# Patient Record
Sex: FEMALE | Race: WHITE | Employment: UNEMPLOYED | URBAN - METROPOLITAN AREA
[De-identification: names, ages, dates, MRNs, and addresses within clinical notes are randomized per-mention and may not be internally consistent; named-entity substitution may affect disease eponyms.]

---

## 2017-01-06 ENCOUNTER — OFFICE VISIT (OUTPATIENT)
Dept: FAMILY MEDICINE CLINIC | Age: 62
End: 2017-01-06

## 2017-01-06 ENCOUNTER — DOCUMENTATION ONLY (OUTPATIENT)
Dept: FAMILY MEDICINE CLINIC | Age: 62
End: 2017-01-06

## 2017-01-06 VITALS
HEIGHT: 64 IN | WEIGHT: 130.6 LBS | SYSTOLIC BLOOD PRESSURE: 152 MMHG | HEART RATE: 74 BPM | OXYGEN SATURATION: 92 % | DIASTOLIC BLOOD PRESSURE: 70 MMHG | RESPIRATION RATE: 20 BRPM | BODY MASS INDEX: 22.3 KG/M2 | TEMPERATURE: 98.2 F

## 2017-01-06 DIAGNOSIS — Z95.2 S/P AVR (AORTIC VALVE REPLACEMENT): Primary | ICD-10-CM

## 2017-01-06 DIAGNOSIS — Z79.01 ON WARFARIN THERAPY: ICD-10-CM

## 2017-01-06 DIAGNOSIS — J44.1 COPD EXACERBATION (HCC): ICD-10-CM

## 2017-01-06 LAB
INR BLD: 1.8
PT POC: 22 SEC
VALID INTERNAL CONTROL?: YES

## 2017-01-06 RX ORDER — AZITHROMYCIN 250 MG/1
TABLET, FILM COATED ORAL
Qty: 6 TAB | Refills: 0 | Status: SHIPPED | OUTPATIENT
Start: 2017-01-06 | End: 2017-02-21 | Stop reason: ALTCHOICE

## 2017-01-06 RX ORDER — WARFARIN 2 MG/1
2 TABLET ORAL DAILY
Qty: 30 TAB | Refills: 5 | Status: SHIPPED | OUTPATIENT
Start: 2017-01-06 | End: 2017-06-08 | Stop reason: SDUPTHER

## 2017-01-06 NOTE — PROGRESS NOTES
Pt here for PT INR   Also c/o cough, congestion and sore throat for past two days   Results for orders placed or performed in visit on 01/06/17   AMB POC PT/INR   Result Value Ref Range    VALID INTERNAL CONTROL POC Yes     Prothrombin time (POC) 22.0 sec    INR POC 1.8

## 2017-01-06 NOTE — PROGRESS NOTES
Jesus Jacobsen is a 63 y.o. female   Chief Complaint   Patient presents with    Coagulation disorder    Cold Symptoms   Pt states she is having a lot of coughing and congestion. Very little production. Pt states dark yellow when it does come up. Pt breathing is also getting a little more difficult but nothing severe. PA for symbicort has been completed. Chief Complaint   Patient presents with    Coagulation disorder    Cold Symptoms     she is a 63y.o. year old female who presents for evalution. Reviewed PmHx, RxHx, FmHx, SocHx, AllgHx and updated and dated in the chart. Review of Systems - negative except as listed above in the HPI    Objective:     Vitals:    01/06/17 1555   BP: 152/70   Pulse: 74   Resp: 20   Temp: 98.2 °F (36.8 °C)   TempSrc: Oral   SpO2: 92%   Weight: 130 lb 9.6 oz (59.2 kg)   Height: 5' 4\" (1.626 m)       Current Outpatient Prescriptions   Medication Sig    warfarin (COUMADIN) 2 mg tablet Take 1 Tab by mouth daily.  azithromycin (ZITHROMAX) 250 mg tablet Take 2 tablets today, then take 1 tablet daily    lovastatin (MEVACOR) 10 mg tablet TAKE 1 TABLET EVERY NIGHT    oxyCODONE-acetaminophen (PERCOCET) 5-325 mg per tablet Take 1 Tab by mouth every six (6) hours as needed for Pain. Max Daily Amount: 4 Tabs.  enoxaparin (LOVENOX) 60 mg/0.6 mL injection 60 mg by SubCUTAneous route every twelve (12) hours.  ferrous sulfate (SLOW FE) 142 mg (45 mg iron) ER tablet Take 1 Tab by mouth two (2) times daily (with meals).  famotidine (PEPCID) 40 mg tablet Take 1 Tab by mouth two (2) times a day.  docusate sodium (COLACE) 100 mg capsule Take 100 mg by mouth two (2) times daily as needed for Constipation.  budesonide-formoterol (SYMBICORT) 160-4.5 mcg/actuation HFA inhaler Take 2 Puffs by inhalation two (2) times a day.  predniSONE (DELTASONE) 5 mg tablet TAKE 1 TABLET EVERY DAY    bumetanide (BUMEX) 1 mg tablet Take 1 Tab by mouth daily.     diltiazem (CARDIZEM) 60 mg tablet Take 1 Tab by mouth Before breakfast, lunch, dinner and at bedtime.  alendronate (FOSAMAX) 70 mg tablet Take 1 Tab by mouth every Wednesday.  albuterol (PROVENTIL VENTOLIN) 2.5 mg /3 mL (0.083 %) nebulizer solution 3 mL by Nebulization route every four (4) hours as needed for Wheezing or Shortness of Breath.  albuterol (VENTOLIN HFA) 90 mcg/actuation inhaler Take 2 Puffs by inhalation every four (4) hours as needed for Wheezing or Shortness of Breath. No current facility-administered medications for this visit. Physical Examination: General appearance - alert, well appearing, and in no distress  Eyes - pupils equal and reactive, extraocular eye movements intact  Chest - coarse b/l  Heart - normal rate, regular rhythm, normal S1, S2, no murmurs, rubs, clicks or gallops      Assessment/ Plan:   Chayito Contreras was seen today for coagulation disorder and cold symptoms. Diagnoses and all orders for this visit:    S/P AVR (aortic valve replacement)  -     warfarin (COUMADIN) 2 mg tablet; Take 1 Tab by mouth daily. On warfarin therapy  -     AMB POC PT/INR    COPD exacerbation (HCC)  -     azithromycin (ZITHROMAX) 250 mg tablet; Take 2 tablets today, then take 1 tablet daily     pt to check inr in 1 week and call with result if does not have testing strips will need appt. Follow-up Disposition:  Return in about 1 week (around 1/13/2017), or if symptoms worsen or fail to improve. I have discussed the diagnosis with the patient and the intended plan as seen in the above orders. The patient has received an after-visit summary and questions were answered concerning future plans. Pt conveyed understanding of plan.     Medication Side Effects and Warnings were discussed with patient      Leisa Mccoy DO

## 2017-01-06 NOTE — PATIENT INSTRUCTIONS
Azithromycin (By mouth)   Azithromycin ([id number])  Treats infections. This medicine is a macrolide antibiotic. Brand Name(s):Zithromax, Zithromax Tri-Rodger, Zithromax Z-Rodger, Zmax   There may be other brand names for this medicine. When This Medicine Should Not Be Used: This medicine is not right for everyone. Do not use it if you had an allergic reaction to azithromycin, erythromycin, or any other macrolide or ketolide antibiotic (such as clarithromycin, telithromycin), or if you have a history of liver problems caused by azithromycin. How to Use This Medicine:   Capsule, Liquid, Packet, Powder, Tablet  · Your doctor will tell you how much medicine to use. Do not use more than directed. · Read and follow the patient instructions that come with this medicine. Talk to your doctor or pharmacist if you have any questions. · Multiple dose (Zithromax® oral liquid or tablets):   ¨ You may take this medicine with or without food. ¨ Take all of the medicine in your prescription to clear up your infection, even if you feel better after the first few doses. ¨ Shake the bottle well before you measure the medicine. Measure the oral liquid medicine with a marked measuring spoon, oral syringe, or medicine cup. · Single dose (Zmax® extended-release oral liquid or powder):   ¨ Liquid:   § Take this medicine on an empty stomach at least 1 hour before you eat, or 2 hours after you eat. § Call your doctor right away if you vomit within 1 hour after you take the medicine. § You must take the liquid within 12 hours after the pharmacist gives it to you. § Shake the bottle well before you measure the medicine. Measure your dose with a marked measuring spoon, cup, or syringe. ¨ Powder:   § Open 1 packet and pour all of the medicine into a glass with about 2 ounces (¼ cup) of water. Mix well and drink the medicine right away.  Pour another 2 ounces of water into the same glass, and drink the remaining medicine. · Missed dose: If you are taking multiple doses, take the dose as soon as you remember. If it is almost time for your next dose, wait until then to take a regular dose. Do not use extra medicine to make up for a missed dose. · Store the medicine in a closed container at room temperature, away from heat, moisture, and direct light. · Oral liquid for multiple doses: Store at room temperature or in the refrigerator. Use it within 10 days of filling the prescription. · Oral liquid for 1 dose only: Store at room temperature. Do not store in the refrigerator or allow the medicine to freeze. · Extended-release oral liquid: Do not refrigerate or freeze. Drugs and Foods to Avoid:   Ask your doctor or pharmacist before using any other medicine, including over-the-counter medicines, vitamins, and herbal products. · Some medicines can affect how azithromycin works. Tell your doctor if you are also using cyclosporine, digoxin, nelfinavir, an ergot medicine, medicine for a heart rhythm problem, medicine for seizures, or a blood thinner. · Zithromax® for multiple doses: Do not take an antacid that contains magnesium or aluminum at the same time you take Zithromax®. An antacid will affect how the medicine works. Antacids will not affect Zmax® for single dose. Warnings While Using This Medicine:   · Tell your doctor if you are pregnant or breastfeeding, or if you have kidney disease, liver disease, heart disease, heart rhythm problems, heart failure, low potassium or magnesium in the blood, or myasthenia gravis. Tell your doctor if anyone in your family has heart rhythm problems. · This medicine may cause the following problems:   ¨ Liver problems  ¨ Heart rhythm problems  · This medicine can cause diarrhea. Call your doctor if the diarrhea becomes severe, does not stop, or is bloody. Do not take any medicine to stop diarrhea until you have talked to your doctor.  Diarrhea can occur 2 months or more after you stop taking this medicine. It may occur 2 months or more after you stop using this medicine. · Call your doctor if your symptoms do not improve or if they get worse. · Keep all medicine out of the reach of children. Never share your medicine with anyone. Possible Side Effects While Using This Medicine:   Call your doctor right away if you notice any of these side effects:  · Allergic reaction: Itching or hives, swelling in your face or hands, swelling or tingling in your mouth or throat, chest tightness, trouble breathing  · Blistering, peeling, or red skin rash  · Dark urine, pale stools, nausea, vomiting, loss of appetite, stomach pain, yellow skin or eyes  · Double vision, unusual tiredness or weakness  · Fainting, dizziness, lightheadedness  · Fast, pounding, or uneven heartbeat, chest pain  · Severe diarrhea that may contain blood, stomach cramps, fever  If you notice these less serious side effects, talk with your doctor:   · Mild diarrhea, nausea, vomiting, stomach pain  If you notice other side effects that you think are caused by this medicine, tell your doctor. Call your doctor for medical advice about side effects. You may report side effects to FDA at 7-817-FDA-4105  © 2016 [address] is for End User's use only and may not be sold, redistributed or otherwise used for commercial purposes. The above information is an  only. It is not intended as medical advice for individual conditions or treatments. Talk to your doctor, nurse or pharmacist before following any medical regimen to see if it is safe and effective for you.

## 2017-01-09 RX ORDER — FLUTICASONE FUROATE AND VILANTEROL 100; 25 UG/1; UG/1
1 POWDER RESPIRATORY (INHALATION) DAILY
Qty: 1 INHALER | Refills: 5 | Status: SHIPPED | OUTPATIENT
Start: 2017-01-09 | End: 2017-02-21

## 2017-02-13 ENCOUNTER — TELEPHONE (OUTPATIENT)
Dept: FAMILY MEDICINE CLINIC | Age: 62
End: 2017-02-13

## 2017-02-13 NOTE — TELEPHONE ENCOUNTER
[facility] called. They need an end date for the INR supplies. Please fax to [facility]. Info that was provided before.

## 2017-02-14 NOTE — TELEPHONE ENCOUNTER
Spoke with rep stated they know pt's med hx and aware inr supplies are life long; no further action required at this time. stated will call office back if further documentation is needed.

## 2017-02-21 ENCOUNTER — OFFICE VISIT (OUTPATIENT)
Dept: FAMILY MEDICINE CLINIC | Age: 62
End: 2017-02-21

## 2017-02-21 VITALS
DIASTOLIC BLOOD PRESSURE: 62 MMHG | TEMPERATURE: 98.2 F | OXYGEN SATURATION: 97 % | BODY MASS INDEX: 21.7 KG/M2 | SYSTOLIC BLOOD PRESSURE: 122 MMHG | RESPIRATION RATE: 18 BRPM | HEART RATE: 61 BPM | WEIGHT: 126.4 LBS

## 2017-02-21 DIAGNOSIS — J43.9 PULMONARY EMPHYSEMA, UNSPECIFIED EMPHYSEMA TYPE (HCC): ICD-10-CM

## 2017-02-21 DIAGNOSIS — M54.50 CHRONIC MIDLINE LOW BACK PAIN WITHOUT SCIATICA: Primary | ICD-10-CM

## 2017-02-21 DIAGNOSIS — G89.29 CHRONIC MIDLINE LOW BACK PAIN WITHOUT SCIATICA: Primary | ICD-10-CM

## 2017-02-21 DIAGNOSIS — S32.000S LUMBAR COMPRESSION FRACTURE, SEQUELA: ICD-10-CM

## 2017-02-21 DIAGNOSIS — Z23 ENCOUNTER FOR IMMUNIZATION: ICD-10-CM

## 2017-02-21 RX ORDER — OXYCODONE AND ACETAMINOPHEN 5; 325 MG/1; MG/1
1 TABLET ORAL
Qty: 120 TAB | Refills: 0 | Status: SHIPPED | OUTPATIENT
Start: 2017-02-21 | End: 2017-03-29 | Stop reason: SDUPTHER

## 2017-02-21 NOTE — PATIENT INSTRUCTIONS

## 2017-02-21 NOTE — PROGRESS NOTES
Ksenia Finch is a 63 y.o. female   Chief Complaint   Patient presents with    Follow-up    pt here for refill of her pain medication for her chronic back pain from her compression fracture. Pt states the med is working well for her pain. Pt also would like an inhaler sample states that she is currently out of symbicort and has on order with mail order. she is a 63y.o. year old female who presents for evalution. Reviewed PmHx, RxHx, FmHx, SocHx, AllgHx and updated and dated in the chart. Review of Systems - negative except as listed above in the HPI    Objective:     Vitals:    02/21/17 1551   BP: 122/62   Pulse: 61   Resp: 18   Temp: 98.2 °F (36.8 °C)   TempSrc: Oral   SpO2: 97%   Weight: 126 lb 6.4 oz (57.3 kg)   Height: (P) 5' 4\" (1.626 m)       Current Outpatient Prescriptions   Medication Sig    oxyCODONE-acetaminophen (PERCOCET) 5-325 mg per tablet Take 1 Tab by mouth every six (6) hours as needed for Pain. Max Daily Amount: 4 Tabs.  warfarin (COUMADIN) 2 mg tablet Take 1 Tab by mouth daily.  lovastatin (MEVACOR) 10 mg tablet TAKE 1 TABLET EVERY NIGHT    enoxaparin (LOVENOX) 60 mg/0.6 mL injection 60 mg by SubCUTAneous route every twelve (12) hours.  ferrous sulfate (SLOW FE) 142 mg (45 mg iron) ER tablet Take 1 Tab by mouth two (2) times daily (with meals).  famotidine (PEPCID) 40 mg tablet Take 1 Tab by mouth two (2) times a day.  docusate sodium (COLACE) 100 mg capsule Take 100 mg by mouth two (2) times daily as needed for Constipation.  budesonide-formoterol (SYMBICORT) 160-4.5 mcg/actuation HFA inhaler Take 2 Puffs by inhalation two (2) times a day.  bumetanide (BUMEX) 1 mg tablet Take 1 Tab by mouth daily.  diltiazem (CARDIZEM) 60 mg tablet Take 1 Tab by mouth Before breakfast, lunch, dinner and at bedtime.  alendronate (FOSAMAX) 70 mg tablet Take 1 Tab by mouth every Wednesday.     albuterol (PROVENTIL VENTOLIN) 2.5 mg /3 mL (0.083 %) nebulizer solution 3 mL by Nebulization route every four (4) hours as needed for Wheezing or Shortness of Breath.  albuterol (VENTOLIN HFA) 90 mcg/actuation inhaler Take 2 Puffs by inhalation every four (4) hours as needed for Wheezing or Shortness of Breath. No current facility-administered medications for this visit. Physical Examination: General appearance - alert, well appearing, and in no distress  Eyes - pupils equal and reactive, extraocular eye movements intact  Chest - wheezing noted b/l  Heart - s1s2 regular with audible click from the valve  Back exam - limited range of motion, pain with motion noted during exam      Assessment/ Plan:   Barrett Gutierrez was seen today for follow-up. Diagnoses and all orders for this visit:    Chronic midline low back pain without sciatica  -     oxyCODONE-acetaminophen (PERCOCET) 5-325 mg per tablet; Take 1 Tab by mouth every six (6) hours as needed for Pain. Max Daily Amount: 4 Tabs. Lumbar compression fracture, sequela  -     oxyCODONE-acetaminophen (PERCOCET) 5-325 mg per tablet; Take 1 Tab by mouth every six (6) hours as needed for Pain. Max Daily Amount: 4 Tabs. Encounter for immunization  -     Pneumococcal polysaccharide vaccine, 23-valent, adult or immunosuppressed pt dose  -     CO IMMUNIZ ADMIN,1 SINGLE/COMB VAC/TOXOID    Pulmonary emphysema, unspecified emphysema type ([facility] [address]     stiolto sample given advised to stop once she gets symbicort if better control than symbicort will switch to stiolto  Follow-up Disposition:  Return in about 1 month (around 3/21/2017), or if symptoms worsen or fail to improve. I have discussed the diagnosis with the patient and the intended plan as seen in the above orders. The patient has received an after-visit summary and questions were answered concerning future plans. Pt conveyed understanding of plan.     Medication Side Effects and Warnings were discussed with patient      Kavya Montesinos, DO

## 2017-02-23 ENCOUNTER — DOCUMENTATION ONLY (OUTPATIENT)
Dept: FAMILY MEDICINE CLINIC | Age: 62
End: 2017-02-23

## 2017-03-29 ENCOUNTER — DOCUMENTATION ONLY (OUTPATIENT)
Dept: FAMILY MEDICINE CLINIC | Age: 62
End: 2017-03-29

## 2017-03-29 ENCOUNTER — OFFICE VISIT (OUTPATIENT)
Dept: FAMILY MEDICINE CLINIC | Age: 62
End: 2017-03-29

## 2017-03-29 VITALS
TEMPERATURE: 98.2 F | HEIGHT: 64 IN | OXYGEN SATURATION: 97 % | RESPIRATION RATE: 18 BRPM | BODY MASS INDEX: 21.34 KG/M2 | SYSTOLIC BLOOD PRESSURE: 122 MMHG | WEIGHT: 125 LBS | DIASTOLIC BLOOD PRESSURE: 70 MMHG | HEART RATE: 83 BPM

## 2017-03-29 DIAGNOSIS — Z95.2 S/P AVR (AORTIC VALVE REPLACEMENT): ICD-10-CM

## 2017-03-29 DIAGNOSIS — E87.6 HYPOKALEMIA: ICD-10-CM

## 2017-03-29 DIAGNOSIS — M54.50 CHRONIC MIDLINE LOW BACK PAIN WITHOUT SCIATICA: Primary | ICD-10-CM

## 2017-03-29 DIAGNOSIS — G89.29 CHRONIC MIDLINE LOW BACK PAIN WITHOUT SCIATICA: Primary | ICD-10-CM

## 2017-03-29 DIAGNOSIS — Z51.81 MEDICATION MONITORING ENCOUNTER: ICD-10-CM

## 2017-03-29 DIAGNOSIS — S32.000S LUMBAR COMPRESSION FRACTURE, SEQUELA: ICD-10-CM

## 2017-03-29 DIAGNOSIS — D50.9 IRON DEFICIENCY ANEMIA, UNSPECIFIED IRON DEFICIENCY ANEMIA TYPE: ICD-10-CM

## 2017-03-29 RX ORDER — OXYCODONE AND ACETAMINOPHEN 5; 325 MG/1; MG/1
1 TABLET ORAL
Qty: 120 TAB | Refills: 0 | Status: SHIPPED | OUTPATIENT
Start: 2017-03-29 | End: 2017-05-05 | Stop reason: SDUPTHER

## 2017-03-29 NOTE — PATIENT INSTRUCTIONS
Warfarin (By mouth)   Warfarin (WAR-far-in)  Prevents and treats blood clots. May lower the risk of serious complications after a heart attack. This medicine is a blood thinner. Brand Name(s):Ben Douglas   There may be other brand names for this medicine. When This Medicine Should Not Be Used: This medicine is not right for everyone. Do not use it if you had an allergic reaction to warfarin, if you are pregnant, or if you have health problems that could cause bleeding. How to Use This Medicine:   Tablet  · Take your medicine as directed. Your dose may need to be changed several times to find what works best for you. · This medicine should come with a Medication Guide. Ask your pharmacist for a copy if you do not have one. · Missed dose: Take a dose as soon as you remember. If it is almost time for your next dose, wait until then and take a regular dose. Do not take extra medicine to make up for a missed dose. · Store the medicine in a closed container at room temperature, away from heat, moisture, and direct light. Drugs and Foods to Avoid:   Ask your doctor or pharmacist before using any other medicine, including over-the-counter medicines, vitamins, and herbal products. · Many medicines and foods can affect how warfarin works and may affect the PT/INR test results.  Tell your doctor before you start or stop any medicine, especially the following:   ¨ Ginkgo, echinacea, goldenseal, or Ladi's wort  ¨ Another blood thinner, including apixaban, argatroban, bivalirudin, cilostazol, clopidogrel, dabigatran, desirudin, dipyridamole, heparin, lepirudin, prasugrel, rivaroxaban, ticlopidine  ¨ Medicine to treat depression or anxiety, including citalopram, desvenlafaxine, duloxetine, escitalopram, fluoxetine, fluvoxamine, milnacipran, paroxetine, sertraline, venlafaxine, vilazodone  ¨ Medicine to treat an infection  ¨ NSAID pain or arthritis medicine, including aspirin, celecoxib, diclofenac, diflunisal, fenoprofen, ibuprofen, indomethacin, ketoprofen, ketorolac, mefenamic acid, naproxen, oxaprozin, piroxicam, sulindac. Check labels for over-the-counter medicines to find out if they contain an NSAID. ¨ Steroid medicine, including dexamethasone, hydrocortisone, methylprednisolone, prednisolone, prednisone  · Warfarin works best if you eat about the same amount of vitamin K every day. Foods high in vitamin K include asparagus, broccoli, brussels sprouts, cabbage, green leafy vegetables, plums, rhubarb, and canola oil. Talk to your doctor before you make changes to your normal diet. Warnings While Using This Medicine:   · It is not safe to take this medicine during pregnancy. It could harm an unborn baby. Tell your doctor right away if you become pregnant. Use an effective form of birth control to keep from getting pregnant during treatment and for at least 1 month after your last dose. · Tell your doctor if you are breastfeeding, or if you have kidney disease, liver disease, diabetes, heart disease, heart failure, high blood pressure, an infection, a stomach ulcer, or protein C deficiency. Also tell your doctor if you had recent surgery or an injury, or a history of stroke, anemia, severe bleeding or bruising, or problems caused by heparin use. · This medicine may cause the following problems:   ¨ Bleeding, which may be life-threatening  ¨ Gangrene (skin or tissue damage)  ¨ Purple toes syndrome  · You must have a PT/INR blood test while you use this medicine to check how well your blood is clotting. Your doctor will use the test results to make sure the medicine is working properly. Keep all appointments for the PT/INR blood tests. · You may bleed or bruise more easily with warfarin. To prevent injury or cuts, do not play rough sports, be careful with sharp objects, and brush and floss your teeth gently. Alissa Bunting your nose gently, and do not pick your nose.   · Carry an ID card or wear a medical alert bracelet to let emergency caregivers know that you use warfarin. · Tell any doctor or dentist who treats you that you are using this medicine. You may need to stop using this medicine several days before you have surgery or medical tests. · Keep all medicine out of the reach of children. Never share your medicine with anyone. Possible Side Effects While Using This Medicine:   Call your doctor right away if you notice any of these side effects:  · Allergic reaction: Itching or hives, swelling in your face or hands, swelling or tingling in your mouth or throat, chest tightness, trouble breathing  · Bleeding from your gums or nose, coughing up blood, heavy monthly periods  · Bleeding that does not stop, bruising, or weakness  · Dizziness, fainting, or lightheadedness  · Pain, brown or black skin, or skin that is cool to the touch  · Purple toes or feet, or new pain in your leg, foot, or toes  · Red or dark brown urine, or red or black, tarry stools  · Vomiting blood or material that looks like coffee grounds  If you notice other side effects that you think are caused by this medicine, tell your doctor. Call your doctor for medical advice about side effects. You may report side effects to FDA at 7-510-FDA-8116  © 2016 [address] is for End User's use only and may not be sold, redistributed or otherwise used for commercial purposes. The above information is an  only. It is not intended as medical advice for individual conditions or treatments. Talk to your doctor, nurse or pharmacist before following any medical regimen to see if it is safe and effective for you.

## 2017-03-29 NOTE — MR AVS SNAPSHOT
Visit Information Date & Time Provider Department Dept. Phone Encounter #  
 3/29/2017  7:00 AM [address] [facility], [place] AUDI Crowley [phone] [id number] Follow-up Instructions Return in about 1 month (around 4/29/2017), or if symptoms worsen or fail to improve. Your Appointments 6/12/2017  9:00 AM  
ECHO CARDIOGRAMS 2D with ECHO, STFRANCIS  
[facility] (WENDI PEGUERO) Appt Note: 6 MO FUP ECHO AT 9  [facility] [address] [facility]  
[phone]  
  
   
 [address] [facility]  
  
    
 6/12/2017 10:00 AM  
ESTABLISHED PATIENT with Clayton Hernandez MD  
[facility] (Payal Goddard) Appt Note: 6 MO FUP ECHO AT 9  [facility] [address] [facility]  
[address] [facility] Upcoming Health Maintenance Date Due DTaP/Tdap/Td series (1 - Tdap) 3/13/1978 PAP AKA CERVICAL CYTOLOGY 3/13/1978 ZOSTER VACCINE AGE 60> 3/13/2017 BREAST CANCER SCRN MAMMOGRAM 2/24/2018 COLONOSCOPY 11/28/2019 Allergies as of 3/29/2017  Review Complete On: 3/29/2017 By: [address] [facility], DO Severity Noted Reaction Type Reactions Spiriva Respimat [Tiotropium Bromide] High 01/08/2015   Systemic Anaphylaxis, Other (comments) Adverse effects; Per RN - pt states that Spiriva caused throat swelling and could not breathe well. Celebrex [Celecoxib]  01/08/2015   Intolerance Rash Tomato  01/08/2015   Intolerance Rash Current Immunizations  Reviewed on 2/21/2017 Name Date Influenza Vaccine (Quad) PF 10/21/2016, 11/13/2015 Pneumococcal Conjugate (PCV-13) 2/18/2016 Pneumococcal Polysaccharide (PPSV-23) 2/21/2017 Not reviewed this visit You Were Diagnosed With   
  
 Codes Comments Chronic midline low back pain without sciatica    -  Primary ICD-10-CM: M54.5, G89.29 ICD-9-CM: 724.2, 338.29 Lumbar compression fracture, sequela     ICD-10-CM: S32.000S ICD-9-CM: 905.1 Medication monitoring encounter     ICD-10-CM: Z51.81 
ICD-9-CM: V58.83 Hypokalemia     ICD-10-CM: E87.6 ICD-9-CM: 276.8 Iron deficiency anemia, unspecified iron deficiency anemia type     ICD-10-CM: D50.9 ICD-9-CM: 280.9 S/P AVR (aortic valve replacement)     ICD-10-CM: D79.6 ICD-9-CM: V43.3 Vitals BP Pulse Temp Resp Height(growth percentile) Weight(growth percentile) 122/70 83 98.2 °F (36.8 °C) (Oral) 18 5' 4\" (1.626 m) 125 lb (56.7 kg) SpO2 BMI OB Status Smoking Status 97% 21.46 kg/m2 Hysterectomy Current Every Day Smoker Vitals History BMI and BSA Data Body Mass Index Body Surface Area  
 21.46 kg/m 2 1.6 m 2 Preferred Pharmacy Pharmacy Name Phone [facility] [place], [address] [phone] Your Updated Medication List  
  
   
This list is accurate as of: 3/29/17  7:41 AM.  Always use your most recent med list.  
  
  
  
  
 * albuterol 90 mcg/actuation inhaler Commonly known as:  VENTOLIN HFA Take 2 Puffs by inhalation every four (4) hours as needed for Wheezing or Shortness of Breath. * albuterol 2.5 mg /3 mL (0.083 %) nebulizer solution Commonly known as:  PROVENTIL VENTOLIN  
3 mL by Nebulization route every four (4) hours as needed for Wheezing or Shortness of Breath. alendronate 70 mg tablet Commonly known as:  FOSAMAX Take 1 Tab by mouth every Wednesday. budesonide-formoterol 160-4.5 mcg/actuation HFA inhaler Commonly known as:  SYMBICORT Take 2 Puffs by inhalation two (2) times a day. bumetanide 1 mg tablet Commonly known as:  Assunta Brink Take 1 Tab by mouth daily. dilTIAZem 60 mg tablet Commonly known as:  CARDIZEM  
 Take 1 Tab by mouth Before breakfast, lunch, dinner and at bedtime. docusate sodium 100 mg capsule Commonly known as:  Ltanya Rahat Take 100 mg by mouth two (2) times daily as needed for Constipation. famotidine 40 mg tablet Commonly known as:  PEPCID Take 1 Tab by mouth two (2) times a day. ferrous sulfate 142 mg (45 mg iron) ER tablet Commonly known as:  SLOW FE Take 1 Tab by mouth two (2) times daily (with meals). lovastatin 10 mg tablet Commonly known as:  MEVACOR  
TAKE 1 TABLET EVERY NIGHT  
  
 LOVENOX 60 mg/0.6 mL injection Generic drug:  enoxaparin 60 mg by SubCUTAneous route every twelve (12) hours. oxyCODONE-acetaminophen 5-325 mg per tablet Commonly known as:  PERCOCET Take 1 Tab by mouth every six (6) hours as needed for Pain. Max Daily Amount: 4 Tabs. warfarin 2 mg tablet Commonly known as:  COUMADIN Take 1 Tab by mouth daily. * Notice: This list has 2 medication(s) that are the same as other medications prescribed for you. Read the directions carefully, and ask your doctor or other care provider to review them with you. Prescriptions Printed Refills  
 oxyCODONE-acetaminophen (PERCOCET) 5-325 mg per tablet 0 Sig: Take 1 Tab by mouth every six (6) hours as needed for Pain. Max Daily Amount: 4 Tabs. Class: Print Route: Oral  
  
We Performed the Following CBC WITH AUTOMATED DIFF [15524 CPT(R)] [address] [place] [address] MONITORING [[id number] Custom] METABOLIC PANEL, BASIC [12047 CPT(R)] PROTHROMBIN TIME + INR [07973 CPT(R)] Follow-up Instructions Return in about 1 month (around 4/29/2017), or if symptoms worsen or fail to improve. Patient Instructions Warfarin (By mouth) Warfarin (WAR-far-in) Prevents and treats blood clots. May lower the risk of serious complications after a heart attack. This medicine is a blood thinner. Brand Name(s):Coumadin, John Paul and J Luis There may be other brand names for this medicine. When This Medicine Should Not Be Used: This medicine is not right for everyone. Do not use it if you had an allergic reaction to warfarin, if you are pregnant, or if you have health problems that could cause bleeding. How to Use This Medicine:  
Tablet · Take your medicine as directed. Your dose may need to be changed several times to find what works best for you. · This medicine should come with a Medication Guide. Ask your pharmacist for a copy if you do not have one. · Missed dose: Take a dose as soon as you remember. If it is almost time for your next dose, wait until then and take a regular dose. Do not take extra medicine to make up for a missed dose. · Store the medicine in a closed container at room temperature, away from heat, moisture, and direct light. Drugs and Foods to Avoid: Ask your doctor or pharmacist before using any other medicine, including over-the-counter medicines, vitamins, and herbal products. · Many medicines and foods can affect how warfarin works and may affect the PT/INR test results. Tell your doctor before you start or stop any medicine, especially the following: ¨ Ginkgo, echinacea, goldenseal, or Ladi's wort ¨ Another blood thinner, including apixaban, argatroban, bivalirudin, cilostazol, clopidogrel, dabigatran, desirudin, dipyridamole, heparin, lepirudin, prasugrel, rivaroxaban, ticlopidine ¨ Medicine to treat depression or anxiety, including citalopram, desvenlafaxine, duloxetine, escitalopram, fluoxetine, fluvoxamine, milnacipran, paroxetine, sertraline, venlafaxine, vilazodone ¨ Medicine to treat an infection ¨ NSAID pain or arthritis medicine, including aspirin, celecoxib, diclofenac, diflunisal, fenoprofen, ibuprofen, indomethacin, ketoprofen, ketorolac, mefenamic acid, naproxen, oxaprozin, piroxicam, sulindac. Check labels for over-the-counter medicines to find out if they contain an NSAID. ¨ Steroid medicine, including dexamethasone, hydrocortisone, methylprednisolone, prednisolone, prednisone · Warfarin works best if you eat about the same amount of vitamin K every day. Foods high in vitamin K include asparagus, broccoli, brussels sprouts, cabbage, green leafy vegetables, plums, rhubarb, and canola oil. Talk to your doctor before you make changes to your normal diet. Warnings While Using This Medicine: · It is not safe to take this medicine during pregnancy. It could harm an unborn baby. Tell your doctor right away if you become pregnant. Use an effective form of birth control to keep from getting pregnant during treatment and for at least 1 month after your last dose. · Tell your doctor if you are breastfeeding, or if you have kidney disease, liver disease, diabetes, heart disease, heart failure, high blood pressure, an infection, a stomach ulcer, or protein C deficiency. Also tell your doctor if you had recent surgery or an injury, or a history of stroke, anemia, severe bleeding or bruising, or problems caused by heparin use. · This medicine may cause the following problems: ¨ Bleeding, which may be life-threatening ¨ Gangrene (skin or tissue damage) ¨ Purple toes syndrome · You must have a PT/INR blood test while you use this medicine to check how well your blood is clotting. Your doctor will use the test results to make sure the medicine is working properly. Keep all appointments for the PT/INR blood tests. · You may bleed or bruise more easily with warfarin. To prevent injury or cuts, do not play rough sports, be careful with sharp objects, and brush and floss your teeth gently. Marleta Morales your nose gently, and do not pick your nose. · Carry an ID card or wear a medical alert bracelet to let emergency caregivers know that you use warfarin. · Tell any doctor or dentist who treats you that you are using this medicine.  You may need to stop using this medicine several days before you have surgery or medical tests. · Keep all medicine out of the reach of children. Never share your medicine with anyone. Possible Side Effects While Using This Medicine:  
Call your doctor right away if you notice any of these side effects: · Allergic reaction: Itching or hives, swelling in your face or hands, swelling or tingling in your mouth or throat, chest tightness, trouble breathing · Bleeding from your gums or nose, coughing up blood, heavy monthly periods · Bleeding that does not stop, bruising, or weakness · Dizziness, fainting, or lightheadedness · Pain, brown or black skin, or skin that is cool to the touch · Purple toes or feet, or new pain in your leg, foot, or toes · Red or dark brown urine, or red or black, tarry stools · Vomiting blood or material that looks like coffee grounds If you notice other side effects that you think are caused by this medicine, tell your doctor. Call your doctor for medical advice about side effects. You may report side effects to FDA at 9-137-HUQ-8584 © 2016 [address] is for End User's use only and may not be sold, redistributed or otherwise used for commercial purposes. The above information is an  only. It is not intended as medical advice for individual conditions or treatments. Talk to your doctor, nurse or pharmacist before following any medical regimen to see if it is safe and effective for you. Introducing [facility]! Dear Kathya Carpenter: 
Thank you for requesting a [employer] account. Our records indicate that you already have an active [employer] account. You can access your account anytime at [email or web address] Did you know that you can access your hospital and ER discharge instructions at any time in [employer]? You can also review all of your test results from your hospital stay or ER visit. Additional Information If you have questions, please visit the Frequently Asked Questions section of the [employer] website at [email or web address] Remember, [employer] is NOT to be used for urgent needs. For medical emergencies, dial 911. Now available from your iPhone and Android! Please provide this summary of care documentation to your next provider. Your primary care clinician is listed as Kavya Reynoso. If you have any questions after today's visit, please call [phone].

## 2017-04-02 LAB
BASOPHILS # BLD AUTO: 0 X10E3/UL (ref 0–0.2)
BASOPHILS NFR BLD AUTO: 0 %
BUN SERPL-MCNC: 13 MG/DL (ref 8–27)
BUN/CREAT SERPL: 16 (ref 11–26)
CALCIUM SERPL-MCNC: 9.6 MG/DL (ref 8.7–10.3)
CHLORIDE SERPL-SCNC: 88 MMOL/L (ref 96–106)
CO2 SERPL-SCNC: 33 MMOL/L (ref 18–29)
CREAT SERPL-MCNC: 0.8 MG/DL (ref 0.57–1)
DRUGS UR: NORMAL
EOSINOPHIL # BLD AUTO: 0.1 X10E3/UL (ref 0–0.4)
EOSINOPHIL NFR BLD AUTO: 1 %
ERYTHROCYTE [DISTWIDTH] IN BLOOD BY AUTOMATED COUNT: 17.7 % (ref 12.3–15.4)
GLUCOSE SERPL-MCNC: 86 MG/DL (ref 65–99)
HCT VFR BLD AUTO: 41.9 % (ref 34–46.6)
HGB BLD-MCNC: 13.6 G/DL (ref 11.1–15.9)
IMM GRANULOCYTES # BLD: 0 X10E3/UL (ref 0–0.1)
IMM GRANULOCYTES NFR BLD: 0 %
INR PPP: 1 (ref 0.8–1.2)
LYMPHOCYTES # BLD AUTO: 1.7 X10E3/UL (ref 0.7–3.1)
LYMPHOCYTES NFR BLD AUTO: 16 %
MCH RBC QN AUTO: 29.6 PG (ref 26.6–33)
MCHC RBC AUTO-ENTMCNC: 32.5 G/DL (ref 31.5–35.7)
MCV RBC AUTO: 91 FL (ref 79–97)
MONOCYTES # BLD AUTO: 1.1 X10E3/UL (ref 0.1–0.9)
MONOCYTES NFR BLD AUTO: 10 %
NEUTROPHILS # BLD AUTO: 7.9 X10E3/UL (ref 1.4–7)
NEUTROPHILS NFR BLD AUTO: 73 %
PLATELET # BLD AUTO: 238 X10E3/UL (ref 150–379)
POTASSIUM SERPL-SCNC: 2.8 MMOL/L (ref 3.5–5.2)
PROTHROMBIN TIME: 10.6 SEC (ref 9.1–12)
RBC # BLD AUTO: 4.6 X10E6/UL (ref 3.77–5.28)
SODIUM SERPL-SCNC: 142 MMOL/L (ref 134–144)
WBC # BLD AUTO: 10.7 X10E3/UL (ref 3.4–10.8)

## 2017-04-03 NOTE — PROGRESS NOTES
Your potassium has dropped even further, i would like you to try the [prescription potassium supplements again, I will send these in. Your coumadin level is a 1, have you been taking the coumadin everyday? If you have been I will need to adjust your dose, are you currently taking 2mg everyday? Your urine toxicology is appropriate.

## 2017-04-04 NOTE — PROGRESS NOTES
Pt idx3 notified and verbalized understanding. Per Dr. Olga Cedillo pt to take 4mg today and tomorrow and then 3mg every day, recheck 1 week.

## 2017-04-07 ENCOUNTER — DOCUMENTATION ONLY (OUTPATIENT)
Dept: FAMILY MEDICINE CLINIC | Age: 62
End: 2017-04-07

## 2017-04-14 NOTE — PROGRESS NOTES
[facility] monitoring approved at home PT INR testing. [facility] #[id number]. Original placed in scan folder.

## 2017-04-27 ENCOUNTER — NURSE NAVIGATOR (OUTPATIENT)
Dept: FAMILY MEDICINE CLINIC | Age: 62
End: 2017-04-27

## 2017-04-27 NOTE — PROGRESS NOTES
4/27/17, Chart review. [employer]. Patient continues to keep F/U appts, seen by Dr Dominique Raza, 3/29/17(usually seen monthly) and has upcoming Cardiology appt, 6/12/17. No further ED/Hospitalizations noted at this time. Will remain available for further NN needs. See Previous NN Documentation:   Patient S/P 11/25/16 - 11/29/16 at [facility] related to weakness/GI Bleed. RISK SCORE = 13, HIGH RISK FOR RE-ADMISSION. Per Discharge Summary:    GI bleed : s/p 2 units of PRBCs on 11/26/16, s/p 3 complete polypectomies on 11/28/16. (Hgb/Hct on discharge, 9.1/29.0). - Colonoscopy and EGD on (11/28/16) showing normal mucosa without angioectasias. One (4mm) sessile polyp in the ascending colon , one (10mm ) pedunculated polyp in the sigmoid colon and one sessile polyp in the rectum. A total of 3 polyps were removed. GI recommends repeat capsule study as outpatient while on chronic anticoagulation and repeat colonoscopy in 3 years. Patient will resume regular diet, resume home Warfarin regimen with therapeutic lovenox injections ( additional 4 days) and famotidine 40 mg bid.    History of AVR and MVR in the setting of Diastolic CHF. Followed by Cardiology/Dr Mauro Doty  Hypokalemia : Chronic  (K+ on discharge at 3.4)  Potassium was repleted with a goal of >4 . Patient follow up with PCP for INR and BMP check as well affordable outpatient regimen. COPD : stable not in exacerbation. Tobacco Abuse, Nicotine patch prn, counseled pt about smoking cessation and risk and benefits of not smoking. Patient not interested/not ready to quit. **Also of note, during hospitalization, R. Foot pain:    Right ankle Xray on 11/5/16 showed no acute findings, no fracture. Patiens repeat right ankle Xray on 11/26/16 shows new non displaced intra-articular distal calcaneal fracture. Fracture and pain is secondary to step injury 4 weeks prior to admission, pain stable, now with CAM walker boot.  Ortho Consult recommended treating conservatively with pain meds and boot. Pain managed with Tylenol q6 prn  and Percocet 1 tab q 6 prn. Follow up outpatient with Dr. Katie Oden for repeat Xrays in 2 weeks. See Previous NN Encounter Notes:  S/P 12/4/15 - 12/11/15 at [facility] related to Hypotension.    S/P 11/25/15 - 12/03/15 at [facility] related to Hypovolemic Shock secondary to acute GI Bleed, kept F/U appt with Dr Corinne Sick, 12/4/15, however, sent to ED related to hypotension.    S/P [facility] 11/14/15-11/15/15 COPD exacerbation. Per chart review, kept F/U appt with Dr Corinne Sick, 11/18/15, however required further hospitalization, 11/25/15 related to Hypovolemic Shock secondary to acute GI Bleed.    Patient was new patient to [facility]/Dr French, 11/13/15, keeping regular F/U appts, seen recently, 11/22/16. Patient has past medical history of artificial mitral and aortic valves(S/P Rheumatic Fever) with AFib on coumadin, COPD, and HLD  12/2/16, Patient seen for F/U appt with Dr Corinne Sick, today, 12/2/16. Patient reports, currently in [facility] and could not afford medications, especially Lovenox and Inhalers. INR at 1.2  12/2/16, This writer/NN agrees to reach out to Medical Group Team and Cardiology NN, Beecher Pallas to discuss possible resources. 12/2/16, This writer/NN contacted Beecher Pallas, Cardiology NN and Wolf Seen agrees to speak with Cardiologist, assist with F/U appt and possible medication resources as needed. 12/2/16, This writer/NN contacted At [address], [phone], spoke to Deonna, to explain above concerns. Deonna states a visit can be made tomorrow, 12/3/16, should Lovenox become available. 12/2/16, This writer/NN in communication with Medical Group/Jaqueline, working with [facility] for possible assistance with Lovenox. 12/2/16, This writer/NN contacted patient at listed phone number, HIPAA verified with 2 identifiers. Patient allowed this writer/NN to explain purpose of call,possible community resources to assist with medications.  Patient verbalizes understanding, states will be able to get transportation to  Lovenox if available resources are able to assist with cost of medication. [facility], tomorrow, 12/3/16 to assist with 1st injection and patient states feels comfortable with injections and will be able to self administer. 12/2/16, This writer/NN received confirmation that [facility]acy will be able to assist with medication/cost and will be open until 7pm.  12/2/16, This writer/NN contacted patient to discuss medication at [facility]. Patient verbalizes understanding and states will have transportation to  medications. Instruction provided on importance to continue to monitor for signs/symptoms(shortness of breath, chest pain, bleeding, etc), notify MD of changes or concerns(Provider on call/after hours as needed), and to seek medical attention as needed. Patient verbalizes understanding and agrees to call as needed. PLAN: Should patient return call or be seen for F/U appt, continue post hospitalization. Discuss further symptoms or concerns, INR results medications(Lovenox Compliance and Reconciliation), HH Progress, and F/U appts. Provide instruction, goal work and resource connection as indicated.

## 2017-05-05 ENCOUNTER — TELEPHONE (OUTPATIENT)
Dept: FAMILY MEDICINE CLINIC | Age: 62
End: 2017-05-05

## 2017-05-05 DIAGNOSIS — G89.29 CHRONIC MIDLINE LOW BACK PAIN WITHOUT SCIATICA: ICD-10-CM

## 2017-05-05 DIAGNOSIS — M54.50 CHRONIC MIDLINE LOW BACK PAIN WITHOUT SCIATICA: ICD-10-CM

## 2017-05-05 DIAGNOSIS — S32.000S LUMBAR COMPRESSION FRACTURE, SEQUELA: ICD-10-CM

## 2017-05-05 RX ORDER — OXYCODONE AND ACETAMINOPHEN 5; 325 MG/1; MG/1
1 TABLET ORAL
Qty: 120 TAB | Refills: 0 | Status: SHIPPED | OUTPATIENT
Start: 2017-05-05 | End: 2017-06-08 | Stop reason: SDUPTHER

## 2017-05-05 NOTE — TELEPHONE ENCOUNTER
Pt with INR 5.0 advised to skip today and she skipped yesterday Then restart 2mg x 3 days then 4mg x 2 days and repeat. No active bleeding pt otherwise feels fine.

## 2017-06-07 DIAGNOSIS — I48.20 CHRONIC ATRIAL FIBRILLATION (HCC): ICD-10-CM

## 2017-06-07 DIAGNOSIS — I50.32 DIASTOLIC CHF, CHRONIC (HCC): ICD-10-CM

## 2017-06-07 RX ORDER — DILTIAZEM HYDROCHLORIDE 60 MG/1
TABLET, FILM COATED ORAL
Qty: 360 TAB | Refills: 3 | Status: SHIPPED | OUTPATIENT
Start: 2017-06-07 | End: 2017-08-22 | Stop reason: SDUPTHER

## 2017-06-08 ENCOUNTER — OFFICE VISIT (OUTPATIENT)
Dept: FAMILY MEDICINE CLINIC | Age: 62
End: 2017-06-08

## 2017-06-08 VITALS
SYSTOLIC BLOOD PRESSURE: 122 MMHG | BODY MASS INDEX: 21 KG/M2 | WEIGHT: 123 LBS | DIASTOLIC BLOOD PRESSURE: 80 MMHG | HEART RATE: 80 BPM | RESPIRATION RATE: 18 BRPM | TEMPERATURE: 98.1 F | HEIGHT: 64 IN | OXYGEN SATURATION: 97 %

## 2017-06-08 DIAGNOSIS — Z71.89 ACP (ADVANCE CARE PLANNING): ICD-10-CM

## 2017-06-08 DIAGNOSIS — J44.1 COPD EXACERBATION (HCC): ICD-10-CM

## 2017-06-08 DIAGNOSIS — Z13.39 SCREENING FOR ALCOHOLISM: ICD-10-CM

## 2017-06-08 DIAGNOSIS — B37.0 ORAL THRUSH: ICD-10-CM

## 2017-06-08 DIAGNOSIS — I48.20 CHRONIC ATRIAL FIBRILLATION (HCC): Primary | ICD-10-CM

## 2017-06-08 DIAGNOSIS — M54.50 CHRONIC MIDLINE LOW BACK PAIN WITHOUT SCIATICA: ICD-10-CM

## 2017-06-08 DIAGNOSIS — E87.6 HYPOKALEMIA: ICD-10-CM

## 2017-06-08 DIAGNOSIS — Z95.2 S/P AVR (AORTIC VALVE REPLACEMENT): ICD-10-CM

## 2017-06-08 DIAGNOSIS — S32.000S LUMBAR COMPRESSION FRACTURE, SEQUELA: ICD-10-CM

## 2017-06-08 DIAGNOSIS — G89.29 CHRONIC MIDLINE LOW BACK PAIN WITHOUT SCIATICA: ICD-10-CM

## 2017-06-08 DIAGNOSIS — Z00.00 ROUTINE GENERAL MEDICAL EXAMINATION AT A HEALTH CARE FACILITY: ICD-10-CM

## 2017-06-08 LAB
INR BLD: 4.1
PT POC: 49.4 SECONDS
VALID INTERNAL CONTROL?: YES

## 2017-06-08 RX ORDER — OXYCODONE AND ACETAMINOPHEN 5; 325 MG/1; MG/1
1 TABLET ORAL
Qty: 120 TAB | Refills: 0 | Status: SHIPPED | OUTPATIENT
Start: 2017-06-08 | End: 2017-07-25 | Stop reason: SDUPTHER

## 2017-06-08 RX ORDER — NYSTATIN 100000 [USP'U]/ML
1 SUSPENSION ORAL 4 TIMES DAILY
Qty: 180 ML | Refills: 0 | Status: SHIPPED | OUTPATIENT
Start: 2017-06-08 | End: 2018-01-01

## 2017-06-08 RX ORDER — AZITHROMYCIN 250 MG/1
TABLET, FILM COATED ORAL
Qty: 6 TAB | Refills: 0 | Status: SHIPPED | OUTPATIENT
Start: 2017-06-08 | End: 2017-06-16

## 2017-06-08 RX ORDER — WARFARIN 2 MG/1
2 TABLET ORAL DAILY
Qty: 30 TAB | Refills: 5 | Status: SHIPPED | OUTPATIENT
Start: 2017-06-08 | End: 2017-06-16 | Stop reason: SDUPTHER

## 2017-06-08 RX ORDER — PREDNISONE 10 MG/1
TABLET ORAL
Qty: 21 TAB | Refills: 0 | Status: SHIPPED | OUTPATIENT
Start: 2017-06-08 | End: 2017-08-22

## 2017-06-08 NOTE — PROGRESS NOTES
Mis Eli is a 63 y.o. female   Chief Complaint   Patient presents with    Labs    Coagulation disorder    pt nhere for INR check and refill of pain medication from chronic compression fracture in her lumbar spine. Pain med does work well for her spinal pain, take s 7-8/10 down to a 3/10. Pt states that her INR machine is not working and is trying to get it fixed. INR currently 4+. Pt currently taking 2mg x 4 days then 4mg x 2 days. Will change to 2mg daily. Skip today and tomorrow  Pt also reports that her breathing is getting more difficult with increased coughing. Pt continues to smoke and has again been advised to quit. Pt is using inhalers. Pt also with hypok which is chronic and pt will not take Rx supplement and otc have not helped, did prev discuss a higher K diet. Recheck today  she is a 63y.o. year old female who presents for evalution. Reviewed PmHx, RxHx, FmHx, SocHx, AllgHx and updated and dated in the chart. Review of Systems - negative except as listed above in the HPI    Objective:     Vitals:    06/08/17 0810   BP: 122/80   Pulse: 80   Resp: 18   Temp: 98.1 °F (36.7 °C)   TempSrc: Oral   SpO2: 97%   Weight: 123 lb (55.8 kg)   Height: 5' 4\" (1.626 m)       Current Outpatient Prescriptions   Medication Sig    warfarin (COUMADIN) 2 mg tablet Take 1 Tab by mouth daily.  oxyCODONE-acetaminophen (PERCOCET) 5-325 mg per tablet Take 1 Tab by mouth every six (6) hours as needed for Pain. Max Daily Amount: 4 Tabs.  predniSONE (STERAPRED DS) 10 mg dose pack See administration instruction per 10mg dose pack    azithromycin (ZITHROMAX) 250 mg tablet Take 2 tablets today, then take 1 tablet daily    nystatin (MYCOSTATIN) 100,000 unit/mL suspension Take 5 mL by mouth four (4) times daily.  swish and spit    dilTIAZem (CARDIZEM) 60 mg tablet TAKE 1 TABLET BEFORE BREAKFAST, LUNCH, DINNER AND AT BEDTIME    lovastatin (MEVACOR) 10 mg tablet TAKE 1 TABLET EVERY NIGHT    enoxaparin (LOVENOX) 60 mg/0.6 mL injection 60 mg by SubCUTAneous route every twelve (12) hours.  ferrous sulfate (SLOW FE) 142 mg (45 mg iron) ER tablet Take 1 Tab by mouth two (2) times daily (with meals).  famotidine (PEPCID) 40 mg tablet Take 1 Tab by mouth two (2) times a day.  docusate sodium (COLACE) 100 mg capsule Take 100 mg by mouth two (2) times daily as needed for Constipation.  budesonide-formoterol (SYMBICORT) 160-4.5 mcg/actuation HFA inhaler Take 2 Puffs by inhalation two (2) times a day.  bumetanide (BUMEX) 1 mg tablet Take 1 Tab by mouth daily.  alendronate (FOSAMAX) 70 mg tablet Take 1 Tab by mouth every Wednesday.  albuterol (PROVENTIL VENTOLIN) 2.5 mg /3 mL (0.083 %) nebulizer solution 3 mL by Nebulization route every four (4) hours as needed for Wheezing or Shortness of Breath.  albuterol (VENTOLIN HFA) 90 mcg/actuation inhaler Take 2 Puffs by inhalation every four (4) hours as needed for Wheezing or Shortness of Breath. No current facility-administered medications for this visit. Physical Examination: General appearance - alert, well appearing, and in no distress  Mental status - alert, oriented to person, place, and time  Eyes - pupils equal and reactive, extraocular eye movements intact  Ears - bilateral TM's and external ear canals normal  Mouth - thrush noted  Chest - wheezes and scattered rhonchi  Heart - irregularly irregular with audible click from mechanical valve    Assessment/ Plan:   Andrei Garza was seen today for labs and coagulation disorder. Diagnoses and all orders for this visit:    Chronic atrial fibrillation (HCC)  -     AMB POC PT/INR    S/P AVR (aortic valve replacement)  -     AMB POC PT/INR  -     warfarin (COUMADIN) 2 mg tablet; Take 1 Tab by mouth daily.     Routine general medical examination at a health care facility    Screening for alcoholism    ACP (advance care planning)  Comments:  discussed    Chronic midline low back pain without sciatica  -     oxyCODONE-acetaminophen (PERCOCET) 5-325 mg per tablet; Take 1 Tab by mouth every six (6) hours as needed for Pain. Max Daily Amount: 4 Tabs. Lumbar compression fracture, sequela  -     oxyCODONE-acetaminophen (PERCOCET) 5-325 mg per tablet; Take 1 Tab by mouth every six (6) hours as needed for Pain. Max Daily Amount: 4 Tabs. COPD exacerbation (HCC)  -     predniSONE (STERAPRED DS) 10 mg dose pack; See administration instruction per 10mg dose pack  -     azithromycin (ZITHROMAX) 250 mg tablet; Take 2 tablets today, then take 1 tablet daily    Oral thrush  -     nystatin (MYCOSTATIN) 100,000 unit/mL suspension; Take 5 mL by mouth four (4) times daily. swish and spit    Hypokalemia  -     METABOLIC PANEL, BASIC       Follow-up Disposition:  Return in about 1 week (around 6/15/2017), or if symptoms worsen or fail to improve. I have discussed the diagnosis with the patient and the intended plan as seen in the above orders. The patient has received an after-visit summary and questions were answered concerning future plans. Pt conveyed understanding of plan. Medication Side Effects and Warnings were discussed with patient      Sharon Magana, DO       This is an Initial Medicare Annual Wellness Exam (AWV) (Performed 12 months after IPPE or effective date of Medicare Part B enrollment, Once in a lifetime)    I have reviewed the patient's medical history in detail and updated the computerized patient record.      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    Tissue MVR 1989 following failed balloon valvuloplasty for MS Redo MVR 2004 due to severe MR, AVR due to AR ([facility])     S/P AVR (aortic valve replacement) 1/8/2015    S/P MVR (mitral valve replacement) 1/8/2015      Past Surgical History:   Procedure Laterality Date    COLONOSCOPY N/A 11/28/2016    COLONOSCOPY performed by Bryan Painter MD at [facility] HX COLECTOMY      HX HEART CATHETERIZATION      cabg    HX HYSTERECTOMY      BSO    HX MITRAL VALVE REPLACEMENT      and redo MVR and AVR     Current Outpatient Prescriptions   Medication Sig Dispense Refill    warfarin (COUMADIN) 2 mg tablet Take 1 Tab by mouth daily. 30 Tab 5    oxyCODONE-acetaminophen (PERCOCET) 5-325 mg per tablet Take 1 Tab by mouth every six (6) hours as needed for Pain. Max Daily Amount: 4 Tabs. 120 Tab 0    predniSONE (STERAPRED DS) 10 mg dose pack See administration instruction per 10mg dose pack 21 Tab 0    azithromycin (ZITHROMAX) 250 mg tablet Take 2 tablets today, then take 1 tablet daily 6 Tab 0    nystatin (MYCOSTATIN) 100,000 unit/mL suspension Take 5 mL by mouth four (4) times daily. swish and spit 180 mL 0    dilTIAZem (CARDIZEM) 60 mg tablet TAKE 1 TABLET BEFORE BREAKFAST, LUNCH, DINNER AND AT BEDTIME 360 Tab 3    lovastatin (MEVACOR) 10 mg tablet TAKE 1 TABLET EVERY NIGHT 90 Tab 3    enoxaparin (LOVENOX) 60 mg/0.6 mL injection 60 mg by SubCUTAneous route every twelve (12) hours.  ferrous sulfate (SLOW FE) 142 mg (45 mg iron) ER tablet Take 1 Tab by mouth two (2) times daily (with meals). 30 Tab 2    famotidine (PEPCID) 40 mg tablet Take 1 Tab by mouth two (2) times a day. 30 Tab 0    docusate sodium (COLACE) 100 mg capsule Take 100 mg by mouth two (2) times daily as needed for Constipation.  budesonide-formoterol (SYMBICORT) 160-4.5 mcg/actuation HFA inhaler Take 2 Puffs by inhalation two (2) times a day. 3 Inhaler 3    bumetanide (BUMEX) 1 mg tablet Take 1 Tab by mouth daily. 90 Tab 3    alendronate (FOSAMAX) 70 mg tablet Take 1 Tab by mouth every Wednesday.  12 Tab 3    albuterol (PROVENTIL VENTOLIN) 2.5 mg /3 mL (0.083 %) nebulizer solution 3 mL by Nebulization route every four (4) hours as needed for Wheezing or Shortness of Breath. 24 Each 0    albuterol (VENTOLIN HFA) 90 mcg/actuation inhaler Take 2 Puffs by inhalation every four (4) hours as needed for Wheezing or Shortness of Breath. 1 Inhaler 11     Allergies   Allergen Reactions    Spiriva Respimat [Tiotropium Bromide] Anaphylaxis and Other (comments)     Adverse effects; Per RN - pt states that Spiriva caused throat swelling and could not breathe well. 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 I08.0    CAD (coronary artery disease), native coronary artery I25.10    S/P MVR (mitral valve replacement) Z95.2    COPD (chronic obstructive pulmonary disease) ([facility]) J44.9    Tobacco abuse O64.7    Diastolic CHF, chronic ([facility]) I50.32    Chronic atrial fibrillation ([facility]) I48.2    ACP (advance care planning) Z71.89    COPD exacerbation ([facility] [address] J44.1    GI (gastrointestinal bleed) K92.2         Depression Risk Factor Screening:     PHQ over the last two weeks 6/8/2017   Little interest or pleasure in doing things Not at all   Feeling down, depressed or hopeless Not at all   Total Score PHQ 2 0     Alcohol Risk Factor Screening: On any occasion during the past 3 months, have you had more than 3 drinks containing alcohol? No    Do you average more than 7 drinks per week?   No    Functional Ability and Level of Safety:     Hearing Loss   none    Activities of Daily Living   Self-care. Requires assistance with: no ADLs    Fall Risk     Fall Risk Assessment, last 12 mths 2/18/2016   Able to walk? Yes   Fall in past 12 months? No     Abuse Screen   Patient is not abused    Review of Systems   A comprehensive review of systems was negative except for that written in the HPI. Physical Examination     No exam data present    Evaluation of Cognitive Function:  Mood/affect:  happy  Appearance: age appropriate  Family member/caregiver input: n/a    See above    Patient Care Team:  Alex Parsons DO as PCP - General (Family Practice)  Seth Theodore MD as Physician (Cardiology)    Advice/Referrals/Counseling   Education and counseling provided:  Are appropriate based on today's review and evaluation  End-of-Life planning (with patient's consent)      Assessment/Plan       ICD-10-CM ICD-9-CM    1. Chronic atrial fibrillation (HCC) I48.2 427.31 AMB POC PT/INR   2. S/P AVR (aortic valve replacement) Z95.2 V43.3 AMB POC PT/INR      warfarin (COUMADIN) 2 mg tablet   3. Routine general medical examination at a health care facility Z00.00 V70.0    4. Screening for alcoholism Z13.89 V79.1    5. ACP (advance care planning) Z71.89 V65.49     discussed   6. Chronic midline low back pain without sciatica M54.5 724.2 oxyCODONE-acetaminophen (PERCOCET) 5-325 mg per tablet    G89.29 338.29    7. Lumbar compression fracture, sequela S32.000S 905.1 oxyCODONE-acetaminophen (PERCOCET) 5-325 mg per tablet   8. COPD exacerbation (HCC) J44.1 491.21 predniSONE (STERAPRED DS) 10 mg dose pack      azithromycin (ZITHROMAX) 250 mg tablet   9. Oral thrush B37.0 112.0 nystatin (MYCOSTATIN) 100,000 unit/mL suspension   10. Hypokalemia F14.5 366.2 METABOLIC PANEL, BASIC   . I have discussed the diagnosis with the patient and the intended plan as seen in the above orders. The patient has received an after-visit summary and questions were answered concerning future plans.  Pt conveyed understanding of plan.      Dr Rossana Jordan

## 2017-06-09 ENCOUNTER — TELEPHONE (OUTPATIENT)
Dept: FAMILY MEDICINE CLINIC | Age: 62
End: 2017-06-09

## 2017-06-09 DIAGNOSIS — I50.32 DIASTOLIC CHF, CHRONIC (HCC): Primary | ICD-10-CM

## 2017-06-09 DIAGNOSIS — I25.10 CORONARY ARTERY DISEASE INVOLVING NATIVE CORONARY ARTERY OF NATIVE HEART WITHOUT ANGINA PECTORIS: ICD-10-CM

## 2017-06-09 DIAGNOSIS — I48.20 CHRONIC ATRIAL FIBRILLATION (HCC): ICD-10-CM

## 2017-06-09 DIAGNOSIS — Z95.2 S/P AVR (AORTIC VALVE REPLACEMENT): ICD-10-CM

## 2017-06-09 LAB
BUN SERPL-MCNC: 10 MG/DL (ref 8–27)
BUN/CREAT SERPL: 14 (ref 12–28)
CALCIUM SERPL-MCNC: 9.2 MG/DL (ref 8.7–10.3)
CHLORIDE SERPL-SCNC: 98 MMOL/L (ref 96–106)
CO2 SERPL-SCNC: 28 MMOL/L (ref 18–29)
CREAT SERPL-MCNC: 0.73 MG/DL (ref 0.57–1)
GLUCOSE SERPL-MCNC: 105 MG/DL (ref 65–99)
POTASSIUM SERPL-SCNC: 4.4 MMOL/L (ref 3.5–5.2)
SODIUM SERPL-SCNC: 143 MMOL/L (ref 134–144)

## 2017-06-09 NOTE — TELEPHONE ENCOUNTER
Please enter referral for Dr. Rhea Grant ([facility]ARD[facility])    DX: I48.2 Chronic atrial fibrillation         Z95.2 S/P AVR (aortic valve replacement)

## 2017-06-16 ENCOUNTER — OFFICE VISIT (OUTPATIENT)
Dept: FAMILY MEDICINE CLINIC | Age: 62
End: 2017-06-16

## 2017-06-16 VITALS
WEIGHT: 123 LBS | OXYGEN SATURATION: 97 % | HEART RATE: 80 BPM | DIASTOLIC BLOOD PRESSURE: 80 MMHG | HEIGHT: 64 IN | BODY MASS INDEX: 21 KG/M2 | TEMPERATURE: 97.7 F | SYSTOLIC BLOOD PRESSURE: 122 MMHG | RESPIRATION RATE: 18 BRPM

## 2017-06-16 DIAGNOSIS — Z95.2 S/P AVR (AORTIC VALVE REPLACEMENT): Primary | ICD-10-CM

## 2017-06-16 DIAGNOSIS — Z79.01 ON WARFARIN THERAPY: ICD-10-CM

## 2017-06-16 LAB
INR BLD: 1.4
PT POC: 16.6 SECONDS
VALID INTERNAL CONTROL?: YES

## 2017-06-16 RX ORDER — WARFARIN 2 MG/1
TABLET ORAL
Qty: 30 TAB | Refills: 5
Start: 2017-06-16 | End: 2017-08-22 | Stop reason: SDUPTHER

## 2017-06-16 NOTE — PROGRESS NOTES
Dominga Ruiz is a 63 y.o. female   Chief Complaint   Patient presents with    Coagulation disorder    Pt with low INR now and pt was previously too high last week and was changed to 2mg everyday. Pt machine remains broken and is trying to get it fixed. If she fixes will call Monday with INR otherwise f/u on Friday next week. she is a 63y.o. year old female who presents for evalution. Reviewed PmHx, RxHx, FmHx, SocHx, AllgHx and updated and dated in the chart. Review of Systems - negative except as listed above in the HPI    Objective:     Vitals:    06/16/17 0757   BP: 122/80   Pulse: 80   Resp: 18   Temp: 97.7 °F (36.5 °C)   TempSrc: Oral   SpO2: 97%   Weight: 123 lb (55.8 kg)   Height: 5' 4\" (1.626 m)       Current Outpatient Prescriptions   Medication Sig    warfarin (COUMADIN) 2 mg tablet 2mg everyday except Friday and Sunday take 3mg    oxyCODONE-acetaminophen (PERCOCET) 5-325 mg per tablet Take 1 Tab by mouth every six (6) hours as needed for Pain. Max Daily Amount: 4 Tabs.  predniSONE (STERAPRED DS) 10 mg dose pack See administration instruction per 10mg dose pack    nystatin (MYCOSTATIN) 100,000 unit/mL suspension Take 5 mL by mouth four (4) times daily. swish and spit    dilTIAZem (CARDIZEM) 60 mg tablet TAKE 1 TABLET BEFORE BREAKFAST, LUNCH, DINNER AND AT BEDTIME    lovastatin (MEVACOR) 10 mg tablet TAKE 1 TABLET EVERY NIGHT    ferrous sulfate (SLOW FE) 142 mg (45 mg iron) ER tablet Take 1 Tab by mouth two (2) times daily (with meals).  famotidine (PEPCID) 40 mg tablet Take 1 Tab by mouth two (2) times a day.  docusate sodium (COLACE) 100 mg capsule Take 100 mg by mouth two (2) times daily as needed for Constipation.  budesonide-formoterol (SYMBICORT) 160-4.5 mcg/actuation HFA inhaler Take 2 Puffs by inhalation two (2) times a day.  bumetanide (BUMEX) 1 mg tablet Take 1 Tab by mouth daily.  alendronate (FOSAMAX) 70 mg tablet Take 1 Tab by mouth every Wednesday.  albuterol (PROVENTIL VENTOLIN) 2.5 mg /3 mL (0.083 %) nebulizer solution 3 mL by Nebulization route every four (4) hours as needed for Wheezing or Shortness of Breath.  albuterol (VENTOLIN HFA) 90 mcg/actuation inhaler Take 2 Puffs by inhalation every four (4) hours as needed for Wheezing or Shortness of Breath. No current facility-administered medications for this visit. Physical Examination: General appearance - alert, well appearing, and in no distress  Chest - rhonchi noted scattered baseline for pt  Heart - normal rate, regular rhythm, normal S1, S2, no murmurs, rubs, clicks or gallops      Assessment/ Plan:   Aleks Macario was seen today for coagulation disorder. Diagnoses and all orders for this visit:    S/P AVR (aortic valve replacement)  -     warfarin (COUMADIN) 2 mg tablet; 2mg everyday except Friday and Sunday take 3mg    On warfarin therapy  -     AMB POC PT/INR       Follow-up Disposition:  Return in about 1 week (around 6/23/2017), or if symptoms worsen or fail to improve. I have discussed the diagnosis with the patient and the intended plan as seen in the above orders. The patient has received an after-visit summary and questions were answered concerning future plans. Pt conveyed understanding of plan.     Medication Side Effects and Warnings were discussed with patient      Eugene Hay DO

## 2017-06-16 NOTE — PATIENT INSTRUCTIONS
Anticoagulants: After Your Visit  Your Care Instructions  Your doctor prescribed an anticoagulant medicine. Anticoagulants, often called blood thinners, prevent new blood clots from forming and keep existing clots from getting larger. They do not actually thin the blood, but they make the blood take longer to clot. This lowers the risk of a blood clot moving to the lungs (pulmonary embolism) or moving to the brain and causing a stroke. Blood thinners come in two forms. Heparin is given by shot, either under your skin or through a needle in your vein, and starts working right away. Warfarin (Coumadin) comes in pill form and takes longer to work. Your doctor may have you begin taking both forms at the same time. As soon as the pills start to work, you will stop the shots but continue to take the pills. If you have a blood clot in your leg, you may need to take warfarin for several months. People who have heart conditions such as atrial fibrillation often need to take it for the rest of their lives. The right dose of this medicine is different for each person. You will need regular blood tests to see if your dose is correct. Follow-up care is a key part of your treatment and safety. Be sure to make and go to all appointments, and call your doctor if you are having problems. Itâs also a good idea to know your test results and keep a list of the medicines you take. How do you take blood thinners? · Take your medicines exactly as prescribed. Call your doctor if you think you are having a problem with your medicine. · Call your doctor if you are not sure what to do if you missed a dose of blood thinner. ¨ Your doctor can tell you exactly what to do so you do not take too much or too little blood thinner. Then you will be as safe as possible. · Some general rules for what to do if you miss a dose:  ¨ If you remember it in the same day, take the missed dose. Then go back to your regular schedule.   ¨ If it is the next day, or almost time to take the next dose, do not take the missed dose. Do not double the dose to make up for the missed one. At your next regularly scheduled time, take your normal dose. ¨ If you miss your dose for 2 or more days, call your doctor. · To help you stay on schedule, use a calendar to remind you when to take your blood thinner. When you take the medicine, note it on the calendar. · If you are going to give yourself shots, your doctor will give you instructions for how to safely inject the medicine. Follow the directions carefully. · Do not take any vitamins, over-the-counter medicines, or herbal products without talking to your doctor first.  · Avoid contact sports and other activities that could lead to injury. Make your home safe and take other measures to reduce your risk of falling. Always wear a seat belt while in a car. · Do not suddenly change your intake of vitamin Kârich foods, such as broccoli, cabbage, asparagus, lettuce, and spinach. This will help blood thinners work evenly from day to day. · Limit alcohol to 2 drinks a day for men and 1 drink a day for women. Alcohol may interfere with blood thinners. It also increases your risk of falls, which can cause bruising and bleeding. · Tell your dentist, pharmacist, and other health professionals that you take blood thinners. Wear medical alert jewelry that says you take blood thinners. You can buy this at most drugstores. When should you call for help? Call 911 anytime you think you may need emergency care. For example, call if:  · You cough up blood. · You vomit blood or what looks like coffee grounds. · You pass maroon or very bloody stools. Call your doctor now or seek immediate medical care if:  · You have new bruises or blood spots under your skin. · You have a nosebleed. · Your gums bleed when you brush your teeth. · You have blood in your urine. · Your stools are black and tarlike or have streaks of blood.   · You have vaginal bleeding when you are not having your period, or heavy period bleeding. Watch closely for changes in your health, and be sure to contact your doctor if:  · You have questions about your medicine. Where can you learn more? Go to [employer]  Enter T810 in the search box to learn more about \"Anticoagulants: After Your Visit. \"   Â© 1283-1792 [employer]. Care instructions adapted under license by [facility] (which disclaims liability or warranty for this information). This care instruction is for use with your licensed healthcare professional. If you have questions about a medical condition or this instruction, always ask your healthcare professional. [place] [address] any warranty or liability for your use of this information.   Content Version: 6.2.02017; Last Revised: July 1, 2009

## 2017-06-16 NOTE — PROGRESS NOTES
Pt here for PT INR   Results for orders placed or performed in visit on 06/16/17   AMB POC PT/INR   Result Value Ref Range    VALID INTERNAL CONTROL POC Yes     Prothrombin time (POC) 16.6 seconds    INR POC 1.4

## 2017-06-22 ENCOUNTER — OFFICE VISIT (OUTPATIENT)
Dept: FAMILY MEDICINE CLINIC | Age: 62
End: 2017-06-22

## 2017-06-22 VITALS
HEART RATE: 74 BPM | TEMPERATURE: 98.2 F | SYSTOLIC BLOOD PRESSURE: 114 MMHG | HEIGHT: 64 IN | WEIGHT: 123 LBS | RESPIRATION RATE: 18 BRPM | OXYGEN SATURATION: 96 % | BODY MASS INDEX: 21 KG/M2 | DIASTOLIC BLOOD PRESSURE: 80 MMHG

## 2017-06-22 DIAGNOSIS — Z95.2 S/P AVR (AORTIC VALVE REPLACEMENT): ICD-10-CM

## 2017-06-22 DIAGNOSIS — Z79.01 ON WARFARIN THERAPY: Primary | ICD-10-CM

## 2017-06-22 DIAGNOSIS — E87.6 HYPOKALEMIA: ICD-10-CM

## 2017-06-22 DIAGNOSIS — Z51.81 MEDICATION MONITORING ENCOUNTER: ICD-10-CM

## 2017-06-22 LAB
INR BLD: 3.1
PT POC: 37.5 SECONDS
VALID INTERNAL CONTROL?: YES

## 2017-06-22 RX ORDER — POTASSIUM CHLORIDE 750 MG/1
10 TABLET, EXTENDED RELEASE ORAL DAILY
Qty: 90 TAB | Refills: 3 | Status: SHIPPED | OUTPATIENT
Start: 2017-06-22 | End: 2017-07-12 | Stop reason: SDUPTHER

## 2017-06-22 NOTE — PROGRESS NOTES
Rito Uribe is a 63 y.o. female   Chief Complaint   Patient presents with    Labs    Coagulation disorder    pt here coumadin check. Currently at 3.1 which is within range for mechanical heart valve. K back to normal on Rx K pills, will decrease dose to 10 and continue. she is a 63y.o. year old female who presents for evalution. Reviewed PmHx, RxHx, FmHx, SocHx, AllgHx and updated and dated in the chart. Review of Systems - negative except as listed above in the HPI    Objective:     Vitals:    06/22/17 0714   BP: 114/80   Pulse: 74   Resp: 18   Temp: 98.2 °F (36.8 °C)   TempSrc: Oral   SpO2: 96%   Weight: 123 lb (55.8 kg)   Height: 5' 4\" (1.626 m)       Current Outpatient Prescriptions   Medication Sig    potassium chloride (KLOR-CON) 10 mEq tablet Take 1 Tab by mouth daily.  warfarin (COUMADIN) 2 mg tablet 2mg everyday except Friday and Sunday take 3mg    oxyCODONE-acetaminophen (PERCOCET) 5-325 mg per tablet Take 1 Tab by mouth every six (6) hours as needed for Pain. Max Daily Amount: 4 Tabs.  predniSONE (STERAPRED DS) 10 mg dose pack See administration instruction per 10mg dose pack    nystatin (MYCOSTATIN) 100,000 unit/mL suspension Take 5 mL by mouth four (4) times daily. swish and spit    dilTIAZem (CARDIZEM) 60 mg tablet TAKE 1 TABLET BEFORE BREAKFAST, LUNCH, DINNER AND AT BEDTIME    lovastatin (MEVACOR) 10 mg tablet TAKE 1 TABLET EVERY NIGHT    ferrous sulfate (SLOW FE) 142 mg (45 mg iron) ER tablet Take 1 Tab by mouth two (2) times daily (with meals).  famotidine (PEPCID) 40 mg tablet Take 1 Tab by mouth two (2) times a day.  docusate sodium (COLACE) 100 mg capsule Take 100 mg by mouth two (2) times daily as needed for Constipation.  budesonide-formoterol (SYMBICORT) 160-4.5 mcg/actuation HFA inhaler Take 2 Puffs by inhalation two (2) times a day.  bumetanide (BUMEX) 1 mg tablet Take 1 Tab by mouth daily.     alendronate (FOSAMAX) 70 mg tablet Take 1 Tab by mouth every Wednesday.  albuterol (PROVENTIL VENTOLIN) 2.5 mg /3 mL (0.083 %) nebulizer solution 3 mL by Nebulization route every four (4) hours as needed for Wheezing or Shortness of Breath.  albuterol (VENTOLIN HFA) 90 mcg/actuation inhaler Take 2 Puffs by inhalation every four (4) hours as needed for Wheezing or Shortness of Breath. No current facility-administered medications for this visit. Physical Examination: General appearance - alert, well appearing, and in no distress  Eyes - pupils equal and reactive, extraocular eye movements intact  Chest - clear to auscultation, no wheezes, rales or rhonchi, symmetric air entry  Heart - normal rate with audible click from [facility] AV      Assessment/ Plan:   Chandana Fink was seen today for labs and coagulation disorder. Diagnoses and all orders for this visit:    On warfarin therapy  -     AMB POC PT/INR    S/P AVR (aortic valve replacement)  -     AMB POC PT/INR  Within range c/w current dosing regimen  Medication monitoring encounter  -     AMB POC PT/INR    Hypokalemia  -     potassium chloride (KLOR-CON) 10 mEq tablet; Take 1 Tab by mouth daily. Follow-up Disposition:  Return in about 2 weeks (around 7/6/2017), or if symptoms worsen or fail to improve. I have discussed the diagnosis with the patient and the intended plan as seen in the above orders. The patient has received an after-visit summary and questions were answered concerning future plans. Pt conveyed understanding of plan.     Medication Side Effects and Warnings were discussed with patient      Russell Castro DO

## 2017-06-22 NOTE — PROGRESS NOTES
Pt here for INR check  Would like any labs needed   Results for orders placed or performed in visit on 06/22/17   AMB POC PT/INR   Result Value Ref Range    VALID INTERNAL CONTROL POC Yes     Prothrombin time (POC) 37.5 seconds    INR POC 3.1

## 2017-07-12 ENCOUNTER — OFFICE VISIT (OUTPATIENT)
Dept: CARDIOLOGY CLINIC | Age: 62
End: 2017-07-12

## 2017-07-12 ENCOUNTER — CLINICAL SUPPORT (OUTPATIENT)
Dept: CARDIOLOGY CLINIC | Age: 62
End: 2017-07-12

## 2017-07-12 VITALS
SYSTOLIC BLOOD PRESSURE: 130 MMHG | RESPIRATION RATE: 16 BRPM | WEIGHT: 125 LBS | HEIGHT: 64 IN | BODY MASS INDEX: 21.34 KG/M2 | DIASTOLIC BLOOD PRESSURE: 80 MMHG | OXYGEN SATURATION: 98 %

## 2017-07-12 DIAGNOSIS — Z95.2 S/P MVR (MITRAL VALVE REPLACEMENT): ICD-10-CM

## 2017-07-12 DIAGNOSIS — Z95.2 S/P AVR (AORTIC VALVE REPLACEMENT): Primary | ICD-10-CM

## 2017-07-12 DIAGNOSIS — I50.32 DIASTOLIC CHF, CHRONIC (HCC): ICD-10-CM

## 2017-07-12 DIAGNOSIS — E87.6 HYPOKALEMIA: ICD-10-CM

## 2017-07-12 DIAGNOSIS — J43.9 PULMONARY EMPHYSEMA, UNSPECIFIED EMPHYSEMA TYPE (HCC): ICD-10-CM

## 2017-07-12 DIAGNOSIS — Z95.2 S/P AVR (AORTIC VALVE REPLACEMENT): ICD-10-CM

## 2017-07-12 DIAGNOSIS — I08.0 RHEUMATIC DISEASE OF MITRAL AND AORTIC VALVES: ICD-10-CM

## 2017-07-12 DIAGNOSIS — I25.10 CORONARY ARTERY DISEASE INVOLVING NATIVE CORONARY ARTERY OF NATIVE HEART WITHOUT ANGINA PECTORIS: ICD-10-CM

## 2017-07-12 DIAGNOSIS — I48.20 CHRONIC ATRIAL FIBRILLATION (HCC): Primary | ICD-10-CM

## 2017-07-12 DIAGNOSIS — Z72.0 TOBACCO ABUSE: ICD-10-CM

## 2017-07-12 RX ORDER — BUMETANIDE 1 MG/1
1 TABLET ORAL
Qty: 90 TAB | Refills: 3
Start: 2017-07-12 | End: 2017-12-18 | Stop reason: SDUPTHER

## 2017-07-12 RX ORDER — POTASSIUM CHLORIDE 750 MG/1
10 TABLET, EXTENDED RELEASE ORAL
Qty: 90 TAB | Refills: 3
Start: 2017-07-12 | End: 2018-01-01 | Stop reason: DRUGHIGH

## 2017-07-12 NOTE — PROGRESS NOTES
Blanca Eubanks, [address]  [address]  [place], [address] [facility]    Office [phone]  Fax [phone]  Cell [phone]         Meir Montana is a 63 y.o. female. Last seen 7 months ago. Assessment  Encounter Diagnoses   Name Primary? 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 Lorna Roth has rheumatic valvular disease s/p mechanical AVR/MVR 1429 complicated by chronic AF. She has normal prosthetic valve by exam and echo today. anticoagulation has been therapeutic without evidence of recurrent GI bleeding. Repeat echocardiogram in 1 year. She has chronic HA due to COPD and ongoing smoking. She has been on chronic diuretic therapy for presuemd diastolic dysfunction. Will reduce dose of Bumex and KCL to thrice weekly. AF remains rate controlled on Diltiazem alone. Follow-up Disposition:  Return in about 6 months (around 1/12/2018). Subjective:    Does home INR checks and Coumadin levels have been within range. No recurrent GI bleeding    She has stable pattern of HA with moderate effort, but no associated chest pain. She continues to smoke 1/2 ppd. Patient denies any exertional chest pain, palpitations, syncope, orthopnea, edema or paroxysmal nocturnal dyspnea.     Cardiac risk factors   HTN no  DM  no  Smoking yes    Cardiac testing  ECHO 11/2004 - normal [facility] AVR/MVR, EF 50%, PA pressures nl  ECHO 1/16/15-EF 55 % to 60 %, [facility], AVR/MVR, PA pressures normal  STRESS: Dobutamine cardiolite 2/5/15 - normal perfusion  ECHO 11/14/2015: EF 65%, no WMA, LAE, normal [facility] MVR/AVR   AoV gradient 25 mmHg mean gradient 13 mmHg. 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 90 Tab 3    warfarin (COUMADIN) 2 mg tablet 2mg everyday except Friday and Sunday take 3mg 30 Tab 5    oxyCODONE-acetaminophen (PERCOCET) 5-325 mg per tablet Take 1 Tab by mouth every six (6) hours as needed for Pain. Max Daily Amount: 4 Tabs. 120 Tab 0    predniSONE (STERAPRED DS) 10 mg dose pack See administration instruction per 10mg dose pack 21 Tab 0    nystatin (MYCOSTATIN) 100,000 unit/mL suspension Take 5 mL by mouth four (4) times daily. swish and spit 180 mL 0    dilTIAZem (CARDIZEM) 60 mg tablet TAKE 1 TABLET BEFORE BREAKFAST, LUNCH, DINNER AND AT BEDTIME 360 Tab 3    lovastatin (MEVACOR) 10 mg tablet TAKE 1 TABLET EVERY NIGHT 90 Tab 3    ferrous sulfate (SLOW FE) 142 mg (45 mg iron) ER tablet Take 1 Tab by mouth two (2) times daily (with meals). 30 Tab 2    famotidine (PEPCID) 40 mg tablet Take 1 Tab by mouth two (2) times a day. 30 Tab 0    docusate sodium (COLACE) 100 mg capsule Take 100 mg by mouth two (2) times daily as needed for Constipation.       budesonide-formoterol (SYMBICORT) 160-4.5 mcg/actuation HFA inhaler Take 2 Puffs by inhalation two (2) times a day. 3 Inhaler 3    bumetanide (BUMEX) 1 mg tablet Take 1 Tab by mouth daily. 90 Tab 3    alendronate (FOSAMAX) 70 mg tablet Take 1 Tab by mouth every Wednesday. 12 Tab 3    albuterol (PROVENTIL VENTOLIN) 2.5 mg /3 mL (0.083 %) nebulizer solution 3 mL by Nebulization route every four (4) hours as needed for Wheezing or Shortness of Breath. 24 Each 0    albuterol (VENTOLIN HFA) 90 mcg/actuation inhaler Take 2 Puffs by inhalation every four (4) hours as needed for Wheezing or Shortness of Breath. 1 Inhaler 11       Allergies   Allergen Reactions    Spiriva Respimat [Tiotropium Bromide] Anaphylaxis and Other (comments)     Adverse effects; Per RN - pt states that Spiriva caused throat swelling and could not breathe well.  Celebrex [Celecoxib] Rash    Tomato Rash      . Review of Systems  Constitutional: Negative for fever, chills, malaise/fatigue and diaphoresis. Respiratory: Negative for cough, hemoptysis, sputum production and wheezing. Positive for HA. Cardiovascular: Negative for chest pain, palpitations, orthopnea, claudication, leg swelling and PND. Gastrointestinal: Negative for heartburn, nausea, vomiting, blood in stool and melena. Genitourinary: Negative for dysuria and flank pain. Musculoskeletal: Negative for joint pain and back pain. Skin: Negative for rash. Neurological: Negative for focal weakness, seizures, loss of consciousness, weakness and headaches. Endo/Heme/Allergies: Does not bruise/bleed easily. Psychiatric/Behavioral: Negative for memory loss. The patient does not have insomnia.         Physical Exam    Visit Vitals    /80 (BP 1 Location: Left arm, BP Patient Position: Sitting)    Resp 16    Ht 5' 4\" (1.626 m)    Wt 125 lb (56.7 kg)    SpO2 98%    BMI 21.46 kg/m2      Wt Readings from Last 3 Encounters:   07/12/17 125 lb (56.7 kg)   06/22/17 123 lb (55.8 kg)   06/16/17 123 lb (55.8 kg)      General - well developed well nourished  Neck - JVP normal, thyroid nl  Cardiac - Irregular S1,S2, no murmurs, rubs or gallops. crisp metallic clicks  Vascular - carotids without bruits, radials, femorals and pedal pulses equal bilateral  Lungs - clear to auscultation bilaterals, no rales ,wheezing or rhonchi  Abd - soft nontender, no HSM, no abd bruits  Extremities - no edema  Skin - no rash  Neuro - nonfocal  Psych - normal mood and affect    Lab Results   Component Value Date/Time    WBC 10.7 03/29/2017 07:38 AM    Hemoglobin (POC) 10.5 01/08/2016 02:18 PM    HGB 13.6 03/29/2017 07:38 AM    Hematocrit (POC) 31 01/08/2016 02:18 PM    HCT 41.9 03/29/2017 07:38 AM    PLATELET 722 61/46/5135 07:38 AM    MCV 91 03/29/2017 07:38 AM       Lab Results   Component Value Date/Time    INR 1.0 03/29/2017 07:38 AM    INR 1.1 11/29/2016 04:44 AM    INR 1.1 11/28/2016 03:42 AM    INR POC 3.1 06/22/2017 07:20 AM    INR POC 1.4 06/16/2017 08:04 AM    INR POC 4.1 06/08/2017 01:10 PM    Prothrombin time 10.6 03/29/2017 07:38 AM    Prothrombin time 11.1 11/29/2016 04:44 AM    Prothrombin time 11.3 11/28/2016 03:42 AM         Cardiographics    ECG 1/8/15 - AF 90s  EKG 12/17/15 - AF, non specific T wave inversion V1-V3   LVEF 60% normal mechancial AVR/MVR function.        Written by Isael Gonzalez, as dictated by Ken Whitlock MD.   Ken Whitlock MD

## 2017-07-12 NOTE — MR AVS SNAPSHOT
Visit Information Date & Time Provider Department Dept. Phone Encounter #  
 7/12/2017 10:00 AM Ken Whitlock MD [facility] [phone] [id number] Follow-up Instructions Return in about 6 months (around 1/12/2018). Upcoming Health Maintenance Date Due DTaP/Tdap/Td series (1 - Tdap) 3/13/1978 PAP AKA CERVICAL CYTOLOGY 3/13/1978 ZOSTER VACCINE AGE 60> 3/13/2017 INFLUENZA AGE 9 TO ADULT 8/1/2017 BREAST CANCER SCRN MAMMOGRAM 2/24/2018 COLONOSCOPY 11/28/2019 Allergies as of 7/12/2017  Review Complete On: 7/12/2017 By: Yasmine Hays Severity Noted Reaction Type Reactions Spiriva Respimat [Tiotropium Bromide] High 01/08/2015   Systemic Anaphylaxis, Other (comments) Adverse effects; Per RN - pt states that Spiriva caused throat swelling and could not breathe well. Celebrex [Celecoxib]  01/08/2015   Intolerance Rash Tomato  01/08/2015   Intolerance Rash Current Immunizations  Reviewed on 2/21/2017 Name Date Influenza Vaccine (Quad) PF 10/21/2016, 11/13/2015 Pneumococcal Conjugate (PCV-13) 2/18/2016 Pneumococcal Polysaccharide (PPSV-23) 2/21/2017 Not reviewed this visit You Were Diagnosed With   
  
 Codes Comments Chronic atrial fibrillation (HCC)    -  Primary ICD-10-CM: M52.1 ICD-9-CM: 427.31 Diastolic CHF, chronic (HCC)     ICD-10-CM: I50.32 
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  
 130/80 (BP 1 Location: Left arm, BP Patient Position: Sitting) 16 5' 4\" (1.626 m) 125 lb (56.7 kg) 98% 21.46 kg/m2 OB Status Smoking Status Hysterectomy Current Every Day Smoker BMI and BSA Data Body Mass Index Body Surface Area  
 21.46 kg/m 2 1.6 m 2 Preferred Pharmacy Pharmacy Name Phone [facility] [phone] Your Updated Medication List  
  
   
This list is accurate as of: 7/12/17 10:37 AM.  Always use your most recent med list.  
  
  
  
  
 * albuterol 90 mcg/actuation inhaler Commonly known as:  VENTOLIN HFA Take 2 Puffs by inhalation every four (4) hours as needed for Wheezing or Shortness of Breath. * albuterol 2.5 mg /3 mL (0.083 %) nebulizer solution Commonly known as:  PROVENTIL VENTOLIN  
3 mL by Nebulization route every four (4) hours as needed for Wheezing or Shortness of Breath. alendronate 70 mg tablet Commonly known as:  FOSAMAX Take 1 Tab by mouth every Wednesday. budesonide-formoterol 160-4.5 mcg/actuation HFA inhaler Commonly known as:  SYMBICORT Take 2 Puffs by inhalation two (2) times a day. bumetanide 1 mg tablet Commonly known as:  Nubia Jayla Take 1 Tab by mouth daily. dilTIAZem 60 mg tablet Commonly known as:  CARDIZEM  
TAKE 1 TABLET BEFORE BREAKFAST, LUNCH, DINNER AND AT BEDTIME  
  
 docusate sodium 100 mg capsule Commonly known as:  Malinda Second Take 100 mg by mouth two (2) times daily as needed for Constipation. famotidine 40 mg tablet Commonly known as:  PEPCID Take 1 Tab by mouth two (2) times a day. ferrous sulfate 142 mg (45 mg iron) ER tablet Commonly known as:  SLOW FE Take 1 Tab by mouth two (2) times daily (with meals). lovastatin 10 mg tablet Commonly known as:  MEVACOR  
 TAKE 1 TABLET EVERY NIGHT  
  
 nystatin 100,000 unit/mL suspension Commonly known as:  MYCOSTATIN Take 5 mL by mouth four (4) times daily. swish and spit  
  
 oxyCODONE-acetaminophen 5-325 mg per tablet Commonly known as:  PERCOCET Take 1 Tab by mouth every six (6) hours as needed for Pain. Max Daily Amount: 4 Tabs. potassium chloride 10 mEq tablet Commonly known as:  KLOR-CON Take 1 Tab by mouth daily. predniSONE 10 mg dose pack Commonly known as:  STERAPRED DS See administration instruction per 10mg dose pack  
  
 warfarin 2 mg tablet Commonly known as:  COUMADIN  
2mg everyday except Friday and Sunday take 3mg * Notice: This list has 2 medication(s) that are the same as other medications prescribed for you. Read the directions carefully, and ask your doctor or other care provider to review them with you. Follow-up Instructions Return in about 6 months (around 1/12/2018). Patient Instructions Reduce Bumex to 1mg thrice weekly and also reduce Potassium three times weekly. Introducing [facility] & [facility]! Dear Merlin Michael: 
Thank you for requesting a [employer] account. Our records indicate that you already have an active [employer] account. You can access your account anytime at [email or web address] Did you know that you can access your hospital and ER discharge instructions at any time in [employer]? You can also review all of your test results from your hospital stay or ER visit. Additional Information If you have questions, please visit the Frequently Asked Questions section of the [employer] website at [email or web address] Remember, [employer] is NOT to be used for urgent needs. For medical emergencies, dial 911. Now available from your iPhone and Android! Please provide this summary of care documentation to your next provider. Your primary care clinician is listed as [facility]. If you have any questions after today's visit, please call [phone].

## 2017-07-25 ENCOUNTER — TELEPHONE (OUTPATIENT)
Dept: FAMILY MEDICINE CLINIC | Age: 62
End: 2017-07-25

## 2017-07-25 DIAGNOSIS — G89.29 CHRONIC MIDLINE LOW BACK PAIN WITHOUT SCIATICA: ICD-10-CM

## 2017-07-25 DIAGNOSIS — S32.000S LUMBAR COMPRESSION FRACTURE, SEQUELA: ICD-10-CM

## 2017-07-25 DIAGNOSIS — M54.50 CHRONIC MIDLINE LOW BACK PAIN WITHOUT SCIATICA: ICD-10-CM

## 2017-07-25 RX ORDER — OXYCODONE AND ACETAMINOPHEN 5; 325 MG/1; MG/1
1 TABLET ORAL
Qty: 120 TAB | Refills: 0 | Status: SHIPPED | OUTPATIENT
Start: 2017-07-25 | End: 2017-08-22 | Stop reason: SDUPTHER

## 2017-07-25 NOTE — TELEPHONE ENCOUNTER
Pt states that her INR is 3.4 on 2mg on Monday, Wednesday, and Thursday. And 3 mg the other days. Can be reached at [phone]  Would also like a refill of percocet.

## 2017-07-25 NOTE — TELEPHONE ENCOUNTER
She is in range but I would like her to recheck her INR in 4 weeks.   Percocet printed and may  will need appt for next

## 2017-08-22 ENCOUNTER — OFFICE VISIT (OUTPATIENT)
Dept: FAMILY MEDICINE CLINIC | Age: 62
End: 2017-08-22

## 2017-08-22 VITALS
WEIGHT: 128 LBS | HEART RATE: 67 BPM | BODY MASS INDEX: 21.85 KG/M2 | TEMPERATURE: 98.2 F | RESPIRATION RATE: 18 BRPM | HEIGHT: 64 IN | DIASTOLIC BLOOD PRESSURE: 86 MMHG | SYSTOLIC BLOOD PRESSURE: 124 MMHG | OXYGEN SATURATION: 98 %

## 2017-08-22 DIAGNOSIS — Z51.81 MEDICATION MONITORING ENCOUNTER: ICD-10-CM

## 2017-08-22 DIAGNOSIS — J44.1 COPD EXACERBATION (HCC): ICD-10-CM

## 2017-08-22 DIAGNOSIS — G89.29 CHRONIC MIDLINE LOW BACK PAIN WITHOUT SCIATICA: Primary | ICD-10-CM

## 2017-08-22 DIAGNOSIS — M54.50 CHRONIC MIDLINE LOW BACK PAIN WITHOUT SCIATICA: Primary | ICD-10-CM

## 2017-08-22 DIAGNOSIS — I48.20 CHRONIC ATRIAL FIBRILLATION (HCC): ICD-10-CM

## 2017-08-22 DIAGNOSIS — J43.9 PULMONARY EMPHYSEMA, UNSPECIFIED EMPHYSEMA TYPE (HCC): ICD-10-CM

## 2017-08-22 DIAGNOSIS — S32.000S LUMBAR COMPRESSION FRACTURE, SEQUELA: ICD-10-CM

## 2017-08-22 DIAGNOSIS — Z95.2 S/P AVR (AORTIC VALVE REPLACEMENT): ICD-10-CM

## 2017-08-22 DIAGNOSIS — Z71.6 ENCOUNTER FOR TOBACCO USE CESSATION COUNSELING: ICD-10-CM

## 2017-08-22 DIAGNOSIS — I50.32 DIASTOLIC CHF, CHRONIC (HCC): ICD-10-CM

## 2017-08-22 DIAGNOSIS — I25.10 CORONARY ARTERY DISEASE INVOLVING NATIVE CORONARY ARTERY OF NATIVE HEART WITHOUT ANGINA PECTORIS: ICD-10-CM

## 2017-08-22 LAB
BARBITURATES UR POC: NEGATIVE
BENZODIAZEPINES UR POC: NEGATIVE
COCAINE QL URINE POC: NEGATIVE
LOT EXP DATE POC: NORMAL
LOT NUMBER POC: NORMAL
MARIJUANA (THC) QL URINE POC: NEGATIVE
MDMA/ECSTASY UR POC: NEGATIVE
METHADONE QL URINE POC: NEGATIVE
METHAMPHETAMINE QL URINE POC: NEGATIVE
NTI OTHER MICRO TRANSPORT 977598: NEGATIVE
OPIATES QL URINE POC: NORMAL
OXYCODONE UR POC: NORMAL
VALID INTERNAL CONTROL?: YES

## 2017-08-22 RX ORDER — WARFARIN 2 MG/1
TABLET ORAL
Qty: 120 TAB | Refills: 3 | Status: SHIPPED | OUTPATIENT
Start: 2017-08-22 | End: 2017-09-08 | Stop reason: SDUPTHER

## 2017-08-22 RX ORDER — LOVASTATIN 10 MG/1
TABLET ORAL
Qty: 90 TAB | Refills: 3 | Status: SHIPPED | OUTPATIENT
Start: 2017-08-22 | End: 2018-01-01 | Stop reason: SDUPTHER

## 2017-08-22 RX ORDER — DILTIAZEM HYDROCHLORIDE 60 MG/1
TABLET, FILM COATED ORAL
Qty: 360 TAB | Refills: 3 | Status: SHIPPED | OUTPATIENT
Start: 2017-08-22 | End: 2019-01-01

## 2017-08-22 RX ORDER — OXYCODONE AND ACETAMINOPHEN 5; 325 MG/1; MG/1
1 TABLET ORAL
Qty: 120 TAB | Refills: 0 | Status: SHIPPED | OUTPATIENT
Start: 2017-08-22 | End: 2017-09-26 | Stop reason: SDUPTHER

## 2017-08-22 RX ORDER — VARENICLINE TARTRATE 25 MG
KIT ORAL
Qty: 1 DOSE PACK | Refills: 0 | Status: SHIPPED | OUTPATIENT
Start: 2017-08-22 | End: 2018-01-01

## 2017-08-22 RX ORDER — WARFARIN 2 MG/1
TABLET ORAL
Qty: 30 TAB | Refills: 5 | Status: SHIPPED | OUTPATIENT
Start: 2017-08-22 | End: 2017-08-22 | Stop reason: SDUPTHER

## 2017-08-22 RX ORDER — PREDNISONE 5 MG/1
5 TABLET ORAL DAILY
Qty: 90 TAB | Refills: 3 | Status: SHIPPED | OUTPATIENT
Start: 2017-08-22 | End: 2018-01-01

## 2017-08-22 NOTE — PROGRESS NOTES
Daniel Mcgrath is a 63 y.o. female   Chief Complaint   Patient presents with    Medication Refill    pt states she has been eating more greens, pt would like to change dosing to  accommodate her dietary changes, will change as in orders. Pt also states she is having a hard time quitting smoking but wants to and would like to try chantix. Pt also needs a refill of her pain medication which brings her pain from a 8/10 to a 3/10 in her back from her chronic pain from compression gfracture. This is a chronic problem that is stable. Per review of available records and patients , there are not sign of overuse, misuse, diversion, or concerning side effects. Today we reviewed: the risk of overdose, addiction, and dependency proper storage and disposal of medications the goals of treatment (improve functionality, quality of life, and pain) alternative treatment options including non-narcotic modalities the risks and benefits of continuing with a narcotic based pain regimen  The following changes were made to the patients current treatment plan: nothing, medications refilled.  checked and utox appropriate      Pt would like meds resent to Kaylyn Stewart  she is a 63y.o. year old female who presents for evalution. Reviewed PmHx, RxHx, FmHx, SocHx, AllgHx and updated and dated in the chart. Review of Systems - negative except as listed above in the HPI    Objective:     Vitals:    08/22/17 0915   BP: 124/86   Pulse: 67   Resp: 18   Temp: 98.2 °F (36.8 °C)   TempSrc: Oral   SpO2: 98%   Weight: 128 lb (58.1 kg)   Height: 5' 4\" (1.626 m)       Current Outpatient Prescriptions   Medication Sig    oxyCODONE-acetaminophen (PERCOCET) 5-325 mg per tablet Take 1 Tab by mouth every six (6) hours as needed for Pain. Max Daily Amount: 4 Tabs. DO NOT FILL UNTIL 8/26/17    predniSONE (DELTASONE) 5 mg tablet Take 1 Tab by mouth daily.     lovastatin (MEVACOR) 10 mg tablet TAKE 1 TABLET EVERY NIGHT    dilTIAZem (CARDIZEM) 60 mg tablet TAKE 1 TABLET BEFORE BREAKFAST, LUNCH, DINNER AND AT BEDTIME    warfarin (COUMADIN) 2 mg tablet Monday Wednesday Friday and Sunday take 3mg and take 2mg Tuesday thursday Saturday    varenicline (CHANTIX STARTER ROSSY) 0.5 mg (11)- 1 mg (42) DsPk Take as directed    potassium chloride (KLOR-CON) 10 mEq tablet Take 1 Tab by mouth every Monday, Wednesday, Friday.  bumetanide (BUMEX) 1 mg tablet Take 1 Tab by mouth every Monday, Wednesday, Friday.  ferrous sulfate (SLOW FE) 142 mg (45 mg iron) ER tablet Take 1 Tab by mouth two (2) times daily (with meals).  famotidine (PEPCID) 40 mg tablet Take 1 Tab by mouth two (2) times a day.  docusate sodium (COLACE) 100 mg capsule Take 100 mg by mouth two (2) times daily as needed for Constipation.  budesonide-formoterol (SYMBICORT) 160-4.5 mcg/actuation HFA inhaler Take 2 Puffs by inhalation two (2) times a day.  alendronate (FOSAMAX) 70 mg tablet Take 1 Tab by mouth every Wednesday.  albuterol (PROVENTIL VENTOLIN) 2.5 mg /3 mL (0.083 %) nebulizer solution 3 mL by Nebulization route every four (4) hours as needed for Wheezing or Shortness of Breath.  albuterol (VENTOLIN HFA) 90 mcg/actuation inhaler Take 2 Puffs by inhalation every four (4) hours as needed for Wheezing or Shortness of Breath.  nystatin (MYCOSTATIN) 100,000 unit/mL suspension Take 5 mL by mouth four (4) times daily. swish and spit     No current facility-administered medications for this visit.         Physical Examination: General appearance - alert, well appearing, and in no distress  Eyes - pupils equal and reactive, extraocular eye movements intact  Chest - clear to auscultation, no wheezes, rales or rhonchi, symmetric air entry  Heart - normal rate, regular rhythm, normal S1, S2, no murmurs, rubs, + click form mechanical valve  Back exam - limited range of motion, pain with motion noted during exam      Assessment/ Plan:   Diagnoses and all orders for this visit: 1. Chronic midline low back pain without sciatica  -     oxyCODONE-acetaminophen (PERCOCET) 5-325 mg per tablet; Take 1 Tab by mouth every six (6) hours as needed for Pain. Max Daily Amount: 4 Tabs. DO NOT FILL UNTIL 8/26/17    2. Lumbar compression fracture, sequela  -     oxyCODONE-acetaminophen (PERCOCET) 5-325 mg per tablet; Take 1 Tab by mouth every six (6) hours as needed for Pain. Max Daily Amount: 4 Tabs. DO NOT FILL UNTIL 8/26/17    3. Medication monitoring encounter  -     PRASANNA MADDEN ICUP DX DRUG SCREEN 10    4. S/P AVR (aortic valve replacement)  -     warfarin (COUMADIN) 2 mg tablet; Monday Wednesday Friday and Sunday take 3mg and take 2mg Tuesday thursday Saturday    5. COPD exacerbation (HCC)    6. Pulmonary emphysema, unspecified emphysema type (HCC)  -     predniSONE (DELTASONE) 5 mg tablet; Take 1 Tab by mouth daily. 7. Coronary artery disease involving native coronary artery of native heart without angina pectoris  -     lovastatin (MEVACOR) 10 mg tablet; TAKE 1 TABLET EVERY NIGHT    8. Chronic atrial fibrillation (HCC)  -     dilTIAZem (CARDIZEM) 60 mg tablet; TAKE 1 TABLET BEFORE BREAKFAST, LUNCH, DINNER AND AT BEDTIME    9. Diastolic CHF, chronic (HCC)  -     dilTIAZem (CARDIZEM) 60 mg tablet; TAKE 1 TABLET BEFORE BREAKFAST, LUNCH, DINNER AND AT BEDTIME    10. Encounter for tobacco use cessation counseling  -     varenicline (CHANTIX STARTER ROSSY) 0.5 mg (11)- 1 mg (42) DsPk; Take as directed     recheck INR 9/1/17 and send message  Follow-up Disposition:  Return in about 1 month (around 9/22/2017), or if symptoms worsen or fail to improve. I have discussed the diagnosis with the patient and the intended plan as seen in the above orders. The patient has received an after-visit summary and questions were answered concerning future plans. Pt conveyed understanding of plan.     Medication Side Effects and Warnings were discussed with patient      Bisi Sheldon DO

## 2017-09-08 DIAGNOSIS — Z95.2 S/P AVR (AORTIC VALVE REPLACEMENT): ICD-10-CM

## 2017-09-08 RX ORDER — WARFARIN 2 MG/1
TABLET ORAL
Qty: 120 TAB | Refills: 3
Start: 2017-09-08 | End: 2017-09-26 | Stop reason: SDUPTHER

## 2017-09-09 DIAGNOSIS — J43.9 PULMONARY EMPHYSEMA, UNSPECIFIED EMPHYSEMA TYPE (HCC): ICD-10-CM

## 2017-09-11 RX ORDER — BUDESONIDE AND FORMOTEROL FUMARATE DIHYDRATE 160; 4.5 UG/1; UG/1
AEROSOL RESPIRATORY (INHALATION)
Qty: 3 INHALER | Refills: 3 | Status: SHIPPED | OUTPATIENT
Start: 2017-09-11 | End: 2018-01-01 | Stop reason: SDUPTHER

## 2017-09-14 RX ORDER — BUPROPION HYDROCHLORIDE 150 MG/1
TABLET, EXTENDED RELEASE ORAL
Qty: 60 TAB | Refills: 2 | Status: SHIPPED | OUTPATIENT
Start: 2017-09-14 | End: 2018-01-01

## 2017-09-14 RX ORDER — BUPROPION HYDROCHLORIDE 150 MG/1
TABLET, EXTENDED RELEASE ORAL
Qty: 60 TAB | Refills: 2 | Status: SHIPPED | OUTPATIENT
Start: 2017-09-14 | End: 2017-09-14 | Stop reason: SDUPTHER

## 2017-09-26 ENCOUNTER — OFFICE VISIT (OUTPATIENT)
Dept: FAMILY MEDICINE CLINIC | Age: 62
End: 2017-09-26

## 2017-09-26 VITALS
RESPIRATION RATE: 14 BRPM | HEART RATE: 78 BPM | OXYGEN SATURATION: 98 % | WEIGHT: 128 LBS | HEIGHT: 64 IN | DIASTOLIC BLOOD PRESSURE: 70 MMHG | TEMPERATURE: 98.1 F | SYSTOLIC BLOOD PRESSURE: 144 MMHG | BODY MASS INDEX: 21.85 KG/M2

## 2017-09-26 DIAGNOSIS — Z79.01 ANTICOAGULATED ON COUMADIN: Primary | ICD-10-CM

## 2017-09-26 DIAGNOSIS — N30.01 ACUTE CYSTITIS WITH HEMATURIA: ICD-10-CM

## 2017-09-26 DIAGNOSIS — G89.29 CHRONIC MIDLINE LOW BACK PAIN WITHOUT SCIATICA: ICD-10-CM

## 2017-09-26 DIAGNOSIS — M54.50 CHRONIC MIDLINE LOW BACK PAIN WITHOUT SCIATICA: ICD-10-CM

## 2017-09-26 DIAGNOSIS — R30.0 BURNING WITH URINATION: ICD-10-CM

## 2017-09-26 DIAGNOSIS — Z95.2 S/P AVR (AORTIC VALVE REPLACEMENT): ICD-10-CM

## 2017-09-26 DIAGNOSIS — S32.000S LUMBAR COMPRESSION FRACTURE, SEQUELA: ICD-10-CM

## 2017-09-26 DIAGNOSIS — Z51.81 MEDICATION MONITORING ENCOUNTER: ICD-10-CM

## 2017-09-26 DIAGNOSIS — Z23 ENCOUNTER FOR IMMUNIZATION: ICD-10-CM

## 2017-09-26 PROBLEM — Z90.710 H/O TOTAL HYSTERECTOMY: Status: ACTIVE | Noted: 2017-09-26

## 2017-09-26 LAB
BARBITURATES UR POC: NEGATIVE
BENZODIAZEPINES UR POC: NEGATIVE
BILIRUB UR QL STRIP: NEGATIVE
COCAINE QL URINE POC: NEGATIVE
GLUCOSE UR-MCNC: NEGATIVE MG/DL
INR BLD: 2.1
KETONES P FAST UR STRIP-MCNC: NEGATIVE MG/DL
LOT EXP DATE POC: NORMAL
LOT NUMBER POC: NORMAL
MARIJUANA (THC) QL URINE POC: NEGATIVE
MDMA/ECSTASY UR POC: NEGATIVE
METHADONE QL URINE POC: NEGATIVE
METHAMPHETAMINE QL URINE POC: NEGATIVE
NTI OTHER MICRO TRANSPORT 977598: NEGATIVE
OPIATES QL URINE POC: NEGATIVE
OXYCODONE UR POC: NORMAL
PH UR STRIP: 6.5 [PH] (ref 4.6–8)
PROT UR QL STRIP: NEGATIVE MG/DL
PT POC: NORMAL SECONDS
SP GR UR STRIP: 1.01 (ref 1–1.03)
UA UROBILINOGEN AMB POC: NORMAL (ref 0.2–1)
URINALYSIS CLARITY POC: NORMAL
URINALYSIS COLOR POC: YELLOW
URINE BLOOD POC: NORMAL
URINE LEUKOCYTES POC: NORMAL
URINE NITRITES POC: POSITIVE
VALID INTERNAL CONTROL?: YES
VALID INTERNAL CONTROL?: YES

## 2017-09-26 RX ORDER — OXYCODONE AND ACETAMINOPHEN 5; 325 MG/1; MG/1
1 TABLET ORAL
Qty: 120 TAB | Refills: 0 | Status: SHIPPED | OUTPATIENT
Start: 2017-09-26 | End: 2017-10-28 | Stop reason: SDUPTHER

## 2017-09-26 RX ORDER — WARFARIN 2 MG/1
TABLET ORAL
Qty: 120 TAB | Refills: 3
Start: 2017-09-26 | End: 2017-12-12 | Stop reason: SDUPTHER

## 2017-09-26 RX ORDER — CIPROFLOXACIN 250 MG/1
250 TABLET, FILM COATED ORAL EVERY 12 HOURS
Qty: 14 TAB | Refills: 0 | Status: SHIPPED | OUTPATIENT
Start: 2017-09-26 | End: 2017-10-03

## 2017-09-26 NOTE — PROGRESS NOTES
Chief Complaint   Patient presents with    Coagulation disorder    Injection     Flu        Results for orders placed or performed in visit on 09/26/17   AMB POC PT/INR   Result Value Ref Range    VALID INTERNAL CONTROL POC Yes     Prothrombin time (POC)  seconds    INR POC 2.1

## 2017-09-26 NOTE — PROGRESS NOTES
Vignesh Yeung is a 63 y.o. female   Chief Complaint   Patient presents with    Coagulation disorder    Injection     Flu    pt states for the past week has been having issues with increased urinary frequency and burning. No fever no chills. Pt also for INR check and currently 2.1 for AVR so low and denies any changes I ndiet. Pt also for refill of her pain medication for her chronic low back pain from vertebral compression fracture. Opioid/Pain Management:    1. Has the patient signed a pain contract for chronic narcotic use? yes  2. Has the patient had a UDS or Serum screen as per guidelines as per formerly [facility]?:   yes    3. Does patient meet necessary guidelines for Naloxone treatement per the formerly [facility]?:    no  4. Has the Prescription Monitoring Program been reviewed? yes  5. Does patient have a long term condition that requires long term use of a Narcotic?  yes  6. Has patient been tolerant of therapy and responsible to routine follow up and specialist follow-up? Yes        she is a 63y.o. year old female who presents for evalution. Reviewed PmHx, RxHx, FmHx, SocHx, AllgHx and updated and dated in the chart. Review of Systems - negative except as listed above in the HPI    Objective:     Vitals:    09/26/17 1136   BP: 144/70   Pulse: 78   Resp: 14   Temp: 98.1 °F (36.7 °C)   SpO2: 98%   Weight: 128 lb (58.1 kg)   Height: 5' 4\" (1.626 m)       Current Outpatient Prescriptions   Medication Sig    warfarin (COUMADIN) 2 mg tablet Monday Tuesday Wednesday and Sunday take 3mg and take 2mg Thursday Friday Saturday.  oxyCODONE-acetaminophen (PERCOCET) 5-325 mg per tablet Take 1 Tab by mouth every six (6) hours as needed for Pain. Max Daily Amount: 4 Tabs.  ciprofloxacin HCl (CIPRO) 250 mg tablet Take 1 Tab by mouth every twelve (12) hours for 7 days.     buPROPion SR (WELLBUTRIN SR) 150 mg SR tablet 1 tab PO qday x 3 days then 1 tab PO bid for smoking cessation    SYMBICORT 160-4.5 mcg/actuation HFA inhaler INHALE 2 PUFFS TWICE DAILY    predniSONE (DELTASONE) 5 mg tablet Take 1 Tab by mouth daily.  lovastatin (MEVACOR) 10 mg tablet TAKE 1 TABLET EVERY NIGHT    dilTIAZem (CARDIZEM) 60 mg tablet TAKE 1 TABLET BEFORE BREAKFAST, LUNCH, DINNER AND AT BEDTIME    varenicline (CHANTIX STARTER ROSSY) 0.5 mg (11)- 1 mg (42) DsPk Take as directed    potassium chloride (KLOR-CON) 10 mEq tablet Take 1 Tab by mouth every Monday, Wednesday, Friday.  bumetanide (BUMEX) 1 mg tablet Take 1 Tab by mouth every Monday, Wednesday, Friday.  nystatin (MYCOSTATIN) 100,000 unit/mL suspension Take 5 mL by mouth four (4) times daily. swish and spit    ferrous sulfate (SLOW FE) 142 mg (45 mg iron) ER tablet Take 1 Tab by mouth two (2) times daily (with meals).  famotidine (PEPCID) 40 mg tablet Take 1 Tab by mouth two (2) times a day.  docusate sodium (COLACE) 100 mg capsule Take 100 mg by mouth two (2) times daily as needed for Constipation.  alendronate (FOSAMAX) 70 mg tablet Take 1 Tab by mouth every Wednesday.  albuterol (PROVENTIL VENTOLIN) 2.5 mg /3 mL (0.083 %) nebulizer solution 3 mL by Nebulization route every four (4) hours as needed for Wheezing or Shortness of Breath.  albuterol (VENTOLIN HFA) 90 mcg/actuation inhaler Take 2 Puffs by inhalation every four (4) hours as needed for Wheezing or Shortness of Breath. No current facility-administered medications for this visit.         Physical Examination: General appearance - alert, well appearing, and in no distress  Eyes - pupils equal and reactive, extraocular eye movements intact  Chest - clear to auscultation, no wheezes, rales or rhonchi, symmetric air entry  Heart - normal rate, regular rhythm, normal S1, S2, no murmurs, rubs, clicks or gallops  Back exam - limited range of motion, pain with motion noted during exam, tenderness noted midline lumbar spine      Assessment/ Plan:   Diagnoses and all orders for this visit:    1. Anticoagulated on Coumadin  -     AMB POC PT/INR    2. Burning with urination  -     AMB POC URINALYSIS DIP STICK AUTO W/O MICRO  -     CULTURE, URINE    3. S/P AVR (aortic valve replacement)  -     warfarin (COUMADIN) 2 mg tablet; Monday Tuesday Wednesday and Sunday take 3mg and take 2mg Thursday Friday Saturday. 4. Chronic midline low back pain without sciatica  -     oxyCODONE-acetaminophen (PERCOCET) 5-325 mg per tablet; Take 1 Tab by mouth every six (6) hours as needed for Pain. Max Daily Amount: 4 Tabs. 5. Lumbar compression fracture, sequela  -     oxyCODONE-acetaminophen (PERCOCET) 5-325 mg per tablet; Take 1 Tab by mouth every six (6) hours as needed for Pain. Max Daily Amount: 4 Tabs. 6. Medication monitoring encounter  -     AMB POC ALERE ICUP DX DRUG SCREEN 10    7. Acute cystitis with hematuria  -     ciprofloxacin HCl (CIPRO) 250 mg tablet; Take 1 Tab by mouth every twelve (12) hours for 7 days. -     CULTURE, URINE    8. Encounter for immunization  -     Influenza virus vaccine (QUADRIVALENT PRES FREE SYRINGE) IM ([id number])  -     [place] IMMUNIZ ADMIN,1 SINGLE/COMB VAC/TOXOID       Follow-up Disposition:  Return in about 1 month (around 10/26/2017), or if symptoms worsen or fail to improve. I have discussed the diagnosis with the patient and the intended plan as seen in the above orders. The patient has received an after-visit summary and questions were answered concerning future plans. Pt conveyed understanding of plan.     Medication Side Effects and Warnings were discussed with patient      Choco Jasso DO

## 2017-09-29 LAB — BACTERIA UR CULT: ABNORMAL

## 2017-10-18 ENCOUNTER — DOCUMENTATION ONLY (OUTPATIENT)
Dept: FAMILY MEDICINE CLINIC | Age: 62
End: 2017-10-18

## 2017-10-18 NOTE — PROGRESS NOTES
Spoke with pt- ID verified, informed pt that she need to come into office for a medication count, stated she has no transportation & was keeping her 17 mos grandchild, I informed pt she could be discharged from practice if she could not come into the office within 24 hours for her medication count, that is stated in her pain contract - pt stated she could come tomorrow- advised pt she need to present to office by 7:30 am with all medications- stated she would come in am at 7:30 with all her medications.

## 2017-10-19 ENCOUNTER — OFFICE VISIT (OUTPATIENT)
Dept: FAMILY MEDICINE CLINIC | Age: 62
End: 2017-10-19

## 2017-10-19 DIAGNOSIS — Z51.81 MEDICATION MONITORING ENCOUNTER: Primary | ICD-10-CM

## 2017-10-19 NOTE — PROGRESS NOTES
Pt brought in for pill count which was appropriate had 35 pills and markings were correct as confirmed with pharmacy yesterday for her percocet. Utox in office appropriate but sent out for confirmation.   This was also confirmed by TRISH Vazquez

## 2017-10-26 LAB — DRUGS UR: NORMAL

## 2017-10-28 DIAGNOSIS — G89.29 CHRONIC MIDLINE LOW BACK PAIN WITHOUT SCIATICA: ICD-10-CM

## 2017-10-28 DIAGNOSIS — M54.50 CHRONIC MIDLINE LOW BACK PAIN WITHOUT SCIATICA: ICD-10-CM

## 2017-10-28 DIAGNOSIS — S32.000S LUMBAR COMPRESSION FRACTURE, SEQUELA: ICD-10-CM

## 2017-10-31 RX ORDER — OXYCODONE AND ACETAMINOPHEN 5; 325 MG/1; MG/1
1 TABLET ORAL
Qty: 120 TAB | Refills: 0 | Status: SHIPPED | OUTPATIENT
Start: 2017-10-31 | End: 2017-12-12 | Stop reason: SDUPTHER

## 2017-10-31 NOTE — TELEPHONE ENCOUNTER
From: Tierra Huddleston  To: Charlotte Hernandez DO  Sent: 10/28/2017 12:51 PM EDT  Subject: Medication Renewal Request    Original authorizing provider: DO Emili Porter Acebonita would like a refill of the following medications:  oxyCODONE-acetaminophen (PERCOCET) 5-325 mg per tablet Charlotte Hernandez DO]    Preferred pharmacy: Other - come in to get    Comment:

## 2017-11-16 ENCOUNTER — OFFICE VISIT (OUTPATIENT)
Dept: FAMILY MEDICINE CLINIC | Age: 62
End: 2017-11-16

## 2017-11-16 VITALS
TEMPERATURE: 98.2 F | DIASTOLIC BLOOD PRESSURE: 80 MMHG | HEART RATE: 81 BPM | BODY MASS INDEX: 21.68 KG/M2 | HEIGHT: 64 IN | OXYGEN SATURATION: 98 % | WEIGHT: 127 LBS | SYSTOLIC BLOOD PRESSURE: 134 MMHG | RESPIRATION RATE: 18 BRPM

## 2017-11-16 DIAGNOSIS — I48.20 CHRONIC ATRIAL FIBRILLATION (HCC): Primary | ICD-10-CM

## 2017-11-16 DIAGNOSIS — Z95.2 S/P AVR (AORTIC VALVE REPLACEMENT): ICD-10-CM

## 2017-11-16 LAB
INR BLD: 1.5
PT POC: 17.7 SECONDS
VALID INTERNAL CONTROL?: YES

## 2017-11-16 NOTE — PROGRESS NOTES
Fritz Curtis is a 63 y.o. female   Chief Complaint   Patient presents with    Coagulation disorder    Pt here for INR check and is currently low at 1.5, goal is 2.5-3.5. Pt denies any dietary changes. Pt is otherwise doing well. Pt is not taking 3mg on Sundays and will start now and will also double dose today and tomorrow. Home INR machine is not functioning pt keeps getting an error code    she is a 63y.o. year old female who presents for evalution. Reviewed PmHx, RxHx, FmHx, SocHx, AllgHx and updated and dated in the chart. Review of Systems - negative except as listed above in the HPI    Objective:     Vitals:    11/16/17 0742   BP: 134/80   Pulse: 81   Resp: 18   Temp: 98.2 °F (36.8 °C)   TempSrc: Oral   SpO2: 98%   Weight: 127 lb (57.6 kg)   Height: 5' 4\" (1.626 m)       Current Outpatient Prescriptions   Medication Sig    warfarin (COUMADIN) 2 mg tablet Monday Tuesday Wednesday and Sunday take 3mg and take 2mg Thursday Friday Saturday.  oxyCODONE-acetaminophen (PERCOCET) 5-325 mg per tablet Take 1 Tab by mouth every six (6) hours as needed for Pain. Max Daily Amount: 4 Tabs.  buPROPion SR (WELLBUTRIN SR) 150 mg SR tablet 1 tab PO qday x 3 days then 1 tab PO bid for smoking cessation    SYMBICORT 160-4.5 mcg/actuation HFA inhaler INHALE 2 PUFFS TWICE DAILY    predniSONE (DELTASONE) 5 mg tablet Take 1 Tab by mouth daily.  lovastatin (MEVACOR) 10 mg tablet TAKE 1 TABLET EVERY NIGHT    dilTIAZem (CARDIZEM) 60 mg tablet TAKE 1 TABLET BEFORE BREAKFAST, LUNCH, DINNER AND AT BEDTIME    varenicline (CHANTIX STARTER ROSSY) 0.5 mg (11)- 1 mg (42) DsPk Take as directed    potassium chloride (KLOR-CON) 10 mEq tablet Take 1 Tab by mouth every Monday, Wednesday, Friday.  bumetanide (BUMEX) 1 mg tablet Take 1 Tab by mouth every Monday, Wednesday, Friday.  nystatin (MYCOSTATIN) 100,000 unit/mL suspension Take 5 mL by mouth four (4) times daily.  swish and spit    ferrous sulfate (SLOW FE) 142 mg (45 mg iron) ER tablet Take 1 Tab by mouth two (2) times daily (with meals).  famotidine (PEPCID) 40 mg tablet Take 1 Tab by mouth two (2) times a day.  docusate sodium (COLACE) 100 mg capsule Take 100 mg by mouth two (2) times daily as needed for Constipation.  alendronate (FOSAMAX) 70 mg tablet Take 1 Tab by mouth every Wednesday.  albuterol (PROVENTIL VENTOLIN) 2.5 mg /3 mL (0.083 %) nebulizer solution 3 mL by Nebulization route every four (4) hours as needed for Wheezing or Shortness of Breath.  albuterol (VENTOLIN HFA) 90 mcg/actuation inhaler Take 2 Puffs by inhalation every four (4) hours as needed for Wheezing or Shortness of Breath. No current facility-administered medications for this visit. Physical Examination: General appearance - alert, well appearing, and in no distress  Chest - clear to auscultation, no wheezes, rales or rhonchi, symmetric air entry  Heart Audible click  Assessment/ Plan:   Diagnoses and all orders for this visit:    1. S/P AVR (aortic valve replacement)     double dose today and tomorrow, then coumadin as per med list and recheck in 1.5 weeks  Follow-up Disposition:  Return if symptoms worsen or fail to improve. I have discussed the diagnosis with the patient and the intended plan as seen in the above orders. The patient has received an after-visit summary and questions were answered concerning future plans. Pt conveyed understanding of plan.     Medication Side Effects and Warnings were discussed with patient      Wilbur Nazario DO

## 2017-11-16 NOTE — PATIENT INSTRUCTIONS
Anticoagulants: After Your Visit  Your Care Instructions  Your doctor prescribed an anticoagulant medicine. Anticoagulants, often called blood thinners, prevent new blood clots from forming and keep existing clots from getting larger. They do not actually thin the blood, but they make the blood take longer to clot. This lowers the risk of a blood clot moving to the lungs (pulmonary embolism) or moving to the brain and causing a stroke. Blood thinners come in two forms. Heparin is given by shot, either under your skin or through a needle in your vein, and starts working right away. Warfarin (Coumadin) comes in pill form and takes longer to work. Your doctor may have you begin taking both forms at the same time. As soon as the pills start to work, you will stop the shots but continue to take the pills. If you have a blood clot in your leg, you may need to take warfarin for several months. People who have heart conditions such as atrial fibrillation often need to take it for the rest of their lives. The right dose of this medicine is different for each person. You will need regular blood tests to see if your dose is correct. Follow-up care is a key part of your treatment and safety. Be sure to make and go to all appointments, and call your doctor if you are having problems. Itâs also a good idea to know your test results and keep a list of the medicines you take. How do you take blood thinners? · Take your medicines exactly as prescribed. Call your doctor if you think you are having a problem with your medicine. · Call your doctor if you are not sure what to do if you missed a dose of blood thinner. ¨ Your doctor can tell you exactly what to do so you do not take too much or too little blood thinner. Then you will be as safe as possible. · Some general rules for what to do if you miss a dose:  ¨ If you remember it in the same day, take the missed dose. Then go back to your regular schedule.   ¨ If it is the next day, or almost time to take the next dose, do not take the missed dose. Do not double the dose to make up for the missed one. At your next regularly scheduled time, take your normal dose. ¨ If you miss your dose for 2 or more days, call your doctor. · To help you stay on schedule, use a calendar to remind you when to take your blood thinner. When you take the medicine, note it on the calendar. · If you are going to give yourself shots, your doctor will give you instructions for how to safely inject the medicine. Follow the directions carefully. · Do not take any vitamins, over-the-counter medicines, or herbal products without talking to your doctor first.  · Avoid contact sports and other activities that could lead to injury. Make your home safe and take other measures to reduce your risk of falling. Always wear a seat belt while in a car. · Do not suddenly change your intake of vitamin Kârich foods, such as broccoli, cabbage, asparagus, lettuce, and spinach. This will help blood thinners work evenly from day to day. · Limit alcohol to 2 drinks a day for men and 1 drink a day for women. Alcohol may interfere with blood thinners. It also increases your risk of falls, which can cause bruising and bleeding. · Tell your dentist, pharmacist, and other health professionals that you take blood thinners. Wear medical alert jewelry that says you take blood thinners. You can buy this at most drugstores. When should you call for help? Call 911 anytime you think you may need emergency care. For example, call if:  · You cough up blood. · You vomit blood or what looks like coffee grounds. · You pass maroon or very bloody stools. Call your doctor now or seek immediate medical care if:  · You have new bruises or blood spots under your skin. · You have a nosebleed. · Your gums bleed when you brush your teeth. · You have blood in your urine. · Your stools are black and tarlike or have streaks of blood.   · You have vaginal bleeding when you are not having your period, or heavy period bleeding. Watch closely for changes in your health, and be sure to contact your doctor if:  · You have questions about your medicine. Where can you learn more? Go to [employer]  Enter U572 in the search box to learn more about \"Anticoagulants: After Your Visit. \"   Â© 6000-9203 [employer]. Care instructions adapted under license by [employer] (which disclaims liability or warranty for this information). This care instruction is for use with your licensed healthcare professional. If you have questions about a medical condition or this instruction, always ask your healthcare professional. [place] [address] any warranty or liability for your use of this information.   Content Version: 2.1.87817; Last Revised: July 1, 2009

## 2017-11-26 ENCOUNTER — APPOINTMENT (OUTPATIENT)
Dept: GENERAL RADIOLOGY | Age: 62
DRG: 191 | End: 2017-11-26
Attending: EMERGENCY MEDICINE
Payer: MEDICARE

## 2017-11-26 ENCOUNTER — HOSPITAL ENCOUNTER (INPATIENT)
Age: 62
LOS: 6 days | Discharge: HOME | DRG: 191 | End: 2017-12-02
Attending: EMERGENCY MEDICINE | Admitting: INTERNAL MEDICINE
Payer: MEDICARE

## 2017-11-26 DIAGNOSIS — I48.20 CHRONIC ATRIAL FIBRILLATION (HCC): ICD-10-CM

## 2017-11-26 DIAGNOSIS — Z79.01 ANTICOAGULANT LONG-TERM USE: ICD-10-CM

## 2017-11-26 DIAGNOSIS — J44.1 ACUTE EXACERBATION OF CHRONIC OBSTRUCTIVE PULMONARY DISEASE (COPD) (HCC): Primary | ICD-10-CM

## 2017-11-26 LAB
ALBUMIN SERPL-MCNC: 3.6 G/DL (ref 3.5–5)
ALBUMIN/GLOB SERPL: 0.9 {RATIO} (ref 1.1–2.2)
ALP SERPL-CCNC: 103 U/L (ref 45–117)
ALT SERPL-CCNC: 26 U/L (ref 12–78)
ANION GAP SERPL CALC-SCNC: 6 MMOL/L (ref 5–15)
APPEARANCE UR: CLEAR
ARTERIAL PATENCY WRIST A: ABNORMAL
AST SERPL-CCNC: 27 U/L (ref 15–37)
ATRIAL RATE: 300 BPM
BACTERIA URNS QL MICRO: NEGATIVE /HPF
BASE EXCESS BLDA CALC-SCNC: 0.4 MMOL/L
BASOPHILS # BLD: 0 K/UL (ref 0–0.1)
BASOPHILS NFR BLD: 0 % (ref 0–1)
BDY SITE: ABNORMAL
BILIRUB SERPL-MCNC: 0.4 MG/DL (ref 0.2–1)
BILIRUB UR QL: NEGATIVE
BNP SERPL-MCNC: 600 PG/ML (ref 0–125)
BREATHS.SPONTANEOUS ON VENT: 18
BUN SERPL-MCNC: 11 MG/DL (ref 6–20)
BUN/CREAT SERPL: 11 (ref 12–20)
CALCIUM SERPL-MCNC: 8.8 MG/DL (ref 8.5–10.1)
CALCULATED R AXIS, ECG10: 78 DEGREES
CALCULATED T AXIS, ECG11: 30 DEGREES
CHLORIDE SERPL-SCNC: 102 MMOL/L (ref 97–108)
CO2 SERPL-SCNC: 31 MMOL/L (ref 21–32)
COLOR UR: ABNORMAL
CREAT SERPL-MCNC: 1.01 MG/DL (ref 0.55–1.02)
DIAGNOSIS, 93000: NORMAL
DIFFERENTIAL METHOD BLD: ABNORMAL
EOSINOPHIL # BLD: 0 K/UL (ref 0–0.4)
EOSINOPHIL NFR BLD: 0 % (ref 0–7)
EPITH CASTS URNS QL MICRO: ABNORMAL /LPF
ERYTHROCYTE [DISTWIDTH] IN BLOOD BY AUTOMATED COUNT: 15.9 % (ref 11.5–14.5)
GAS FLOW.O2 O2 DELIVERY SYS: 2 L/MIN
GLOBULIN SER CALC-MCNC: 3.9 G/DL (ref 2–4)
GLUCOSE SERPL-MCNC: 106 MG/DL (ref 65–100)
GLUCOSE UR STRIP.AUTO-MCNC: NEGATIVE MG/DL
HCO3 BLDA-SCNC: 25 MMOL/L (ref 22–26)
HCT VFR BLD AUTO: 40.1 % (ref 35–47)
HGB BLD-MCNC: 13.3 G/DL (ref 11.5–16)
HGB UR QL STRIP: ABNORMAL
HYALINE CASTS URNS QL MICRO: ABNORMAL /LPF (ref 0–5)
INR PPP: 1.8 (ref 0.9–1.1)
KETONES UR QL STRIP.AUTO: NEGATIVE MG/DL
LACTATE SERPL-SCNC: 1.6 MMOL/L (ref 0.4–2)
LACTATE SERPL-SCNC: 2.4 MMOL/L (ref 0.4–2)
LEUKOCYTE ESTERASE UR QL STRIP.AUTO: NEGATIVE
LYMPHOCYTES # BLD: 0.7 K/UL (ref 0.8–3.5)
LYMPHOCYTES NFR BLD: 7 % (ref 12–49)
MCH RBC QN AUTO: 30.9 PG (ref 26–34)
MCHC RBC AUTO-ENTMCNC: 33.2 G/DL (ref 30–36.5)
MCV RBC AUTO: 93.3 FL (ref 80–99)
MONOCYTES # BLD: 1.2 K/UL (ref 0–1)
MONOCYTES NFR BLD: 12 % (ref 5–13)
NEUTS SEG # BLD: 8.1 K/UL (ref 1.8–8)
NEUTS SEG NFR BLD: 81 % (ref 32–75)
NITRITE UR QL STRIP.AUTO: NEGATIVE
PCO2 BLDA: 42 MMHG (ref 35–45)
PH BLDA: 7.4 [PH] (ref 7.35–7.45)
PH UR STRIP: 7.5 [PH] (ref 5–8)
PLATELET # BLD AUTO: 221 K/UL (ref 150–400)
PO2 BLDA: 71 MMHG (ref 80–100)
POTASSIUM SERPL-SCNC: 3.7 MMOL/L (ref 3.5–5.1)
PROT SERPL-MCNC: 7.5 G/DL (ref 6.4–8.2)
PROT UR STRIP-MCNC: NEGATIVE MG/DL
PROTHROMBIN TIME: 18.8 SEC (ref 9–11.1)
Q-T INTERVAL, ECG07: 350 MS
QRS DURATION, ECG06: 74 MS
QTC CALCULATION (BEZET), ECG08: 439 MS
RBC # BLD AUTO: 4.3 M/UL (ref 3.8–5.2)
RBC #/AREA URNS HPF: ABNORMAL /HPF (ref 0–5)
RBC MORPH BLD: ABNORMAL
SAO2 % BLD: 94 % (ref 92–97)
SAO2% DEVICE SAO2% SENSOR NAME: ABNORMAL
SODIUM SERPL-SCNC: 139 MMOL/L (ref 136–145)
SP GR UR REFRACTOMETRY: 1.01 (ref 1–1.03)
SPECIMEN SITE: ABNORMAL
TROPONIN I SERPL-MCNC: <0.04 NG/ML
UA: UC IF INDICATED,UAUC: ABNORMAL
UROBILINOGEN UR QL STRIP.AUTO: 0.2 EU/DL (ref 0.2–1)
VENTRICULAR RATE, ECG03: 95 BPM
WBC # BLD AUTO: 10 K/UL (ref 3.6–11)
WBC URNS QL MICRO: ABNORMAL /HPF (ref 0–4)

## 2017-11-26 PROCEDURE — 84484 ASSAY OF TROPONIN QUANT: CPT | Performed by: EMERGENCY MEDICINE

## 2017-11-26 PROCEDURE — 80053 COMPREHEN METABOLIC PANEL: CPT | Performed by: EMERGENCY MEDICINE

## 2017-11-26 PROCEDURE — 81001 URINALYSIS AUTO W/SCOPE: CPT | Performed by: EMERGENCY MEDICINE

## 2017-11-26 PROCEDURE — 71010 XR CHEST PORT: CPT

## 2017-11-26 PROCEDURE — 74011000250 HC RX REV CODE- 250: Performed by: INTERNAL MEDICINE

## 2017-11-26 PROCEDURE — 94640 AIRWAY INHALATION TREATMENT: CPT

## 2017-11-26 PROCEDURE — 74011250636 HC RX REV CODE- 250/636: Performed by: EMERGENCY MEDICINE

## 2017-11-26 PROCEDURE — 85025 COMPLETE CBC W/AUTO DIFF WBC: CPT | Performed by: EMERGENCY MEDICINE

## 2017-11-26 PROCEDURE — 96374 THER/PROPH/DIAG INJ IV PUSH: CPT

## 2017-11-26 PROCEDURE — 77030029684 HC NEB SM VOL KT MONA -A

## 2017-11-26 PROCEDURE — 36600 WITHDRAWAL OF ARTERIAL BLOOD: CPT | Performed by: EMERGENCY MEDICINE

## 2017-11-26 PROCEDURE — 83880 ASSAY OF NATRIURETIC PEPTIDE: CPT | Performed by: EMERGENCY MEDICINE

## 2017-11-26 PROCEDURE — 77010033678 HC OXYGEN DAILY

## 2017-11-26 PROCEDURE — 74011250637 HC RX REV CODE- 250/637: Performed by: INTERNAL MEDICINE

## 2017-11-26 PROCEDURE — 93005 ELECTROCARDIOGRAM TRACING: CPT

## 2017-11-26 PROCEDURE — 94644 CONT INHLJ TX 1ST HOUR: CPT

## 2017-11-26 PROCEDURE — 65660000000 HC RM CCU STEPDOWN

## 2017-11-26 PROCEDURE — 82803 BLOOD GASES ANY COMBINATION: CPT | Performed by: EMERGENCY MEDICINE

## 2017-11-26 PROCEDURE — 85610 PROTHROMBIN TIME: CPT | Performed by: EMERGENCY MEDICINE

## 2017-11-26 PROCEDURE — 74011250636 HC RX REV CODE- 250/636: Performed by: INTERNAL MEDICINE

## 2017-11-26 PROCEDURE — 99285 EMERGENCY DEPT VISIT HI MDM: CPT

## 2017-11-26 PROCEDURE — 36415 COLL VENOUS BLD VENIPUNCTURE: CPT | Performed by: EMERGENCY MEDICINE

## 2017-11-26 PROCEDURE — 83605 ASSAY OF LACTIC ACID: CPT | Performed by: EMERGENCY MEDICINE

## 2017-11-26 PROCEDURE — 74011000250 HC RX REV CODE- 250: Performed by: EMERGENCY MEDICINE

## 2017-11-26 RX ORDER — IBUPROFEN 200 MG
1 TABLET ORAL DAILY PRN
Status: DISCONTINUED | OUTPATIENT
Start: 2017-11-26 | End: 2017-12-02 | Stop reason: HOSPADM

## 2017-11-26 RX ORDER — ACETAMINOPHEN 500 MG
500 TABLET ORAL
Status: DISCONTINUED | OUTPATIENT
Start: 2017-11-26 | End: 2017-12-02 | Stop reason: HOSPADM

## 2017-11-26 RX ORDER — OXYCODONE AND ACETAMINOPHEN 5; 325 MG/1; MG/1
1 TABLET ORAL
Status: DISCONTINUED | OUTPATIENT
Start: 2017-11-26 | End: 2017-12-02 | Stop reason: HOSPADM

## 2017-11-26 RX ORDER — DILTIAZEM HYDROCHLORIDE 60 MG/1
60 TABLET, FILM COATED ORAL
Status: DISCONTINUED | OUTPATIENT
Start: 2017-11-26 | End: 2017-12-02 | Stop reason: HOSPADM

## 2017-11-26 RX ORDER — FLUTICASONE FUROATE AND VILANTEROL 200; 25 UG/1; UG/1
1 POWDER RESPIRATORY (INHALATION) DAILY
Status: DISCONTINUED | OUTPATIENT
Start: 2017-11-27 | End: 2017-12-02 | Stop reason: HOSPADM

## 2017-11-26 RX ORDER — POTASSIUM CHLORIDE 750 MG/1
10 TABLET, FILM COATED, EXTENDED RELEASE ORAL
Status: DISCONTINUED | OUTPATIENT
Start: 2017-11-27 | End: 2017-12-02 | Stop reason: HOSPADM

## 2017-11-26 RX ORDER — ALBUTEROL SULFATE 0.83 MG/ML
15 SOLUTION RESPIRATORY (INHALATION)
Status: COMPLETED | OUTPATIENT
Start: 2017-11-26 | End: 2017-11-26

## 2017-11-26 RX ORDER — ALBUTEROL SULFATE 0.83 MG/ML
2.5 SOLUTION RESPIRATORY (INHALATION)
Status: DISCONTINUED | OUTPATIENT
Start: 2017-11-26 | End: 2017-12-02 | Stop reason: HOSPADM

## 2017-11-26 RX ORDER — ONDANSETRON 2 MG/ML
2 INJECTION INTRAMUSCULAR; INTRAVENOUS
Status: DISCONTINUED | OUTPATIENT
Start: 2017-11-26 | End: 2017-12-02 | Stop reason: HOSPADM

## 2017-11-26 RX ORDER — SODIUM CHLORIDE 0.9 % (FLUSH) 0.9 %
5-10 SYRINGE (ML) INJECTION EVERY 8 HOURS
Status: DISCONTINUED | OUTPATIENT
Start: 2017-11-26 | End: 2017-12-02 | Stop reason: HOSPADM

## 2017-11-26 RX ORDER — SODIUM CHLORIDE 0.9 % (FLUSH) 0.9 %
5-10 SYRINGE (ML) INJECTION AS NEEDED
Status: DISCONTINUED | OUTPATIENT
Start: 2017-11-26 | End: 2017-12-02 | Stop reason: HOSPADM

## 2017-11-26 RX ORDER — NYSTATIN 100000 [USP'U]/ML
500000 SUSPENSION ORAL 4 TIMES DAILY
Status: DISCONTINUED | OUTPATIENT
Start: 2017-11-26 | End: 2017-12-02 | Stop reason: HOSPADM

## 2017-11-26 RX ORDER — WARFARIN SODIUM 5 MG/1
5 TABLET ORAL ONCE
Status: COMPLETED | OUTPATIENT
Start: 2017-11-26 | End: 2017-11-26

## 2017-11-26 RX ORDER — WARFARIN 1 MG/1
1 TABLET ORAL EVERY EVENING
Status: DISCONTINUED | OUTPATIENT
Start: 2017-11-26 | End: 2017-11-26 | Stop reason: DRUGHIGH

## 2017-11-26 RX ORDER — ATORVASTATIN CALCIUM 20 MG/1
20 TABLET, FILM COATED ORAL
Status: DISCONTINUED | OUTPATIENT
Start: 2017-11-26 | End: 2017-12-02 | Stop reason: HOSPADM

## 2017-11-26 RX ORDER — BUMETANIDE 1 MG/1
1 TABLET ORAL
Status: DISCONTINUED | OUTPATIENT
Start: 2017-11-27 | End: 2017-12-02 | Stop reason: HOSPADM

## 2017-11-26 RX ORDER — DOCUSATE SODIUM 100 MG/1
100 CAPSULE, LIQUID FILLED ORAL
Status: DISCONTINUED | OUTPATIENT
Start: 2017-11-26 | End: 2017-12-02 | Stop reason: HOSPADM

## 2017-11-26 RX ORDER — NALOXONE HYDROCHLORIDE 0.4 MG/ML
0.4 INJECTION, SOLUTION INTRAMUSCULAR; INTRAVENOUS; SUBCUTANEOUS AS NEEDED
Status: DISCONTINUED | OUTPATIENT
Start: 2017-11-26 | End: 2017-12-02 | Stop reason: HOSPADM

## 2017-11-26 RX ORDER — FAMOTIDINE 20 MG/1
40 TABLET, FILM COATED ORAL 2 TIMES DAILY
Status: DISCONTINUED | OUTPATIENT
Start: 2017-11-26 | End: 2017-12-02 | Stop reason: HOSPADM

## 2017-11-26 RX ORDER — BUPROPION HYDROCHLORIDE 150 MG/1
150 TABLET, EXTENDED RELEASE ORAL 2 TIMES DAILY
Status: DISCONTINUED | OUTPATIENT
Start: 2017-11-26 | End: 2017-12-02 | Stop reason: HOSPADM

## 2017-11-26 RX ORDER — HYDROMORPHONE HYDROCHLORIDE 1 MG/ML
0.5 INJECTION, SOLUTION INTRAMUSCULAR; INTRAVENOUS; SUBCUTANEOUS
Status: DISCONTINUED | OUTPATIENT
Start: 2017-11-26 | End: 2017-12-02 | Stop reason: HOSPADM

## 2017-11-26 RX ADMIN — WARFARIN SODIUM 5 MG: 5 TABLET ORAL at 18:01

## 2017-11-26 RX ADMIN — METHYLPREDNISOLONE SODIUM SUCCINATE 60 MG: 125 INJECTION, POWDER, FOR SOLUTION INTRAMUSCULAR; INTRAVENOUS at 16:35

## 2017-11-26 RX ADMIN — OXYCODONE HYDROCHLORIDE AND ACETAMINOPHEN 1 TABLET: 5; 325 TABLET ORAL at 18:09

## 2017-11-26 RX ADMIN — SODIUM CHLORIDE 500 ML: 900 INJECTION, SOLUTION INTRAVENOUS at 09:28

## 2017-11-26 RX ADMIN — METHYLPREDNISOLONE SODIUM SUCCINATE 125 MG: 125 INJECTION, POWDER, FOR SOLUTION INTRAMUSCULAR; INTRAVENOUS at 08:36

## 2017-11-26 RX ADMIN — Medication 10 ML: at 22:53

## 2017-11-26 RX ADMIN — ALBUTEROL SULFATE 15 MG: 2.5 SOLUTION RESPIRATORY (INHALATION) at 08:59

## 2017-11-26 RX ADMIN — ALBUTEROL SULFATE 2.5 MG: 2.5 SOLUTION RESPIRATORY (INHALATION) at 16:35

## 2017-11-26 RX ADMIN — ALBUTEROL SULFATE 2.5 MG: 2.5 SOLUTION RESPIRATORY (INHALATION) at 19:19

## 2017-11-26 RX ADMIN — ATORVASTATIN CALCIUM 20 MG: 20 TABLET, FILM COATED ORAL at 22:51

## 2017-11-26 RX ADMIN — Medication 10 ML: at 18:03

## 2017-11-26 RX ADMIN — ALBUTEROL SULFATE 2.5 MG: 2.5 SOLUTION RESPIRATORY (INHALATION) at 23:59

## 2017-11-26 RX ADMIN — METHYLPREDNISOLONE SODIUM SUCCINATE 60 MG: 125 INJECTION, POWDER, FOR SOLUTION INTRAMUSCULAR; INTRAVENOUS at 22:52

## 2017-11-26 RX ADMIN — DILTIAZEM HYDROCHLORIDE 60 MG: 30 TABLET, FILM COATED ORAL at 16:35

## 2017-11-26 NOTE — H&P
History and Physical          Pt Name  Gabriel Cedeño   Date of Birth 1955   Medical Record Number  [id number]      Age  63 y.o. PCP Toma Gonzales DO   Admit date:  11/26/2017    Room Number  VX70/73  @ [facility]   Date of Service  11/26/2017     Admission Diagnoses:  Acute exacerbation of chronic obstructive pulmonary disease (COPD) [facility])     Certification: We are admitting Gabriel Cedeño 63 y.o. female with a principle diagnosis of Acute exacerbation of chronic obstructive pulmonary disease (COPD) ([facility] [address]  This patient also suffers from other comorbidities listed below. I have a high level of concern for rapid decline in respiratory distress  leading to life threatening complications      Assessment and plan:    Acute exacerbation of chronic obstructive pulmonary disease (COPD) ([facility] [address]  · Admit to remote tele bed   · Oxygen/NC ( pt reported that she could not afford home oxygen when it had been previously ordered. )   · IV Solu-medrol   · Jet nebs round the clock   · RT Peak flow check daily     CAD (coronary artery disease), native coronary artery  Chronic atrial fibrillation (HCC) on chronic anticoagulation   Rate controled and we will continue her home meds as they were PTA     COPD (chronic obstructive pulmonary disease) ([facility] [address]  As above     Diastolic CHF, chronic (HCC) not in acute CHF at this time. EF 55-60% 07/17   We will ask cardiologist to evaluate her - pt is planning to transfer care to her 's cardiologist Dr. Lisa Manzano. History of GI bleed   Happened last November and workup including Capsule endoscopy didn't show any obvious source. 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 Pharmacy to manage coumadin dose.      Tobacco abuse  Nicotine patch PRN        CODE STATUS  Full     Functional Status  Pt is  and lives with her  in [place]  She is able to walk on her own and walk back forth to the mailbox about 100' total.      Surrogate decision maker: Pt's     Prophylaxis  Hep SQ   Discharge Plan: HH PT, OT, RN,     There are currently no Active Isolations Payor: Brian Cavanaugh / Plan: [address] [place] [address] HMO / Product Type: Managed Care Medicare /    Social issues  Date Comment    2:25 PM  No family here. Prognosis  Fair      Subjective Data         History of Present Illness : The pt was in her usual state of health till last evening. She tried increasing her nebs with some relief. This morning she developed increased SOB and wheezing. ER treated her with back to back nebs with no significant relief.    We are admitting her for COPD exacerbation   She did mention that she had some chest pain with yellow sputum with cough      Review of Systems - History obtained from the patient  General ROS: positive for  - fatigue  negative for - chills or fever  Psychological ROS: negative  Ophthalmic ROS: negative for - blurry vision or decreased vision  ENT ROS: negative for - epistaxis, headaches or hearing change  Allergy and Immunology ROS: negative for - nasal congestion or postnasal drip  Respiratory ROS: positive for - cough, shortness of breath, sputum changes, tachypnea and wheezing  negative for - hemoptysis or orthopnea  Cardiovascular ROS: positive for - chest pain and dyspnea on exertion  negative for - loss of consciousness or palpitations  Gastrointestinal ROS: no abdominal pain, change in bowel habits, or black or bloody stools  Genito-Urinary ROS: no dysuria, trouble voiding, or hematuria  Musculoskeletal ROS: negative for - gait disturbance or joint pain  Neurological ROS: no TIA or stroke symptoms  Dermatological ROS: negative     Past Medical History:   Diagnosis Date    A-fib ([facility] [address]     Anticoagulant long-term use     CAD (coronary artery disease), native coronary artery     mild to moderate by cath    CHF (congestive heart failure) (HCC)     Chronic obstructive pulmonary disease (HCC)     Dyslipidemia     Ill-defined condition     migraines    Migraine     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 1/8/2015    Tissue MVR 1989 following failed balloon valvuloplasty for MS Redo MVR 2004 due to severe MR, AVR due to AR ([facility]     S/P AVR (aortic valve replacement) 1/8/2015    S/P MVR (mitral valve replacement) 1/8/2015         Past Surgical History:   Procedure Laterality Date    COLONOSCOPY N/A 11/28/2016    COLONOSCOPY performed by Connie Dubon MD at [address] HX COLECTOMY      HX HEART CATHETERIZATION      cabg    HX HYSTERECTOMY      BSO    HX MITRAL VALVE REPLACEMENT      and redo MVR and AVR         Social History   Substance Use Topics    Smoking status: Current Every Day Smoker     Packs/day: 0.50     Types: Cigarettes     Last attempt to quit: 11/1/2015    Smokeless tobacco: Never Used      Comment: 11/1/2014    Alcohol use No         Family History   Problem Relation Age of Onset    Cancer Brother            Objective data     Comment: Pleasant lady lying in ER bed in no distress   No one else in her room      Patient Vitals for the past 24 hrs:   Temp   11/26/17 0749 98.3 °F (36.8 °C)    Patient Vitals for the past 24 hrs:   Pulse   11/26/17 1300 87   11/26/17 1200 87   11/26/17 1114 96   11/26/17 1112 89   11/26/17 1000 87   11/26/17 0900 80   11/26/17 0830 89   11/26/17 0800 93   11/26/17 0749 90    Patient Vitals for the past 24 hrs:   Resp   11/26/17 1300 25   11/26/17 1200 26   11/26/17 1114 26   11/26/17 1112 (!) 32   11/26/17 1000 26   11/26/17 0900 24   11/26/17 0830 25   11/26/17 0800 28   11/26/17 0749 26    Patient Vitals for the past 24 hrs:   BP   11/26/17 1300 131/61   11/26/17 1200 128/76   11/26/17 1114 145/68   11/26/17 1112 145/68   11/26/17 1000 142/66   11/26/17 0900 134/74   11/26/17 0830 123/64   11/26/17 0800 132/66   11/26/17 0749 150/81 SpO2 Readings from Last 6 Encounters:   11/26/17 97%   11/16/17 98%   09/26/17 98%   08/22/17 98%   07/12/17 98%   06/22/17 96%      O2 Flow Rate (L/min): 2 l/min  O2 Device: Nasal cannula   Body mass index is 21.8 kg/(m^2). - Wt Readings from Last 10 Encounters:   11/26/17 57.6 kg (126 lb 15.8 oz)   11/16/17 57.6 kg (127 lb)   09/26/17 58.1 kg (128 lb)   08/22/17 58.1 kg (128 lb)   07/12/17 56.7 kg (125 lb)   06/22/17 55.8 kg (123 lb)   06/16/17 55.8 kg (123 lb)   06/08/17 55.8 kg (123 lb)   03/29/17 56.7 kg (125 lb)   02/21/17 57.3 kg (126 lb 6.4 oz)          Physical Exam:             General:  Alert, cooperative,   well noursished,   well developed,   appears stated age    Ears/Eyes:  Hearing intact  Sclera anicteric. Pupils equal   Mouth/Throat:  Mucous membranes normal pink and moist  Oral pharynx clear   Mild thrush noted on tongue    Neck:     Lungs:  Trachea midline  Chest excursion symmetrical   Auscultation B/L Symmetrical with   Vesicular breath sounds     No Crepitations noted   Percussion note resonant on mid Clavicular line; no sign of pneumothorax        CVS:  Regular rate and rhythm   Mod systolic  murmur,   No click, rub or gallop  S1 normal   S2 normal   Pedal pulses  b/l symmetrical   Abdomen:    Soft, non-tender  Bowel sounds normal  No distension   Percussion note tympanitic   Extremities:    No cyanosis, jaundice  No edema noted   No sign of DVT/cord like lesion on palpation  No sign of acute trauma   Age appropriate OA changes noted. Skin:    Skin color, texture, turgor normal. no acute rash or lesions    Lymph nodes:     Musculoskeletal Muscle bulk B/L symmetrical   Neuro Cranial nerves are intact,   motor movement b/l symmetrical,   Sensory evaluation b/l symmetrical    Psych:  Alert and oriented, normal mood & affect given the clinical scenario          Medications reviewed     No current facility-administered medications for this encounter.       Current Outpatient Prescriptions Medication Sig    oxyCODONE-acetaminophen (PERCOCET) 5-325 mg per tablet Take 1 Tab by mouth every six (6) hours as needed for Pain. Max Daily Amount: 4 Tabs.  warfarin (COUMADIN) 2 mg tablet Monday Tuesday Wednesday and Sunday take 3mg and take 2mg Thursday Friday Saturday.  buPROPion SR (WELLBUTRIN SR) 150 mg SR tablet 1 tab PO qday x 3 days then 1 tab PO bid for smoking cessation    SYMBICORT 160-4.5 mcg/actuation HFA inhaler INHALE 2 PUFFS TWICE DAILY    predniSONE (DELTASONE) 5 mg tablet Take 1 Tab by mouth daily.  lovastatin (MEVACOR) 10 mg tablet TAKE 1 TABLET EVERY NIGHT    dilTIAZem (CARDIZEM) 60 mg tablet TAKE 1 TABLET BEFORE BREAKFAST, LUNCH, DINNER AND AT BEDTIME    varenicline (CHANTIX STARTER ROSSY) 0.5 mg (11)- 1 mg (42) DsPk Take as directed    potassium chloride (KLOR-CON) 10 mEq tablet Take 1 Tab by mouth every Monday, Wednesday, Friday.  bumetanide (BUMEX) 1 mg tablet Take 1 Tab by mouth every Monday, Wednesday, Friday.  nystatin (MYCOSTATIN) 100,000 unit/mL suspension Take 5 mL by mouth four (4) times daily. swish and spit    ferrous sulfate (SLOW FE) 142 mg (45 mg iron) ER tablet Take 1 Tab by mouth two (2) times daily (with meals).  famotidine (PEPCID) 40 mg tablet Take 1 Tab by mouth two (2) times a day.  docusate sodium (COLACE) 100 mg capsule Take 100 mg by mouth two (2) times daily as needed for Constipation.  alendronate (FOSAMAX) 70 mg tablet Take 1 Tab by mouth every Wednesday.  albuterol (PROVENTIL VENTOLIN) 2.5 mg /3 mL (0.083 %) nebulizer solution 3 mL by Nebulization route every four (4) hours as needed for Wheezing or Shortness of Breath.  albuterol (VENTOLIN HFA) 90 mcg/actuation inhaler Take 2 Puffs by inhalation every four (4) hours as needed for Wheezing or Shortness of Breath. Relevant other informations:      Other medical conditions listed in this following active hospital problem list section; all of these and other pertinent data were taken into consideration when treatment plan is developed and customized to this patient's unique overall circumstances and needs. We have reviewed available old medical records within the constraints of this admission process. 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   S/P AVR (aortic valve replacement)   S/P MVR (mitral valve replacement)   Tobacco abuse       Data Review:   Recent Days:  All Micro Results     None          Recent Labs      11/26/17   0800   WBC  10.0   HGB  13.3   HCT  40.1   PLT  221     Recent Labs      11/26/17   0800   NA  139   K  3.7   CL  102   CO2  31   GLU  106*   BUN  11   CREA  1.01   CA  8.8   ALB  3.6   TBILI  0.4   SGOT  27   ALT  26   INR  1.8*      Lab Results   Component Value Date/Time    TSH 1.08 11/26/2016 05:36 AM         Care Plan discussed with: Patient/Family and Nurse   Other medical conditions are listed in the active hospital problem list section; these and other pertinent data were taken into consideration when the treatment plan was developed and customized to this patient's unique overall circumstances and needs. High complexity decision making was performed for this patient who is at high risk for decompensation with multiple organ involvement. Today total floor/unit time was 65  minutes while caring for this patient and greater than 50% of that time was spent with patient (and/or family) coordinating patients clinical issues.      Jhonathan Arita MD MPH  FACP                               11/26/2017       __________________________________________________________     FOR SOUND PHYSICIAN ADMINISTRATIVE USE ONLY     SHYANN           INPT 223 Merrill Adames MD MPH FACP   2:05 PM  11/26/2017

## 2017-11-26 NOTE — ED NOTES
Bedside and Verbal shift change report given to [facility] (oncoming nurse) by Hilton Jefferson (offgoing nurse). Report included the following information SBAR, ED Summary, Intake/Output, MAR and Recent Results. Pt on monitor x 3. Call bell in reach. Pt updated on plan of care, awaiting inpatient room placement. Pt on oxygen via NC at 2 L; not baseline.

## 2017-11-26 NOTE — PROGRESS NOTES
Pharmacy Daily Dosing of Warfarin    Indication: AF, AVR, MVR    Goal INR: 2-3    PTA Warfarin Dose: 3 mg M/T/W/Sun, 2 mg Th/Sat (2.6 mg average)    Concurrent anticoagulants: None    Concurrent antiplatelet: None    Major Interacting Medications    Drugs that may increase INR: None   Drugs that may decrease INR: None    Conditions that may increase/decrease INR (CHF, C. diff, cirrhosis, thyroid disorder, hypoalbuminemia): No    Labs:  Recent Labs      11/26/17   0800   INR  1.8*   HGB  13.3   PLT  221   SGOT  27   TBILI  0.4   ALB  3.6           Impression/Plan: Warfarin 5 mg tonight then daily dose per INR. Daily INR  CBC w/o diff QOD     Pharmacy will follow daily and adjust the dose as appropriate. Thanks  Jorge Coyne [facility]      [email or web address]. [email or web address]

## 2017-11-26 NOTE — ED NOTES
Patient arrived with complaints of shortness of breath. Oxygen saturation noted to be 89% on room air. Patient speaking in short sentences. Patient placed on two liters of oxygen nasal cannula. Oxygen saturations improved to 99%. Spouse at bedside and call bell with in reach.

## 2017-11-26 NOTE — ED PROVIDER NOTES
EMERGENCY DEPARTMENT HISTORY AND PHYSICAL EXAM      Date: 11/26/2017  Patient Name: Wally Tejeda    History of Presenting Illness     Chief Complaint   Patient presents with    Shortness of Breath     Couple of days - has COPD       History Provided By: Patient    HPI: Wally Tejeda, 63 y.o. female with PMHx significant for COPD, aortic valve replacement, mitral valve replacement, CAD, afib, and CHF, presents ambulatory to the ED with cc of progressively worsening SOB that started 2 days ago. She also c/o a minimally productive cough with yellow sputum and constant, generalized chest pain that started yesterday morning. She describes her chest pain as a pressure. The patient reports that she typically undergoes 2-3 breathing treatments per day, but she has increased her use to 5-6 times since onset of symptoms with no relief. She notes that her most recent breathing treatment was at 0430 this morning. She also states that she has used her Albuterol inhaler with no relief. She reports that she is also prescribed Warfarin and Prednisone 5 mg daily. She notes that she has been admitted to the hospital for COPD exacerbation in the past, but she denies a history of intubations. She has been a smoker since she was 8years old, with a history of smoking 2 PPD for several years. She states that she has recently decreased her tobacco use to 5-6 cigarettes per day. She denies Pulmonology or recent Cardiology follow up. She specifically denies fevers, nausea, vomiting, or other complaints at this time. PCP: Jamey Womack DO   Cardiology: Ihsan Salgado. Marion Casas MD    Current Outpatient Prescriptions   Medication Sig Dispense Refill    oxyCODONE-acetaminophen (PERCOCET) 5-325 mg per tablet Take 1 Tab by mouth every six (6) hours as needed for Pain. Max Daily Amount: 4 Tabs. 120 Tab 0    warfarin (COUMADIN) 2 mg tablet Monday Tuesday Wednesday and Sunday take 3mg and take 2mg Thursday Friday Saturday.  120 Tab 3    buPROPion SR (WELLBUTRIN SR) 150 mg SR tablet 1 tab PO qday x 3 days then 1 tab PO bid for smoking cessation 60 Tab 2    SYMBICORT 160-4.5 mcg/actuation HFA inhaler INHALE 2 PUFFS TWICE DAILY 3 Inhaler 3    predniSONE (DELTASONE) 5 mg tablet Take 1 Tab by mouth daily. 90 Tab 3    lovastatin (MEVACOR) 10 mg tablet TAKE 1 TABLET EVERY NIGHT 90 Tab 3    dilTIAZem (CARDIZEM) 60 mg tablet TAKE 1 TABLET BEFORE BREAKFAST, LUNCH, DINNER AND AT BEDTIME 360 Tab 3    varenicline (CHANTIX STARTER ROSSY) 0.5 mg (11)- 1 mg (42) DsPk Take as directed 1 Dose Pack 0    potassium chloride (KLOR-CON) 10 mEq tablet Take 1 Tab by mouth every Monday, Wednesday, Friday. 90 Tab 3    bumetanide (BUMEX) 1 mg tablet Take 1 Tab by mouth every Monday, Wednesday, Friday. 90 Tab 3    nystatin (MYCOSTATIN) 100,000 unit/mL suspension Take 5 mL by mouth four (4) times daily. swish and spit 180 mL 0    ferrous sulfate (SLOW FE) 142 mg (45 mg iron) ER tablet Take 1 Tab by mouth two (2) times daily (with meals). 30 Tab 2    famotidine (PEPCID) 40 mg tablet Take 1 Tab by mouth two (2) times a day. 30 Tab 0    docusate sodium (COLACE) 100 mg capsule Take 100 mg by mouth two (2) times daily as needed for Constipation.  alendronate (FOSAMAX) 70 mg tablet Take 1 Tab by mouth every Wednesday. 12 Tab 3    albuterol (PROVENTIL VENTOLIN) 2.5 mg /3 mL (0.083 %) nebulizer solution 3 mL by Nebulization route every four (4) hours as needed for Wheezing or Shortness of Breath. 24 Each 0    albuterol (VENTOLIN HFA) 90 mcg/actuation inhaler Take 2 Puffs by inhalation every four (4) hours as needed for Wheezing or Shortness of Breath.  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    Tissue MVR 1989 following failed balloon valvuloplasty for MS Redo MVR 2004 due to severe MR, AVR due to AR ([facility])     S/P AVR (aortic valve replacement) 1/8/2015    S/P MVR (mitral valve replacement) 1/8/2015       Past Surgical History:  Past Surgical History:   Procedure Laterality Date    COLONOSCOPY N/A 11/28/2016    COLONOSCOPY performed by Maris Carlisle MD at [facility] ENDOSCOPY    HX COLECTOMY      HX HEART CATHETERIZATION      cabg    HX HYSTERECTOMY      BSO    HX MITRAL VALVE REPLACEMENT      and redo MVR and AVR       Family History:  Family History   Problem Relation Age of Onset    Cancer Brother        Social History:  Social History   Substance Use Topics    Smoking status: Current Every Day Smoker     Packs/day: 0.50     Types: Cigarettes     Last attempt to quit: 11/1/2015    Smokeless tobacco: Never Used      Comment: 11/1/2014    Alcohol use No       Allergies: Allergies   Allergen Reactions    Spiriva Respimat [Tiotropium Bromide] Anaphylaxis and Other (comments)     Adverse effects; Per RN - pt states that Spiriva caused throat swelling and could not breathe well.  Celebrex [Celecoxib] Rash    Tomato Rash         Review of Systems   Review of Systems   Constitutional: Negative for activity change, chills and fever. HENT: Negative for congestion and sore throat. Eyes: Negative for pain and redness. Respiratory: Positive for cough and shortness of breath. Negative for chest tightness. Cardiovascular: Positive for chest pain. Negative for palpitations. Gastrointestinal: Negative for abdominal pain, diarrhea, nausea and vomiting. Genitourinary: Negative for dysuria, frequency and urgency. Musculoskeletal: Negative for back pain and neck pain. Skin: Negative for rash. Neurological: Negative for syncope, light-headedness and headaches. Psychiatric/Behavioral: Negative for confusion. Physical Exam   Physical Exam   Constitutional: She is oriented to person, place, and time. She appears well-developed and well-nourished. She appears distressed (Moderately). HENT:   Head: Normocephalic. Nose: Nose normal.   Mouth/Throat: Oropharynx is clear and moist. No oropharyngeal exudate. Eyes: Conjunctivae are normal. Pupils are equal, round, and reactive to light. No scleral icterus. Neck: Normal range of motion. Neck supple. No JVD present. No tracheal deviation present. No thyromegaly present. Cardiovascular: Normal rate, regular rhythm and intact distal pulses. Exam reveals no gallop and no friction rub. No murmur heard. Pulmonary/Chest: No stridor. She is in respiratory distress (Moderate). She has wheezes. She has no rales. Moderate BL expiratory wheezes. Moderate increased work of breathing. Poor air movement. Speaking in 3-5 words phrases. Abdominal: Soft. Bowel sounds are normal. She exhibits no distension. There is no tenderness. There is no rebound and no guarding. Musculoskeletal: Normal range of motion. She exhibits no edema. Lymphadenopathy:     She has no cervical adenopathy. Neurological: She is alert and oriented to person, place, and time. No cranial nerve deficit. She exhibits normal muscle tone. Coordination normal.   Skin: Skin is warm and dry. No rash noted. She is not diaphoretic. No erythema. Psychiatric: She has a normal mood and affect. Her behavior is normal.   Nursing note and vitals reviewed.         Diagnostic Study Results     Labs -     Recent Results (from the past 12 hour(s))   EKG, 12 LEAD, INITIAL    Collection Time: 11/26/17  7:55 AM   Result Value Ref Range    Ventricular Rate 95 BPM    Atrial Rate 300 BPM    QRS Duration 74 ms    Q-T Interval 350 ms    QTC Calculation (Bezet) 439 ms    Calculated R Axis 78 degrees    Calculated T Axis 30 degrees    Diagnosis       Atrial fibrillation  ST & T wave abnormality, consider inferior ischemia  ST & T wave abnormality, consider anterolateral ischemia  Abnormal ECG  When compared with ECG of 25-NOV-2016 12:59,  ST no longer depressed in Lateral leads  T wave inversion now evident in Inferior leads  QT has shortened     CBC WITH AUTOMATED DIFF    Collection Time: 11/26/17  8:00 AM   Result Value Ref Range    WBC 10.0 3.6 - 11.0 K/uL    RBC 4.30 3.80 - 5.20 M/uL    HGB 13.3 11.5 - 16.0 g/dL    HCT 40.1 35.0 - 47.0 %    MCV 93.3 80.0 - 99.0 FL    MCH 30.9 26.0 - 34.0 PG    MCHC 33.2 30.0 - 36.5 g/dL    RDW 15.9 (H) 11.5 - 14.5 %    PLATELET 708 421 - 982 K/uL    NEUTROPHILS 81 (H) 32 - 75 %    LYMPHOCYTES 7 (L) 12 - 49 %    MONOCYTES 12 5 - 13 %    EOSINOPHILS 0 0 - 7 %    BASOPHILS 0 0 - 1 %    ABS. NEUTROPHILS 8.1 (H) 1.8 - 8.0 K/UL    ABS. LYMPHOCYTES 0.7 (L) 0.8 - 3.5 K/UL    ABS. MONOCYTES 1.2 (H) 0.0 - 1.0 K/UL    ABS. EOSINOPHILS 0.0 0.0 - 0.4 K/UL    ABS. BASOPHILS 0.0 0.0 - 0.1 K/UL    RBC COMMENTS ANISOCYTOSIS  1+        DF SMEAR SCANNED     METABOLIC PANEL, COMPREHENSIVE    Collection Time: 11/26/17  8:00 AM   Result Value Ref Range    Sodium 139 136 - 145 mmol/L    Potassium 3.7 3.5 - 5.1 mmol/L    Chloride 102 97 - 108 mmol/L    CO2 31 21 - 32 mmol/L    Anion gap 6 5 - 15 mmol/L    Glucose 106 (H) 65 - 100 mg/dL    BUN 11 6 - 20 MG/DL    Creatinine 1.01 0.55 - 1.02 MG/DL    BUN/Creatinine ratio 11 (L) 12 - 20      GFR est AA >60 >60 ml/min/1.73m2    GFR est non-AA 56 (L) >60 ml/min/1.73m2    Calcium 8.8 8.5 - 10.1 MG/DL    Bilirubin, total 0.4 0.2 - 1.0 MG/DL    ALT (SGPT) 26 12 - 78 U/L    AST (SGOT) 27 15 - 37 U/L    Alk.  phosphatase 103 45 - 117 U/L    Protein, total 7.5 6.4 - 8.2 g/dL    Albumin 3.6 3.5 - 5.0 g/dL    Globulin 3.9 2.0 - 4.0 g/dL    A-G Ratio 0.9 (L) 1.1 - 2.2     PROTHROMBIN TIME + INR    Collection Time: 11/26/17  8:00 AM   Result Value Ref Range    INR 1.8 (H) 0.9 - 1.1      Prothrombin time 18.8 (H) 9.0 - 11.1 sec   LACTIC ACID Collection Time: 11/26/17  8:00 AM   Result Value Ref Range    Lactic acid 2.4 (HH) 0.4 - 2.0 MMOL/L   TROPONIN I    Collection Time: 11/26/17  8:00 AM   Result Value Ref Range    Troponin-I, Qt. <0.04 <0.05 ng/mL   NT-PRO BNP    Collection Time: 11/26/17  8:00 AM   Result Value Ref Range    NT pro- (H) 0 - 125 PG/ML   URINALYSIS W/ REFLEX CULTURE    Collection Time: 11/26/17 10:36 AM   Result Value Ref Range    Color YELLOW/STRAW      Appearance CLEAR CLEAR      Specific gravity 1.015 1.003 - 1.030      pH (UA) 7.5 5.0 - 8.0      Protein NEGATIVE  NEG mg/dL    Glucose NEGATIVE  NEG mg/dL    Ketone NEGATIVE  NEG mg/dL    Bilirubin NEGATIVE  NEG      Blood MODERATE (A) NEG      Urobilinogen 0.2 0.2 - 1.0 EU/dL    Nitrites NEGATIVE  NEG      Leukocyte Esterase NEGATIVE  NEG      WBC 0-4 0 - 4 /hpf    RBC 20-50 0 - 5 /hpf    Epithelial cells FEW FEW /lpf    Bacteria NEGATIVE  NEG /hpf    UA:UC IF INDICATED CULTURE NOT INDICATED BY UA RESULT CNI      Hyaline cast 0-2 0 - 5 /lpf       Radiologic Studies -   CXR Results  (Last 48 hours)               11/26/17 0810  XR CHEST PORT Final result    Impression:  IMPRESSION:       No acute process on portable chest. No change given difference in technique. Narrative:  EXAM:  XR CHEST PORT       INDICATION:  Shortness of breath and hypoxia. COPD       COMPARISON: Chest views on 4/7/2016       TECHNIQUE: Upright portable chest AP view       FINDINGS: Cardiac monitoring wires overlie the thorax. Sternotomy wires and   cardiac valve prosthesis are unchanged. Left atrial enlargement is unchanged. Cardiac apex is not enlarged, unchanged. Aortic arch is atherosclerotic but not   enlarged. The pulmonary vasculature is within normal limits. Left pericardial adipose tissues are unchanged. Lungs and pleural spaces are   otherwise clear. No pneumothorax. Osteopenia is unchanged.                    Medical Decision Making   I am the first provider for this patient. I reviewed the vital signs, available nursing notes, past medical history, past surgical history, family history and social history. Vital Signs-Reviewed the patient's vital signs. Patient Vitals for the past 12 hrs:   Temp Pulse Resp BP SpO2   11/26/17 1114 - 96 26 145/68 95 %   11/26/17 0900 - 80 24 134/74 97 %   11/26/17 0859 - - - - 98 %   11/26/17 0830 - 89 25 123/64 97 %   11/26/17 0800 - 93 28 132/66 98 %   11/26/17 0753 - - - - 99 %   11/26/17 0749 98.3 °F (36.8 °C) 90 26 150/81 (!) 89 %       Pulse Oximetry Analysis - 89% on RA    Cardiac Monitor:   Rate: 90 bpm  Rhythm: Atrial Fibrillation     ED EKG interpretation: 07:55  Rhythm: atrial fib; and irregular. Rate (approx.): 95; Axis: normal; MT Interval: m/a; QRS interval: normal; ST/T wave: non-specific changes; This EKG was interpreted by Chandana Mar MD, ED Provider. Records Reviewed: Nursing Notes, Old Medical Records and Previous electrocardiograms    Provider Notes (Medical Decision Making):   DDx: COPD exacerbation, ACS, PNA. ED Course:   Initial assessment performed. The patients presenting problems have been discussed, and they are in agreement with the care plan formulated and outlined with them. I have encouraged them to ask questions as they arise throughout their visit. Progress Note:  11:13 AM  The patient was informed of her available lab and imaging results. She conveys her understanding of these results. Only mild improvement with hr long neb; baseline O2 sats but patient still with moderate increased work of breathing; abg appears compensated; lactate minimally elevated at 2.4 which improved to 1.6 with iv fluids; clear cxr; negative troponin;   Written by Clemente Onofre, ED Scribe, as dictated by Chandana Mar MD.    P    CONSULT NOTE:   11:28 AM  Chandana Mar MD spoke with Salazar Adams MD,   Specialty: Hospitalist  Discussed pt's hx, disposition, and available diagnostic and imaging results.  Reviewed care plans. Consultant will evaluate pt for admission. Written by Az Ngo, MATTHEW Scribe, as dictated by Rogelio Blunt MD.      CRITICAL CARE NOTE :  11:27 AM  IMPENDING DETERIORATION -Airway, Respiratory, CNS and Metabolic  ASSOCIATED RISK FACTORS - Hypoxia, Dysrhythmia, Metabolic changes and CNS Decompensation  MANAGEMENT- Bedside Assessment and Supervision of Care  INTERPRETATION -  Xrays, Blood Gases, ECG and Blood Pressure  INTERVENTIONS - Metobolic interventions  CASE REVIEW - Hospitalist, Medical Sub-Specialist, Nursing and Family  TREATMENT RESPONSE -Improved  PERFORMED BY - Self    NOTES   :  I have spent 45 minutes of critical care time involved in lab review, consultations with specialist, family decision- making, bedside attention and documentation. During this entire length of time I was immediately available to the patient . Critical Care: The reason for providing this level of medical care for this critically ill patient was due to a critical illness that impaired one or more vital organ systems, such that there was a high probability of imminent or life threatening deterioration in the patient's condition. This care involved high complexity decision making to assess, manipulate, and support vital system functions, to treat this degree of vital organ system failure, and to prevent further life threatening deterioration of the patients condition. Admit Note:  11:28 AM  Pt is being admitted by Zulma Singh MD. The results of their tests and reason(s) for their admission have been discussed with pt and/or available family. They convey agreement and understanding for the need to be admitted and for admission diagnosis. Diagnosis     Clinical Impression:   1. Acute exacerbation of chronic obstructive pulmonary disease (COPD) ([facility]    2. Chronic atrial fibrillation (HCC)    3. Anticoagulant long-term use        Attestations:   This note is prepared by Az Ngo, acting as a Scribe for [employer] Karen Ponce MD.    Cuate Whiting MD: The scribe's documentation has been prepared under my direction and personally reviewed by me in its entirety. I confirm that the notes above accurately reflects all work, treatment, procedures, and medical decision making performed by me. This note will not be viewable in [address]

## 2017-11-26 NOTE — ED NOTES
TRANSFER - OUT REPORT:    Verbal report given to FAUSTINA Byers on Jake Doll  being transferred to [facility] for routine progression of care       Report consisted of patients Situation, Background, Assessment and   Recommendations(SBAR). Information from the following report(s) SBAR, Kardex, ED Summary, [facility] and Recent Results was reviewed with the receiving nurse. Lines:   Peripheral IV 11/26/17 Left Antecubital (Active)   Site Assessment Clean, dry, & intact 11/26/2017  7:58 AM   Phlebitis Assessment 0 11/26/2017  7:58 AM   Infiltration Assessment 0 11/26/2017  7:58 AM   Dressing Status Clean, dry, & intact 11/26/2017  7:58 AM   Dressing Type Transparent;Tape 11/26/2017  7:58 AM        Opportunity for questions and clarification was provided.       Patient transported with:   oxygen at 2 L via NC

## 2017-11-26 NOTE — IP AVS SNAPSHOT
[address] [facility] 
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  
  
Phone:  [phone] Where:  [address] [facility], [address]  [address] Discharge Orders None A check tram indicates which time of day the medication should be taken. My Medications TAKE these medications as instructed Instructions Each Dose to Equal  
 Morning Noon Evening Bedtime * albuterol 90 mcg/actuation inhaler Commonly known as:  VENTOLIN HFA Your last dose was: Your next dose is: Take 2 Puffs by inhalation every four (4) hours as needed for Wheezing or Shortness of Breath. 2 Puff * albuterol 2.5 mg /3 mL (0.083 %) nebulizer solution Commonly known as:  PROVENTIL VENTOLIN Your last dose was: Your next dose is:    
   
   
 3 mL by Nebulization route every four (4) hours as needed for Wheezing or Shortness of Breath.   
 2.5 mg  
    
   
   
   
  
 alendronate 70 mg tablet Commonly known as:  FOSAMAX Your last dose was: Your next dose is: Take 1 Tab by mouth every Wednesday. 70 mg  
    
   
   
   
  
 azithromycin 250 mg tablet Commonly known as:  Nasreen Ax Your last dose was: Your next dose is: Take 1 Tab by mouth daily for 2 days. 250 mg  
    
   
   
   
  
 bumetanide 1 mg tablet Commonly known as:  Angel Vincent Your last dose was: Your next dose is: Take 1 Tab by mouth every Monday, Wednesday, Friday. 1 mg  
    
   
   
   
  
 buPROPion  mg SR tablet Commonly known as:  Hermon Light Your last dose was: Your next dose is:    
   
   
 1 tab PO qday x 3 days then 1 tab PO bid for smoking cessation  
     
   
   
   
  
 dilTIAZem 60 mg tablet Commonly known as:  CARDIZEM Your last dose was: Your next dose is: TAKE 1 TABLET BEFORE BREAKFAST, LUNCH, DINNER AND AT BEDTIME  
     
   
   
   
  
 docusate sodium 100 mg capsule Commonly known as:  Roseline Lucio Your last dose was: Your next dose is: Take 100 mg by mouth two (2) times daily as needed for Constipation. 100 mg  
    
   
   
   
  
 famotidine 40 mg tablet Commonly known as:  PEPCID Your last dose was: Your next dose is: Take 1 Tab by mouth two (2) times a day. 40 mg  
    
   
   
   
  
 ferrous sulfate 142 mg (45 mg iron) ER tablet Commonly known as:  SLOW FE  
   
Your last dose was: Your next dose is: Take 1 Tab by mouth two (2) times daily (with meals). 142 mg  
    
   
   
   
  
 lovastatin 10 mg tablet Commonly known as:  MEVACOR Your last dose was: Your next dose is: TAKE 1 TABLET EVERY NIGHT  
     
   
   
   
  
 nystatin 100,000 unit/mL suspension Commonly known as:  MYCOSTATIN Your last dose was: Your next dose is: Take 5 mL by mouth four (4) times daily. swish and spit 1 tsp  
    
   
   
   
  
 oxyCODONE-acetaminophen 5-325 mg per tablet Commonly known as:  PERCOCET Your last dose was: Your next dose is: Take 1 Tab by mouth every six (6) hours as needed for Pain. Max Daily Amount: 4 Tabs. 1 Tab  
    
   
   
   
  
 potassium chloride 10 mEq tablet Commonly known as:  KLOR-CON Your last dose was: Your next dose is: Take 1 Tab by mouth every Monday, Wednesday, Friday. 10 mEq * predniSONE 5 mg tablet Commonly known as:  Kavitha Hernandez Your last dose was: Your next dose is: Take 1 Tab by mouth daily. 5 mg * predniSONE 10 mg tablet Commonly known as:  Kavitha Hernandez Your last dose was: Your next dose is: Take 4 tabs daily for 3 days, then take 3 tabs daily for 3 days, then take 2 tabs daily for 3 days, then take 1 tab daily for 3 days, then stop SYMBICORT 160-4.5 mcg/actuation [facility]faa Generic drug:  budesonide-formoterol Your last dose was: Your next dose is:    
   
   
 INHALE 2 PUFFS TWICE DAILY  
     
   
   
   
  
 umeclidinium 62.5 mcg/actuation inhaler Commonly known as:  INCRUSE ELLIPTA Start taking on:  12/3/2017 Your last dose was: Your next dose is: Take 1 Puff by inhalation daily. Indications: BRONCHOSPASM PREVENTION WITH COPD  
 1 Puff  
    
   
   
   
  
 varenicline 0.5 mg (11)- 1 mg (42) Dspk Commonly known as:  CHANTIX STARTER ROSSY Your last dose was: Your next dose is: Take as directed  
     
   
   
   
  
 warfarin 2 mg tablet Commonly known as:  COUMADIN Your last dose was: Your next dose is:    
   
   
 Monday Tuesday Wednesday and Sunday take 3mg and take 2mg Thursday Friday Saturday. * Notice: This list has 4 medication(s) that are the same as other medications prescribed for you. Read the directions carefully, and ask your doctor or other care provider to review them with you. Where to Get Your Medications Information on where to get these meds will be given to you by the nurse or doctor. ! Ask your nurse or doctor about these medications  
  azithromycin 250 mg tablet  
 predniSONE 10 mg tablet  
 umeclidinium 62.5 mcg/actuation inhaler Discharge Instructions Smoking Cessation Program: This is a free, phone/text/email based, smoking cessation program. The program is individualized to meet each patient's needs. To enroll use the link https://Polleverywhere/ra/survey/7670 or text Ruddy Valiente to 420 4609 from any smart phone. Introducing [facility]! Dear Zeenat Harrington: 
Thank you for requesting a [employer] account. Our records indicate that you already have an active [employer] account. You can access your account anytime at [email or web address] Did you know that you can access your hospital and ER discharge instructions at any time in [employer]? You can also review all of your test results from your hospital stay or ER visit. Additional Information If you have questions, please visit the Frequently Asked Questions section of the [employer] website at [email or web address] Remember, [employer] is NOT to be used for urgent needs. For medical emergencies, dial 911. Now available from your iPhone and Android! Providers Seen During Your Hospitalization Provider Specialty Primary office phone Loni Harris MD Emergency Medicine [phone] David Vazquez MD Hospitalist [phone] Brent Geronimo MD Internal Medicine [phone] Your Primary Care Physician (PCP) Primary Care Physician Office Phone Office Fax Cristobal Hutchins [phone] You are allergic to the following Allergen Reactions Spiriva Respimat (Tiotropium Bromide) Anaphylaxis Other (comments) Adverse effects; Per RN - pt states that Spiriva caused throat swelling and could not breathe well. Celebrex (Celecoxib) Rash Tomato Rash Recent Documentation Height Weight Breastfeeding? BMI OB Status Smoking Status 1.626 m 55.7 kg No 21.08 kg/m2 Hysterectomy Current Every Day Smoker Emergency Contacts Name Discharge Info Relation Home Work Mobile ROCHELLE Johns DISCHARGE CAREGIVER [3] Spouse [3] [phone] [place]  Daughter [21] [phone] Eric Johns  Spouse [3] [phone] Patient Belongings The following personal items are in your possession at time of discharge: 
  Dental Appliances: Lowers, Uppers  Visual Aid: Glasses, At bedside      Home Medications: None   Jewelry: Necklace, Ring  Clothing: Footwear, Pajamas, Shirt, Undergarments    Other Valuables: [employer], Eyeglasses, Money (comment), Valeta Shirlene Discharge Instructions Attachments/References WARFARIN: LONG-TERM USE (ENGLISH) Patient Handouts Taking Warfarin Safely: Care Instructions Your Care Instructions Warfarin is a medicine that you take to prevent blood clots. It is often called a blood thinner. Doctors give warfarin (such as Coumadin) to reduce the risk of blood clots. You may be at risk for blood clots if you have atrial fibrillation or deep vein thrombosis. Some other health problems may also put you at risk. Warfarin slows the amount of time it takes for your blood to clot. It can cause bleeding problems. Even if you've been taking warfarin for a while, it's important to know how to take it safely. Foods and other medicines can affect the way warfarin works. Some can make warfarin work too well. This can cause bleeding problems.  And some can make it work poorly, so that it does not prevent blood clots very well. You will need regular blood tests to check how long it takes for your blood to form a clot. This test is called a PT or prothrombin time test. The result of the test is called an INR level. Depending on the test results, your doctor or anticoagulation clinic may adjust your dose of warfarin. Follow-up care is a key part of your treatment and safety. Be sure to make and go to all appointments, and call your doctor if you are having problems. It's also a good idea to know your test results and keep a list of the medicines you take. How can you care for yourself at home? Take warfarin safely · Take your warfarin at the same time each day. · If you miss a dose of warfarin, don't take an extra dose to make up for it. Your doctor can tell you exactly what to do so you don't take too much or too little. · Wear medical alert jewelry that lets others know that you take warfarin. You can buy this at most drugstores. · Don't take warfarin if you are pregnant or planning to get pregnant. Talk to your doctor about how you can prevent getting pregnant while you are taking it. · Don't change your dose or stop taking warfarin unless your doctor tells you to. Effects of medicines and food on warfarin · Don't start or stop taking any medicines, vitamins, or natural remedies unless you first talk to your doctor. Many medicines can affect how warfarin works. These include aspirin and other pain relievers, over-the-counter medicines, multivitamins, dietary supplements, and herbal products. · Tell all of your doctors and pharmacists that you take warfarin. Some prescription medicines can affect how warfarin works. · Keep the amount of vitamin K in your diet about the same from day to day. Do not suddenly eat a lot more or a lot less food that is rich in vitamin K than you usually do.  Vitamin K affects how warfarin works and how your blood clots. Talk with your doctor before making big changes in your diet. Foods that have a lot of vitamin K include cooked green vegetables, such as: ¨ Kale, spinach, turnip greens, george greens, Swiss chard, and mustard greens. ¨ Wright sprouts, broccoli, and asparagus. · Limit your use of alcohol. Avoid bleeding by preventing falls and injuries · Wear slippers or shoes with nonskid soles. · Remove throw rugs and clutter. · Rearrange furniture and electrical cords to keep them out of walking paths. · Keep stairways, porches, and outside walkways well lit. Use night-lights in hallways and bathrooms. · Be extra careful when you work with sharp tools or knives. When should you call for help? Call 911 anytime you think you may need emergency care. For example, call if: 
? · You have a sudden, severe headache that is different from past headaches. ?Call your doctor now or seek immediate medical care if: 
? · You have any abnormal bleeding, such as: 
¨ Nosebleeds. ¨ Vaginal bleeding that is different (heavier, more frequent, at a different time of the month) than what you are used to. ¨ Bloody or black stools, or rectal bleeding. ¨ Bloody or pink urine. ? Watch closely for changes in your health, and be sure to contact your doctor if you have any problems. Where can you learn more? Go to http://hayden-дмитрий.info/. Enter T285 in the search box to learn more about \"Taking Warfarin Safely: Care Instructions. \" Current as of: September 21, 2016 Content Version: 11.4 © 3518-4683 [employer]. Care instructions adapted under license by [employer] (which disclaims liability or warranty for this information). If you have questions about a medical condition or this instruction, always ask your healthcare professional. [place] [address] any warranty or liability for your use of this information. Please provide this summary of care documentation to your next provider. Signatures-by signing, you are acknowledging that this After Visit Summary has been reviewed with you and you have received a copy. Patient Signature:  ____________________________________________________________ Date:  ____________________________________________________________  
  
[facility] Provider Signature:  ____________________________________________________________ Date:  ____________________________________________________________

## 2017-11-26 NOTE — ED NOTES
Bedside shift change report given to RN Jose A Galaviz (oncoming nurse) by RN May Granados (offgoing nurse). Report included the following information SBAR, ED Summary and MAR.

## 2017-11-26 NOTE — IP AVS SNAPSHOT
[address] [facility]. 
[phone] Patient: Sheldon Menjivar MRN: [id number] My Medications TAKE these medications as instructed Instructions Each Dose to Equal  
 Morning Noon Evening Bedtime * albuterol 90 mcg/actuation inhaler Commonly known as:  VENTOLIN HFA Your last dose was: Your next dose is: Take 2 Puffs by inhalation every four (4) hours as needed for Wheezing or Shortness of Breath. 2 Puff * albuterol 2.5 mg /3 mL (0.083 %) nebulizer solution Commonly known as:  PROVENTIL VENTOLIN Your last dose was: Your next dose is:    
   
   
 3 mL by Nebulization route every four (4) hours as needed for Wheezing or Shortness of Breath. 2.5 mg  
    
   
   
   
  
 alendronate 70 mg tablet Commonly known as:  FOSAMAX Your last dose was: Your next dose is: Take 1 Tab by mouth every Wednesday. 70 mg  
    
   
   
   
  
 azithromycin 250 mg tablet Commonly known as:  Bree Gil Your last dose was: Your next dose is: Take 1 Tab by mouth daily for 2 days. 250 mg  
    
   
   
   
  
 bumetanide 1 mg tablet Commonly known as:  Assunta Brink Your last dose was: Your next dose is: Take 1 Tab by mouth every Monday, Wednesday, Friday. 1 mg  
    
   
   
   
  
 buPROPion  mg SR tablet Commonly known as:  Martha Rivers Your last dose was: Your next dose is:    
   
   
 1 tab PO qday x 3 days then 1 tab PO bid for smoking cessation  
     
   
   
   
  
 dilTIAZem 60 mg tablet Commonly known as:  CARDIZEM Your last dose was: Your next dose is: TAKE 1 TABLET BEFORE BREAKFAST, LUNCH, DINNER AND AT BEDTIME  
     
   
   
   
  
 docusate sodium 100 mg capsule Commonly known as:  Ravindra Alva  
   
 Your last dose was: Your next dose is: Take 100 mg by mouth two (2) times daily as needed for Constipation. 100 mg  
    
   
   
   
  
 famotidine 40 mg tablet Commonly known as:  PEPCID Your last dose was: Your next dose is: Take 1 Tab by mouth two (2) times a day. 40 mg  
    
   
   
   
  
 ferrous sulfate 142 mg (45 mg iron) ER tablet Commonly known as:  SLOW FE  
   
Your last dose was: Your next dose is: Take 1 Tab by mouth two (2) times daily (with meals). 142 mg  
    
   
   
   
  
 lovastatin 10 mg tablet Commonly known as:  MEVACOR Your last dose was: Your next dose is: TAKE 1 TABLET EVERY NIGHT  
     
   
   
   
  
 nystatin 100,000 unit/mL suspension Commonly known as:  MYCOSTATIN Your last dose was: Your next dose is: Take 5 mL by mouth four (4) times daily. swish and spit 1 tsp  
    
   
   
   
  
 oxyCODONE-acetaminophen 5-325 mg per tablet Commonly known as:  PERCOCET Your last dose was: Your next dose is: Take 1 Tab by mouth every six (6) hours as needed for Pain. Max Daily Amount: 4 Tabs. 1 Tab  
    
   
   
   
  
 potassium chloride 10 mEq tablet Commonly known as:  KLOR-CON Your last dose was: Your next dose is: Take 1 Tab by mouth every Monday, Wednesday, Friday. 10 mEq * predniSONE 5 mg tablet Commonly known as:  Berle Pouch Your last dose was: Your next dose is: Take 1 Tab by mouth daily. 5 mg * predniSONE 10 mg tablet Commonly known as:  Berle Pouch Your last dose was: Your next dose is: Take 4 tabs daily for 3 days, then take 3 tabs daily for 3 days, then take 2 tabs daily for 3 days, then take 1 tab daily for 3 days, then stop SYMBICORT 160-4.5 mcg/actuation Hfaa Generic drug:  budesonide-formoterol Your last dose was: Your next dose is:    
   
   
 INHALE 2 PUFFS TWICE DAILY  
     
   
   
   
  
 umeclidinium 62.5 mcg/actuation inhaler Commonly known as:  INCRUSE ELLIPTA Start taking on:  12/3/2017 Your last dose was: Your next dose is: Take 1 Puff by inhalation daily. Indications: BRONCHOSPASM PREVENTION WITH COPD  
 1 Puff  
    
   
   
   
  
 varenicline 0.5 mg (11)- 1 mg (42) Dspk Commonly known as:  CHANTIX STARTER ROSSY Your last dose was: Your next dose is: Take as directed  
     
   
   
   
  
 warfarin 2 mg tablet Commonly known as:  COUMADIN Your last dose was: Your next dose is:    
   
   
 Monday Tuesday Wednesday and Sunday take 3mg and take 2mg Thursday Friday Saturday. * Notice: This list has 4 medication(s) that are the same as other medications prescribed for you. Read the directions carefully, and ask your doctor or other care provider to review them with you. Where to Get Your Medications Information on where to get these meds will be given to you by the nurse or doctor. ! Ask your nurse or doctor about these medications  
  azithromycin 250 mg tablet  
 predniSONE 10 mg tablet  
 umeclidinium 62.5 mcg/actuation inhaler

## 2017-11-27 LAB
ANION GAP SERPL CALC-SCNC: 6 MMOL/L (ref 5–15)
ATRIAL RATE: 84 BPM
BASOPHILS # BLD: 0 K/UL (ref 0–0.1)
BASOPHILS NFR BLD: 0 % (ref 0–1)
BUN SERPL-MCNC: 17 MG/DL (ref 6–20)
BUN/CREAT SERPL: 20 (ref 12–20)
CALCIUM SERPL-MCNC: 8.2 MG/DL (ref 8.5–10.1)
CALCULATED R AXIS, ECG10: 82 DEGREES
CALCULATED T AXIS, ECG11: 45 DEGREES
CHLORIDE SERPL-SCNC: 103 MMOL/L (ref 97–108)
CO2 SERPL-SCNC: 30 MMOL/L (ref 21–32)
CREAT SERPL-MCNC: 0.85 MG/DL (ref 0.55–1.02)
DIAGNOSIS, 93000: NORMAL
EOSINOPHIL # BLD: 0 K/UL (ref 0–0.4)
EOSINOPHIL NFR BLD: 0 % (ref 0–7)
ERYTHROCYTE [DISTWIDTH] IN BLOOD BY AUTOMATED COUNT: 15.8 % (ref 11.5–14.5)
GLUCOSE SERPL-MCNC: 158 MG/DL (ref 65–100)
HCT VFR BLD AUTO: 36.3 % (ref 35–47)
HGB BLD-MCNC: 12 G/DL (ref 11.5–16)
INR PPP: 2.3 (ref 0.9–1.1)
LYMPHOCYTES # BLD: 0.3 K/UL (ref 0.8–3.5)
LYMPHOCYTES NFR BLD: 3 % (ref 12–49)
MCH RBC QN AUTO: 30.6 PG (ref 26–34)
MCHC RBC AUTO-ENTMCNC: 33.1 G/DL (ref 30–36.5)
MCV RBC AUTO: 92.6 FL (ref 80–99)
MONOCYTES # BLD: 0.5 K/UL (ref 0–1)
MONOCYTES NFR BLD: 5 % (ref 5–13)
NEUTS SEG # BLD: 8.9 K/UL (ref 1.8–8)
NEUTS SEG NFR BLD: 92 % (ref 32–75)
PLATELET # BLD AUTO: 196 K/UL (ref 150–400)
POTASSIUM SERPL-SCNC: 3.8 MMOL/L (ref 3.5–5.1)
PROTHROMBIN TIME: 24.3 SEC (ref 9–11.1)
Q-T INTERVAL, ECG07: 370 MS
QRS DURATION, ECG06: 84 MS
QTC CALCULATION (BEZET), ECG08: 447 MS
RBC # BLD AUTO: 3.92 M/UL (ref 3.8–5.2)
SODIUM SERPL-SCNC: 139 MMOL/L (ref 136–145)
VENTRICULAR RATE, ECG03: 88 BPM
WBC # BLD AUTO: 9.7 K/UL (ref 3.6–11)

## 2017-11-27 PROCEDURE — 77010033678 HC OXYGEN DAILY

## 2017-11-27 PROCEDURE — 65660000000 HC RM CCU STEPDOWN

## 2017-11-27 PROCEDURE — 80048 BASIC METABOLIC PNL TOTAL CA: CPT | Performed by: INTERNAL MEDICINE

## 2017-11-27 PROCEDURE — 36415 COLL VENOUS BLD VENIPUNCTURE: CPT | Performed by: INTERNAL MEDICINE

## 2017-11-27 PROCEDURE — 93306 TTE W/DOPPLER COMPLETE: CPT

## 2017-11-27 PROCEDURE — 74011250636 HC RX REV CODE- 250/636: Performed by: INTERNAL MEDICINE

## 2017-11-27 PROCEDURE — 74011250637 HC RX REV CODE- 250/637: Performed by: INTERNAL MEDICINE

## 2017-11-27 PROCEDURE — 93005 ELECTROCARDIOGRAM TRACING: CPT

## 2017-11-27 PROCEDURE — 77030018744 HC FLOMTR PEAK FLO -A

## 2017-11-27 PROCEDURE — 74011000250 HC RX REV CODE- 250: Performed by: INTERNAL MEDICINE

## 2017-11-27 PROCEDURE — 85610 PROTHROMBIN TIME: CPT | Performed by: INTERNAL MEDICINE

## 2017-11-27 PROCEDURE — 85025 COMPLETE CBC W/AUTO DIFF WBC: CPT | Performed by: INTERNAL MEDICINE

## 2017-11-27 PROCEDURE — 94640 AIRWAY INHALATION TREATMENT: CPT

## 2017-11-27 RX ADMIN — ALBUTEROL SULFATE 2.5 MG: 2.5 SOLUTION RESPIRATORY (INHALATION) at 19:00

## 2017-11-27 RX ADMIN — ALBUTEROL SULFATE 2.5 MG: 2.5 SOLUTION RESPIRATORY (INHALATION) at 04:53

## 2017-11-27 RX ADMIN — DILTIAZEM HYDROCHLORIDE 60 MG: 30 TABLET, FILM COATED ORAL at 09:37

## 2017-11-27 RX ADMIN — BUPROPION HYDROCHLORIDE 150 MG: 150 TABLET, FILM COATED, EXTENDED RELEASE ORAL at 17:58

## 2017-11-27 RX ADMIN — OXYCODONE HYDROCHLORIDE AND ACETAMINOPHEN 1 TABLET: 5; 325 TABLET ORAL at 17:58

## 2017-11-27 RX ADMIN — METHYLPREDNISOLONE SODIUM SUCCINATE 60 MG: 125 INJECTION, POWDER, FOR SOLUTION INTRAMUSCULAR; INTRAVENOUS at 05:06

## 2017-11-27 RX ADMIN — FAMOTIDINE 40 MG: 20 TABLET, FILM COATED ORAL at 17:58

## 2017-11-27 RX ADMIN — DILTIAZEM HYDROCHLORIDE 60 MG: 30 TABLET, FILM COATED ORAL at 13:03

## 2017-11-27 RX ADMIN — FLUTICASONE FUROATE AND VILANTEROL TRIFENATATE 1 PUFF: 200; 25 POWDER RESPIRATORY (INHALATION) at 09:56

## 2017-11-27 RX ADMIN — ALBUTEROL SULFATE 2.5 MG: 2.5 SOLUTION RESPIRATORY (INHALATION) at 07:28

## 2017-11-27 RX ADMIN — ALBUTEROL SULFATE 2.5 MG: 2.5 SOLUTION RESPIRATORY (INHALATION) at 11:56

## 2017-11-27 RX ADMIN — Medication 10 ML: at 13:05

## 2017-11-27 RX ADMIN — BUMETANIDE 1 MG: 1 TABLET ORAL at 17:58

## 2017-11-27 RX ADMIN — METHYLPREDNISOLONE SODIUM SUCCINATE 60 MG: 125 INJECTION, POWDER, FOR SOLUTION INTRAMUSCULAR; INTRAVENOUS at 13:04

## 2017-11-27 RX ADMIN — FAMOTIDINE 40 MG: 20 TABLET, FILM COATED ORAL at 09:36

## 2017-11-27 RX ADMIN — ALBUTEROL SULFATE 2.5 MG: 2.5 SOLUTION RESPIRATORY (INHALATION) at 15:35

## 2017-11-27 RX ADMIN — OXYCODONE HYDROCHLORIDE AND ACETAMINOPHEN 1 TABLET: 5; 325 TABLET ORAL at 00:01

## 2017-11-27 RX ADMIN — METHYLPREDNISOLONE SODIUM SUCCINATE 60 MG: 125 INJECTION, POWDER, FOR SOLUTION INTRAMUSCULAR; INTRAVENOUS at 21:12

## 2017-11-27 RX ADMIN — ALBUTEROL SULFATE 2.5 MG: 2.5 SOLUTION RESPIRATORY (INHALATION) at 23:26

## 2017-11-27 RX ADMIN — WARFARIN SODIUM 3 MG: 2 TABLET ORAL at 17:58

## 2017-11-27 RX ADMIN — Medication 10 ML: at 05:07

## 2017-11-27 RX ADMIN — UMECLIDINIUM 1 PUFF: 62.5 AEROSOL, POWDER ORAL at 09:56

## 2017-11-27 RX ADMIN — BUPROPION HYDROCHLORIDE 150 MG: 150 TABLET, FILM COATED, EXTENDED RELEASE ORAL at 09:36

## 2017-11-27 RX ADMIN — ATORVASTATIN CALCIUM 20 MG: 20 TABLET, FILM COATED ORAL at 21:32

## 2017-11-27 RX ADMIN — DILTIAZEM HYDROCHLORIDE 60 MG: 30 TABLET, FILM COATED ORAL at 17:58

## 2017-11-27 RX ADMIN — Medication 10 ML: at 21:33

## 2017-11-27 RX ADMIN — OXYCODONE HYDROCHLORIDE AND ACETAMINOPHEN 1 TABLET: 5; 325 TABLET ORAL at 09:45

## 2017-11-27 RX ADMIN — POTASSIUM CHLORIDE 10 MEQ: 750 TABLET, FILM COATED, EXTENDED RELEASE ORAL at 17:58

## 2017-11-27 NOTE — PROGRESS NOTES
Received report from Qian Luo, [facility]. Assumed care of patient.

## 2017-11-27 NOTE — PROGRESS NOTES
Problem: Falls - Risk of  Goal: *Absence of Falls  Document Columba Fall Risk and appropriate interventions in the flowsheet.    Outcome: Progressing Towards Goal  Fall Risk Interventions:            Medication Interventions: Evaluate medications/consider consulting pharmacy, Patient to call before getting OOB, Teach patient to arise slowly

## 2017-11-27 NOTE — CONSULTS
[id number] [facility] [address] [facility]     Date of  Admission: 11/26/2017  7:43 AM     Admission type:Emergency    Consult for: DHF exacerbation  Consult by: Hospitalist     Subjective:     Annie Bass is a 63 y.o. female admitted for Acute exacerbation of chronic obstructive pulmonary disease (COPD) ([facility] Previous hx of [facility] AVR/MVR mech valve, chronic afib, chronic DHF with recent echo 7/17 EF 55-60%, COPD. No previous MI, cardiac cath 1998, possibly angioplasty but unsure. Admitted with worsening SOB. States some CP with radiation to left arm. Denies palpitaitons, n/v, dizziness/lightheadedness. ECG afib rate controlled, no other acute changes, troponin neg, NT pro . Previous treatment/evaluation includes echocardiogram and Dobutamine stress echo .  Cardiac risk factors: smoking/ tobacco exposure, family history, dyslipidemia, sedentary life style, hypertension, post-menopausal.      Patient Active Problem List    Diagnosis Date Noted    Acute exacerbation of chronic obstructive pulmonary disease (COPD) ([facility] 11/26/2017    H/O total hysterectomy 09/26/2017    History of GI bleed 11/25/2016     Class: Present on Admission    Diastolic CHF, chronic ([facility] 12/17/2015    Chronic atrial fibrillation ([facility] 12/17/2015    Tobacco abuse 11/14/2015    COPD (chronic obstructive pulmonary disease) ([facility] 11/13/2015    S/P AVR (aortic valve replacement) 01/08/2015    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 01/08/2015    CAD (coronary artery disease), native coronary artery 01/08/2015    S/P MVR (mitral valve replacement) 01/08/2015      Ramiro Huston DO  Past Medical History:   Diagnosis Date    A-fib [facility])     Anticoagulant long-term use     CAD (coronary artery disease), native coronary artery     mild to moderate by cath    CHF (congestive heart failure) (HCC)     Chronic obstructive pulmonary disease ([facility]     Dyslipidemia     Ill-defined condition     migraines    Migraine     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 1/8/2015    Tissue MVR 1989 following failed balloon valvuloplasty for MS Redo MVR 2004 due to severe MR, AVR due to AR ([facility])     S/P AVR (aortic valve replacement) 1/8/2015    S/P MVR (mitral valve replacement) 1/8/2015      Social History     Social History    Marital status:      Spouse name: N/A    Number of children: N/A    Years of education: N/A     Social History Main Topics    Smoking status: Current Every Day Smoker     Packs/day: 0.50     Types: Cigarettes     Last attempt to quit: 11/1/2015    Smokeless tobacco: Never Used      Comment: 11/1/2014    Alcohol use No    Drug use: No    Sexual activity: Not on file     Other Topics Concern    Not on file     Social History Narrative     Allergies   Allergen Reactions    Spiriva Respimat [Tiotropium Bromide] Anaphylaxis and Other (comments)     Adverse effects; Per RN - pt states that Spiriva caused throat swelling and could not breathe well.     Celebrex [Celecoxib] Rash    Tomato Rash      Family History   Problem Relation Age of Onset    Cancer Brother       Current Facility-Administered Medications   Medication Dose Route Frequency    albuterol (PROVENTIL VENTOLIN) nebulizer solution 2.5 mg  2.5 mg Nebulization Q4H RT    bumetanide (BUMEX) tablet 1 mg  1 mg Oral Q MON, WED & FRI    buPROPion SR (WELLBUTRIN SR) tablet 150 mg  150 mg Oral BID    dilTIAZem (CARDIZEM) IR tablet 60 mg  60 mg Oral TIDAC    docusate sodium (COLACE) capsule 100 mg  100 mg Oral BID PRN    famotidine (PEPCID) tablet 40 mg  40 mg Oral BID    atorvastatin (LIPITOR) tablet 20 mg  20 mg Oral QHS    nystatin (MYCOSTATIN) 100,000 unit/mL oral suspension 500,000 Units  500,000 Units Oral QID    oxyCODONE-acetaminophen (PERCOCET) 5-325 mg per tablet 1 Tab  1 Tab Oral Q6H PRN    potassium chloride (KLOR-CON) tablet 10 mEq  10 mEq Oral Q MON, WED & FRI    fluticasone-vilanterol (BREO ELLIPTA) 200mcg-25mcg/puff  1 Puff Inhalation DAILY    sodium chloride (NS) flush 5-10 mL  5-10 mL IntraVENous Q8H    sodium chloride (NS) flush 5-10 mL  5-10 mL IntraVENous PRN    methylPREDNISolone (PF) (SOLU-MEDROL) injection 60 mg  60 mg IntraVENous Q8H    acetaminophen (TYLENOL) tablet 500 mg  500 mg Oral Q6H PRN    HYDROmorphone (PF) (DILAUDID) injection 0.5 mg  0.5 mg IntraVENous Q6H PRN    naloxone (NARCAN) injection 0.4 mg  0.4 mg IntraVENous PRN    ondansetron (ZOFRAN) injection 2 mg  2 mg IntraVENous Q6H PRN    nicotine (NICODERM CQ) 14 mg/24 hr patch 1 Patch  1 Patch TransDERmal DAILY PRN    umeclidinium (INCRUSE ELLIPTA) 62.5 mcg/actuation  1 Puff Inhalation DAILY    WARFARIN INFORMATION NOTE (COUMADIN)   Other QPM        Review of Symptoms: PTA  Constitutional: negative  Eyes: negative   Ears, nose, mouth, throat, and face: negative  Respiratory: worsening SOB, then sudden onset not relieved with nebs and inhalers  Cardiovascular: slight CP with left arm radiation  Gastrointestinal: negative  Genitourinary:negative   Musculoskeletal:negative   Neurological: negative   Endocrine: negative          Objective:      Visit Vitals    /64 (BP 1 Location: Right arm, BP Patient Position: Sitting)    Pulse (!) 103    Temp 97.9 °F (36.6 °C)    Resp 18    Ht 5' 4\" (1.626 m)    Wt 57.6 kg (126 lb 15.8 oz)    SpO2 98%    Breastfeeding No    BMI 21.8 kg/m2       Physical:   General: WNWD  female in no acute distress  Heart: irr, irr, audible click of valves, no T8/RHZ, no carotid bruits   Lungs: diminished, wheezing, rhonchi  Abdomen: Soft, +BS, NTND   Extremities: LE robina +DP/PT, no edema   Neurologic: Grossly normal    Data Review:   Recent Labs      11/27/17   0441 11/26/17   0800   WBC  9.7  10.0   HGB  12.0  13.3   HCT  36.3  40.1   PLT  196  221     Recent Labs      11/27/17   0441  11/26/17   0800 NA  139  139   K  3.8  3.7   CL  103  102   CO2  30  31   GLU  158*  106*   BUN  17  11   CREA  0.85  1.01   CA  8.2*  8.8   ALB   --   3.6   TBILI   --   0.4   SGOT   --   27   ALT   --   26   INR  2.3*  1.8*       Recent Labs      11/26/17   0800   TROIQ  <0.04       No intake or output data in the 24 hours ending 11/27/17 0943     Cardiographics    Telemetry: afib with RVR  ECG: afib with RVR     Echocardiogram: limited pending  CXRAY: no acute changes       Assessment:       Principal Problem:    Acute exacerbation of chronic obstructive pulmonary disease (COPD) ([facility] (11/26/2017)    Active Problems:    S/P AVR (aortic valve replacement) (1/8/2015)      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 (1/8/2015)      Overview: Tissue MVR 1989 following failed balloon valvuloplasty for MS      Redo MVR 2004 due to severe MR, AVR due to AR ([facility])      CAD (coronary artery disease), native coronary artery (1/8/2015)      S/P MVR (mitral valve replacement) (1/8/2015)      COPD (chronic obstructive pulmonary disease) ([facility] (11/13/2015)      Tobacco abuse (25/66/7375)      Diastolic CHF, chronic (HCC) (12/17/2015)      Chronic atrial fibrillation ([facility] (12/17/2015)      History of GI bleed (11/25/2016)         Plan:     Hx of Chronic diastolic HF:  No evidence of volume overload, and echos show normal EFs with no diastolic HF  Limited echo today for further evaluation  With significant COPD hx and continuing to smoke, likely symptoms r/t COPD exacerbation  Agree with continuing home dose of diuretic, diltiazem  Monitor I/Os, daily weights, labs    Chest Pain:  Once her breathing improves will plan for a dobutamine nuclear stress test  No BB due to COPD  Start low dose ASA 81mg daily  Continue statin    Chronic Afib:  Rate slightly elevated, continue on Dilt and warfarin (for AV/MVR and afib)    Thank you for consulting MARYANA Alvares NP

## 2017-11-27 NOTE — PROGRESS NOTES
Spiritual Care Partner Volunteer visited patient in [facility] on November 27, 2017.   Documented by:  JEWELS Howard, [facility], Chaplain KAREN HAMILTON [facility] Paging Service  [phone])

## 2017-11-27 NOTE — PROGRESS NOTES
Problem: Falls - Risk of  Goal: *Absence of Falls  Document Columba Fall Risk and appropriate interventions in the flowsheet.    Outcome: Progressing Towards Goal  Fall Risk Interventions:            Medication Interventions: Patient to call before getting OOB

## 2017-11-27 NOTE — PROGRESS NOTES
Pharmacy Daily Dosing of Warfarin    Indication: AF, AVR, MVR    Goal INR: 2-3    PTA Warfarin Dose: 3 mg M/T/W/Sun, 2 mg Th/Sat (2.6 mg average)    Concurrent anticoagulants: None  Concurrent antiplatelet: None    Major Interacting Medications    Drugs that may increase INR: None   Drugs that may decrease INR: None    Conditions that may increase/decrease INR (CHF, C. diff, cirrhosis, thyroid disorder, hypoalbuminemia): CHF    Labs:  Recent Labs      11/27/17   0441  11/26/17   0800   INR  2.3*  1.8*   HGB  12.0  13.3   PLT  196  221   SGOT   --   27   TBILI   --   0.4   ALB   --   3.6           Impression/Plan: Warfarin 3 mg x 1 dose today. Daily INR  CBC w/o diff QOD     Pharmacy will follow daily and adjust the dose as appropriate. Thanks  ASHISH MendozaD      [email or web address]. pdf

## 2017-11-27 NOTE — PROGRESS NOTES
Pt is a 64 yo  female admitted on 11/26/17 for acute exacerbation of COPD. Pt lives in a three-story house ([address], 8 steps to 2nd level) with bedroom/bathroom on 2nd floor. Pt lives with spouse, daughter, son-in-law, and 20 month old grandson. Pt is independent in ADLs/IADLs to include driving. Pt reported prior use of [employer] for [facility], but does not use them anymore. Pt has a nebulizer at home and reports that she used to have 2 LPM continuous O2 at home, but her insurance stopped covering it after a year and she stopped using them due to financial strain. Pt reported she still has a small portable O2 in her car that she uses at times. Pt to discharge home by private vehicle with family. Pt to transport self or use family for follow-up care appointments. Pt's preferred Rx is [id number] ([address] CM met with pt to verify demographic information and conduct initial assessment. Pt appears to be alert and oriented x4. CM will continue to follow-up to ensure any additional discharge needs are met. Care Management Interventions  PCP Verified by CM: Yes  Palliative Care Criteria Met (RRAT>21 & CHF Dx)?: No  Mode of Transport at Discharge:  Other (see comment) (By private vehicle with family)  Transition of Care Consult (CM Consult): Home Health ([facility] in the past)  [address]: No  Reason Outside [place]: Patient already serviced by other home care/hospice agency  Discharge Durable Medical Equipment: No (Nebulizer at home)  Health Maintenance Reviewed: Yes  Physical Therapy Consult: No  Occupational Therapy Consult: No  Speech Therapy Consult: No  Current Support Network: Lives with Spouse, Family Lives [place], Own Home ([address] ([address]) with bedroom and bathroom on 2nd floor (8 steps); lives with spouse, daughter, son-in-law, and grandson)  Confirm Follow Up Transport: Self (Pt to transport self or use family for support)  Plan discussed with Pt/Family/Caregiver: Yes  Discharge Location  Discharge Placement: Home with family assistance    JENNIFER Cisneros, [address] [place] [address]  [phone]

## 2017-11-27 NOTE — PROGRESS NOTES
Hospitalist Progress Note    NAME: Rogelio Dejesus   :  1955   MRN:  [id number]     Admit date: 2017    Today's date: 17    PCP: Dana Alvarez M.D. Cell [phone]      Assessment / Plan:  Acute exacerbation of chronic obstructive pulmonary disease (COPD) ([facility]  Admit to remote tele bed   Oxygen/NC ( pt reported that she could not afford home oxygen when it had been previously ordered. )   IV Solu-medrol  Jet nebs round the clock  RT Peak flow check daily, currently 100  CXR reviewed no ASD or edema     CAD (coronary artery disease), native coronary artery  Chronic atrial fibrillation (HCC) on chronic anticoagulation   Chest pain POA   Rate controlled with diltiazem  Cardiology following  Stress test before discharge  Echo results pending  ASA, statin     Diastolic CHF, chronic ([facility] not in acute CHF at this time. EF 55-60%      History of GI bleed   Happened last November and workup including Capsule endoscopy didn't show any obvious source.      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      Tobacco abuse  Nicotine patch PRN     Body mass index is 21.8 kg/(m^2). Code status: Full  Prophylaxis: Coumadin  Recommended Disposition: Home w/Family     Subjective:     Chief Complaint / Reason for Physician Visit  \"My breathing is a bit better\". Discussed with RN events overnight.    Breathing better than admit, not back to baseline  Still on O2, does not wear it at home \"because I could not afford it\"  Cough but no phlegm    Review of Systems:  Symptom Y/N Comments  Symptom Y/N Comments   Fever/Chills n   Chest Pain n    Poor Appetite    Edema     Cough y   Abdominal Pain n    Sputum n   Joint Pain     SOB/HA y   Headache     Nausea/vomit  n   Tolerating PT/OT     Diarrhea n   Tolerating Diet y    Constipation    Other       Could NOT obtain due to:      Objective:     VITALS:   Last 24hrs VS reviewed since prior progress note. Most recent are:  Patient Vitals for the past 24 hrs:   Temp Pulse Resp BP SpO2   11/27/17 1306 - - - - 96 %   11/27/17 1156 - - - - 98 %   11/27/17 1131 98.4 °F (36.9 °C) (!) 115 20 132/70 96 %   11/27/17 0808 97.9 °F (36.6 °C) (!) 103 18 137/64 98 %   11/27/17 0728 - - - - 96 %   11/27/17 0452 - - - - 98 %   11/27/17 0350 97.9 °F (36.6 °C) 86 20 127/70 98 %   11/26/17 2358 - - - - 95 %   11/26/17 2307 97.9 °F (36.6 °C) 86 20 125/69 96 %   11/26/17 1939 97.8 °F (36.6 °C) 88 22 120/46 96 %   11/26/17 1916 - - - - 93 %   11/26/17 1736 97.9 °F (36.6 °C) 93 24 146/55 98 %   11/26/17 1700 - 96 25 143/59 97 %   11/26/17 1635 - 93 - 142/78 -   11/26/17 1630 - 86 22 - 96 %   11/26/17 1600 - 87 23 142/78 95 %   11/26/17 1500 - 79 22 129/67 97 %     No intake or output data in the 24 hours ending 11/27/17 1458     Wt Readings from Last 12 Encounters:   11/26/17 57.6 kg (126 lb 15.8 oz)   11/16/17 57.6 kg (127 lb)   09/26/17 58.1 kg (128 lb)   08/22/17 58.1 kg (128 lb)   07/12/17 56.7 kg (125 lb)   06/22/17 55.8 kg (123 lb)   06/16/17 55.8 kg (123 lb)   06/08/17 55.8 kg (123 lb)   03/29/17 56.7 kg (125 lb)   02/21/17 57.3 kg (126 lb 6.4 oz)   01/06/17 59.2 kg (130 lb 9.6 oz)   12/05/16 54.4 kg (120 lb)       PHYSICAL EXAM:  General: WD, WN. Alert, cooperative, no acute distress    EENT:  PERRL. Anicteric sclerae. MMM  Resp:  Decreased BS with prolonged expiration bilaterally, no wheezing or rales. No accessory muscle use  CV:  Irregular  rhythm,  mechanical S2, No edema  GI:  Soft, Non distended, Non tender.  +Bowel sounds, no rebound  LN:  No cervical or inguinal JULIAN  Neurologic:  Alert and oriented X 3, normal speech, non focal motor exam  Psych:   Good insight. Not anxious nor agitated  Skin:  No rashes.   No jaundice    Reviewed most current lab test results and cultures  YES  Reviewed most current radiology test results   YES  Review and summation of old records today    NO  Reviewed patient's current orders and MAR    YES  PMH/SH reviewed - no change compared to H&P  ________________________________________________________________________  Care Plan discussed with:    Comments   Patient x    Family      RN x    Care Manager     Consultant                        Multidiciplinary team rounds were held today with , nursing, pharmacist and clinical coordinator. Patient's plan of care was discussed; medications were reviewed and discharge planning was addressed. ________________________________________________________________________  Total NON critical care TIME: 25   Minutes    Total CRITICAL CARE TIME Spent:   Minutes non procedure based      Comments   >50% of visit spent in counseling and coordination of care     ________________________________________________________________________  Nata Sagastume MD     Procedures: see electronic medical records for all procedures/Xrays and details which were not copied into this note but were reviewed prior to creation of Plan. LABS:  I reviewed today's most current labs and imaging studies.   Pertinent labs include:  Recent Labs      11/27/17   0441  11/26/17   0800   WBC  9.7  10.0   HGB  12.0  13.3   HCT  36.3  40.1   PLT  196  221     Recent Labs      11/27/17   0441  11/26/17   0800   NA  139  139   K  3.8  3.7   CL  103  102   CO2  30  31   GLU  158*  106*   BUN  17  11   CREA  0.85  1.01   CA  8.2*  8.8   ALB   --   3.6   TBILI   --   0.4   SGOT   --   27   ALT   --   26   INR  2.3*  1.8*

## 2017-11-27 NOTE — CARDIO/PULMONARY
Cardiopulmonary Rehab Nursing Entry:    Chart reviewed. Pt 64 yo admitting diagnosis: COPD exacerbation. Past cardiac history of diastolic HF, chronic a-fib, AVR/MVR, dyslipidemia. Current smoker. Smoking cessation program link added to AVS.     Per cardiology entry: No evidence of volume overload, normal EFs on prior echo, repeat echo pending. Nuclear stress test pending. Will follow and address cardiac teaching needs as appropriate.

## 2017-11-28 ENCOUNTER — APPOINTMENT (OUTPATIENT)
Dept: GENERAL RADIOLOGY | Age: 62
DRG: 191 | End: 2017-11-28
Attending: INTERNAL MEDICINE
Payer: MEDICARE

## 2017-11-28 LAB
ANION GAP SERPL CALC-SCNC: 8 MMOL/L (ref 5–15)
BASOPHILS # BLD: 0 K/UL (ref 0–0.1)
BASOPHILS NFR BLD: 0 % (ref 0–1)
BUN SERPL-MCNC: 18 MG/DL (ref 6–20)
BUN/CREAT SERPL: 18 (ref 12–20)
CALCIUM SERPL-MCNC: 8 MG/DL (ref 8.5–10.1)
CHLORIDE SERPL-SCNC: 101 MMOL/L (ref 97–108)
CO2 SERPL-SCNC: 30 MMOL/L (ref 21–32)
CREAT SERPL-MCNC: 1.02 MG/DL (ref 0.55–1.02)
DIFFERENTIAL METHOD BLD: ABNORMAL
EOSINOPHIL # BLD: 0 K/UL (ref 0–0.4)
EOSINOPHIL NFR BLD: 0 % (ref 0–7)
ERYTHROCYTE [DISTWIDTH] IN BLOOD BY AUTOMATED COUNT: 15.8 % (ref 11.5–14.5)
GLUCOSE SERPL-MCNC: 155 MG/DL (ref 65–100)
HCT VFR BLD AUTO: 37.7 % (ref 35–47)
HGB BLD-MCNC: 12.4 G/DL (ref 11.5–16)
INR PPP: 3.4 (ref 0.9–1.1)
LYMPHOCYTES # BLD: 0.2 K/UL (ref 0.8–3.5)
LYMPHOCYTES NFR BLD: 1 % (ref 12–49)
MCH RBC QN AUTO: 30.4 PG (ref 26–34)
MCHC RBC AUTO-ENTMCNC: 32.9 G/DL (ref 30–36.5)
MCV RBC AUTO: 92.4 FL (ref 80–99)
MONOCYTES # BLD: 1.1 K/UL (ref 0–1)
MONOCYTES NFR BLD: 5 % (ref 5–13)
NEUTS SEG # BLD: 20.8 K/UL (ref 1.8–8)
NEUTS SEG NFR BLD: 94 % (ref 32–75)
PLATELET # BLD AUTO: 215 K/UL (ref 150–400)
POTASSIUM SERPL-SCNC: 3.4 MMOL/L (ref 3.5–5.1)
PROTHROMBIN TIME: 35.2 SEC (ref 9–11.1)
RBC # BLD AUTO: 4.08 M/UL (ref 3.8–5.2)
RBC MORPH BLD: ABNORMAL
SODIUM SERPL-SCNC: 139 MMOL/L (ref 136–145)
WBC # BLD AUTO: 22.1 K/UL (ref 3.6–11)

## 2017-11-28 PROCEDURE — 36415 COLL VENOUS BLD VENIPUNCTURE: CPT | Performed by: INTERNAL MEDICINE

## 2017-11-28 PROCEDURE — 85610 PROTHROMBIN TIME: CPT | Performed by: INTERNAL MEDICINE

## 2017-11-28 PROCEDURE — 71010 XR CHEST PORT: CPT

## 2017-11-28 PROCEDURE — 65660000000 HC RM CCU STEPDOWN

## 2017-11-28 PROCEDURE — 80048 BASIC METABOLIC PNL TOTAL CA: CPT | Performed by: INTERNAL MEDICINE

## 2017-11-28 PROCEDURE — 77010033678 HC OXYGEN DAILY

## 2017-11-28 PROCEDURE — 74011250637 HC RX REV CODE- 250/637: Performed by: INTERNAL MEDICINE

## 2017-11-28 PROCEDURE — 74011000250 HC RX REV CODE- 250: Performed by: INTERNAL MEDICINE

## 2017-11-28 PROCEDURE — 94640 AIRWAY INHALATION TREATMENT: CPT

## 2017-11-28 PROCEDURE — 74011250636 HC RX REV CODE- 250/636: Performed by: INTERNAL MEDICINE

## 2017-11-28 PROCEDURE — 85025 COMPLETE CBC W/AUTO DIFF WBC: CPT | Performed by: INTERNAL MEDICINE

## 2017-11-28 RX ORDER — POTASSIUM CHLORIDE 20 MEQ/1
20 TABLET, EXTENDED RELEASE ORAL ONCE
Status: COMPLETED | OUTPATIENT
Start: 2017-11-28 | End: 2017-11-28

## 2017-11-28 RX ORDER — AMOXICILLIN 250 MG
2 CAPSULE ORAL DAILY
Status: DISCONTINUED | OUTPATIENT
Start: 2017-11-28 | End: 2017-12-02 | Stop reason: HOSPADM

## 2017-11-28 RX ORDER — PREDNISONE 20 MG/1
40 TABLET ORAL
Status: DISCONTINUED | OUTPATIENT
Start: 2017-11-29 | End: 2017-11-29

## 2017-11-28 RX ADMIN — FAMOTIDINE 40 MG: 20 TABLET, FILM COATED ORAL at 08:10

## 2017-11-28 RX ADMIN — UMECLIDINIUM 1 PUFF: 62.5 AEROSOL, POWDER ORAL at 08:13

## 2017-11-28 RX ADMIN — Medication 10 ML: at 05:00

## 2017-11-28 RX ADMIN — OXYCODONE HYDROCHLORIDE AND ACETAMINOPHEN 1 TABLET: 5; 325 TABLET ORAL at 16:05

## 2017-11-28 RX ADMIN — ALBUTEROL SULFATE 2.5 MG: 2.5 SOLUTION RESPIRATORY (INHALATION) at 04:04

## 2017-11-28 RX ADMIN — BUPROPION HYDROCHLORIDE 150 MG: 150 TABLET, FILM COATED, EXTENDED RELEASE ORAL at 17:45

## 2017-11-28 RX ADMIN — ALBUTEROL SULFATE 2.5 MG: 2.5 SOLUTION RESPIRATORY (INHALATION) at 11:38

## 2017-11-28 RX ADMIN — ALBUTEROL SULFATE 2.5 MG: 2.5 SOLUTION RESPIRATORY (INHALATION) at 07:22

## 2017-11-28 RX ADMIN — ALBUTEROL SULFATE 2.5 MG: 2.5 SOLUTION RESPIRATORY (INHALATION) at 20:34

## 2017-11-28 RX ADMIN — ALBUTEROL SULFATE 2.5 MG: 2.5 SOLUTION RESPIRATORY (INHALATION) at 16:01

## 2017-11-28 RX ADMIN — ONDANSETRON HYDROCHLORIDE 2 MG: 2 INJECTION, SOLUTION INTRAMUSCULAR; INTRAVENOUS at 14:25

## 2017-11-28 RX ADMIN — Medication 10 ML: at 19:56

## 2017-11-28 RX ADMIN — DOCUSATE SODIUM AND SENNOSIDES 2 TABLET: 8.6; 5 TABLET, FILM COATED ORAL at 19:55

## 2017-11-28 RX ADMIN — DILTIAZEM HYDROCHLORIDE 60 MG: 30 TABLET, FILM COATED ORAL at 08:10

## 2017-11-28 RX ADMIN — Medication 10 ML: at 14:27

## 2017-11-28 RX ADMIN — POTASSIUM CHLORIDE 20 MEQ: 20 TABLET, EXTENDED RELEASE ORAL at 12:42

## 2017-11-28 RX ADMIN — METHYLPREDNISOLONE SODIUM SUCCINATE 60 MG: 125 INJECTION, POWDER, FOR SOLUTION INTRAMUSCULAR; INTRAVENOUS at 04:59

## 2017-11-28 RX ADMIN — DILTIAZEM HYDROCHLORIDE 60 MG: 30 TABLET, FILM COATED ORAL at 16:05

## 2017-11-28 RX ADMIN — OXYCODONE HYDROCHLORIDE AND ACETAMINOPHEN 1 TABLET: 5; 325 TABLET ORAL at 08:13

## 2017-11-28 RX ADMIN — DILTIAZEM HYDROCHLORIDE 60 MG: 30 TABLET, FILM COATED ORAL at 12:42

## 2017-11-28 RX ADMIN — ATORVASTATIN CALCIUM 20 MG: 20 TABLET, FILM COATED ORAL at 19:55

## 2017-11-28 RX ADMIN — ALBUTEROL SULFATE 2.5 MG: 2.5 SOLUTION RESPIRATORY (INHALATION) at 23:31

## 2017-11-28 RX ADMIN — FAMOTIDINE 40 MG: 20 TABLET, FILM COATED ORAL at 17:45

## 2017-11-28 RX ADMIN — FLUTICASONE FUROATE AND VILANTEROL TRIFENATATE 1 PUFF: 200; 25 POWDER RESPIRATORY (INHALATION) at 08:13

## 2017-11-28 RX ADMIN — BUPROPION HYDROCHLORIDE 150 MG: 150 TABLET, FILM COATED, EXTENDED RELEASE ORAL at 08:10

## 2017-11-28 NOTE — PROGRESS NOTES
Pharmacy Daily Dosing of Warfarin    Indication: AF, AVR, MVR    Goal INR: 2.5-3.5    PTA Warfarin Dose: 3 mg M/T/W/Sun, 2 mg Th/Sat (2.6 mg average)    Concurrent anticoagulants: None  Concurrent antiplatelet: None    Major Interacting Medications    Drugs that may increase INR: None   Drugs that may decrease INR: None    Conditions that may increase/decrease INR (CHF, C. diff, cirrhosis, thyroid disorder, hypoalbuminemia): CHF    Labs:  Recent Labs      11/28/17   0450  11/27/17   0441  11/26/17   0800   INR  3.4*  2.3*  1.8*   HGB  12.4  12.0  13.3   PLT  215  196  221   SGOT   --    --   27   TBILI   --    --   0.4   ALB   --    --   3.6           Impression/Plan: Will HOLD dose tonight given the jump in INR from yesterday. Daily INR  CBC w/o diff QOD     Pharmacy will follow daily and adjust the dose as appropriate. Thanks  Malaika Neri PHARMD      [email or web address]/Warfarin%[email or web address]. pdf

## 2017-11-28 NOTE — PROGRESS NOTES
[address] [place] [address], [address] [place] [address], [address] [facility]  [phone]      Cardiology Progress Note      11/28/2017 2:51 PM    Admit Date: 11/26/2017    Admit Diagnosis:   Acute exacerbation of chronic obstructive pulmonary disease*    Subjective:     Farhana Rebolledo continues to have episodes of CP at rest.    Visit Vitals    /66 (BP 1 Location: Left arm, BP Patient Position: At rest)    Pulse 82    Temp 97.8 °F (36.6 °C)    Resp 18    Ht 5' 4\" (1.626 m)    Wt 127 lb (57.6 kg)    SpO2 99%    Breastfeeding No    BMI 21.8 kg/m2       Current Facility-Administered Medications   Medication Dose Route Frequency    Warfarin- HOLD dose tonight.    1 Each Other ONCE    [START ON 11/29/2017] predniSONE (DELTASONE) tablet 40 mg  40 mg Oral DAILY WITH BREAKFAST    albuterol (PROVENTIL VENTOLIN) nebulizer solution 2.5 mg  2.5 mg Nebulization Q4H RT    bumetanide (BUMEX) tablet 1 mg  1 mg Oral Q MON, WED & FRI    buPROPion SR (WELLBUTRIN SR) tablet 150 mg  150 mg Oral BID    dilTIAZem (CARDIZEM) IR tablet 60 mg  60 mg Oral TIDAC    docusate sodium (COLACE) capsule 100 mg  100 mg Oral BID PRN    famotidine (PEPCID) tablet 40 mg  40 mg Oral BID    atorvastatin (LIPITOR) tablet 20 mg  20 mg Oral QHS    nystatin (MYCOSTATIN) 100,000 unit/mL oral suspension 500,000 Units  500,000 Units Oral QID    oxyCODONE-acetaminophen (PERCOCET) 5-325 mg per tablet 1 Tab  1 Tab Oral Q6H PRN    potassium chloride SR (KLOR-CON 10) tablet 10 mEq  10 mEq Oral Q MON, WED & FRI    fluticasone-vilanterol (BREO ELLIPTA) 200mcg-25mcg/puff  1 Puff Inhalation DAILY    sodium chloride (NS) flush 5-10 mL  5-10 mL IntraVENous Q8H    sodium chloride (NS) flush 5-10 mL  5-10 mL IntraVENous PRN    acetaminophen (TYLENOL) tablet 500 mg  500 mg Oral Q6H PRN    HYDROmorphone (PF) (DILAUDID) injection 0.5 mg  0.5 mg IntraVENous Q6H PRN    naloxone (NARCAN) injection 0.4 mg  0.4 mg IntraVENous PRN    ondansetron (ZOFRAN) injection 2 mg  2 mg IntraVENous Q6H PRN    nicotine (NICODERM CQ) 14 mg/24 hr patch 1 Patch  1 Patch TransDERmal DAILY PRN    umeclidinium (INCRUSE ELLIPTA) 62.5 mcg/actuation  1 Puff Inhalation DAILY    WARFARIN INFORMATION NOTE (COUMADIN)   Other QPM       Objective:      Physical Exam:  General Appearance:    Chest:   Clear  Cardiovascular:  Regular rate and rhythm, no murmur.   Abdomen:   Soft, non-tender, bowel sounds are active.   Extremities:   Skin:  Warm and dry.     Data Review:   Recent Labs      11/28/17 0450 11/27/17 0441 11/26/17   0800   WBC  22.1*  9.7  10.0   HGB  12.4  12.0  13.3   HCT  37.7  36.3  40.1   PLT  215  196  221     Recent Labs      11/28/17 0450 11/27/17   0441  11/26/17   0800   NA  139  139  139   K  3.4*  3.8  3.7   CL  101  103  102   CO2  30  30  31   GLU  155*  158*  106*   BUN  18  17  11   CREA  1.02  0.85  1.01   CA  8.0*  8.2*  8.8   ALB   --    --   3.6   TBILI   --    --   0.4   SGOT   --    --   27   ALT   --    --   26   INR  3.4*  2.3*  1.8*       Recent Labs      11/26/17   0800   TROIQ  <0.04         Intake/Output Summary (Last 24 hours) at 11/28/17 1451  Last data filed at 11/28/17 0447   Gross per 24 hour   Intake                0 ml   Output             2350 ml   Net            -2350 ml        Telemetry:   EKG:  Cxray:    Assessment:     Principal Problem:    Acute exacerb[facility] of chronic [facility] pulmonary disease [facility]COPD) [facility] (11/26/2017)    Active Problems:    S/P AVR (aortic valve replacement) (1/8/2015)      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 Breathing better. EF 50-55% by echo. Will eval her CP with  myoview.

## 2017-11-28 NOTE — PROGRESS NOTES
Home Oxygen Test  Date of test: 11/28/2017  Time of test: 1745    Sa02 98% on room air AT REST. Sa02  85% on room air DURING AMBULATION. Sa02  95% on 2 Liters DURING AMBULATION. Sa02 98 % on 2 Liters AT REST/AFTER AMBULATION.

## 2017-11-28 NOTE — PROGRESS NOTES
ADULT PROTOCOL: JET AEROSOL ASSESSMENT    Patient  Gabriel Cedeño     63 y.o.   female     11/28/2017  10:34 AM    Breath Sounds Pre Procedure: Right Breath Sounds: Expiratory wheezing                               Left Breath Sounds: Expiratory wheezing    Breath Sounds Post Procedure: Right Breath Sounds: Expiratory wheezing                                 Left Breath Sounds: Expiratory wheezing    Breathing pattern: Pre procedure Breathing Pattern: Regular, Dyspnea at rest          Post procedure Breathing Pattern: Regular, Dyspnea at rest    Heart Rate: Pre procedure Pulse: 81           Post procedure Pulse: 80    Resp Rate: Pre procedure Respirations: 20           Post procedure Respirations: 20    Peak Flow: Pre bronchodilator  Peak Flow: 110          Post bronchodilator  Peak Flow: 110            Cough: Pre procedure Cough: Non-productive               Post procedure Cough: Moist, Non-productive      Oxygen: O2 Device: Nasal cannula   Flow rate (L/min) 2 lpm     Changed: NO    SpO2: Pre procedure SpO2: 98 %   with oxygen              Post procedure SpO2: 94 %  with oxygen    Nebulizer Therapy: Current medications Aerosolized Medications: Albuterol      Changed: NO    Smoking History: current smoker    Problem List:   Patient Active Problem List   Diagnosis Code    S/P AVR (aortic valve replacement) Z95.2    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 I08.0    CAD (coronary artery disease), native coronary artery I25.10    S/P MVR (mitral valve replacement) Z95.2    COPD (chronic obstructive pulmonary disease) ([facility] J44.9    Tobacco abuse V02.8    Diastolic CHF, chronic ([facility]) I50.32    Chronic atrial fibrillation ([facility]) I48.2    History of GI bleed Z87.19    H/O total hysterectomy Z90.710    Acute [facility] of [facility] disease [facility] J44.1       Respiratory Therapist: José Nam, RT

## 2017-11-28 NOTE — PROGRESS NOTES
11/28/17 4058   Post-Treatment   Breathing Pattern Regular;Dyspnea at rest   Cough Moist;Non-productive   Left Breath Sounds Expiratory wheezing   Right Breath Sounds Expiratory wheezing   Pulse 80   SpO2 94 %   Respirations 20   Treatment Tolerance Well   Peak Flow 110

## 2017-11-28 NOTE — PROGRESS NOTES
[facility] SBAR, Kardex and MAR was discussed.     Antonio Marie RN

## 2017-11-28 NOTE — PROGRESS NOTES
1515  Report received from [address] [facility] [address] [facility], [address] [facility]. SBAR, Kardex, ED Summary, Procedure Summary, Intake/Output, MAR, Accordion, Recent Results, Med Rec Status and Cardiac Rhythm a-fib were discussed. [address]      Patient stated that she takes pericolace every night - 2 tablets. MD notified and new orders received. Bedside shift change report given to Maty Bunch (oncoming nurse) by Aron Magana (offgoing nurse). Report included the following information SBAR, Kardex, ED Summary, Procedure Summary, Intake/Output, MAR, Accordion, Recent Results, Med Rec Status and Cardiac Rhythm a-fib. SHIFT SUMMARY:    Patient going for a stress test tomorrow and will be NPO after midnight. Patient able to tolerate being off of oxygen at rest but dropped to 85% on room air with ambulation. Oxygen assessment noted in separate note. [facility] NURSING NOTE   Admission Date 11/26/2017   Admission Diagnosis Acute exacerbation of chronic obstructive pulmonary disease (COPD) ([facility]   Consults IP CONSULT TO CARDIOLOGY      Cardiac Monitoring [x] Yes [] No      Purposeful Hourly Rounding [x] Yes    Columba Score Total Score: 1   Coulmba score 3 or > [] Bed Alarm [] Avasys [] 1:1 sitter [] Patient refused (Place signed refusal form in chart)   Jose Score Jose Score: 20   Jose score 14 or < [] PMT consult [] Wound Care consult    []  Specialty bed  [] Nutrition consult      Influenza Vaccine Received Flu Vaccine for Current Season (usually Sept-March): Yes           Oxygen needs?  [] Room air Oxygen @  []1L    [x]2L    []3L   []4L    []5L   []6L     Use home O2? [] Yes [x] No  Perform O2 challenge test using  smartphrase (.Homeoxygen)      Last bowel movement Last Bowel Movement Date: 11/26/17      Urinary Catheter             LDAs               Peripheral IV 11/27/17 Right Arm (Active)   Site Assessment Clean, dry, & intact 11/28/2017  4:09 PM   Phlebitis Assessment 0 11/28/2017  4:09 PM   Infiltration Assessment 0 11/28/2017  4:09 PM   Dressing Status Clean, dry, & intact 11/28/2017  4:09 PM   Dressing Type Tape;Transparent 11/28/2017  4:09 PM   Hub Color/Line Status Yellow; Flushed 11/28/2017  4:09 PM                         Readmission Risk Assessment Tool Score Medium Risk            20       Total Score        3 Has Seen PCP in Last 6 Months (Yes=3, No=0)    2 . Living with Significant Other. Assisted Living. LTAC. SNF. or   Rehab    4 IP Visits Last 12 Months (1-3=4, 4=9, >4=11)    5 Pt.  Coverage (Medicare=5 , Medicaid, or Self-Pay=4)    6 Charlson Comorbidity Score (Age + Comorbid Conditions)        Criteria that do not apply:    Patient Length of Stay (>5 days = 3)       Expected Length of Stay 3d 2h   Actual Length of Stay 2

## 2017-11-28 NOTE — PROGRESS NOTES
11/28/17 0722   Pre-Treatment   Breathing Pattern Regular;Dyspnea at rest   Cough Non-productive   Left Breath Sounds Expiratory wheezing   Right Breath Sounds Expiratory wheezing   Pulse 81   SpO2 98 %   Respirations 20   Peak Flow 110

## 2017-11-28 NOTE — PROGRESS NOTES
Hospitalist Progress Note    NAME: Farhana Rebolledo   :  1955   MRN:  [id number]   LOS:   2      Assessment / Plan:  Acute exacerbation of chronic obstructive pulmonary disease  -improving  -continue nebs, steroids switch to PO  -O2 stable on RA      CAD, native coronary artery  Chronic atrial fibrillation  -aspirin and statin  -diltiazem IR 60 mg TID  -stress test tomorrow per cardiology      Diastolic CHF, chronic (HCC) not in acute CHF at this time. EF 55-60%   -continue bumex      History of GI bleed   Happened last November and workup including Capsule endoscopy didn't show any obvious source.       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       Tobacco abuse  Nicotine patch PRN      Body mass index is 21.8 kg/(m^2).     Code status: Full  Prophylaxis: Coumadin  Recommended Disposition: Home w/Family     Subjective:     Chief Complaint: coughing  Patient feels improved overall      Objective:     VITALS:   Last 24hrs VS reviewed since prior progress note.  Most recent are:  Patient Vitals for the past 24 hrs:   Temp Pulse Resp BP SpO2   17 1419 97.8 °F (36.6 °C) 82 18 144/66 99 %   17 1138 - - - - 98 %   17 1036 97.4 °F (36.3 °C) 76 20 129/62 99 %   17 0722 - - - - 98 %   17 0709 97.7 °F (36.5 °C) 88 18 132/75 99 %   17 0403 - - - - 99 %   17 0330 97.9 °F (36.6 °C) 84 20 135/69 99 %   17 2324 - - - - 96 %   17 2300 97.8 °F (36.6 °C) 69 20 127/49 98 %   17 1926 97.8 °F (36.6 °C) 82 20 135/79 100 %   17 1857 - - - - 98 %   17 1543 98.3 °F (36.8 °C) 87 22 133/52 97 %   17 1535 - - - - 97 %       Intake/Output Summary (Last 24 hours) at 17 1433  Last data filed at 17 0447   Gross per 24 hour   Intake                0 ml Output             2350 ml   Net            -2350 ml        PHYSICAL EXAM:  General: Alert, cooperative, no acute distress    EENT:  EOMI. Anicteric sclerae. Mucous membranes moist  Resp:  Decreased CTA bilaterally, no wheezing or rales. No accessory muscle use  CV:  Regular rhythm, no edema  GI:  Soft, non distended, non tender. +Bowel sounds  Neurologic:  Alert and oriented X 3, normal speech  Psych:   Good insight, not anxious nor agitated  Skin:  No rashes, no jaundice  ________________________________________________________________________  Care Plan discussed with:    Comments   Patient x    Family  x    RN x    Care Manager     Consultant                        Multidiciplinary team rounds were held today with , nursing, pharmacist and clinical coordinator. Patient's plan of care was discussed; medications were reviewed and discharge planning was addressed. ________________________________________________________________________  Total NON critical care TIME:  26   Minutes    >50% of visit spent in counseling and coordination of care       ________________________________________________________________________    Procedures: see electronic medical records for all procedures/Xrays and details which were not copied into this note but were reviewed prior to creation of Plan. LABS:  I reviewed today's most current labs and imaging studies.   Pertinent labs include:  Recent Labs      11/28/17 0450 11/27/17   0441  11/26/17   0800   WBC  22.1*  9.7  10.0   HGB  12.4  12.0  13.3   HCT  37.7  36.3  40.1   PLT  215  196  221     Recent Labs      11/28/17   0450  11/27/17   0441  11/26/17   0800   NA  139  139  139   K  3.4*  3.8  3.7   CL  101  103  102   CO2  30  30  31   GLU  155*  158*  106*   BUN  18  17  11   CREA  1.02  0.85  1.01   CA  8.0*  8.2*  8.8   ALB   --    --   3.6   TBILI   --    --   0.4   SGOT   --    --   27   ALT   --    --   26   INR  3.4*  2.3*  1.8*       Signed: Dinora Collazo MD

## 2017-11-28 NOTE — PROGRESS NOTES
Cardiopulmonary Care Interdisciplinary rounds were held today to discuss patient plan of care and outcomes. The following members were present: NP/Physician, PT, Pharmacy, Nursing, Nutritionist and Case Management. Expected Length of Stay:  3d 2h  Geometric Length of Stay: DRG GLOS: 3.1    Plan of Care: Continue current treatment plan; ?  Stress test

## 2017-11-29 ENCOUNTER — APPOINTMENT (OUTPATIENT)
Dept: GENERAL RADIOLOGY | Age: 62
DRG: 191 | End: 2017-11-29
Attending: INTERNAL MEDICINE
Payer: MEDICARE

## 2017-11-29 LAB
ANION GAP SERPL CALC-SCNC: 6 MMOL/L (ref 5–15)
ARTERIAL PATENCY WRIST A: ABNORMAL
BASE EXCESS BLDA CALC-SCNC: 1.1 MMOL/L
BASOPHILS # BLD: 0 K/UL (ref 0–0.1)
BASOPHILS NFR BLD: 0 % (ref 0–1)
BDY SITE: ABNORMAL
BREATHS.SPONTANEOUS ON VENT: 26
BUN SERPL-MCNC: 19 MG/DL (ref 6–20)
BUN/CREAT SERPL: 21 (ref 12–20)
CALCIUM SERPL-MCNC: 8.3 MG/DL (ref 8.5–10.1)
CHLORIDE SERPL-SCNC: 100 MMOL/L (ref 97–108)
CO2 SERPL-SCNC: 33 MMOL/L (ref 21–32)
CREAT SERPL-MCNC: 0.89 MG/DL (ref 0.55–1.02)
DIFFERENTIAL METHOD BLD: ABNORMAL
EOSINOPHIL # BLD: 0 K/UL (ref 0–0.4)
EOSINOPHIL NFR BLD: 0 % (ref 0–7)
ERYTHROCYTE [DISTWIDTH] IN BLOOD BY AUTOMATED COUNT: 16.1 % (ref 11.5–14.5)
FIO2 ON VENT: 100 %
GAS FLOW.O2 O2 DELIVERY SYS: 15 L/MIN
GLUCOSE BLD STRIP.AUTO-MCNC: 158 MG/DL (ref 65–100)
GLUCOSE SERPL-MCNC: 123 MG/DL (ref 65–100)
HCO3 BLDA-SCNC: 28 MMOL/L (ref 22–26)
HCT VFR BLD AUTO: 37.6 % (ref 35–47)
HGB BLD-MCNC: 12.2 G/DL (ref 11.5–16)
INR PPP: 4 (ref 0.9–1.1)
LYMPHOCYTES # BLD: 1.2 K/UL (ref 0.8–3.5)
LYMPHOCYTES NFR BLD: 6 % (ref 12–49)
MCH RBC QN AUTO: 30 PG (ref 26–34)
MCHC RBC AUTO-ENTMCNC: 32.4 G/DL (ref 30–36.5)
MCV RBC AUTO: 92.6 FL (ref 80–99)
MONOCYTES # BLD: 1.4 K/UL (ref 0–1)
MONOCYTES NFR BLD: 7 % (ref 5–13)
NEUTS SEG # BLD: 16.7 K/UL (ref 1.8–8)
NEUTS SEG NFR BLD: 87 % (ref 32–75)
PCO2 BLDA: 52 MMHG (ref 35–45)
PH BLDA: 7.34 [PH] (ref 7.35–7.45)
PLATELET # BLD AUTO: 207 K/UL (ref 150–400)
PO2 BLDA: 450 MMHG (ref 80–100)
POTASSIUM SERPL-SCNC: 4.4 MMOL/L (ref 3.5–5.1)
PROTHROMBIN TIME: 42.6 SEC (ref 9–11.1)
RBC # BLD AUTO: 4.06 M/UL (ref 3.8–5.2)
RBC MORPH BLD: ABNORMAL
SAO2 % BLD: 100 % (ref 92–97)
SAO2% DEVICE SAO2% SENSOR NAME: ABNORMAL
SERVICE CMNT-IMP: ABNORMAL
SODIUM SERPL-SCNC: 139 MMOL/L (ref 136–145)
SPECIMEN SITE: ABNORMAL
WBC # BLD AUTO: 19.3 K/UL (ref 3.6–11)

## 2017-11-29 PROCEDURE — 85025 COMPLETE CBC W/AUTO DIFF WBC: CPT | Performed by: INTERNAL MEDICINE

## 2017-11-29 PROCEDURE — 36415 COLL VENOUS BLD VENIPUNCTURE: CPT | Performed by: INTERNAL MEDICINE

## 2017-11-29 PROCEDURE — 71010 XR CHEST PORT: CPT

## 2017-11-29 PROCEDURE — 74011250637 HC RX REV CODE- 250/637: Performed by: INTERNAL MEDICINE

## 2017-11-29 PROCEDURE — 74011250636 HC RX REV CODE- 250/636: Performed by: INTERNAL MEDICINE

## 2017-11-29 PROCEDURE — 65660000000 HC RM CCU STEPDOWN

## 2017-11-29 PROCEDURE — 82962 GLUCOSE BLOOD TEST: CPT

## 2017-11-29 PROCEDURE — 77010033678 HC OXYGEN DAILY

## 2017-11-29 PROCEDURE — 80048 BASIC METABOLIC PNL TOTAL CA: CPT | Performed by: INTERNAL MEDICINE

## 2017-11-29 PROCEDURE — 74011000250 HC RX REV CODE- 250: Performed by: INTERNAL MEDICINE

## 2017-11-29 PROCEDURE — 94660 CPAP INITIATION&MGMT: CPT

## 2017-11-29 PROCEDURE — 94640 AIRWAY INHALATION TREATMENT: CPT

## 2017-11-29 PROCEDURE — 85610 PROTHROMBIN TIME: CPT | Performed by: INTERNAL MEDICINE

## 2017-11-29 PROCEDURE — 74011000250 HC RX REV CODE- 250

## 2017-11-29 PROCEDURE — 36600 WITHDRAWAL OF ARTERIAL BLOOD: CPT | Performed by: INTERNAL MEDICINE

## 2017-11-29 PROCEDURE — 82803 BLOOD GASES ANY COMBINATION: CPT | Performed by: INTERNAL MEDICINE

## 2017-11-29 RX ORDER — DOBUTAMINE HYDROCHLORIDE 200 MG/100ML
10-40 INJECTION INTRAVENOUS
Status: DISCONTINUED | OUTPATIENT
Start: 2017-11-29 | End: 2017-11-29

## 2017-11-29 RX ORDER — ALBUTEROL SULFATE 0.83 MG/ML
SOLUTION RESPIRATORY (INHALATION)
Status: COMPLETED
Start: 2017-11-29 | End: 2017-11-29

## 2017-11-29 RX ORDER — AZITHROMYCIN 250 MG/1
250 TABLET, FILM COATED ORAL DAILY
Status: DISCONTINUED | OUTPATIENT
Start: 2017-11-29 | End: 2017-12-02 | Stop reason: HOSPADM

## 2017-11-29 RX ORDER — LORAZEPAM 2 MG/ML
1 INJECTION INTRAMUSCULAR
Status: DISCONTINUED | OUTPATIENT
Start: 2017-11-29 | End: 2017-12-02 | Stop reason: HOSPADM

## 2017-11-29 RX ORDER — ATROPINE SULFATE 1 MG/ML
.2-1 INJECTION, SOLUTION INTRAVENOUS
Status: DISCONTINUED | OUTPATIENT
Start: 2017-11-29 | End: 2017-11-29

## 2017-11-29 RX ADMIN — ALBUTEROL SULFATE 2.5 MG: 2.5 SOLUTION RESPIRATORY (INHALATION) at 10:38

## 2017-11-29 RX ADMIN — AZITHROMYCIN 250 MG: 250 TABLET, FILM COATED ORAL at 15:12

## 2017-11-29 RX ADMIN — DILTIAZEM HYDROCHLORIDE 60 MG: 30 TABLET, FILM COATED ORAL at 17:25

## 2017-11-29 RX ADMIN — ALBUTEROL SULFATE 2.5 MG: 2.5 SOLUTION RESPIRATORY (INHALATION) at 03:38

## 2017-11-29 RX ADMIN — ALBUTEROL SULFATE 2.5 MG: 2.5 SOLUTION RESPIRATORY (INHALATION) at 15:27

## 2017-11-29 RX ADMIN — BUMETANIDE 1 MG: 1 TABLET ORAL at 15:12

## 2017-11-29 RX ADMIN — DILTIAZEM HYDROCHLORIDE 60 MG: 30 TABLET, FILM COATED ORAL at 09:08

## 2017-11-29 RX ADMIN — LORAZEPAM 1 MG: 2 INJECTION INTRAMUSCULAR at 15:12

## 2017-11-29 RX ADMIN — Medication 10 ML: at 15:12

## 2017-11-29 RX ADMIN — FAMOTIDINE 40 MG: 20 TABLET, FILM COATED ORAL at 09:08

## 2017-11-29 RX ADMIN — BUPROPION HYDROCHLORIDE 150 MG: 150 TABLET, FILM COATED, EXTENDED RELEASE ORAL at 17:25

## 2017-11-29 RX ADMIN — Medication 10 ML: at 21:04

## 2017-11-29 RX ADMIN — UMECLIDINIUM 1 PUFF: 62.5 AEROSOL, POWDER ORAL at 09:09

## 2017-11-29 RX ADMIN — METHYLPREDNISOLONE SODIUM SUCCINATE 40 MG: 40 INJECTION, POWDER, FOR SOLUTION INTRAMUSCULAR; INTRAVENOUS at 15:11

## 2017-11-29 RX ADMIN — ONDANSETRON HYDROCHLORIDE 2 MG: 2 INJECTION, SOLUTION INTRAMUSCULAR; INTRAVENOUS at 05:36

## 2017-11-29 RX ADMIN — OXYCODONE HYDROCHLORIDE AND ACETAMINOPHEN 1 TABLET: 5; 325 TABLET ORAL at 20:43

## 2017-11-29 RX ADMIN — FAMOTIDINE 40 MG: 20 TABLET, FILM COATED ORAL at 17:25

## 2017-11-29 RX ADMIN — FLUTICASONE FUROATE AND VILANTEROL TRIFENATATE 1 PUFF: 200; 25 POWDER RESPIRATORY (INHALATION) at 09:09

## 2017-11-29 RX ADMIN — NYSTATIN 500000 UNITS: 100000 SUSPENSION ORAL at 17:25

## 2017-11-29 RX ADMIN — BUPROPION HYDROCHLORIDE 150 MG: 150 TABLET, FILM COATED, EXTENDED RELEASE ORAL at 09:08

## 2017-11-29 RX ADMIN — NYSTATIN 500000 UNITS: 100000 SUSPENSION ORAL at 15:11

## 2017-11-29 RX ADMIN — DILTIAZEM HYDROCHLORIDE 60 MG: 30 TABLET, FILM COATED ORAL at 11:06

## 2017-11-29 RX ADMIN — POTASSIUM CHLORIDE 10 MEQ: 750 TABLET, FILM COATED, EXTENDED RELEASE ORAL at 17:25

## 2017-11-29 RX ADMIN — ALBUTEROL SULFATE 2.5 MG: 2.5 SOLUTION RESPIRATORY (INHALATION) at 07:39

## 2017-11-29 RX ADMIN — LORAZEPAM 1 MG: 2 INJECTION INTRAMUSCULAR at 11:02

## 2017-11-29 RX ADMIN — METHYLPREDNISOLONE SODIUM SUCCINATE 40 MG: 40 INJECTION, POWDER, FOR SOLUTION INTRAMUSCULAR; INTRAVENOUS at 09:06

## 2017-11-29 RX ADMIN — Medication 10 ML: at 05:36

## 2017-11-29 RX ADMIN — ATORVASTATIN CALCIUM 20 MG: 20 TABLET, FILM COATED ORAL at 21:04

## 2017-11-29 RX ADMIN — METHYLPREDNISOLONE SODIUM SUCCINATE 40 MG: 40 INJECTION, POWDER, FOR SOLUTION INTRAMUSCULAR; INTRAVENOUS at 21:04

## 2017-11-29 RX ADMIN — DOCUSATE SODIUM AND SENNOSIDES 2 TABLET: 8.6; 5 TABLET, FILM COATED ORAL at 09:08

## 2017-11-29 NOTE — PROGRESS NOTES
Problem: Falls - Risk of  Goal: *Absence of Falls  Document Columba Fall Risk and appropriate interventions in the flowsheet.    Outcome: Progressing Towards Goal  Fall Risk Interventions:            Medication Interventions: Assess postural VS orthostatic hypotension, Bed/chair exit alarm, Evaluate medications/consider consulting pharmacy, Patient to call before getting OOB, Teach patient to arise slowly, Utilize gait belt for transfers/ambulation

## 2017-11-29 NOTE — CONSULTS
[facility]  Pulmonary, Critical Care, and Sleep Medicine    Initial Patient Consult    Name: Hardik Martínez MRN: [id number]   : 1955 Hospital: [facility]   Date: 2017        IMPRESSION:   · Acute/chronic hypoxic/hypercapnic respiratory failure  · COPD - FEV1 0.77 L (35% predicted) in  - with acute exacerbation - does not follow with pulmonary   · CAD with chest pain  · Atrial fibrillation  · H/O AV replacement, MV replacement  · Diastolic heart failure without exacerbation  · H/O GI bleed  · Tobacco use      RECOMMENDATIONS:   · BiPAP for now - wean as tolerated  · Bronchodilators - allergy to Spiriva noted  · Systemic corticosteroids  · Empiric antibiotics   · Ischemic evaluation pending (on hold due to this acute episode)  · Tobacco cessation counseling  · Continue warfarin     Subjective: This patient has been seen and evaluated at the request of Dr. Inez Bates for worsening COPD exacerbation. Patient is a 63 y.o. female smoker with COPD; she does not follow with a pulmonary specialist, presented with acute onset of shortness of breath. She has slowly been recovering, and an ischemic workup was planned due to some chest pain she had experienced. However, after walking back from the bathroom today she developed acute onset of markedly worsening shortness of breath and wheezing. No chest pain at that time. History and ROS is limited from the patient at this time as she is on BiPAP but she can tell me she is feeling better since starting on BiPAP. At baseline she is supposed to be on O2, but she has reported she can't afford it.      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 1/8/2015    Tissue MVR 1989 following failed balloon valvuloplasty for MS Redo MVR 2004 due to severe MR, AVR due to AR ([facility]     S/P AVR (aortic valve replacement) 1/8/2015    S/P MVR (mitral valve replacement) 1/8/2015      Past Surgical History:   Procedure Laterality Date    COLONOSCOPY N/A 11/28/2016    COL[facility] Guille Diaz [facility] ENDOSCOPY    HX COLECTOMY      HX HEART CATHETERIZATION      cabg    HX HYSTERECTOMY      BSO    HX MITRAL VALVE REPLACEMENT      and redo MVR and AVR      Prior to Admission medications    Medication Sig Start Date End Date Taking? Authorizing Provider   oxyCODONE-acetaminophen (PERCOCET) 5-325 mg per tablet Take 1 Tab by mouth every six (6) hours as needed for Pain. Max Daily Amount: 4 Tabs. 10/31/17   Barry French, DO   warfarin (COUMADIN) 2 mg tablet Monday Tuesday Wednesday and Sunday take 3mg and take 2mg Thursday Friday Saturday. 9/26/17   Barry French, DO   buPROPion SR (WELLBUTRIN SR) 150 mg SR tablet 1 tab PO qday x 3 days then 1 tab PO bid for smoking cessation 9/14/17   Barry French, DO   SYMBICORT 160-4.5 mcg/actuation HFA inhaler INHALE 2 PUFFS TWICE DAILY 9/11/17   Dian French, DO   predniSONE (DELTASONE) 5 mg tablet Take 1 Tab by mouth daily. 8/22/17   Dian French, DO   lovastatin (MEVACOR) 10 mg tablet TAKE 1 TABLET EVERY NIGHT 8/22/17   Dian French, DO   dilTIAZem (CARDIZEM) 60 mg tablet TAKE 1 TABLET BEFORE BREAKFAST, LUNCH, DINNER AND AT BEDTIME 8/22/17   Barry French, DO   varenicline (CHANTIX STARTER ROSSY) 0.5 mg (11)- 1 mg (42) DsPk Take as directed 8/22/17   Barry French DO   potassium chloride (KLOR-CON) 10 mEq tablet Take 1 Tab by mouth every Monday, Wednesday, Friday. 7/12/17   Naldo Roque MD   bumetanide (BUMEX) 1 mg tablet Take 1 Tab by mouth every Monday, Wednesday, Friday.  7/12/17   Naldo Roque MD   nystatin (MYCOSTATIN) 100,000 unit/mL suspension Take 5 mL by mouth four (4) times daily. swish and spit 6/8/17   Dian French DO   ferrous sulfate (SLOW FE) 142 mg (45 mg iron) ER tablet Take 1 Tab by mouth two (2) times daily (with meals). 12/2/16   Dian French DO   famotidine (PEPCID) 40 mg tablet Take 1 Tab by mouth two (2) times a day. 11/28/16   Dom Davis MD   docusate sodium (COLACE) 100 mg capsule Take 100 mg by mouth two (2) times daily as needed for Constipation. Historical Provider   alendronate (FOSAMAX) 70 mg tablet Take 1 Tab by mouth every Wednesday. 4/13/16   Dian French DO   albuterol (PROVENTIL VENTOLIN) 2.5 mg /3 mL (0.083 %) nebulizer solution 3 mL by Nebulization route every four (4) hours as needed for Wheezing or Shortness of Breath. 4/8/16   Bridget Lutz MD   albuterol (VENTOLIN HFA) 90 mcg/actuation inhaler Take 2 Puffs by inhalation every four (4) hours as needed for Wheezing or Shortness of Breath. 11/18/15   Cj [place] DO Manolo     Allergies   Allergen Reactions    Spiriva Respimat [Tiotropium Bromide] Anaphylaxis and Other (comments)     Adverse effects; Per RN - pt states that Spiriva caused throat swelling and could not breathe well.     Celebrex [Celecoxib] Rash    Tomato Rash      Social History   Substance Use Topics    Smoking status: Current Every Day Smoker     Packs/day: 0.50     Types: Cigarettes     Last attempt to quit: 11/1/2015    Smokeless tobacco: Never Used      Comment: 11/1/2014    Alcohol use No      Family History   Problem Relation Age of Onset    Cancer Brother         Current Facility-Administered Medications   Medication Dose Route Frequency    methylPREDNISolone (PF) (SOLU-MEDROL) injection 40 mg  40 mg IntraVENous Q8H    azithromycin (ZITHROMAX) tablet 250 mg  250 mg Oral DAILY    senna-docusate (PERICOLACE) 8.6-50 mg per tablet 2 Tab  2 Tab Oral DAILY    albuterol (PROVENTIL VENTOLIN) nebulizer solution 2.5 mg  2.5 mg Nebulization Q4H RT    bumetanide (BUMEX) tablet 1 mg  1 mg Oral Q MON, WED & FRI    buPROPion SR (WELLBUTRIN SR) tablet 150 mg  150 mg Oral BID    dilTIAZem (CARDIZEM) IR tablet 60 mg  60 mg Oral TIDAC    famotidine (PEPCID) tablet 40 mg  40 mg Oral BID    atorvastatin (LIPITOR) tablet 20 mg  20 mg Oral QHS    nystatin (MYCOSTATIN) 100,000 unit/mL oral suspension 500,000 Units  500,000 Units Oral QID    potassium chloride SR (KLOR-CON 10) tablet 10 mEq  10 mEq Oral Q MON, WED & FRI    fluticasone-vilanterol (BREO ELLIPTA) 200mcg-25mcg/puff  1 Puff Inhalation DAILY    sodium chloride (NS) flush 5-10 mL  5-10 mL IntraVENous Q8H    umeclidinium (INCRUSE ELLIPTA) 62.5 mcg/actuation  1 Puff Inhalation DAILY    WARFARIN INFORMATION NOTE (COUMADIN)   Other QPM       Review of Systems:  Review of systems not obtained due to patient factors. Objective:   Vital Signs:    Visit Vitals    /85    Pulse (!) 125    Temp 98.2 °F (36.8 °C)    Resp (!) 38    Ht 5' 4\" (1.626 m)    Wt 58.1 kg (128 lb)    SpO2 95%    Breastfeeding No    BMI 21.97 kg/m2       O2 Device: BIPAP   O2 Flow Rate (L/min): 2 l/min   Temp (24hrs), Av.8 °F (36.6 °C), Min:97.4 °F (36.3 °C), Max:98.2 °F (36.8 °C)       Intake/Output:   Last shift:      701 - 1900  In: 60 [P.O.:60]  Out: -   Last 3 shifts: 1901 -  07  In: -   Out: 7403 [Urine:3050]    Intake/Output Summary (Last 24 hours) at 17 1226  Last data filed at 17 0911   Gross per 24 hour   Intake               60 ml   Output              700 ml   Net             -640 ml      Physical Exam:   General:  Alert, cooperative, on BiPAP   Head:  Normocephalic, without obvious abnormality, atraumatic. Eyes:  Conjunctivae/corneas clear. PERRL, EOMs intact. Nose: Nares normal. Septum midline. Mucosa normal.     Throat: Lips, mucosa, and tongue normal. Teeth and gums normal.   Neck: Supple, symmetrical, trachea midline    Back:   Symmetric, no curvature.  ROM normal.   Lungs:   Diffuse bilateral wheeze, tachypneic    Chest wall:  No tenderness or deformity. Heart:  Tachy, irregular, + murmur    Abdomen:   Soft, non-tender. Bowel sounds normal.     Extremities: Extremities normal, atraumatic, no cyanosis or edema. Skin: Skin color, texture, turgor normal. No rashes or lesions   Lymph nodes: Cervical, supraclavicular nodes normal.   Neurologic: Grossly nonfocal     Data review:     Recent Results (from the past 24 hour(s))   PROTHROMBIN TIME + INR    Collection Time: 11/29/17  5:18 AM   Result Value Ref Range    INR 4.0 (H) 0.9 - 1.1      Prothrombin time 42.6 (H) 9.0 - 11.1 sec   CBC WITH AUTOMATED DIFF    Collection Time: 11/29/17  5:18 AM   Result Value Ref Range    WBC 19.3 (H) 3.6 - 11.0 K/uL    RBC 4.06 3.80 - 5.20 M/uL    HGB 12.2 11.5 - 16.0 g/dL    HCT 37.6 35.0 - 47.0 %    MCV 92.6 80.0 - 99.0 FL    MCH 30.0 26.0 - 34.0 PG    MCHC 32.4 30.0 - 36.5 g/dL    RDW 16.1 (H) 11.5 - 14.5 %    PLATELET 270 881 - 742 K/uL    NEUTROPHILS 87 (H) 32 - 75 %    LYMPHOCYTES 6 (L) 12 - 49 %    MONOCYTES 7 5 - 13 %    EOSINOPHILS 0 0 - 7 %    BASOPHILS 0 0 - 1 %    ABS. NEUTROPHILS 16.7 (H) 1.8 - 8.0 K/UL    ABS. LYMPHOCYTES 1.2 0.8 - 3.5 K/UL    ABS. MONOCYTES 1.4 (H) 0.0 - 1.0 K/UL    ABS. EOSINOPHILS 0.0 0.0 - 0.4 K/UL    ABS.  BASOPHILS 0.0 0.0 - 0.1 K/UL    RBC COMMENTS ANISOCYTOSIS  1+        DF SMEAR SCANNED     METABOLIC PANEL, BASIC    Collection Time: 11/29/17  5:18 AM   Result Value Ref Range    Sodium 139 136 - 145 mmol/L    Potassium 4.4 3.5 - 5.1 mmol/L    Chloride 100 97 - 108 mmol/L    CO2 33 (H) 21 - 32 mmol/L    Anion gap 6 5 - 15 mmol/L    Glucose 123 (H) 65 - 100 mg/dL    BUN 19 6 - 20 MG/DL    Creatinine 0.89 0.55 - 1.02 MG/DL    BUN/Creatinine ratio 21 (H) 12 - 20      GFR est AA >60 >60 ml/min/1.73m2    GFR est non-AA >60 >60 ml/min/1.73m2    Calcium 8.3 (L) 8.5 - 10.1 MG/DL   GLUCOSE, POC    Collection Time: 11/29/17  7:57 AM   Result Value Ref Range    Glucose (POC) 158 (H) 65 - 100 mg/dL Performed by Franc Rojas    BLOOD GAS, ARTERIAL    Collection Time: 11/29/17  8:05 AM   Result Value Ref Range    pH 7.34 (L) 7.35 - 7.45      PCO2 52 (H) 35.0 - 45.0 mmHg    PO2 450 (H) 80 - 100 mmHg    O2  (H) 92 - 97 %    BICARBONATE 28 (H) 22 - 26 mmol/L    BASE EXCESS 1.1 mmol/L    O2 METHOD NRM      O2 FLOW RATE 15.00 L/min    FIO2 100 %    SPONTANEOUS RATE 26.0      Sample source ARTERIAL      SITE LEFT BRACHIAL      TEE'S TEST N/A         Imaging:  I have personally reviewed the patients radiographs and have reviewed the reports:  COPD, prior sternotomy, small left effusion, no acute process        Dell Pires MD

## 2017-11-29 NOTE — PROGRESS NOTES
Resp Care Note:    Pt unable to perform peak flow due to SOB pt being transferred to PCU to be placed on Bipap

## 2017-11-29 NOTE — PROGRESS NOTES
Pharmacy Daily Dosing of Warfarin    Indication: AF, AVR, MVR    Goal INR: 2.5-3.5    PTA Warfarin Dose: 3 mg M/T/W/Sun, 2 mg Th/Sat (2.6 mg average)    Concurrent anticoagulants: None  Concurrent antiplatelet: None    Major Interacting Medications    Drugs that may increase INR: Azithromycin   Drugs that may decrease INR: None    Conditions that may increase/decrease INR (CHF, C. diff, cirrhosis, thyroid disorder, hypoalbuminemia): CHF    Labs:  Recent Labs      11/29/17   0518  11/28/17   0450  11/27/17   0441   INR  4.0*  3.4*  2.3*   HGB  12.2  12.4  12.0   PLT  207  215  196           Impression/Plan: Will HOLD dose tonight. Daily INR  CBC w/o diff QOD     Pharmacy will follow daily and adjust the dose as appropriate. Thanks  Jose Haskins PHARMD      [email or web address]. [email or web address]

## 2017-11-29 NOTE — PROGRESS NOTES
SHIFT SUMMARY:  approx 745/750, rn notified by coworker rn that pt in respiratory distress. Pt's rr 40, hr in high 130s and pt's O2 sats 88% on 2L. Pt wheezing significantly. Pt receiving neb treatment and etiology of exacerbation unclear at this time. RR called. Pt transitioned to ventimask. MD paged. Pt's distress diminishing and sats are appropriate on ventimask. MD ordered ABGs and CXR. Pt already received neb tx.     810: pt afebrile and much more at ease. Pt able to talk and laugh. 816: rn paged dr Citlali Eller. 821: rn notified xray of stat xray (João Koroma)  -pt's rr 24, hr 116 and O2 100 on venti-mask. Pt denies feelings of distress. 826: rn spoke to [place], np for dr Mariam Berger. NP notified of pt's RR for respiratory distress and that pt is doing well at present. NP to cancel stress test. NP to review chart for appropriateness of pulm consult.     830: rn spoke to dr Citlali Eller, updating on pt's condition. Dr Citlali Eller assessed pt just now. rn requested MD consideration of pulm consult. MD to assess appropriateness of consult. md to restart steroids. 905: NP dhaval cohn stated pt may eat. 1025: pt rung out stating she feels respiratory distress coming on. Pt;'s O2 sats are 100% on 2L O2. Pt is slightly tachypneic. RT called for STAT PRN neb as pt is wheezing quite a bit. 1045: pt called out wheezing c/o resp distress. Pt's O2 sats fine but HR elevated. Pt refusing NC and demanding ventimask.   -pt placed on ventimask     1051: md paged    1054: md order to give prn ativan. md ok with ventimask for comfort at this time.    -rn holding bumex at this time as pt needs to stay still. 1119: hospitalist dr Piyush Rosa visualized pt. Pt to be transferred tp PCU. Pt to be placed on BIPAP.     1138: paged dr Citlali Eller

## 2017-11-29 NOTE — PROGRESS NOTES
Found patient in respiratory distress using accessory muscles sitting in tripod position. Lungs diminished expiratory wheezing and coarse. Patient initially on 1.5 lpm nasal. Increased it to 2 based on sats 88-90%. Now increased to 4 lpm with 97% Rapid response called. Patient finished breathing tx and mask taken off. She screamed she needs air. Non rebreather placed on patient. She has audible wheezing. Tami Po

## 2017-11-29 NOTE — PROGRESS NOTES
Responded to RAT call. Pt was feeling sob after returning from bathroom gave pt Ja tx due to SOB and Exp wheezes  During Ja tx pt called out for help and RAT call was called. After ABG and time for Ja tx to get in pts system pt calmed down and stated she felt much better, changed pt over to 28% venti mask.   Abg results in system     PH 7.34  co2 52  po2 450  spo2 100

## 2017-11-29 NOTE — PROGRESS NOTES
TRANSFER - OUT REPORT:    Verbal report given to elias (name) on Gustavo Luong  being transferred to PCU (unit) for change in patient condition(respiratory distress)       Report consisted of patients Situation, Background, Assessment and   Recommendations(SBAR). Information from the following report(s) SBAR, Kardex, ED Summary, Procedure Summary, Intake/Output, MAR, Recent Results and Cardiac Rhythm afib elevated hr was reviewed with the receiving nurse. Lines:   Peripheral IV 11/27/17 Right Arm (Active)   Site Assessment Clean, dry, & intact 11/29/2017  7:44 AM   Phlebitis Assessment 0 11/29/2017  4:00 AM   Infiltration Assessment 0 11/29/2017  4:00 AM   Dressing Status Clean, dry, & intact 11/29/2017  4:00 AM   Dressing Type Tape;Transparent 11/29/2017  4:00 AM   Hub Color/Line Status Yellow; Flushed 11/29/2017  4:00 AM        Opportunity for questions and clarification was provided. Patient transported with:   O2 @ ventimask or BIPAP.   liters

## 2017-11-29 NOTE — PROGRESS NOTES
Responding to RAT call secondary to staff reports pt. Calling out for assistance. Pt. Reports returning from bathroom with O2 and experiencing work of breathing. Upon arrival, sitting upright in bed, tripod position, audible expiratory wheezes. Prolonged expiratory phrase. Respiratory reports administering (2) recent  jet nebulizer treatment due to pt. Being wheezy and tight. Change to 100 % NRM. Unable to complete sentences with  use of accessory muscles. 0809 States breathing better, able to complete sentences. Change to venti-mask at 50%. SAO2 100. Respiratory rate 38-40. Pt. Reports \" likes the mask it's easier to use than those things stuck up my nose\". 0813 Vent- mask decrease to 28% by Respiratory therapist MACK Trujillo. Pt. states \"feeling a whole lot better\" ABG and PCXR pending. Pt. Reports experiencing similar episodes at home using nebulizer. Pt. Reports management of pulmonary status by Dr. Russell Mai (PCP). Note somehat congestive sounding non-productive cough. Pt. Reports \"not coughing up any phlegm. Nurse to notify Dr. Federico Acevedo of events, ? Pulmonary consult. 1357 Follow up RAT. Pt. Reports experiencing \"another episode after coughing\". Presently remains upright in bed using venti-mask. SAO2 94% with respiratory rate of 28. Respirations slightly labored at rest. Cough unchanged. Requesting glasses and chocolate chip cookies. Nurse notified. Lashawn Rodriguez

## 2017-11-29 NOTE — PROGRESS NOTES
[address] [address]  [phone]      Cardiology Progress Note      11/29/2017 12:16 PM    Admit Date: 11/26/2017    Admit Diagnosis:   Acute exacerbation of chronic obstructive pulmonary disease*    Subjective:     Deidra Lewis     Visit Vitals    /85    Pulse (!) 125    Temp 98.2 °F (36.8 °C)    Resp (!) 38    Ht 5' 4\" (1.626 m)    Wt 128 lb (58.1 kg)    SpO2 95%    Breastfeeding No    BMI 21.97 kg/m2       Current Facility-Administered Medications   Medication Dose Route Frequency    methylPREDNISolone (PF) (SOLU-MEDROL) injection 40 mg  40 mg IntraVENous Q8H    LORazepam (ATIVAN) injection 1 mg  1 mg IntraVENous Q4H PRN    azithromycin (ZITHROMAX) tablet 250 mg  250 mg Oral DAILY    senna-docusate (PERICOLACE) 8.6-50 mg per tablet 2 Tab  2 Tab Oral DAILY    albuterol (PROVENTIL VENTOLIN) nebulizer solution 2.5 mg  2.5 mg Nebulization Q4H RT    bumetanide (BUMEX) tablet 1 mg  1 mg Oral Q MON, WED & FRI    buPROPion SR (WELLBUTRIN SR) tablet 150 mg  150 mg Oral BID    dilTIAZem (CARDIZEM) IR tablet 60 mg  60 mg Oral TIDAC    docusate sodium (COLACE) capsule 100 mg  100 mg Oral BID PRN    famotidine (PEPCID) tablet 40 mg  40 mg Oral BID    atorvastatin (LIPITOR) tablet 20 mg  20 mg Oral QHS    nystatin (MYCOSTATIN) 100,000 unit/mL oral suspension 500,000 Units  500,000 Units Oral QID    oxyCODONE-acetaminophen (PERCOCET) 5-325 mg per tablet 1 Tab  1 Tab Oral Q6H PRN    potassium chloride SR (KLOR-CON 10) tablet 10 mEq  10 mEq Oral Q MON, WED & FRI    fluticasone-vilanterol (BREO ELLIPTA) 200mcg-25mcg/puff  1 Puff Inhalation DAILY    sodium chloride (NS) flush 5-10 mL  5-10 mL IntraVENous Q8H    sodium chloride (NS) flush 5-10 mL  5-10 mL IntraVENous PRN    acetaminophen (TYLENOL) tablet 500 mg  500 mg Oral Q6H PRN    HYDROmorphone (PF) (DILAUDID) injection 0.5 mg  0.5 mg IntraVENous Q6H PRN    naloxone (NARCAN) injection 0.4 mg  0.4 mg IntraVENous PRN    ondansetron (ZOFRAN) injection 2 mg  2 mg IntraVENous Q6H PRN    nicotine (NICODERM CQ) 14 mg/24 hr patch 1 Patch  1 Patch TransDERmal DAILY PRN    umeclidinium (INCRUSE ELLIPTA) 62.5 mcg/actuation  1 Puff Inhalation DAILY    WARFARIN INFORMATION NOTE (COUMADIN)   Other QPM       Objective:      Physical Exam:  General Appearance:    Chest:   Clear  Cardiovascular:  Regular rate and rhythm, no murmur.   Abdomen:   Soft, non-tender, bowel sounds are active.   Extremities:   Skin:  Warm and dry.     Data Review:   Recent Labs      11/29/17 0518 11/28/17 0450  11/27/17   0441   WBC  19.3*  22.1*  9.7   HGB  12.2  12.4  12.0   HCT  37.6  37.7  36.3   PLT  207  215  196     Recent Labs      11/29/17 0518 11/28/17 0450  11/27/17   0441   NA  139  139  139   K  4.4  3.4*  3.8   CL  100  101  103   CO2  33*  30  30   GLU  123*  155*  158*   BUN  19  18  17   CREA  0.89  1.02  0.85   CA  8.3*  8.0*  8.2*   INR  4.0*  3.4*  2.3*       No results for input(s): TROIQ, CPK, CKMB in the last 72 hours.       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 (1/8/2015)      Overview: Tissue MVR 1989 following failed balloon valvuloplasty for MS      Redo MVR 2004 due to severe MR, AVR due to AR ([facility])      CAD (coronary artery disease), native coronary artery (1/8/2015)      S/P MVR (mitral valve replacement) (1/8/2015)      COPD (chronic obstructive pulmonary disease) ([facility] (11/13/2015)      Tobacco abuse (09/92/0774)      Diastolic CHF, chronic ([facility] (12/17/2015)      Chronic atrial fibrillation ([facility] (2015)      History of GI bleed (2016)        Plan:     Events noted. No CP during the event. Wheezing now.   Postpone  stress test

## 2017-11-29 NOTE — PROGRESS NOTES
CM informed pt had RAT earlier this morning. CM discussed with attending. CM informed pt likely to discharge tomorrow. CM will send referral for home O2 once O2 challenge completed and assess options, as pt has had difficulty getting insurance coverage for home O2 in the past.     Pt will need O2 challenge this morning.      JENNIFER Webb, [address] [place] [address]  [phone]

## 2017-11-29 NOTE — PROGRESS NOTES
Problem: Falls - Risk of  Goal: *Absence of Falls  Document Columba Fall Risk and appropriate interventions in the flowsheet.    Outcome: Progressing Towards Goal  Fall Risk Interventions:            Medication Interventions: Assess postural VS orthostatic hypotension, Bed/chair exit alarm, Evaluate medications/consider consulting pharmacy, Patient to call before getting OOB, Teach patient to arise slowly

## 2017-11-30 LAB
ANION GAP SERPL CALC-SCNC: 7 MMOL/L (ref 5–15)
BASOPHILS # BLD: 0 K/UL
BASOPHILS NFR BLD: 0 %
BUN SERPL-MCNC: 22 MG/DL (ref 6–20)
BUN/CREAT SERPL: 23 (ref 12–20)
CALCIUM SERPL-MCNC: 8.5 MG/DL (ref 8.5–10.1)
CHLORIDE SERPL-SCNC: 97 MMOL/L (ref 97–108)
CO2 SERPL-SCNC: 32 MMOL/L (ref 21–32)
CREAT SERPL-MCNC: 0.97 MG/DL (ref 0.55–1.02)
DIFFERENTIAL METHOD BLD: ABNORMAL
EOSINOPHIL # BLD: 0 K/UL
EOSINOPHIL NFR BLD: 0 %
ERYTHROCYTE [DISTWIDTH] IN BLOOD BY AUTOMATED COUNT: 16 % (ref 11.5–14.5)
GLUCOSE SERPL-MCNC: 177 MG/DL (ref 65–100)
HCT VFR BLD AUTO: 38.2 % (ref 35–47)
HGB BLD-MCNC: 12.6 G/DL (ref 11.5–16)
INR PPP: 2.6 (ref 0.9–1.1)
LYMPHOCYTES # BLD: 0.8 K/UL
LYMPHOCYTES NFR BLD: 6 %
MCH RBC QN AUTO: 30.4 PG (ref 26–34)
MCHC RBC AUTO-ENTMCNC: 33 G/DL (ref 30–36.5)
MCV RBC AUTO: 92 FL (ref 80–99)
MONOCYTES # BLD: 0.4 K/UL
MONOCYTES NFR BLD: 3 %
NEUTS BAND NFR BLD MANUAL: 2 %
NEUTS SEG # BLD: 11.4 K/UL
NEUTS SEG NFR BLD: 89 %
NRBC # BLD: 0 K/UL (ref 0–0.01)
NRBC BLD-RTO: 0 PER 100 WBC
PLATELET # BLD AUTO: 201 K/UL (ref 150–400)
POTASSIUM SERPL-SCNC: 3.8 MMOL/L (ref 3.5–5.1)
PROTHROMBIN TIME: 27.3 SEC (ref 9–11.1)
RBC # BLD AUTO: 4.15 M/UL (ref 3.8–5.2)
RBC MORPH BLD: ABNORMAL
SODIUM SERPL-SCNC: 136 MMOL/L (ref 136–145)
WBC # BLD AUTO: 12.6 K/UL (ref 3.6–11)

## 2017-11-30 PROCEDURE — 74011250636 HC RX REV CODE- 250/636: Performed by: INTERNAL MEDICINE

## 2017-11-30 PROCEDURE — 74011250637 HC RX REV CODE- 250/637: Performed by: INTERNAL MEDICINE

## 2017-11-30 PROCEDURE — 85610 PROTHROMBIN TIME: CPT | Performed by: INTERNAL MEDICINE

## 2017-11-30 PROCEDURE — 94640 AIRWAY INHALATION TREATMENT: CPT

## 2017-11-30 PROCEDURE — 65660000000 HC RM CCU STEPDOWN

## 2017-11-30 PROCEDURE — 74011000250 HC RX REV CODE- 250: Performed by: INTERNAL MEDICINE

## 2017-11-30 PROCEDURE — 77010033678 HC OXYGEN DAILY

## 2017-11-30 PROCEDURE — 94761 N-INVAS EAR/PLS OXIMETRY MLT: CPT

## 2017-11-30 PROCEDURE — 85025 COMPLETE CBC W/AUTO DIFF WBC: CPT | Performed by: INTERNAL MEDICINE

## 2017-11-30 PROCEDURE — 80048 BASIC METABOLIC PNL TOTAL CA: CPT | Performed by: INTERNAL MEDICINE

## 2017-11-30 PROCEDURE — 36415 COLL VENOUS BLD VENIPUNCTURE: CPT | Performed by: INTERNAL MEDICINE

## 2017-11-30 RX ORDER — WARFARIN 2 MG/1
2 TABLET ORAL ONCE
Status: COMPLETED | OUTPATIENT
Start: 2017-11-30 | End: 2017-11-30

## 2017-11-30 RX ADMIN — DILTIAZEM HYDROCHLORIDE 60 MG: 30 TABLET, FILM COATED ORAL at 17:45

## 2017-11-30 RX ADMIN — DILTIAZEM HYDROCHLORIDE 60 MG: 30 TABLET, FILM COATED ORAL at 13:26

## 2017-11-30 RX ADMIN — WARFARIN SODIUM 2 MG: 2 TABLET ORAL at 17:45

## 2017-11-30 RX ADMIN — ALBUTEROL SULFATE 2.5 MG: 2.5 SOLUTION RESPIRATORY (INHALATION) at 00:06

## 2017-11-30 RX ADMIN — ALBUTEROL SULFATE 2.5 MG: 2.5 SOLUTION RESPIRATORY (INHALATION) at 15:09

## 2017-11-30 RX ADMIN — FAMOTIDINE 40 MG: 20 TABLET, FILM COATED ORAL at 08:55

## 2017-11-30 RX ADMIN — METHYLPREDNISOLONE SODIUM SUCCINATE 40 MG: 40 INJECTION, POWDER, FOR SOLUTION INTRAMUSCULAR; INTRAVENOUS at 13:26

## 2017-11-30 RX ADMIN — FAMOTIDINE 40 MG: 20 TABLET, FILM COATED ORAL at 17:45

## 2017-11-30 RX ADMIN — ALBUTEROL SULFATE 2.5 MG: 2.5 SOLUTION RESPIRATORY (INHALATION) at 11:09

## 2017-11-30 RX ADMIN — ALBUTEROL SULFATE 2.5 MG: 2.5 SOLUTION RESPIRATORY (INHALATION) at 07:35

## 2017-11-30 RX ADMIN — Medication 10 ML: at 21:41

## 2017-11-30 RX ADMIN — ALBUTEROL SULFATE 2.5 MG: 2.5 SOLUTION RESPIRATORY (INHALATION) at 23:16

## 2017-11-30 RX ADMIN — BUPROPION HYDROCHLORIDE 150 MG: 150 TABLET, FILM COATED, EXTENDED RELEASE ORAL at 08:55

## 2017-11-30 RX ADMIN — FLUTICASONE FUROATE AND VILANTEROL TRIFENATATE 1 PUFF: 200; 25 POWDER RESPIRATORY (INHALATION) at 08:56

## 2017-11-30 RX ADMIN — METHYLPREDNISOLONE SODIUM SUCCINATE 40 MG: 40 INJECTION, POWDER, FOR SOLUTION INTRAMUSCULAR; INTRAVENOUS at 21:40

## 2017-11-30 RX ADMIN — Medication 10 ML: at 13:26

## 2017-11-30 RX ADMIN — METHYLPREDNISOLONE SODIUM SUCCINATE 40 MG: 40 INJECTION, POWDER, FOR SOLUTION INTRAMUSCULAR; INTRAVENOUS at 05:54

## 2017-11-30 RX ADMIN — UMECLIDINIUM 1 PUFF: 62.5 AEROSOL, POWDER ORAL at 08:56

## 2017-11-30 RX ADMIN — DILTIAZEM HYDROCHLORIDE 60 MG: 30 TABLET, FILM COATED ORAL at 08:56

## 2017-11-30 RX ADMIN — ATORVASTATIN CALCIUM 20 MG: 20 TABLET, FILM COATED ORAL at 21:41

## 2017-11-30 RX ADMIN — DOCUSATE SODIUM AND SENNOSIDES 2 TABLET: 8.6; 5 TABLET, FILM COATED ORAL at 08:55

## 2017-11-30 RX ADMIN — Medication 10 ML: at 05:54

## 2017-11-30 RX ADMIN — AZITHROMYCIN 250 MG: 250 TABLET, FILM COATED ORAL at 08:55

## 2017-11-30 RX ADMIN — ALBUTEROL SULFATE 2.5 MG: 2.5 SOLUTION RESPIRATORY (INHALATION) at 03:50

## 2017-11-30 RX ADMIN — BUPROPION HYDROCHLORIDE 150 MG: 150 TABLET, FILM COATED, EXTENDED RELEASE ORAL at 17:45

## 2017-11-30 RX ADMIN — ALBUTEROL SULFATE 2.5 MG: 2.5 SOLUTION RESPIRATORY (INHALATION) at 19:12

## 2017-11-30 NOTE — PROGRESS NOTES
[facility]  Pulmonary, Critical Care, and Sleep Medicine    Name: Pablo Jacobs MRN: [id number]   : 1955 Hospital: [facility]   Date: 2017        IMPRESSION:   · Acute/chronic hypoxic/hypercapnic respiratory failure  · COPD - FEV1 0.77 L (35% predicted) in  - with acute exacerbation - does not follow with pulmonary   · CAD with chest pain  · Atrial fibrillation  · H/O AV replacement, MV replacement  · Diastolic heart failure without exacerbation  · H/O GI bleed  · Tobacco use      PLAN:   · O2 with PRN BiPAP - wean O2 at tolerated (she is on a ventimask because she \"doesn't like\" the nasal cannula  · Bronchodilators  · IV steroids  · Empiric antibiotics  · Ischemic evaluation per cardiology  · On warfarin     Subjective/Interval History:   I have reviewed the flowsheet and previous days notes. Feels a lot better this morning. Less dyspnea, but still with lots of wheezing. Gets very tachycardic with any exertion. Review of Systems   Constitutional: Positive for fatigue. HENT: Negative. Eyes: Negative. Respiratory: Positive for shortness of breath and wheezing. Cardiovascular: Negative. Gastrointestinal: Negative.       Objective:   Vital Signs:    Visit Vitals    /65 (BP 1 Location: Right arm, BP Patient Position: At rest)    Pulse 98    Temp 97.7 °F (36.5 °C)    Resp 18    Ht 5' 4\" (1.626 m)    Wt 56.2 kg (123 lb 14.4 oz)    SpO2 100%    Breastfeeding No    BMI 21.27 kg/m2       O2 Device: Nasal cannula, Ventimask   O2 Flow Rate (L/min): 8 l/min   Temp (24hrs), Av.1 °F (36.7 °C), Min:97.6 °F (36.4 °C), Max:98.3 °F (36.8 °C)       Intake/Output:   Last shift:         Last 3 shifts:  1901 -  0700  In: 580 [P.O.:560; I.V.:20]  Out: 1450 [Urine:1450]    Intake/Output Summary (Last 24 hours) at 17 0837  Last data filed at 17 0600   Gross per 24 hour   Intake              580 ml   Output 750 ml   Net             -170 ml      Physical Exam   Constitutional: She is oriented to person, place, and time. She is cooperative. No distress. HENT:   Head: Normocephalic and atraumatic. Mouth/Throat: No oropharyngeal exudate. Eyes: No scleral icterus. Cardiovascular: Regular rhythm. Tachycardia present. No murmur heard. Pulmonary/Chest: No respiratory distress. She has wheezes. She has no rales. Abdominal: Soft. Bowel sounds are normal. She exhibits no distension. There is no tenderness. Musculoskeletal: She exhibits no edema. Neurological: She is alert and oriented to person, place, and time. Skin: Skin is warm and dry.      Data:   Labs:  Recent Labs      11/30/17   0329  11/29/17   0518  11/28/17   0450   WBC  12.6*  19.3*  22.1*   HGB  12.6  12.2  12.4   HCT  38.2  37.6  37.7   PLT  201  207  215     Recent Labs      11/30/17   0329  11/29/17   0518  11/28/17   0450   NA  136  139  139   K  3.8  4.4  3.4*   CL  97  100  101   CO2  32  33*  30   GLU  177*  123*  155*   BUN  22*  19  18   CREA  0.97  0.89  1.02   CA  8.5  8.3*  8.0*   INR  2.6*  4.0*  3.4*     Recent Labs      11/29/17   0805   PH  7.34*   PCO2  52*   PO2  450*   HCO3  28*   FIO2  100       Imaging:  I have personally reviewed the patients radiographs:  None today        Uriel Oliveros MD

## 2017-11-30 NOTE — PROGRESS NOTES
TRANSFER - IN REPORT:    Ver[facility] on Corinda Aschoff  being received from [facility] (unit) for change in patient condition(respiratory distress)      Report consisted of patients Situation, Background, Assessment and   Recommendations(SBAR). Information from the following report(s) SBAR, Kardex, MAR, Accordion and Recent Results was reviewed with the receiving nurse. Opportunity for questions and clarification was provided. Assessment completed upon patients arrival to unit and care assumed.

## 2017-11-30 NOTE — PROGRESS NOTES
.    PCU SHIFT NURSING NOTE      Bedside shift change report given to Beata Schafer RN (oncoming nurse) by Leonard Paredes (offgoing nurse). Report included the following information SBAR, Kardex, Intake/Output, MAR and Recent Results. Shift Summary:   0730- Patient resting in bed on 40% venti-mask. 4723- Patient placed on 28% per MD. 02 sats 98%. 1100- Patient without complaints. 1430- Dr. Curtis Rodriguez in to assess. Admission Date 11/26/2017   Admission Diagnosis Acute exacerbation of chronic obstructive pulmonary disease (COPD) ([facility]   Consults IP CONSULT TO CARDIOLOGY  IP CONSULT TO PULMONOLOGY        Consults   []PT   []OT   []Speech   []Case Management      [] Palliative      Cardiac Monitoring Order   [x]Yes   []No     IV drips   []Yes    Drip:                            Dose:  Drip:                            Dose:  Drip:                            Dose:   [x]No     GI Prophylaxis   [x]Yes   []No         DVT Prophylaxis   SCDs:             Gomez stockings:         [x] Medication   []Contraindicated   []None      Activity Level Activity Level: Up with Assistance     Activity Assistance: Partial (one person)   Purposeful Rounding every 1-2 hour? []Yes   Watts Score  Total Score: 1   Bed Alarm (If score 3 or >)   [x]Yes   [] Refused (See signed refusal form in chart)   Jose Score  Jose Score: 20   Jose Score (if score 14 or less)   []PMT consult   []Wound Care consult      []Specialty bed   [] Nutrition consult          Needs prior to discharge:   Home O2 required:    []Yes   [x]No    If yes, how much O2 required?     Other:    Last Bowel Movement: Last Bowel Movement Date: 11/29/17      Influenza Vaccine Received Flu Vaccine for Current Season (usually Sept-March): Yes        Pneumonia Vaccine           Diet Active Orders   Diet    DIET REGULAR      LDAs               Peripheral IV 11/29/17 Left Forearm (Active)   Site Assessment Clean, dry, & intact 11/30/2017  6:00 AM   Phlebitis Assessment 0 11/30/2017  6:00 AM Infiltration Assessment 0 11/30/2017  6:00 AM   Dressing Status Clean, dry, & intact 11/30/2017  6:00 AM   Dressing Type Transparent 11/30/2017  6:00 AM   Hub Color/Line Status Pink 11/30/2017  6:00 AM   Action Taken Catheter retaped 11/30/2017  6:00 AM   Alcohol Cap Used Yes 11/30/2017  6:00 AM                      Urinary Catheter      Intake & Output   Date 11/29/17 0700 - 11/30/17 0659 11/30/17 0700 - 12/01/17 0659   Shift 0700-1859 1900-0659 24 Hour Total 0700-1859 1900-0659 24 Hour Total   I  N  T  A  K  E   P. O. 60 500 560         P. O. 60 500 560       I.V.  (mL/kg/hr)  20  (0) 20  (0)         I.V.  20 20       Shift Total  (mL/kg) 60  (1) 520  (9.3) 580  (10.3)      O  U  T  P  U  T   Urine  (mL/kg/hr) 400  (0.6) 350  (0.5) 750  (0.6)         Urine Voided 400 350 750         Urine Occurrence(s)  2 x 2 x       Stool            Stool Occurrence(s)  4 x 4 x       Shift Total  (mL/kg) 400  (6.9) 350  (6.2) 750  (13.3)      NET -340 170 -170      Weight (kg) 58.1 56.2 56.2 56.2 56.2 56.2         Readmission Risk Assessment Tool Score Medium Risk            16       Total Score        3 Has Seen PCP in Last 6 Months (Yes=3, No=0)    2 . Living with Significant Other. Assisted Living. LTAC. SNF. or   Rehab    5 Pt.  Coverage (Medicare=5 , Medicaid, or Self-Pay=4)    6 Charlson Comorbidity Score (Age + Comorbid Conditions)        Criteria that do not apply:    Patient Length of Stay (>5 days = 3)    IP Visits Last 12 Months (1-3=4, 4=9, >4=11)       Expected Length of Stay 3d 2h   Actual Length of Stay 4

## 2017-11-30 NOTE — PROGRESS NOTES
JET AEROSOL PROTOCOL - REASSESS    Patient: Diane Blanca, 11/30/2017  12:15 PM     Breath Sounds:  Wheezing     Breathing Pattern:  Tachpneic    Respiratory Rate: 22    Peak Flow: Patient unable to do patient SOB      Heart Rate:  Pre:  99,  Post:  106    Repiratory Rate:  Pre:  22  Post:  22    Oxygen: 5L NC & 40% Venti mask      Oxygen changed:  no    SPO2:  With oxygen:  100%,  Without oxygen:  NA    Nebulizer Therapy:    Current:  PRNQ4 2.5mg Albuterol & PRN 2p Albuterol inhaler    Changed:  No    Respiratory Therapist: Wilson Clemente, RT

## 2017-11-30 NOTE — PROGRESS NOTES
Pharmacy Daily Dosing of Warfarin    Indication: AF, AVR, MVR    Goal INR: 2.5-3.5    PTA Warfarin Dose: 3 mg M/T/W/Sun, 2 mg Th/Sat (2.6 mg average)    Concurrent anticoagulants: None  Concurrent antiplatelet: None    Major Interacting Medications    Drugs that may increase INR: Azithromycin   Drugs that may decrease INR: None    Conditions that may increase/decrease INR (CHF, C. diff, cirrhosis, thyroid disorder, hypoalbuminemia): CHF    Labs:  Recent Labs      11/30/17   0329  11/29/17   0518  11/28/17   0450   INR  2.6*  4.0*  3.4*   HGB  12.6  12.2  12.4   PLT  201  207  215       Impression/Plan:   Warfarin 2 mg x 1 dose today  Daily INR  CBC w/o diff QOD     Pharmacy will follow daily and adjust the dose as appropriate. Thanks  Royer Palacios PHARMD      [email or web address]

## 2017-11-30 NOTE — PROGRESS NOTES
Hospitalist Progress Note    NAME: Cesilia Jackson   :  1955   MRN:  [id number]   LOS:   3      Assessment / Plan:  Acute exacerbation of chronic obstructive pulmonary disease  -worsening today patient had acute episode of wheezing, tachypnea and tachycardia  -O2 has has been stable, ABG stable, CXR unremarkable  -started bipap and consulted pulmonary  -continue IV steroids and nebs, azithromcyin  -patient will need home O2 on d/c discussed with CM    CAD, native coronary artery  Chronic atrial fibrillation  -aspirin and statin  -diltiazem IR 60 mg TID  -stress test postponed      Diastolic CHF, chronic (HCC) not in acute CHF at this time. EF 55-60%   -continue bumex      History of GI bleed   Happened last November and workup including Capsule endoscopy didn't show any obvious source.       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       Tobacco abuse  Nicotine patch PRN      Body mass index is 21.8 kg/(m^2).     Code status: Full  Prophylaxis: Coumadin  Recommended Disposition: Home w/Family     Subjective:     Chief Complaint: coughing  Became more tachypneic when walking back from restroom this morning  Put on non rebreather and given neb with some improvement however later in day recurred      Objective:     VITALS:   Last 24hrs VS reviewed since prior progress note.  Most recent are:  Patient Vitals for the past 24 hrs:   Temp Pulse Resp BP SpO2   17 1559 98.3 °F (36.8 °C) (!) 115 24 111/87 100 %   17 1527 - - - - 100 %   17 1157 - - - - 95 %   17 1145 - - - - 95 %   17 1141 98.3 °F (36.8 °C) (!) 125 24 121/78 95 %   17 1130 - (!) 118 - - -   17 1117 - (!) 129 (!) 38 - 97 %   17 1109 - (!) 127 (!) 36 - 96 %   17 1104 - (!) 126 - - 96 %   17 1049 - (!) 136 - - 99 %   11/29/17 1045 - - - - 99 %   11/29/17 1038 - - - - 99 %   11/29/17 1030 - (!) 111 24 123/85 100 %   11/29/17 1026 - - - - 100 %   11/29/17 0910 - - - - 98 %   11/29/17 0908 - - - - 100 %   11/29/17 0902 - - - - 99 %   11/29/17 0837 98.2 °F (36.8 °C) 85 20 147/90 98 %   11/29/17 0817 - (!) 116 24 (!) 134/101 100 %   11/29/17 0815 97.8 °F (36.6 °C) (!) 127 - - -   11/29/17 0755 - (!) 150 (!) 38 (!) 154/106 99 %   11/29/17 0748 - - - - (!) 88 %   11/29/17 0746 - - - - (!) 89 %   11/29/17 0745 - - (!) 40 - 90 %   11/29/17 0740 - - - - 94 %   11/29/17 0338 - - - - 98 %   11/29/17 0256 97.9 °F (36.6 °C) 83 20 146/73 98 %   11/28/17 2342 97.4 °F (36.3 °C) 77 20 147/55 98 %   11/28/17 2331 - - - - 98 %   11/28/17 2035 - - - - 98 %       Intake/Output Summary (Last 24 hours) at 11/29/17 2017  Last data filed at 11/29/17 1730   Gross per 24 hour   Intake               60 ml   Output              800 ml   Net             -740 ml        PHYSICAL EXAM:  General: Alert, cooperative, no acute distress    EENT:  EOMI. Anicteric sclerae. Mucous membranes moist  Resp:  Diffuse wheezing  CV:  Regular rhythm, no edema  GI:  Soft, non distended, non tender. +Bowel sounds  Neurologic:  Alert and oriented X 3, normal speech  Psych:   Good insight, not anxious nor agitated  Skin:  No rashes, no jaundice  ________________________________________________________________________  Care Plan discussed with:    Comments   Patient x    Family      RN x    Care Manager x    Consultant                        Multidiciplinary team rounds were held today with , nursing, pharmacist and clinical coordinator. Patient's plan of care was discussed; medications were reviewed and discharge planning was addressed.      ________________________________________________________________________  Total NON critical care TIME:  25   Minutes    >50% of visit spent in counseling and coordination of care ________________________________________________________________________    Procedures: see electronic medical records for all procedures/Xrays and details which were not copied into this note but were reviewed prior to creation of Plan. LABS:  I reviewed today's most current labs and imaging studies.   Pertinent labs include:  Recent Labs      11/29/17 0518 11/28/17 0450  11/27/17   0441   WBC  19.3*  22.1*  9.7   HGB  12.2  12.4  12.0   HCT  37.6  37.7  36.3   PLT  207  215  196     Recent Labs      11/29/17 0518 11/28/17 0450  11/27/17   0441   NA  139  139  139   K  4.4  3.4*  3.8   CL  100  101  103   CO2  33*  30  30   GLU  123*  155*  158*   BUN  19  18  17   CREA  0.89  1.02  0.85   CA  8.3*  8.0*  8.2*   INR  4.0*  3.4*  2.3*       Signed: Loenela Amaya MD

## 2017-11-30 NOTE — CARDIO/PULMONARY
Cardiopulmonary Rehab Nursing Entry:     Chart reviewed. Pt 62 yo admitting diagnosis: COPD exacerbation. Past cardiac history of diastolic HF, chronic a-fib, AVR/MVR, dyslipidemia. Current smoker.     Smoking cessation program link added to AVS.      Per cardiology entry: No evidence of volume overload, normal EFs on prior echo, repeat echo pending. Nuclear stress test postponed.     Will follow and address cardiac teaching needs as appropriate.

## 2017-12-01 ENCOUNTER — APPOINTMENT (OUTPATIENT)
Dept: NUCLEAR MEDICINE | Age: 62
DRG: 191 | End: 2017-12-01
Attending: NURSE PRACTITIONER
Payer: MEDICARE

## 2017-12-01 LAB
ANION GAP SERPL CALC-SCNC: 7 MMOL/L (ref 5–15)
ATTENDING PHYSICIAN, CST07: NORMAL
BASOPHILS # BLD: 0 K/UL (ref 0–0.1)
BASOPHILS NFR BLD: 0 % (ref 0–1)
BUN SERPL-MCNC: 20 MG/DL (ref 6–20)
BUN/CREAT SERPL: 22 (ref 12–20)
CALCIUM SERPL-MCNC: 8.6 MG/DL (ref 8.5–10.1)
CHLORIDE SERPL-SCNC: 99 MMOL/L (ref 97–108)
CO2 SERPL-SCNC: 32 MMOL/L (ref 21–32)
CREAT SERPL-MCNC: 0.91 MG/DL (ref 0.55–1.02)
DIAGNOSIS, 93000: NORMAL
DIFFERENTIAL METHOD BLD: ABNORMAL
DUKE TM SCORE RESULT, CST14: NORMAL
DUKE TREADMILL SCORE, CST13: 5456
ECG INTERP BEFORE EX, CST11: NORMAL
ECG INTERP DURING EX, CST12: NORMAL
EOSINOPHIL # BLD: 0 K/UL (ref 0–0.4)
EOSINOPHIL NFR BLD: 0 % (ref 0–7)
ERYTHROCYTE [DISTWIDTH] IN BLOOD BY AUTOMATED COUNT: 15.9 % (ref 11.5–14.5)
FUNCTIONAL CAPACITY, CST17: NORMAL
GLUCOSE SERPL-MCNC: 159 MG/DL (ref 65–100)
HCT VFR BLD AUTO: 38.3 % (ref 35–47)
HGB BLD-MCNC: 12.5 G/DL (ref 11.5–16)
INR PPP: 2.6 (ref 0.9–1.1)
KNOWN CARDIAC CONDITION, CST08: NORMAL
LYMPHOCYTES # BLD: 0.3 K/UL (ref 0.8–3.5)
LYMPHOCYTES NFR BLD: 2 % (ref 12–49)
MAX. DIASTOLIC BP, CST04: 73 MMHG
MAX. HEART RATE, CST05: 144 BPM
MAX. SYSTOLIC BP, CST03: 167 MMHG
MCH RBC QN AUTO: 29 PG (ref 26–34)
MCHC RBC AUTO-ENTMCNC: 32.6 G/DL (ref 30–36.5)
MCV RBC AUTO: 88.9 FL (ref 80–99)
MONOCYTES # BLD: 1 K/UL (ref 0–1)
MONOCYTES NFR BLD: 6 % (ref 5–13)
NEUTS SEG # BLD: 15.1 K/UL (ref 1.8–8)
NEUTS SEG NFR BLD: 92 % (ref 32–75)
OVERALL BP RESPONSE TO EXERCISE, CST16: NORMAL
OVERALL HR RESPONSE TO EXERCISE, CST15: NORMAL
PEAK EX METS, CST10: 1 METS
PLATELET # BLD AUTO: 211 K/UL (ref 150–400)
POTASSIUM SERPL-SCNC: 3.5 MMOL/L (ref 3.5–5.1)
PROTHROMBIN TIME: 27.3 SEC (ref 9–11.1)
PROTOCOL NAME, CST01: NORMAL
RBC # BLD AUTO: 4.31 M/UL (ref 3.8–5.2)
RBC MORPH BLD: ABNORMAL
SODIUM SERPL-SCNC: 138 MMOL/L (ref 136–145)
TEST INDICATION, CST09: NORMAL
TROPONIN I SERPL-MCNC: 0.05 NG/ML
WBC # BLD AUTO: 16.4 K/UL (ref 3.6–11)

## 2017-12-01 PROCEDURE — 36415 COLL VENOUS BLD VENIPUNCTURE: CPT | Performed by: INTERNAL MEDICINE

## 2017-12-01 PROCEDURE — 85610 PROTHROMBIN TIME: CPT | Performed by: INTERNAL MEDICINE

## 2017-12-01 PROCEDURE — 80048 BASIC METABOLIC PNL TOTAL CA: CPT | Performed by: INTERNAL MEDICINE

## 2017-12-01 PROCEDURE — 74011636637 HC RX REV CODE- 636/637: Performed by: INTERNAL MEDICINE

## 2017-12-01 PROCEDURE — A9500 TC99M SESTAMIBI: HCPCS

## 2017-12-01 PROCEDURE — 65660000000 HC RM CCU STEPDOWN

## 2017-12-01 PROCEDURE — 74011250636 HC RX REV CODE- 250/636

## 2017-12-01 PROCEDURE — 93017 CV STRESS TEST TRACING ONLY: CPT

## 2017-12-01 PROCEDURE — 77010033678 HC OXYGEN DAILY

## 2017-12-01 PROCEDURE — 85025 COMPLETE CBC W/AUTO DIFF WBC: CPT | Performed by: INTERNAL MEDICINE

## 2017-12-01 PROCEDURE — 94640 AIRWAY INHALATION TREATMENT: CPT

## 2017-12-01 PROCEDURE — 74011250637 HC RX REV CODE- 250/637: Performed by: INTERNAL MEDICINE

## 2017-12-01 PROCEDURE — 74011250636 HC RX REV CODE- 250/636: Performed by: INTERNAL MEDICINE

## 2017-12-01 PROCEDURE — 84484 ASSAY OF TROPONIN QUANT: CPT | Performed by: INTERNAL MEDICINE

## 2017-12-01 PROCEDURE — 74011000250 HC RX REV CODE- 250: Performed by: INTERNAL MEDICINE

## 2017-12-01 RX ORDER — ATROPINE SULFATE 0.1 MG/ML
.2-1 INJECTION INTRAVENOUS
Status: DISCONTINUED | OUTPATIENT
Start: 2017-12-01 | End: 2017-12-01 | Stop reason: ALTCHOICE

## 2017-12-01 RX ORDER — ESMOLOL HYDROCHLORIDE 10 MG/ML
INJECTION INTRAVENOUS
Status: DISCONTINUED
Start: 2017-12-01 | End: 2017-12-02 | Stop reason: HOSPADM

## 2017-12-01 RX ORDER — ESMOLOL HYDROCHLORIDE 10 MG/ML
10 INJECTION INTRAVENOUS
Status: DISCONTINUED | OUTPATIENT
Start: 2017-12-01 | End: 2017-12-01 | Stop reason: ALTCHOICE

## 2017-12-01 RX ORDER — PREDNISONE 20 MG/1
40 TABLET ORAL
Status: DISCONTINUED | OUTPATIENT
Start: 2017-12-02 | End: 2017-12-02 | Stop reason: HOSPADM

## 2017-12-01 RX ORDER — ATROPINE SULFATE 1 MG/ML
.2-1 INJECTION, SOLUTION INTRAVENOUS ONCE
Status: DISCONTINUED | OUTPATIENT
Start: 2017-12-01 | End: 2017-12-01

## 2017-12-01 RX ORDER — DOBUTAMINE HYDROCHLORIDE 200 MG/100ML
INJECTION INTRAVENOUS
Status: COMPLETED
Start: 2017-12-01 | End: 2017-12-01

## 2017-12-01 RX ORDER — PREDNISONE 20 MG/1
40 TABLET ORAL ONCE
Status: COMPLETED | OUTPATIENT
Start: 2017-12-01 | End: 2017-12-01

## 2017-12-01 RX ORDER — ATROPINE SULFATE 0.1 MG/ML
INJECTION INTRAVENOUS
Status: DISCONTINUED
Start: 2017-12-01 | End: 2017-12-02 | Stop reason: HOSPADM

## 2017-12-01 RX ORDER — DOBUTAMINE HYDROCHLORIDE 400 MG/100ML
10-40 INJECTION INTRAVENOUS
Status: DISCONTINUED | OUTPATIENT
Start: 2017-12-01 | End: 2017-12-01

## 2017-12-01 RX ORDER — DOBUTAMINE HYDROCHLORIDE 200 MG/100ML
10-40 INJECTION INTRAVENOUS
Status: DISCONTINUED | OUTPATIENT
Start: 2017-12-01 | End: 2017-12-01

## 2017-12-01 RX ADMIN — DOBUTAMINE HYDROCHLORIDE 10 MCG/KG/MIN: 200 INJECTION INTRAVENOUS at 13:14

## 2017-12-01 RX ADMIN — OXYCODONE HYDROCHLORIDE AND ACETAMINOPHEN 1 TABLET: 5; 325 TABLET ORAL at 15:07

## 2017-12-01 RX ADMIN — BUPROPION HYDROCHLORIDE 150 MG: 150 TABLET, FILM COATED, EXTENDED RELEASE ORAL at 17:37

## 2017-12-01 RX ADMIN — Medication 10 ML: at 06:27

## 2017-12-01 RX ADMIN — Medication 10 ML: at 15:00

## 2017-12-01 RX ADMIN — BUPROPION HYDROCHLORIDE 150 MG: 150 TABLET, FILM COATED, EXTENDED RELEASE ORAL at 08:36

## 2017-12-01 RX ADMIN — DOCUSATE SODIUM AND SENNOSIDES 2 TABLET: 8.6; 5 TABLET, FILM COATED ORAL at 08:36

## 2017-12-01 RX ADMIN — PREDNISONE 40 MG: 20 TABLET ORAL at 17:37

## 2017-12-01 RX ADMIN — ALBUTEROL SULFATE 2.5 MG: 2.5 SOLUTION RESPIRATORY (INHALATION) at 08:01

## 2017-12-01 RX ADMIN — ALBUTEROL SULFATE 2.5 MG: 2.5 SOLUTION RESPIRATORY (INHALATION) at 23:29

## 2017-12-01 RX ADMIN — DOBUTAMINE IN DEXTROSE 10 MCG/KG/MIN: 200 INJECTION, SOLUTION INTRAVENOUS at 13:14

## 2017-12-01 RX ADMIN — POTASSIUM CHLORIDE 10 MEQ: 750 TABLET, FILM COATED, EXTENDED RELEASE ORAL at 17:37

## 2017-12-01 RX ADMIN — ALBUTEROL SULFATE 2.5 MG: 2.5 SOLUTION RESPIRATORY (INHALATION) at 03:56

## 2017-12-01 RX ADMIN — DILTIAZEM HYDROCHLORIDE 60 MG: 30 TABLET, FILM COATED ORAL at 08:36

## 2017-12-01 RX ADMIN — ATORVASTATIN CALCIUM 20 MG: 20 TABLET, FILM COATED ORAL at 20:52

## 2017-12-01 RX ADMIN — FLUTICASONE FUROATE AND VILANTEROL TRIFENATATE 1 PUFF: 200; 25 POWDER RESPIRATORY (INHALATION) at 08:41

## 2017-12-01 RX ADMIN — BUMETANIDE 1 MG: 1 TABLET ORAL at 14:58

## 2017-12-01 RX ADMIN — DOCUSATE SODIUM 100 MG: 100 CAPSULE, LIQUID FILLED ORAL at 21:19

## 2017-12-01 RX ADMIN — Medication 10 ML: at 20:52

## 2017-12-01 RX ADMIN — WARFARIN SODIUM 3 MG: 2 TABLET ORAL at 17:37

## 2017-12-01 RX ADMIN — ALBUTEROL SULFATE 2.5 MG: 2.5 SOLUTION RESPIRATORY (INHALATION) at 11:20

## 2017-12-01 RX ADMIN — ALBUTEROL SULFATE 2.5 MG: 2.5 SOLUTION RESPIRATORY (INHALATION) at 19:50

## 2017-12-01 RX ADMIN — FAMOTIDINE 40 MG: 20 TABLET, FILM COATED ORAL at 17:37

## 2017-12-01 RX ADMIN — ONDANSETRON HYDROCHLORIDE 2 MG: 2 INJECTION, SOLUTION INTRAMUSCULAR; INTRAVENOUS at 20:51

## 2017-12-01 RX ADMIN — METHYLPREDNISOLONE SODIUM SUCCINATE 40 MG: 40 INJECTION, POWDER, FOR SOLUTION INTRAMUSCULAR; INTRAVENOUS at 06:27

## 2017-12-01 RX ADMIN — ALBUTEROL SULFATE 2.5 MG: 2.5 SOLUTION RESPIRATORY (INHALATION) at 15:17

## 2017-12-01 RX ADMIN — UMECLIDINIUM 1 PUFF: 62.5 AEROSOL, POWDER ORAL at 08:41

## 2017-12-01 RX ADMIN — DILTIAZEM HYDROCHLORIDE 60 MG: 30 TABLET, FILM COATED ORAL at 17:37

## 2017-12-01 RX ADMIN — FAMOTIDINE 40 MG: 20 TABLET, FILM COATED ORAL at 08:36

## 2017-12-01 RX ADMIN — AZITHROMYCIN 250 MG: 250 TABLET, FILM COATED ORAL at 08:36

## 2017-12-01 NOTE — PROGRESS NOTES
CM notified that is currently been weaned down to 0.5 LPM of Oxygen. MD during MD meeting indicated that Pt may be discharged 12/2/17. CM asked CM specialist to do [address] with Pt to determine if we need to order oxygen for this Pt before the weekend. Pt has [employer] and it would be good if we could start the process today if she needs oxygen at home. 4:03 PM   RN completed Oxygen Challenge and confirmed that Pt does not need oxygen at home. CM will continue to monitor discharge plan.       Yair Valero, [address]  Ext [phone]

## 2017-12-01 NOTE — PROGRESS NOTES
Hospitalist Progress Note    NAME: Ksenia Finch   :  1955   MRN:  [id number]   LOS:   5      Assessment / Plan:  Acute exacerbation of chronic obstructive pulmonary disease  -improved now off of bipap, patient does not have high O2 requirement but likes ventimask  -appeciate pulm eval  -continue IV steroids and nebs, azithromcyin  -patient will need home O2 on d/c discussed with CM    CAD, native coronary artery  Chronic atrial fibrillation  -aspirin and statin  -diltiazem IR 60 mg TID  -stress test postponed      Diastolic CHF, chronic (HCC) not in acute CHF at this time. EF 55-60%   -continue bumex      History of GI bleed   Happened last November and workup including Capsule endoscopy didn't show any obvious source.       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       Tobacco abuse  Nicotine patch PRN      Body mass index is 21.8 kg/(m^2).     Code status: Full  Prophylaxis: Coumadin  Recommended Disposition: Home w/Family     Subjective:     Chief Complaint: coughing  Feeling better today, likes ventimask    Objective:     VITALS:   Last 24hrs VS reviewed since prior progress note.  Most recent are:  Patient Vitals for the past 24 hrs:   Temp Pulse Resp BP SpO2   17 1332 - (!) 108 - - -   17 1331 - (!) 113 - - -   17 1328 - (!) 121 - - 94 %   17 1327 - (!) 128 - 152/89 93 %   17 1326 - (!) 144 - 164/69 93 %   17 1325 - (!) 144 - 148/72 93 %   17 1323 - (!) 130 - 148/69 94 %   17 1320 - (!) 113 - 167/73 95 %   17 1317 - (!) 104 - 166/71 93 %   17 1314 - 100 - 155/75 94 %   17 1306 - - - - 95 %   17 1304 - (!) 104 - (!) 165/100 93 %   17 1222 98.1 °F (36.7 °C) 92 18 141/88 91 %   17 1120 - - - - 97 %   17 0801 - - - - 98 %   12/01/17 0741 98 °F (36.7 °C) 97 18 148/72 99 %   12/01/17 0400 98.1 °F (36.7 °C) 90 16 126/68 98 %   12/01/17 0000 97.7 °F (36.5 °C) 88 16 126/67 100 %   11/30/17 2000 97.8 °F (36.6 °C) 94 18 103/54 99 %   11/30/17 1507 98 °F (36.7 °C) (!) 109 22 152/59 97 %       Intake/Output Summary (Last 24 hours) at 12/01/17 1436  Last data filed at 12/01/17 0600   Gross per 24 hour   Intake              350 ml   Output              425 ml   Net              -75 ml        PHYSICAL EXAM:  General: Alert, cooperative, no acute distress    EENT:  EOMI. Anicteric sclerae. Mucous membranes moist  Resp:  Persistent wheezing  CV:  Regular rhythm, no edema  GI:  Soft, non distended, non tender. +Bowel sounds  Neurologic:  Alert and oriented X 3, normal speech  Psych:   Good insight, not anxious nor agitated  Skin:  No rashes, no jaundice  ________________________________________________________________________  Care Plan discussed with:    Comments   Patient x    Family      RN x    Care Manager x    Consultant                        Multidiciplinary team rounds were held today with , nursing, pharmacist and clinical coordinator. Patient's plan of care was discussed; medications were reviewed and discharge planning was addressed. ________________________________________________________________________  Total NON critical care TIME:  20   Minutes    >50% of visit spent in counseling and coordination of care       ________________________________________________________________________    Procedures: see electronic medical records for all procedures/Xrays and details which were not copied into this note but were reviewed prior to creation of Plan. LABS:  I reviewed today's most current labs and imaging studies.   Pertinent labs include:  Recent Labs      12/01/17   0328  11/30/17   0329  11/29/17   0518   WBC  16.4*  12.6*  19.3*   HGB  12.5  12.6  12.2   HCT  38.3  38.2  37.6   PLT  211  201  207 Recent Labs      12/01/17   0328  11/30/17 0329  11/29/17   0518   NA  138  136  139   K  3.5  3.8  4.4   CL  99  97  100   CO2  32  32  33*   GLU  159*  177*  123*   BUN  20  22*  19   CREA  0.91  0.97  0.89   CA  8.6  8.5  8.3*   INR  2.6*  2.6*  4.0*       Signed: James Cesar MD

## 2017-12-01 NOTE — PROGRESS NOTES
[facility]  Pulmonary, Critical Care, and Sleep Medicine    Name: Oleg Krishnan MRN: [id number]   : 1955 Hospital: [facility]   Date: 2017        IMPRESSION:   · Acute/chronic hypoxic/hypercapnic respiratory failure  · COPD - FEV1 0.77 L (35% predicted) in  - with acute exacerbation - does not follow with pulmonary   · CAD with chest pain  · Atrial fibrillation  · H/O AV replacement, MV replacement  · Diastolic heart failure without exacerbation  · H/O GI bleed  · Tobacco use      PLAN:   · O2 - wean  · Bronchodilators  · Convert steroids to PO and taper over 2 weeks  · Empiric antibiotics  · Ischemic evaluation per cardiology  · On warfarin  · Will check back 17 or sooner PRN     Subjective/Interval History:   I have reviewed the flowsheet and previous days notes. Feeling a lot better. Much less dyspnea. Review of Systems   Constitutional: Positive for fatigue. HENT: Negative. Eyes: Negative. Respiratory: Positive for shortness of breath and wheezing. Cardiovascular: Negative. Gastrointestinal: Negative. Objective:   Vital Signs:    Visit Vitals    /72 (BP 1 Location: Right arm, BP Patient Position: At rest)    Pulse 97    Temp 98 °F (36.7 °C)    Resp 18    Ht 5' 4\" (1.626 m)    Wt 55.7 kg (122 lb 12.7 oz)    SpO2 98%    Breastfeeding No    BMI 21.08 kg/m2       O2 Device: Nasal cannula   O2 Flow Rate (L/min): 1 l/min   Temp (24hrs), Av.1 °F (36.7 °C), Min:97.7 °F (36.5 °C), Max:98.7 °F (37.1 °C)       Intake/Output:   Last shift:         Last 3 shifts: 1 -  0700  In: 5146 [P.O.:1330; I.V.:20]  Out: 775 [Urine:775]    Intake/Output Summary (Last 24 hours) at 17 0829  Last data filed at 17 0600   Gross per 24 hour   Intake              830 ml   Output              425 ml   Net              405 ml      Physical Exam   Constitutional: She is oriented to person, place, and time.  She is cooperative. No distress. HENT:   Head: Normocephalic and atraumatic. Mouth/Throat: No oropharyngeal exudate. Eyes: No scleral icterus. Cardiovascular: Normal rate and regular rhythm. No murmur heard. Pulmonary/Chest: No respiratory distress. She has wheezes. She has no rales. Abdominal: Soft. Bowel sounds are normal. She exhibits no distension. There is no tenderness. Musculoskeletal: She exhibits no edema. Neurological: She is alert and oriented to person, place, and time. Skin: Skin is warm and dry.      Data:   Labs:  Recent Labs      12/01/17 0328 11/30/17 0329 11/29/17   0518   WBC  16.4*  12.6*  19.3*   HGB  12.5  12.6  12.2   HCT  38.3  38.2  37.6   PLT  211  201  207     Recent Labs      12/01/17 0328 11/30/17 0329 11/29/17   0518   NA  138  136  139   K  3.5  3.8  4.4   CL  99  97  100   CO2  32  32  33*   GLU  159*  177*  123*   BUN  20  22*  19   CREA  0.91  0.97  0.89   CA  8.6  8.5  8.3*   INR  2.6*  2.6*  4.0*     Recent Labs      11/29/17   0805   PH  7.34*   PCO2  52*   PO2  450*   HCO3  28*   FIO2  100       Imaging:  I have personally reviewed the patients radiographs:  None today        Brennen Alcazar MD

## 2017-12-01 NOTE — PROGRESS NOTES
Hospitalist Progress Note    NAME: Jesus Jacobsen   :  1955   MRN:  [id number]   LOS:   5      Assessment / Plan:  Acute exacerbation of chronic obstructive pulmonary disease  -doing well has not needed bipap, O2 down to 1 L while resting  -appeciate pulm eval  -will switch to PO steroids and plan for long taper, continue nebs  -patient will need home O2 on d/c discussed with CM    CAD, native coronary artery  Chronic atrial fibrillation  -aspirin and statin  -diltiazem IR 60 mg TID  -stress test today      Diastolic CHF, chronic (HCC) not in acute CHF at this time. EF 55-60%   -continue bumex      History of GI bleed   Happened last November and workup including Capsule endoscopy didn't show any obvious source.       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       Tobacco abuse  Nicotine patch PRN      Body mass index is 21.8 kg/(m^2).     Code status: Full  Prophylaxis: Coumadin  Recommended Disposition: Home w/Family     Subjective:     Chief Complaint: coughing  Feeling much better, getting wheeled to stress test, O2 req has been decreased    Objective:     VITALS:   Last 24hrs VS reviewed since prior progress note.  Most recent are:  Patient Vitals for the past 24 hrs:   Temp Pulse Resp BP SpO2   17 1332 - (!) 108 - - -   17 1331 - (!) 113 - - -   17 1328 - (!) 121 - - 94 %   17 1327 - (!) 128 - 152/89 93 %   17 1326 - (!) 144 - 164/69 93 %   17 1325 - (!) 144 - 148/72 93 %   17 1323 - (!) 130 - 148/69 94 %   17 1320 - (!) 113 - 167/73 95 %   17 1317 - (!) 104 - 166/71 93 %   17 1314 - 100 - 155/75 94 %   17 1306 - - - - 95 %   17 1304 - (!) 104 - (!) 165/100 93 %   17 1222 98.1 °F (36.7 °C) 92 18 141/88 91 %   17 1120 - - - - 97 %   12/01/17 0801 - - - - 98 %   12/01/17 0741 98 °F (36.7 °C) 97 18 148/72 99 %   12/01/17 0400 98.1 °F (36.7 °C) 90 16 126/68 98 %   12/01/17 0000 97.7 °F (36.5 °C) 88 16 126/67 100 %   11/30/17 2000 97.8 °F (36.6 °C) 94 18 103/54 99 %   11/30/17 1507 98 °F (36.7 °C) (!) 109 22 152/59 97 %       Intake/Output Summary (Last 24 hours) at 12/01/17 1437  Last data filed at 12/01/17 0600   Gross per 24 hour   Intake              350 ml   Output              425 ml   Net              -75 ml        PHYSICAL EXAM:  General: Alert, cooperative, no acute distress    EENT:  EOMI. Anicteric sclerae. Mucous membranes moist  Resp:  Improved wheezing  CV:  Regular rhythm, no edema  GI:  Soft, non distended, non tender. +Bowel sounds  Neurologic:  Alert and oriented X 3, normal speech  Psych:   Good insight, not anxious nor agitated  Skin:  No rashes, no jaundice  ________________________________________________________________________  Care Plan discussed with:    Comments   Patient x    Family      RN x    Care Manager     Consultant                        Multidiciplinary team rounds were held today with , nursing, pharmacist and clinical coordinator. Patient's plan of care was discussed; medications were reviewed and discharge planning was addressed. ________________________________________________________________________  Total NON critical care TIME:  20   Minutes    >50% of visit spent in counseling and coordination of care       ________________________________________________________________________    Procedures: see electronic medical records for all procedures/Xrays and details which were not copied into this note but were reviewed prior to creation of Plan. LABS:  I reviewed today's most current labs and imaging studies.   Pertinent labs include:  Recent Labs      12/01/17   0328  11/30/17   0329  11/29/17   0518   WBC  16.4*  12.6*  19.3*   HGB  12.5  12.6  12.2   HCT  38.3  38.2  37.6 PLT  211  201  207     Recent Labs      12/01/17   0328  11/30/17 0329  11/29/17   0518   NA  138  136  139   K  3.5  3.8  4.4   CL  99  97  100   CO2  32  32  33*   GLU  159*  177*  123*   BUN  20  22*  19   CREA  0.91  0.97  0.89   CA  8.6  8.5  8.3*   INR  2.6*  2.6*  4.0*       Signed: Blanca Montemayor MD

## 2017-12-01 NOTE — PROGRESS NOTES
Pharmacy Daily Dosing of Warfarin    Indication: AF, AVR, MVR    Goal INR: 2.5-3.5    PTA Warfarin Dose: 3 mg M/T/W/Sun, 2 mg Th/Sat (2.6 mg average)    Concurrent anticoagulants: None  Concurrent antiplatelet: None    Major Interacting Medications    Drugs that may increase INR: Azithromycin   Drugs that may decrease INR: None    Conditions that may increase/decrease INR (CHF, C. diff, cirrhosis, thyroid disorder, hypoalbuminemia): CHF    Labs:  Recent Labs      12/01/17   0328  11/30/17   0329  11/29/17   0518   INR  2.6*  2.6*  4.0*   HGB  12.5  12.6  12.2   PLT  211  201  207       Impression/Plan:   Warfarin 3 mg x 1 dose today  Daily INR  CBC w/o diff QOD     Pharmacy will follow daily and adjust the dose as appropriate. Thanks  Malinda Veloz, PHARMD    [email or web address]. pdf

## 2017-12-01 NOTE — PROGRESS NOTES
Pt had oxygen trial with the following results:    O2 sats on RA at rest - 95%  O2 sats on 0.5 liters of O2 at rest - 96%  O2 sats on RA with activity - 92%  O2 sats on 0.5 lters of O2 with activity - 95%

## 2017-12-01 NOTE — PROGRESS NOTES
[address] [place] [address], [address] [facility]  [phone]      Cardiology Progress Note      11/30/2017 7:10 PM    Admit Date: 11/26/2017    Admit Diagnosis:   Acute exacerbation of chronic obstructive pulmonary disease*    Subjective:     Farhana Amaury     Visit Vitals    /59 (BP 1 Location: Right arm, BP Patient Position: At rest)    Pulse (!) 109    Temp 98 °F (36.7 °C)    Resp 22    Ht 5' 4\" (1.626 m)    Wt 123 lb 14.4 oz (56.2 kg)    SpO2 97%    Breastfeeding No    BMI 21.27 kg/m2       Current Facility-Administered Medications   Medication Dose Route Frequency    methylPREDNISolone (PF) (SOLU-MEDROL) injection 40 mg  40 mg IntraVENous Q8H    LORazepam (ATIVAN) injection 1 mg  1 mg IntraVENous Q4H PRN    azithromycin (ZITHROMAX) tablet 250 mg  250 mg Oral DAILY    senna-docusate (PERICOLACE) 8.6-50 mg per tablet 2 Tab  2 Tab Oral DAILY    albuterol (PROVENTIL VENTOLIN) nebulizer solution 2.5 mg  2.5 mg Nebulization Q4H RT    bumetanide (BUMEX) tablet 1 mg  1 mg Oral Q MON, WED & FRI    buPROPion SR (WELLBUTRIN SR) tablet 150 mg  150 mg Oral BID    dilTIAZem (CARDIZEM) IR tablet 60 mg  60 mg Oral TIDAC    docusate sodium (COLACE) capsule 100 mg  100 mg Oral BID PRN    famotidine (PEPCID) tablet 40 mg  40 mg Oral BID    atorvastatin (LIPITOR) tablet 20 mg  20 mg Oral QHS    nystatin (MYCOSTATIN) 100,000 unit/mL oral suspension 500,000 Units  500,000 Units Oral QID    oxyCODONE-acetaminophen (PERCOCET) 5-325 mg per tablet 1 Tab  1 Tab Oral Q6H PRN    potassium chloride SR (KLOR-CON 10) tablet 10 mEq  10 mEq Oral Q MON, WED & FRI    fluticasone-vilanterol (BREO ELLIPTA) 200mcg-25mcg/puff  1 Puff Inhalation DAILY    sodium chloride (NS) flush 5-10 mL  5-10 mL IntraVENous Q8H    sodium chloride (NS) flush 5-10 mL  5-10 mL IntraVENous PRN    acetaminophen (TYLENOL) tablet 500 mg  500 mg Oral Q6H PRN    HYDROmorphone (PF) (DILAUDID) injection 0.5 mg  0.5 mg IntraVENous Q6H PRN    naloxone (NARCAN) injection 0.4 mg  0.4 mg IntraVENous PRN    ondansetron (ZOFRAN) injection 2 mg  2 mg IntraVENous Q6H PRN    nicotine (NICODERM CQ) 14 mg/24 hr patch 1 Patch  1 Patch TransDERmal DAILY PRN    umeclidinium (INCRUSE ELLIPTA) 62.5 mcg/actuation  1 Puff Inhalation DAILY    WARFARIN INFORMATION NOTE (COUMADIN)   Other QPM       Objective:      Physical Exam:  General Appearance:    Chest:   Clear  Cardiovascular:  Regular rate and rhythm, no murmur.   Abdomen:   Soft, non-tender, bowel sounds are active.   Extremities:   Skin:  Warm and dry.     Data Review:   Recent Labs      11/30/17   0329  11/29/17   0518  11/28/17   0450   WBC  12.6*  19.3*  22.1*   HGB  12.6  12.2  12.4   HCT  38.2  37.6  37.7   PLT  201  207  215     Recent Labs      11/30/17   0329  11/29/17   0518  11/28/17   0450   NA  136  139  139   K  3.8  4.4  3.4*   CL  97  100  101   CO2  32  33*  30   GLU  177*  123*  155*   BUN  22*  19  18   CREA  0.97  0.89  1.02   CA  8.5  8.3*  8.0*   INR  2.6*  4.0*  3.4*       No results for input(s): TROIQ, CPK, CKMB in the last 72 hours.       Intake/Output Summary (Last 24 hours) at 11/30/17 1910  Last data filed at 11/30/17 1233   Gross per 24 hour   Intake             1000 ml   Output              350 ml   Net              650 ml        Telemetry:   EKG:  Cxray:    Assessment:     Principal Problem:    Acute exacerbation of chronic obstructive pulmonary disease (COPD) ([facility] (11/26/2017)    Active Problems:    S/P AVR (aortic valve replacement) (1/8/2015)      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 (1/8/2015)      Overview: Tissue MVR 1989 following failed balloon valvuloplasty for MS      Redo MVR 2004 due to severe MR, AVR due to AR ([facility]      CAD (coronary artery disease), native coronary artery (1/8/2015)      S/P MVR (mitral valve replacement) (1/8/2015)      COPD (chronic obstructive pulmonary disease) ([facility] (11/13/2015)      Tobacco abuse (11/14/2015) Diastolic CHF, chronic (HCC) (12/17/2015)      Chronic atrial fibrillation ([address] (12/17/2015)      History of GI bleed (11/25/2016)        Plan:       No further CP. Has been tenuous with her resp status. Ischemic eval when able.   Will recheck her troponin in AM

## 2017-12-01 NOTE — PROGRESS NOTES
Pt stressed with Dobutamine. Pt may eat and drink per physician. Last scan to be done after 2:30pm today.

## 2017-12-01 NOTE — PROGRESS NOTES
Bedside and Verbal shift change report given to Zully Shepherd RN (oncoming nurse) by Veronica Wheat RN (offgoing nurse). Report included the following information SBAR, Kardex, MAR and Recent Results.

## 2017-12-01 NOTE — PROGRESS NOTES
[address] [address]  [phone]      Cardiology Progress Note      12/1/2017 3:32 PM    Admit Date: 11/26/2017    Admit Diagnosis:   Acute exacerbation of chronic obstructive pulmonary disease (COPD) ([facility] [address]    Subjective:     Deidra Lewis successfully completed dobutamine nuclear stress test today which was negative for ischemia. Patient is feeling much better, breathing easier.      Visit Vitals    /89    Pulse (!) 108    Temp 98.1 °F (36.7 °C)    Resp 18    Ht 5' 4\" (1.626 m)    Wt 55.7 kg (122 lb 12.7 oz)    SpO2 97%    Breastfeeding No    BMI 21.08 kg/m2       Current Facility-Administered Medications   Medication Dose Route Frequency    sodium chloride (OCEAN) 0.65 % nasal spray 2 Spray  2 Spray Both Nostrils Q2H PRN    warfarin (COUMADIN) tablet 3 mg  3 mg Oral ONCE    [START ON 12/2/2017] predniSONE (DELTASONE) tablet 40 mg  40 mg Oral DAILY WITH BREAKFAST    predniSONE (DELTASONE) tablet 40 mg  40 mg Oral ONCE    LORazepam (ATIVAN) injection 1 mg  1 mg IntraVENous Q4H PRN    azithromycin (ZITHROMAX) tablet 250 mg  250 mg Oral DAILY    senna-docusate (PERICOLACE) 8.6-50 mg per tablet 2 Tab  2 Tab Oral DAILY    albuterol (PROVENTIL VENTOLIN) nebulizer solution 2.5 mg  2.5 mg Nebulization Q4H RT    bumetanide (BUMEX) tablet 1 mg  1 mg Oral Q MON, WED & FRI    buPROPion SR (WELLBUTRIN SR) tablet 150 mg  150 mg Oral BID    dilTIAZem (CARDIZEM) IR tablet 60 mg  60 mg Oral TIDAC    docusate sodium (COLACE) capsule 100 mg  100 mg Oral BID PRN    famotidine (PEPCID) tablet 40 mg  40 mg Oral BID    atorvastatin (LIPITOR) tablet 20 mg  20 mg Oral QHS    nystatin (MYCOSTATIN) 100,000 unit/mL oral suspension 500,000 Units  500,000 Units Oral QID    oxyCODONE-acetaminophen (PERCOCET) 5-325 mg per tablet 1 Tab  1 Tab Oral Q6H PRN    potassium chloride SR (KLOR-CON 10) tablet 10 mEq  10 mEq Oral Q MON, WED & FRI    fluticasone-vilanterol (BREO ELLIPTA) 200mcg-25mcg/puff  1 Puff Inhalation DAILY    sodium chloride (NS) flush 5-10 mL  5-10 mL IntraVENous Q8H    sodium chloride (NS) flush 5-10 mL  5-10 mL IntraVENous PRN    acetaminophen (TYLENOL) tablet 500 mg  500 mg Oral Q6H PRN    HYDROmorphone (PF) (DILAUDID) injection 0.5 mg  0.5 mg IntraVENous Q6H PRN    naloxone (NARCAN) injection 0.4 mg  0.4 mg IntraVENous PRN    ondansetron (ZOFRAN) injection 2 mg  2 mg IntraVENous Q6H PRN    nicotine (NICODERM CQ) 14 mg/24 hr patch 1 Patch  1 Patch TransDERmal DAILY PRN    umeclidinium (INCRUSE ELLIPTA) 62.5 mcg/actuation  1 Puff Inhalation DAILY    WARFARIN INFORMATION NOTE (COUMADIN)   Other QPM       Objective:      Physical Exam:  General Appearance:  WNWD  female in no acute distress  Chest:   much less wheezing  Cardiovascular:  tachy, irr no murmur.   Abdomen:   Soft, non-tender, bowel sounds are active.   Extremities: no peripheral edema  Skin:  Warm and dry.     Data Review:   Recent Labs      12/01/17 0328 11/30/17   0329  11/29/17   0518   WBC  16.4*  12.6*  19.3*   HGB  12.5  12.6  12.2   HCT  38.3  38.2  37.6   PLT  211  201  207     Recent Labs      12/01/17 0328 11/30/17   0329  11/29/17   0518   NA  138  136  139   K  3.5  3.8  4.4   CL  99  97  100   CO2  32  32  33*   GLU  159*  177*  123*   BUN  20  22*  19   CREA  0.91  0.97  0.89   CA  8.6  8.5  8.3*   INR  2.6*  2.6*  4.0*       Recent Labs      12/01/17   0328   TROIQ  0.05*         Intake/Output Summary (Last 24 hours) at 12/01/17 1532  Last data filed at 12/01/17 0600   Gross per 24 hour   Intake              350 ml   Output              425 ml   Net              -75 ml        Telemetry: afib with RVR      Assessment:     Principal Problem:    Acute exacerbation of chronic obstructive pulmonary disease (COPD) ([facility] [address] (11/26/2017)    Active Problems:    S/P AVR (aortic valve replacement) (1/8/2015)      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 (1/8/2015) Overview: Tissue MVR  following failed balloon valvuloplasty for MS      Redo MVR  due to severe MR, AVR due to AR ([facility])      CAD (coronary artery disease), native coronary artery (2015)      S/P MVR (mitral valve replacement) (2015)      COPD (chronic obstructive pulmonary disease) ([facility] (2015)      Tobacco abuse ()      Diastolic CHF, chronic (HCC) (2015)      Chronic atrial fibrillation ([facility] (2015)      History of GI bleed (2016)        Plan:     Hx of Chronic diastolic HF:  No evidence of volume overload, and echos show normal EFs with no diastolic HF  Limited echo shows mild DHF   With significant COPD hx and continuing to smoke, likely symptoms r/t COPD exacerbation  Continuing home dose of diuretic, diltiazem  Monitor I/Os, daily weights, labs.  Wt down 4#s     Chest Pain:  Negative  nuclear stress  No BB due to COPD  Continue ASA 81mg, statin     Chronic Afib:  Rate remains elevated, continue on Dilt and warfarin (for AV/MVR and afib)  No BB      Gino Pea ACNP  Cardiology

## 2017-12-01 NOTE — PROGRESS NOTES
Nutrition Screen  LOS: 5    Assessment:  Patient screened per protocol; at moderate to high nutrition risk. RD to complete full assessment in 3-4 days.      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 1/8/2015    Tissue MVR 1989 following failed balloon valvuloplasty for MS Redo MVR 2004 due to severe MR, AVR due to AR ([facility])     S/P AVR (aortic valve replacement) 1/8/2015    S/P MVR (mitral valve replacement) 1/8/2015       Wt Readings from Last 30 Encounters:   12/01/17 55.7 kg (122 lb 12.7 oz)   11/16/17 57.6 kg (127 lb)   09/26/17 58.1 kg (128 lb)   08/22/17 58.1 kg (128 lb)   07/12/17 56.7 kg (125 lb)   06/22/17 55.8 kg (123 lb)   06/16/17 55.8 kg (123 lb)   06/08/17 55.8 kg (123 lb)   03/29/17 56.7 kg (125 lb)   02/21/17 57.3 kg (126 lb 6.4 oz)   01/06/17 59.2 kg (130 lb 9.6 oz)   12/05/16 54.4 kg (120 lb)   11/29/16 59.9 kg (132 lb)   11/22/16 58.7 kg (129 lb 6.4 oz)   11/05/16 59 kg (130 lb)   10/21/16 61.4 kg (135 lb 6.4 oz)   09/22/16 60.1 kg (132 lb 6.4 oz)   08/17/16 59 kg (130 lb)   06/08/16 58.2 kg (128 lb 6.4 oz)   05/17/16 56.7 kg (125 lb)   04/13/16 55.8 kg (123 lb)   04/07/16 57 kg (125 lb 9.6 oz)   03/10/16 58.2 kg (128 lb 6.4 oz)   02/18/16 59.6 kg (131 lb 6.4 oz)   01/29/16 56.6 kg (124 lb 12.8 oz)   01/12/16 62.1 kg (137 lb)   01/08/16 63 kg (139 lb)   12/29/15 65 kg (143 lb 3.2 oz)   12/17/15 63.2 kg (139 lb 6.4 oz)   12/14/15 62.1 kg (137 lb)       Anthropometrics:   Height: 5' 4\" (162.6 cm) Weight: 55.7 kg (122 lb 12.7 oz)     BMI: Body mass index is 21.08 kg/(m^2).     This BMI is indicative of:   []Underweight    [x]Normal    []Overweight    [] Obesity   [] Extreme Obesity (BMI>40)       Teo Belle, [address] [place] [address]  Pager: [phone]

## 2017-12-01 NOTE — PROGRESS NOTES
Spiritual Care Assessment/Progress Notes    Kathrine Saleh [id number]  [id number]    1955  63 y.o.  female    Patient Telephone Number: [phone] (home)   Yarsanism Affiliation: [facility]   Language: English   Extended Emergency Contact Information  Primary Emergency Contact: [address] [facility] Phone: [phone]  Relation: Spouse  Secondary Emergency Contact: [address] [facility] Phone: [phone]  Relation: Daughter   Patient Active Problem List    Diagnosis Date Noted    Acute exacerbation of chronic obstructive pulmonary disease (COPD) ([facility] [address] 11/26/2017    H/O total hysterectomy 09/26/2017    History of GI bleed 11/25/2016     Class: Present on Admission    Diastolic CHF, chronic ([address] 12/17/2015    Chronic atrial fibrillation ([facility] [address] 12/17/2015    Tobacco abuse 11/14/2015    COPD (chronic obstructive pulmonary disease) ([facility] [address] 11/13/2015    S/P AVR (aortic valve replacement) 01/08/2015    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 01/08/2015    CAD (coronary artery disease), native coronary artery 01/08/2015    S/P MVR (mitral valve replacement) 01/08/2015        Date: 12/1/2017       Level of Yarsanism/Spiritual Activity:  []         Involved in henok tradition/spiritual practice    []         Not involved in henok tradition/spiritual practice  [x]         Spiritually oriented    []         Claims no spiritual orientation    []         seeking spiritual identity  []         Feels alienated from Sabianist practice/tradition  []         Feels angry about Sabianist practice/tradition  [x]         Spirituality/Sabianist tradition is a resource for coping at this time.   []         Not able to assess due to medical condition    Services Provided Today:  []         crisis intervention    []         reading Scriptures  [x]         spiritual assessment    [x]         prayer  [x]         empathic listening/emotional support  []         rites and rituals (cite in comments)  []         life review     []         Scientology support  []         theological development    []         advocacy  []         ethical dialog     []         blessing  []         bereavement support    []         support to family  []         anticipatory grief support   []         help with AMD  []         spiritual guidance    []         meditation      Spiritual Care Needs  []         Emotional Support  []         Spiritual/Rastafari Care  []         Loss/Adjustment  []         Advocacy/Referral                /Ethics  [x]         No needs expressed at               this time  []         Other: (note in               comments)  Spiritual Care Plan  []         Follow up visits with               pt/family  []         Provide materials  []         Schedule sacraments  []         Contact Community               Clergy  [x]         Follow up as needed  []         Other: (note in               comments)     Comments:  Brief supportive visit on PCU for spiritual assessment. Patient's  was present. Provided listening presence as Mrs. Ismael Sultana spoke briefly about her current health challenges and that she is waiting on results from a test.  She is hopeful that she will go home soon. She appears to be coping well at this time. She had no current concerns to share. Mrs. Ismael Sultana was receptive to assurance of prayer. Advised her of  availability.   Gregorio Childers, MPS, [address] [place], [address] [place] [address] [employer] Paging Service  [phone])

## 2017-12-02 VITALS
SYSTOLIC BLOOD PRESSURE: 136 MMHG | BODY MASS INDEX: 20.96 KG/M2 | HEART RATE: 89 BPM | RESPIRATION RATE: 20 BRPM | DIASTOLIC BLOOD PRESSURE: 71 MMHG | WEIGHT: 122.8 LBS | HEIGHT: 64 IN | OXYGEN SATURATION: 94 % | TEMPERATURE: 98.1 F

## 2017-12-02 LAB
ANION GAP SERPL CALC-SCNC: 7 MMOL/L (ref 5–15)
BUN SERPL-MCNC: 25 MG/DL (ref 6–20)
BUN/CREAT SERPL: 27 (ref 12–20)
CALCIUM SERPL-MCNC: 8.4 MG/DL (ref 8.5–10.1)
CHLORIDE SERPL-SCNC: 95 MMOL/L (ref 97–108)
CO2 SERPL-SCNC: 33 MMOL/L (ref 21–32)
CREAT SERPL-MCNC: 0.93 MG/DL (ref 0.55–1.02)
GLUCOSE SERPL-MCNC: 151 MG/DL (ref 65–100)
INR PPP: 3.6 (ref 0.9–1.1)
POTASSIUM SERPL-SCNC: 3.3 MMOL/L (ref 3.5–5.1)
PROTHROMBIN TIME: 37.6 SEC (ref 9–11.1)
SODIUM SERPL-SCNC: 135 MMOL/L (ref 136–145)

## 2017-12-02 PROCEDURE — 74011000250 HC RX REV CODE- 250: Performed by: INTERNAL MEDICINE

## 2017-12-02 PROCEDURE — 74011250637 HC RX REV CODE- 250/637: Performed by: INTERNAL MEDICINE

## 2017-12-02 PROCEDURE — 36415 COLL VENOUS BLD VENIPUNCTURE: CPT | Performed by: INTERNAL MEDICINE

## 2017-12-02 PROCEDURE — 80048 BASIC METABOLIC PNL TOTAL CA: CPT | Performed by: INTERNAL MEDICINE

## 2017-12-02 PROCEDURE — 94640 AIRWAY INHALATION TREATMENT: CPT

## 2017-12-02 PROCEDURE — 85610 PROTHROMBIN TIME: CPT | Performed by: INTERNAL MEDICINE

## 2017-12-02 PROCEDURE — 74011636637 HC RX REV CODE- 636/637: Performed by: INTERNAL MEDICINE

## 2017-12-02 RX ORDER — AZITHROMYCIN 250 MG/1
250 TABLET, FILM COATED ORAL DAILY
Qty: 6 TAB | Refills: 0 | Status: SHIPPED | OUTPATIENT
Start: 2017-12-02 | End: 2017-12-02

## 2017-12-02 RX ORDER — AZITHROMYCIN 250 MG/1
250 TABLET, FILM COATED ORAL DAILY
Qty: 2 TAB | Refills: 0 | Status: SHIPPED | OUTPATIENT
Start: 2017-12-02 | End: 2017-12-04

## 2017-12-02 RX ORDER — PREDNISONE 10 MG/1
TABLET ORAL
Qty: 30 TAB | Refills: 0 | Status: SHIPPED | OUTPATIENT
Start: 2017-12-02 | End: 2018-01-01

## 2017-12-02 RX ADMIN — ALBUTEROL SULFATE 2.5 MG: 2.5 SOLUTION RESPIRATORY (INHALATION) at 07:55

## 2017-12-02 RX ADMIN — NYSTATIN 500000 UNITS: 100000 SUSPENSION ORAL at 09:22

## 2017-12-02 RX ADMIN — DILTIAZEM HYDROCHLORIDE 60 MG: 30 TABLET, FILM COATED ORAL at 09:22

## 2017-12-02 RX ADMIN — FLUTICASONE FUROATE AND VILANTEROL TRIFENATATE 1 PUFF: 200; 25 POWDER RESPIRATORY (INHALATION) at 09:23

## 2017-12-02 RX ADMIN — BUPROPION HYDROCHLORIDE 150 MG: 150 TABLET, FILM COATED, EXTENDED RELEASE ORAL at 09:22

## 2017-12-02 RX ADMIN — UMECLIDINIUM 1 PUFF: 62.5 AEROSOL, POWDER ORAL at 09:23

## 2017-12-02 RX ADMIN — Medication 10 ML: at 03:54

## 2017-12-02 RX ADMIN — FAMOTIDINE 40 MG: 20 TABLET, FILM COATED ORAL at 09:22

## 2017-12-02 RX ADMIN — DOCUSATE SODIUM AND SENNOSIDES 2 TABLET: 8.6; 5 TABLET, FILM COATED ORAL at 09:22

## 2017-12-02 RX ADMIN — AZITHROMYCIN 250 MG: 250 TABLET, FILM COATED ORAL at 09:22

## 2017-12-02 RX ADMIN — PREDNISONE 40 MG: 20 TABLET ORAL at 09:22

## 2017-12-02 RX ADMIN — ALBUTEROL SULFATE 2.5 MG: 2.5 SOLUTION RESPIRATORY (INHALATION) at 03:32

## 2017-12-02 NOTE — PROGRESS NOTES
0367 Beside report received from Goran Klein RN. Patient in bed with no complaints at this time. Call bell with in reach. [address] Travis Watson in to see patient. Patient ready for discharge. 1105 Discharge instructions reviewed with patient. Prescriptions given. Patient with no concerns at this time. IV removed. Telemetry removed. 1120 Patient discharged home with . No concerns voiced at time of discharge.

## 2017-12-02 NOTE — PROGRESS NOTES
PCU SHIFT NURSING NOTE      Bedside and Verbal shift change report given to Doroteo Canales RN (oncoming nurse) by Yola Alcantara RN (offgoing nurse). Report included the following information SBAR, Kardex, Accordion and Recent Results. Shift Summary:  Pt received resting in bed; A & O x 4; ventimask at 24% with no acute distress noted. Admission Date 11/26/2017   Admission Diagnosis Acute exacerbation of chronic obstructive pulmonary disease (COPD) ([facility] [address]   Consults IP CONSULT TO CARDIOLOGY  IP CONSULT TO PULMONOLOGY        Consults   []PT   []OT   []Speech   []Case Management      [] Palliative      Cardiac Monitoring Order   []Yes   []No     IV drips   []Yes    Drip:                            Dose:  Drip:                            Dose:  Drip:                            Dose:   []No     GI Prophylaxis   []Yes   []No         DVT Prophylaxis   SCDs:             Gomez stockings:         [] Medication   []Contraindicated   []None      Activity Level Activity Level: Chair     Activity Assistance: Partial (one person)   Purposeful Rounding every 1-2 hour? []Yes   Watts Score  Total Score: 2   Bed Alarm (If score 3 or >)   []Yes   [] Refused (See signed refusal form in chart)   Jose Score  Jose Score: 20   Jose Score (if score 14 or less)   []PMT consult   []Wound Care consult      []Specialty bed   [] Nutrition consult          Needs prior to discharge:   Home O2 required:    []Yes   []No    If yes, how much O2 required?     Other:    Last Bowel Movement: Last Bowel Movement Date: 11/30/17      Influenza Vaccine Received Flu Vaccine for Current Season (usually Sept-March): Yes        Pneumonia Vaccine           Diet Active Orders   Diet    DIET REGULAR      LDAs               Peripheral IV 11/29/17 Left Forearm (Active)   Site Assessment Clean, dry, & intact 12/1/2017 12:22 PM   Phlebitis Assessment 0 12/1/2017 12:22 PM   Infiltration Assessment 0 12/1/2017 12:22 PM   Dressing Status Clean, dry, & intact 12/1/2017 12:22 PM   Dressing Type Transparent 12/1/2017 12:22 PM   Hub Color/Line Status Pink 12/1/2017 12:22 PM   Action Taken Catheter retaped 11/30/2017  6:00 AM   Alcohol Cap Used Yes 12/1/2017 12:22 PM                      Urinary Catheter      Intake & Output   Date 11/30/17 1900 - 12/01/17 0659 12/01/17 0700 - 12/02/17 0659   Shift 5446-6898 24 Hour Total 4011-0362 7808-4253 24 Hour Total   I  N  T  A  K  E   P.O. 350 830         P. O. 350 830       Shift Total  (mL/kg) 350  (6.3) 830  (14.9)      O  U  T  P  U  T   Urine  (mL/kg/hr) 425 425         Urine Voided 425 425         Urine Occurrence(s) 1 x 2 x 3 x  3 x    Stool           Stool Occurrence(s) 1 x 2 x       Shift Total  (mL/kg) 425  (7.6) 425  (7.6)      NET -75 405      Weight (kg) 55.7 55.7 55.7 55.7 55.7         Readmission Risk Assessment Tool Score Medium Risk            19       Total Score        3 Has Seen PCP in Last 6 Months (Yes=3, No=0)    2 . Living with Significant Other. Assisted Living. LTAC. SNF. or   Rehab    3 Patient Length of Stay (>5 days = 3)    5 Pt.  Coverage (Medicare=5 , Medicaid, or Self-Pay=4)    6 Charlson Comorbidity Score (Age + Comorbid Conditions)        Criteria that do not apply:    IP Visits Last 12 Months (1-3=4, 4=9, >4=11)       Expected Length of Stay 3d 19h   Actual Length of Stay 5

## 2017-12-02 NOTE — PROGRESS NOTES
1927: Bedside and Verbal shift change report given to Suki Keithalec Elsa (oncoming nurse) by Gucci Vázquez RN (offgoing nurse). Report included the following information SBAR.     2057: IV clotted off. Will insert another. 2129: New IV inserted. 0020: Patient signed informed refusal for bed alarm. It was placed in the paper chart. Bed alarm removed and nurse encouraged the patient to call when getting up to the bathroom. Patient agreeable. 0404: pst and blue draw.

## 2017-12-02 NOTE — DISCHARGE INSTRUCTIONS
Smoking Cessation Program: This is a free, phone/text/email based, smoking cessation program. The program is individualized to meet each patient's needs. To enroll use the link https://[email or web address]/survey/6440 or text Edvin Wilson to 477 6279 from any smart phone.

## 2017-12-11 ENCOUNTER — PATIENT OUTREACH (OUTPATIENT)
Dept: FAMILY MEDICINE CLINIC | Age: 62
End: 2017-12-11

## 2017-12-11 NOTE — PROGRESS NOTES
[facility]  Nursing Note  [phone]    Patient Name: Jesus Jacobsen  YOB: 1955   Date/Time:  12/11/2017 3:35 PM    Patient listed on nurse navigator combined daily census report on 12/11/17. Patient hospitalized from 11/26/17 to 12/2/17 at [facility] for CPD exacerbation. Last [facility] appt. 11/16/17. Ms Elisa Urban states she is doing so much better in breathing since her hospitalization. Denies CP. States she feels the exacerbation was caused by the change in the cold weather. States she is in the habit of checking her oximetry level daily. It was 80 at home when she had the exacerbation. Has an emergency small canister of oxygen at home. According to DC summary, patient does not need oxygen. Aware that Follow up appt is scheduled for:  Future Appointments  Date Time Provider Jerry Barrera   12/12/2017 8:50 AM Corinne Bugler Lobb, DO [facility] SCHED        Readmission Risk  RRAT score: Medium  Total Hospitalizations/ED visits:  0 in the past 6 months prior to most recent hospitalization    Diet:   Resumed previous, heart healthy. Activity:    Patient reports: light house work    Medication:   Performed medication reconciliation with patient, and patient verbalizes understanding of administration of home medications. There were no barriers to obtaining medications identified at this time. Support system:      Home Health orders at discharge? no     Advance Medical Directive on file in EMR? no ; Will need to assess at next contact. Plan:  NN plan is to continue to monitor during the [address] Follow-up episode. Post PCP appt, will contact patient to discuss ACP, smoking cessation and goals of weighing daily. ..  Goals      Prepare patients and caregivers for end of life decisions (ie. need for hospice, pain management, symptom relief, advance directives etc.)      Reduce risk of CHF exacerbations and complications. Daily weights       Smoking cessation.

## 2017-12-12 ENCOUNTER — OFFICE VISIT (OUTPATIENT)
Dept: FAMILY MEDICINE CLINIC | Age: 62
End: 2017-12-12

## 2017-12-12 VITALS
WEIGHT: 127 LBS | DIASTOLIC BLOOD PRESSURE: 84 MMHG | BODY MASS INDEX: 21.68 KG/M2 | RESPIRATION RATE: 20 BRPM | HEIGHT: 64 IN | HEART RATE: 69 BPM | SYSTOLIC BLOOD PRESSURE: 142 MMHG | TEMPERATURE: 98.4 F | OXYGEN SATURATION: 99 %

## 2017-12-12 DIAGNOSIS — S32.000S LUMBAR COMPRESSION FRACTURE, SEQUELA: ICD-10-CM

## 2017-12-12 DIAGNOSIS — M54.50 CHRONIC MIDLINE LOW BACK PAIN WITHOUT SCIATICA: ICD-10-CM

## 2017-12-12 DIAGNOSIS — Z95.2 S/P AVR (AORTIC VALVE REPLACEMENT): ICD-10-CM

## 2017-12-12 DIAGNOSIS — G89.29 CHRONIC MIDLINE LOW BACK PAIN WITHOUT SCIATICA: ICD-10-CM

## 2017-12-12 RX ORDER — WARFARIN 2 MG/1
TABLET ORAL
Qty: 120 TAB | Refills: 3
Start: 2017-12-12 | End: 2018-01-08 | Stop reason: SDUPTHER

## 2017-12-12 RX ORDER — OXYCODONE AND ACETAMINOPHEN 5; 325 MG/1; MG/1
1 TABLET ORAL
Qty: 120 TAB | Refills: 0 | Status: SHIPPED | OUTPATIENT
Start: 2017-12-12 | End: 2018-01-12 | Stop reason: SDUPTHER

## 2017-12-12 NOTE — DISCHARGE SUMMARY
Hospitalist Discharge Summary     Patient ID:  Deidra Lewis  [id number]  82 y.o.  1955    PCP on record: Jack Rainey DO    Admit date: 11/26/2017  Discharge date: 12/2/2017      DISCHARGE DIAGNOSES  DISCHARGE SUMMARY/HOSPITAL COURSE: for full details see H&P, daily progress notes, labs, consult notes. Acute exacerbation of chronic obstructive pulmonary disease  -requiring bipap briefly, now improved off of oxygen, no O2 needs on discharge  -prednisone taper and abx course  -recommend pulmonary follow up as outpatient  -continue symbicort and incruse ellipta     CAD, native coronary artery  Chronic atrial fibrillation  -aspirin and statin  -diltiazem IR 60 mg TID  -stress test done and negative for acute ischemia      Diastolic CHF, chronic (HCC) not in acute CHF at this time. EF 55-60% 07/17  -continue bumex      History of GI bleed   Happened last November and workup including Capsule endoscopy didn't show any obvious source.       Rheumatic heart disease of mitral and aortic valves  S/P AVR (aortic valve replacement) [facility]   S/P MVR (mitral valve replacement)  Continue full anticoagulation with coumadin, INR 2.5 to 3.5 goal      Tobacco abuse  Nicotine patch PRN       Body mass index is 21.8 kg/(m^2).     Code status: Full        CONSULTATIONS:  IP CONSULT TO CARDIOLOGY  IP CONSULT TO PULMONOLOGY    Excerpted HPI from H&P of Brigido Vizcaino MD:  The pt was in her usual state of health till last evening. She tried increasing her nebs with some relief. This morning she developed increased SOB and wheezing. ER treated her with back to back nebs with no significant relief. We are admitting her for COPD exacerbation   She did mention that she had some chest pain with yellow sputum with cough       _______________________________________________________________________  Patient seen and examined by me on discharge day.   Pertinent Findings:  Gen:    Not in distress  Chest: Clear lungs  _______________________________________________________________________  DISCHARGE MEDICATIONS:   Discharge Medication List as of 12/2/2017 11:07 AM      START taking these medications    Details   umeclidinium (INCRUSE ELLIPTA) 62.5 mcg/actuation inhaler Take 1 Puff by inhalation daily. Indications: BRONCHOSPASM PREVENTION WITH COPD, Print, Disp-1 Inhaler, R-0      !! predniSONE (DELTASONE) 10 mg tablet Take 4 tabs daily for 3 days, then take 3 tabs daily for 3 days, then take 2 tabs daily for 3 days, then take 1 tab daily for 3 days, then stop, Print, Disp-30 Tab, R-0       !! - Potential duplicate medications found. Please discuss with provider. CONTINUE these medications which have CHANGED    Details   azithromycin (ZITHROMAX) 250 mg tablet Take 1 Tab by mouth daily for 2 days. , Print, Disp-2 Tab, R-0         CONTINUE these medications which have NOT CHANGED    Details   oxyCODONE-acetaminophen (PERCOCET) 5-325 mg per tablet Take 1 Tab by mouth every six (6) hours as needed for Pain. Max Daily Amount: 4 Tabs., Print, Disp-120 Tab, R-0      warfarin (COUMADIN) 2 mg tablet Monday Tuesday Wednesday and Sunday take 3mg and take 2mg Thursday Friday Saturday., No Print, Disp-120 Tab, R-3      buPROPion SR (WELLBUTRIN SR) 150 mg SR tablet 1 tab PO qday x 3 days then 1 tab PO bid for smoking cessation, Normal, Disp-60 Tab, R-2      SYMBICORT 160-4.5 mcg/actuation HFA inhaler INHALE 2 PUFFS TWICE DAILY, Normal, Disp-3 Inhaler, R-3      !! predniSONE (DELTASONE) 5 mg tablet Take 1 Tab by mouth daily. , Normal, Disp-90 Tab, R-3      lovastatin (MEVACOR) 10 mg tablet TAKE 1 TABLET EVERY NIGHT, Normal, Disp-90 Tab, R-3      dilTIAZem (CARDIZEM) 60 mg tablet TAKE 1 TABLET BEFORE BREAKFAST, LUNCH, DINNER AND AT BEDTIME, Normal, Disp-360 Tab, R-3      varenicline (CHANTIX STARTER ROSSY) 0.5 mg (11)- 1 mg (42) DsPk Take as directed, Normal, Disp-1 Dose Pack, R-0      potassium chloride (KLOR-CON) 10 mEq tablet Take 1 Tab by mouth every Monday, Wednesday, Friday., No Print, Disp-90 Tab, R-3      bumetanide (BUMEX) 1 mg tablet Take 1 Tab by mouth every Monday, Wednesday, Friday., No Print, Disp-90 Tab, R-3      nystatin (MYCOSTATIN) 100,000 unit/mL suspension Take 5 mL by mouth four (4) times daily. swish and spit, Normal, Disp-180 mL, R-0      ferrous sulfate (SLOW FE) 142 mg (45 mg iron) ER tablet Take 1 Tab by mouth two (2) times daily (with meals). , Normal, Disp-30 Tab, R-2      famotidine (PEPCID) 40 mg tablet Take 1 Tab by mouth two (2) times a day., Print, Disp-30 Tab, R-0      docusate sodium (COLACE) 100 mg capsule Take 100 mg by mouth two (2) times daily as needed for Constipation. , Historical Med      alendronate (FOSAMAX) 70 mg tablet Take 1 Tab by mouth every Wednesday., Normal, Disp-12 Tab, R-3      albuterol (PROVENTIL VENTOLIN) 2.5 mg /3 mL (0.083 %) nebulizer solution 3 mL by Nebulization route every four (4) hours as needed for Wheezing or Shortness of Breath., Print, Disp-24 Each, R-0      albuterol (VENTOLIN HFA) 90 mcg/actuation inhaler Take 2 Puffs by inhalation every four (4) hours as needed for Wheezing or Shortness of Breath., Normal, Disp-1 Inhaler, R-11       !! - Potential duplicate medications found. Please discuss with provider. My Recommended Diet, Activity, Wound Care, and follow-up labs are listed in the patient's Discharge Insturctions which I have personally completed and reviewed.     _______________________________________________________________________  DISPOSITION:    Home with Family: x   Home with HH/PT/OT/RN:    SNF/LTC:    BEAU:    OTHER:        Condition at Discharge:  Stable  _______________________________________________________________________  Follow up with:   PCP : John Shoemaker DO  Follow-up Information     Follow up With Details Comments [address] [facility] [address]  [address] [facility] [address]      [facility], [address] [place] [address] [address] [facility]  [phone]                Total time in minutes spent coordinating this discharge (includes going over instructions, follow-up, prescriptions, and preparing report for sign off to her PCP) :  35 minutes    Signed:  Harmony Borrego MD

## 2017-12-12 NOTE — PATIENT INSTRUCTIONS

## 2017-12-12 NOTE — PROGRESS NOTES
Tisha Jaramillo is a 63 y.o. female   Chief Complaint   Patient presents with   [facility] Follow Up    pt her [facility] follow up was admitted to [facility] due to difficulty breathing. Pt continues to smoke but reports she has cut back. Took her SpO2 and was 80% and her HR was elevated at 128 so went to ED and was admitted but is feeling much much better now. Coumadin is a little high. Cj lhold today and tomorrow and see med list for new schedyule. Pt would also like refill of her pain medication, last fill was 11/3/17. Opioid/Pain Management:    1. Has the patient signed a pain contract for chronic narcotic use? yes  2. Has the patient had a UDS or Serum screen as per guidelines as per [facility]?:   yes    3. Does patient meet necessary guidelines for Naloxone treatement per the [facility]?:    no  4. Has the Prescription Monitoring Program been reviewed? yes  5. Does patient have a long term condition that requires long term use of a Narcotic?  yes  6. Has patient been tolerant of therapy and responsible to routine follow up and specialist follow-up? Yes      she is a 63y.o. year old female who presents for evalution. Reviewed PmHx, RxHx, FmHx, SocHx, AllgHx and updated and dated in the chart. Review of Systems - negative except as listed above in the HPI    Objective:     Vitals:    12/12/17 0855   BP: 142/84   Pulse: 69   Resp: 20   Temp: 98.4 °F (36.9 °C)   TempSrc: Oral   SpO2: 99%   Weight: 127 lb (57.6 kg)   Height: 5' 4\" (1.626 m)       Current Outpatient Prescriptions   Medication Sig    warfarin (COUMADIN) 2 mg tablet Sunday Monday Tuesday take 3mg and take 2mg Wednesday Thursday Friday Saturday.  oxyCODONE-acetaminophen (PERCOCET) 5-325 mg per tablet Take 1 Tab by mouth every six (6) hours as needed for Pain. Max Daily Amount: 4 Tabs.  umeclidinium (INCRUSE ELLIPTA) 62.5 mcg/actuation inhaler Take 1 Puff by inhalation daily.  Indications: BRONCHOSPASM PREVENTION WITH COPD    predniSONE (DELTASONE) 10 mg tablet Take 4 tabs daily for 3 days, then take 3 tabs daily for 3 days, then take 2 tabs daily for 3 days, then take 1 tab daily for 3 days, then stop    buPROPion SR (WELLBUTRIN SR) 150 mg SR tablet 1 tab PO qday x 3 days then 1 tab PO bid for smoking cessation    lovastatin (MEVACOR) 10 mg tablet TAKE 1 TABLET EVERY NIGHT    dilTIAZem (CARDIZEM) 60 mg tablet TAKE 1 TABLET BEFORE BREAKFAST, LUNCH, DINNER AND AT BEDTIME    varenicline (CHANTIX STARTER ROSSY) 0.5 mg (11)- 1 mg (42) DsPk Take as directed    bumetanide (BUMEX) 1 mg tablet Take 1 Tab by mouth every Monday, Wednesday, Friday.  ferrous sulfate (SLOW FE) 142 mg (45 mg iron) ER tablet Take 1 Tab by mouth two (2) times daily (with meals).  famotidine (PEPCID) 40 mg tablet Take 1 Tab by mouth two (2) times a day.  docusate sodium (COLACE) 100 mg capsule Take 100 mg by mouth two (2) times daily as needed for Constipation.  alendronate (FOSAMAX) 70 mg tablet Take 1 Tab by mouth every Wednesday.  albuterol (PROVENTIL VENTOLIN) 2.5 mg /3 mL (0.083 %) nebulizer solution 3 mL by Nebulization route every four (4) hours as needed for Wheezing or Shortness of Breath.  albuterol (VENTOLIN HFA) 90 mcg/actuation inhaler Take 2 Puffs by inhalation every four (4) hours as needed for Wheezing or Shortness of Breath.  SYMBICORT 160-4.5 mcg/actuation HFA inhaler INHALE 2 PUFFS TWICE DAILY    predniSONE (DELTASONE) 5 mg tablet Take 1 Tab by mouth daily.  potassium chloride (KLOR-CON) 10 mEq tablet Take 1 Tab by mouth every Monday, Wednesday, Friday.  nystatin (MYCOSTATIN) 100,000 unit/mL suspension Take 5 mL by mouth four (4) times daily. swish and spit     No current facility-administered medications for this visit.         Physical Examination: General appearance - alert, well appearing, and in no distress  Mental status - alert, oriented to person, place, and time  Eyes - pupils equal and reactive, extraocular eye movements intact  Chest - mild scattered rhonchi baseline for pt  Heart - irregularly irregular rhythm with rate 24'H with audible click  Back exam - tenderness noted midline lumbar spine, decreased ROM      Assessment/ Plan:   Diagnoses and all orders for this visit:    1. S/P AVR (aortic valve replacement)  -     warfarin (COUMADIN) 2 mg tablet; Sunday Monday Tuesday take 3mg and take 2mg Wednesday Thursday Friday Saturday. 2. Chronic midline low back pain without sciatica  -     oxyCODONE-acetaminophen (PERCOCET) 5-325 mg per tablet; Take 1 Tab by mouth every six (6) hours as needed for Pain. Max Daily Amount: 4 Tabs. 3. Lumbar compression fracture, sequela  -     oxyCODONE-acetaminophen (PERCOCET) 5-325 mg per tablet; Take 1 Tab by mouth every six (6) hours as needed for Pain. Max Daily Amount: 4 Tabs.     skip today and tomorrow. If unable to check coumadin at home with meter f/u 2 weeks in office  Follow-up Disposition:  Return in about 2 weeks (around 12/26/2017), or if symptoms worsen or fail to improve. I have discussed the diagnosis with the patient and the intended plan as seen in the above orders. The patient has received an after-visit summary and questions were answered concerning future plans. Pt conveyed understanding of plan.     Medication Side Effects and Warnings were discussed with patient      Dannielle Cooks, DO

## 2017-12-12 NOTE — MR AVS SNAPSHOT
Visit Information Date & Time Provider Department Dept. Phone Encounter #  
 12/12/2017  8:50 AM Radha Otero, [facility] [phone] [id number] Follow-up Instructions Return in about 2 weeks (around 12/26/2017), or if symptoms worsen or fail to improve. Upcoming Health Maintenance Date Due  
 BREAST CANCER SCRN MAMMOGRAM 2/24/2018 COLONOSCOPY 11/28/2019 DTaP/Tdap/Td series (2 - Td) 9/26/2027 Allergies as of 12/12/2017  Review Complete On: 12/12/2017 By: Emily Rico LPN Severity Noted Reaction Type Reactions Spiriva Respimat [Tiotropium Bromide] High 01/08/2015   Systemic Anaphylaxis, Other (comments) Adverse effects; Per RN - pt states that Spiriva caused throat swelling and could not breathe well. Celebrex [Celecoxib]  01/08/2015   Intolerance Rash Tomato  01/08/2015   Intolerance Rash Current Immunizations  Reviewed on 12/1/2017 Name Date Influenza Vaccine (Quad) PF 9/26/2017, 10/21/2016, 11/13/2015 Pneumococcal Conjugate (PCV-13) 2/18/2016 Pneumococcal Polysaccharide (PPSV-23) 2/21/2017 Not reviewed this visit You Were Diagnosed With   
  
 Codes Comments S/P AVR (aortic valve replacement)     ICD-10-CM: E50.4 ICD-9-CM: V43.3 Chronic midline low back pain without sciatica     ICD-10-CM: M54.5, G89.29 ICD-9-CM: 724.2, 338.29 Lumbar compression fracture, sequela     ICD-10-CM: S32.000S ICD-9-CM: 905.1 Vitals BP Pulse Temp Resp Height(growth percentile) Weight(growth percentile) 142/84 69 98.4 °F (36.9 °C) (Oral) 20 5' 4\" (1.626 m) 127 lb (57.6 kg) SpO2 BMI OB Status Smoking Status 99% 21.8 kg/m2 Hysterectomy Current Every Day Smoker Vitals History BMI and BSA Data Body Mass Index Body Surface Area  
 21.8 kg/m 2 1.61 m 2 Preferred Pharmacy Pharmacy Name Phone [id number] [id number] [address] [phone] Your Updated Medication List  
  
   
This list is accurate as of: 12/12/17  9:21 AM.  Always use your most recent med list.  
  
  
  
  
 * albuterol 90 mcg/actuation inhaler Commonly known as:  VENTOLIN HFA Take 2 Puffs by inhalation every four (4) hours as needed for Wheezing or Shortness of Breath. * albuterol 2.5 mg /3 mL (0.083 %) nebulizer solution Commonly known as:  PROVENTIL VENTOLIN  
3 mL by Nebulization route every four (4) hours as needed for Wheezing or Shortness of Breath. alendronate 70 mg tablet Commonly known as:  FOSAMAX Take 1 Tab by mouth every Wednesday. bumetanide 1 mg tablet Commonly known as:  Amol Michael Take 1 Tab by mouth every Monday, Wednesday, Friday. buPROPion  mg SR tablet Commonly known as:  WELLBUTRIN SR  
1 tab PO qday x 3 days then 1 tab PO bid for smoking cessation  
  
 dilTIAZem 60 mg tablet Commonly known as:  CARDIZEM  
TAKE 1 TABLET BEFORE BREAKFAST, LUNCH, DINNER AND AT BEDTIME  
  
 docusate sodium 100 mg capsule Commonly known as:  Joan Mule Take 100 mg by mouth two (2) times daily as needed for Constipation. famotidine 40 mg tablet Commonly known as:  PEPCID Take 1 Tab by mouth two (2) times a day. ferrous sulfate 142 mg (45 mg iron) ER tablet Commonly known as:  SLOW FE Take 1 Tab by mouth two (2) times daily (with meals). lovastatin 10 mg tablet Commonly known as:  MEVACOR  
TAKE 1 TABLET EVERY NIGHT  
  
 nystatin 100,000 unit/mL suspension Commonly known as:  MYCOSTATIN Take 5 mL by mouth four (4) times daily. swish and spit  
  
 oxyCODONE-acetaminophen 5-325 mg per tablet Commonly known as:  PERCOCET Take 1 Tab by mouth every six (6) hours as needed for Pain. Max Daily Amount: 4 Tabs. potassium chloride 10 mEq tablet Commonly known as:  KLOR-CON Take 1 Tab by mouth every Monday, Wednesday, Friday. * predniSONE 5 mg tablet Commonly known as:  Belkys Noriega  
 Take 1 Tab by mouth daily. * predniSONE 10 mg tablet Commonly known as:  Mark Preety Take 4 tabs daily for 3 days, then take 3 tabs daily for 3 days, then take 2 tabs daily for 3 days, then take 1 tab daily for 3 days, then stop SYMBICORT 160-4.5 mcg/actuation [facility] Generic drug:  budesonide-formoterol INHALE 2 PUFFS TWICE DAILY  
  
 umeclidinium 62.5 mcg/actuation inhaler Commonly known as:  INCRUSE ELLIPTA Take 1 Puff by inhalation daily. Indications: BRONCHOSPASM PREVENTION WITH COPD  
  
 varenicline 0.5 mg (11)- 1 mg (42) Dspk Commonly known as:  CHANTIX STARTER ROSSY Take as directed  
  
 warfarin 2 mg tablet Commonly known as:  COUMADIN Sunday Monday Tuesday take 3mg and take 2mg Wednesday Thursday Friday Saturday. * Notice: This list has 4 medication(s) that are the same as other medications prescribed for you. Read the directions carefully, and ask your doctor or other care provider to review them with you. Prescriptions Printed Refills  
 oxyCODONE-acetaminophen (PERCOCET) 5-325 mg per tablet 0 Sig: Take 1 Tab by mouth every six (6) hours as needed for Pain. Max Daily Amount: 4 Tabs. Class: Print Route: Oral  
  
Follow-up Instructions Return in about 2 weeks (around 12/26/2017), or if symptoms worsen or fail to improve. Patient Instructions Stopping Smoking: Care Instructions Your Care Instructions Cigarette smokers crave the nicotine in cigarettes. Giving it up is much harder than simply changing a habit. Your body has to stop craving the nicotine. It is hard to quit, but you can do it. There are many tools that people use to quit smoking. You may find that combining tools works best for you. There are several steps to quitting. First you get ready to quit. Then you get support to help you. After that, you learn new skills and behaviors to become a nonsmoker.  For many people, a necessary step is getting and using medicine. Your doctor will help you set up the plan that best meets your needs. You may want to attend a smoking cessation program to help you quit smoking. When you choose a program, look for one that has proven success. Ask your doctor for ideas. You will greatly increase your chances of success if you take medicine as well as get counseling or join a cessation program. 
Some of the changes you feel when you first quit tobacco are uncomfortable. Your body will miss the nicotine at first, and you may feel short-tempered and grumpy. You may have trouble sleeping or concentrating. Medicine can help you deal with these symptoms. You may struggle with changing your smoking habits and rituals. The last step is the tricky one: Be prepared for the smoking urge to continue for a time. This is a lot to deal with, but keep at it. You will feel better. Follow-up care is a key part of your treatment and safety. Be sure to make and go to all appointments, and call your doctor if you are having problems. It's also a good idea to know your test results and keep a list of the medicines you take. How can you care for yourself at home? · Ask your family, friends, and coworkers for support. You have a better chance of quitting if you have help and support. · Join a support group, such as Nicotine Anonymous, for people who are trying to quit smoking. · Consider signing up for a smoking cessation program, such as the American Lung Association's Freedom from Smoking program. 
· Set a quit date. Pick your date carefully so that it is not right in the middle of a big deadline or stressful time. Once you quit, do not even take a puff. Get rid of all ashtrays and lighters after your last cigarette. Clean your house and your clothes so that they do not smell of smoke. · Learn how to be a nonsmoker. Think about ways you can avoid those things that make you reach for a cigarette. ¨ Avoid situations that put you at greatest risk for smoking. For some people, it is hard to have a drink with friends without smoking. For others, they might skip a coffee break with coworkers who smoke. ¨ Change your daily routine. Take a different route to work or eat a meal in a different place. · Cut down on stress. Calm yourself or release tension by doing an activity you enjoy, such as reading a book, taking a hot bath, or gardening. · Talk to your doctor or pharmacist about nicotine replacement therapy, which replaces the nicotine in your body. You still get nicotine but you do not use tobacco. Nicotine replacement products help you slowly reduce the amount of nicotine you need. These products come in several forms, many of them available over-the-counter: ¨ Nicotine patches ¨ Nicotine gum and lozenges ¨ Nicotine inhaler · Ask your doctor about bupropion (Wellbutrin) or varenicline (Chantix), which are prescription medicines. They do not contain nicotine. They help you by reducing withdrawal symptoms, such as stress and anxiety. · Some people find hypnosis, acupuncture, and massage helpful for ending the smoking habit. · Eat a healthy diet and get regular exercise. Having healthy habits will help your body move past its craving for nicotine. · Be prepared to keep trying. Most people are not successful the first few times they try to quit. Do not get mad at yourself if you smoke again. Make a list of things you learned and think about when you want to try again, such as next week, next month, or next year. Where can you learn more? Go to http://hayden-дмитрий.info/. Enter D029 in the search box to learn more about \"Stopping Smoking: Care Instructions. \" Current as of: March 20, 2017 Content Version: 11.4 © 6499-2121 Healthwise, [employer].  Care instructions adapted under license by [employer] (which disclaims liability or warranty for this information). If you have questions about a medical condition or this instruction, always ask your healthcare professional. [address] any warranty or liability for your use of this information. Introducing [facility]! Dear Naseem Kiser: 
Thank you for requesting a [employer] account. Our records indicate that you already have an active [employer] account. You can access your account anytime at [email or web address] Did you know that you can access your hospital and ER discharge instructions at any time in [employer]? You can also review all of your test results from your hospital stay or ER visit. Additional Information If you have questions, please visit the Frequently Asked Questions section of the [employer] website at [email or web address] Remember, [employer] is NOT to be used for urgent needs. For medical emergencies, dial 911. Now available from your iPhone and Android! Please provide this summary of care documentation to your next provider. Your primary care clinician is listed as Sailaja Charles. If you have any questions after today's visit, please call [phone].

## 2017-12-18 DIAGNOSIS — I50.32 DIASTOLIC CHF, CHRONIC (HCC): ICD-10-CM

## 2017-12-18 RX ORDER — BUMETANIDE 1 MG/1
TABLET ORAL
Qty: 90 TAB | Refills: 3 | Status: SHIPPED | OUTPATIENT
Start: 2017-12-18 | End: 2018-01-01

## 2018-01-01 ENCOUNTER — HOME CARE VISIT (OUTPATIENT)
Dept: SCHEDULING | Facility: HOME HEALTH | Age: 63
End: 2018-01-01
Payer: MEDICARE

## 2018-01-01 ENCOUNTER — OFFICE VISIT (OUTPATIENT)
Dept: FAMILY MEDICINE CLINIC | Age: 63
End: 2018-01-01

## 2018-01-01 ENCOUNTER — DOCUMENTATION ONLY (OUTPATIENT)
Dept: FAMILY MEDICINE CLINIC | Age: 63
End: 2018-01-01

## 2018-01-01 ENCOUNTER — TELEPHONE (OUTPATIENT)
Dept: FAMILY MEDICINE CLINIC | Age: 63
End: 2018-01-01

## 2018-01-01 ENCOUNTER — HOSPITAL ENCOUNTER (EMERGENCY)
Age: 63
Discharge: HOME | End: 2018-03-06
Attending: EMERGENCY MEDICINE
Payer: MEDICARE

## 2018-01-01 ENCOUNTER — APPOINTMENT (OUTPATIENT)
Dept: ULTRASOUND IMAGING | Age: 63
DRG: 189 | End: 2018-01-01
Attending: INTERNAL MEDICINE
Payer: MEDICARE

## 2018-01-01 ENCOUNTER — HOME CARE VISIT (OUTPATIENT)
Dept: HOME HEALTH SERVICES | Facility: HOME HEALTH | Age: 63
End: 2018-01-01
Payer: MEDICARE

## 2018-01-01 ENCOUNTER — HOSPITAL ENCOUNTER (OUTPATIENT)
Dept: MAMMOGRAPHY | Age: 63
Discharge: HOME | End: 2018-02-27
Attending: FAMILY MEDICINE
Payer: MEDICARE

## 2018-01-01 ENCOUNTER — APPOINTMENT (OUTPATIENT)
Dept: GENERAL RADIOLOGY | Age: 63
DRG: 189 | End: 2018-01-01
Attending: NURSE PRACTITIONER
Payer: MEDICARE

## 2018-01-01 ENCOUNTER — PATIENT OUTREACH (OUTPATIENT)
Dept: FAMILY MEDICINE CLINIC | Age: 63
End: 2018-01-01

## 2018-01-01 ENCOUNTER — HOSPITAL ENCOUNTER (OUTPATIENT)
Dept: GENERAL RADIOLOGY | Age: 63
Discharge: HOME | End: 2018-06-21
Attending: FAMILY MEDICINE
Payer: MEDICARE

## 2018-01-01 ENCOUNTER — HOME CARE VISIT (OUTPATIENT)
Dept: SCHEDULING | Facility: HOME HEALTH | Age: 63
End: 2018-01-01

## 2018-01-01 ENCOUNTER — APPOINTMENT (OUTPATIENT)
Dept: CT IMAGING | Age: 63
DRG: 191 | End: 2018-01-01
Attending: EMERGENCY MEDICINE
Payer: MEDICARE

## 2018-01-01 ENCOUNTER — HOME HEALTH ADMISSION (OUTPATIENT)
Dept: HOME HEALTH SERVICES | Facility: HOME HEALTH | Age: 63
End: 2018-01-01

## 2018-01-01 ENCOUNTER — APPOINTMENT (OUTPATIENT)
Dept: GENERAL RADIOLOGY | Age: 63
DRG: 189 | End: 2018-01-01
Attending: STUDENT IN AN ORGANIZED HEALTH CARE EDUCATION/TRAINING PROGRAM
Payer: MEDICARE

## 2018-01-01 ENCOUNTER — APPOINTMENT (OUTPATIENT)
Dept: GENERAL RADIOLOGY | Age: 63
DRG: 189 | End: 2018-01-01
Attending: EMERGENCY MEDICINE
Payer: MEDICARE

## 2018-01-01 ENCOUNTER — APPOINTMENT (OUTPATIENT)
Dept: GENERAL RADIOLOGY | Age: 63
End: 2018-01-01
Attending: PHYSICIAN ASSISTANT
Payer: MEDICARE

## 2018-01-01 ENCOUNTER — APPOINTMENT (OUTPATIENT)
Dept: GENERAL RADIOLOGY | Age: 63
DRG: 191 | End: 2018-01-01
Attending: NURSE PRACTITIONER
Payer: MEDICARE

## 2018-01-01 ENCOUNTER — APPOINTMENT (OUTPATIENT)
Dept: GENERAL RADIOLOGY | Age: 63
End: 2018-01-01
Payer: MEDICARE

## 2018-01-01 ENCOUNTER — HOME HEALTH ADMISSION (OUTPATIENT)
Dept: HOME HEALTH SERVICES | Facility: HOME HEALTH | Age: 63
End: 2018-01-01
Payer: MEDICARE

## 2018-01-01 ENCOUNTER — HOSPITAL ENCOUNTER (INPATIENT)
Age: 63
LOS: 8 days | Discharge: HOME HEALTH | DRG: 189 | End: 2018-11-01
Attending: EMERGENCY MEDICINE | Admitting: FAMILY MEDICINE
Payer: MEDICARE

## 2018-01-01 ENCOUNTER — APPOINTMENT (OUTPATIENT)
Dept: GENERAL RADIOLOGY | Age: 63
DRG: 191 | End: 2018-01-01
Attending: STUDENT IN AN ORGANIZED HEALTH CARE EDUCATION/TRAINING PROGRAM
Payer: MEDICARE

## 2018-01-01 ENCOUNTER — HOSPITAL ENCOUNTER (INPATIENT)
Age: 63
LOS: 2 days | Discharge: HOME HEALTH | DRG: 191 | End: 2018-07-31
Attending: EMERGENCY MEDICINE | Admitting: FAMILY MEDICINE
Payer: MEDICARE

## 2018-01-01 ENCOUNTER — APPOINTMENT (OUTPATIENT)
Dept: INTERVENTIONAL RADIOLOGY/VASCULAR | Age: 63
DRG: 189 | End: 2018-01-01
Attending: FAMILY MEDICINE
Payer: MEDICARE

## 2018-01-01 ENCOUNTER — HOSPITAL ENCOUNTER (EMERGENCY)
Age: 63
Discharge: HOME | End: 2018-06-07
Attending: EMERGENCY MEDICINE
Payer: MEDICARE

## 2018-01-01 VITALS
SYSTOLIC BLOOD PRESSURE: 116 MMHG | TEMPERATURE: 97.6 F | DIASTOLIC BLOOD PRESSURE: 72 MMHG | BODY MASS INDEX: 21.28 KG/M2 | WEIGHT: 124 LBS | OXYGEN SATURATION: 98 % | HEART RATE: 96 BPM | RESPIRATION RATE: 20 BRPM

## 2018-01-01 VITALS
TEMPERATURE: 97.4 F | SYSTOLIC BLOOD PRESSURE: 100 MMHG | RESPIRATION RATE: 18 BRPM | DIASTOLIC BLOOD PRESSURE: 60 MMHG | HEART RATE: 100 BPM | WEIGHT: 136 LBS | OXYGEN SATURATION: 99 % | BODY MASS INDEX: 23.34 KG/M2

## 2018-01-01 VITALS
SYSTOLIC BLOOD PRESSURE: 132 MMHG | TEMPERATURE: 98.4 F | HEART RATE: 62 BPM | OXYGEN SATURATION: 99 % | DIASTOLIC BLOOD PRESSURE: 70 MMHG | RESPIRATION RATE: 18 BRPM

## 2018-01-01 VITALS
RESPIRATION RATE: 18 BRPM | TEMPERATURE: 98.2 F | HEART RATE: 88 BPM | SYSTOLIC BLOOD PRESSURE: 102 MMHG | DIASTOLIC BLOOD PRESSURE: 62 MMHG | OXYGEN SATURATION: 99 %

## 2018-01-01 VITALS
OXYGEN SATURATION: 98 % | SYSTOLIC BLOOD PRESSURE: 108 MMHG | DIASTOLIC BLOOD PRESSURE: 68 MMHG | BODY MASS INDEX: 23.17 KG/M2 | WEIGHT: 135 LBS | RESPIRATION RATE: 18 BRPM | HEART RATE: 76 BPM | TEMPERATURE: 98.4 F

## 2018-01-01 VITALS
TEMPERATURE: 97.8 F | RESPIRATION RATE: 18 BRPM | WEIGHT: 126 LBS | OXYGEN SATURATION: 93 % | SYSTOLIC BLOOD PRESSURE: 126 MMHG | DIASTOLIC BLOOD PRESSURE: 86 MMHG | BODY MASS INDEX: 21.63 KG/M2 | HEART RATE: 86 BPM

## 2018-01-01 VITALS
DIASTOLIC BLOOD PRESSURE: 69 MMHG | WEIGHT: 138.67 LBS | SYSTOLIC BLOOD PRESSURE: 115 MMHG | TEMPERATURE: 98.7 F | HEIGHT: 64 IN | OXYGEN SATURATION: 96 % | HEART RATE: 100 BPM | BODY MASS INDEX: 23.67 KG/M2 | RESPIRATION RATE: 21 BRPM

## 2018-01-01 VITALS
HEART RATE: 94 BPM | RESPIRATION RATE: 26 BRPM | TEMPERATURE: 98.5 F | WEIGHT: 130.73 LBS | BODY MASS INDEX: 21.76 KG/M2 | OXYGEN SATURATION: 100 % | SYSTOLIC BLOOD PRESSURE: 151 MMHG | DIASTOLIC BLOOD PRESSURE: 66 MMHG

## 2018-01-01 VITALS
RESPIRATION RATE: 20 BRPM | HEIGHT: 64 IN | DIASTOLIC BLOOD PRESSURE: 75 MMHG | WEIGHT: 138 LBS | HEART RATE: 82 BPM | SYSTOLIC BLOOD PRESSURE: 132 MMHG | TEMPERATURE: 98.8 F | BODY MASS INDEX: 23.56 KG/M2 | OXYGEN SATURATION: 94 %

## 2018-01-01 VITALS
OXYGEN SATURATION: 95 % | HEART RATE: 88 BPM | SYSTOLIC BLOOD PRESSURE: 139 MMHG | HEIGHT: 65 IN | WEIGHT: 132.72 LBS | RESPIRATION RATE: 30 BRPM | BODY MASS INDEX: 22.11 KG/M2 | TEMPERATURE: 98.3 F | DIASTOLIC BLOOD PRESSURE: 97 MMHG

## 2018-01-01 VITALS
SYSTOLIC BLOOD PRESSURE: 138 MMHG | DIASTOLIC BLOOD PRESSURE: 79 MMHG | HEIGHT: 64 IN | TEMPERATURE: 98 F | OXYGEN SATURATION: 94 % | HEART RATE: 91 BPM | BODY MASS INDEX: 20.83 KG/M2 | RESPIRATION RATE: 20 BRPM | WEIGHT: 122 LBS

## 2018-01-01 VITALS
TEMPERATURE: 98.5 F | SYSTOLIC BLOOD PRESSURE: 120 MMHG | HEART RATE: 76 BPM | BODY MASS INDEX: 21.56 KG/M2 | WEIGHT: 129.4 LBS | OXYGEN SATURATION: 94 % | DIASTOLIC BLOOD PRESSURE: 55 MMHG | HEIGHT: 65 IN | RESPIRATION RATE: 20 BRPM

## 2018-01-01 VITALS
DIASTOLIC BLOOD PRESSURE: 50 MMHG | SYSTOLIC BLOOD PRESSURE: 96 MMHG | OXYGEN SATURATION: 98 % | BODY MASS INDEX: 21.63 KG/M2 | TEMPERATURE: 97.6 F | WEIGHT: 126 LBS | RESPIRATION RATE: 18 BRPM | HEART RATE: 87 BPM

## 2018-01-01 VITALS
RESPIRATION RATE: 18 BRPM | DIASTOLIC BLOOD PRESSURE: 50 MMHG | OXYGEN SATURATION: 97 % | HEART RATE: 87 BPM | SYSTOLIC BLOOD PRESSURE: 110 MMHG | TEMPERATURE: 98 F

## 2018-01-01 VITALS
HEART RATE: 83 BPM | RESPIRATION RATE: 18 BRPM | TEMPERATURE: 98.1 F | DIASTOLIC BLOOD PRESSURE: 64 MMHG | OXYGEN SATURATION: 99 % | SYSTOLIC BLOOD PRESSURE: 102 MMHG

## 2018-01-01 VITALS
DIASTOLIC BLOOD PRESSURE: 60 MMHG | TEMPERATURE: 97.5 F | WEIGHT: 135 LBS | HEART RATE: 82 BPM | RESPIRATION RATE: 18 BRPM | BODY MASS INDEX: 23.17 KG/M2 | OXYGEN SATURATION: 97 % | SYSTOLIC BLOOD PRESSURE: 108 MMHG

## 2018-01-01 VITALS
WEIGHT: 141.09 LBS | RESPIRATION RATE: 20 BRPM | HEIGHT: 64 IN | BODY MASS INDEX: 24.09 KG/M2 | OXYGEN SATURATION: 96 % | TEMPERATURE: 98 F | HEART RATE: 107 BPM | DIASTOLIC BLOOD PRESSURE: 85 MMHG | SYSTOLIC BLOOD PRESSURE: 153 MMHG

## 2018-01-01 VITALS
RESPIRATION RATE: 20 BRPM | TEMPERATURE: 98.1 F | DIASTOLIC BLOOD PRESSURE: 62 MMHG | SYSTOLIC BLOOD PRESSURE: 99 MMHG | OXYGEN SATURATION: 97 % | HEART RATE: 67 BPM

## 2018-01-01 VITALS
WEIGHT: 134 LBS | SYSTOLIC BLOOD PRESSURE: 112 MMHG | HEART RATE: 63 BPM | HEART RATE: 103 BPM | BODY MASS INDEX: 23 KG/M2 | RESPIRATION RATE: 18 BRPM | DIASTOLIC BLOOD PRESSURE: 80 MMHG | OXYGEN SATURATION: 96 % | WEIGHT: 124 LBS | OXYGEN SATURATION: 96 % | TEMPERATURE: 97.6 F | RESPIRATION RATE: 20 BRPM | DIASTOLIC BLOOD PRESSURE: 70 MMHG | BODY MASS INDEX: 21.28 KG/M2 | TEMPERATURE: 97.6 F | SYSTOLIC BLOOD PRESSURE: 106 MMHG

## 2018-01-01 VITALS
OXYGEN SATURATION: 98 % | HEART RATE: 94 BPM | TEMPERATURE: 98.3 F | RESPIRATION RATE: 18 BRPM | DIASTOLIC BLOOD PRESSURE: 82 MMHG | SYSTOLIC BLOOD PRESSURE: 136 MMHG

## 2018-01-01 VITALS
RESPIRATION RATE: 15 BRPM | HEART RATE: 81 BPM | SYSTOLIC BLOOD PRESSURE: 121 MMHG | BODY MASS INDEX: 21.43 KG/M2 | WEIGHT: 128.6 LBS | DIASTOLIC BLOOD PRESSURE: 69 MMHG | OXYGEN SATURATION: 96 % | HEIGHT: 65 IN

## 2018-01-01 VITALS
HEIGHT: 64 IN | SYSTOLIC BLOOD PRESSURE: 81 MMHG | BODY MASS INDEX: 21.51 KG/M2 | HEART RATE: 59 BPM | TEMPERATURE: 97.8 F | OXYGEN SATURATION: 99 % | RESPIRATION RATE: 16 BRPM | DIASTOLIC BLOOD PRESSURE: 51 MMHG | WEIGHT: 126 LBS

## 2018-01-01 VITALS
BODY MASS INDEX: 21.28 KG/M2 | RESPIRATION RATE: 20 BRPM | TEMPERATURE: 97.4 F | DIASTOLIC BLOOD PRESSURE: 60 MMHG | WEIGHT: 124 LBS | HEART RATE: 91 BPM | OXYGEN SATURATION: 98 % | SYSTOLIC BLOOD PRESSURE: 108 MMHG

## 2018-01-01 VITALS
TEMPERATURE: 98.1 F | RESPIRATION RATE: 24 BRPM | DIASTOLIC BLOOD PRESSURE: 61 MMHG | WEIGHT: 130.2 LBS | WEIGHT: 125 LBS | OXYGEN SATURATION: 95 % | DIASTOLIC BLOOD PRESSURE: 83 MMHG | HEART RATE: 98 BPM | SYSTOLIC BLOOD PRESSURE: 159 MMHG | HEIGHT: 64 IN | BODY MASS INDEX: 21.34 KG/M2 | SYSTOLIC BLOOD PRESSURE: 133 MMHG | HEART RATE: 71 BPM | BODY MASS INDEX: 21.69 KG/M2 | OXYGEN SATURATION: 98 % | RESPIRATION RATE: 16 BRPM | TEMPERATURE: 98.1 F | HEIGHT: 65 IN

## 2018-01-01 VITALS
DIASTOLIC BLOOD PRESSURE: 76 MMHG | OXYGEN SATURATION: 98 % | HEART RATE: 76 BPM | WEIGHT: 124 LBS | RESPIRATION RATE: 20 BRPM | BODY MASS INDEX: 21.28 KG/M2 | SYSTOLIC BLOOD PRESSURE: 108 MMHG | TEMPERATURE: 97.2 F

## 2018-01-01 VITALS
RESPIRATION RATE: 18 BRPM | SYSTOLIC BLOOD PRESSURE: 102 MMHG | HEART RATE: 54 BPM | DIASTOLIC BLOOD PRESSURE: 70 MMHG | OXYGEN SATURATION: 92 % | TEMPERATURE: 96.9 F

## 2018-01-01 VITALS
RESPIRATION RATE: 18 BRPM | OXYGEN SATURATION: 98 % | HEART RATE: 67 BPM | DIASTOLIC BLOOD PRESSURE: 60 MMHG | SYSTOLIC BLOOD PRESSURE: 102 MMHG | TEMPERATURE: 98.2 F

## 2018-01-01 VITALS
TEMPERATURE: 98.5 F | TEMPERATURE: 97.2 F | WEIGHT: 132.6 LBS | RESPIRATION RATE: 18 BRPM | DIASTOLIC BLOOD PRESSURE: 77 MMHG | DIASTOLIC BLOOD PRESSURE: 60 MMHG | SYSTOLIC BLOOD PRESSURE: 132 MMHG | HEIGHT: 65 IN | BODY MASS INDEX: 22.09 KG/M2 | HEART RATE: 95 BPM | SYSTOLIC BLOOD PRESSURE: 92 MMHG | OXYGEN SATURATION: 94 % | RESPIRATION RATE: 12 BRPM | OXYGEN SATURATION: 95 % | HEART RATE: 95 BPM

## 2018-01-01 VITALS
WEIGHT: 124 LBS | SYSTOLIC BLOOD PRESSURE: 121 MMHG | BODY MASS INDEX: 21.28 KG/M2 | OXYGEN SATURATION: 98 % | TEMPERATURE: 97.9 F | DIASTOLIC BLOOD PRESSURE: 71 MMHG | RESPIRATION RATE: 20 BRPM | HEART RATE: 97 BPM

## 2018-01-01 VITALS
HEART RATE: 87 BPM | DIASTOLIC BLOOD PRESSURE: 88 MMHG | OXYGEN SATURATION: 98 % | RESPIRATION RATE: 18 BRPM | TEMPERATURE: 97.8 F | BODY MASS INDEX: 21.51 KG/M2 | HEIGHT: 64 IN | SYSTOLIC BLOOD PRESSURE: 130 MMHG | WEIGHT: 126 LBS

## 2018-01-01 VITALS
HEART RATE: 71 BPM | DIASTOLIC BLOOD PRESSURE: 78 MMHG | OXYGEN SATURATION: 99 % | TEMPERATURE: 98.9 F | RESPIRATION RATE: 18 BRPM | SYSTOLIC BLOOD PRESSURE: 132 MMHG

## 2018-01-01 VITALS
OXYGEN SATURATION: 93 % | RESPIRATION RATE: 20 BRPM | HEART RATE: 68 BPM | BODY MASS INDEX: 22.2 KG/M2 | SYSTOLIC BLOOD PRESSURE: 126 MMHG | HEIGHT: 64 IN | WEIGHT: 130 LBS | TEMPERATURE: 98.2 F | DIASTOLIC BLOOD PRESSURE: 79 MMHG

## 2018-01-01 VITALS
OXYGEN SATURATION: 99 % | SYSTOLIC BLOOD PRESSURE: 155 MMHG | RESPIRATION RATE: 18 BRPM | DIASTOLIC BLOOD PRESSURE: 67 MMHG | BODY MASS INDEX: 21.99 KG/M2 | HEIGHT: 65 IN | HEART RATE: 87 BPM | TEMPERATURE: 97.9 F | WEIGHT: 132 LBS

## 2018-01-01 VITALS
TEMPERATURE: 98.5 F | BODY MASS INDEX: 22.33 KG/M2 | HEART RATE: 87 BPM | SYSTOLIC BLOOD PRESSURE: 157 MMHG | RESPIRATION RATE: 16 BRPM | DIASTOLIC BLOOD PRESSURE: 71 MMHG | WEIGHT: 134 LBS | HEIGHT: 65 IN | OXYGEN SATURATION: 98 %

## 2018-01-01 VITALS
RESPIRATION RATE: 18 BRPM | SYSTOLIC BLOOD PRESSURE: 110 MMHG | BODY MASS INDEX: 21.49 KG/M2 | DIASTOLIC BLOOD PRESSURE: 80 MMHG | OXYGEN SATURATION: 94 % | TEMPERATURE: 98.6 F | HEIGHT: 65 IN | HEART RATE: 90 BPM | WEIGHT: 129 LBS

## 2018-01-01 VITALS
HEART RATE: 83 BPM | TEMPERATURE: 98.7 F | OXYGEN SATURATION: 98 % | DIASTOLIC BLOOD PRESSURE: 60 MMHG | RESPIRATION RATE: 20 BRPM | SYSTOLIC BLOOD PRESSURE: 108 MMHG

## 2018-01-01 VITALS
BODY MASS INDEX: 23.22 KG/M2 | HEART RATE: 78 BPM | OXYGEN SATURATION: 98 % | WEIGHT: 136 LBS | HEIGHT: 64 IN | SYSTOLIC BLOOD PRESSURE: 117 MMHG | TEMPERATURE: 97.7 F | RESPIRATION RATE: 16 BRPM | DIASTOLIC BLOOD PRESSURE: 69 MMHG

## 2018-01-01 VITALS
DIASTOLIC BLOOD PRESSURE: 50 MMHG | TEMPERATURE: 97.2 F | HEART RATE: 97 BPM | OXYGEN SATURATION: 98 % | RESPIRATION RATE: 18 BRPM | SYSTOLIC BLOOD PRESSURE: 110 MMHG

## 2018-01-01 VITALS
HEART RATE: 84 BPM | RESPIRATION RATE: 18 BRPM | DIASTOLIC BLOOD PRESSURE: 74 MMHG | SYSTOLIC BLOOD PRESSURE: 122 MMHG | OXYGEN SATURATION: 99 % | TEMPERATURE: 97.4 F

## 2018-01-01 VITALS
SYSTOLIC BLOOD PRESSURE: 122 MMHG | DIASTOLIC BLOOD PRESSURE: 68 MMHG | OXYGEN SATURATION: 99 % | HEART RATE: 74 BPM | RESPIRATION RATE: 18 BRPM | TEMPERATURE: 97.6 F

## 2018-01-01 VITALS
HEART RATE: 98 BPM | HEIGHT: 64 IN | BODY MASS INDEX: 21.1 KG/M2 | RESPIRATION RATE: 16 BRPM | SYSTOLIC BLOOD PRESSURE: 128 MMHG | DIASTOLIC BLOOD PRESSURE: 81 MMHG | WEIGHT: 123.6 LBS | TEMPERATURE: 98.2 F | OXYGEN SATURATION: 95 %

## 2018-01-01 VITALS
WEIGHT: 135 LBS | SYSTOLIC BLOOD PRESSURE: 102 MMHG | BODY MASS INDEX: 23.17 KG/M2 | DIASTOLIC BLOOD PRESSURE: 60 MMHG | HEART RATE: 90 BPM | RESPIRATION RATE: 18 BRPM | OXYGEN SATURATION: 98 % | TEMPERATURE: 97.5 F

## 2018-01-01 VITALS
BODY MASS INDEX: 23.34 KG/M2 | SYSTOLIC BLOOD PRESSURE: 100 MMHG | RESPIRATION RATE: 20 BRPM | OXYGEN SATURATION: 98 % | HEART RATE: 67 BPM | TEMPERATURE: 97.9 F | DIASTOLIC BLOOD PRESSURE: 50 MMHG | WEIGHT: 136 LBS

## 2018-01-01 VITALS
RESPIRATION RATE: 18 BRPM | OXYGEN SATURATION: 99 % | SYSTOLIC BLOOD PRESSURE: 100 MMHG | TEMPERATURE: 97.2 F | BODY MASS INDEX: 23.17 KG/M2 | WEIGHT: 135 LBS | DIASTOLIC BLOOD PRESSURE: 60 MMHG | HEART RATE: 87 BPM

## 2018-01-01 VITALS
SYSTOLIC BLOOD PRESSURE: 98 MMHG | HEART RATE: 89 BPM | DIASTOLIC BLOOD PRESSURE: 60 MMHG | TEMPERATURE: 97.8 F | OXYGEN SATURATION: 96 % | RESPIRATION RATE: 18 BRPM

## 2018-01-01 VITALS
OXYGEN SATURATION: 97 % | RESPIRATION RATE: 20 BRPM | BODY MASS INDEX: 21.34 KG/M2 | SYSTOLIC BLOOD PRESSURE: 124 MMHG | HEART RATE: 100 BPM | HEIGHT: 64 IN | WEIGHT: 125 LBS | DIASTOLIC BLOOD PRESSURE: 66 MMHG | TEMPERATURE: 98.1 F

## 2018-01-01 VITALS
HEART RATE: 71 BPM | BODY MASS INDEX: 23.17 KG/M2 | DIASTOLIC BLOOD PRESSURE: 54 MMHG | TEMPERATURE: 97.8 F | OXYGEN SATURATION: 97 % | WEIGHT: 135 LBS | SYSTOLIC BLOOD PRESSURE: 118 MMHG

## 2018-01-01 VITALS
SYSTOLIC BLOOD PRESSURE: 106 MMHG | DIASTOLIC BLOOD PRESSURE: 62 MMHG | HEART RATE: 66 BPM | RESPIRATION RATE: 18 BRPM | OXYGEN SATURATION: 92 % | TEMPERATURE: 97.3 F

## 2018-01-01 VITALS
WEIGHT: 124 LBS | BODY MASS INDEX: 21.28 KG/M2 | SYSTOLIC BLOOD PRESSURE: 112 MMHG | HEART RATE: 86 BPM | DIASTOLIC BLOOD PRESSURE: 73 MMHG | RESPIRATION RATE: 18 BRPM | TEMPERATURE: 97.7 F | OXYGEN SATURATION: 97 %

## 2018-01-01 VITALS
HEART RATE: 80 BPM | TEMPERATURE: 98.2 F | SYSTOLIC BLOOD PRESSURE: 110 MMHG | OXYGEN SATURATION: 96 % | DIASTOLIC BLOOD PRESSURE: 62 MMHG

## 2018-01-01 VITALS
TEMPERATURE: 98.3 F | RESPIRATION RATE: 24 BRPM | SYSTOLIC BLOOD PRESSURE: 141 MMHG | OXYGEN SATURATION: 98 % | HEART RATE: 100 BPM | HEIGHT: 64 IN | DIASTOLIC BLOOD PRESSURE: 89 MMHG | WEIGHT: 127.38 LBS | BODY MASS INDEX: 21.75 KG/M2

## 2018-01-01 VITALS
HEART RATE: 116 BPM | RESPIRATION RATE: 16 BRPM | SYSTOLIC BLOOD PRESSURE: 112 MMHG | OXYGEN SATURATION: 96 % | TEMPERATURE: 97.9 F | DIASTOLIC BLOOD PRESSURE: 64 MMHG

## 2018-01-01 VITALS
SYSTOLIC BLOOD PRESSURE: 100 MMHG | HEART RATE: 74 BPM | DIASTOLIC BLOOD PRESSURE: 58 MMHG | RESPIRATION RATE: 18 BRPM | TEMPERATURE: 98.5 F | OXYGEN SATURATION: 96 %

## 2018-01-01 VITALS
HEART RATE: 92 BPM | RESPIRATION RATE: 20 BRPM | DIASTOLIC BLOOD PRESSURE: 62 MMHG | SYSTOLIC BLOOD PRESSURE: 102 MMHG | TEMPERATURE: 98.3 F | WEIGHT: 124 LBS | OXYGEN SATURATION: 97 % | BODY MASS INDEX: 21.28 KG/M2

## 2018-01-01 VITALS
RESPIRATION RATE: 18 BRPM | TEMPERATURE: 97.8 F | HEIGHT: 64 IN | OXYGEN SATURATION: 94 % | DIASTOLIC BLOOD PRESSURE: 84 MMHG | SYSTOLIC BLOOD PRESSURE: 128 MMHG | BODY MASS INDEX: 22.53 KG/M2 | WEIGHT: 132 LBS | HEART RATE: 89 BPM

## 2018-01-01 VITALS
TEMPERATURE: 97.8 F | SYSTOLIC BLOOD PRESSURE: 90 MMHG | DIASTOLIC BLOOD PRESSURE: 50 MMHG | HEART RATE: 97 BPM | RESPIRATION RATE: 18 BRPM | OXYGEN SATURATION: 99 %

## 2018-01-01 VITALS
RESPIRATION RATE: 16 BRPM | WEIGHT: 136 LBS | TEMPERATURE: 98.1 F | BODY MASS INDEX: 23.22 KG/M2 | HEIGHT: 64 IN | SYSTOLIC BLOOD PRESSURE: 124 MMHG | OXYGEN SATURATION: 100 % | DIASTOLIC BLOOD PRESSURE: 54 MMHG | HEART RATE: 90 BPM

## 2018-01-01 VITALS
SYSTOLIC BLOOD PRESSURE: 110 MMHG | WEIGHT: 124 LBS | BODY MASS INDEX: 21.28 KG/M2 | RESPIRATION RATE: 18 BRPM | OXYGEN SATURATION: 98 % | DIASTOLIC BLOOD PRESSURE: 72 MMHG | HEART RATE: 88 BPM | TEMPERATURE: 97.7 F

## 2018-01-01 VITALS
HEART RATE: 93 BPM | OXYGEN SATURATION: 99 % | TEMPERATURE: 97.8 F | RESPIRATION RATE: 18 BRPM | BODY MASS INDEX: 22.16 KG/M2 | DIASTOLIC BLOOD PRESSURE: 74 MMHG | HEIGHT: 65 IN | WEIGHT: 133 LBS | SYSTOLIC BLOOD PRESSURE: 139 MMHG

## 2018-01-01 VITALS
OXYGEN SATURATION: 98 % | BODY MASS INDEX: 21.28 KG/M2 | TEMPERATURE: 98.4 F | HEART RATE: 87 BPM | RESPIRATION RATE: 20 BRPM | DIASTOLIC BLOOD PRESSURE: 73 MMHG | WEIGHT: 124 LBS | SYSTOLIC BLOOD PRESSURE: 124 MMHG

## 2018-01-01 VITALS
TEMPERATURE: 97.8 F | OXYGEN SATURATION: 97 % | RESPIRATION RATE: 18 BRPM | SYSTOLIC BLOOD PRESSURE: 150 MMHG | HEART RATE: 91 BPM | DIASTOLIC BLOOD PRESSURE: 80 MMHG

## 2018-01-01 DIAGNOSIS — B37.0 THRUSH: ICD-10-CM

## 2018-01-01 DIAGNOSIS — D68.9 COAGULATION DEFECT (HCC): ICD-10-CM

## 2018-01-01 DIAGNOSIS — N17.9 AKI (ACUTE KIDNEY INJURY) (HCC): ICD-10-CM

## 2018-01-01 DIAGNOSIS — S32.000S LUMBAR COMPRESSION FRACTURE, SEQUELA: ICD-10-CM

## 2018-01-01 DIAGNOSIS — G89.29 CHRONIC MIDLINE LOW BACK PAIN WITHOUT SCIATICA: ICD-10-CM

## 2018-01-01 DIAGNOSIS — Z95.2 S/P AVR (AORTIC VALVE REPLACEMENT): Primary | ICD-10-CM

## 2018-01-01 DIAGNOSIS — R11.0 NAUSEA: Primary | ICD-10-CM

## 2018-01-01 DIAGNOSIS — M54.50 CHRONIC MIDLINE LOW BACK PAIN WITHOUT SCIATICA: ICD-10-CM

## 2018-01-01 DIAGNOSIS — J44.1 COPD WITH EXACERBATION (HCC): ICD-10-CM

## 2018-01-01 DIAGNOSIS — D72.829 LEUKOCYTOSIS, UNSPECIFIED TYPE: ICD-10-CM

## 2018-01-01 DIAGNOSIS — J44.1 CHRONIC OBSTRUCTIVE PULMONARY DISEASE WITH ACUTE EXACERBATION (HCC): ICD-10-CM

## 2018-01-01 DIAGNOSIS — I48.20 CHRONIC ATRIAL FIBRILLATION (HCC): Primary | ICD-10-CM

## 2018-01-01 DIAGNOSIS — G89.29 CHRONIC MIDLINE LOW BACK PAIN WITHOUT SCIATICA: Primary | ICD-10-CM

## 2018-01-01 DIAGNOSIS — M54.50 CHRONIC MIDLINE LOW BACK PAIN WITHOUT SCIATICA: Primary | ICD-10-CM

## 2018-01-01 DIAGNOSIS — M54.50 CHRONIC BILATERAL LOW BACK PAIN WITHOUT SCIATICA: Chronic | ICD-10-CM

## 2018-01-01 DIAGNOSIS — Z12.31 ENCOUNTER FOR SCREENING MAMMOGRAM FOR MALIGNANT NEOPLASM OF BREAST: ICD-10-CM

## 2018-01-01 DIAGNOSIS — R79.1 SUPRATHERAPEUTIC INR: ICD-10-CM

## 2018-01-01 DIAGNOSIS — J44.1 ACUTE EXACERBATION OF CHRONIC OBSTRUCTIVE PULMONARY DISEASE (COPD) (HCC): Primary | ICD-10-CM

## 2018-01-01 DIAGNOSIS — R79.89 ELEVATED LFTS: ICD-10-CM

## 2018-01-01 DIAGNOSIS — D64.9 ANEMIA, UNSPECIFIED TYPE: ICD-10-CM

## 2018-01-01 DIAGNOSIS — Z95.2 S/P AVR (AORTIC VALVE REPLACEMENT): ICD-10-CM

## 2018-01-01 DIAGNOSIS — Z00.00 MEDICARE ANNUAL WELLNESS VISIT, SUBSEQUENT: ICD-10-CM

## 2018-01-01 DIAGNOSIS — R13.10 PAINFUL SWALLOWING: ICD-10-CM

## 2018-01-01 DIAGNOSIS — Z79.01 WARFARIN ANTICOAGULATION: Primary | ICD-10-CM

## 2018-01-01 DIAGNOSIS — G89.29 CHRONIC BILATERAL LOW BACK PAIN WITHOUT SCIATICA: Chronic | ICD-10-CM

## 2018-01-01 DIAGNOSIS — I50.32 DIASTOLIC CHF, CHRONIC (HCC): ICD-10-CM

## 2018-01-01 DIAGNOSIS — E83.42 HYPOMAGNESEMIA: ICD-10-CM

## 2018-01-01 DIAGNOSIS — Z51.81 MEDICATION MONITORING ENCOUNTER: ICD-10-CM

## 2018-01-01 DIAGNOSIS — J43.9 PULMONARY EMPHYSEMA, UNSPECIFIED EMPHYSEMA TYPE (HCC): ICD-10-CM

## 2018-01-01 DIAGNOSIS — R31.9 URINARY TRACT INFECTION WITH HEMATURIA, SITE UNSPECIFIED: ICD-10-CM

## 2018-01-01 DIAGNOSIS — E87.6 HYPOKALEMIA: ICD-10-CM

## 2018-01-01 DIAGNOSIS — Z23 ENCOUNTER FOR IMMUNIZATION: ICD-10-CM

## 2018-01-01 DIAGNOSIS — J43.2 CENTRILOBULAR EMPHYSEMA (HCC): ICD-10-CM

## 2018-01-01 DIAGNOSIS — E87.6 HYPOKALEMIA: Primary | ICD-10-CM

## 2018-01-01 DIAGNOSIS — I25.10 CORONARY ARTERY DISEASE INVOLVING NATIVE CORONARY ARTERY OF NATIVE HEART WITHOUT ANGINA PECTORIS: ICD-10-CM

## 2018-01-01 DIAGNOSIS — Z79.01 ENCOUNTER FOR MONITORING COUMADIN THERAPY: Primary | ICD-10-CM

## 2018-01-01 DIAGNOSIS — A41.9 SYSTEMIC INFECTION (HCC): ICD-10-CM

## 2018-01-01 DIAGNOSIS — M79.18 INTERCOSTAL MUSCLE PAIN: ICD-10-CM

## 2018-01-01 DIAGNOSIS — N39.0 URINARY TRACT INFECTION WITH HEMATURIA, SITE UNSPECIFIED: ICD-10-CM

## 2018-01-01 DIAGNOSIS — R13.10 DYSPHAGIA, UNSPECIFIED TYPE: ICD-10-CM

## 2018-01-01 DIAGNOSIS — I48.20 CHRONIC ATRIAL FIBRILLATION (HCC): ICD-10-CM

## 2018-01-01 DIAGNOSIS — D68.9 COAGULATION DEFICIENCY (HCC): Primary | ICD-10-CM

## 2018-01-01 DIAGNOSIS — J43.2 CENTRILOBULAR EMPHYSEMA (HCC): Primary | ICD-10-CM

## 2018-01-01 DIAGNOSIS — R79.1 INR (INTERNATIONAL NORMAL RATIO) ABNORMAL: Primary | ICD-10-CM

## 2018-01-01 DIAGNOSIS — J44.1 COPD EXACERBATION (HCC): Primary | ICD-10-CM

## 2018-01-01 DIAGNOSIS — R79.1 INR (INTERNATIONAL NORMAL RATIO) ABNORMAL: ICD-10-CM

## 2018-01-01 DIAGNOSIS — Z51.81 ENCOUNTER FOR MONITORING COUMADIN THERAPY: Primary | ICD-10-CM

## 2018-01-01 DIAGNOSIS — Z79.01 WARFARIN ANTICOAGULATION: ICD-10-CM

## 2018-01-01 LAB
6-ACETYLMORPHINE: NEGATIVE
ABO + RH BLD: NORMAL
ALBUMIN SERPL-MCNC: 2.4 G/DL (ref 3.5–5)
ALBUMIN SERPL-MCNC: 2.7 G/DL (ref 3.5–5)
ALBUMIN SERPL-MCNC: 2.8 G/DL (ref 3.5–5)
ALBUMIN SERPL-MCNC: 2.8 G/DL (ref 3.5–5)
ALBUMIN SERPL-MCNC: 3 G/DL (ref 3.5–5)
ALBUMIN SERPL-MCNC: 3.1 G/DL (ref 3.5–5)
ALBUMIN SERPL-MCNC: 3.1 G/DL (ref 3.5–5)
ALBUMIN SERPL-MCNC: 3.5 G/DL (ref 3.5–5)
ALBUMIN SERPL-MCNC: 3.5 G/DL (ref 3.5–5)
ALBUMIN SERPL-MCNC: 3.5 G/DL (ref 3.6–4.8)
ALBUMIN SERPL-MCNC: 4 G/DL (ref 3.6–4.8)
ALBUMIN SERPL-MCNC: 4 G/DL (ref 3.6–4.8)
ALBUMIN/GLOB SERPL: 0.9 {RATIO} (ref 1.1–2.2)
ALBUMIN/GLOB SERPL: 1 {RATIO} (ref 1.1–2.2)
ALBUMIN/GLOB SERPL: 1.6 {RATIO} (ref 1.2–2.2)
ALBUMIN/GLOB SERPL: 1.9 {RATIO} (ref 1.2–2.2)
ALP SERPL-CCNC: 100 U/L (ref 45–117)
ALP SERPL-CCNC: 103 U/L (ref 45–117)
ALP SERPL-CCNC: 104 U/L (ref 45–117)
ALP SERPL-CCNC: 110 IU/L (ref 39–117)
ALP SERPL-CCNC: 121 U/L (ref 45–117)
ALP SERPL-CCNC: 132 U/L (ref 45–117)
ALP SERPL-CCNC: 76 U/L (ref 45–117)
ALP SERPL-CCNC: 85 U/L (ref 45–117)
ALP SERPL-CCNC: 86 IU/L (ref 39–117)
ALP SERPL-CCNC: 86 U/L (ref 45–117)
ALP SERPL-CCNC: 87 IU/L (ref 39–117)
ALP SERPL-CCNC: 95 U/L (ref 45–117)
ALT SERPL-CCNC: 132 U/L (ref 12–78)
ALT SERPL-CCNC: 145 U/L (ref 12–78)
ALT SERPL-CCNC: 17 IU/L (ref 0–32)
ALT SERPL-CCNC: 19 U/L (ref 12–78)
ALT SERPL-CCNC: 20 IU/L (ref 0–32)
ALT SERPL-CCNC: 21 U/L (ref 12–78)
ALT SERPL-CCNC: 23 U/L (ref 12–78)
ALT SERPL-CCNC: 52 U/L (ref 12–78)
ALT SERPL-CCNC: 54 U/L (ref 12–78)
ALT SERPL-CCNC: 57 U/L (ref 12–78)
ALT SERPL-CCNC: 68 U/L (ref 12–78)
ALT SERPL-CCNC: 69 IU/L (ref 0–32)
AMPHETAMINES SERPL QL SCN: NEGATIVE NG/ML
ANION GAP SERPL CALC-SCNC: 10 MMOL/L (ref 5–15)
ANION GAP SERPL CALC-SCNC: 10 MMOL/L (ref 5–15)
ANION GAP SERPL CALC-SCNC: 11 MMOL/L (ref 5–15)
ANION GAP SERPL CALC-SCNC: 2 MMOL/L (ref 5–15)
ANION GAP SERPL CALC-SCNC: 2 MMOL/L (ref 5–15)
ANION GAP SERPL CALC-SCNC: 3 MMOL/L (ref 5–15)
ANION GAP SERPL CALC-SCNC: 3 MMOL/L (ref 5–15)
ANION GAP SERPL CALC-SCNC: 4 MMOL/L (ref 5–15)
ANION GAP SERPL CALC-SCNC: 5 MMOL/L (ref 5–15)
ANION GAP SERPL CALC-SCNC: 6 MMOL/L (ref 5–15)
ANION GAP SERPL CALC-SCNC: 7 MMOL/L (ref 5–15)
ANION GAP SERPL CALC-SCNC: 8 MMOL/L (ref 5–15)
ANION GAP SERPL CALC-SCNC: 8 MMOL/L (ref 5–15)
ANION GAP SERPL CALC-SCNC: 9 MMOL/L (ref 5–15)
APPEARANCE UR: ABNORMAL
APPEARANCE UR: CLEAR
APTT PPP: 25.6 SEC (ref 22.1–32)
APTT PPP: 26.5 SEC (ref 22.1–32)
APTT PPP: 28.5 SEC (ref 22.1–32)
APTT PPP: 29.3 SEC (ref 22.1–32)
APTT PPP: 32.9 SEC (ref 22.1–32)
APTT PPP: 33.4 SEC (ref 22.1–32)
APTT PPP: 41.3 SEC (ref 22.1–32)
APTT PPP: 43.5 SEC (ref 22.1–32)
APTT PPP: 46.3 SEC (ref 22.1–32)
APTT PPP: 53.8 SEC (ref 22.1–32)
APTT PPP: 56.7 SEC (ref 22.1–32)
APTT PPP: 63.8 SEC (ref 22.1–32)
APTT PPP: 66 SEC (ref 22.1–32)
ARTERIAL PATENCY WRIST A: YES
ARTERIAL PATENCY WRIST A: YES
AST SERPL-CCNC: 14 U/L (ref 15–37)
AST SERPL-CCNC: 156 U/L (ref 15–37)
AST SERPL-CCNC: 17 IU/L (ref 0–40)
AST SERPL-CCNC: 20 U/L (ref 15–37)
AST SERPL-CCNC: 22 IU/L (ref 0–40)
AST SERPL-CCNC: 25 U/L (ref 15–37)
AST SERPL-CCNC: 28 U/L (ref 15–37)
AST SERPL-CCNC: 34 IU/L (ref 0–40)
AST SERPL-CCNC: 35 U/L (ref 15–37)
AST SERPL-CCNC: 43 U/L (ref 15–37)
AST SERPL-CCNC: 57 U/L (ref 15–37)
AST SERPL-CCNC: 74 U/L (ref 15–37)
ATRIAL RATE: 100 BPM
ATRIAL RATE: 101 BPM
ATRIAL RATE: 120 BPM
ATRIAL RATE: 86 BPM
ATRIAL RATE: 92 BPM
ATRIAL RATE: 96 BPM
B PERT DNA SPEC QL NAA+PROBE: NOT DETECTED
B PERT DNA SPEC QL NAA+PROBE: NOT DETECTED
BACTERIA BLD CULT: NORMAL
BACTERIA SPEC CULT: ABNORMAL
BACTERIA SPEC CULT: NORMAL
BACTERIA URNS QL MICRO: ABNORMAL /HPF
BACTERIA URNS QL MICRO: NEGATIVE /HPF
BARBITURATES SERPL QL SCN: NEGATIVE UG/ML
BARBITURATES UR POC: NEGATIVE
BASE EXCESS BLDA CALC-SCNC: 15 MMOL/L
BASE EXCESS BLDA CALC-SCNC: 2.9 MMOL/L
BASE EXCESS BLDV CALC-SCNC: 3.3 MMOL/L
BASOPHILS # BLD AUTO: 0 X10E3/UL (ref 0–0.2)
BASOPHILS # BLD: 0 K/UL (ref 0–0.1)
BASOPHILS # BLD: 0.1 K/UL (ref 0–0.1)
BASOPHILS NFR BLD AUTO: 0 %
BASOPHILS NFR BLD: 0 % (ref 0–1)
BDY SITE: ABNORMAL
BENZODIAZ SERPL QL SCN: NEGATIVE NG/ML
BENZODIAZEPINES UR POC: NEGATIVE
BILIRUB DIRECT SERPL-MCNC: 0.1 MG/DL (ref 0–0.2)
BILIRUB DIRECT SERPL-MCNC: 0.19 MG/DL (ref 0–0.4)
BILIRUB INDIRECT SERPL-MCNC: 0.6 MG/DL (ref 0–1.1)
BILIRUB SERPL-MCNC: 0.3 MG/DL (ref 0.2–1)
BILIRUB SERPL-MCNC: 0.3 MG/DL (ref 0.2–1)
BILIRUB SERPL-MCNC: 0.3 MG/DL (ref 0–1.2)
BILIRUB SERPL-MCNC: 0.4 MG/DL (ref 0.2–1)
BILIRUB SERPL-MCNC: 0.4 MG/DL (ref 0.2–1)
BILIRUB SERPL-MCNC: 0.5 MG/DL (ref 0.2–1)
BILIRUB SERPL-MCNC: 0.5 MG/DL (ref 0–1.2)
BILIRUB SERPL-MCNC: 0.6 MG/DL (ref 0–1.2)
BILIRUB SERPL-MCNC: 0.7 MG/DL (ref 0.2–1)
BILIRUB SERPL-MCNC: 0.9 MG/DL (ref 0.2–1)
BILIRUB SERPL-MCNC: 1.1 MG/DL (ref 0.2–1)
BILIRUB UR QL CFM: NEGATIVE
BILIRUB UR QL: NEGATIVE
BLD PROD TYP BPU: NORMAL
BLOOD GROUP ANTIBODIES SERPL: NORMAL
BNP SERPL-MCNC: 1445 PG/ML (ref 0–125)
BNP SERPL-MCNC: 3771 PG/ML (ref 0–125)
BNP SERPL-MCNC: 715 PG/ML (ref 0–125)
BNP SERPL-MCNC: 821 PG/ML (ref 0–125)
BPU ID: NORMAL
BUN SERPL-MCNC: 10 MG/DL (ref 6–20)
BUN SERPL-MCNC: 10 MG/DL (ref 8–27)
BUN SERPL-MCNC: 13 MG/DL (ref 6–20)
BUN SERPL-MCNC: 15 MG/DL (ref 6–20)
BUN SERPL-MCNC: 15 MG/DL (ref 6–20)
BUN SERPL-MCNC: 15 MG/DL (ref 8–27)
BUN SERPL-MCNC: 16 MG/DL (ref 6–20)
BUN SERPL-MCNC: 17 MG/DL (ref 6–20)
BUN SERPL-MCNC: 18 MG/DL (ref 6–20)
BUN SERPL-MCNC: 18 MG/DL (ref 6–20)
BUN SERPL-MCNC: 19 MG/DL (ref 6–20)
BUN SERPL-MCNC: 19 MG/DL (ref 6–20)
BUN SERPL-MCNC: 20 MG/DL (ref 6–20)
BUN SERPL-MCNC: 20 MG/DL (ref 8–27)
BUN SERPL-MCNC: 21 MG/DL (ref 6–20)
BUN SERPL-MCNC: 21 MG/DL (ref 6–20)
BUN SERPL-MCNC: 22 MG/DL (ref 6–20)
BUN SERPL-MCNC: 22 MG/DL (ref 6–20)
BUN SERPL-MCNC: 23 MG/DL (ref 6–20)
BUN SERPL-MCNC: 24 MG/DL (ref 6–20)
BUN SERPL-MCNC: 25 MG/DL (ref 6–20)
BUN SERPL-MCNC: 9 MG/DL (ref 6–20)
BUN/CREAT SERPL: 11 (ref 12–20)
BUN/CREAT SERPL: 13 (ref 12–20)
BUN/CREAT SERPL: 13 (ref 12–20)
BUN/CREAT SERPL: 13 (ref 12–28)
BUN/CREAT SERPL: 14 (ref 12–20)
BUN/CREAT SERPL: 15 (ref 12–20)
BUN/CREAT SERPL: 16 (ref 12–20)
BUN/CREAT SERPL: 17 (ref 12–20)
BUN/CREAT SERPL: 18 (ref 12–20)
BUN/CREAT SERPL: 18 (ref 12–20)
BUN/CREAT SERPL: 18 (ref 12–28)
BUN/CREAT SERPL: 19 (ref 12–20)
BUN/CREAT SERPL: 20 (ref 12–20)
BUN/CREAT SERPL: 21 (ref 12–20)
BUN/CREAT SERPL: 22 (ref 12–20)
BUN/CREAT SERPL: 22 (ref 12–28)
BUN/CREAT SERPL: 24 (ref 12–20)
C PNEUM DNA SPEC QL NAA+PROBE: NOT DETECTED
C PNEUM DNA SPEC QL NAA+PROBE: NOT DETECTED
CALCIUM SERPL-MCNC: 7.3 MG/DL (ref 8.5–10.1)
CALCIUM SERPL-MCNC: 7.5 MG/DL (ref 8.5–10.1)
CALCIUM SERPL-MCNC: 7.5 MG/DL (ref 8.5–10.1)
CALCIUM SERPL-MCNC: 7.6 MG/DL (ref 8.5–10.1)
CALCIUM SERPL-MCNC: 7.6 MG/DL (ref 8.5–10.1)
CALCIUM SERPL-MCNC: 7.8 MG/DL (ref 8.5–10.1)
CALCIUM SERPL-MCNC: 7.9 MG/DL (ref 8.5–10.1)
CALCIUM SERPL-MCNC: 8 MG/DL (ref 8.5–10.1)
CALCIUM SERPL-MCNC: 8.1 MG/DL (ref 8.5–10.1)
CALCIUM SERPL-MCNC: 8.3 MG/DL (ref 8.5–10.1)
CALCIUM SERPL-MCNC: 8.4 MG/DL (ref 8.5–10.1)
CALCIUM SERPL-MCNC: 8.5 MG/DL (ref 8.5–10.1)
CALCIUM SERPL-MCNC: 8.6 MG/DL (ref 8.5–10.1)
CALCIUM SERPL-MCNC: 8.7 MG/DL (ref 8.5–10.1)
CALCIUM SERPL-MCNC: 8.8 MG/DL (ref 8.7–10.3)
CALCIUM SERPL-MCNC: 8.9 MG/DL (ref 8.5–10.1)
CALCIUM SERPL-MCNC: 9.1 MG/DL (ref 8.7–10.3)
CALCIUM SERPL-MCNC: 9.2 MG/DL (ref 8.7–10.3)
CALCULATED R AXIS, ECG10: 63 DEGREES
CALCULATED R AXIS, ECG10: 72 DEGREES
CALCULATED R AXIS, ECG10: 74 DEGREES
CALCULATED R AXIS, ECG10: 77 DEGREES
CALCULATED R AXIS, ECG10: 79 DEGREES
CALCULATED R AXIS, ECG10: 82 DEGREES
CALCULATED T AXIS, ECG11: -53 DEGREES
CALCULATED T AXIS, ECG11: 63 DEGREES
CALCULATED T AXIS, ECG11: 74 DEGREES
CALCULATED T AXIS, ECG11: 76 DEGREES
CALCULATED T AXIS, ECG11: 81 DEGREES
CALCULATED T AXIS, ECG11: 88 DEGREES
CANNABINOIDS SERPL QL SCN: NEGATIVE NG/ML
CC UR VC: ABNORMAL
CHLORIDE SERPL-SCNC: 100 MMOL/L (ref 96–106)
CHLORIDE SERPL-SCNC: 100 MMOL/L (ref 97–108)
CHLORIDE SERPL-SCNC: 101 MMOL/L (ref 97–108)
CHLORIDE SERPL-SCNC: 102 MMOL/L (ref 97–108)
CHLORIDE SERPL-SCNC: 105 MMOL/L (ref 97–108)
CHLORIDE SERPL-SCNC: 87 MMOL/L (ref 96–106)
CHLORIDE SERPL-SCNC: 91 MMOL/L (ref 96–106)
CHLORIDE SERPL-SCNC: 93 MMOL/L (ref 97–108)
CHLORIDE SERPL-SCNC: 94 MMOL/L (ref 97–108)
CHLORIDE SERPL-SCNC: 95 MMOL/L (ref 97–108)
CHLORIDE SERPL-SCNC: 95 MMOL/L (ref 97–108)
CHLORIDE SERPL-SCNC: 96 MMOL/L (ref 97–108)
CHLORIDE SERPL-SCNC: 97 MMOL/L (ref 97–108)
CHLORIDE SERPL-SCNC: 98 MMOL/L (ref 97–108)
CHLORIDE SERPL-SCNC: 99 MMOL/L (ref 97–108)
CHOLEST SERPL-MCNC: 196 MG/DL (ref 100–199)
CK MB CFR SERPL CALC: 4 % (ref 0–2.5)
CK MB SERPL-MCNC: 1.9 NG/ML (ref 5–25)
CK SERPL-CCNC: 47 U/L (ref 26–192)
CO2 SERPL-SCNC: 23 MMOL/L (ref 21–32)
CO2 SERPL-SCNC: 25 MMOL/L (ref 21–32)
CO2 SERPL-SCNC: 25 MMOL/L (ref 21–32)
CO2 SERPL-SCNC: 26 MMOL/L (ref 21–32)
CO2 SERPL-SCNC: 27 MMOL/L (ref 21–32)
CO2 SERPL-SCNC: 28 MMOL/L (ref 20–29)
CO2 SERPL-SCNC: 28 MMOL/L (ref 21–32)
CO2 SERPL-SCNC: 29 MMOL/L (ref 21–32)
CO2 SERPL-SCNC: 30 MMOL/L (ref 21–32)
CO2 SERPL-SCNC: 30 MMOL/L (ref 21–32)
CO2 SERPL-SCNC: 31 MMOL/L (ref 18–29)
CO2 SERPL-SCNC: 31 MMOL/L (ref 21–32)
CO2 SERPL-SCNC: 31 MMOL/L (ref 21–32)
CO2 SERPL-SCNC: 33 MMOL/L (ref 21–32)
CO2 SERPL-SCNC: 34 MMOL/L (ref 21–32)
CO2 SERPL-SCNC: 35 MMOL/L (ref 21–32)
CO2 SERPL-SCNC: 36 MMOL/L (ref 20–29)
CO2 SERPL-SCNC: 36 MMOL/L (ref 21–32)
CO2 SERPL-SCNC: 36 MMOL/L (ref 21–32)
CO2 SERPL-SCNC: 37 MMOL/L (ref 21–32)
CO2 SERPL-SCNC: 39 MMOL/L (ref 21–32)
CO2 SERPL-SCNC: 40 MMOL/L (ref 21–32)
COCAINE QL URINE POC: NEGATIVE
COCAINE+BZE SERPL QL SCN: NEGATIVE NG/ML
CODEINE, 737848: NEGATIVE NG/ML
COLOR UR: ABNORMAL
COLOR UR: ABNORMAL
COMMENT, HOLDF: NORMAL
CREAT SERPL-MCNC: 0.68 MG/DL (ref 0.55–1.02)
CREAT SERPL-MCNC: 0.69 MG/DL (ref 0.55–1.02)
CREAT SERPL-MCNC: 0.76 MG/DL (ref 0.57–1)
CREAT SERPL-MCNC: 0.84 MG/DL (ref 0.55–1.02)
CREAT SERPL-MCNC: 0.84 MG/DL (ref 0.57–1)
CREAT SERPL-MCNC: 0.87 MG/DL (ref 0.55–1.02)
CREAT SERPL-MCNC: 0.89 MG/DL (ref 0.57–1)
CREAT SERPL-MCNC: 0.91 MG/DL (ref 0.55–1.02)
CREAT SERPL-MCNC: 0.92 MG/DL (ref 0.55–1.02)
CREAT SERPL-MCNC: 0.92 MG/DL (ref 0.55–1.02)
CREAT SERPL-MCNC: 0.96 MG/DL (ref 0.55–1.02)
CREAT SERPL-MCNC: 1.02 MG/DL (ref 0.55–1.02)
CREAT SERPL-MCNC: 1.05 MG/DL (ref 0.55–1.02)
CREAT SERPL-MCNC: 1.06 MG/DL (ref 0.55–1.02)
CREAT SERPL-MCNC: 1.07 MG/DL (ref 0.55–1.02)
CREAT SERPL-MCNC: 1.12 MG/DL (ref 0.55–1.02)
CREAT SERPL-MCNC: 1.14 MG/DL (ref 0.55–1.02)
CREAT SERPL-MCNC: 1.14 MG/DL (ref 0.55–1.02)
CREAT SERPL-MCNC: 1.15 MG/DL (ref 0.55–1.02)
CREAT SERPL-MCNC: 1.19 MG/DL (ref 0.55–1.02)
CREAT SERPL-MCNC: 1.27 MG/DL (ref 0.55–1.02)
CREAT SERPL-MCNC: 1.32 MG/DL (ref 0.55–1.02)
CREAT SERPL-MCNC: 1.33 MG/DL (ref 0.55–1.02)
CREAT SERPL-MCNC: 1.43 MG/DL (ref 0.55–1.02)
CREAT SERPL-MCNC: 1.44 MG/DL (ref 0.55–1.02)
CREAT SERPL-MCNC: 1.49 MG/DL (ref 0.55–1.02)
CREAT SERPL-MCNC: 1.52 MG/DL (ref 0.55–1.02)
CREAT UR-MCNC: 60.34 MG/DL
CROSSMATCH RESULT,%XM: NORMAL
D DIMER PPP FEU-MCNC: 0.33 MG/L FEU (ref 0–0.65)
DAT POLY-SP REAG RBC QL: NORMAL
DEPRECATED S PYO AG THROAT QL EIA: NEGATIVE
DIAGNOSIS, 93000: NORMAL
DIFFERENTIAL METHOD BLD: ABNORMAL
DIHYDROCODEINE, 764159: NEGATIVE NG/ML
EOSINOPHIL # BLD AUTO: 0 X10E3/UL (ref 0–0.4)
EOSINOPHIL # BLD: 0 K/UL (ref 0–0.4)
EOSINOPHIL # BLD: 0.1 K/UL (ref 0–0.4)
EOSINOPHIL NFR BLD AUTO: 0 %
EOSINOPHIL NFR BLD: 0 % (ref 0–7)
EOSINOPHIL NFR BLD: 1 % (ref 0–7)
EPAP/CPAP/PEEP, PAPEEP: 5
EPITH CASTS URNS QL MICRO: ABNORMAL /LPF
EPITH CASTS URNS QL MICRO: ABNORMAL /LPF
ERYTHROCYTE [DISTWIDTH] IN BLOOD BY AUTOMATED COUNT: 15.3 % (ref 11.5–14.5)
ERYTHROCYTE [DISTWIDTH] IN BLOOD BY AUTOMATED COUNT: 16 % (ref 11.5–14.5)
ERYTHROCYTE [DISTWIDTH] IN BLOOD BY AUTOMATED COUNT: 17.6 % (ref 12.3–15.4)
ERYTHROCYTE [DISTWIDTH] IN BLOOD BY AUTOMATED COUNT: 17.8 % (ref 11.5–14.5)
ERYTHROCYTE [DISTWIDTH] IN BLOOD BY AUTOMATED COUNT: 17.8 % (ref 11.5–14.5)
ERYTHROCYTE [DISTWIDTH] IN BLOOD BY AUTOMATED COUNT: 17.9 % (ref 11.5–14.5)
ERYTHROCYTE [DISTWIDTH] IN BLOOD BY AUTOMATED COUNT: 17.9 % (ref 11.5–14.5)
ERYTHROCYTE [DISTWIDTH] IN BLOOD BY AUTOMATED COUNT: 18 % (ref 11.5–14.5)
ERYTHROCYTE [DISTWIDTH] IN BLOOD BY AUTOMATED COUNT: 18 % (ref 11.5–14.5)
ERYTHROCYTE [DISTWIDTH] IN BLOOD BY AUTOMATED COUNT: 18.1 % (ref 11.5–14.5)
ERYTHROCYTE [DISTWIDTH] IN BLOOD BY AUTOMATED COUNT: 18.2 % (ref 11.5–14.5)
ERYTHROCYTE [DISTWIDTH] IN BLOOD BY AUTOMATED COUNT: 18.3 % (ref 11.5–14.5)
ERYTHROCYTE [DISTWIDTH] IN BLOOD BY AUTOMATED COUNT: 18.4 % (ref 11.5–14.5)
ERYTHROCYTE [DISTWIDTH] IN BLOOD BY AUTOMATED COUNT: 18.4 % (ref 11.5–14.5)
ERYTHROCYTE [DISTWIDTH] IN BLOOD BY AUTOMATED COUNT: 18.6 % (ref 11.5–14.5)
ERYTHROCYTE [DISTWIDTH] IN BLOOD BY AUTOMATED COUNT: 18.7 % (ref 11.5–14.5)
ERYTHROCYTE [DISTWIDTH] IN BLOOD BY AUTOMATED COUNT: 19.4 % (ref 12.3–15.4)
ERYTHROCYTE [DISTWIDTH] IN BLOOD BY AUTOMATED COUNT: 19.5 % (ref 11.5–14.5)
ERYTHROCYTE [DISTWIDTH] IN BLOOD BY AUTOMATED COUNT: 20 % (ref 11.5–14.5)
ERYTHROCYTE [DISTWIDTH] IN BLOOD BY AUTOMATED COUNT: 20.1 % (ref 11.5–14.5)
ERYTHROCYTE [DISTWIDTH] IN BLOOD BY AUTOMATED COUNT: 20.3 % (ref 11.5–14.5)
ERYTHROCYTE [DISTWIDTH] IN BLOOD BY AUTOMATED COUNT: 20.6 % (ref 11.5–14.5)
ERYTHROCYTE [DISTWIDTH] IN BLOOD BY AUTOMATED COUNT: 20.7 % (ref 11.5–14.5)
ERYTHROCYTE [DISTWIDTH] IN BLOOD BY AUTOMATED COUNT: 20.8 % (ref 12.3–15.4)
ERYTHROCYTE [DISTWIDTH] IN BLOOD BY AUTOMATED COUNT: 21.8 % (ref 12.3–15.4)
EST. AVERAGE GLUCOSE BLD GHB EST-MCNC: ABNORMAL MG/DL
ETHANOL UR-MCNC: NEGATIVE GM/DL
FERRITIN SERPL-MCNC: 327 NG/ML (ref 8–252)
FERRITIN SERPL-MCNC: 89 NG/ML (ref 15–150)
FIO2 ON VENT: 40 %
FLUAV AG NPH QL IA: NEGATIVE
FLUAV H1 2009 PAND RNA SPEC QL NAA+PROBE: NOT DETECTED
FLUAV H1 2009 PAND RNA SPEC QL NAA+PROBE: NOT DETECTED
FLUAV H1 RNA SPEC QL NAA+PROBE: NOT DETECTED
FLUAV H1 RNA SPEC QL NAA+PROBE: NOT DETECTED
FLUAV H3 RNA SPEC QL NAA+PROBE: NOT DETECTED
FLUAV H3 RNA SPEC QL NAA+PROBE: NOT DETECTED
FLUAV SUBTYP SPEC NAA+PROBE: NOT DETECTED
FLUAV SUBTYP SPEC NAA+PROBE: NOT DETECTED
FLUBV AG NOSE QL IA: NEGATIVE
FLUBV RNA SPEC QL NAA+PROBE: NOT DETECTED
FLUBV RNA SPEC QL NAA+PROBE: NOT DETECTED
FOLATE SERPL-MCNC: 8.3 NG/ML (ref 5–21)
GAS FLOW.O2 O2 DELIVERY SYS: 2 L/MIN
GFR SERPLBLD CREATININE-BSD FMLA CKD-EPI: 86 ML/MIN/1.73
GFR SERPLBLD CREATININE-BSD FMLA CKD-EPI: 99 ML/MIN/1.73
GLOBULIN SER CALC-MCNC: 2.1 G/DL (ref 1.5–4.5)
GLOBULIN SER CALC-MCNC: 2.2 G/DL (ref 1.5–4.5)
GLOBULIN SER CALC-MCNC: 2.9 G/DL (ref 2–4)
GLOBULIN SER CALC-MCNC: 3 G/DL (ref 2–4)
GLOBULIN SER CALC-MCNC: 3 G/DL (ref 2–4)
GLOBULIN SER CALC-MCNC: 3.1 G/DL (ref 2–4)
GLOBULIN SER CALC-MCNC: 3.2 G/DL (ref 2–4)
GLOBULIN SER CALC-MCNC: 3.6 G/DL (ref 2–4)
GLOBULIN SER CALC-MCNC: 3.7 G/DL (ref 2–4)
GLUCOSE BLD STRIP.AUTO-MCNC: 105 MG/DL (ref 65–100)
GLUCOSE BLD STRIP.AUTO-MCNC: 108 MG/DL (ref 65–100)
GLUCOSE BLD STRIP.AUTO-MCNC: 130 MG/DL (ref 65–100)
GLUCOSE BLD STRIP.AUTO-MCNC: 136 MG/DL (ref 65–100)
GLUCOSE BLD STRIP.AUTO-MCNC: 151 MG/DL (ref 65–100)
GLUCOSE BLD STRIP.AUTO-MCNC: 160 MG/DL (ref 65–100)
GLUCOSE BLD STRIP.AUTO-MCNC: 166 MG/DL (ref 65–100)
GLUCOSE BLD STRIP.AUTO-MCNC: 168 MG/DL (ref 65–100)
GLUCOSE BLD STRIP.AUTO-MCNC: 172 MG/DL (ref 65–100)
GLUCOSE BLD STRIP.AUTO-MCNC: 180 MG/DL (ref 65–100)
GLUCOSE BLD STRIP.AUTO-MCNC: 193 MG/DL (ref 65–100)
GLUCOSE BLD STRIP.AUTO-MCNC: 216 MG/DL (ref 65–100)
GLUCOSE BLD STRIP.AUTO-MCNC: 224 MG/DL (ref 65–100)
GLUCOSE BLD STRIP.AUTO-MCNC: 230 MG/DL (ref 65–100)
GLUCOSE BLD STRIP.AUTO-MCNC: 234 MG/DL (ref 65–100)
GLUCOSE BLD STRIP.AUTO-MCNC: 238 MG/DL (ref 65–100)
GLUCOSE BLD STRIP.AUTO-MCNC: 245 MG/DL (ref 65–100)
GLUCOSE BLD STRIP.AUTO-MCNC: 250 MG/DL (ref 65–100)
GLUCOSE BLD STRIP.AUTO-MCNC: 268 MG/DL (ref 65–100)
GLUCOSE BLD STRIP.AUTO-MCNC: 271 MG/DL (ref 65–100)
GLUCOSE BLD STRIP.AUTO-MCNC: 274 MG/DL (ref 65–100)
GLUCOSE BLD STRIP.AUTO-MCNC: 279 MG/DL (ref 65–100)
GLUCOSE BLD STRIP.AUTO-MCNC: 288 MG/DL (ref 65–100)
GLUCOSE BLD STRIP.AUTO-MCNC: 292 MG/DL (ref 65–100)
GLUCOSE BLD STRIP.AUTO-MCNC: 300 MG/DL (ref 65–100)
GLUCOSE BLD STRIP.AUTO-MCNC: 305 MG/DL (ref 65–100)
GLUCOSE BLD STRIP.AUTO-MCNC: 319 MG/DL (ref 65–100)
GLUCOSE BLD STRIP.AUTO-MCNC: 329 MG/DL (ref 65–100)
GLUCOSE BLD STRIP.AUTO-MCNC: 333 MG/DL (ref 65–100)
GLUCOSE BLD STRIP.AUTO-MCNC: 389 MG/DL (ref 65–100)
GLUCOSE SERPL-MCNC: 106 MG/DL (ref 65–99)
GLUCOSE SERPL-MCNC: 109 MG/DL (ref 65–100)
GLUCOSE SERPL-MCNC: 112 MG/DL (ref 65–100)
GLUCOSE SERPL-MCNC: 118 MG/DL (ref 65–100)
GLUCOSE SERPL-MCNC: 129 MG/DL (ref 65–100)
GLUCOSE SERPL-MCNC: 130 MG/DL (ref 65–99)
GLUCOSE SERPL-MCNC: 132 MG/DL (ref 65–99)
GLUCOSE SERPL-MCNC: 147 MG/DL (ref 65–100)
GLUCOSE SERPL-MCNC: 149 MG/DL (ref 65–100)
GLUCOSE SERPL-MCNC: 154 MG/DL (ref 65–100)
GLUCOSE SERPL-MCNC: 169 MG/DL (ref 65–100)
GLUCOSE SERPL-MCNC: 187 MG/DL (ref 65–100)
GLUCOSE SERPL-MCNC: 188 MG/DL (ref 65–100)
GLUCOSE SERPL-MCNC: 189 MG/DL (ref 65–100)
GLUCOSE SERPL-MCNC: 233 MG/DL (ref 65–100)
GLUCOSE SERPL-MCNC: 244 MG/DL (ref 65–100)
GLUCOSE SERPL-MCNC: 244 MG/DL (ref 65–100)
GLUCOSE SERPL-MCNC: 254 MG/DL (ref 65–100)
GLUCOSE SERPL-MCNC: 263 MG/DL (ref 65–100)
GLUCOSE SERPL-MCNC: 271 MG/DL (ref 65–100)
GLUCOSE SERPL-MCNC: 276 MG/DL (ref 65–100)
GLUCOSE SERPL-MCNC: 279 MG/DL (ref 65–100)
GLUCOSE SERPL-MCNC: 279 MG/DL (ref 65–100)
GLUCOSE SERPL-MCNC: 287 MG/DL (ref 65–100)
GLUCOSE SERPL-MCNC: 384 MG/DL (ref 65–100)
GLUCOSE SERPL-MCNC: 92 MG/DL (ref 65–100)
GLUCOSE SERPL-MCNC: 95 MG/DL (ref 65–100)
GLUCOSE UR STRIP.AUTO-MCNC: 250 MG/DL
GLUCOSE UR STRIP.AUTO-MCNC: NEGATIVE MG/DL
GRAM STN SPEC: ABNORMAL
HADV DNA SPEC QL NAA+PROBE: NOT DETECTED
HADV DNA SPEC QL NAA+PROBE: NOT DETECTED
HAPTOGLOB SERPL-MCNC: <8 MG/DL (ref 30–200)
HBA1C MFR BLD: <3.5 % (ref 4.2–6.3)
HCO3 BLDA-SCNC: 26 MMOL/L (ref 22–26)
HCO3 BLDA-SCNC: 40 MMOL/L (ref 22–26)
HCO3 BLDV-SCNC: 30 MMOL/L (ref 23–28)
HCOV 229E RNA SPEC QL NAA+PROBE: NOT DETECTED
HCOV 229E RNA SPEC QL NAA+PROBE: NOT DETECTED
HCOV HKU1 RNA SPEC QL NAA+PROBE: NOT DETECTED
HCOV HKU1 RNA SPEC QL NAA+PROBE: NOT DETECTED
HCOV NL63 RNA SPEC QL NAA+PROBE: NOT DETECTED
HCOV NL63 RNA SPEC QL NAA+PROBE: NOT DETECTED
HCOV OC43 RNA SPEC QL NAA+PROBE: NOT DETECTED
HCOV OC43 RNA SPEC QL NAA+PROBE: NOT DETECTED
HCT VFR BLD AUTO: 22.1 % (ref 35–47)
HCT VFR BLD AUTO: 22.1 % (ref 35–47)
HCT VFR BLD AUTO: 22.3 % (ref 35–47)
HCT VFR BLD AUTO: 24.3 % (ref 35–47)
HCT VFR BLD AUTO: 24.8 % (ref 34–46.6)
HCT VFR BLD AUTO: 24.8 % (ref 35–47)
HCT VFR BLD AUTO: 25 % (ref 35–47)
HCT VFR BLD AUTO: 25.5 % (ref 35–47)
HCT VFR BLD AUTO: 25.6 % (ref 34–46.6)
HCT VFR BLD AUTO: 25.8 % (ref 35–47)
HCT VFR BLD AUTO: 25.9 % (ref 35–47)
HCT VFR BLD AUTO: 26.1 % (ref 35–47)
HCT VFR BLD AUTO: 26.2 % (ref 35–47)
HCT VFR BLD AUTO: 26.7 % (ref 35–47)
HCT VFR BLD AUTO: 26.8 % (ref 35–47)
HCT VFR BLD AUTO: 27.8 % (ref 35–47)
HCT VFR BLD AUTO: 28.5 % (ref 35–47)
HCT VFR BLD AUTO: 29.6 % (ref 35–47)
HCT VFR BLD AUTO: 32.1 % (ref 35–47)
HCT VFR BLD AUTO: 34.3 % (ref 35–47)
HCT VFR BLD AUTO: 34.4 % (ref 35–47)
HCT VFR BLD AUTO: 34.5 % (ref 35–47)
HCT VFR BLD AUTO: 35.2 % (ref 35–47)
HCT VFR BLD AUTO: 35.4 % (ref 35–47)
HCT VFR BLD AUTO: 35.7 % (ref 34–46.6)
HCT VFR BLD AUTO: 37.4 % (ref 34–46.6)
HCT VFR BLD AUTO: 37.4 % (ref 35–47)
HCT VFR BLD AUTO: 39.1 % (ref 35–47)
HDLC SERPL-MCNC: 88 MG/DL
HEMOCCULT STL QL: POSITIVE
HGB BLD-MCNC: 10 G/DL (ref 11.5–16)
HGB BLD-MCNC: 10.7 G/DL (ref 11.5–16)
HGB BLD-MCNC: 10.7 G/DL (ref 11.5–16)
HGB BLD-MCNC: 10.8 G/DL (ref 11.5–16)
HGB BLD-MCNC: 10.9 G/DL (ref 11.5–16)
HGB BLD-MCNC: 11.4 G/DL (ref 11.1–15.9)
HGB BLD-MCNC: 11.7 G/DL (ref 11.5–16)
HGB BLD-MCNC: 11.8 G/DL (ref 11.1–15.9)
HGB BLD-MCNC: 11.9 G/DL (ref 11.5–16)
HGB BLD-MCNC: 12.3 G/DL (ref 11.5–16)
HGB BLD-MCNC: 7 G/DL (ref 11.5–16)
HGB BLD-MCNC: 7.1 G/DL (ref 11.5–16)
HGB BLD-MCNC: 7.2 G/DL (ref 11.5–16)
HGB BLD-MCNC: 7.7 G/DL (ref 11.5–16)
HGB BLD-MCNC: 7.9 G/DL (ref 11.1–15.9)
HGB BLD-MCNC: 7.9 G/DL (ref 11.5–16)
HGB BLD-MCNC: 7.9 G/DL (ref 11.5–16)
HGB BLD-MCNC: 8 G/DL (ref 11.5–16)
HGB BLD-MCNC: 8 G/DL (ref 11.5–16)
HGB BLD-MCNC: 8.1 G/DL (ref 11.5–16)
HGB BLD-MCNC: 8.2 G/DL (ref 11.5–16)
HGB BLD-MCNC: 8.3 G/DL (ref 11.1–15.9)
HGB BLD-MCNC: 8.3 G/DL (ref 11.5–16)
HGB BLD-MCNC: 8.3 G/DL (ref 11.5–16)
HGB BLD-MCNC: 8.4 G/DL (ref 11.5–16)
HGB BLD-MCNC: 8.6 G/DL (ref 11.5–16)
HGB BLD-MCNC: 8.7 G/DL (ref 11.5–16)
HGB BLD-MCNC: 9.1 G/DL (ref 11.5–16)
HGB BLD-MCNC: 9.6 G/DL (ref 11.5–16)
HGB UR QL STRIP: ABNORMAL
HGB UR QL STRIP: ABNORMAL
HMPV RNA SPEC QL NAA+PROBE: NOT DETECTED
HMPV RNA SPEC QL NAA+PROBE: NOT DETECTED
HPIV1 RNA SPEC QL NAA+PROBE: NOT DETECTED
HPIV1 RNA SPEC QL NAA+PROBE: NOT DETECTED
HPIV2 RNA SPEC QL NAA+PROBE: NOT DETECTED
HPIV2 RNA SPEC QL NAA+PROBE: NOT DETECTED
HPIV3 RNA SPEC QL NAA+PROBE: NOT DETECTED
HPIV3 RNA SPEC QL NAA+PROBE: NOT DETECTED
HPIV4 RNA SPEC QL NAA+PROBE: NOT DETECTED
HPIV4 RNA SPEC QL NAA+PROBE: NOT DETECTED
HYALINE CASTS URNS QL MICRO: ABNORMAL /LPF (ref 0–5)
HYDROCODONE, 737854: NEGATIVE NG/ML
HYDROMORPHONE, 737852: NEGATIVE NG/ML
IMM GRANULOCYTES # BLD: 0 K/UL
IMM GRANULOCYTES # BLD: 0 K/UL
IMM GRANULOCYTES # BLD: 0 K/UL (ref 0–0.04)
IMM GRANULOCYTES # BLD: 0 X10E3/UL (ref 0–0.1)
IMM GRANULOCYTES # BLD: 0.1 K/UL (ref 0–0.04)
IMM GRANULOCYTES # BLD: 0.2 K/UL (ref 0–0.04)
IMM GRANULOCYTES # BLD: 0.2 K/UL (ref 0–0.04)
IMM GRANULOCYTES # BLD: 0.4 K/UL (ref 0–0.04)
IMM GRANULOCYTES # BLD: 0.5 X10E3/UL (ref 0–0.1)
IMM GRANULOCYTES # BLD: 0.6 X10E3/UL (ref 0–0.1)
IMM GRANULOCYTES NFR BLD AUTO: 0 %
IMM GRANULOCYTES NFR BLD AUTO: 0 %
IMM GRANULOCYTES NFR BLD AUTO: 0 % (ref 0–0.5)
IMM GRANULOCYTES NFR BLD AUTO: 0 % (ref 0–0.5)
IMM GRANULOCYTES NFR BLD AUTO: 1 % (ref 0–0.5)
IMM GRANULOCYTES NFR BLD AUTO: 1 % (ref 0–0.5)
IMM GRANULOCYTES NFR BLD AUTO: 2 % (ref 0–0.5)
IMM GRANULOCYTES NFR BLD: 0 %
IMM GRANULOCYTES NFR BLD: 2 %
IMM GRANULOCYTES NFR BLD: 3 %
IMMATURE CELLS, 115398: ABNORMAL
INR BLD: 1.2
INR BLD: 1.3
INR BLD: 1.3
INR BLD: 1.3 (ref 0.9–1.1)
INR BLD: 1.6
INR BLD: 1.7
INR BLD: 1.9
INR BLD: 1.9 (ref 0.9–1.1)
INR BLD: 2.1
INR BLD: 2.2
INR BLD: 2.5
INR BLD: 2.5
INR BLD: 2.8
INR BLD: 2.8 (ref 0.9–1.1)
INR BLD: 2.9
INR BLD: 2.9 (ref 0.9–1.1)
INR BLD: 3
INR BLD: 3
INR BLD: 3.6
INR BLD: 3.8
INR BLD: 5.1
INR BLD: 6.3 (ref 0.9–1.1)
INR BLD: 8
INR PPP: 1.5 (ref 0.9–1.1)
INR PPP: 1.7 (ref 0.9–1.1)
INR PPP: 1.7 (ref 0.9–1.1)
INR PPP: 1.9 (ref 0.8–1.2)
INR PPP: 2.1 (ref 0.9–1.1)
INR PPP: 2.2 (ref 0.9–1.1)
INR PPP: 2.3 (ref 0.9–1.1)
INR PPP: 2.5 (ref 0.9–1.1)
INR PPP: 2.5 (ref 0.9–1.1)
INR PPP: 2.7 (ref 0.9–1.1)
INR PPP: 2.7 (ref 0.9–1.1)
INR PPP: 2.9 (ref 0.9–1.1)
INR PPP: 3.2 (ref 0.9–1.1)
INR PPP: 3.4 (ref 0.9–1.1)
INR PPP: 3.4 (ref 0.9–1.1)
INR PPP: 3.7 (ref 0.9–1.1)
INR PPP: 5.1 (ref 0.9–1.1)
INR PPP: 6.8 (ref 0.8–1.2)
INR, POC: 1.3 (ref 0.7–1.2)
INTERPRETATION, 910389: NORMAL
IPAP/PIP, IPAPIP: 10
IRON SATN MFR SERPL: 20 % (ref 20–50)
IRON SATN MFR SERPL: 36 % (ref 15–55)
IRON SERPL-MCNC: 126 UG/DL (ref 27–139)
IRON SERPL-MCNC: 66 UG/DL (ref 35–150)
KETONES UR QL STRIP.AUTO: ABNORMAL MG/DL
KETONES UR QL STRIP.AUTO: NEGATIVE MG/DL
LACTATE SERPL-SCNC: 1.7 MMOL/L (ref 0.4–2)
LACTATE SERPL-SCNC: 1.8 MMOL/L (ref 0.4–2)
LACTATE SERPL-SCNC: 3.1 MMOL/L (ref 0.4–2)
LACTATE SERPL-SCNC: 3.5 MMOL/L (ref 0.4–2)
LACTATE SERPL-SCNC: 3.5 MMOL/L (ref 0.4–2)
LACTATE SERPL-SCNC: 3.7 MMOL/L (ref 0.4–2)
LACTATE SERPL-SCNC: 4.7 MMOL/L (ref 0.4–2)
LACTATE SERPL-SCNC: 5.3 MMOL/L (ref 0.4–2)
LDLC SERPL CALC-MCNC: 78 MG/DL (ref 0–99)
LEUKOCYTE ESTERASE UR QL STRIP.AUTO: ABNORMAL
LEUKOCYTE ESTERASE UR QL STRIP.AUTO: NEGATIVE
LOT EXP DATE POC: NORMAL
LOT NUMBER POC: NORMAL
LYMPHOCYTES # BLD AUTO: 0.9 X10E3/UL (ref 0.7–3.1)
LYMPHOCYTES # BLD AUTO: 1 X10E3/UL (ref 0.7–3.1)
LYMPHOCYTES # BLD AUTO: 1.3 X10E3/UL (ref 0.7–3.1)
LYMPHOCYTES # BLD AUTO: 1.8 X10E3/UL (ref 0.7–3.1)
LYMPHOCYTES # BLD: 0 K/UL (ref 0.8–3.5)
LYMPHOCYTES # BLD: 0.2 K/UL (ref 0.8–3.5)
LYMPHOCYTES # BLD: 0.7 K/UL (ref 0.8–3.5)
LYMPHOCYTES # BLD: 0.8 K/UL (ref 0.8–3.5)
LYMPHOCYTES # BLD: 1 K/UL (ref 0.8–3.5)
LYMPHOCYTES # BLD: 1.4 K/UL (ref 0.8–3.5)
LYMPHOCYTES # BLD: 2.1 K/UL (ref 0.8–3.5)
LYMPHOCYTES NFR BLD AUTO: 11 %
LYMPHOCYTES NFR BLD AUTO: 4 %
LYMPHOCYTES NFR BLD AUTO: 5 %
LYMPHOCYTES NFR BLD AUTO: 5 %
LYMPHOCYTES NFR BLD: 0 % (ref 12–49)
LYMPHOCYTES NFR BLD: 1 % (ref 12–49)
LYMPHOCYTES NFR BLD: 5 % (ref 12–49)
LYMPHOCYTES NFR BLD: 5 % (ref 12–49)
LYMPHOCYTES NFR BLD: 7 % (ref 12–49)
LYMPHOCYTES NFR BLD: 8 % (ref 12–49)
LYMPHOCYTES NFR BLD: 8 % (ref 12–49)
M PNEUMO DNA SPEC QL NAA+PROBE: NOT DETECTED
M PNEUMO DNA SPEC QL NAA+PROBE: NOT DETECTED
MAGNESIUM SERPL-MCNC: 1.4 MG/DL (ref 1.6–2.4)
MAGNESIUM SERPL-MCNC: 1.6 MG/DL (ref 1.6–2.4)
MAGNESIUM SERPL-MCNC: 1.7 MG/DL (ref 1.6–2.4)
MAGNESIUM SERPL-MCNC: 1.8 MG/DL (ref 1.6–2.4)
MAGNESIUM SERPL-MCNC: 1.9 MG/DL (ref 1.6–2.4)
MAGNESIUM SERPL-MCNC: 1.9 MG/DL (ref 1.6–2.4)
MAGNESIUM SERPL-MCNC: 2 MG/DL (ref 1.6–2.4)
MAGNESIUM SERPL-MCNC: 2.1 MG/DL (ref 1.6–2.4)
MARIJUANA (THC) QL URINE POC: NEGATIVE
MCH RBC QN AUTO: 27 PG (ref 26–34)
MCH RBC QN AUTO: 28.5 PG (ref 26.6–33)
MCH RBC QN AUTO: 28.6 PG (ref 26–34)
MCH RBC QN AUTO: 28.7 PG (ref 26–34)
MCH RBC QN AUTO: 28.7 PG (ref 26–34)
MCH RBC QN AUTO: 28.8 PG (ref 26–34)
MCH RBC QN AUTO: 29.1 PG (ref 26–34)
MCH RBC QN AUTO: 29.2 PG (ref 26–34)
MCH RBC QN AUTO: 29.3 PG (ref 26.6–33)
MCH RBC QN AUTO: 29.4 PG (ref 26–34)
MCH RBC QN AUTO: 29.4 PG (ref 26–34)
MCH RBC QN AUTO: 29.6 PG (ref 26–34)
MCH RBC QN AUTO: 29.9 PG (ref 26–34)
MCH RBC QN AUTO: 30 PG (ref 26–34)
MCH RBC QN AUTO: 30.1 PG (ref 26–34)
MCH RBC QN AUTO: 30.2 PG (ref 26–34)
MCH RBC QN AUTO: 30.2 PG (ref 26–34)
MCH RBC QN AUTO: 30.3 PG (ref 26–34)
MCH RBC QN AUTO: 30.5 PG (ref 26.6–33)
MCH RBC QN AUTO: 30.7 PG (ref 26.6–33)
MCH RBC QN AUTO: 30.8 PG (ref 26–34)
MCHC RBC AUTO-ENTMCNC: 30.8 G/DL (ref 30–36.5)
MCHC RBC AUTO-ENTMCNC: 31 G/DL (ref 30–36.5)
MCHC RBC AUTO-ENTMCNC: 31 G/DL (ref 30–36.5)
MCHC RBC AUTO-ENTMCNC: 31.1 G/DL (ref 30–36.5)
MCHC RBC AUTO-ENTMCNC: 31.2 G/DL (ref 30–36.5)
MCHC RBC AUTO-ENTMCNC: 31.3 G/DL (ref 30–36.5)
MCHC RBC AUTO-ENTMCNC: 31.4 G/DL (ref 30–36.5)
MCHC RBC AUTO-ENTMCNC: 31.5 G/DL (ref 30–36.5)
MCHC RBC AUTO-ENTMCNC: 31.5 G/DL (ref 30–36.5)
MCHC RBC AUTO-ENTMCNC: 31.6 G/DL (ref 30–36.5)
MCHC RBC AUTO-ENTMCNC: 31.6 G/DL (ref 31.5–35.7)
MCHC RBC AUTO-ENTMCNC: 31.7 G/DL (ref 30–36.5)
MCHC RBC AUTO-ENTMCNC: 31.8 G/DL (ref 30–36.5)
MCHC RBC AUTO-ENTMCNC: 31.9 G/DL (ref 30–36.5)
MCHC RBC AUTO-ENTMCNC: 31.9 G/DL (ref 30–36.5)
MCHC RBC AUTO-ENTMCNC: 31.9 G/DL (ref 31.5–35.7)
MCHC RBC AUTO-ENTMCNC: 31.9 G/DL (ref 31.5–35.7)
MCHC RBC AUTO-ENTMCNC: 32 G/DL (ref 30–36.5)
MCHC RBC AUTO-ENTMCNC: 32.2 G/DL (ref 30–36.5)
MCHC RBC AUTO-ENTMCNC: 32.4 G/DL (ref 30–36.5)
MCHC RBC AUTO-ENTMCNC: 32.4 G/DL (ref 31.5–35.7)
MCHC RBC AUTO-ENTMCNC: 32.6 G/DL (ref 30–36.5)
MCHC RBC AUTO-ENTMCNC: 33.2 G/DL (ref 30–36.5)
MCV RBC AUTO: 85 FL (ref 80–99)
MCV RBC AUTO: 87.8 FL (ref 80–99)
MCV RBC AUTO: 90 FL (ref 79–97)
MCV RBC AUTO: 90.7 FL (ref 80–99)
MCV RBC AUTO: 92 FL (ref 79–97)
MCV RBC AUTO: 92.2 FL (ref 80–99)
MCV RBC AUTO: 93.4 FL (ref 80–99)
MCV RBC AUTO: 93.5 FL (ref 80–99)
MCV RBC AUTO: 93.5 FL (ref 80–99)
MCV RBC AUTO: 93.8 FL (ref 80–99)
MCV RBC AUTO: 93.9 FL (ref 80–99)
MCV RBC AUTO: 94.1 FL (ref 80–99)
MCV RBC AUTO: 94.1 FL (ref 80–99)
MCV RBC AUTO: 94.4 FL (ref 80–99)
MCV RBC AUTO: 94.5 FL (ref 80–99)
MCV RBC AUTO: 94.5 FL (ref 80–99)
MCV RBC AUTO: 94.9 FL (ref 80–99)
MCV RBC AUTO: 95 FL (ref 79–97)
MCV RBC AUTO: 95 FL (ref 80–99)
MCV RBC AUTO: 95.4 FL (ref 80–99)
MCV RBC AUTO: 95.5 FL (ref 80–99)
MCV RBC AUTO: 96 FL (ref 79–97)
MCV RBC AUTO: 96.2 FL (ref 80–99)
MDMA/ECSTASY UR POC: NEGATIVE
METAMYELOCYTES NFR BLD MANUAL: 4 %
METAMYELOCYTES NFR BLD: 2 %
METHADONE QL URINE POC: NEGATIVE
METHADONE SERPL QL SCN: NEGATIVE NG/ML
METHAMPHETAMINE QL URINE POC: NEGATIVE
MONOCYTES # BLD AUTO: 0.6 X10E3/UL (ref 0.1–0.9)
MONOCYTES # BLD AUTO: 0.6 X10E3/UL (ref 0.1–0.9)
MONOCYTES # BLD AUTO: 1 X10E3/UL (ref 0.1–0.9)
MONOCYTES # BLD AUTO: 2 X10E3/UL (ref 0.1–0.9)
MONOCYTES # BLD: 0.5 K/UL (ref 0–1)
MONOCYTES # BLD: 0.9 K/UL (ref 0–1)
MONOCYTES # BLD: 1.1 K/UL (ref 0–1)
MONOCYTES # BLD: 1.1 K/UL (ref 0–1)
MONOCYTES # BLD: 1.2 K/UL (ref 0–1)
MONOCYTES # BLD: 1.3 K/UL (ref 0–1)
MONOCYTES # BLD: 1.4 K/UL (ref 0–1)
MONOCYTES NFR BLD AUTO: 2 %
MONOCYTES NFR BLD AUTO: 3 %
MONOCYTES NFR BLD AUTO: 6 %
MONOCYTES NFR BLD AUTO: 8 %
MONOCYTES NFR BLD: 2 % (ref 5–13)
MONOCYTES NFR BLD: 3 % (ref 5–13)
MONOCYTES NFR BLD: 5 % (ref 5–13)
MONOCYTES NFR BLD: 7 % (ref 5–13)
MONOCYTES NFR BLD: 9 % (ref 5–13)
MORPHINE, 737850: NEGATIVE NG/ML
MORPHOLOGY BLD-IMP: ABNORMAL
MYELOCYTES NFR BLD: 3 %
NEUTROPHILS # BLD AUTO: 14.2 X10E3/UL (ref 1.4–7)
NEUTROPHILS # BLD AUTO: 14.3 X10E3/UL (ref 1.4–7)
NEUTROPHILS # BLD AUTO: 19.8 X10E3/UL (ref 1.4–7)
NEUTROPHILS # BLD AUTO: 27.2 X10E3/UL (ref 1.4–7)
NEUTROPHILS NFR BLD AUTO: 84 %
NEUTROPHILS NFR BLD AUTO: 84 %
NEUTROPHILS NFR BLD AUTO: 86 %
NEUTROPHILS NFR BLD AUTO: 92 %
NEUTS BAND NFR BLD MANUAL: 3 % (ref 0–6)
NEUTS SEG # BLD: 11.8 K/UL (ref 1.8–8)
NEUTS SEG # BLD: 13.3 K/UL (ref 1.8–8)
NEUTS SEG # BLD: 15.3 K/UL (ref 1.8–8)
NEUTS SEG # BLD: 20.6 K/UL (ref 1.8–8)
NEUTS SEG # BLD: 22.5 K/UL (ref 1.8–8)
NEUTS SEG # BLD: 25.9 K/UL (ref 1.8–8)
NEUTS SEG # BLD: 9.9 K/UL (ref 1.8–8)
NEUTS SEG NFR BLD: 82 % (ref 32–75)
NEUTS SEG NFR BLD: 83 % (ref 32–75)
NEUTS SEG NFR BLD: 84 % (ref 32–75)
NEUTS SEG NFR BLD: 86 % (ref 32–75)
NEUTS SEG NFR BLD: 86 % (ref 32–75)
NEUTS SEG NFR BLD: 93 % (ref 32–75)
NEUTS SEG NFR BLD: 96 % (ref 32–75)
NITRITE UR QL STRIP.AUTO: NEGATIVE
NITRITE UR QL STRIP.AUTO: POSITIVE
NRBC # BLD: 0 K/UL (ref 0–0.01)
NRBC # BLD: 0.02 K/UL (ref 0–0.01)
NRBC # BLD: 0.08 K/UL (ref 0–0.01)
NRBC # BLD: 0.19 K/UL (ref 0–0.01)
NRBC # BLD: 0.25 K/UL (ref 0–0.01)
NRBC # BLD: 0.42 K/UL (ref 0–0.01)
NRBC BLD AUTO-RTO: 1 %
NRBC BLD-RTO: 0 PER 100 WBC
NRBC BLD-RTO: 0.1 PER 100 WBC
NRBC BLD-RTO: 0.4 PER 100 WBC
NRBC BLD-RTO: 0.8 PER 100 WBC
NRBC BLD-RTO: 0.9 PER 100 WBC
NRBC BLD-RTO: 1.4 PER 100 WBC
NTI OTHER MICRO TRANSPORT 977598: NEGATIVE
OPIATES QL URINE POC: NEGATIVE
OPIATES SERPL QL SCN: NEGATIVE NG/ML
OPIATES SPEC QL: NEGATIVE
OXYCOCONE: 31.7 NG/ML
OXYCODONE UR POC: NORMAL
OXYCODONES CONFIRMATION, 738393: POSITIVE
OXYCODONES, 738315: ABNORMAL NG/ML
OXYMORPHONE, 763902: 1.2 NG/ML
PCO2 BLDA: 36 MMHG (ref 35–45)
PCO2 BLDA: 47 MMHG (ref 35–45)
PCO2 BLDV: 52 MMHG (ref 41–51)
PCP SERPL QL SCN: NEGATIVE NG/ML
PH BLDA: 7.48 [PH] (ref 7.35–7.45)
PH BLDA: 7.54 [PH] (ref 7.35–7.45)
PH BLDV: 7.38 [PH] (ref 7.32–7.42)
PH UR STRIP: 5.5 [PH] (ref 5–8)
PH UR STRIP: 5.5 [PH] (ref 5–8)
PHOSPHATE SERPL-MCNC: 2.4 MG/DL (ref 2.6–4.7)
PHOSPHATE SERPL-MCNC: 3.1 MG/DL (ref 2.6–4.7)
PHOSPHATE SERPL-MCNC: 3.1 MG/DL (ref 2.6–4.7)
PHOSPHATE SERPL-MCNC: 3.3 MG/DL (ref 2.6–4.7)
PHOSPHATE SERPL-MCNC: 3.5 MG/DL (ref 2.6–4.7)
PHOSPHATE SERPL-MCNC: 3.6 MG/DL (ref 2.6–4.7)
PHOSPHATE SERPL-MCNC: 3.7 MG/DL (ref 2.6–4.7)
PHOSPHATE SERPL-MCNC: 3.8 MG/DL (ref 2.6–4.7)
PHOSPHATE SERPL-MCNC: 4.2 MG/DL (ref 2.6–4.7)
PLATELET # BLD AUTO: 184 K/UL (ref 150–400)
PLATELET # BLD AUTO: 187 K/UL (ref 150–400)
PLATELET # BLD AUTO: 192 K/UL (ref 150–400)
PLATELET # BLD AUTO: 196 K/UL (ref 150–400)
PLATELET # BLD AUTO: 198 K/UL (ref 150–400)
PLATELET # BLD AUTO: 202 K/UL (ref 150–400)
PLATELET # BLD AUTO: 210 K/UL (ref 150–400)
PLATELET # BLD AUTO: 223 K/UL (ref 150–400)
PLATELET # BLD AUTO: 229 K/UL (ref 150–400)
PLATELET # BLD AUTO: 237 K/UL (ref 150–400)
PLATELET # BLD AUTO: 242 K/UL (ref 150–400)
PLATELET # BLD AUTO: 243 K/UL (ref 150–400)
PLATELET # BLD AUTO: 243 K/UL (ref 150–400)
PLATELET # BLD AUTO: 248 K/UL (ref 150–400)
PLATELET # BLD AUTO: 248 K/UL (ref 150–400)
PLATELET # BLD AUTO: 264 K/UL (ref 150–400)
PLATELET # BLD AUTO: 266 K/UL (ref 150–400)
PLATELET # BLD AUTO: 269 K/UL (ref 150–400)
PLATELET # BLD AUTO: 273 K/UL (ref 150–400)
PLATELET # BLD AUTO: 282 X10E3/UL (ref 150–379)
PLATELET # BLD AUTO: 296 K/UL (ref 150–400)
PLATELET # BLD AUTO: 298 X10E3/UL (ref 150–379)
PLATELET # BLD AUTO: 304 K/UL (ref 150–400)
PLATELET # BLD AUTO: 315 X10E3/UL (ref 150–379)
PLATELET # BLD AUTO: 374 X10E3/UL (ref 150–379)
PLATELET COMMENTS,PCOM: ABNORMAL
PMV BLD AUTO: 10 FL (ref 8.9–12.9)
PMV BLD AUTO: 10.2 FL (ref 8.9–12.9)
PMV BLD AUTO: 10.4 FL (ref 8.9–12.9)
PMV BLD AUTO: 10.7 FL (ref 8.9–12.9)
PMV BLD AUTO: 11 FL (ref 8.9–12.9)
PMV BLD AUTO: 11.9 FL (ref 8.9–12.9)
PMV BLD AUTO: 12 FL (ref 8.9–12.9)
PMV BLD AUTO: 12.1 FL (ref 8.9–12.9)
PMV BLD AUTO: 12.2 FL (ref 8.9–12.9)
PMV BLD AUTO: 12.2 FL (ref 8.9–12.9)
PMV BLD AUTO: 12.4 FL (ref 8.9–12.9)
PMV BLD AUTO: 12.5 FL (ref 8.9–12.9)
PMV BLD AUTO: 12.7 FL (ref 8.9–12.9)
PMV BLD AUTO: 12.7 FL (ref 8.9–12.9)
PMV BLD AUTO: 12.8 FL (ref 8.9–12.9)
PMV BLD AUTO: 12.8 FL (ref 8.9–12.9)
PMV BLD AUTO: 12.9 FL (ref 8.9–12.9)
PMV BLD AUTO: 12.9 FL (ref 8.9–12.9)
PMV BLD AUTO: 9.7 FL (ref 8.9–12.9)
PO2 BLDA: 78 MMHG (ref 80–100)
PO2 BLDA: 86 MMHG (ref 80–100)
PO2 BLDV: 39 MMHG (ref 25–40)
POTASSIUM SERPL-SCNC: 2 MMOL/L (ref 3.5–5.1)
POTASSIUM SERPL-SCNC: 2.3 MMOL/L (ref 3.5–5.1)
POTASSIUM SERPL-SCNC: 3.2 MMOL/L (ref 3.5–5.1)
POTASSIUM SERPL-SCNC: 3.5 MMOL/L (ref 3.5–5.1)
POTASSIUM SERPL-SCNC: 3.6 MMOL/L (ref 3.5–5.1)
POTASSIUM SERPL-SCNC: 3.7 MMOL/L (ref 3.5–5.1)
POTASSIUM SERPL-SCNC: 3.7 MMOL/L (ref 3.5–5.2)
POTASSIUM SERPL-SCNC: 3.8 MMOL/L (ref 3.5–5.1)
POTASSIUM SERPL-SCNC: 3.9 MMOL/L (ref 3.5–5.1)
POTASSIUM SERPL-SCNC: 4 MMOL/L (ref 3.5–5.1)
POTASSIUM SERPL-SCNC: 4 MMOL/L (ref 3.5–5.1)
POTASSIUM SERPL-SCNC: 4.2 MMOL/L (ref 3.5–5.1)
POTASSIUM SERPL-SCNC: 4.2 MMOL/L (ref 3.5–5.1)
POTASSIUM SERPL-SCNC: 4.3 MMOL/L (ref 3.5–5.1)
POTASSIUM SERPL-SCNC: 4.3 MMOL/L (ref 3.5–5.2)
POTASSIUM SERPL-SCNC: 4.4 MMOL/L (ref 3.5–5.2)
POTASSIUM SERPL-SCNC: 5 MMOL/L (ref 3.5–5.1)
POTASSIUM SERPL-SCNC: 5.2 MMOL/L (ref 3.5–5.1)
POTASSIUM SERPL-SCNC: 5.3 MMOL/L (ref 3.5–5.1)
POTASSIUM SERPL-SCNC: 5.5 MMOL/L (ref 3.5–5.1)
POTASSIUM SERPL-SCNC: 6.2 MMOL/L (ref 3.5–5.1)
POTASSIUM SERPL-SCNC: 6.7 MMOL/L (ref 3.5–5.1)
PROPOXYPH SERPL QL SCN: NEGATIVE NG/ML
PROT SERPL-MCNC: 5.7 G/DL (ref 6.4–8.2)
PROT SERPL-MCNC: 5.7 G/DL (ref 6.4–8.2)
PROT SERPL-MCNC: 5.7 G/DL (ref 6–8.5)
PROT SERPL-MCNC: 5.8 G/DL (ref 6.4–8.2)
PROT SERPL-MCNC: 5.9 G/DL (ref 6.4–8.2)
PROT SERPL-MCNC: 6 G/DL (ref 6.4–8.2)
PROT SERPL-MCNC: 6.1 G/DL (ref 6.4–8.2)
PROT SERPL-MCNC: 6.1 G/DL (ref 6–8.5)
PROT SERPL-MCNC: 6.2 G/DL (ref 6.4–8.2)
PROT SERPL-MCNC: 6.2 G/DL (ref 6–8.5)
PROT SERPL-MCNC: 7.1 G/DL (ref 6.4–8.2)
PROT SERPL-MCNC: 7.2 G/DL (ref 6.4–8.2)
PROT UR STRIP-MCNC: 300 MG/DL
PROT UR STRIP-MCNC: NEGATIVE MG/DL
PROT UR-MCNC: 24 MG/DL (ref 0–11.9)
PROTHROMBIN TIME, POC: 15.9 SECONDS (ref 6.5–11.9)
PROTHROMBIN TIME: 15 SEC (ref 9–11.1)
PROTHROMBIN TIME: 16.7 SEC (ref 9–11.1)
PROTHROMBIN TIME: 17 SEC (ref 9–11.1)
PROTHROMBIN TIME: 18.7 SEC (ref 9.1–12)
PROTHROMBIN TIME: 21.3 SEC (ref 9–11.1)
PROTHROMBIN TIME: 21.4 SEC (ref 9–11.1)
PROTHROMBIN TIME: 22.8 SEC (ref 9–11.1)
PROTHROMBIN TIME: 24 SEC (ref 9–11.1)
PROTHROMBIN TIME: 24.7 SEC (ref 9–11.1)
PROTHROMBIN TIME: 26.2 SEC (ref 9–11.1)
PROTHROMBIN TIME: 27.1 SEC (ref 9–11.1)
PROTHROMBIN TIME: 29.3 SEC (ref 9–11.1)
PROTHROMBIN TIME: 30.9 SEC (ref 9–11.1)
PROTHROMBIN TIME: 32.5 SEC (ref 9–11.1)
PROTHROMBIN TIME: 34.1 SEC (ref 9–11.1)
PROTHROMBIN TIME: 37.5 SEC (ref 9–11.1)
PROTHROMBIN TIME: 51.6 SEC (ref 9–11.1)
PROTHROMBIN TIME: 63.1 SEC (ref 9.1–12)
PT POC: 14.5 SECONDS
PT POC: 15.5 SECONDS
PT POC: 15.6 SECONDS
PT POC: 15.9 SECONDS (ref 11.8–14.9)
PT POC: 19.4 SECONDS
PT POC: 20.7 SECONDS
PT POC: 22.5 SECONDS (ref 11.8–14.9)
PT POC: 23 SECONDS
PT POC: 24.9 SECONDS
PT POC: 29.6 SECONDS
PT POC: 30.6 SECONDS
PT POC: 33.6 SECONDS
PT POC: 33.6 SECONDS (ref 11.8–14.9)
PT POC: 35.3 SECONDS
PT POC: 35.3 SECONDS (ref 11.8–14.9)
PT POC: 35.7 SECONDS
PT POC: 35.8 SECONDS
PT POC: 43 SECONDS
PT POC: 61.4 SECONDS
PT POC: 75.6 SECONDS (ref 11.8–14.9)
PT POC: 96 SECONDS
PT POC: NORMAL SECONDS
PT POC: NORMAL SECONDS
Q-T INTERVAL, ECG07: 358 MS
Q-T INTERVAL, ECG07: 374 MS
Q-T INTERVAL, ECG07: 376 MS
Q-T INTERVAL, ECG07: 392 MS
Q-T INTERVAL, ECG07: 396 MS
Q-T INTERVAL, ECG07: 424 MS
QRS DURATION, ECG06: 70 MS
QRS DURATION, ECG06: 78 MS
QRS DURATION, ECG06: 78 MS
QRS DURATION, ECG06: 82 MS
QRS DURATION, ECG06: 88 MS
QRS DURATION, ECG06: 94 MS
QTC CALCULATION (BEZET), ECG08: 466 MS
QTC CALCULATION (BEZET), ECG08: 467 MS
QTC CALCULATION (BEZET), ECG08: 477 MS
QTC CALCULATION (BEZET), ECG08: 508 MS
QTC CALCULATION (BEZET), ECG08: 510 MS
QTC CALCULATION (BEZET), ECG08: 515 MS
RBC # BLD AUTO: 2.34 M/UL (ref 3.8–5.2)
RBC # BLD AUTO: 2.36 M/UL (ref 3.8–5.2)
RBC # BLD AUTO: 2.59 X10E6/UL (ref 3.77–5.28)
RBC # BLD AUTO: 2.6 M/UL (ref 3.8–5.2)
RBC # BLD AUTO: 2.61 M/UL (ref 3.8–5.2)
RBC # BLD AUTO: 2.62 M/UL (ref 3.8–5.2)
RBC # BLD AUTO: 2.65 M/UL (ref 3.8–5.2)
RBC # BLD AUTO: 2.7 X10E6/UL (ref 3.77–5.28)
RBC # BLD AUTO: 2.71 M/UL (ref 3.8–5.2)
RBC # BLD AUTO: 2.72 M/UL (ref 3.8–5.2)
RBC # BLD AUTO: 2.74 M/UL (ref 3.8–5.2)
RBC # BLD AUTO: 2.78 M/UL (ref 3.8–5.2)
RBC # BLD AUTO: 2.91 M/UL (ref 3.8–5.2)
RBC # BLD AUTO: 3.03 M/UL (ref 3.8–5.2)
RBC # BLD AUTO: 3.21 M/UL (ref 3.8–5.2)
RBC # BLD AUTO: 3.48 M/UL (ref 3.8–5.2)
RBC # BLD AUTO: 3.68 M/UL (ref 3.8–5.2)
RBC # BLD AUTO: 3.73 M/UL (ref 3.8–5.2)
RBC # BLD AUTO: 3.78 M/UL (ref 3.8–5.2)
RBC # BLD AUTO: 3.79 M/UL (ref 3.8–5.2)
RBC # BLD AUTO: 3.89 X10E6/UL (ref 3.77–5.28)
RBC # BLD AUTO: 4.01 M/UL (ref 3.8–5.2)
RBC # BLD AUTO: 4.14 X10E6/UL (ref 3.77–5.28)
RBC # BLD AUTO: 4.18 M/UL (ref 3.8–5.2)
RBC # BLD AUTO: 4.4 M/UL (ref 3.8–5.2)
RBC #/AREA URNS HPF: ABNORMAL /HPF (ref 0–5)
RBC #/AREA URNS HPF: ABNORMAL /HPF (ref 0–5)
RBC MORPH BLD: ABNORMAL
RETICS # AUTO: 0.23 M/UL (ref 0.02–0.08)
RETICS/RBC NFR AUTO: 7.1 % (ref 0.7–2.1)
RSV RNA SPEC QL NAA+PROBE: NOT DETECTED
RSV RNA SPEC QL NAA+PROBE: NOT DETECTED
RV+EV RNA SPEC QL NAA+PROBE: NOT DETECTED
RV+EV RNA SPEC QL NAA+PROBE: NOT DETECTED
SAMPLES BEING HELD,HOLD: NORMAL
SAO2 % BLD: 97 % (ref 92–97)
SAO2 % BLD: 97 % (ref 92–97)
SAO2 % BLDV: 72 % (ref 65–88)
SAO2% DEVICE SAO2% SENSOR NAME: ABNORMAL
SERVICE CMNT-IMP: ABNORMAL
SERVICE CMNT-IMP: NORMAL
SODIUM SERPL-SCNC: 127 MMOL/L (ref 136–145)
SODIUM SERPL-SCNC: 128 MMOL/L (ref 136–145)
SODIUM SERPL-SCNC: 129 MMOL/L (ref 136–145)
SODIUM SERPL-SCNC: 131 MMOL/L (ref 136–145)
SODIUM SERPL-SCNC: 131 MMOL/L (ref 136–145)
SODIUM SERPL-SCNC: 134 MMOL/L (ref 136–145)
SODIUM SERPL-SCNC: 134 MMOL/L (ref 136–145)
SODIUM SERPL-SCNC: 135 MMOL/L (ref 136–145)
SODIUM SERPL-SCNC: 136 MMOL/L (ref 136–145)
SODIUM SERPL-SCNC: 137 MMOL/L (ref 134–144)
SODIUM SERPL-SCNC: 137 MMOL/L (ref 136–145)
SODIUM SERPL-SCNC: 138 MMOL/L (ref 136–145)
SODIUM SERPL-SCNC: 139 MMOL/L (ref 136–145)
SODIUM SERPL-SCNC: 140 MMOL/L (ref 136–145)
SODIUM SERPL-SCNC: 140 MMOL/L (ref 136–145)
SODIUM SERPL-SCNC: 141 MMOL/L (ref 134–144)
SODIUM SERPL-SCNC: 143 MMOL/L (ref 136–145)
SODIUM SERPL-SCNC: 147 MMOL/L (ref 134–144)
SODIUM UR-SCNC: 8 MMOL/L
SP GR UR REFRACTOMETRY: 1.01 (ref 1–1.03)
SP GR UR REFRACTOMETRY: 1.03 (ref 1–1.03)
SPECIMEN EXP DATE BLD: NORMAL
SPECIMEN SITE: ABNORMAL
STATUS OF UNIT,%ST: NORMAL
THERAPEUTIC RANGE,PTTT: ABNORMAL SECS (ref 58–77)
THERAPEUTIC RANGE,PTTT: NORMAL SECS (ref 58–77)
TIBC SERPL-MCNC: 336 UG/DL (ref 250–450)
TIBC SERPL-MCNC: 352 UG/DL (ref 250–450)
TRIGL SERPL-MCNC: 148 MG/DL (ref 0–149)
TROPONIN I SERPL-MCNC: <0.04 NG/ML
TROPONIN I SERPL-MCNC: <0.05 NG/ML
TSH SERPL DL<=0.05 MIU/L-ACNC: 1.7 UIU/ML (ref 0.36–3.74)
UIBC SERPL-MCNC: 226 UG/DL (ref 118–369)
UNIT DIVISION, %UDIV: 0
UR CULT HOLD, URHOLD: NORMAL
UROBILINOGEN UR QL STRIP.AUTO: 0.2 EU/DL (ref 0.2–1)
UROBILINOGEN UR QL STRIP.AUTO: 1 EU/DL (ref 0.2–1)
VALID INTERNAL CONTROL?: YES
VENTRICULAR RATE, ECG03: 101 BPM
VENTRICULAR RATE, ECG03: 102 BPM
VENTRICULAR RATE, ECG03: 112 BPM
VENTRICULAR RATE, ECG03: 84 BPM
VENTRICULAR RATE, ECG03: 89 BPM
VENTRICULAR RATE, ECG03: 97 BPM
VIT B12 SERPL-MCNC: 374 PG/ML (ref 193–986)
VLDLC SERPL CALC-MCNC: 30 MG/DL (ref 5–40)
WBC # BLD AUTO: 11.2 K/UL (ref 3.6–11)
WBC # BLD AUTO: 12.1 K/UL (ref 3.6–11)
WBC # BLD AUTO: 13.7 K/UL (ref 3.6–11)
WBC # BLD AUTO: 14.2 K/UL (ref 3.6–11)
WBC # BLD AUTO: 15.5 K/UL (ref 3.6–11)
WBC # BLD AUTO: 16.6 X10E3/UL (ref 3.4–10.8)
WBC # BLD AUTO: 17 X10E3/UL (ref 3.4–10.8)
WBC # BLD AUTO: 17.3 K/UL (ref 3.6–11)
WBC # BLD AUTO: 18.2 K/UL (ref 3.6–11)
WBC # BLD AUTO: 18.3 K/UL (ref 3.6–11)
WBC # BLD AUTO: 19.1 K/UL (ref 3.6–11)
WBC # BLD AUTO: 19.8 K/UL (ref 3.6–11)
WBC # BLD AUTO: 20.4 K/UL (ref 3.6–11)
WBC # BLD AUTO: 20.6 K/UL (ref 3.6–11)
WBC # BLD AUTO: 21 K/UL (ref 3.6–11)
WBC # BLD AUTO: 22.1 K/UL (ref 3.6–11)
WBC # BLD AUTO: 22.2 K/UL (ref 3.6–11)
WBC # BLD AUTO: 23.4 K/UL (ref 3.6–11)
WBC # BLD AUTO: 23.6 K/UL (ref 3.6–11)
WBC # BLD AUTO: 23.7 X10E3/UL (ref 3.4–10.8)
WBC # BLD AUTO: 29.4 X10E3/UL (ref 3.4–10.8)
WBC # BLD AUTO: 30 K/UL (ref 3.6–11)
WBC # BLD AUTO: 30.1 K/UL (ref 3.6–11)
WBC # BLD AUTO: 31 K/UL (ref 3.6–11)
WBC # BLD AUTO: 44.7 K/UL (ref 3.6–11)
WBC URNS QL MICRO: ABNORMAL /HPF (ref 0–4)
WBC URNS QL MICRO: ABNORMAL /HPF (ref 0–4)

## 2018-01-01 PROCEDURE — 74011250636 HC RX REV CODE- 250/636: Performed by: FAMILY MEDICINE

## 2018-01-01 PROCEDURE — 94640 AIRWAY INHALATION TREATMENT: CPT

## 2018-01-01 PROCEDURE — 84100 ASSAY OF PHOSPHORUS: CPT

## 2018-01-01 PROCEDURE — 77030029684 HC NEB SM VOL KT MONA -A

## 2018-01-01 PROCEDURE — 74011636637 HC RX REV CODE- 636/637: Performed by: INTERNAL MEDICINE

## 2018-01-01 PROCEDURE — 96366 THER/PROPH/DIAG IV INF ADDON: CPT

## 2018-01-01 PROCEDURE — 74011250636 HC RX REV CODE- 250/636: Performed by: STUDENT IN AN ORGANIZED HEALTH CARE EDUCATION/TRAINING PROGRAM

## 2018-01-01 PROCEDURE — 3331090002 HH PPS REVENUE DEBIT

## 2018-01-01 PROCEDURE — 74011250637 HC RX REV CODE- 250/637: Performed by: FAMILY MEDICINE

## 2018-01-01 PROCEDURE — 3331090001 HH PPS REVENUE CREDIT

## 2018-01-01 PROCEDURE — 85730 THROMBOPLASTIN TIME PARTIAL: CPT

## 2018-01-01 PROCEDURE — 83605 ASSAY OF LACTIC ACID: CPT | Performed by: STUDENT IN AN ORGANIZED HEALTH CARE EDUCATION/TRAINING PROGRAM

## 2018-01-01 PROCEDURE — 83880 ASSAY OF NATRIURETIC PEPTIDE: CPT | Performed by: EMERGENCY MEDICINE

## 2018-01-01 PROCEDURE — 93005 ELECTROCARDIOGRAM TRACING: CPT

## 2018-01-01 PROCEDURE — 80069 RENAL FUNCTION PANEL: CPT | Performed by: INTERNAL MEDICINE

## 2018-01-01 PROCEDURE — 85025 COMPLETE CBC W/AUTO DIFF WBC: CPT | Performed by: STUDENT IN AN ORGANIZED HEALTH CARE EDUCATION/TRAINING PROGRAM

## 2018-01-01 PROCEDURE — 74011250637 HC RX REV CODE- 250/637: Performed by: STUDENT IN AN ORGANIZED HEALTH CARE EDUCATION/TRAINING PROGRAM

## 2018-01-01 PROCEDURE — 71045 X-RAY EXAM CHEST 1 VIEW: CPT

## 2018-01-01 PROCEDURE — 96365 THER/PROPH/DIAG IV INF INIT: CPT

## 2018-01-01 PROCEDURE — 82272 OCCULT BLD FECES 1-3 TESTS: CPT

## 2018-01-01 PROCEDURE — 36415 COLL VENOUS BLD VENIPUNCTURE: CPT | Performed by: EMERGENCY MEDICINE

## 2018-01-01 PROCEDURE — 74011636637 HC RX REV CODE- 636/637: Performed by: STUDENT IN AN ORGANIZED HEALTH CARE EDUCATION/TRAINING PROGRAM

## 2018-01-01 PROCEDURE — 94760 N-INVAS EAR/PLS OXIMETRY 1: CPT

## 2018-01-01 PROCEDURE — G0300 HHS/HOSPICE OF LPN EA 15 MIN: HCPCS

## 2018-01-01 PROCEDURE — 74011000258 HC RX REV CODE- 258: Performed by: STUDENT IN AN ORGANIZED HEALTH CARE EDUCATION/TRAINING PROGRAM

## 2018-01-01 PROCEDURE — G8978 MOBILITY CURRENT STATUS: HCPCS

## 2018-01-01 PROCEDURE — 97116 GAIT TRAINING THERAPY: CPT

## 2018-01-01 PROCEDURE — 74011000250 HC RX REV CODE- 250: Performed by: STUDENT IN AN ORGANIZED HEALTH CARE EDUCATION/TRAINING PROGRAM

## 2018-01-01 PROCEDURE — 85610 PROTHROMBIN TIME: CPT

## 2018-01-01 PROCEDURE — 82962 GLUCOSE BLOOD TEST: CPT

## 2018-01-01 PROCEDURE — 97530 THERAPEUTIC ACTIVITIES: CPT

## 2018-01-01 PROCEDURE — 85610 PROTHROMBIN TIME: CPT | Performed by: FAMILY MEDICINE

## 2018-01-01 PROCEDURE — 74011250637 HC RX REV CODE- 250/637: Performed by: NURSE PRACTITIONER

## 2018-01-01 PROCEDURE — 87077 CULTURE AEROBIC IDENTIFY: CPT | Performed by: EMERGENCY MEDICINE

## 2018-01-01 PROCEDURE — 85730 THROMBOPLASTIN TIME PARTIAL: CPT | Performed by: STUDENT IN AN ORGANIZED HEALTH CARE EDUCATION/TRAINING PROGRAM

## 2018-01-01 PROCEDURE — 87070 CULTURE OTHR SPECIMN AEROBIC: CPT | Performed by: NURSE PRACTITIONER

## 2018-01-01 PROCEDURE — 97535 SELF CARE MNGMENT TRAINING: CPT

## 2018-01-01 PROCEDURE — A6223 GAUZE >16<=48 NO W/SAL W/O B: HCPCS

## 2018-01-01 PROCEDURE — 86901 BLOOD TYPING SEROLOGIC RH(D): CPT

## 2018-01-01 PROCEDURE — G0299 HHS/HOSPICE OF RN EA 15 MIN: HCPCS

## 2018-01-01 PROCEDURE — 85027 COMPLETE CBC AUTOMATED: CPT

## 2018-01-01 PROCEDURE — 80048 BASIC METABOLIC PNL TOTAL CA: CPT

## 2018-01-01 PROCEDURE — 71046 X-RAY EXAM CHEST 2 VIEWS: CPT

## 2018-01-01 PROCEDURE — A6212 FOAM DRG <=16 SQ IN W/BORDER: HCPCS

## 2018-01-01 PROCEDURE — G0157 HHC PT ASSISTANT EA 15: HCPCS

## 2018-01-01 PROCEDURE — 99218 HC RM OBSERVATION: CPT

## 2018-01-01 PROCEDURE — 74011000250 HC RX REV CODE- 250: Performed by: NURSE PRACTITIONER

## 2018-01-01 PROCEDURE — 87077 CULTURE AEROBIC IDENTIFY: CPT | Performed by: NURSE PRACTITIONER

## 2018-01-01 PROCEDURE — 93306 TTE W/DOPPLER COMPLETE: CPT

## 2018-01-01 PROCEDURE — 77030013140 HC MSK NEB VYRM -A

## 2018-01-01 PROCEDURE — 36430 TRANSFUSION BLD/BLD COMPNT: CPT

## 2018-01-01 PROCEDURE — 74011000250 HC RX REV CODE- 250

## 2018-01-01 PROCEDURE — 80048 BASIC METABOLIC PNL TOTAL CA: CPT | Performed by: NURSE PRACTITIONER

## 2018-01-01 PROCEDURE — 65660000000 HC RM CCU STEPDOWN

## 2018-01-01 PROCEDURE — 84443 ASSAY THYROID STIM HORMONE: CPT | Performed by: PHYSICIAN ASSISTANT

## 2018-01-01 PROCEDURE — 85730 THROMBOPLASTIN TIME PARTIAL: CPT | Performed by: FAMILY MEDICINE

## 2018-01-01 PROCEDURE — 74011250637 HC RX REV CODE- 250/637: Performed by: PHYSICIAN ASSISTANT

## 2018-01-01 PROCEDURE — 85027 COMPLETE CBC AUTOMATED: CPT | Performed by: FAMILY MEDICINE

## 2018-01-01 PROCEDURE — 74011250636 HC RX REV CODE- 250/636: Performed by: INTERNAL MEDICINE

## 2018-01-01 PROCEDURE — 83735 ASSAY OF MAGNESIUM: CPT | Performed by: EMERGENCY MEDICINE

## 2018-01-01 PROCEDURE — C9113 INJ PANTOPRAZOLE SODIUM, VIA: HCPCS | Performed by: FAMILY MEDICINE

## 2018-01-01 PROCEDURE — 82803 BLOOD GASES ANY COMBINATION: CPT | Performed by: INTERNAL MEDICINE

## 2018-01-01 PROCEDURE — 5A09457 ASSISTANCE WITH RESPIRATORY VENTILATION, 24-96 CONSECUTIVE HOURS, CONTINUOUS POSITIVE AIRWAY PRESSURE: ICD-10-PCS | Performed by: INTERNAL MEDICINE

## 2018-01-01 PROCEDURE — 94761 N-INVAS EAR/PLS OXIMETRY MLT: CPT

## 2018-01-01 PROCEDURE — 74011000250 HC RX REV CODE- 250: Performed by: FAMILY MEDICINE

## 2018-01-01 PROCEDURE — 77030038269 HC DRN EXT URIN PURWCK BARD -A

## 2018-01-01 PROCEDURE — 83735 ASSAY OF MAGNESIUM: CPT

## 2018-01-01 PROCEDURE — 83540 ASSAY OF IRON: CPT | Performed by: FAMILY MEDICINE

## 2018-01-01 PROCEDURE — 83735 ASSAY OF MAGNESIUM: CPT | Performed by: NURSE PRACTITIONER

## 2018-01-01 PROCEDURE — 77010033678 HC OXYGEN DAILY

## 2018-01-01 PROCEDURE — 86923 COMPATIBILITY TEST ELECTRIC: CPT

## 2018-01-01 PROCEDURE — 83735 ASSAY OF MAGNESIUM: CPT | Performed by: FAMILY MEDICINE

## 2018-01-01 PROCEDURE — A4452 WATERPROOF TAPE: HCPCS

## 2018-01-01 PROCEDURE — 82607 VITAMIN B-12: CPT | Performed by: FAMILY MEDICINE

## 2018-01-01 PROCEDURE — 36415 COLL VENOUS BLD VENIPUNCTURE: CPT | Performed by: PHYSICIAN ASSISTANT

## 2018-01-01 PROCEDURE — 80053 COMPREHEN METABOLIC PANEL: CPT | Performed by: FAMILY MEDICINE

## 2018-01-01 PROCEDURE — 80053 COMPREHEN METABOLIC PANEL: CPT | Performed by: STUDENT IN AN ORGANIZED HEALTH CARE EDUCATION/TRAINING PROGRAM

## 2018-01-01 PROCEDURE — 85018 HEMOGLOBIN: CPT | Performed by: STUDENT IN AN ORGANIZED HEALTH CARE EDUCATION/TRAINING PROGRAM

## 2018-01-01 PROCEDURE — 85730 THROMBOPLASTIN TIME PARTIAL: CPT | Performed by: EMERGENCY MEDICINE

## 2018-01-01 PROCEDURE — 36415 COLL VENOUS BLD VENIPUNCTURE: CPT | Performed by: FAMILY MEDICINE

## 2018-01-01 PROCEDURE — 36600 WITHDRAWAL OF ARTERIAL BLOOD: CPT

## 2018-01-01 PROCEDURE — 80048 BASIC METABOLIC PNL TOTAL CA: CPT | Performed by: FAMILY MEDICINE

## 2018-01-01 PROCEDURE — 77030027138 HC INCENT SPIROMETER -A

## 2018-01-01 PROCEDURE — 74011636637 HC RX REV CODE- 636/637: Performed by: FAMILY MEDICINE

## 2018-01-01 PROCEDURE — G8988 SELF CARE GOAL STATUS: HCPCS | Performed by: OCCUPATIONAL THERAPIST

## 2018-01-01 PROCEDURE — G8987 SELF CARE CURRENT STATUS: HCPCS | Performed by: OCCUPATIONAL THERAPIST

## 2018-01-01 PROCEDURE — 76770 US EXAM ABDO BACK WALL COMP: CPT

## 2018-01-01 PROCEDURE — 74011250636 HC RX REV CODE- 250/636

## 2018-01-01 PROCEDURE — A6216 NON-STERILE GAUZE<=16 SQ IN: HCPCS

## 2018-01-01 PROCEDURE — 80048 BASIC METABOLIC PNL TOTAL CA: CPT | Performed by: STUDENT IN AN ORGANIZED HEALTH CARE EDUCATION/TRAINING PROGRAM

## 2018-01-01 PROCEDURE — 84100 ASSAY OF PHOSPHORUS: CPT | Performed by: FAMILY MEDICINE

## 2018-01-01 PROCEDURE — G0151 HHCP-SERV OF PT,EA 15 MIN: HCPCS

## 2018-01-01 PROCEDURE — 74011000258 HC RX REV CODE- 258: Performed by: INTERNAL MEDICINE

## 2018-01-01 PROCEDURE — 74011636637 HC RX REV CODE- 636/637: Performed by: PHYSICIAN ASSISTANT

## 2018-01-01 PROCEDURE — 36415 COLL VENOUS BLD VENIPUNCTURE: CPT

## 2018-01-01 PROCEDURE — 83605 ASSAY OF LACTIC ACID: CPT | Performed by: PHYSICIAN ASSISTANT

## 2018-01-01 PROCEDURE — 74011000250 HC RX REV CODE- 250: Performed by: INTERNAL MEDICINE

## 2018-01-01 PROCEDURE — 74011000250 HC RX REV CODE- 250: Performed by: EMERGENCY MEDICINE

## 2018-01-01 PROCEDURE — 86880 COOMBS TEST DIRECT: CPT

## 2018-01-01 PROCEDURE — 85027 COMPLETE CBC AUTOMATED: CPT | Performed by: NURSE PRACTITIONER

## 2018-01-01 PROCEDURE — 97165 OT EVAL LOW COMPLEX 30 MIN: CPT | Performed by: OCCUPATIONAL THERAPIST

## 2018-01-01 PROCEDURE — 84484 ASSAY OF TROPONIN QUANT: CPT | Performed by: EMERGENCY MEDICINE

## 2018-01-01 PROCEDURE — 87186 SC STD MICRODIL/AGAR DIL: CPT | Performed by: EMERGENCY MEDICINE

## 2018-01-01 PROCEDURE — 74011250636 HC RX REV CODE- 250/636: Performed by: PHYSICIAN ASSISTANT

## 2018-01-01 PROCEDURE — 76937 US GUIDE VASCULAR ACCESS: CPT

## 2018-01-01 PROCEDURE — 99283 EMERGENCY DEPT VISIT LOW MDM: CPT

## 2018-01-01 PROCEDURE — 77030012879 HC MSK CPAP FLL FAC PHIL -B

## 2018-01-01 PROCEDURE — 36600 WITHDRAWAL OF ARTERIAL BLOOD: CPT | Performed by: EMERGENCY MEDICINE

## 2018-01-01 PROCEDURE — 99285 EMERGENCY DEPT VISIT HI MDM: CPT

## 2018-01-01 PROCEDURE — 87486 CHLMYD PNEUM DNA AMP PROBE: CPT

## 2018-01-01 PROCEDURE — 93971 EXTREMITY STUDY: CPT

## 2018-01-01 PROCEDURE — 83735 ASSAY OF MAGNESIUM: CPT | Performed by: STUDENT IN AN ORGANIZED HEALTH CARE EDUCATION/TRAINING PROGRAM

## 2018-01-01 PROCEDURE — 65610000006 HC RM INTENSIVE CARE

## 2018-01-01 PROCEDURE — 96375 TX/PRO/DX INJ NEW DRUG ADDON: CPT

## 2018-01-01 PROCEDURE — 51798 US URINE CAPACITY MEASURE: CPT

## 2018-01-01 PROCEDURE — 83880 ASSAY OF NATRIURETIC PEPTIDE: CPT | Performed by: NURSE PRACTITIONER

## 2018-01-01 PROCEDURE — 74011250637 HC RX REV CODE- 250/637: Performed by: EMERGENCY MEDICINE

## 2018-01-01 PROCEDURE — 81001 URINALYSIS AUTO W/SCOPE: CPT | Performed by: INTERNAL MEDICINE

## 2018-01-01 PROCEDURE — 85045 AUTOMATED RETICULOCYTE COUNT: CPT | Performed by: FAMILY MEDICINE

## 2018-01-01 PROCEDURE — 83010 ASSAY OF HAPTOGLOBIN QUANT: CPT

## 2018-01-01 PROCEDURE — G0152 HHCP-SERV OF OT,EA 15 MIN: HCPCS

## 2018-01-01 PROCEDURE — 85018 HEMOGLOBIN: CPT

## 2018-01-01 PROCEDURE — 87804 INFLUENZA ASSAY W/OPTIC: CPT

## 2018-01-01 PROCEDURE — 80053 COMPREHEN METABOLIC PANEL: CPT | Performed by: PHYSICIAN ASSISTANT

## 2018-01-01 PROCEDURE — 87186 SC STD MICRODIL/AGAR DIL: CPT | Performed by: NURSE PRACTITIONER

## 2018-01-01 PROCEDURE — 97161 PT EVAL LOW COMPLEX 20 MIN: CPT

## 2018-01-01 PROCEDURE — 83605 ASSAY OF LACTIC ACID: CPT

## 2018-01-01 PROCEDURE — 400013 HH SOC

## 2018-01-01 PROCEDURE — 83735 ASSAY OF MAGNESIUM: CPT | Performed by: INTERNAL MEDICINE

## 2018-01-01 PROCEDURE — 96374 THER/PROPH/DIAG INJ IV PUSH: CPT

## 2018-01-01 PROCEDURE — 77030018786 HC NDL GD F/USND BARD -B

## 2018-01-01 PROCEDURE — 77067 SCR MAMMO BI INCL CAD: CPT

## 2018-01-01 PROCEDURE — 80053 COMPREHEN METABOLIC PANEL: CPT | Performed by: EMERGENCY MEDICINE

## 2018-01-01 PROCEDURE — A6446 CONFORM BAND S W>=3" <5"/YD: HCPCS

## 2018-01-01 PROCEDURE — 87070 CULTURE OTHR SPECIMN AEROBIC: CPT | Performed by: PHYSICIAN ASSISTANT

## 2018-01-01 PROCEDURE — 97535 SELF CARE MNGMENT TRAINING: CPT | Performed by: OCCUPATIONAL THERAPIST

## 2018-01-01 PROCEDURE — 87070 CULTURE OTHR SPECIMN AEROBIC: CPT | Performed by: EMERGENCY MEDICINE

## 2018-01-01 PROCEDURE — P9016 RBC LEUKOCYTES REDUCED: HCPCS

## 2018-01-01 PROCEDURE — 87880 STREP A ASSAY W/OPTIC: CPT | Performed by: EMERGENCY MEDICINE

## 2018-01-01 PROCEDURE — 82746 ASSAY OF FOLIC ACID SERUM: CPT | Performed by: FAMILY MEDICINE

## 2018-01-01 PROCEDURE — 74011250636 HC RX REV CODE- 250/636: Performed by: EMERGENCY MEDICINE

## 2018-01-01 PROCEDURE — 74220 X-RAY XM ESOPHAGUS 1CNTRST: CPT

## 2018-01-01 PROCEDURE — 74011000258 HC RX REV CODE- 258: Performed by: PHYSICIAN ASSISTANT

## 2018-01-01 PROCEDURE — 84156 ASSAY OF PROTEIN URINE: CPT | Performed by: INTERNAL MEDICINE

## 2018-01-01 PROCEDURE — 70360 X-RAY EXAM OF NECK: CPT

## 2018-01-01 PROCEDURE — 82248 BILIRUBIN DIRECT: CPT

## 2018-01-01 PROCEDURE — 84300 ASSAY OF URINE SODIUM: CPT | Performed by: INTERNAL MEDICINE

## 2018-01-01 PROCEDURE — 30233N1 TRANSFUSION OF NONAUTOLOGOUS RED BLOOD CELLS INTO PERIPHERAL VEIN, PERCUTANEOUS APPROACH: ICD-10-PCS | Performed by: INTERNAL MEDICINE

## 2018-01-01 PROCEDURE — 83605 ASSAY OF LACTIC ACID: CPT | Performed by: FAMILY MEDICINE

## 2018-01-01 PROCEDURE — 85610 PROTHROMBIN TIME: CPT | Performed by: INTERNAL MEDICINE

## 2018-01-01 PROCEDURE — 83880 ASSAY OF NATRIURETIC PEPTIDE: CPT | Performed by: STUDENT IN AN ORGANIZED HEALTH CARE EDUCATION/TRAINING PROGRAM

## 2018-01-01 PROCEDURE — 82803 BLOOD GASES ANY COMBINATION: CPT | Performed by: EMERGENCY MEDICINE

## 2018-01-01 PROCEDURE — 85025 COMPLETE CBC W/AUTO DIFF WBC: CPT | Performed by: PHYSICIAN ASSISTANT

## 2018-01-01 PROCEDURE — 94660 CPAP INITIATION&MGMT: CPT

## 2018-01-01 PROCEDURE — 85025 COMPLETE CBC W/AUTO DIFF WBC: CPT | Performed by: EMERGENCY MEDICINE

## 2018-01-01 PROCEDURE — 74011000250 HC RX REV CODE- 250: Performed by: PHYSICIAN ASSISTANT

## 2018-01-01 PROCEDURE — 96360 HYDRATION IV INFUSION INIT: CPT

## 2018-01-01 PROCEDURE — 85027 COMPLETE CBC AUTOMATED: CPT | Performed by: INTERNAL MEDICINE

## 2018-01-01 PROCEDURE — 83036 HEMOGLOBIN GLYCOSYLATED A1C: CPT | Performed by: STUDENT IN AN ORGANIZED HEALTH CARE EDUCATION/TRAINING PROGRAM

## 2018-01-01 PROCEDURE — 84484 ASSAY OF TROPONIN QUANT: CPT

## 2018-01-01 PROCEDURE — 82803 BLOOD GASES ANY COMBINATION: CPT

## 2018-01-01 PROCEDURE — 36415 COLL VENOUS BLD VENIPUNCTURE: CPT | Performed by: STUDENT IN AN ORGANIZED HEALTH CARE EDUCATION/TRAINING PROGRAM

## 2018-01-01 PROCEDURE — 82728 ASSAY OF FERRITIN: CPT | Performed by: FAMILY MEDICINE

## 2018-01-01 PROCEDURE — 85610 PROTHROMBIN TIME: CPT | Performed by: EMERGENCY MEDICINE

## 2018-01-01 PROCEDURE — 92610 EVALUATE SWALLOWING FUNCTION: CPT

## 2018-01-01 PROCEDURE — 81001 URINALYSIS AUTO W/SCOPE: CPT | Performed by: EMERGENCY MEDICINE

## 2018-01-01 PROCEDURE — 85025 COMPLETE CBC W/AUTO DIFF WBC: CPT

## 2018-01-01 PROCEDURE — 84100 ASSAY OF PHOSPHORUS: CPT | Performed by: NURSE PRACTITIONER

## 2018-01-01 PROCEDURE — C1751 CATH, INF, PER/CENT/MIDLINE: HCPCS

## 2018-01-01 PROCEDURE — 87040 BLOOD CULTURE FOR BACTERIA: CPT | Performed by: STUDENT IN AN ORGANIZED HEALTH CARE EDUCATION/TRAINING PROGRAM

## 2018-01-01 PROCEDURE — 80053 COMPREHEN METABOLIC PANEL: CPT

## 2018-01-01 PROCEDURE — 97165 OT EVAL LOW COMPLEX 30 MIN: CPT

## 2018-01-01 PROCEDURE — 85379 FIBRIN DEGRADATION QUANT: CPT | Performed by: EMERGENCY MEDICINE

## 2018-01-01 PROCEDURE — 82570 ASSAY OF URINE CREATININE: CPT | Performed by: INTERNAL MEDICINE

## 2018-01-01 PROCEDURE — 85025 COMPLETE CBC W/AUTO DIFF WBC: CPT | Performed by: FAMILY MEDICINE

## 2018-01-01 PROCEDURE — 74011000258 HC RX REV CODE- 258: Performed by: FAMILY MEDICINE

## 2018-01-01 PROCEDURE — 87086 URINE CULTURE/COLONY COUNT: CPT | Performed by: EMERGENCY MEDICINE

## 2018-01-01 PROCEDURE — G8979 MOBILITY GOAL STATUS: HCPCS

## 2018-01-01 PROCEDURE — 36415 COLL VENOUS BLD VENIPUNCTURE: CPT | Performed by: INTERNAL MEDICINE

## 2018-01-01 PROCEDURE — 85610 PROTHROMBIN TIME: CPT | Performed by: STUDENT IN AN ORGANIZED HEALTH CARE EDUCATION/TRAINING PROGRAM

## 2018-01-01 PROCEDURE — 65270000029 HC RM PRIVATE

## 2018-01-01 PROCEDURE — 99284 EMERGENCY DEPT VISIT MOD MDM: CPT

## 2018-01-01 PROCEDURE — 87798 DETECT AGENT NOS DNA AMP: CPT

## 2018-01-01 PROCEDURE — 77030018719 HC DRSG PTCH ANTIMIC J&J -A

## 2018-01-01 PROCEDURE — 84484 ASSAY OF TROPONIN QUANT: CPT | Performed by: PHYSICIAN ASSISTANT

## 2018-01-01 PROCEDURE — 87040 BLOOD CULTURE FOR BACTERIA: CPT | Performed by: FAMILY MEDICINE

## 2018-01-01 PROCEDURE — 77030034848

## 2018-01-01 PROCEDURE — 85018 HEMOGLOBIN: CPT | Performed by: FAMILY MEDICINE

## 2018-01-01 PROCEDURE — 82550 ASSAY OF CK (CPK): CPT | Performed by: PHYSICIAN ASSISTANT

## 2018-01-01 PROCEDURE — A4216 STERILE WATER/SALINE, 10 ML: HCPCS

## 2018-01-01 PROCEDURE — G8989 SELF CARE D/C STATUS: HCPCS | Performed by: OCCUPATIONAL THERAPIST

## 2018-01-01 PROCEDURE — 85014 HEMATOCRIT: CPT

## 2018-01-01 RX ORDER — LISINOPRIL 5 MG/1
5 TABLET ORAL DAILY
Qty: 90 TAB | Refills: 3 | Status: SHIPPED | OUTPATIENT
Start: 2018-01-01

## 2018-01-01 RX ORDER — HEPARIN SODIUM 5000 [USP'U]/ML
30 INJECTION, SOLUTION INTRAVENOUS; SUBCUTANEOUS ONCE
Status: COMPLETED | OUTPATIENT
Start: 2018-01-01 | End: 2018-01-01

## 2018-01-01 RX ORDER — DOXYCYCLINE 100 MG/1
100 TABLET ORAL 2 TIMES DAILY
Qty: 14 TAB | Refills: 0 | Status: SHIPPED | OUTPATIENT
Start: 2018-01-01 | End: 2018-01-01 | Stop reason: ALTCHOICE

## 2018-01-01 RX ORDER — NYSTATIN 100000 [USP'U]/ML
SUSPENSION ORAL
Qty: 200 ML | Refills: 0 | Status: SHIPPED | OUTPATIENT
Start: 2018-01-01 | End: 2018-01-01

## 2018-01-01 RX ORDER — WARFARIN 2.5 MG/1
2.5 TABLET ORAL ONCE
Status: COMPLETED | OUTPATIENT
Start: 2018-01-01 | End: 2018-01-01

## 2018-01-01 RX ORDER — ALBUTEROL SULFATE 90 UG/1
2 AEROSOL, METERED RESPIRATORY (INHALATION)
COMMUNITY
End: 2018-01-01 | Stop reason: SDUPTHER

## 2018-01-01 RX ORDER — WARFARIN 1 MG/1
TABLET ORAL
Qty: 45 TAB | Refills: 0
Start: 2018-01-01 | End: 2019-01-01 | Stop reason: CLARIF

## 2018-01-01 RX ORDER — SODIUM CHLORIDE 0.9 % (FLUSH) 0.9 %
5-10 SYRINGE (ML) INJECTION AS NEEDED
Status: DISCONTINUED | OUTPATIENT
Start: 2018-01-01 | End: 2018-01-01 | Stop reason: SDUPTHER

## 2018-01-01 RX ORDER — DOXYCYCLINE HYCLATE 100 MG
100 TABLET ORAL EVERY 12 HOURS
Status: DISCONTINUED | OUTPATIENT
Start: 2018-01-01 | End: 2018-01-01

## 2018-01-01 RX ORDER — DOCUSATE SODIUM 100 MG/1
100 CAPSULE, LIQUID FILLED ORAL 2 TIMES DAILY
Status: DISCONTINUED | OUTPATIENT
Start: 2018-01-01 | End: 2018-01-01 | Stop reason: HOSPADM

## 2018-01-01 RX ORDER — WARFARIN 1 MG/1
1 TABLET ORAL DAILY
Qty: 30 TAB | Refills: 0 | Status: SHIPPED | OUTPATIENT
Start: 2018-01-01 | End: 2018-01-01

## 2018-01-01 RX ORDER — DILTIAZEM HYDROCHLORIDE 5 MG/ML
0.25 INJECTION INTRAVENOUS
Status: COMPLETED | OUTPATIENT
Start: 2018-01-01 | End: 2018-01-01

## 2018-01-01 RX ORDER — POTASSIUM CHLORIDE 750 MG/1
40 TABLET, FILM COATED, EXTENDED RELEASE ORAL
Status: COMPLETED | OUTPATIENT
Start: 2018-01-01 | End: 2018-01-01

## 2018-01-01 RX ORDER — DEXTROSE 50 % IN WATER (D50W) INTRAVENOUS SYRINGE
25-50 AS NEEDED
Status: DISCONTINUED | OUTPATIENT
Start: 2018-01-01 | End: 2018-01-01 | Stop reason: HOSPADM

## 2018-01-01 RX ORDER — MIDAZOLAM HYDROCHLORIDE 1 MG/ML
INJECTION, SOLUTION INTRAMUSCULAR; INTRAVENOUS
Status: COMPLETED
Start: 2018-01-01 | End: 2018-01-01

## 2018-01-01 RX ORDER — IPRATROPIUM BROMIDE AND ALBUTEROL SULFATE 2.5; .5 MG/3ML; MG/3ML
3 SOLUTION RESPIRATORY (INHALATION)
Status: DISCONTINUED | OUTPATIENT
Start: 2018-01-01 | End: 2018-01-01

## 2018-01-01 RX ORDER — MAGNESIUM SULFATE HEPTAHYDRATE 40 MG/ML
2 INJECTION, SOLUTION INTRAVENOUS ONCE
Status: COMPLETED | OUTPATIENT
Start: 2018-01-01 | End: 2018-01-01

## 2018-01-01 RX ORDER — GLYCERIN ADULT
1 SUPPOSITORY, RECTAL RECTAL
Status: DISCONTINUED | OUTPATIENT
Start: 2018-01-01 | End: 2018-01-01

## 2018-01-01 RX ORDER — PREDNISONE 20 MG/1
20 TABLET ORAL
Qty: 3 TAB | Refills: 0 | Status: SHIPPED | OUTPATIENT
Start: 2018-01-01 | End: 2018-01-01

## 2018-01-01 RX ORDER — INSULIN LISPRO 100 [IU]/ML
INJECTION, SOLUTION INTRAVENOUS; SUBCUTANEOUS
Status: DISCONTINUED | OUTPATIENT
Start: 2018-01-01 | End: 2018-01-01 | Stop reason: HOSPADM

## 2018-01-01 RX ORDER — PREDNISONE 20 MG/1
TABLET ORAL
Qty: 20 TAB | Refills: 0 | Status: SHIPPED | OUTPATIENT
Start: 2018-01-01 | End: 2018-01-01

## 2018-01-01 RX ORDER — PREDNISONE 5 MG/1
10 TABLET ORAL
Status: DISCONTINUED | OUTPATIENT
Start: 2018-01-01 | End: 2018-01-01 | Stop reason: HOSPADM

## 2018-01-01 RX ORDER — NYSTATIN 100000 [USP'U]/ML
500000 SUSPENSION ORAL 4 TIMES DAILY
Qty: 50 ML | Refills: 1 | Status: SHIPPED | OUTPATIENT
Start: 2018-01-01 | End: 2018-01-01

## 2018-01-01 RX ORDER — NYSTATIN 100000 [USP'U]/ML
500000 SUSPENSION ORAL 4 TIMES DAILY
Status: DISCONTINUED | OUTPATIENT
Start: 2018-01-01 | End: 2018-01-01 | Stop reason: HOSPADM

## 2018-01-01 RX ORDER — GUAIFENESIN 600 MG/1
600 TABLET, EXTENDED RELEASE ORAL EVERY 12 HOURS
Status: DISCONTINUED | OUTPATIENT
Start: 2018-01-01 | End: 2018-01-01 | Stop reason: HOSPADM

## 2018-01-01 RX ORDER — PREDNISONE 10 MG/1
10 TABLET ORAL
Status: ON HOLD | COMMUNITY
End: 2018-01-01 | Stop reason: SDUPTHER

## 2018-01-01 RX ORDER — MIDAZOLAM HYDROCHLORIDE 1 MG/ML
1 INJECTION, SOLUTION INTRAMUSCULAR; INTRAVENOUS
Status: COMPLETED | OUTPATIENT
Start: 2018-01-01 | End: 2018-01-01

## 2018-01-01 RX ORDER — OXYCODONE AND ACETAMINOPHEN 5; 325 MG/1; MG/1
1 TABLET ORAL
Qty: 120 TAB | Refills: 0 | Status: SHIPPED | OUTPATIENT
Start: 2018-01-01 | End: 2018-01-01 | Stop reason: SDUPTHER

## 2018-01-01 RX ORDER — MAGNESIUM SULFATE HEPTAHYDRATE 40 MG/ML
2 INJECTION, SOLUTION INTRAVENOUS
Status: COMPLETED | OUTPATIENT
Start: 2018-01-01 | End: 2018-01-01

## 2018-01-01 RX ORDER — NYSTATIN 100000 [USP'U]/ML
500000 SUSPENSION ORAL 4 TIMES DAILY
Qty: 50 ML | Refills: 0 | Status: SHIPPED | OUTPATIENT
Start: 2018-01-01 | End: 2018-01-01

## 2018-01-01 RX ORDER — NYSTATIN 100000 [USP'U]/ML
500000 SUSPENSION ORAL 4 TIMES DAILY
Qty: 200 ML | Refills: 0 | Status: SHIPPED | OUTPATIENT
Start: 2018-01-01 | End: 2018-01-01 | Stop reason: DRUGHIGH

## 2018-01-01 RX ORDER — POTASSIUM CHLORIDE 1.5 G/1.77G
40 POWDER, FOR SOLUTION ORAL 2 TIMES DAILY WITH MEALS
Status: DISCONTINUED | OUTPATIENT
Start: 2018-01-01 | End: 2018-01-01

## 2018-01-01 RX ORDER — PREDNISONE 10 MG/1
TABLET ORAL
Qty: 9 TAB | Refills: 0 | Status: SHIPPED | OUTPATIENT
Start: 2018-01-01 | End: 2019-01-01 | Stop reason: CLARIF

## 2018-01-01 RX ORDER — ALBUTEROL SULFATE 0.83 MG/ML
2.5 SOLUTION RESPIRATORY (INHALATION)
Status: DISCONTINUED | OUTPATIENT
Start: 2018-01-01 | End: 2018-01-01

## 2018-01-01 RX ORDER — LEVOFLOXACIN 5 MG/ML
750 INJECTION, SOLUTION INTRAVENOUS EVERY 24 HOURS
Status: DISCONTINUED | OUTPATIENT
Start: 2018-01-01 | End: 2018-01-01

## 2018-01-01 RX ORDER — NYSTATIN 100000 [USP'U]/ML
1 SUSPENSION ORAL 4 TIMES DAILY
Status: ON HOLD | COMMUNITY
Start: 2018-01-01 | End: 2018-01-01

## 2018-01-01 RX ORDER — SODIUM CHLORIDE 9 MG/ML
75 INJECTION, SOLUTION INTRAVENOUS CONTINUOUS
Status: DISPENSED | OUTPATIENT
Start: 2018-01-01 | End: 2018-01-01

## 2018-01-01 RX ORDER — BUMETANIDE 1 MG/1
1 TABLET ORAL EVERY EVENING
Qty: 90 TAB | Refills: 3 | Status: SHIPPED | OUTPATIENT
Start: 2018-01-01 | End: 2018-01-01 | Stop reason: SDUPTHER

## 2018-01-01 RX ORDER — ALBUTEROL SULFATE 0.83 MG/ML
5 SOLUTION RESPIRATORY (INHALATION)
Status: COMPLETED | OUTPATIENT
Start: 2018-01-01 | End: 2018-01-01

## 2018-01-01 RX ORDER — POTASSIUM CHLORIDE 7.45 MG/ML
10 INJECTION INTRAVENOUS ONCE
Status: CANCELLED | OUTPATIENT
Start: 2018-01-01 | End: 2018-01-01

## 2018-01-01 RX ORDER — DOCUSATE SODIUM 100 MG/1
100 CAPSULE, LIQUID FILLED ORAL
Status: DISCONTINUED | OUTPATIENT
Start: 2018-01-01 | End: 2018-01-01 | Stop reason: HOSPADM

## 2018-01-01 RX ORDER — WARFARIN 2 MG/1
2 TABLET ORAL EVERY EVENING
COMMUNITY
End: 2018-01-01

## 2018-01-01 RX ORDER — LOVASTATIN 20 MG/1
10 TABLET ORAL DAILY
Status: DISCONTINUED | OUTPATIENT
Start: 2018-01-01 | End: 2018-01-01

## 2018-01-01 RX ORDER — BUMETANIDE 1 MG/1
1 TABLET ORAL EVERY EVENING
Status: DISCONTINUED | OUTPATIENT
Start: 2018-01-01 | End: 2018-01-01

## 2018-01-01 RX ORDER — WARFARIN 2 MG/1
TABLET ORAL
Qty: 130 TAB | Refills: 3 | Status: SHIPPED | OUTPATIENT
Start: 2018-01-01 | End: 2018-01-01 | Stop reason: SDUPTHER

## 2018-01-01 RX ORDER — DILTIAZEM HYDROCHLORIDE 5 MG/ML
0.25 INJECTION INTRAVENOUS ONCE
Status: DISCONTINUED | OUTPATIENT
Start: 2018-01-01 | End: 2018-01-01

## 2018-01-01 RX ORDER — DILTIAZEM HYDROCHLORIDE 5 MG/ML
INJECTION INTRAVENOUS
Status: COMPLETED
Start: 2018-01-01 | End: 2018-01-01

## 2018-01-01 RX ORDER — PREDNISONE 20 MG/1
40 TABLET ORAL
Status: COMPLETED | OUTPATIENT
Start: 2018-01-01 | End: 2018-01-01

## 2018-01-01 RX ORDER — DILTIAZEM HYDROCHLORIDE 60 MG/1
60 TABLET, FILM COATED ORAL 4 TIMES DAILY
COMMUNITY
End: 2018-01-01 | Stop reason: SDUPTHER

## 2018-01-01 RX ORDER — IBUPROFEN 200 MG
1 TABLET ORAL EVERY 24 HOURS
Status: DISCONTINUED | OUTPATIENT
Start: 2018-01-01 | End: 2018-01-01

## 2018-01-01 RX ORDER — POTASSIUM CHLORIDE 1.5 G/1.77G
20 POWDER, FOR SOLUTION ORAL
Status: COMPLETED | OUTPATIENT
Start: 2018-01-01 | End: 2018-01-01

## 2018-01-01 RX ORDER — HYDROXYZINE 25 MG/1
25 TABLET, FILM COATED ORAL ONCE
Status: COMPLETED | OUTPATIENT
Start: 2018-01-01 | End: 2018-01-01

## 2018-01-01 RX ORDER — NYSTATIN 100000 [USP'U]/ML
SUSPENSION ORAL 4 TIMES DAILY
COMMUNITY
End: 2018-01-01

## 2018-01-01 RX ORDER — AMOXICILLIN AND CLAVULANATE POTASSIUM 875; 125 MG/1; MG/1
TABLET, FILM COATED ORAL
Qty: 17 TAB | Refills: 0 | Status: SHIPPED | OUTPATIENT
Start: 2018-01-01 | End: 2018-01-01 | Stop reason: ALTCHOICE

## 2018-01-01 RX ORDER — DOCUSATE SODIUM 100 MG/1
100 CAPSULE, LIQUID FILLED ORAL
COMMUNITY
End: 2018-01-01 | Stop reason: SDUPTHER

## 2018-01-01 RX ORDER — PREDNISONE 10 MG/1
10 TABLET ORAL
Qty: 3 TAB | Refills: 0 | Status: SHIPPED | OUTPATIENT
Start: 2018-01-01 | End: 2018-01-01

## 2018-01-01 RX ORDER — WARFARIN 1 MG/1
1 TABLET ORAL EVERY EVENING
Status: COMPLETED | OUTPATIENT
Start: 2018-01-01 | End: 2018-01-01

## 2018-01-01 RX ORDER — POTASSIUM CHLORIDE 1.5 G/1.77G
40 POWDER, FOR SOLUTION ORAL EVERY 4 HOURS
Status: COMPLETED | OUTPATIENT
Start: 2018-01-01 | End: 2018-01-01

## 2018-01-01 RX ORDER — DILTIAZEM HYDROCHLORIDE 30 MG/1
60 TABLET, FILM COATED ORAL 4 TIMES DAILY
Status: DISCONTINUED | OUTPATIENT
Start: 2018-01-01 | End: 2018-01-01

## 2018-01-01 RX ORDER — SODIUM CHLORIDE 0.9 % (FLUSH) 0.9 %
5-10 SYRINGE (ML) INJECTION EVERY 8 HOURS
Status: DISCONTINUED | OUTPATIENT
Start: 2018-01-01 | End: 2018-01-01 | Stop reason: HOSPADM

## 2018-01-01 RX ORDER — BUMETANIDE 1 MG/1
1 TABLET ORAL
Status: DISCONTINUED | OUTPATIENT
Start: 2018-01-01 | End: 2018-01-01

## 2018-01-01 RX ORDER — LORAZEPAM 0.5 MG/1
0.5 TABLET ORAL ONCE
Status: COMPLETED | OUTPATIENT
Start: 2018-01-01 | End: 2018-01-01

## 2018-01-01 RX ORDER — BUMETANIDE 1 MG/1
1 TABLET ORAL DAILY
Status: DISCONTINUED | OUTPATIENT
Start: 2018-01-01 | End: 2018-01-01

## 2018-01-01 RX ORDER — POTASSIUM CHLORIDE 1.5 G/1.77G
40 POWDER, FOR SOLUTION ORAL EVERY 4 HOURS
Status: DISPENSED | OUTPATIENT
Start: 2018-01-01 | End: 2018-01-01

## 2018-01-01 RX ORDER — PREDNISONE 20 MG/1
60 TABLET ORAL DAILY
Status: DISCONTINUED | OUTPATIENT
Start: 2018-01-01 | End: 2018-01-01

## 2018-01-01 RX ORDER — ALBUTEROL SULFATE 2.5 MG/.5ML
5 SOLUTION RESPIRATORY (INHALATION)
Status: COMPLETED | OUTPATIENT
Start: 2018-01-01 | End: 2018-01-01

## 2018-01-01 RX ORDER — WARFARIN 1 MG/1
1 TABLET ORAL ONCE
Status: COMPLETED | OUTPATIENT
Start: 2018-01-01 | End: 2018-01-01

## 2018-01-01 RX ORDER — DILTIAZEM HYDROCHLORIDE 60 MG/1
60 TABLET, FILM COATED ORAL 4 TIMES DAILY
Status: DISCONTINUED | OUTPATIENT
Start: 2018-01-01 | End: 2018-01-01

## 2018-01-01 RX ORDER — NYSTATIN 100000 [USP'U]/ML
500000 SUSPENSION ORAL 4 TIMES DAILY
Qty: 200 ML | Refills: 0 | Status: SHIPPED | OUTPATIENT
Start: 2018-01-01 | End: 2018-01-01 | Stop reason: SDUPTHER

## 2018-01-01 RX ORDER — SODIUM CHLORIDE 0.9 % (FLUSH) 0.9 %
5-10 SYRINGE (ML) INJECTION EVERY 8 HOURS
Status: DISCONTINUED | OUTPATIENT
Start: 2018-01-01 | End: 2018-01-01 | Stop reason: SDUPTHER

## 2018-01-01 RX ORDER — WARFARIN 1 MG/1
TABLET ORAL
Qty: 45 TAB | Refills: 0 | Status: SHIPPED | OUTPATIENT
Start: 2018-01-01 | End: 2018-01-01

## 2018-01-01 RX ORDER — SODIUM CHLORIDE 9 MG/ML
100 INJECTION, SOLUTION INTRAVENOUS CONTINUOUS
Status: DISPENSED | OUTPATIENT
Start: 2018-01-01 | End: 2018-01-01

## 2018-01-01 RX ORDER — BUMETANIDE 1 MG/1
1 TABLET ORAL ONCE
Status: COMPLETED | OUTPATIENT
Start: 2018-01-01 | End: 2018-01-01

## 2018-01-01 RX ORDER — SODIUM CHLORIDE 0.9 % (FLUSH) 0.9 %
5-10 SYRINGE (ML) INJECTION AS NEEDED
Status: DISCONTINUED | OUTPATIENT
Start: 2018-01-01 | End: 2018-01-01 | Stop reason: HOSPADM

## 2018-01-01 RX ORDER — POTASSIUM CHLORIDE 7.45 MG/ML
10 INJECTION INTRAVENOUS
Status: DISCONTINUED | OUTPATIENT
Start: 2018-01-01 | End: 2018-01-01

## 2018-01-01 RX ORDER — PREDNISONE 20 MG/1
60 TABLET ORAL
Status: DISCONTINUED | OUTPATIENT
Start: 2018-01-01 | End: 2018-01-01 | Stop reason: HOSPADM

## 2018-01-01 RX ORDER — LISINOPRIL 5 MG/1
5 TABLET ORAL DAILY
Qty: 30 TAB | Refills: 0 | Status: SHIPPED | OUTPATIENT
Start: 2018-01-01 | End: 2018-01-01

## 2018-01-01 RX ORDER — LISINOPRIL 5 MG/1
5 TABLET ORAL DAILY
Status: DISCONTINUED | OUTPATIENT
Start: 2018-01-01 | End: 2018-01-01 | Stop reason: HOSPADM

## 2018-01-01 RX ORDER — BUMETANIDE 1 MG/1
1 TABLET ORAL DAILY
Status: DISCONTINUED | OUTPATIENT
Start: 2018-01-01 | End: 2018-01-01 | Stop reason: HOSPADM

## 2018-01-01 RX ORDER — GLYCERIN ADULT
1 SUPPOSITORY, RECTAL RECTAL DAILY PRN
Status: DISCONTINUED | OUTPATIENT
Start: 2018-01-01 | End: 2018-01-01 | Stop reason: HOSPADM

## 2018-01-01 RX ORDER — PREDNISONE 20 MG/1
20 TABLET ORAL
Status: DISCONTINUED | OUTPATIENT
Start: 2018-01-01 | End: 2018-01-01 | Stop reason: HOSPADM

## 2018-01-01 RX ORDER — POTASSIUM CHLORIDE 750 MG/1
10 TABLET, FILM COATED, EXTENDED RELEASE ORAL DAILY
COMMUNITY

## 2018-01-01 RX ORDER — POTASSIUM CHLORIDE 1.5 G/1.77G
40 POWDER, FOR SOLUTION ORAL
Status: DISCONTINUED | OUTPATIENT
Start: 2018-01-01 | End: 2018-01-01

## 2018-01-01 RX ORDER — LISINOPRIL 5 MG/1
5 TABLET ORAL DAILY
Qty: 30 TAB | Refills: 0 | Status: SHIPPED | OUTPATIENT
Start: 2018-01-01 | End: 2018-01-01 | Stop reason: SDUPTHER

## 2018-01-01 RX ORDER — POTASSIUM CHLORIDE 750 MG/1
40 TABLET, FILM COATED, EXTENDED RELEASE ORAL 2 TIMES DAILY WITH MEALS
Status: COMPLETED | OUTPATIENT
Start: 2018-01-01 | End: 2018-01-01

## 2018-01-01 RX ORDER — WARFARIN 1 MG/1
0.5 TABLET ORAL ONCE
Status: ACTIVE | OUTPATIENT
Start: 2018-01-01 | End: 2018-01-01

## 2018-01-01 RX ORDER — POTASSIUM CHLORIDE 7.45 MG/ML
10 INJECTION INTRAVENOUS ONCE
Status: COMPLETED | OUTPATIENT
Start: 2018-01-01 | End: 2018-01-01

## 2018-01-01 RX ORDER — MAGNESIUM SULFATE 100 %
4 CRYSTALS MISCELLANEOUS AS NEEDED
Status: DISCONTINUED | OUTPATIENT
Start: 2018-01-01 | End: 2018-01-01 | Stop reason: HOSPADM

## 2018-01-01 RX ORDER — POTASSIUM CHLORIDE 1.5 G/1.77G
40 POWDER, FOR SOLUTION ORAL
Status: COMPLETED | OUTPATIENT
Start: 2018-01-01 | End: 2018-01-01

## 2018-01-01 RX ORDER — DIPHENHYDRAMINE HCL 25 MG
25 CAPSULE ORAL
Status: COMPLETED | OUTPATIENT
Start: 2018-01-01 | End: 2018-01-01

## 2018-01-01 RX ORDER — POTASSIUM CHLORIDE 1.5 G/1.77G
40 POWDER, FOR SOLUTION ORAL ONCE
Status: COMPLETED | OUTPATIENT
Start: 2018-01-01 | End: 2018-01-01

## 2018-01-01 RX ORDER — MIDAZOLAM HYDROCHLORIDE 5 MG/ML
1 INJECTION INTRAMUSCULAR; INTRAVENOUS ONCE
Status: ACTIVE | OUTPATIENT
Start: 2018-01-01 | End: 2018-01-01

## 2018-01-01 RX ORDER — ALBUTEROL SULFATE 90 UG/1
2 AEROSOL, METERED RESPIRATORY (INHALATION)
Qty: 1 INHALER | Refills: 11 | Status: SHIPPED | OUTPATIENT
Start: 2018-01-01 | End: 2018-01-01 | Stop reason: SDUPTHER

## 2018-01-01 RX ORDER — PREDNISONE 10 MG/1
10 TABLET ORAL DAILY
Qty: 90 TAB | Refills: 3 | Status: ON HOLD | OUTPATIENT
Start: 2018-01-01 | End: 2018-01-01

## 2018-01-01 RX ORDER — IPRATROPIUM BROMIDE AND ALBUTEROL SULFATE 2.5; .5 MG/3ML; MG/3ML
SOLUTION RESPIRATORY (INHALATION)
Status: COMPLETED
Start: 2018-01-01 | End: 2018-01-01

## 2018-01-01 RX ORDER — ALBUTEROL SULFATE 0.83 MG/ML
2.5 SOLUTION RESPIRATORY (INHALATION)
Status: DISCONTINUED | OUTPATIENT
Start: 2018-01-01 | End: 2018-01-01 | Stop reason: HOSPADM

## 2018-01-01 RX ORDER — BUDESONIDE AND FORMOTEROL FUMARATE DIHYDRATE 160; 4.5 UG/1; UG/1
AEROSOL RESPIRATORY (INHALATION)
Qty: 3 INHALER | Refills: 3 | Status: SHIPPED | OUTPATIENT
Start: 2018-01-01 | End: 2018-01-01 | Stop reason: SDUPTHER

## 2018-01-01 RX ORDER — HEPARIN SODIUM 10000 [USP'U]/100ML
12-25 INJECTION, SOLUTION INTRAVENOUS
Status: DISCONTINUED | OUTPATIENT
Start: 2018-01-01 | End: 2018-01-01

## 2018-01-01 RX ORDER — WARFARIN 2 MG/1
TABLET ORAL
Qty: 130 TAB | Refills: 3
Start: 2018-01-01 | End: 2018-01-01

## 2018-01-01 RX ORDER — AMOXICILLIN AND CLAVULANATE POTASSIUM 875; 125 MG/1; MG/1
1 TABLET, FILM COATED ORAL EVERY 12 HOURS
Status: DISCONTINUED | OUTPATIENT
Start: 2018-01-01 | End: 2018-01-01 | Stop reason: HOSPADM

## 2018-01-01 RX ORDER — OXYCODONE AND ACETAMINOPHEN 5; 325 MG/1; MG/1
1 TABLET ORAL
Qty: 100 TAB | Refills: 0 | Status: SHIPPED | OUTPATIENT
Start: 2018-01-01 | End: 2018-01-01

## 2018-01-01 RX ORDER — WARFARIN 1 MG/1
TABLET ORAL
Qty: 45 TAB | Refills: 0
Start: 2018-01-01 | End: 2018-01-01 | Stop reason: SDUPTHER

## 2018-01-01 RX ORDER — DOCUSATE SODIUM 100 MG/1
100 CAPSULE, LIQUID FILLED ORAL
Status: DISCONTINUED | OUTPATIENT
Start: 2018-01-01 | End: 2018-01-01

## 2018-01-01 RX ORDER — BUMETANIDE 1 MG/1
1 TABLET ORAL
COMMUNITY
End: 2018-01-01 | Stop reason: SDUPTHER

## 2018-01-01 RX ORDER — PREDNISONE 10 MG/1
TABLET ORAL
Qty: 39 TAB | Refills: 0 | Status: SHIPPED | OUTPATIENT
Start: 2018-01-01 | End: 2018-01-01 | Stop reason: SDUPTHER

## 2018-01-01 RX ORDER — BUMETANIDE 1 MG/1
1 TABLET ORAL ONCE
Status: DISPENSED | OUTPATIENT
Start: 2018-01-01 | End: 2018-01-01

## 2018-01-01 RX ORDER — OXYCODONE AND ACETAMINOPHEN 5; 325 MG/1; MG/1
1 TABLET ORAL
Qty: 70 TAB | Refills: 0 | Status: SHIPPED | OUTPATIENT
Start: 2018-01-01

## 2018-01-01 RX ORDER — POTASSIUM CHLORIDE 7.45 MG/ML
10 INJECTION INTRAVENOUS
Status: DISCONTINUED | OUTPATIENT
Start: 2018-01-01 | End: 2018-01-01 | Stop reason: SDUPTHER

## 2018-01-01 RX ORDER — WARFARIN 1 MG/1
TABLET ORAL
Qty: 90 TAB | Refills: 5
Start: 2018-01-01 | End: 2018-01-01 | Stop reason: SDUPTHER

## 2018-01-01 RX ORDER — LOVASTATIN 10 MG/1
10 TABLET ORAL
COMMUNITY

## 2018-01-01 RX ORDER — SODIUM CHLORIDE 9 MG/ML
50 INJECTION, SOLUTION INTRAVENOUS CONTINUOUS
Status: DISCONTINUED | OUTPATIENT
Start: 2018-01-01 | End: 2018-01-01

## 2018-01-01 RX ORDER — OXYCODONE AND ACETAMINOPHEN 5; 325 MG/1; MG/1
1 TABLET ORAL
Status: DISCONTINUED | OUTPATIENT
Start: 2018-01-01 | End: 2018-01-01 | Stop reason: HOSPADM

## 2018-01-01 RX ORDER — WARFARIN 1 MG/1
TABLET ORAL
Qty: 45 TAB | Refills: 0 | Status: SHIPPED | OUTPATIENT
Start: 2018-01-01 | End: 2018-01-01 | Stop reason: SDUPTHER

## 2018-01-01 RX ORDER — ALBUTEROL SULFATE 0.83 MG/ML
SOLUTION RESPIRATORY (INHALATION) ONCE
COMMUNITY
End: 2018-01-01

## 2018-01-01 RX ORDER — LEVOFLOXACIN 750 MG/1
750 TABLET ORAL
Status: DISCONTINUED | OUTPATIENT
Start: 2018-01-01 | End: 2018-01-01

## 2018-01-01 RX ORDER — LORAZEPAM 0.5 MG/1
0.5 TABLET ORAL ONCE
Status: DISCONTINUED | OUTPATIENT
Start: 2018-01-01 | End: 2018-01-01

## 2018-01-01 RX ORDER — PANTOPRAZOLE SODIUM 40 MG/1
40 TABLET, DELAYED RELEASE ORAL
Status: DISCONTINUED | OUTPATIENT
Start: 2018-01-01 | End: 2018-01-01

## 2018-01-01 RX ORDER — DILTIAZEM HYDROCHLORIDE 60 MG/1
60 TABLET, FILM COATED ORAL
Status: DISCONTINUED | OUTPATIENT
Start: 2018-01-01 | End: 2018-01-01 | Stop reason: HOSPADM

## 2018-01-01 RX ORDER — HEPARIN SODIUM 5000 [USP'U]/ML
30 INJECTION, SOLUTION INTRAVENOUS; SUBCUTANEOUS ONCE
Status: DISCONTINUED | OUTPATIENT
Start: 2018-01-01 | End: 2018-01-01

## 2018-01-01 RX ORDER — POTASSIUM CITRATE 10 MEQ/1
TABLET, EXTENDED RELEASE ORAL
COMMUNITY
End: 2018-01-01

## 2018-01-01 RX ORDER — WARFARIN 1 MG/1
1 TABLET ORAL ONCE
Status: DISCONTINUED | OUTPATIENT
Start: 2018-01-01 | End: 2018-01-01 | Stop reason: HOSPADM

## 2018-01-01 RX ORDER — NYSTATIN 100000 [USP'U]/ML
500000 SUSPENSION ORAL 4 TIMES DAILY
Qty: 100 ML | Refills: 0 | Status: SHIPPED | OUTPATIENT
Start: 2018-01-01 | End: 2019-01-01 | Stop reason: CLARIF

## 2018-01-01 RX ORDER — PANTOPRAZOLE SODIUM 40 MG/1
40 TABLET, DELAYED RELEASE ORAL EVERY 12 HOURS
Status: DISCONTINUED | OUTPATIENT
Start: 2018-01-01 | End: 2018-01-01 | Stop reason: HOSPADM

## 2018-01-01 RX ORDER — WARFARIN 2 MG/1
2 TABLET ORAL ONCE
Status: COMPLETED | OUTPATIENT
Start: 2018-01-01 | End: 2018-01-01

## 2018-01-01 RX ORDER — ALBUTEROL SULFATE 90 UG/1
2 AEROSOL, METERED RESPIRATORY (INHALATION)
Qty: 1 INHALER | Refills: 11 | Status: SHIPPED | OUTPATIENT
Start: 2018-01-01 | End: 2019-01-01 | Stop reason: SDUPTHER

## 2018-01-01 RX ORDER — DILTIAZEM HYDROCHLORIDE 30 MG/1
60 TABLET, FILM COATED ORAL
Status: COMPLETED | OUTPATIENT
Start: 2018-01-01 | End: 2018-01-01

## 2018-01-01 RX ORDER — ARFORMOTEROL TARTRATE 15 UG/2ML
15 SOLUTION RESPIRATORY (INHALATION)
Status: DISCONTINUED | OUTPATIENT
Start: 2018-01-01 | End: 2018-01-01 | Stop reason: HOSPADM

## 2018-01-01 RX ORDER — POTASSIUM CHLORIDE 750 MG/1
10 TABLET, FILM COATED, EXTENDED RELEASE ORAL
COMMUNITY
End: 2018-01-01 | Stop reason: SDUPTHER

## 2018-01-01 RX ORDER — DOXYCYCLINE HYCLATE 100 MG
100 TABLET ORAL 2 TIMES DAILY
Qty: 14 TAB | Refills: 0 | Status: SHIPPED | OUTPATIENT
Start: 2018-01-01 | End: 2018-01-01

## 2018-01-01 RX ORDER — POTASSIUM CHLORIDE 1.5 G/1.77G
20 POWDER, FOR SOLUTION ORAL ONCE
Status: COMPLETED | OUTPATIENT
Start: 2018-01-01 | End: 2018-01-01

## 2018-01-01 RX ORDER — BUDESONIDE AND FORMOTEROL FUMARATE DIHYDRATE 160; 4.5 UG/1; UG/1
2 AEROSOL RESPIRATORY (INHALATION) 2 TIMES DAILY
COMMUNITY
End: 2018-01-01

## 2018-01-01 RX ORDER — PANTOPRAZOLE SODIUM 40 MG/1
40 TABLET, DELAYED RELEASE ORAL DAILY
Status: DISCONTINUED | OUTPATIENT
Start: 2018-01-01 | End: 2018-01-01

## 2018-01-01 RX ORDER — PREDNISONE 20 MG/1
60 TABLET ORAL DAILY
Qty: 15 TAB | Refills: 0 | Status: SHIPPED | OUTPATIENT
Start: 2018-01-01 | End: 2018-01-01

## 2018-01-01 RX ORDER — WARFARIN 1 MG/1
2 TABLET ORAL 2 TIMES WEEKLY
COMMUNITY
End: 2018-01-01

## 2018-01-01 RX ORDER — WARFARIN 2 MG/1
2 TABLET ORAL ONCE
Status: ACTIVE | OUTPATIENT
Start: 2018-01-01 | End: 2018-01-01

## 2018-01-01 RX ORDER — OXYCODONE AND ACETAMINOPHEN 5; 325 MG/1; MG/1
1 TABLET ORAL
Qty: 90 TAB | Refills: 0 | Status: SHIPPED | OUTPATIENT
Start: 2018-01-01 | End: 2018-01-01

## 2018-01-01 RX ORDER — SODIUM CHLORIDE 9 MG/ML
75 INJECTION, SOLUTION INTRAVENOUS CONTINUOUS
Status: DISCONTINUED | OUTPATIENT
Start: 2018-01-01 | End: 2018-01-01

## 2018-01-01 RX ORDER — PREDNISONE 10 MG/1
TABLET ORAL
Qty: 30 TAB | Refills: 0 | Status: SHIPPED
Start: 2018-01-01 | End: 2018-01-01

## 2018-01-01 RX ORDER — WARFARIN 1 MG/1
3 TABLET ORAL
COMMUNITY
End: 2019-01-01 | Stop reason: CLARIF

## 2018-01-01 RX ORDER — NITROGLYCERIN 0.4 MG/1
0.4 TABLET SUBLINGUAL
Status: COMPLETED | OUTPATIENT
Start: 2018-01-01 | End: 2018-01-01

## 2018-01-01 RX ORDER — DOXYCYCLINE HYCLATE 100 MG
100 TABLET ORAL
Status: COMPLETED | OUTPATIENT
Start: 2018-01-01 | End: 2018-01-01

## 2018-01-01 RX ORDER — PREDNISONE 10 MG/1
TABLET ORAL
Qty: 30 TAB | Refills: 0 | Status: SHIPPED | OUTPATIENT
Start: 2018-01-01 | End: 2019-01-01 | Stop reason: CLARIF

## 2018-01-01 RX ORDER — HEPARIN SODIUM 5000 [USP'U]/ML
60 INJECTION, SOLUTION INTRAVENOUS; SUBCUTANEOUS ONCE
Status: COMPLETED | OUTPATIENT
Start: 2018-01-01 | End: 2018-01-01

## 2018-01-01 RX ORDER — BUMETANIDE 0.25 MG/ML
1 INJECTION INTRAMUSCULAR; INTRAVENOUS 2 TIMES DAILY
Status: COMPLETED | OUTPATIENT
Start: 2018-01-01 | End: 2018-01-01

## 2018-01-01 RX ORDER — ONDANSETRON 4 MG/1
4 TABLET, ORALLY DISINTEGRATING ORAL
Qty: 15 TAB | Refills: 1 | Status: SHIPPED | OUTPATIENT
Start: 2018-01-01 | End: 2018-01-01

## 2018-01-01 RX ORDER — MAGNESIUM SULFATE 1 G/100ML
1 INJECTION INTRAVENOUS ONCE
Status: COMPLETED | OUTPATIENT
Start: 2018-01-01 | End: 2018-01-01

## 2018-01-01 RX ORDER — LOVASTATIN 20 MG/1
10 TABLET ORAL
Status: DISCONTINUED | OUTPATIENT
Start: 2018-01-01 | End: 2018-01-01 | Stop reason: HOSPADM

## 2018-01-01 RX ORDER — NYSTATIN 100000 [USP'U]/ML
SUSPENSION ORAL
Qty: 200 ML | Refills: 0 | Status: SHIPPED | OUTPATIENT
Start: 2018-01-01 | End: 2019-01-01 | Stop reason: CLARIF

## 2018-01-01 RX ORDER — PREDNISONE 10 MG/1
10 TABLET ORAL DAILY
Qty: 90 TAB | Refills: 3 | Status: SHIPPED | OUTPATIENT
Start: 2018-01-01 | End: 2018-01-01 | Stop reason: SDUPTHER

## 2018-01-01 RX ORDER — BUMETANIDE 1 MG/1
1 TABLET ORAL EVERY EVENING
Qty: 90 TAB | Refills: 3 | Status: ON HOLD | OUTPATIENT
Start: 2018-01-01 | End: 2019-01-01 | Stop reason: SDUPTHER

## 2018-01-01 RX ORDER — BUDESONIDE AND FORMOTEROL FUMARATE DIHYDRATE 160; 4.5 UG/1; UG/1
AEROSOL RESPIRATORY (INHALATION)
Qty: 3 INHALER | Refills: 3 | Status: SHIPPED | OUTPATIENT
Start: 2018-01-01 | End: 2019-01-01 | Stop reason: CLARIF

## 2018-01-01 RX ORDER — BUDESONIDE 0.5 MG/2ML
500 INHALANT ORAL
Status: DISCONTINUED | OUTPATIENT
Start: 2018-01-01 | End: 2018-01-01

## 2018-01-01 RX ORDER — WARFARIN 1 MG/1
TABLET ORAL
Qty: 45 TAB | Refills: 0 | Status: SHIPPED
Start: 2018-01-01 | End: 2018-01-01

## 2018-01-01 RX ORDER — IBUPROFEN 200 MG
TABLET ORAL
COMMUNITY
End: 2018-01-01

## 2018-01-01 RX ORDER — LOVASTATIN 10 MG/1
TABLET ORAL
Qty: 90 TAB | Refills: 3 | Status: SHIPPED | OUTPATIENT
Start: 2018-01-01 | End: 2019-01-01 | Stop reason: CLARIF

## 2018-01-01 RX ORDER — PREDNISONE 20 MG/1
TABLET ORAL
Qty: 27 TAB | Refills: 0 | Status: SHIPPED | OUTPATIENT
Start: 2018-01-01 | End: 2018-01-01

## 2018-01-01 RX ORDER — DILTIAZEM HYDROCHLORIDE 30 MG/1
60 TABLET, FILM COATED ORAL 4 TIMES DAILY
Status: DISCONTINUED | OUTPATIENT
Start: 2018-01-01 | End: 2018-01-01 | Stop reason: HOSPADM

## 2018-01-01 RX ORDER — OXYCODONE AND ACETAMINOPHEN 5; 325 MG/1; MG/1
1 TABLET ORAL
COMMUNITY
End: 2018-01-01 | Stop reason: SDUPTHER

## 2018-01-01 RX ORDER — BUMETANIDE 1 MG/1
1 TABLET ORAL EVERY EVENING
COMMUNITY
End: 2018-01-01 | Stop reason: SDUPTHER

## 2018-01-01 RX ORDER — LORAZEPAM 2 MG/ML
1 INJECTION INTRAMUSCULAR
Status: DISCONTINUED | OUTPATIENT
Start: 2018-01-01 | End: 2018-01-01

## 2018-01-01 RX ORDER — DEXTROSE 50 % IN WATER (D50W) INTRAVENOUS SYRINGE
25 ONCE
Status: COMPLETED | OUTPATIENT
Start: 2018-01-01 | End: 2018-01-01

## 2018-01-01 RX ORDER — ALBUTEROL SULFATE 0.83 MG/ML
2.5 SOLUTION RESPIRATORY (INHALATION)
Status: COMPLETED | OUTPATIENT
Start: 2018-01-01 | End: 2018-01-01

## 2018-01-01 RX ORDER — OXYCODONE AND ACETAMINOPHEN 5; 325 MG/1; MG/1
1 TABLET ORAL
Qty: 80 TAB | Refills: 0 | Status: SHIPPED | OUTPATIENT
Start: 2018-01-01 | End: 2018-01-01 | Stop reason: SDUPTHER

## 2018-01-01 RX ORDER — LANOLIN ALCOHOL/MO/W.PET/CERES
3 CREAM (GRAM) TOPICAL
Status: DISCONTINUED | OUTPATIENT
Start: 2018-01-01 | End: 2018-01-01 | Stop reason: HOSPADM

## 2018-01-01 RX ORDER — POTASSIUM CHLORIDE 7.45 MG/ML
10 INJECTION INTRAVENOUS
Status: COMPLETED | OUTPATIENT
Start: 2018-01-01 | End: 2018-01-01

## 2018-01-01 RX ORDER — WARFARIN 1 MG/1
TABLET ORAL
Qty: 90 TAB | Refills: 5 | Status: SHIPPED | OUTPATIENT
Start: 2018-01-01 | End: 2019-01-01 | Stop reason: CLARIF

## 2018-01-01 RX ORDER — PREDNISONE 20 MG/1
TABLET ORAL
Qty: 27 TAB | Refills: 0 | Status: SHIPPED | OUTPATIENT
Start: 2018-01-01 | End: 2018-01-01 | Stop reason: ALTCHOICE

## 2018-01-01 RX ORDER — SODIUM CHLORIDE 9 MG/ML
250 INJECTION, SOLUTION INTRAVENOUS AS NEEDED
Status: DISCONTINUED | OUTPATIENT
Start: 2018-01-01 | End: 2018-01-01

## 2018-01-01 RX ORDER — POLYETHYLENE GLYCOL 3350 17 G/17G
17 POWDER, FOR SOLUTION ORAL DAILY PRN
Status: DISCONTINUED | OUTPATIENT
Start: 2018-01-01 | End: 2018-01-01 | Stop reason: HOSPADM

## 2018-01-01 RX ORDER — BUDESONIDE 0.5 MG/2ML
1000 INHALANT ORAL
Status: DISCONTINUED | OUTPATIENT
Start: 2018-01-01 | End: 2018-01-01 | Stop reason: HOSPADM

## 2018-01-01 RX ORDER — PREDNISONE 5 MG/1
5 TABLET ORAL DAILY
Qty: 90 TAB | Refills: 3 | Status: SHIPPED | OUTPATIENT
Start: 2018-01-01 | End: 2018-01-01

## 2018-01-01 RX ORDER — PREDNISONE 10 MG/1
TABLET ORAL
Qty: 90 TAB | Refills: 3 | Status: SHIPPED | OUTPATIENT
Start: 2018-01-01 | End: 2018-01-01

## 2018-01-01 RX ORDER — DOCUSATE SODIUM 100 MG/1
100 CAPSULE, LIQUID FILLED ORAL EVERY EVENING
COMMUNITY

## 2018-01-01 RX ORDER — POTASSIUM CHLORIDE 1.5 G/1.77G
20 POWDER, FOR SOLUTION ORAL
Status: DISCONTINUED | OUTPATIENT
Start: 2018-01-01 | End: 2018-01-01

## 2018-01-01 RX ORDER — SODIUM POLYSTYRENE SULFONATE 15 G/60ML
30 SUSPENSION ORAL; RECTAL
Status: COMPLETED | OUTPATIENT
Start: 2018-01-01 | End: 2018-01-01

## 2018-01-01 RX ADMIN — WARFARIN SODIUM 1 MG: 1 TABLET ORAL at 17:56

## 2018-01-01 RX ADMIN — ALBUTEROL SULFATE 2.5 MG: 2.5 SOLUTION RESPIRATORY (INHALATION) at 14:50

## 2018-01-01 RX ADMIN — AMOXICILLIN AND CLAVULANATE POTASSIUM 1 TABLET: 875; 125 TABLET, FILM COATED ORAL at 08:28

## 2018-01-01 RX ADMIN — PANTOPRAZOLE SODIUM 40 MG: 40 TABLET, DELAYED RELEASE ORAL at 08:04

## 2018-01-01 RX ADMIN — GUAIFENESIN 600 MG: 600 TABLET, EXTENDED RELEASE ORAL at 21:01

## 2018-01-01 RX ADMIN — PANTOPRAZOLE SODIUM 40 MG: 40 TABLET, DELAYED RELEASE ORAL at 08:06

## 2018-01-01 RX ADMIN — OXYCODONE HYDROCHLORIDE AND ACETAMINOPHEN 1 TABLET: 5; 325 TABLET ORAL at 06:06

## 2018-01-01 RX ADMIN — Medication 10 ML: at 06:26

## 2018-01-01 RX ADMIN — DILTIAZEM HYDROCHLORIDE 60 MG: 60 TABLET, FILM COATED ORAL at 11:54

## 2018-01-01 RX ADMIN — Medication 5 ML: at 06:00

## 2018-01-01 RX ADMIN — Medication 10 ML: at 21:39

## 2018-01-01 RX ADMIN — SODIUM CHLORIDE 40 MG: 9 INJECTION INTRAMUSCULAR; INTRAVENOUS; SUBCUTANEOUS at 09:00

## 2018-01-01 RX ADMIN — DILTIAZEM HYDROCHLORIDE 60 MG: 60 TABLET, FILM COATED ORAL at 08:50

## 2018-01-01 RX ADMIN — MAGNESIUM SULFATE IN DEXTROSE 1 G: 10 INJECTION, SOLUTION INTRAVENOUS at 06:02

## 2018-01-01 RX ADMIN — INSULIN LISPRO 6 UNITS: 100 INJECTION, SOLUTION INTRAVENOUS; SUBCUTANEOUS at 14:08

## 2018-01-01 RX ADMIN — AMOXICILLIN AND CLAVULANATE POTASSIUM 1 TABLET: 875; 125 TABLET, FILM COATED ORAL at 08:32

## 2018-01-01 RX ADMIN — OXYCODONE HYDROCHLORIDE AND ACETAMINOPHEN 1 TABLET: 5; 325 TABLET ORAL at 08:39

## 2018-01-01 RX ADMIN — Medication 10 ML: at 22:35

## 2018-01-01 RX ADMIN — Medication 10 ML: at 13:38

## 2018-01-01 RX ADMIN — POTASSIUM CHLORIDE 10 MEQ: 10 INJECTION, SOLUTION INTRAVENOUS at 20:17

## 2018-01-01 RX ADMIN — ARFORMOTEROL TARTRATE 15 MCG: 15 SOLUTION RESPIRATORY (INHALATION) at 20:29

## 2018-01-01 RX ADMIN — IPRATROPIUM BROMIDE AND ALBUTEROL SULFATE 3 ML: .5; 3 SOLUTION RESPIRATORY (INHALATION) at 07:49

## 2018-01-01 RX ADMIN — ALBUTEROL SULFATE 2.5 MG: 2.5 SOLUTION RESPIRATORY (INHALATION) at 00:34

## 2018-01-01 RX ADMIN — Medication 10 ML: at 06:29

## 2018-01-01 RX ADMIN — PREDNISONE 50 MG: 20 TABLET ORAL at 14:48

## 2018-01-01 RX ADMIN — INSULIN HUMAN 10 UNITS: 100 INJECTION, SUSPENSION SUBCUTANEOUS at 08:33

## 2018-01-01 RX ADMIN — METHYLPREDNISOLONE SODIUM SUCCINATE 40 MG: 40 INJECTION, POWDER, FOR SOLUTION INTRAMUSCULAR; INTRAVENOUS at 14:09

## 2018-01-01 RX ADMIN — MAGNESIUM SULFATE HEPTAHYDRATE 2 G: 40 INJECTION, SOLUTION INTRAVENOUS at 09:34

## 2018-01-01 RX ADMIN — DILTIAZEM HYDROCHLORIDE 60 MG: 60 TABLET, FILM COATED ORAL at 21:38

## 2018-01-01 RX ADMIN — ALBUTEROL SULFATE 2.5 MG: 2.5 SOLUTION RESPIRATORY (INHALATION) at 00:07

## 2018-01-01 RX ADMIN — ALBUTEROL SULFATE 2.5 MG: 2.5 SOLUTION RESPIRATORY (INHALATION) at 10:35

## 2018-01-01 RX ADMIN — IPRATROPIUM BROMIDE AND ALBUTEROL SULFATE 3 ML: .5; 3 SOLUTION RESPIRATORY (INHALATION) at 09:02

## 2018-01-01 RX ADMIN — POTASSIUM CHLORIDE 20 MEQ: 1.5 POWDER, FOR SOLUTION ORAL at 05:27

## 2018-01-01 RX ADMIN — INSULIN LISPRO 4 UNITS: 100 INJECTION, SOLUTION INTRAVENOUS; SUBCUTANEOUS at 21:51

## 2018-01-01 RX ADMIN — INSULIN LISPRO 2 UNITS: 100 INJECTION, SOLUTION INTRAVENOUS; SUBCUTANEOUS at 12:24

## 2018-01-01 RX ADMIN — OXYCODONE HYDROCHLORIDE AND ACETAMINOPHEN 1 TABLET: 5; 325 TABLET ORAL at 07:32

## 2018-01-01 RX ADMIN — INSULIN HUMAN 8 UNITS: 100 INJECTION, SUSPENSION SUBCUTANEOUS at 08:06

## 2018-01-01 RX ADMIN — DILTIAZEM HYDROCHLORIDE 60 MG: 60 TABLET, FILM COATED ORAL at 21:01

## 2018-01-01 RX ADMIN — WARFARIN SODIUM 3 MG: 2 TABLET ORAL at 16:39

## 2018-01-01 RX ADMIN — IPRATROPIUM BROMIDE AND ALBUTEROL SULFATE 3 ML: .5; 3 SOLUTION RESPIRATORY (INHALATION) at 13:16

## 2018-01-01 RX ADMIN — METHYLPREDNISOLONE SODIUM SUCCINATE 80 MG: 125 INJECTION, POWDER, FOR SOLUTION INTRAMUSCULAR; INTRAVENOUS at 13:37

## 2018-01-01 RX ADMIN — NYSTATIN 500000 UNITS: 100000 SUSPENSION ORAL at 09:10

## 2018-01-01 RX ADMIN — INSULIN HUMAN 10 UNITS: 100 INJECTION, SUSPENSION SUBCUTANEOUS at 21:40

## 2018-01-01 RX ADMIN — PANTOPRAZOLE SODIUM 40 MG: 40 TABLET, DELAYED RELEASE ORAL at 21:08

## 2018-01-01 RX ADMIN — POTASSIUM CHLORIDE 40 MEQ: 1.5 POWDER, FOR SOLUTION ORAL at 21:03

## 2018-01-01 RX ADMIN — LOVASTATIN 10 MG: 20 TABLET ORAL at 22:41

## 2018-01-01 RX ADMIN — INSULIN HUMAN 10 UNITS: 100 INJECTION, SUSPENSION SUBCUTANEOUS at 08:51

## 2018-01-01 RX ADMIN — AMOXICILLIN AND CLAVULANATE POTASSIUM 1 TABLET: 875; 125 TABLET, FILM COATED ORAL at 09:06

## 2018-01-01 RX ADMIN — OXYCODONE AND ACETAMINOPHEN 1 TABLET: 5; 325 TABLET ORAL at 19:05

## 2018-01-01 RX ADMIN — LOVASTATIN 10 MG: 20 TABLET ORAL at 22:24

## 2018-01-01 RX ADMIN — NYSTATIN 500000 UNITS: 100000 SUSPENSION ORAL at 21:30

## 2018-01-01 RX ADMIN — WARFARIN SODIUM 2.5 MG: 2.5 TABLET ORAL at 17:36

## 2018-01-01 RX ADMIN — ALBUTEROL SULFATE 2.5 MG: 2.5 SOLUTION RESPIRATORY (INHALATION) at 20:06

## 2018-01-01 RX ADMIN — OXYCODONE AND ACETAMINOPHEN 1 TABLET: 5; 325 TABLET ORAL at 10:38

## 2018-01-01 RX ADMIN — NYSTATIN 500000 UNITS: 500000 SUSPENSION ORAL at 18:00

## 2018-01-01 RX ADMIN — Medication 10 ML: at 20:53

## 2018-01-01 RX ADMIN — INSULIN LISPRO 5 UNITS: 100 INJECTION, SOLUTION INTRAVENOUS; SUBCUTANEOUS at 07:50

## 2018-01-01 RX ADMIN — INSULIN LISPRO 2 UNITS: 100 INJECTION, SOLUTION INTRAVENOUS; SUBCUTANEOUS at 12:07

## 2018-01-01 RX ADMIN — DILTIAZEM HYDROCHLORIDE 60 MG: 30 TABLET, FILM COATED ORAL at 19:45

## 2018-01-01 RX ADMIN — METHYLPREDNISOLONE SODIUM SUCCINATE 125 MG: 125 INJECTION, POWDER, FOR SOLUTION INTRAMUSCULAR; INTRAVENOUS at 14:24

## 2018-01-01 RX ADMIN — METHYLPREDNISOLONE SODIUM SUCCINATE 60 MG: 125 INJECTION, POWDER, FOR SOLUTION INTRAMUSCULAR; INTRAVENOUS at 21:46

## 2018-01-01 RX ADMIN — DILTIAZEM HYDROCHLORIDE 60 MG: 60 TABLET, FILM COATED ORAL at 22:24

## 2018-01-01 RX ADMIN — BUDESONIDE 500 MCG: 0.5 INHALANT RESPIRATORY (INHALATION) at 20:28

## 2018-01-01 RX ADMIN — WARFARIN SODIUM 3 MG: 2 TABLET ORAL at 22:41

## 2018-01-01 RX ADMIN — INSULIN LISPRO 3 UNITS: 100 INJECTION, SOLUTION INTRAVENOUS; SUBCUTANEOUS at 07:30

## 2018-01-01 RX ADMIN — IPRATROPIUM BROMIDE AND ALBUTEROL SULFATE 3 ML: .5; 3 SOLUTION RESPIRATORY (INHALATION) at 20:28

## 2018-01-01 RX ADMIN — INSULIN LISPRO 10 UNITS: 100 INJECTION, SOLUTION INTRAVENOUS; SUBCUTANEOUS at 21:37

## 2018-01-01 RX ADMIN — BUDESONIDE 500 MCG: 0.5 INHALANT RESPIRATORY (INHALATION) at 21:04

## 2018-01-01 RX ADMIN — OXYCODONE AND ACETAMINOPHEN 1 TABLET: 5; 325 TABLET ORAL at 08:02

## 2018-01-01 RX ADMIN — ALBUTEROL SULFATE 2.5 MG: 2.5 SOLUTION RESPIRATORY (INHALATION) at 07:33

## 2018-01-01 RX ADMIN — ALBUTEROL SULFATE 2.5 MG: 2.5 SOLUTION RESPIRATORY (INHALATION) at 03:52

## 2018-01-01 RX ADMIN — DIPHENHYDRAMINE HYDROCHLORIDE 25 MG: 25 CAPSULE ORAL at 19:59

## 2018-01-01 RX ADMIN — BUDESONIDE 500 MCG: 0.5 INHALANT RESPIRATORY (INHALATION) at 07:20

## 2018-01-01 RX ADMIN — DILTIAZEM HYDROCHLORIDE 60 MG: 60 TABLET, FILM COATED ORAL at 18:40

## 2018-01-01 RX ADMIN — IPRATROPIUM BROMIDE AND ALBUTEROL SULFATE 3 ML: .5; 3 SOLUTION RESPIRATORY (INHALATION) at 00:00

## 2018-01-01 RX ADMIN — Medication 10 ML: at 14:22

## 2018-01-01 RX ADMIN — OXYCODONE AND ACETAMINOPHEN 1 TABLET: 5; 325 TABLET ORAL at 18:40

## 2018-01-01 RX ADMIN — DILTIAZEM HYDROCHLORIDE 60 MG: 60 TABLET, FILM COATED ORAL at 08:28

## 2018-01-01 RX ADMIN — OXYCODONE AND ACETAMINOPHEN 1 TABLET: 5; 325 TABLET ORAL at 03:14

## 2018-01-01 RX ADMIN — PANTOPRAZOLE SODIUM 40 MG: 40 TABLET, DELAYED RELEASE ORAL at 08:38

## 2018-01-01 RX ADMIN — INSULIN LISPRO 3 UNITS: 100 INJECTION, SOLUTION INTRAVENOUS; SUBCUTANEOUS at 08:32

## 2018-01-01 RX ADMIN — LORAZEPAM 0.5 MG: 0.5 TABLET ORAL at 19:45

## 2018-01-01 RX ADMIN — DILTIAZEM HYDROCHLORIDE 60 MG: 60 TABLET, FILM COATED ORAL at 21:55

## 2018-01-01 RX ADMIN — Medication 10 ML: at 00:51

## 2018-01-01 RX ADMIN — PREDNISONE 60 MG: 20 TABLET ORAL at 12:37

## 2018-01-01 RX ADMIN — DILTIAZEM HYDROCHLORIDE 60 MG: 60 TABLET, FILM COATED ORAL at 06:20

## 2018-01-01 RX ADMIN — DEXMEDETOMIDINE HYDROCHLORIDE 0.4 MCG/KG/HR: 100 INJECTION, SOLUTION INTRAVENOUS at 15:26

## 2018-01-01 RX ADMIN — ALBUTEROL SULFATE 2.5 MG: 2.5 SOLUTION RESPIRATORY (INHALATION) at 11:15

## 2018-01-01 RX ADMIN — OXYCODONE AND ACETAMINOPHEN 1 TABLET: 5; 325 TABLET ORAL at 00:05

## 2018-01-01 RX ADMIN — IPRATROPIUM BROMIDE AND ALBUTEROL SULFATE 3 ML: .5; 3 SOLUTION RESPIRATORY (INHALATION) at 03:48

## 2018-01-01 RX ADMIN — GUAIFENESIN 600 MG: 600 TABLET, EXTENDED RELEASE ORAL at 08:35

## 2018-01-01 RX ADMIN — OXYCODONE AND ACETAMINOPHEN 1 TABLET: 5; 325 TABLET ORAL at 03:28

## 2018-01-01 RX ADMIN — NITROGLYCERIN 0.4 MG: 0.4 TABLET, ORALLY DISINTEGRATING SUBLINGUAL at 17:12

## 2018-01-01 RX ADMIN — OXYCODONE AND ACETAMINOPHEN 1 TABLET: 5; 325 TABLET ORAL at 09:28

## 2018-01-01 RX ADMIN — LOVASTATIN 10 MG: 20 TABLET ORAL at 21:00

## 2018-01-01 RX ADMIN — OXYCODONE AND ACETAMINOPHEN 1 TABLET: 5; 325 TABLET ORAL at 19:03

## 2018-01-01 RX ADMIN — INSULIN LISPRO 8 UNITS: 100 INJECTION, SOLUTION INTRAVENOUS; SUBCUTANEOUS at 18:07

## 2018-01-01 RX ADMIN — HYDROXYZINE HYDROCHLORIDE 25 MG: 25 TABLET, FILM COATED ORAL at 10:38

## 2018-01-01 RX ADMIN — GUAIFENESIN 600 MG: 600 TABLET, EXTENDED RELEASE ORAL at 08:31

## 2018-01-01 RX ADMIN — DILTIAZEM HYDROCHLORIDE 60 MG: 60 TABLET, FILM COATED ORAL at 18:06

## 2018-01-01 RX ADMIN — ALBUTEROL SULFATE 2.5 MG: 2.5 SOLUTION RESPIRATORY (INHALATION) at 03:17

## 2018-01-01 RX ADMIN — PREDNISONE 60 MG: 20 TABLET ORAL at 09:10

## 2018-01-01 RX ADMIN — DILTIAZEM HYDROCHLORIDE 60 MG: 60 TABLET, FILM COATED ORAL at 18:19

## 2018-01-01 RX ADMIN — DILTIAZEM HYDROCHLORIDE 15 MG: 5 INJECTION INTRAVENOUS at 05:37

## 2018-01-01 RX ADMIN — POTASSIUM CHLORIDE 40 MEQ: 750 TABLET, FILM COATED, EXTENDED RELEASE ORAL at 18:25

## 2018-01-01 RX ADMIN — NYSTATIN 500000 UNITS: 500000 SUSPENSION ORAL at 17:36

## 2018-01-01 RX ADMIN — DILTIAZEM HYDROCHLORIDE 60 MG: 60 TABLET, FILM COATED ORAL at 07:41

## 2018-01-01 RX ADMIN — AMOXICILLIN AND CLAVULANATE POTASSIUM 1 TABLET: 875; 125 TABLET, FILM COATED ORAL at 21:07

## 2018-01-01 RX ADMIN — OXYCODONE HYDROCHLORIDE AND ACETAMINOPHEN 1 TABLET: 5; 325 TABLET ORAL at 01:17

## 2018-01-01 RX ADMIN — METHYLPREDNISOLONE SODIUM SUCCINATE 40 MG: 40 INJECTION, POWDER, FOR SOLUTION INTRAMUSCULAR; INTRAVENOUS at 05:09

## 2018-01-01 RX ADMIN — DILTIAZEM HYDROCHLORIDE 60 MG: 60 TABLET, FILM COATED ORAL at 13:29

## 2018-01-01 RX ADMIN — INSULIN LISPRO 3 UNITS: 100 INJECTION, SOLUTION INTRAVENOUS; SUBCUTANEOUS at 11:30

## 2018-01-01 RX ADMIN — METHYLPREDNISOLONE SODIUM SUCCINATE 60 MG: 125 INJECTION, POWDER, FOR SOLUTION INTRAMUSCULAR; INTRAVENOUS at 13:31

## 2018-01-01 RX ADMIN — OXYCODONE AND ACETAMINOPHEN 1 TABLET: 5; 325 TABLET ORAL at 08:31

## 2018-01-01 RX ADMIN — INSULIN HUMAN 10 UNITS: 100 INJECTION, SUSPENSION SUBCUTANEOUS at 21:55

## 2018-01-01 RX ADMIN — DOCUSATE SODIUM 100 MG: 100 CAPSULE, LIQUID FILLED ORAL at 12:36

## 2018-01-01 RX ADMIN — INSULIN HUMAN 8 UNITS: 100 INJECTION, SUSPENSION SUBCUTANEOUS at 20:00

## 2018-01-01 RX ADMIN — IPRATROPIUM BROMIDE AND ALBUTEROL SULFATE 3 ML: .5; 3 SOLUTION RESPIRATORY (INHALATION) at 19:32

## 2018-01-01 RX ADMIN — POTASSIUM CHLORIDE 40 MEQ: 1.5 POWDER, FOR SOLUTION ORAL at 08:50

## 2018-01-01 RX ADMIN — IPRATROPIUM BROMIDE AND ALBUTEROL SULFATE 3 ML: .5; 3 SOLUTION RESPIRATORY (INHALATION) at 07:09

## 2018-01-01 RX ADMIN — ALBUTEROL SULFATE 2.5 MG: 2.5 SOLUTION RESPIRATORY (INHALATION) at 19:24

## 2018-01-01 RX ADMIN — INSULIN LISPRO 3 UNITS: 100 INJECTION, SOLUTION INTRAVENOUS; SUBCUTANEOUS at 16:34

## 2018-01-01 RX ADMIN — ALBUTEROL SULFATE 2.5 MG: 2.5 SOLUTION RESPIRATORY (INHALATION) at 23:28

## 2018-01-01 RX ADMIN — ALBUTEROL SULFATE 2.5 MG: 2.5 SOLUTION RESPIRATORY (INHALATION) at 23:50

## 2018-01-01 RX ADMIN — PANTOPRAZOLE SODIUM 40 MG: 40 TABLET, DELAYED RELEASE ORAL at 08:51

## 2018-01-01 RX ADMIN — IPRATROPIUM BROMIDE AND ALBUTEROL SULFATE 3 ML: .5; 3 SOLUTION RESPIRATORY (INHALATION) at 13:30

## 2018-01-01 RX ADMIN — OXYCODONE HYDROCHLORIDE AND ACETAMINOPHEN 1 TABLET: 5; 325 TABLET ORAL at 13:25

## 2018-01-01 RX ADMIN — Medication 10 ML: at 15:06

## 2018-01-01 RX ADMIN — WARFARIN SODIUM 3 MG: 2 TABLET ORAL at 21:07

## 2018-01-01 RX ADMIN — DOCUSATE SODIUM 100 MG: 100 CAPSULE, LIQUID FILLED ORAL at 17:41

## 2018-01-01 RX ADMIN — HEPARIN SODIUM 3400 UNITS: 5000 INJECTION INTRAVENOUS; SUBCUTANEOUS at 02:20

## 2018-01-01 RX ADMIN — METHYLPREDNISOLONE SODIUM SUCCINATE 80 MG: 125 INJECTION, POWDER, FOR SOLUTION INTRAMUSCULAR; INTRAVENOUS at 07:41

## 2018-01-01 RX ADMIN — MAGNESIUM SULFATE IN WATER 2 G: 40 INJECTION, SOLUTION INTRAVENOUS at 10:11

## 2018-01-01 RX ADMIN — LOVASTATIN 10 MG: 20 TABLET ORAL at 21:37

## 2018-01-01 RX ADMIN — MIDAZOLAM 1 MG: 1 INJECTION INTRAMUSCULAR; INTRAVENOUS at 16:02

## 2018-01-01 RX ADMIN — OXYCODONE AND ACETAMINOPHEN 1 TABLET: 5; 325 TABLET ORAL at 13:32

## 2018-01-01 RX ADMIN — NYSTATIN 500000 UNITS: 100000 SUSPENSION ORAL at 12:37

## 2018-01-01 RX ADMIN — DILTIAZEM HYDROCHLORIDE 60 MG: 60 TABLET, FILM COATED ORAL at 21:37

## 2018-01-01 RX ADMIN — NYSTATIN 500000 UNITS: 500000 SUSPENSION ORAL at 12:23

## 2018-01-01 RX ADMIN — DEXTROSE MONOHYDRATE 25 G: 25 INJECTION, SOLUTION INTRAVENOUS at 09:16

## 2018-01-01 RX ADMIN — DILTIAZEM HYDROCHLORIDE 2.5 MG/HR: 5 INJECTION INTRAVENOUS at 06:26

## 2018-01-01 RX ADMIN — NYSTATIN 500000 UNITS: 100000 SUSPENSION ORAL at 22:24

## 2018-01-01 RX ADMIN — PANTOPRAZOLE SODIUM 40 MG: 40 TABLET, DELAYED RELEASE ORAL at 09:07

## 2018-01-01 RX ADMIN — BUMETANIDE 1 MG: 1 TABLET ORAL at 17:56

## 2018-01-01 RX ADMIN — Medication 10 ML: at 06:09

## 2018-01-01 RX ADMIN — Medication 10 ML: at 09:16

## 2018-01-01 RX ADMIN — OXYCODONE HYDROCHLORIDE AND ACETAMINOPHEN 1 TABLET: 5; 325 TABLET ORAL at 13:06

## 2018-01-01 RX ADMIN — BUMETANIDE 1 MG: 1 TABLET ORAL at 17:41

## 2018-01-01 RX ADMIN — NYSTATIN 500000 UNITS: 100000 SUSPENSION ORAL at 21:35

## 2018-01-01 RX ADMIN — DILTIAZEM HYDROCHLORIDE 60 MG: 60 TABLET, FILM COATED ORAL at 17:56

## 2018-01-01 RX ADMIN — BUMETANIDE 1 MG: 1 TABLET ORAL at 09:06

## 2018-01-01 RX ADMIN — Medication 10 ML: at 21:40

## 2018-01-01 RX ADMIN — ALBUTEROL SULFATE 2.5 MG: 2.5 SOLUTION RESPIRATORY (INHALATION) at 07:19

## 2018-01-01 RX ADMIN — POTASSIUM CHLORIDE 20 MEQ: 1.5 POWDER, FOR SOLUTION ORAL at 06:16

## 2018-01-01 RX ADMIN — PREDNISONE 40 MG: 20 TABLET ORAL at 09:07

## 2018-01-01 RX ADMIN — OXYCODONE HYDROCHLORIDE AND ACETAMINOPHEN 1 TABLET: 5; 325 TABLET ORAL at 15:05

## 2018-01-01 RX ADMIN — MEROPENEM 500 MG: 500 INJECTION, POWDER, FOR SOLUTION INTRAVENOUS at 10:11

## 2018-01-01 RX ADMIN — DILTIAZEM HYDROCHLORIDE 60 MG: 60 TABLET, FILM COATED ORAL at 18:49

## 2018-01-01 RX ADMIN — OXYCODONE HYDROCHLORIDE AND ACETAMINOPHEN 1 TABLET: 5; 325 TABLET ORAL at 13:24

## 2018-01-01 RX ADMIN — Medication 10 ML: at 21:47

## 2018-01-01 RX ADMIN — POTASSIUM CHLORIDE 10 MEQ: 10 INJECTION, SOLUTION INTRAVENOUS at 21:05

## 2018-01-01 RX ADMIN — IPRATROPIUM BROMIDE AND ALBUTEROL SULFATE 3 ML: .5; 3 SOLUTION RESPIRATORY (INHALATION) at 13:14

## 2018-01-01 RX ADMIN — METHYLPREDNISOLONE SODIUM SUCCINATE 80 MG: 125 INJECTION, POWDER, FOR SOLUTION INTRAMUSCULAR; INTRAVENOUS at 21:05

## 2018-01-01 RX ADMIN — HEPARIN SODIUM 1700 UNITS: 5000 INJECTION INTRAVENOUS; SUBCUTANEOUS at 08:02

## 2018-01-01 RX ADMIN — LORAZEPAM 1 MG: 2 INJECTION INTRAMUSCULAR; INTRAVENOUS at 14:05

## 2018-01-01 RX ADMIN — DOCUSATE SODIUM 100 MG: 100 CAPSULE, LIQUID FILLED ORAL at 06:19

## 2018-01-01 RX ADMIN — DILTIAZEM HYDROCHLORIDE 60 MG: 60 TABLET, FILM COATED ORAL at 18:41

## 2018-01-01 RX ADMIN — LISINOPRIL 5 MG: 5 TABLET ORAL at 08:51

## 2018-01-01 RX ADMIN — OXYCODONE AND ACETAMINOPHEN 1 TABLET: 5; 325 TABLET ORAL at 14:21

## 2018-01-01 RX ADMIN — OXYCODONE AND ACETAMINOPHEN 1 TABLET: 5; 325 TABLET ORAL at 20:58

## 2018-01-01 RX ADMIN — DILTIAZEM HYDROCHLORIDE 60 MG: 60 TABLET, FILM COATED ORAL at 13:24

## 2018-01-01 RX ADMIN — BUMETANIDE 1 MG: 0.25 INJECTION INTRAMUSCULAR; INTRAVENOUS at 16:14

## 2018-01-01 RX ADMIN — ALBUTEROL SULFATE 2.5 MG: 2.5 SOLUTION RESPIRATORY (INHALATION) at 19:16

## 2018-01-01 RX ADMIN — POTASSIUM CHLORIDE 40 MEQ: 750 TABLET, FILM COATED, EXTENDED RELEASE ORAL at 17:00

## 2018-01-01 RX ADMIN — METHYLPREDNISOLONE SODIUM SUCCINATE 40 MG: 40 INJECTION, POWDER, FOR SOLUTION INTRAMUSCULAR; INTRAVENOUS at 21:37

## 2018-01-01 RX ADMIN — OXYCODONE AND ACETAMINOPHEN 1 TABLET: 5; 325 TABLET ORAL at 23:32

## 2018-01-01 RX ADMIN — POTASSIUM CHLORIDE 40 MEQ: 750 TABLET, EXTENDED RELEASE ORAL at 13:06

## 2018-01-01 RX ADMIN — OXYCODONE AND ACETAMINOPHEN 1 TABLET: 5; 325 TABLET ORAL at 06:21

## 2018-01-01 RX ADMIN — DILTIAZEM HYDROCHLORIDE 60 MG: 60 TABLET, FILM COATED ORAL at 21:35

## 2018-01-01 RX ADMIN — ALBUTEROL SULFATE 2.5 MG: 2.5 SOLUTION RESPIRATORY (INHALATION) at 04:31

## 2018-01-01 RX ADMIN — WARFARIN SODIUM 3 MG: 2 TABLET ORAL at 18:40

## 2018-01-01 RX ADMIN — MAGNESIUM SULFATE IN DEXTROSE 1 G: 10 INJECTION, SOLUTION INTRAVENOUS at 11:28

## 2018-01-01 RX ADMIN — IPRATROPIUM BROMIDE AND ALBUTEROL SULFATE 3 ML: .5; 3 SOLUTION RESPIRATORY (INHALATION) at 13:03

## 2018-01-01 RX ADMIN — DILTIAZEM HYDROCHLORIDE 60 MG: 60 TABLET, FILM COATED ORAL at 13:37

## 2018-01-01 RX ADMIN — SODIUM CHLORIDE 500 ML: 900 INJECTION, SOLUTION INTRAVENOUS at 14:22

## 2018-01-01 RX ADMIN — POTASSIUM CHLORIDE 40 MEQ: 1.5 POWDER, FOR SOLUTION ORAL at 11:42

## 2018-01-01 RX ADMIN — IPRATROPIUM BROMIDE AND ALBUTEROL SULFATE 3 ML: .5; 3 SOLUTION RESPIRATORY (INHALATION) at 08:02

## 2018-01-01 RX ADMIN — ALBUTEROL SULFATE 2.5 MG: 2.5 SOLUTION RESPIRATORY (INHALATION) at 19:56

## 2018-01-01 RX ADMIN — LOVASTATIN 10 MG: 20 TABLET ORAL at 21:53

## 2018-01-01 RX ADMIN — METHYLPREDNISOLONE SODIUM SUCCINATE 60 MG: 125 INJECTION, POWDER, FOR SOLUTION INTRAMUSCULAR; INTRAVENOUS at 05:25

## 2018-01-01 RX ADMIN — POLYETHYLENE GLYCOL 3350 17 G: 17 POWDER, FOR SOLUTION ORAL at 17:46

## 2018-01-01 RX ADMIN — CALCIUM GLUCONATE 1 G: 94 INJECTION, SOLUTION INTRAVENOUS at 09:16

## 2018-01-01 RX ADMIN — OXYCODONE AND ACETAMINOPHEN 1 TABLET: 5; 325 TABLET ORAL at 00:50

## 2018-01-01 RX ADMIN — Medication 10 ML: at 18:50

## 2018-01-01 RX ADMIN — OXYCODONE HYDROCHLORIDE AND ACETAMINOPHEN 1 TABLET: 5; 325 TABLET ORAL at 19:11

## 2018-01-01 RX ADMIN — DOXYCYCLINE HYCLATE 100 MG: 100 TABLET, COATED ORAL at 21:16

## 2018-01-01 RX ADMIN — SODIUM CHLORIDE 75 ML/HR: 900 INJECTION, SOLUTION INTRAVENOUS at 06:22

## 2018-01-01 RX ADMIN — OXYCODONE HYDROCHLORIDE AND ACETAMINOPHEN 1 TABLET: 5; 325 TABLET ORAL at 21:41

## 2018-01-01 RX ADMIN — OXYCODONE AND ACETAMINOPHEN 1 TABLET: 5; 325 TABLET ORAL at 13:09

## 2018-01-01 RX ADMIN — SODIUM CHLORIDE 75 ML/HR: 900 INJECTION, SOLUTION INTRAVENOUS at 10:28

## 2018-01-01 RX ADMIN — OXYCODONE AND ACETAMINOPHEN 1 TABLET: 5; 325 TABLET ORAL at 13:26

## 2018-01-01 RX ADMIN — DILTIAZEM HYDROCHLORIDE 60 MG: 60 TABLET, FILM COATED ORAL at 06:26

## 2018-01-01 RX ADMIN — AMOXICILLIN AND CLAVULANATE POTASSIUM 1 TABLET: 875; 125 TABLET, FILM COATED ORAL at 21:38

## 2018-01-01 RX ADMIN — ALBUTEROL SULFATE 2.5 MG: 2.5 SOLUTION RESPIRATORY (INHALATION) at 15:10

## 2018-01-01 RX ADMIN — DILTIAZEM HYDROCHLORIDE 60 MG: 60 TABLET, FILM COATED ORAL at 21:54

## 2018-01-01 RX ADMIN — MAGNESIUM SULFATE HEPTAHYDRATE 2 G: 40 INJECTION, SOLUTION INTRAVENOUS at 18:51

## 2018-01-01 RX ADMIN — Medication 10 ML: at 14:00

## 2018-01-01 RX ADMIN — NYSTATIN 500000 UNITS: 100000 SUSPENSION ORAL at 17:56

## 2018-01-01 RX ADMIN — OXYCODONE AND ACETAMINOPHEN 1 TABLET: 5; 325 TABLET ORAL at 22:07

## 2018-01-01 RX ADMIN — ALBUTEROL SULFATE 2.5 MG: 2.5 SOLUTION RESPIRATORY (INHALATION) at 04:02

## 2018-01-01 RX ADMIN — DOXYCYCLINE HYCLATE 100 MG: 100 TABLET, COATED ORAL at 20:33

## 2018-01-01 RX ADMIN — DILTIAZEM HYDROCHLORIDE 60 MG: 60 TABLET, FILM COATED ORAL at 15:51

## 2018-01-01 RX ADMIN — PIPERACILLIN SODIUM,TAZOBACTAM SODIUM 3.38 G: 3; .375 INJECTION, POWDER, FOR SOLUTION INTRAVENOUS at 00:00

## 2018-01-01 RX ADMIN — PANTOPRAZOLE SODIUM 40 MG: 40 TABLET, DELAYED RELEASE ORAL at 21:37

## 2018-01-01 RX ADMIN — HEPARIN SODIUM 25 UNITS/KG/HR: 10000 INJECTION, SOLUTION INTRAVENOUS at 12:32

## 2018-01-01 RX ADMIN — ALBUTEROL SULFATE 2.5 MG: 2.5 SOLUTION RESPIRATORY (INHALATION) at 23:48

## 2018-01-01 RX ADMIN — DILTIAZEM HYDROCHLORIDE 60 MG: 60 TABLET, FILM COATED ORAL at 12:36

## 2018-01-01 RX ADMIN — HEPARIN SODIUM 16 UNITS/KG/HR: 10000 INJECTION, SOLUTION INTRAVENOUS at 16:17

## 2018-01-01 RX ADMIN — AMOXICILLIN AND CLAVULANATE POTASSIUM 1 TABLET: 875; 125 TABLET, FILM COATED ORAL at 08:50

## 2018-01-01 RX ADMIN — IPRATROPIUM BROMIDE AND ALBUTEROL SULFATE 3 ML: .5; 3 SOLUTION RESPIRATORY (INHALATION) at 20:52

## 2018-01-01 RX ADMIN — LISINOPRIL 5 MG: 5 TABLET ORAL at 09:06

## 2018-01-01 RX ADMIN — OXYCODONE AND ACETAMINOPHEN 1 TABLET: 5; 325 TABLET ORAL at 23:02

## 2018-01-01 RX ADMIN — METHYLPREDNISOLONE SODIUM SUCCINATE 40 MG: 40 INJECTION, POWDER, FOR SOLUTION INTRAMUSCULAR; INTRAVENOUS at 21:35

## 2018-01-01 RX ADMIN — Medication 10 ML: at 11:30

## 2018-01-01 RX ADMIN — OXYCODONE HYDROCHLORIDE AND ACETAMINOPHEN 1 TABLET: 5; 325 TABLET ORAL at 01:18

## 2018-01-01 RX ADMIN — PREDNISONE 40 MG: 20 TABLET ORAL at 08:01

## 2018-01-01 RX ADMIN — OXYCODONE AND ACETAMINOPHEN 1 TABLET: 5; 325 TABLET ORAL at 07:50

## 2018-01-01 RX ADMIN — MELATONIN TAB 3 MG 3 MG: 3 TAB at 21:48

## 2018-01-01 RX ADMIN — INSULIN LISPRO 4 UNITS: 100 INJECTION, SOLUTION INTRAVENOUS; SUBCUTANEOUS at 22:38

## 2018-01-01 RX ADMIN — OXYCODONE HYDROCHLORIDE AND ACETAMINOPHEN 1 TABLET: 5; 325 TABLET ORAL at 00:57

## 2018-01-01 RX ADMIN — SODIUM CHLORIDE 40 MG: 9 INJECTION INTRAMUSCULAR; INTRAVENOUS; SUBCUTANEOUS at 15:37

## 2018-01-01 RX ADMIN — IPRATROPIUM BROMIDE AND ALBUTEROL SULFATE 3 ML: .5; 3 SOLUTION RESPIRATORY (INHALATION) at 19:25

## 2018-01-01 RX ADMIN — INSULIN LISPRO 5 UNITS: 100 INJECTION, SOLUTION INTRAVENOUS; SUBCUTANEOUS at 16:30

## 2018-01-01 RX ADMIN — AMOXICILLIN AND CLAVULANATE POTASSIUM 1 TABLET: 875; 125 TABLET, FILM COATED ORAL at 21:55

## 2018-01-01 RX ADMIN — DILTIAZEM HYDROCHLORIDE 60 MG: 60 TABLET, FILM COATED ORAL at 13:07

## 2018-01-01 RX ADMIN — ALBUTEROL SULFATE 2.5 MG: 2.5 SOLUTION RESPIRATORY (INHALATION) at 20:29

## 2018-01-01 RX ADMIN — IPRATROPIUM BROMIDE AND ALBUTEROL SULFATE 3 ML: .5; 3 SOLUTION RESPIRATORY (INHALATION) at 07:04

## 2018-01-01 RX ADMIN — INSULIN HUMAN 10 UNITS: 100 INJECTION, SUSPENSION SUBCUTANEOUS at 09:29

## 2018-01-01 RX ADMIN — NYSTATIN 500000 UNITS: 500000 SUSPENSION ORAL at 08:50

## 2018-01-01 RX ADMIN — WARFARIN SODIUM 2 MG: 2 TABLET ORAL at 17:41

## 2018-01-01 RX ADMIN — Medication 10 ML: at 06:20

## 2018-01-01 RX ADMIN — WARFARIN SODIUM 2 MG: 2 TABLET ORAL at 18:41

## 2018-01-01 RX ADMIN — BUDESONIDE 500 MCG: 0.5 INHALANT RESPIRATORY (INHALATION) at 07:09

## 2018-01-01 RX ADMIN — Medication 10 ML: at 06:13

## 2018-01-01 RX ADMIN — OXYCODONE AND ACETAMINOPHEN 1 TABLET: 5; 325 TABLET ORAL at 13:29

## 2018-01-01 RX ADMIN — DILTIAZEM HYDROCHLORIDE 60 MG: 60 TABLET, FILM COATED ORAL at 09:06

## 2018-01-01 RX ADMIN — OXYCODONE HYDROCHLORIDE AND ACETAMINOPHEN 1 TABLET: 5; 325 TABLET ORAL at 18:42

## 2018-01-01 RX ADMIN — NYSTATIN 500000 UNITS: 100000 SUSPENSION ORAL at 08:35

## 2018-01-01 RX ADMIN — Medication 10 ML: at 05:19

## 2018-01-01 RX ADMIN — BUMETANIDE 1 MG: 0.25 INJECTION INTRAMUSCULAR; INTRAVENOUS at 08:28

## 2018-01-01 RX ADMIN — DILTIAZEM HYDROCHLORIDE 60 MG: 60 TABLET, FILM COATED ORAL at 17:41

## 2018-01-01 RX ADMIN — OXYCODONE AND ACETAMINOPHEN 1 TABLET: 5; 325 TABLET ORAL at 19:51

## 2018-01-01 RX ADMIN — ALBUTEROL SULFATE 2.5 MG: 2.5 SOLUTION RESPIRATORY (INHALATION) at 07:14

## 2018-01-01 RX ADMIN — BUMETANIDE 1 MG: 1 TABLET ORAL at 08:51

## 2018-01-01 RX ADMIN — NYSTATIN 500000 UNITS: 500000 SUSPENSION ORAL at 21:37

## 2018-01-01 RX ADMIN — PANTOPRAZOLE SODIUM 40 MG: 40 TABLET, DELAYED RELEASE ORAL at 08:28

## 2018-01-01 RX ADMIN — ALBUTEROL SULFATE 2.5 MG: 2.5 SOLUTION RESPIRATORY (INHALATION) at 08:48

## 2018-01-01 RX ADMIN — PANTOPRAZOLE SODIUM 40 MG: 40 TABLET, DELAYED RELEASE ORAL at 08:31

## 2018-01-01 RX ADMIN — OXYCODONE HYDROCHLORIDE AND ACETAMINOPHEN 1 TABLET: 5; 325 TABLET ORAL at 19:31

## 2018-01-01 RX ADMIN — OXYCODONE AND ACETAMINOPHEN 1 TABLET: 5; 325 TABLET ORAL at 08:50

## 2018-01-01 RX ADMIN — NYSTATIN 500000 UNITS: 500000 SUSPENSION ORAL at 09:05

## 2018-01-01 RX ADMIN — DILTIAZEM HYDROCHLORIDE 60 MG: 60 TABLET, FILM COATED ORAL at 09:26

## 2018-01-01 RX ADMIN — GLYCERIN 1 SUPPOSITORY: 2 SUPPOSITORY RECTAL at 20:27

## 2018-01-01 RX ADMIN — INSULIN LISPRO 3 UNITS: 100 INJECTION, SOLUTION INTRAVENOUS; SUBCUTANEOUS at 17:19

## 2018-01-01 RX ADMIN — ALBUTEROL SULFATE 2.5 MG: 2.5 SOLUTION RESPIRATORY (INHALATION) at 13:12

## 2018-01-01 RX ADMIN — OXYCODONE HYDROCHLORIDE AND ACETAMINOPHEN 1 TABLET: 5; 325 TABLET ORAL at 22:27

## 2018-01-01 RX ADMIN — LOVASTATIN 10 MG: 20 TABLET ORAL at 21:06

## 2018-01-01 RX ADMIN — ALBUTEROL SULFATE 5 MG: 2.5 SOLUTION RESPIRATORY (INHALATION) at 14:20

## 2018-01-01 RX ADMIN — ALBUTEROL SULFATE 2.5 MG: 2.5 SOLUTION RESPIRATORY (INHALATION) at 14:05

## 2018-01-01 RX ADMIN — DEXMEDETOMIDINE HYDROCHLORIDE 1 MCG/KG/HR: 100 INJECTION, SOLUTION INTRAVENOUS at 18:49

## 2018-01-01 RX ADMIN — Medication 10 ML: at 05:28

## 2018-01-01 RX ADMIN — LOVASTATIN 10 MG: 20 TABLET ORAL at 21:35

## 2018-01-01 RX ADMIN — POTASSIUM CHLORIDE 40 MEQ: 750 TABLET, EXTENDED RELEASE ORAL at 15:37

## 2018-01-01 RX ADMIN — METHYLPREDNISOLONE SODIUM SUCCINATE 125 MG: 125 INJECTION, POWDER, FOR SOLUTION INTRAMUSCULAR; INTRAVENOUS at 19:32

## 2018-01-01 RX ADMIN — NYSTATIN 500000 UNITS: 500000 SUSPENSION ORAL at 21:35

## 2018-01-01 RX ADMIN — DOCUSATE SODIUM 100 MG: 100 CAPSULE, LIQUID FILLED ORAL at 21:03

## 2018-01-01 RX ADMIN — INSULIN LISPRO 4 UNITS: 100 INJECTION, SOLUTION INTRAVENOUS; SUBCUTANEOUS at 21:54

## 2018-01-01 RX ADMIN — DEXMEDETOMIDINE HYDROCHLORIDE 1.2 MCG/KG/HR: 100 INJECTION, SOLUTION INTRAVENOUS at 16:41

## 2018-01-01 RX ADMIN — OXYCODONE AND ACETAMINOPHEN 1 TABLET: 5; 325 TABLET ORAL at 19:28

## 2018-01-01 RX ADMIN — DILTIAZEM HYDROCHLORIDE 60 MG: 60 TABLET, FILM COATED ORAL at 06:57

## 2018-01-01 RX ADMIN — OXYCODONE AND ACETAMINOPHEN 1 TABLET: 5; 325 TABLET ORAL at 09:10

## 2018-01-01 RX ADMIN — GUAIFENESIN 600 MG: 600 TABLET, EXTENDED RELEASE ORAL at 12:37

## 2018-01-01 RX ADMIN — BUMETANIDE 1 MG: 1 TABLET ORAL at 08:56

## 2018-01-01 RX ADMIN — OXYCODONE AND ACETAMINOPHEN 1 TABLET: 5; 325 TABLET ORAL at 05:28

## 2018-01-01 RX ADMIN — HEPARIN SODIUM 1850 UNITS: 5000 INJECTION INTRAVENOUS; SUBCUTANEOUS at 15:32

## 2018-01-01 RX ADMIN — POTASSIUM CHLORIDE 10 MEQ: 10 INJECTION, SOLUTION INTRAVENOUS at 18:53

## 2018-01-01 RX ADMIN — INSULIN LISPRO 2 UNITS: 100 INJECTION, SOLUTION INTRAVENOUS; SUBCUTANEOUS at 22:33

## 2018-01-01 RX ADMIN — OXYCODONE AND ACETAMINOPHEN 1 TABLET: 5; 325 TABLET ORAL at 03:39

## 2018-01-01 RX ADMIN — NYSTATIN 500000 UNITS: 500000 SUSPENSION ORAL at 12:37

## 2018-01-01 RX ADMIN — IPRATROPIUM BROMIDE AND ALBUTEROL SULFATE 3 ML: .5; 3 SOLUTION RESPIRATORY (INHALATION) at 00:28

## 2018-01-01 RX ADMIN — NYSTATIN 500000 UNITS: 100000 SUSPENSION ORAL at 13:24

## 2018-01-01 RX ADMIN — BUDESONIDE 500 MCG: 0.5 INHALANT RESPIRATORY (INHALATION) at 23:11

## 2018-01-01 RX ADMIN — Medication 10 ML: at 21:55

## 2018-01-01 RX ADMIN — INSULIN HUMAN 8 UNITS: 100 INJECTION, SUSPENSION SUBCUTANEOUS at 13:37

## 2018-01-01 RX ADMIN — INSULIN HUMAN 8 UNITS: 100 INJECTION, SUSPENSION SUBCUTANEOUS at 22:34

## 2018-01-01 RX ADMIN — CEFTRIAXONE SODIUM 1 G: 1 INJECTION, POWDER, FOR SOLUTION INTRAMUSCULAR; INTRAVENOUS at 18:51

## 2018-01-01 RX ADMIN — IPRATROPIUM BROMIDE AND ALBUTEROL SULFATE 3 ML: .5; 3 SOLUTION RESPIRATORY (INHALATION) at 19:33

## 2018-01-01 RX ADMIN — NYSTATIN 500000 UNITS: 100000 SUSPENSION ORAL at 13:06

## 2018-01-01 RX ADMIN — NYSTATIN 500000 UNITS: 500000 SUSPENSION ORAL at 08:28

## 2018-01-01 RX ADMIN — INSULIN LISPRO 7 UNITS: 100 INJECTION, SOLUTION INTRAVENOUS; SUBCUTANEOUS at 17:21

## 2018-01-01 RX ADMIN — OXYCODONE AND ACETAMINOPHEN 1 TABLET: 5; 325 TABLET ORAL at 05:25

## 2018-01-01 RX ADMIN — LOVASTATIN 10 MG: 20 TABLET ORAL at 21:38

## 2018-01-01 RX ADMIN — Medication 10 ML: at 21:35

## 2018-01-01 RX ADMIN — IPRATROPIUM BROMIDE AND ALBUTEROL SULFATE 3 ML: .5; 3 SOLUTION RESPIRATORY (INHALATION) at 07:20

## 2018-01-01 RX ADMIN — DOXYCYCLINE HYCLATE 100 MG: 100 TABLET, COATED ORAL at 09:53

## 2018-01-01 RX ADMIN — INSULIN HUMAN 10 UNITS: 100 INJECTION, SUSPENSION SUBCUTANEOUS at 08:29

## 2018-01-01 RX ADMIN — INSULIN HUMAN 8 UNITS: 100 INJECTION, SUSPENSION SUBCUTANEOUS at 21:55

## 2018-01-01 RX ADMIN — OXYCODONE AND ACETAMINOPHEN 1 TABLET: 5; 325 TABLET ORAL at 23:17

## 2018-01-01 RX ADMIN — HEPARIN SODIUM AND DEXTROSE 12 UNITS/KG/HR: 10000; 5 INJECTION INTRAVENOUS at 17:59

## 2018-01-01 RX ADMIN — DILTIAZEM HYDROCHLORIDE 60 MG: 60 TABLET, FILM COATED ORAL at 17:36

## 2018-01-01 RX ADMIN — ALBUTEROL SULFATE 5 MG: 2.5 SOLUTION RESPIRATORY (INHALATION) at 21:16

## 2018-01-01 RX ADMIN — ALBUTEROL SULFATE 5 MG: 2.5 SOLUTION RESPIRATORY (INHALATION) at 19:37

## 2018-01-01 RX ADMIN — DILTIAZEM HYDROCHLORIDE 60 MG: 60 TABLET, FILM COATED ORAL at 08:04

## 2018-01-01 RX ADMIN — PREDNISONE 40 MG: 20 TABLET ORAL at 08:54

## 2018-01-01 RX ADMIN — METHYLPREDNISOLONE SODIUM SUCCINATE 60 MG: 125 INJECTION, POWDER, FOR SOLUTION INTRAMUSCULAR; INTRAVENOUS at 22:34

## 2018-01-01 RX ADMIN — DOXYCYCLINE HYCLATE 100 MG: 100 TABLET, COATED ORAL at 08:04

## 2018-01-01 RX ADMIN — LOVASTATIN 10 MG: 20 TABLET ORAL at 21:30

## 2018-01-01 RX ADMIN — SODIUM POLYSTYRENE SULFONATE 30 G: 15 SUSPENSION ORAL; RECTAL at 10:27

## 2018-01-01 RX ADMIN — AMOXICILLIN AND CLAVULANATE POTASSIUM 1 TABLET: 875; 125 TABLET, FILM COATED ORAL at 15:06

## 2018-01-01 RX ADMIN — AMOXICILLIN AND CLAVULANATE POTASSIUM 1 TABLET: 875; 125 TABLET, FILM COATED ORAL at 21:00

## 2018-01-01 RX ADMIN — NYSTATIN 500000 UNITS: 100000 SUSPENSION ORAL at 18:49

## 2018-01-01 RX ADMIN — DEXMEDETOMIDINE HYDROCHLORIDE 1 MCG/KG/HR: 100 INJECTION, SOLUTION INTRAVENOUS at 02:05

## 2018-01-01 RX ADMIN — DILTIAZEM HYDROCHLORIDE 60 MG: 60 TABLET, FILM COATED ORAL at 12:19

## 2018-01-01 RX ADMIN — PREDNISONE 60 MG: 20 TABLET ORAL at 15:05

## 2018-01-01 RX ADMIN — DILTIAZEM HYDROCHLORIDE 60 MG: 60 TABLET, FILM COATED ORAL at 07:51

## 2018-01-01 RX ADMIN — ALBUTEROL SULFATE 2.5 MG: 2.5 SOLUTION RESPIRATORY (INHALATION) at 04:08

## 2018-01-01 RX ADMIN — DILTIAZEM HYDROCHLORIDE 60 MG: 60 TABLET, FILM COATED ORAL at 08:31

## 2018-01-01 RX ADMIN — ALBUTEROL SULFATE 2.5 MG: 2.5 SOLUTION RESPIRATORY (INHALATION) at 16:28

## 2018-01-01 RX ADMIN — INSULIN LISPRO 2 UNITS: 100 INJECTION, SOLUTION INTRAVENOUS; SUBCUTANEOUS at 11:46

## 2018-01-01 RX ADMIN — Medication 10 ML: at 13:30

## 2018-01-01 RX ADMIN — DILTIAZEM HYDROCHLORIDE 60 MG: 60 TABLET, FILM COATED ORAL at 21:06

## 2018-01-01 RX ADMIN — SODIUM CHLORIDE 250 ML: 900 INJECTION, SOLUTION INTRAVENOUS at 21:56

## 2018-01-01 RX ADMIN — SODIUM CHLORIDE 250 ML: 900 INJECTION, SOLUTION INTRAVENOUS at 20:11

## 2018-01-01 RX ADMIN — BUDESONIDE 1000 MCG: 0.5 INHALANT RESPIRATORY (INHALATION) at 20:29

## 2018-01-01 RX ADMIN — POTASSIUM CHLORIDE 40 MEQ: 750 TABLET, FILM COATED, EXTENDED RELEASE ORAL at 09:27

## 2018-01-01 RX ADMIN — INSULIN HUMAN 10 UNITS: 100 INJECTION, SUSPENSION SUBCUTANEOUS at 21:36

## 2018-01-01 RX ADMIN — GUAIFENESIN 600 MG: 600 TABLET, EXTENDED RELEASE ORAL at 12:19

## 2018-01-01 RX ADMIN — Medication 10 ML: at 21:09

## 2018-01-01 RX ADMIN — Medication 10 ML: at 16:37

## 2018-01-01 RX ADMIN — NYSTATIN 500000 UNITS: 500000 SUSPENSION ORAL at 13:29

## 2018-01-01 RX ADMIN — OXYCODONE AND ACETAMINOPHEN 1 TABLET: 5; 325 TABLET ORAL at 13:44

## 2018-01-01 RX ADMIN — INSULIN LISPRO 7 UNITS: 100 INJECTION, SOLUTION INTRAVENOUS; SUBCUTANEOUS at 13:37

## 2018-01-01 RX ADMIN — MIDAZOLAM 1 MG: 1 INJECTION INTRAMUSCULAR; INTRAVENOUS at 17:13

## 2018-01-01 RX ADMIN — Medication 10 ML: at 21:00

## 2018-01-01 RX ADMIN — MAGNESIUM SULFATE HEPTAHYDRATE 2 G: 40 INJECTION, SOLUTION INTRAVENOUS at 07:33

## 2018-01-01 RX ADMIN — POTASSIUM CHLORIDE 40 MEQ: 1.5 POWDER, FOR SOLUTION ORAL at 03:48

## 2018-01-01 RX ADMIN — OXYCODONE HYDROCHLORIDE AND ACETAMINOPHEN 1 TABLET: 5; 325 TABLET ORAL at 07:00

## 2018-01-01 RX ADMIN — POTASSIUM CHLORIDE 20 MEQ: 1.5 POWDER, FOR SOLUTION ORAL at 08:56

## 2018-01-01 RX ADMIN — DOCUSATE SODIUM 100 MG: 100 CAPSULE, LIQUID FILLED ORAL at 08:31

## 2018-01-01 RX ADMIN — Medication 10 ML: at 15:40

## 2018-01-01 RX ADMIN — INSULIN LISPRO 2 UNITS: 100 INJECTION, SOLUTION INTRAVENOUS; SUBCUTANEOUS at 08:04

## 2018-01-01 RX ADMIN — PIPERACILLIN SODIUM,TAZOBACTAM SODIUM 3.38 G: 3; .375 INJECTION, POWDER, FOR SOLUTION INTRAVENOUS at 07:57

## 2018-01-01 RX ADMIN — GUAIFENESIN 600 MG: 600 TABLET, EXTENDED RELEASE ORAL at 20:52

## 2018-01-01 RX ADMIN — HEPARIN SODIUM 24 UNITS/KG/HR: 10000 INJECTION, SOLUTION INTRAVENOUS at 15:31

## 2018-01-01 RX ADMIN — NYSTATIN 500000 UNITS: 100000 SUSPENSION ORAL at 18:44

## 2018-01-01 RX ADMIN — METHYLPREDNISOLONE SODIUM SUCCINATE 40 MG: 40 INJECTION, POWDER, FOR SOLUTION INTRAMUSCULAR; INTRAVENOUS at 13:26

## 2018-01-01 RX ADMIN — METHYLPREDNISOLONE SODIUM SUCCINATE 60 MG: 125 INJECTION, POWDER, FOR SOLUTION INTRAMUSCULAR; INTRAVENOUS at 06:13

## 2018-01-01 RX ADMIN — LEVOFLOXACIN 750 MG: 750 TABLET, FILM COATED ORAL at 09:27

## 2018-01-01 RX ADMIN — METHYLPREDNISOLONE SODIUM SUCCINATE 80 MG: 125 INJECTION, POWDER, FOR SOLUTION INTRAMUSCULAR; INTRAVENOUS at 12:18

## 2018-01-01 RX ADMIN — Medication 10 ML: at 14:23

## 2018-01-01 RX ADMIN — AMOXICILLIN AND CLAVULANATE POTASSIUM 1 TABLET: 875; 125 TABLET, FILM COATED ORAL at 12:22

## 2018-01-01 RX ADMIN — DOCUSATE SODIUM 100 MG: 100 CAPSULE, LIQUID FILLED ORAL at 18:41

## 2018-01-01 RX ADMIN — BUDESONIDE 500 MCG: 0.5 INHALANT RESPIRATORY (INHALATION) at 09:02

## 2018-01-01 RX ADMIN — SODIUM CHLORIDE 1000 ML: 900 INJECTION, SOLUTION INTRAVENOUS at 18:25

## 2018-01-01 RX ADMIN — DILTIAZEM HYDROCHLORIDE 60 MG: 60 TABLET, FILM COATED ORAL at 12:37

## 2018-01-01 RX ADMIN — ALBUTEROL SULFATE 2.5 MG: 2.5 SOLUTION RESPIRATORY (INHALATION) at 13:53

## 2018-01-01 RX ADMIN — PANTOPRAZOLE SODIUM 40 MG: 40 TABLET, DELAYED RELEASE ORAL at 21:55

## 2018-01-01 RX ADMIN — METHYLPREDNISOLONE SODIUM SUCCINATE 60 MG: 125 INJECTION, POWDER, FOR SOLUTION INTRAMUSCULAR; INTRAVENOUS at 15:37

## 2018-01-01 RX ADMIN — INSULIN HUMAN 8 UNITS: 100 INJECTION, SUSPENSION SUBCUTANEOUS at 08:03

## 2018-01-01 RX ADMIN — DILTIAZEM HYDROCHLORIDE 60 MG: 60 TABLET, FILM COATED ORAL at 17:18

## 2018-01-01 RX ADMIN — POTASSIUM CHLORIDE 10 MEQ: 10 INJECTION, SOLUTION INTRAVENOUS at 11:47

## 2018-01-01 RX ADMIN — NYSTATIN 500000 UNITS: 100000 SUSPENSION ORAL at 15:04

## 2018-01-01 RX ADMIN — MIDAZOLAM HYDROCHLORIDE 1 MG: 1 INJECTION, SOLUTION INTRAMUSCULAR; INTRAVENOUS at 17:13

## 2018-01-01 RX ADMIN — ALBUTEROL SULFATE 2.5 MG: 2.5 SOLUTION RESPIRATORY (INHALATION) at 07:29

## 2018-01-01 RX ADMIN — INSULIN LISPRO 2 UNITS: 100 INJECTION, SOLUTION INTRAVENOUS; SUBCUTANEOUS at 07:30

## 2018-01-01 RX ADMIN — POTASSIUM CHLORIDE 10 MEQ: 10 INJECTION, SOLUTION INTRAVENOUS at 09:50

## 2018-01-01 RX ADMIN — POTASSIUM CHLORIDE 10 MEQ: 10 INJECTION, SOLUTION INTRAVENOUS at 06:12

## 2018-01-01 RX ADMIN — INSULIN LISPRO 5 UNITS: 100 INJECTION, SOLUTION INTRAVENOUS; SUBCUTANEOUS at 17:36

## 2018-01-01 RX ADMIN — PIPERACILLIN SODIUM,TAZOBACTAM SODIUM 3.38 G: 3; .375 INJECTION, POWDER, FOR SOLUTION INTRAVENOUS at 16:59

## 2018-01-01 RX ADMIN — MAGNESIUM SULFATE HEPTAHYDRATE 2 G: 40 INJECTION, SOLUTION INTRAVENOUS at 06:26

## 2018-01-01 RX ADMIN — Medication 10 ML: at 05:09

## 2018-01-01 RX ADMIN — NYSTATIN 500000 UNITS: 100000 SUSPENSION ORAL at 15:24

## 2018-01-01 RX ADMIN — DILTIAZEM HYDROCHLORIDE 60 MG: 60 TABLET, FILM COATED ORAL at 21:44

## 2018-01-01 RX ADMIN — ALBUTEROL SULFATE 2.5 MG: 2.5 SOLUTION RESPIRATORY (INHALATION) at 11:25

## 2018-01-01 RX ADMIN — PROCHLORPERAZINE EDISYLATE 5 MG: 5 INJECTION INTRAMUSCULAR; INTRAVENOUS at 08:21

## 2018-01-01 RX ADMIN — DILTIAZEM HYDROCHLORIDE 60 MG: 60 TABLET, FILM COATED ORAL at 13:31

## 2018-01-01 RX ADMIN — INSULIN LISPRO 5 UNITS: 100 INJECTION, SOLUTION INTRAVENOUS; SUBCUTANEOUS at 16:37

## 2018-01-01 RX ADMIN — NYSTATIN 500000 UNITS: 100000 SUSPENSION ORAL at 10:22

## 2018-01-01 RX ADMIN — MAGNESIUM SULFATE HEPTAHYDRATE 2 G: 40 INJECTION, SOLUTION INTRAVENOUS at 10:39

## 2018-01-01 RX ADMIN — GUAIFENESIN 600 MG: 600 TABLET, EXTENDED RELEASE ORAL at 21:06

## 2018-01-01 RX ADMIN — PROCHLORPERAZINE EDISYLATE 5 MG: 5 INJECTION INTRAMUSCULAR; INTRAVENOUS at 08:24

## 2018-01-01 RX ADMIN — METHYLPREDNISOLONE SODIUM SUCCINATE 80 MG: 125 INJECTION, POWDER, FOR SOLUTION INTRAMUSCULAR; INTRAVENOUS at 06:19

## 2018-01-01 RX ADMIN — Medication 10 ML: at 22:43

## 2018-01-01 RX ADMIN — MIDAZOLAM HYDROCHLORIDE 1 MG: 1 INJECTION, SOLUTION INTRAMUSCULAR; INTRAVENOUS at 16:02

## 2018-01-01 RX ADMIN — DILTIAZEM HYDROCHLORIDE 60 MG: 60 TABLET, FILM COATED ORAL at 14:08

## 2018-01-01 RX ADMIN — PIPERACILLIN SODIUM,TAZOBACTAM SODIUM 3.38 G: 3; .375 INJECTION, POWDER, FOR SOLUTION INTRAVENOUS at 08:08

## 2018-01-01 RX ADMIN — DILTIAZEM HYDROCHLORIDE 60 MG: 60 TABLET, FILM COATED ORAL at 21:30

## 2018-01-01 RX ADMIN — INSULIN HUMAN 10 UNITS: 100 INJECTION, SOLUTION PARENTERAL at 09:16

## 2018-01-01 RX ADMIN — FAMOTIDINE 20 MG: 10 INJECTION, SOLUTION INTRAVENOUS at 19:59

## 2018-01-01 RX ADMIN — POTASSIUM CHLORIDE 40 MEQ: 1.5 POWDER, FOR SOLUTION ORAL at 18:49

## 2018-01-01 RX ADMIN — DILTIAZEM HYDROCHLORIDE 60 MG: 60 TABLET, FILM COATED ORAL at 12:10

## 2018-01-01 RX ADMIN — LOVASTATIN 10 MG: 20 TABLET ORAL at 21:44

## 2018-01-01 RX ADMIN — DILTIAZEM HYDROCHLORIDE 60 MG: 60 TABLET, FILM COATED ORAL at 06:34

## 2018-01-01 RX ADMIN — PREDNISONE 60 MG: 20 TABLET ORAL at 08:31

## 2018-01-01 RX ADMIN — NYSTATIN 500000 UNITS: 100000 SUSPENSION ORAL at 18:00

## 2018-01-01 RX ADMIN — LOVASTATIN 10 MG: 20 TABLET ORAL at 22:38

## 2018-01-01 RX ADMIN — SODIUM CHLORIDE 500 ML: 900 INJECTION, SOLUTION INTRAVENOUS at 10:33

## 2018-01-01 RX ADMIN — PREDNISONE 60 MG: 20 TABLET ORAL at 10:22

## 2018-01-01 RX ADMIN — PIPERACILLIN SODIUM,TAZOBACTAM SODIUM 3.38 G: 3; .375 INJECTION, POWDER, FOR SOLUTION INTRAVENOUS at 15:38

## 2018-01-01 RX ADMIN — NYSTATIN 500000 UNITS: 100000 SUSPENSION ORAL at 08:32

## 2018-01-01 RX ADMIN — Medication 10 ML: at 21:05

## 2018-01-01 RX ADMIN — POTASSIUM CHLORIDE 40 MEQ: 1.5 POWDER, FOR SOLUTION ORAL at 00:50

## 2018-01-01 RX ADMIN — IPRATROPIUM BROMIDE AND ALBUTEROL SULFATE 3 ML: .5; 3 SOLUTION RESPIRATORY (INHALATION) at 20:58

## 2018-01-01 RX ADMIN — NYSTATIN 500000 UNITS: 500000 SUSPENSION ORAL at 12:10

## 2018-01-01 RX ADMIN — NYSTATIN 500000 UNITS: 500000 SUSPENSION ORAL at 18:41

## 2018-01-01 RX ADMIN — POTASSIUM CHLORIDE 40 MEQ: 1.5 POWDER, FOR SOLUTION ORAL at 10:33

## 2018-01-01 RX ADMIN — PIPERACILLIN SODIUM,TAZOBACTAM SODIUM 3.38 G: 3; .375 INJECTION, POWDER, FOR SOLUTION INTRAVENOUS at 00:09

## 2018-01-01 RX ADMIN — MAGNESIUM SULFATE HEPTAHYDRATE 2 G: 40 INJECTION, SOLUTION INTRAVENOUS at 10:24

## 2018-01-01 RX ADMIN — NYSTATIN 500000 UNITS: 100000 SUSPENSION ORAL at 21:01

## 2018-01-01 RX ADMIN — IPRATROPIUM BROMIDE AND ALBUTEROL SULFATE 3 ML: .5; 3 SOLUTION RESPIRATORY (INHALATION) at 11:20

## 2018-01-01 RX ADMIN — DILTIAZEM HYDROCHLORIDE 60 MG: 60 TABLET, FILM COATED ORAL at 08:06

## 2018-01-01 RX ADMIN — INSULIN HUMAN 10 UNITS: 100 INJECTION, SUSPENSION SUBCUTANEOUS at 09:05

## 2018-01-01 RX ADMIN — INSULIN HUMAN 10 UNITS: 100 INJECTION, SUSPENSION SUBCUTANEOUS at 21:08

## 2018-01-01 RX ADMIN — ALBUTEROL SULFATE 2.5 MG: 2.5 SOLUTION RESPIRATORY (INHALATION) at 15:47

## 2018-01-01 RX ADMIN — IPRATROPIUM BROMIDE AND ALBUTEROL SULFATE 3 ML: .5; 3 SOLUTION RESPIRATORY (INHALATION) at 21:04

## 2018-01-01 RX ADMIN — HEPARIN SODIUM 3400 UNITS: 5000 INJECTION INTRAVENOUS; SUBCUTANEOUS at 23:39

## 2018-01-01 RX ADMIN — NYSTATIN 500000 UNITS: 500000 SUSPENSION ORAL at 21:55

## 2018-01-01 RX ADMIN — POTASSIUM CHLORIDE 10 MEQ: 10 INJECTION, SOLUTION INTRAVENOUS at 18:25

## 2018-01-01 RX ADMIN — OXYCODONE AND ACETAMINOPHEN 1 TABLET: 5; 325 TABLET ORAL at 15:36

## 2018-01-01 RX ADMIN — INSULIN LISPRO 2 UNITS: 100 INJECTION, SOLUTION INTRAVENOUS; SUBCUTANEOUS at 21:48

## 2018-01-01 RX ADMIN — LOVASTATIN 10 MG: 20 TABLET ORAL at 21:55

## 2018-01-01 RX ADMIN — DILTIAZEM HYDROCHLORIDE 60 MG: 60 TABLET, FILM COATED ORAL at 12:23

## 2018-01-01 RX ADMIN — ALBUTEROL SULFATE 2.5 MG: 2.5 SOLUTION RESPIRATORY (INHALATION) at 20:55

## 2018-01-08 DIAGNOSIS — Z95.2 S/P AVR (AORTIC VALVE REPLACEMENT): ICD-10-CM

## 2018-01-08 RX ORDER — WARFARIN 2 MG/1
TABLET ORAL
Qty: 120 TAB | Refills: 3
Start: 2018-01-08 | End: 2018-01-01 | Stop reason: SDUPTHER

## 2018-01-12 ENCOUNTER — OFFICE VISIT (OUTPATIENT)
Dept: FAMILY MEDICINE CLINIC | Age: 63
End: 2018-01-12

## 2018-01-12 VITALS
BODY MASS INDEX: 21.51 KG/M2 | RESPIRATION RATE: 18 BRPM | TEMPERATURE: 98.3 F | DIASTOLIC BLOOD PRESSURE: 78 MMHG | HEART RATE: 73 BPM | SYSTOLIC BLOOD PRESSURE: 136 MMHG | WEIGHT: 126 LBS | OXYGEN SATURATION: 97 % | HEIGHT: 64 IN

## 2018-01-12 DIAGNOSIS — S32.000S LUMBAR COMPRESSION FRACTURE, SEQUELA: ICD-10-CM

## 2018-01-12 DIAGNOSIS — M94.0 ACUTE COSTOCHONDRITIS: Primary | ICD-10-CM

## 2018-01-12 DIAGNOSIS — G89.29 CHRONIC MIDLINE LOW BACK PAIN WITHOUT SCIATICA: ICD-10-CM

## 2018-01-12 DIAGNOSIS — M54.50 CHRONIC MIDLINE LOW BACK PAIN WITHOUT SCIATICA: ICD-10-CM

## 2018-01-12 RX ORDER — OXYCODONE AND ACETAMINOPHEN 5; 325 MG/1; MG/1
1 TABLET ORAL
Qty: 120 TAB | Refills: 0 | Status: SHIPPED | OUTPATIENT
Start: 2018-01-12 | End: 2018-01-01 | Stop reason: SDUPTHER

## 2018-01-12 RX ORDER — METHYLPREDNISOLONE 4 MG/1
TABLET ORAL
Qty: 1 DOSE PACK | Refills: 0 | Status: SHIPPED | OUTPATIENT
Start: 2018-01-12 | End: 2018-01-01

## 2018-01-12 NOTE — PATIENT INSTRUCTIONS

## 2018-01-12 NOTE — PROGRESS NOTES
Cierra Coppola is a 63 y.o. female   Chief Complaint   Patient presents with    Medication Refill    pt here for refill of her pain medication. Pt med is for her chronic back pain and pt has not had any unusual activity or lost Rx in the past.  Pt has never been an issue or concern with medication. Pain medication brings her back pain from a 7-8/10 to a 3-5/10. Pt also reports that she is habving ttp over her R sided anterior chest wall. It is ttp    Opioid/Pain Management:    1. Has the patient signed a pain contract for chronic narcotic use? yes  2. Has the patient had a UDS or Serum screen as per guidelines as per [facility]?:   yes    3. Does patient meet necessary guidelines for Naloxone treatement per the [facility]?:    no  4. Has the Prescription Monitoring Program been reviewed? yes  5. Does patient have a long term condition that requires long term use of a Narcotic?  yes  6. Has patient been tolerant of therapy and responsible to routine follow up and specialist follow-up? Yes    she is a 63y.o. year old female who presents for evalution. Reviewed PmHx, RxHx, FmHx, SocHx, AllgHx and updated and dated in the chart. Review of Systems - negative except as listed above in the HPI    Objective:     Vitals:    01/12/18 1058   BP: 136/78   Pulse: 73   Resp: 18   Temp: 98.3 °F (36.8 °C)   TempSrc: Oral   SpO2: 97%   Weight: 126 lb (57.2 kg)   Height: 5' 4\" (1.626 m)       Current Outpatient Prescriptions   Medication Sig    oxyCODONE-acetaminophen (PERCOCET) 5-325 mg per tablet Take 1 Tab by mouth every six (6) hours as needed for Pain. Max Daily Amount: 4 Tabs.  methylPREDNISolone (MEDROL DOSEPACK) 4 mg tablet Take as directed    warfarin (COUMADIN) 2 mg tablet Saturday Sunday Monday Tuesday take 3mg and take 2mg Wednesday Thursday Friday .     bumetanide (BUMEX) 1 mg tablet TAKE 1 TABLET EVERY DAY    umeclidinium (INCRUSE ELLIPTA) 62.5 mcg/actuation inhaler Take 1 Puff by inhalation daily. Indications: BRONCHOSPASM PREVENTION WITH COPD    predniSONE (DELTASONE) 10 mg tablet Take 4 tabs daily for 3 days, then take 3 tabs daily for 3 days, then take 2 tabs daily for 3 days, then take 1 tab daily for 3 days, then stop    buPROPion SR (WELLBUTRIN SR) 150 mg SR tablet 1 tab PO qday x 3 days then 1 tab PO bid for smoking cessation    SYMBICORT 160-4.5 mcg/actuation HFA inhaler INHALE 2 PUFFS TWICE DAILY    predniSONE (DELTASONE) 5 mg tablet Take 1 Tab by mouth daily.  lovastatin (MEVACOR) 10 mg tablet TAKE 1 TABLET EVERY NIGHT    dilTIAZem (CARDIZEM) 60 mg tablet TAKE 1 TABLET BEFORE BREAKFAST, LUNCH, DINNER AND AT BEDTIME    varenicline (CHANTIX STARTER ROSSY) 0.5 mg (11)- 1 mg (42) DsPk Take as directed    potassium chloride (KLOR-CON) 10 mEq tablet Take 1 Tab by mouth every Monday, Wednesday, Friday.  nystatin (MYCOSTATIN) 100,000 unit/mL suspension Take 5 mL by mouth four (4) times daily. swish and spit    ferrous sulfate (SLOW FE) 142 mg (45 mg iron) ER tablet Take 1 Tab by mouth two (2) times daily (with meals).  famotidine (PEPCID) 40 mg tablet Take 1 Tab by mouth two (2) times a day.  docusate sodium (COLACE) 100 mg capsule Take 100 mg by mouth two (2) times daily as needed for Constipation.  alendronate (FOSAMAX) 70 mg tablet Take 1 Tab by mouth every Wednesday.  albuterol (PROVENTIL VENTOLIN) 2.5 mg /3 mL (0.083 %) nebulizer solution 3 mL by Nebulization route every four (4) hours as needed for Wheezing or Shortness of Breath.  albuterol (VENTOLIN HFA) 90 mcg/actuation inhaler Take 2 Puffs by inhalation every four (4) hours as needed for Wheezing or Shortness of Breath. No current facility-administered medications for this visit.         Physical Examination: General appearance - alert, well appearing, and in no distress  Eyes - pupils equal and reactive, extraocular eye movements intact  Chest - coarse rhionchi b/l  Heart - normal rate, regular rhythm, normal S1, S2, no murmurs, rubs, clicks or gallops  Back exam - limited range of motion, pain with motion noted during exam      Assessment/ Plan:   Diagnoses and all orders for this visit:    1. Acute costochondritis  -     methylPREDNISolone (MEDROL DOSEPACK) 4 mg tablet; Take as directed    2. Chronic midline low back pain without sciatica  -     oxyCODONE-acetaminophen (PERCOCET) 5-325 mg per tablet; Take 1 Tab by mouth every six (6) hours as needed for Pain. Max Daily Amount: 4 Tabs. 3. Lumbar compression fracture, sequela  -     oxyCODONE-acetaminophen (PERCOCET) 5-325 mg per tablet; Take 1 Tab by mouth every six (6) hours as needed for Pain. Max Daily Amount: 4 Tabs. Follow-up Disposition:  Return in about 1 month (around 2/12/2018), or if symptoms worsen or fail to improve. I have discussed the diagnosis with the patient and the intended plan as seen in the above orders. The patient has received an after-visit summary and questions were answered concerning future plans. Pt conveyed understanding of plan.     Medication Side Effects and Warnings were discussed with patient      Lazarus Friedman, DO

## 2018-01-30 NOTE — PROGRESS NOTES
[address] Main Note  [phone]  Fax [phone]    Patient Name: Augustus Jaimes  YOB: 1955    Patient hospitalized from 11/26/17-12/2/17 for COPD Exacerbation at [facility]. She has kept her follow up appts and has not had any further issues at the present time. Resolving post 30 day Transitions of Care Coordination episode.

## 2018-02-13 NOTE — PROGRESS NOTES
Emeterio Escamilla is a 63 y.o. female   Chief Complaint   Patient presents with    Medication Refill    Coagulation disorder    pt here for recheck of her INR. Currently 2.5 and isat goal for her AVR and a fib. Pt also for refill of her pain medication for her chronic back pain s/p vertebral compress fracture. Pain is a 7-8/10 without med and a 3-4/10 with medication. Pt has not been an issue and has complid with random pill count and utox which were approriate in past.   Pt does need a refill orf pred 5mg for her copd, pt does continue to smoke at 4 cigs/day and is working on quitting but not doing well with this. Opioid/Pain Management:    1. Has the patient signed a pain contract for chronic narcotic use? yes  2. Has the patient had a UDS or Serum screen as per guidelines as per [facility]?:   yes    3. Does patient meet necessary guidelines for Naloxone treatement per the [facility]?:    no  4. Has the Prescription Monitoring Program been reviewed? yes  5. Does patient have a long term condition that requires long term use of a Narcotic?  yes  6. Has patient been tolerant of therapy and responsible to routine follow up and specialist follow-up? Yes    she is a 63y.o. year old female who presents for evalution. Reviewed PmHx, RxHx, FmHx, SocHx, AllgHx and updated and dated in the chart. Review of Systems - negative except as listed above in the HPI    Objective:     Vitals:    02/13/18 1008   BP: 128/84   Pulse: 89   Resp: 18   Temp: 97.8 °F (36.6 °C)   TempSrc: Oral   SpO2: 94%   Weight: 132 lb (59.9 kg)   Height: 5' 4\" (1.626 m)       Current Outpatient Prescriptions   Medication Sig    oxyCODONE-acetaminophen (PERCOCET) 5-325 mg per tablet Take 1 Tab by mouth every six (6) hours as needed for Pain. Max Daily Amount: 4 Tabs.  lovastatin (MEVACOR) 10 mg tablet TAKE 1 TABLET EVERY NIGHT    predniSONE (DELTASONE) 5 mg tablet Take 1 Tab by mouth daily.     warfarin (COUMADIN) 2 mg tablet Saturday Sunday Monday Tuesday take 3mg and take 2mg Wednesday Thursday Friday .  bumetanide (BUMEX) 1 mg tablet TAKE 1 TABLET EVERY DAY    umeclidinium (INCRUSE ELLIPTA) 62.5 mcg/actuation inhaler Take 1 Puff by inhalation daily. Indications: BRONCHOSPASM PREVENTION WITH COPD    buPROPion SR (WELLBUTRIN SR) 150 mg SR tablet 1 tab PO qday x 3 days then 1 tab PO bid for smoking cessation    SYMBICORT 160-4.5 mcg/actuation HFA inhaler INHALE 2 PUFFS TWICE DAILY    dilTIAZem (CARDIZEM) 60 mg tablet TAKE 1 TABLET BEFORE BREAKFAST, LUNCH, DINNER AND AT BEDTIME    varenicline (CHANTIX STARTER ROSSY) 0.5 mg (11)- 1 mg (42) DsPk Take as directed    potassium chloride (KLOR-CON) 10 mEq tablet Take 1 Tab by mouth every Monday, Wednesday, Friday.  nystatin (MYCOSTATIN) 100,000 unit/mL suspension Take 5 mL by mouth four (4) times daily. swish and spit    ferrous sulfate (SLOW FE) 142 mg (45 mg iron) ER tablet Take 1 Tab by mouth two (2) times daily (with meals).  famotidine (PEPCID) 40 mg tablet Take 1 Tab by mouth two (2) times a day.  docusate sodium (COLACE) 100 mg capsule Take 100 mg by mouth two (2) times daily as needed for Constipation.  alendronate (FOSAMAX) 70 mg tablet Take 1 Tab by mouth every Wednesday.  albuterol (PROVENTIL VENTOLIN) 2.5 mg /3 mL (0.083 %) nebulizer solution 3 mL by Nebulization route every four (4) hours as needed for Wheezing or Shortness of Breath.  albuterol (VENTOLIN HFA) 90 mcg/actuation inhaler Take 2 Puffs by inhalation every four (4) hours as needed for Wheezing or Shortness of Breath. No current facility-administered medications for this visit.         Physical Examination: General appearance - alert, well appearing, and in no distress  Eyes - pupils equal and reactive, extraocular eye movements intact  Chest - clear to auscultation, no wheezes, rales or rhonchi, symmetric air entry  Heart - irregularly irregular with audible click of valve  Back exam - limited range of motion, pain with motion noted during exam      Assessment/ Plan:   Diagnoses and all orders for this visit:    1. Chronic midline low back pain without sciatica  -     oxyCODONE-acetaminophen (PERCOCET) 5-325 mg per tablet; Take 1 Tab by mouth every six (6) hours as needed for Pain. Max Daily Amount: 4 Tabs. 2. Lumbar compression fracture, sequela  -     oxyCODONE-acetaminophen (PERCOCET) 5-325 mg per tablet; Take 1 Tab by mouth every six (6) hours as needed for Pain. Max Daily Amount: 4 Tabs. 3. S/P AVR (aortic valve replacement)  -     AMB POC PT/INR    4. Medication monitoring encounter  -     AMB POC PT/INR    5. Coronary artery disease involving native coronary artery of native heart without angina pectoris  -     lovastatin (MEVACOR) 10 mg tablet; TAKE 1 TABLET EVERY NIGHT  -     LIPID PANEL  -     METABOLIC PANEL, COMPREHENSIVE    6. Pulmonary emphysema, unspecified emphysema type ([facility] [address]  -     predniSONE (DELTASONE) 5 mg tablet; Take 1 Tab by mouth daily. 7. Encounter for screening mammogram for malignant neoplasm of breast  -     Bilateral Digital Screening Mammography; Future       Follow-up Disposition:  Return in about 1 month (around 3/13/2018), or if symptoms worsen or fail to improve. I have discussed the diagnosis with the patient and the intended plan as seen in the above orders. The patient has received an after-visit summary and questions were answered concerning future plans. Pt conveyed understanding of plan.     Medication Side Effects and Warnings were discussed with patient      Benjamin Castano DO

## 2018-02-13 NOTE — PATIENT INSTRUCTIONS

## 2018-02-14 NOTE — PROGRESS NOTES
Your sodium is now high, eat a lower sodium diet. Otherwise your labs look good!   I'll see you in a month

## 2018-03-07 NOTE — ED PROVIDER NOTES
EMERGENCY DEPARTMENT HISTORY AND PHYSICAL EXAM      Date: 3/6/2018  Patient Name: Kianna Lin    History of Presenting Illness     Chief Complaint   Patient presents with    COPD     COPD exacerbation the last couple of days. No cold or illness that she is aware of. History Provided By: Patient    HPI: Kianna Lin, 63 y.o. female with PMHx significant for COPD, dyslipidemia, CAD, afib, and CHF, presents ambulatory to the ED with cc of persistent SOB and cough x 1 week, but worsening today. Pt states the cough sounds productive, however she is only able to expectorate scant amount of sputum. She notes she is on 5mg prednisone chronically for COPD management. Pt indicates current sx's are consistent with COPD exacerbation. She states she is supposed to be oxygen dependent, however her insurance will not cover it. Pt reports she has nebulizers and albuterol at home, which she endorses using with only temporary relief. She endorses taking coumadin daily and last INR was 3.1. Pt reports tobacco use, ~3 cigarettes/day, and indicates she is actively trying to quit. She specifically denies any fever and chills. PCP: Yudelka Thomas DO    There are no other complaints, changes, or physical findings at this time. Current Facility-Administered Medications   Medication Dose Route Frequency Provider Last Rate Last Dose    sodium chloride 0.9 % bolus infusion 1,000 mL  1,000 mL IntraVENous ONCE [address] [place], [place]        sodium chloride (NS) flush 5-10 mL  5-10 mL IntraVENous [address], [place]        sodium chloride (NS) flush 5-10 mL  5-10 mL IntraVENous  [place], [place]        [START ON 3/7/2018] predniSONE (DELTASONE) tablet 60 mg  60 mg Oral DAILY WITH BREAKFAST Francisco J Davis DO         Current Outpatient Prescriptions   Medication Sig Dispense Refill    predniSONE (DELTASONE) 20 mg tablet Take 3 Tabs by mouth daily for 5 days.  With Breakfast 15 Tab 0    doxycycline (ADOXA) 100 mg tablet Take 1 Tab by mouth two (2) times a day. 14 Tab 0    oxyCODONE-acetaminophen (PERCOCET) 5-325 mg per tablet Take 1 Tab by mouth every six (6) hours as needed for Pain. Max Daily Amount: 4 Tabs. 120 Tab 0    lovastatin (MEVACOR) 10 mg tablet TAKE 1 TABLET EVERY NIGHT 90 Tab 3    predniSONE (DELTASONE) 5 mg tablet Take 1 Tab by mouth daily. 90 Tab 3    warfarin (COUMADIN) 2 mg tablet Saturday Sunday Monday Tuesday take 3mg and take 2mg Wednesday Thursday Friday . 120 Tab 3    bumetanide (BUMEX) 1 mg tablet TAKE 1 TABLET EVERY DAY 90 Tab 3    umeclidinium (INCRUSE ELLIPTA) 62.5 mcg/actuation inhaler Take 1 Puff by inhalation daily. Indications: BRONCHOSPASM PREVENTION WITH COPD 1 Inhaler 0    buPROPion SR (WELLBUTRIN SR) 150 mg SR tablet 1 tab PO qday x 3 days then 1 tab PO bid for smoking cessation 60 Tab 2    SYMBICORT 160-4.5 mcg/actuation HFA inhaler INHALE 2 PUFFS TWICE DAILY 3 Inhaler 3    dilTIAZem (CARDIZEM) 60 mg tablet TAKE 1 TABLET BEFORE BREAKFAST, LUNCH, DINNER AND AT BEDTIME 360 Tab 3    varenicline (CHANTIX STARTER ROSSY) 0.5 mg (11)- 1 mg (42) DsPk Take as directed 1 Dose Pack 0    potassium chloride (KLOR-CON) 10 mEq tablet Take 1 Tab by mouth every Monday, Wednesday, Friday. 90 Tab 3    nystatin (MYCOSTATIN) 100,000 unit/mL suspension Take 5 mL by mouth four (4) times daily. swish and spit 180 mL 0    ferrous sulfate (SLOW FE) 142 mg (45 mg iron) ER tablet Take 1 Tab by mouth two (2) times daily (with meals). 30 Tab 2    famotidine (PEPCID) 40 mg tablet Take 1 Tab by mouth two (2) times a day. 30 Tab 0    docusate sodium (COLACE) 100 mg capsule Take 100 mg by mouth two (2) times daily as needed for Constipation.  alendronate (FOSAMAX) 70 mg tablet Take 1 Tab by mouth every Wednesday. 12 Tab 3    albuterol (PROVENTIL VENTOLIN) 2.5 mg /3 mL (0.083 %) nebulizer solution 3 mL by Nebulization route every four (4) hours as needed for Wheezing or Shortness of Breath.  24 Each 0    albuterol (VENTOLIN HFA) 90 mcg/actuation inhaler Take 2 Puffs by inhalation every four (4) hours as needed for Wheezing or Shortness of Breath. 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    Tissue MVR 1989 following failed balloon valvuloplasty for MS Redo MVR 2004 due to severe MR, AVR due to AR ([facility])     S/P AVR (aortic valve replacement) 1/8/2015    S/P MVR (mitral valve replacement) 1/8/2015       Past Surgical History:  Past Surgical History:   Procedure Laterality Date    COLONOSCOPY N/A 11/28/2016    COLONOSCOPY performed by Renee [facility]ace [facility] ENDOSCOPY    HX COLECTOMY      HX HEART CATHETERIZATION      cabg    HX HYSTERECTOMY      BSO    HX MITRAL VALVE REPLACEMENT      and redo MVR and AVR       Family History:  Family History   Problem Relation Age of Onset    Cancer Brother        Social History:  Social History   Substance Use Topics    Smoking status: Current Every Day Smoker     Packs/day: 0.50     Types: Cigarettes     Last attempt to quit: 11/1/2015    Smokeless tobacco: Never Used      Comment: 11/1/2014    Alcohol use No       Allergies: Allergies   Allergen Reactions    Spiriva Respimat [Tiotropium Bromide] Anaphylaxis and Other (comments)     Adverse effects; Per RN - pt states that Spiriva caused throat swelling and could not breathe well.  Celebrex [Celecoxib] Rash    Tomato Rash         Review of Systems   Review of Systems   Constitutional: Negative for chills and fever. HENT: Negative for congestion and sore throat. Eyes: Negative for visual disturbance. Respiratory: Positive for cough and shortness of breath. Cardiovascular: Negative for chest pain and leg swelling. Gastrointestinal: Negative for abdominal pain, blood in stool, diarrhea and nausea. Endocrine: Negative for polyuria. Genitourinary: Negative for dysuria, flank pain, vaginal bleeding and vaginal discharge. Musculoskeletal: Negative for myalgias. Skin: Negative for rash. Allergic/Immunologic: Negative for immunocompromised state. Neurological: Negative for weakness and headaches. Psychiatric/Behavioral: Negative for confusion. Physical Exam   Physical Exam   Constitutional: She is oriented to person, place, and time. She appears well-developed and well-nourished. HENT:   Head: Normocephalic and atraumatic. Moist mucous membranes   Eyes: Conjunctivae are normal. Pupils are equal, round, and reactive to light. Right eye exhibits no discharge. Left eye exhibits no discharge. Neck: Normal range of motion. Neck supple. No tracheal deviation present. Cardiovascular: Normal rate, regular rhythm and normal heart sounds. No murmur heard. Pulmonary/Chest: Effort normal. No respiratory distress. She has wheezes (b/l). She has no rales. Prolonged expiratory time. No increased work of breathing. Abdominal: Soft. Bowel sounds are normal. There is no tenderness. There is no rebound and no guarding. Musculoskeletal: Normal range of motion. She exhibits no edema, tenderness or deformity. Neurological: She is alert and oriented to person, place, and time. Skin: Skin is warm and dry. No rash noted. No erythema. Psychiatric: Her behavior is normal.   Nursing note and vitals reviewed.         Diagnostic Study Results     Labs -     Recent Results (from the past 12 hour(s))   CBC WITH AUTOMATED DIFF    Collection Time: 03/06/18  8:08 PM   Result Value Ref Range    WBC 12.1 (H) 3.6 - 11.0 K/uL    RBC 4.18 3.80 - 5.20 M/uL    HGB 12.3 11.5 - 16.0 g/dL    HCT 39.1 35.0 - 47.0 % MCV 93.5 80.0 - 99.0 FL    MCH 29.4 26.0 - 34.0 PG    MCHC 31.5 30.0 - 36.5 g/dL    RDW 15.3 (H) 11.5 - 14.5 %    PLATELET 111 893 - 857 K/uL    MPV 11.0 8.9 - 12.9 FL    NRBC 0.0 0  WBC    ABSOLUTE NRBC 0.00 0.00 - 0.01 K/uL    NEUTROPHILS 82 (H) 32 - 75 %    LYMPHOCYTES 8 (L) 12 - 49 %    MONOCYTES 9 5 - 13 %    EOSINOPHILS 1 0 - 7 %    BASOPHILS 0 0 - 1 %    IMMATURE GRANULOCYTES 0 0.0 - 0.5 %    ABS. NEUTROPHILS 9.9 (H) 1.8 - 8.0 K/UL    ABS. LYMPHOCYTES 1.0 0.8 - 3.5 K/UL    ABS. MONOCYTES 1.1 (H) 0.0 - 1.0 K/UL    ABS. EOSINOPHILS 0.1 0.0 - 0.4 K/UL    ABS. BASOPHILS 0.0 0.0 - 0.1 K/UL    ABS. IMM. GRANS. 0.1 (H) 0.00 - 0.04 K/UL    DF AUTOMATED     METABOLIC PANEL, COMPREHENSIVE    Collection Time: 03/06/18  8:08 PM   Result Value Ref Range    Sodium 143 136 - 145 mmol/L    Potassium 3.7 3.5 - 5.1 mmol/L    Chloride 105 97 - 108 mmol/L    CO2 34 (H) 21 - 32 mmol/L    Anion gap 4 (L) 5 - 15 mmol/L    Glucose 95 65 - 100 mg/dL    BUN 10 6 - 20 MG/DL    Creatinine 0.87 0.55 - 1.02 MG/DL    BUN/Creatinine ratio 11 (L) 12 - 20      GFR est AA >60 >60 ml/min/1.73m2    GFR est non-AA >60 >60 ml/min/1.73m2    Calcium 8.9 8.5 - 10.1 MG/DL    Bilirubin, total 0.5 0.2 - 1.0 MG/DL    ALT (SGPT) 23 12 - 78 U/L    AST (SGOT) 20 15 - 37 U/L    Alk. phosphatase 86 45 - 117 U/L    Protein, total 7.2 6.4 - 8.2 g/dL    Albumin 3.5 3.5 - 5.0 g/dL    Globulin 3.7 2.0 - 4.0 g/dL    A-G Ratio 0.9 (L) 1.1 - 2.2         Radiologic Studies -     CXR Results  (Last 48 hours)               03/06/18 2054  XR CHEST PA LAT Final result    Impression:  IMPRESSION:       No acute process. No change given difference in technique. Narrative:  EXAM:  XR CHEST PA LAT       INDICATION:  COPD exacerbation for 2 days. COMPARISON: Portable chest on 11/29/2017       TECHNIQUE: PA and lateral chest views       FINDINGS: Sternotomy wires and cardiac valve prostheses are unchanged.  The   cardiomediastinal and hilar contours are within normal limits. The pulmonary   vasculature is within normal limits. The lungs and pleural spaces are clear. Left pericardiac adipose tissues are   unchanged. Bones are osteopenic. Medical Decision Making   I am the first provider for this patient. I reviewed the vital signs, available nursing notes, past medical history, past surgical history, family history and social history. Vital Signs-Reviewed the patient's vital signs. Patient Vitals for the past 12 hrs:   Temp Pulse Resp BP SpO2   03/06/18 1954 98.3 °F (36.8 °C) 88 30 (!) 139/97 94 %   03/06/18 1857 98 °F (36.7 °C) (!) 107 (!) 32 165/85 98 %       Pulse Oximetry Analysis - 98% on 2L nasal cannula    Cardiac Monitor:   Rate: 88 bpm  Rhythm: Normal Sinus Rhythm      Records Reviewed: Nursing Notes and Old Medical Records    Provider Notes (Medical Decision Making):     Consistent with COPD exacerbation, bronchitis. PNA less likely given exam.    ED Course:   Initial assessment performed. The patients presenting problems have been discussed, and they are in agreement with the care plan formulated and outlined with them. I have encouraged them to ask questions as they arise throughout their visit. Disposition:    DISCHARGE NOTE:  10:07 PM  The patient is ready for discharge. The patients signs, symptoms, diagnosis, and instructions for discharge have been discussed and the pt has conveyed their understanding. The patient is to follow up as recommended with PCP or return to the ER should their symptoms worsen. Plan has been discussed and patient has conveyed their agreement. PLAN:  1. Discharge home  Current Discharge Medication List      START taking these medications    Details   ! ! predniSONE (DELTASONE) 20 mg tablet Take 3 Tabs by mouth daily for 5 days. With Breakfast  Qty: 15 Tab, Refills: 0      doxycycline (ADOXA) 100 mg tablet Take 1 Tab by mouth two (2) times a day.   Qty: 14 Tab, Refills: 0       !! - Potential duplicate medications found. Please discuss with provider. CONTINUE these medications which have NOT CHANGED    Details   ! ! predniSONE (DELTASONE) 5 mg tablet Take 1 Tab by mouth daily. Qty: 90 Tab, Refills: 3    Associated Diagnoses: Pulmonary emphysema, unspecified emphysema type ([facility] [address]       ! ! - Potential duplicate medications found. Please discuss with provider. 2.   Follow-up Information     Follow up With Details Comments [address], DO Call in 1 day  [address]  [address]  [phone]          Return to ED if worse     Diagnosis     Clinical Impression:   1. Acute exacerbation of chronic obstructive pulmonary disease (COPD) ([facility] [address]        Attestations: This note is prepared by Zohra Klein, acting as Scribe for [employer] DO. [employer], DO: The scribe's documentation has been prepared under my direction and personally reviewed by me in its entirety. I confirm that the note above accurately reflects all work, treatment, procedures, and medical decision making performed by me.

## 2018-03-07 NOTE — DISCHARGE INSTRUCTIONS
Chronic Obstructive Pulmonary Disease (COPD): Care Instructions  Your Care Instructions    Chronic obstructive pulmonary disease (COPD) is a general term for a group of lung diseases, including emphysema and chronic bronchitis. People with COPD have decreased airflow in and out of the lungs, which makes it hard to breathe. The airways also can get clogged with thick mucus. Cigarette smoking is a major cause of COPD. Although there is no cure for COPD, you can slow its progress. Following your treatment plan and taking care of yourself can help you feel better and live longer. Follow-up care is a key part of your treatment and safety. Be sure to make and go to all appointments, and call your doctor if you are having problems. It's also a good idea to know your test results and keep a list of the medicines you take. How can you care for yourself at home? ?Staying healthy  ? · Do not smoke. This is the most important step you can take to prevent more damage to your lungs. If you need help quitting, talk to your doctor about stop-smoking programs and medicines. These can increase your chances of quitting for good. ? · Avoid colds and flu. Get a pneumococcal vaccine shot. If you have had one before, ask your doctor whether you need a second dose. Get the flu vaccine every fall. If you must be around people with colds or the flu, wash your hands often. ? · Avoid secondhand smoke, air pollution, and high altitudes. Also avoid cold, dry air and hot, humid air. Stay at home with your windows closed when air pollution is bad. ?Medicines and oxygen therapy  ? · Take your medicines exactly as prescribed. Call your doctor if you think you are having a problem with your medicine. ? · You may be taking medicines such as:  ¨ Bronchodilators. These help open your airways and make breathing easier. Bronchodilators are either short-acting (work for 6 to 9 hours) or long-acting (work for 24 hours).  You inhale most bronchodilators, so they start to act quickly. Always carry your quick-relief inhaler with you in case you need it while you are away from home. ¨ Corticosteroids (prednisone, budesonide). These reduce airway inflammation. They come in pill or inhaled form. You must take these medicines every day for them to work well. ? · A spacer may help you get more inhaled medicine to your lungs. Ask your doctor or pharmacist if a spacer is right for you. If it is, ask how to use it properly. ? · Do not take any vitamins, over-the-counter medicine, or herbal products without talking to your doctor first.   ? · If your doctor prescribed antibiotics, take them as directed. Do not stop taking them just because you feel better. You need to take the full course of antibiotics. ? · Oxygen therapy boosts the amount of oxygen in your blood and helps you breathe easier. Use the flow rate your doctor has recommended, and do not change it without talking to your doctor first.   Activity  ? · Get regular exercise. Walking is an easy way to get exercise. Start out slowly, and walk a little more each day. ? · Pay attention to your breathing. You are exercising too hard if you cannot talk while you are exercising. ? · Take short rest breaks when doing household chores and other activities. ? · Learn breathing methods-such as breathing through pursed lips-to help you become less short of breath. ? · If your doctor has not set you up with a pulmonary rehabilitation program, talk to him or her about whether rehab is right for you. Rehab includes exercise programs, education about your disease and how to manage it, help with diet and other changes, and emotional support. Diet  ? · Eat regular, healthy meals. Use bronchodilators about 1 hour before you eat to make it easier to eat. Eat several small meals instead of three large ones. Drink beverages at the end of the meal. Avoid foods that are hard to chew.    ? · Eat foods that contain protein so that you do not lose muscle mass. ? · Talk with your doctor if you gain too much weight or if you lose weight without trying. ?Mental health  ? · Talk to your family, friends, or a therapist about your feelings. It is normal to feel frightened, angry, hopeless, helpless, and even guilty. Talking openly about bad feelings can help you cope. If these feelings last, talk to your doctor. When should you call for help? Call 911 anytime you think you may need emergency care. For example, call if:  ? · You have severe trouble breathing. ?Call your doctor now or seek immediate medical care if:  ? · You have new or worse trouble breathing. ? · You cough up blood. ? · You have a fever. ? Watch closely for changes in your health, and be sure to contact your doctor if:  ? · You cough more deeply or more often, especially if you notice more mucus or a change in the color of your mucus. ? · You have new or worse swelling in your legs or belly. ? · You are not getting better as expected. Where can you learn more? Go to http://haydenYottaaдмитрий.info/. Reba Pruitt in the search box to learn more about \"Chronic Obstructive Pulmonary Disease (COPD): Care Instructions. \"  Current as of: May 12, 2017  Content Version: 11.4  © 5916-7604 [employer]. Care instructions adapted under license by [employer] (which disclaims liability or warranty for this information). If you have questions about a medical condition or this instruction, always ask your healthcare professional. [place] [address] any warranty or liability for your use of this information. COPD Exacerbation Plan: Care Instructions  Your Care Instructions    If you have chronic obstructive pulmonary disease (COPD), your usual shortness of breath could suddenly get worse. You may start coughing more and have more mucus.  This flare-up is called a COPD exacerbation (say \"rr-XKE-nr-BAY-shun\"). A lung infection or air pollution could set off an exacerbation. Sometimes it can happen after a quick change in temperature or being around chemicals. Work with your doctor to make a plan for dealing with an exacerbation. You can better manage it if you plan ahead. Follow-up care is a key part of your treatment and safety. Be sure to make and go to all appointments, and call your doctor if you are having problems. It's also a good idea to know your test results and keep a list of the medicines you take. How can you care for yourself at home? During an exacerbation  · Do not panic if you start to have one. Quick treatment at home may help you prevent serious breathing problems. If you have a COPD exacerbation plan that you developed with your doctor, follow it. · Take your medicines exactly as your doctor tells you. ¨ Use your inhaler as directed by your doctor. If your symptoms do not get better after you use your medicine, have someone take you to the emergency room. Call an ambulance if necessary. ¨ With inhaled medicines, a spacer or a nebulizer may help you get more medicine to your lungs. Ask your doctor or pharmacist how to use them properly. Practice using the spacer in front of a mirror before you have an exacerbation. This may help you get the medicine into your lungs quickly. ¨ If your doctor has given you steroid pills, take them as directed. ¨ Your doctor may have given you a prescription for antibiotics, which you can fill if you need it. ¨ Talk to your doctor if you have any problems with your medicine. And call your doctor if you have to use your antibiotic or steroid pills. Preventing an exacerbation  · Do not smoke. This is the most important step you can take to prevent more damage to your lungs and prevent problems. If you already smoke, it is never too late to stop. If you need help quitting, talk to your doctor about stop-smoking programs and medicines.  These can increase your chances of quitting for good. · Take your daily medicines as prescribed. · Avoid colds and flu. ¨ Get a pneumococcal vaccine. ¨ Get a flu vaccine each year, as soon as it is available. Ask those you live or work with to do the same, so they will not get the flu and infect you. ¨ Try to stay away from people with colds or the flu. ¨ Wash your hands often. · Avoid secondhand smoke; air pollution; cold, dry air; hot, humid air; and high altitudes. Stay at home with your windows closed when air pollution is bad. · Learn breathing techniques for COPD, such as breathing through pursed lips. These techniques can help you breathe easier during an exacerbation. When should you call for help? Call 911 anytime you think you may need emergency care. For example, call if:  ? · You have severe trouble breathing. ? · You have severe chest pain. ?Call your doctor now or seek immediate medical care if:  ? · You have new or worse shortness of breath. ? · You develop new chest pain. ? · You are coughing more deeply or more often, especially if you notice more mucus or a change in the color of your mucus. ? · You cough up blood. ? · You have new or increased swelling in your legs or belly. ? · You have a fever. ? Watch closely for changes in your health, and be sure to contact your doctor if:  ? · You need to use your antibiotic or steroid pills. ? · Your symptoms are getting worse. Where can you learn more? Go to http://hayden-дмитрий.info/. Enter O362 in the search box to learn more about \"COPD Exacerbation Plan: Care Instructions. \"  Current as of: May 12, 2017  Content Version: 11.4  © 1139-6705 [employer]. Care instructions adapted under license by [employer] (which disclaims liability or warranty for this information).  If you have questions about a medical condition or this instruction, always ask your healthcare professional. [facility] disclaims any warranty or liability for your use of this information.

## 2018-03-07 NOTE — ED NOTES
Dr. Gabrielle Hickey reviewed discharge instructions with the patient and spouse. The patient and spouse verbalized understanding.

## 2018-03-16 PROBLEM — J44.1 ACUTE EXACERBATION OF CHRONIC OBSTRUCTIVE PULMONARY DISEASE (COPD) (HCC): Status: RESOLVED | Noted: 2017-11-26 | Resolved: 2018-01-01

## 2018-03-16 NOTE — PROGRESS NOTES
Pt was seen in ED for SOB  States she feels like she has something stuck in throat  Also nausea-would like medication  Also INR check

## 2018-03-16 NOTE — PATIENT INSTRUCTIONS

## 2018-03-16 NOTE — PROGRESS NOTES
Ignacia Tristan is a 66 y.o. female   Chief Complaint   Patient presents with   [facility] Follow Up    Coagulation disorder    pt here for refill of her pain medication which is working well to help control her low back pain from her chronic compression fracture. Pt has not had any abnormal behaviors on this Rx, no early fills all pill counts have been correct and utox appropriate  appropriate. Brings pain from a 8-10/10 to a 3-5/10. Opioid/Pain Management:    1. Has the patient signed a pain contract for chronic narcotic use? yes  2. Has the patient had a UDS or Serum screen as per guidelines as per [facility]?:   yes    3. Does patient meet necessary guidelines for Naloxone treatement per the [facility]?:    no  4. Has the Prescription Monitoring Program been reviewed? yes  5. Does patient have a long term condition that requires long term use of a Narcotic?  yes  6. Has patient been tolerant of therapy and responsible to routine follow up and specialist follow-up? Yes    Pt also for INR check and currently at 1.7 and has normally been well controlled pt denies any diet changes. Will double dose today and tomorrow and resume then recheck in a week. Pt also needs a refill of her albuterol for her copd, pt continues to smoke here and there and we have again discussed need to quit. Pt also reports feeling like something is getting stuck in her throat whenever she swallows. Not having difficulty swallowing solids opr liquids but when swallowing mucous feels like something is there. Pt does not want to do a swallow study and will monitor and if not improving in next week she will contact office and will order swallow stdy. Pt also reports some nausea states a stomach bug is going around her house, not much of an appetite but no vomiting or diarrhea, no fever or chills    she is a 66y.o. year old female who presents for evalution.       Reviewed PmHx, RxHx, FmHx, SocHx, AllgHx and updated and dated in the chart. Review of Systems - negative except as listed above in the HPI    Objective:     Vitals:    03/16/18 0724   BP: 110/80   Pulse: 90   Resp: 18   Temp: 98.6 °F (37 °C)   TempSrc: Oral   SpO2: 94%   Weight: 129 lb (58.5 kg)   Height: 5' 5\" (1.651 m)       Current Outpatient Prescriptions   Medication Sig    ondansetron (ZOFRAN ODT) 4 mg disintegrating tablet Take 1 Tab by mouth every eight (8) hours as needed for Nausea.  albuterol (VENTOLIN HFA) 90 mcg/actuation inhaler Take 2 Puffs by inhalation every four (4) hours as needed for Wheezing or Shortness of Breath.  oxyCODONE-acetaminophen (PERCOCET) 5-325 mg per tablet Take 1 Tab by mouth every six (6) hours as needed for Pain. Max Daily Amount: 4 Tabs.  doxycycline (ADOXA) 100 mg tablet Take 1 Tab by mouth two (2) times a day.  lovastatin (MEVACOR) 10 mg tablet TAKE 1 TABLET EVERY NIGHT    predniSONE (DELTASONE) 5 mg tablet Take 1 Tab by mouth daily.  warfarin (COUMADIN) 2 mg tablet Saturday Sunday Monday Tuesday take 3mg and take 2mg Wednesday Thursday Friday .  bumetanide (BUMEX) 1 mg tablet TAKE 1 TABLET EVERY DAY    umeclidinium (INCRUSE ELLIPTA) 62.5 mcg/actuation inhaler Take 1 Puff by inhalation daily. Indications: BRONCHOSPASM PREVENTION WITH COPD    buPROPion SR (WELLBUTRIN SR) 150 mg SR tablet 1 tab PO qday x 3 days then 1 tab PO bid for smoking cessation    SYMBICORT 160-4.5 mcg/actuation HFA inhaler INHALE 2 PUFFS TWICE DAILY    dilTIAZem (CARDIZEM) 60 mg tablet TAKE 1 TABLET BEFORE BREAKFAST, LUNCH, DINNER AND AT BEDTIME    varenicline (CHANTIX STARTER ROSSY) 0.5 mg (11)- 1 mg (42) DsPk Take as directed    potassium chloride (KLOR-CON) 10 mEq tablet Take 1 Tab by mouth every Monday, Wednesday, Friday.  nystatin (MYCOSTATIN) 100,000 unit/mL suspension Take 5 mL by mouth four (4) times daily.  swish and spit    ferrous sulfate (SLOW FE) 142 mg (45 mg iron) ER tablet Take 1 Tab by mouth two (2) times daily (with meals).  famotidine (PEPCID) 40 mg tablet Take 1 Tab by mouth two (2) times a day.  docusate sodium (COLACE) 100 mg capsule Take 100 mg by mouth two (2) times daily as needed for Constipation.  alendronate (FOSAMAX) 70 mg tablet Take 1 Tab by mouth every Wednesday.  albuterol (PROVENTIL VENTOLIN) 2.5 mg /3 mL (0.083 %) nebulizer solution 3 mL by Nebulization route every four (4) hours as needed for Wheezing or Shortness of Breath. No current facility-administered medications for this visit. Physical Examination: General appearance - alert, well appearing, and in no distress  Eyes - pupils equal and reactive, extraocular eye movements intact  Mouth - mucous membranes moist, pharynx normal without lesions  Neck - supple, no significant adenopathy  Chest - coarse rhonchi with mild wheeze baseline for pt  Heart - normal rate, regular rhythm, normal S1, S2, no murmurs, rubs, clicks or gallops, audible click from valve      Assessment/ Plan:   Diagnoses and all orders for this visit:    1. Nausea  -     ondansetron (ZOFRAN ODT) 4 mg disintegrating tablet; Take 1 Tab by mouth every eight (8) hours as needed for Nausea. 2. COPD with exacerbation (HCC)  -     albuterol (VENTOLIN HFA) 90 mcg/actuation inhaler; Take 2 Puffs by inhalation every four (4) hours as needed for Wheezing or Shortness of Breath. 3. Chronic midline low back pain without sciatica  -     oxyCODONE-acetaminophen (PERCOCET) 5-325 mg per tablet; Take 1 Tab by mouth every six (6) hours as needed for Pain. Max Daily Amount: 4 Tabs. 4. Lumbar compression fracture, sequela  -     oxyCODONE-acetaminophen (PERCOCET) 5-325 mg per tablet; Take 1 Tab by mouth every six (6) hours as needed for Pain. Max Daily Amount: 4 Tabs. 5. Medication monitoring encounter  -     AMB POC PT/INR    6.  S/P AVR (aortic valve replacement)  -     AMB POC PT/INR    double coumadin today and tomorrow recheck in a week Follow-up Disposition:  Return if symptoms worsen or fail to improve. I have discussed the diagnosis with the patient and the intended plan as seen in the above orders. The patient has received an after-visit summary and questions were answered concerning future plans. Pt conveyed understanding of plan.     Medication Side Effects and Warnings were discussed with patient      Kaciealivia Vázquez, DO

## 2018-04-12 NOTE — PROGRESS NOTES
Alex Escamilla is a 66 y.o. female   Chief Complaint   Patient presents with    Coagulation disorder    Pt here for recheck of her INR and is a little low today at 1.9. Pt due to valve needs to be at at least 2.5. Pt also for a refill of her chronic pain medication. Pt pain is a 8-9/10 without med and a 3-5/10 with norco.   checked. Opioid/Pain Management:    1. Has the patient signed a pain contract for chronic narcotic use? yes  2. Has the patient had a UDS or Serum screen as per guidelines as per [facility]?:   yes    3. Does patient meet necessary guidelines for Naloxone treatement per the [facility]?:    no  4. Has the Prescription Monitoring Program been reviewed? yes  5. Does patient have a long term condition that requires long term use of a Narcotic?  yes  6. Has patient been tolerant of therapy and responsible to routine follow up and specialist follow-up? Yes      she is a 66y.o. year old female who presents for evalution. Reviewed PmHx, RxHx, FmHx, SocHx, AllgHx and updated and dated in the chart. Review of Systems - negative except as listed above in the HPI    Objective:     Vitals:    04/12/18 1001   BP: 120/55   Pulse: 76   Resp: 20   Temp: 98.5 °F (36.9 °C)   TempSrc: Oral   SpO2: 94%   Weight: 129 lb 6.4 oz (58.7 kg)   Height: 5' 5\" (1.651 m)       Current Outpatient Prescriptions   Medication Sig    warfarin (COUMADIN) 2 mg tablet Saturday Sunday Monday Tuesday Wednesday take 3mg and take 2mg Thursday Friday.  oxyCODONE-acetaminophen (PERCOCET) 5-325 mg per tablet Take 1 Tab by mouth every six (6) hours as needed for Pain. Max Daily Amount: 4 Tabs. May fill 4/15/18    albuterol (VENTOLIN HFA) 90 mcg/actuation inhaler Take 2 Puffs by inhalation every four (4) hours as needed for Wheezing or Shortness of Breath.     lovastatin (MEVACOR) 10 mg tablet TAKE 1 TABLET EVERY NIGHT    predniSONE (DELTASONE) 5 mg tablet Take 1 Tab by mouth daily.    bumetanide (BUMEX) 1 mg tablet TAKE 1 TABLET EVERY DAY    SYMBICORT 160-4.5 mcg/actuation HFA inhaler INHALE 2 PUFFS TWICE DAILY    dilTIAZem (CARDIZEM) 60 mg tablet TAKE 1 TABLET BEFORE BREAKFAST, LUNCH, DINNER AND AT BEDTIME    potassium chloride (KLOR-CON) 10 mEq tablet Take 1 Tab by mouth every Monday, Wednesday, Friday.  docusate sodium (COLACE) 100 mg capsule Take 100 mg by mouth two (2) times daily as needed for Constipation.  albuterol (PROVENTIL VENTOLIN) 2.5 mg /3 mL (0.083 %) nebulizer solution 3 mL by Nebulization route every four (4) hours as needed for Wheezing or Shortness of Breath.  ondansetron (ZOFRAN ODT) 4 mg disintegrating tablet Take 1 Tab by mouth every eight (8) hours as needed for Nausea.  doxycycline (ADOXA) 100 mg tablet Take 1 Tab by mouth two (2) times a day.  umeclidinium (INCRUSE ELLIPTA) 62.5 mcg/actuation inhaler Take 1 Puff by inhalation daily. Indications: BRONCHOSPASM PREVENTION WITH COPD    buPROPion SR (WELLBUTRIN SR) 150 mg SR tablet 1 tab PO qday x 3 days then 1 tab PO bid for smoking cessation    varenicline (CHANTIX STARTER ROSSY) 0.5 mg (11)- 1 mg (42) DsPk Take as directed    nystatin (MYCOSTATIN) 100,000 unit/mL suspension Take 5 mL by mouth four (4) times daily. swish and spit    ferrous sulfate (SLOW FE) 142 mg (45 mg iron) ER tablet Take 1 Tab by mouth two (2) times daily (with meals).  famotidine (PEPCID) 40 mg tablet Take 1 Tab by mouth two (2) times a day.  alendronate (FOSAMAX) 70 mg tablet Take 1 Tab by mouth every Wednesday. No current facility-administered medications for this visit.         Physical Examination: General appearance - alert, well appearing, and in no distress  Chest - tight with mild wheeze but no rhonchi  Heart - irregularly irregular rhythm with rate 47'Y with audible click from valve  Back exam - limited range of motion, pain with motion noted during exam      Assessment/ Plan:   Diagnoses and all orders for this visit:    1. Chronic midline low back pain without sciatica  -     oxyCODONE-acetaminophen (PERCOCET) 5-325 mg per tablet; Take 1 Tab by mouth every six (6) hours as needed for Pain. Max Daily Amount: 4 Tabs. May fill 4/15/18    2. Coagulation defect (HCC)  -     AMB POC PT/INR    3. S/P AVR (aortic valve replacement)  -     warfarin (COUMADIN) 2 mg tablet; Saturday Sunday Monday Tuesday Wednesday take 3mg and take 2mg Thursday Friday. 4. Lumbar compression fracture, sequela  -     oxyCODONE-acetaminophen (PERCOCET) 5-325 mg per tablet; Take 1 Tab by mouth every six (6) hours as needed for Pain. Max Daily Amount: 4 Tabs. May fill 4/15/18    5. Medication monitoring encounter  -     AMB POC [facility] IC[facility] DX DRUG SCREEN 10     utox appropriate  Follow-up Disposition:  Return in about 1 week (around 4/19/2018), or if symptoms worsen or fail to improve. I have discussed the diagnosis with the patient and the intended plan as seen in the above orders. The patient has received an after-visit summary and questions were answered concerning future plans. Pt conveyed understanding of plan.     Medication Side Effects and Warnings were discussed with patient      Humera Guidry,

## 2018-04-12 NOTE — PATIENT INSTRUCTIONS

## 2018-04-12 NOTE — PROGRESS NOTES
Rx Direct request was signed & faxed to [phone],[id number] placed in scan folder to be scanned to chart.

## 2018-04-12 NOTE — PROGRESS NOTES
Chief Complaint   Patient presents with    Coagulation disorder     1. Have you been to the ER, urgent care clinic since your last visit? Hospitalized since your last visit? No    2. Have you seen or consulted any other health care providers outside of the [address] [place] [address] since your last visit? Include any pap smears or colon screening.  No
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
## 2018-05-15 NOTE — PROGRESS NOTES
1. Have you been to the ER, urgent care clinic since your last visit? Hospitalized since your last visit? No    2. Have you seen or consulted any other health care providers outside of the [address] [place] [address] since your last visit? Include any pap smears or colon screening.  No     Chief Complaint   Patient presents with    Coagulation disorder

## 2018-05-15 NOTE — PATIENT INSTRUCTIONS

## 2018-05-15 NOTE — PROGRESS NOTES
Alyssia Soliz is a 66 y.o. female   Chief Complaint   Patient presents with    Coagulation disorder    pt here for recheck of her INR and is currently at 2.5. Pt is otherwise doing well and britta lcontinue current dosing and recheck in 1 month  Pt does need refill of her pain medication for her chronic low back pain. Medication is helping her pain from a 7-8/10 to a 3/10. Pt has failed other modalities and pt is not a nsaid candidate due to chronic AC. Pt has been compliant with pill counts and utox without issue. Opioid/Pain Management:    1. Has the patient signed a pain contract for chronic narcotic use? yes  2. Has the patient had a UDS or Serum screen as per guidelines as per [facility]?:   yes    3. Does patient meet necessary guidelines for Naloxone treatement per the [facility]?:    no  4. Has the Prescription Monitoring Program been reviewed? yes  5. Does patient have a long term condition that requires long term use of a Narcotic?  yes  6. Has patient been tolerant of therapy and responsible to routine follow up and specialist follow-up? Yes      she is a 66y.o. year old female who presents for evalution. Reviewed PmHx, RxHx, FmHx, SocHx, AllgHx and updated and dated in the chart. Review of Systems - negative except as listed above in the HPI    Objective:     Vitals:    05/15/18 0914   BP: 133/61   Pulse: 71   Resp: 16   Temp: 98.1 °F (36.7 °C)   TempSrc: Oral   SpO2: 95%   Weight: 130 lb 3.2 oz (59.1 kg)   Height: 5' 5\" (1.651 m)       Current Outpatient Prescriptions   Medication Sig    oxyCODONE-acetaminophen (PERCOCET) 5-325 mg per tablet Take 1 Tab by mouth every six (6) hours as needed for Pain. Max Daily Amount: 4 Tabs. May fill 4/15/18    warfarin (COUMADIN) 2 mg tablet Saturday Sunday Monday Tuesday Wednesday take 3mg and take 2mg Thursday Friday.     albuterol (VENTOLIN HFA) 90 mcg/actuation inhaler Take 2 Puffs by inhalation every four (4) hours as needed for Wheezing or Shortness of Breath.  lovastatin (MEVACOR) 10 mg tablet TAKE 1 TABLET EVERY NIGHT    predniSONE (DELTASONE) 5 mg tablet Take 1 Tab by mouth daily.  bumetanide (BUMEX) 1 mg tablet TAKE 1 TABLET EVERY DAY    umeclidinium (INCRUSE ELLIPTA) 62.5 mcg/actuation inhaler Take 1 Puff by inhalation daily. Indications: BRONCHOSPASM PREVENTION WITH COPD    SYMBICORT 160-4.5 mcg/actuation HFA inhaler INHALE 2 PUFFS TWICE DAILY    dilTIAZem (CARDIZEM) 60 mg tablet TAKE 1 TABLET BEFORE BREAKFAST, LUNCH, DINNER AND AT BEDTIME    potassium chloride (KLOR-CON) 10 mEq tablet Take 1 Tab by mouth every Monday, Wednesday, Friday.  docusate sodium (COLACE) 100 mg capsule Take 100 mg by mouth two (2) times daily as needed for Constipation.  alendronate (FOSAMAX) 70 mg tablet Take 1 Tab by mouth every Wednesday.  albuterol (PROVENTIL VENTOLIN) 2.5 mg /3 mL (0.083 %) nebulizer solution 3 mL by Nebulization route every four (4) hours as needed for Wheezing or Shortness of Breath.  ondansetron (ZOFRAN ODT) 4 mg disintegrating tablet Take 1 Tab by mouth every eight (8) hours as needed for Nausea.  doxycycline (ADOXA) 100 mg tablet Take 1 Tab by mouth two (2) times a day.  buPROPion SR (WELLBUTRIN SR) 150 mg SR tablet 1 tab PO qday x 3 days then 1 tab PO bid for smoking cessation    varenicline (CHANTIX STARTER ROSSY) 0.5 mg (11)- 1 mg (42) DsPk Take as directed    nystatin (MYCOSTATIN) 100,000 unit/mL suspension Take 5 mL by mouth four (4) times daily. swish and spit    ferrous sulfate (SLOW FE) 142 mg (45 mg iron) ER tablet Take 1 Tab by mouth two (2) times daily (with meals).  famotidine (PEPCID) 40 mg tablet Take 1 Tab by mouth two (2) times a day. No current facility-administered medications for this visit.         Physical Examination: General appearance - alert, well appearing, and in no distress  Chest - clear to auscultation, no wheezes, rales or rhonchi, symmetric air entry  Heart - irregularly irregular, normal S1, S2, no murmurs, rubs, Pos audible click from mech valve  Back exam - limited range of motion, tenderness noted sacral spine b/l and midline low lumbar      Assessment/ Plan:   Diagnoses and all orders for this visit:    1. Chronic midline low back pain without sciatica  -     oxyCODONE-acetaminophen (PERCOCET) 5-325 mg per tablet; Take 1 Tab by mouth every six (6) hours as needed for Pain. Max Daily Amount: 4 Tabs. May fill 4/15/18    2. Warfarin anticoagulation  -     AMB POC PT/INR    3. Lumbar compression fracture, sequela  -     oxyCODONE-acetaminophen (PERCOCET) 5-325 mg per tablet; Take 1 Tab by mouth every six (6) hours as needed for Pain. Max Daily Amount: 4 Tabs. May fill 4/15/18     c/w current coumadin dosing  Follow-up Disposition:  Return if symptoms worsen or fail to improve. I have discussed the diagnosis with the patient and the intended plan as seen in the above orders. The patient has received an after-visit summary and questions were answered concerning future plans. Pt conveyed understanding of plan.     Medication Side Effects and Warnings were discussed with patient      Neda Jenkins DO

## 2018-06-07 NOTE — ED NOTES
Pt discharged by MD Jaxson Coffman. Pt provided with discharge instructions Rx and instructions on follow up care. Pt out of ED in stable condition accompanied by PCT via wheelchair.

## 2018-06-07 NOTE — ED PROVIDER NOTES
EMERGENCY DEPARTMENT HISTORY AND PHYSICAL EXAM      Date: 6/7/2018  Patient Name: Tatyana Ponce    History of Presenting Illness     Chief Complaint   Patient presents with    Shortness of Breath     Arrives via [facility] complaining of chest pain SOB x 2 days Hx of COPD Pt denies any fever +chills     History Provided By: Patient and medical records    HPI: Tatyana Ponce, 66 y.o. female with PMHx significant for HLD, CAD, afib, CHF, aortic and mitral valve replacements, and COPD on chronic 2 L O2, presents ambulatory to ED [facility] ED with cc of progressively worsening SOB x 3 days. She reports that her SOB is exacerbated when she is laying flat. She also c/o associated wheezes, chest tightness, and a productive cough with occasional yellow sputum x 3 days. She denies medical evaluation for her current symptoms. She reports performing breathing exercises with no relief in SOB. She also reports undergoing nebulizer treatments and using her inhaler with no relief. She is prescribed Prednisone 5 mg daily for COPD, but she denies any recent ABX use. She is also prescribed warfarin. She reports symptoms are similar to prior COPD exacerbations. Of note, the patient has been unable to afford O2 tanks at home. She specifically denies hemoptysis, fevers, dysuria, hematuria, abdominal pain, nausea, vomiting, or diarrhea. There are no other complaints, changes, or physical findings at this time. PCP: Layo Feliciano, DO    Current Outpatient Prescriptions   Medication Sig Dispense Refill    predniSONE (DELTASONE) 10 mg tablet Please take 60 mg daily for four days, 50 mg for one day, 40 mg for one day, 30 mg for one day, 20 mg for one day, 10 mg for one day. 39 Tab 0    doxycycline (VIBRA-TABS) 100 mg tablet Take 1 Tab by mouth two (2) times a day for 7 days. 14 Tab 0    oxyCODONE-acetaminophen (PERCOCET) 5-325 mg per tablet Take 1 Tab by mouth every six (6) hours as needed for Pain. Max Daily Amount: 4 Tabs.  May fill 4/15/18 120 Tab 0    warfarin (COUMADIN) 2 mg tablet Saturday Sunday Monday Tuesday Wednesday take 3mg and take 2mg Thursday Friday. 130 Tab 3    albuterol (VENTOLIN HFA) 90 mcg/actuation inhaler Take 2 Puffs by inhalation every four (4) hours as needed for Wheezing or Shortness of Breath. 1 Inhaler 11    doxycycline (ADOXA) 100 mg tablet Take 1 Tab by mouth two (2) times a day. 14 Tab 0    lovastatin (MEVACOR) 10 mg tablet TAKE 1 TABLET EVERY NIGHT 90 Tab 3    predniSONE (DELTASONE) 5 mg tablet Take 1 Tab by mouth daily. 90 Tab 3    bumetanide (BUMEX) 1 mg tablet TAKE 1 TABLET EVERY DAY 90 Tab 3    umeclidinium (INCRUSE ELLIPTA) 62.5 mcg/actuation inhaler Take 1 Puff by inhalation daily. Indications: BRONCHOSPASM PREVENTION WITH COPD 1 Inhaler 0    buPROPion SR (WELLBUTRIN SR) 150 mg SR tablet 1 tab PO qday x 3 days then 1 tab PO bid for smoking cessation 60 Tab 2    SYMBICORT 160-4.5 mcg/actuation HFA inhaler INHALE 2 PUFFS TWICE DAILY 3 Inhaler 3    dilTIAZem (CARDIZEM) 60 mg tablet TAKE 1 TABLET BEFORE BREAKFAST, LUNCH, DINNER AND AT BEDTIME 360 Tab 3    potassium chloride (KLOR-CON) 10 mEq tablet Take 1 Tab by mouth every Monday, Wednesday, Friday. 90 Tab 3    ferrous sulfate (SLOW FE) 142 mg (45 mg iron) ER tablet Take 1 Tab by mouth two (2) times daily (with meals). 30 Tab 2    docusate sodium (COLACE) 100 mg capsule Take 100 mg by mouth two (2) times daily as needed for Constipation.  albuterol (PROVENTIL VENTOLIN) 2.5 mg /3 mL (0.083 %) nebulizer solution 3 mL by Nebulization route every four (4) hours as needed for Wheezing or Shortness of Breath.  24 Each 0    varenicline (CHANTIX STARTER ROSSY) 0.5 mg (11)- 1 mg (42) DsPk Take as directed 1 Dose Pack 0       Past History     Past Medical History:  Past Medical History:   Diagnosis Date    A[facility]fib 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    Tissue MVR 1989 following failed balloon valvuloplasty for MS Redo MVR 2004 due to severe MR, AVR due to AR ([facility])     S/P AVR (aortic valve replacement) 1/8/2015    S/P MVR (mitral valve replacement) 1/8/2015       Past Surgical History:  Past Surgical History:   Procedure Laterality Date    COLONOSCOPY N/A 11/28/2016    COLONOSCOPY performed by Justin Chaney MD at [facility] ENDOSCOPY    HX COLECTOMY      HX HEART CATHETERIZATION      cabg    HX HYSTERECTOMY      BSO    HX MITRAL VALVE REPLACEMENT      and redo MVR and AVR       Family History:  Family History   Problem Relation Age of Onset    Cancer Brother        Social History:  Social History   Substance Use Topics    Smoking status: Current Every Day Smoker     Packs/day: 0.50     Types: Cigarettes     Last attempt to quit: 11/1/2015    Smokeless tobacco: Never Used      Comment: 11/1/2014    Alcohol use No       Allergies: Allergies   Allergen Reactions    Spiriva Respimat [Tiotropium Bromide] Anaphylaxis and Other (comments)     Adverse effects; Per RN - pt states that Spiriva caused throat swelling and could not breathe well.  Celebrex [Celecoxib] Rash    Tomato Rash         Review of Systems   Review of Systems   Constitutional: Negative for chills, fatigue and fever. HENT: Negative for congestion, rhinorrhea and sore throat. Eyes: Negative for pain, discharge and visual disturbance. Respiratory: Positive for cough, chest tightness, shortness of breath and wheezing. Denies hemoptysis   Cardiovascular: Negative for chest pain, palpitations and leg swelling. Gastrointestinal: Negative for abdominal pain, constipation, diarrhea, nausea and vomiting. Genitourinary: Negative for dysuria, frequency and hematuria.    Musculoskeletal: Negative for arthralgias, back pain and myalgias. Skin: Negative for rash. Neurological: Negative for dizziness, weakness, light-headedness and headaches. Psychiatric/Behavioral: Negative. Physical Exam   Physical Exam   Constitutional: She is oriented to person, place, and time. She appears well-developed and well-nourished. She appears ill (Chronically). No distress. HENT:   Head: Normocephalic and atraumatic. Eyes: EOM are normal. Right eye exhibits no discharge. Left eye exhibits no discharge. No scleral icterus. Neck: Normal range of motion. Neck supple. No tracheal deviation present. Cardiovascular: Normal rate and intact distal pulses. An irregularly irregular rhythm present. Exam reveals no gallop and no friction rub. No murmur heard. Clicking sound consistent with mechanical valve   Pulmonary/Chest: Effort normal. No respiratory distress. She has wheezes. She has no rales. Decreased breath sounds diffusely   Abdominal: Soft. She exhibits no distension. There is no tenderness. Musculoskeletal: Normal range of motion. She exhibits no edema. Lymphadenopathy:     She has no cervical adenopathy. Neurological: She is alert and oriented to person, place, and time. No focal neuro deficits   Skin: Skin is warm and dry. No rash noted. Psychiatric: She has a normal mood and affect. Nursing note and vitals reviewed.       Diagnostic Study Results     Labs -     Recent Results (from the past 12 hour(s))   EKG, 12 LEAD, INITIAL    Collection Time: 06/07/18 12:02 PM   Result Value Ref Range    Ventricular Rate 112 BPM    Atrial Rate 96 BPM    QRS Duration 78 ms    Q-T Interval 374 ms    QTC Calculation (Bezet) 510 ms    Calculated R Axis 82 degrees    Calculated T Axis 63 degrees    Diagnosis       Atrial fibrillation with rapid ventricular response  Nonspecific ST abnormality  Abnormal ECG  When compared with ECG of 27-NOV-2017 04:57,  No significant change was found  Confirmed by Talita Garcia ([id number]) on 6/7/2018 2:35:29 PM     CBC WITH AUTOMATED DIFF    Collection Time: 06/07/18 12:18 PM   Result Value Ref Range    WBC 13.7 (H) 3.6 - 11.0 K/uL    RBC 4.40 3.80 - 5.20 M/uL    HGB 11.9 11.5 - 16.0 g/dL    HCT 37.4 35.0 - 47.0 %    MCV 85.0 80.0 - 99.0 FL    MCH 27.0 26.0 - 34.0 PG    MCHC 31.8 30.0 - 36.5 g/dL    RDW 16.0 (H) 11.5 - 14.5 %    PLATELET 738 044 - 517 K/uL    MPV 10.7 8.9 - 12.9 FL    NRBC 0.0 0  WBC    ABSOLUTE NRBC 0.00 0.00 - 0.01 K/uL    NEUTROPHILS 86 (H) 32 - 75 %    LYMPHOCYTES 5 (L) 12 - 49 %    MONOCYTES 9 5 - 13 %    EOSINOPHILS 0 0 - 7 %    BASOPHILS 0 0 - 1 %    IMMATURE GRANULOCYTES 0 0.0 - 0.5 %    ABS. NEUTROPHILS 11.8 (H) 1.8 - 8.0 K/UL    ABS. LYMPHOCYTES 0.7 (L) 0.8 - 3.5 K/UL    ABS. MONOCYTES 1.2 (H) 0.0 - 1.0 K/UL    ABS. EOSINOPHILS 0.0 0.0 - 0.4 K/UL    ABS. BASOPHILS 0.0 0.0 - 0.1 K/UL    ABS. IMM. GRANS. 0.0 0.00 - 0.04 K/UL    DF AUTOMATED      RBC COMMENTS NORMOCYTIC, NORMOCHROMIC     METABOLIC PANEL, COMPREHENSIVE    Collection Time: 06/07/18 12:18 PM   Result Value Ref Range    Sodium 136 136 - 145 mmol/L    Potassium 3.2 (L) 3.5 - 5.1 mmol/L    Chloride 102 97 - 108 mmol/L    CO2 28 21 - 32 mmol/L    Anion gap 6 5 - 15 mmol/L    Glucose 112 (H) 65 - 100 mg/dL    BUN 9 6 - 20 MG/DL    Creatinine 0.69 0.55 - 1.02 MG/DL    BUN/Creatinine ratio 13 12 - 20      GFR est AA >60 >60 ml/min/1.73m2    GFR est non-AA >60 >60 ml/min/1.73m2    Calcium 8.4 (L) 8.5 - 10.1 MG/DL    Bilirubin, total 0.9 0.2 - 1.0 MG/DL    ALT (SGPT) 21 12 - 78 U/L    AST (SGOT) 14 (L) 15 - 37 U/L    Alk.  phosphatase 95 45 - 117 U/L    Protein, total 7.1 6.4 - 8.2 g/dL    Albumin 3.5 3.5 - 5.0 g/dL    Globulin 3.6 2.0 - 4.0 g/dL    A-G Ratio 1.0 (L) 1.1 - 2.2     TROPONIN I    Collection Time: 06/07/18 12:18 PM   Result Value Ref Range    Troponin-I, Qt. <0.04 <0.05 ng/mL   CK W/ CKMB & INDEX    Collection Time: 06/07/18 12:18 PM   Result Value Ref Range    CK 47 26 - 192 U/L    CK - MB 1.9 <3.6 NG/ML    CK-MB Index 4.0 (H) 0 - 2.5     NT-PRO BNP    Collection Time: 06/07/18 12:18 PM   Result Value Ref Range    NT pro-BNP 1445 (H) 0 - 125 PG/ML   PROTHROMBIN TIME + INR    Collection Time: 06/07/18 12:18 PM   Result Value Ref Range    INR 2.1 (H) 0.9 - 1.1      Prothrombin time 21.4 (H) 9.0 - 11.1 sec       Radiologic Studies -     CXR Results  (Last 48 hours)               06/07/18 1419  XR CHEST PORT Final result    Impression:  IMPRESSION: No acute abnormality. Narrative:  EXAM:  XR CHEST PORT. INDICATION: Chest pain. COMPARISON: 3/6/2018. FINDINGS:    A portable AP radiograph of the chest was obtained at 1410 hours. There are   sternal sutures and mediastinal clips and valve replacements. Lines and tubes: The patient is on a cardiac monitor. Lungs: The lungs are clear. Pleura: There is no pneumothorax or pleural effusion. Mediastinum: The cardiac and mediastinal contours and pulmonary vascularity are   normal. The aorta is atherosclerotic. Bones and soft tissues: The bones and soft tissues are grossly within normal   limits. Medical Decision Making   I am the first provider for this patient. I reviewed the vital signs, available nursing notes, past medical history, past surgical history, family history and social history. Vital Signs-Reviewed the patient's vital signs. Patient Vitals for the past 12 hrs:   Temp Pulse Resp BP SpO2   06/07/18 1420 - 91 - 104/67 -   06/07/18 1158 98.5 °F (36.9 °C) - 26 116/84 93 %       EKG interpretation: (Preliminary) 12:02  Rhythm: atrial fib (RVR); and irregular. Rate (approx.): 112; Axis: normal; ST/T wave: non-specific changes;  Other findings: normal.  Written by Levert Nyhan, ED Scribe, as dictated by Ely Edmondson MD.    Records Reviewed: Nursing Notes, Old Medical Records, Previous Radiology Studies and Previous Laboratory Studies    Provider Notes (Medical Decision Making):   DDx: COPD exacerbation, URI, pneumonia, bronchitis, pulmonary edema, CHF, PE. Disposition: Patient is a 65 yo female who presents to ED with s/s consistent with COPD exacerbation. She is in chronic atrial fibrillation and on coumadin. Doubt PE - INR therapeutic at 2.1. CXR negative for pneumonia, pulmonary edema. Patient feeling better after breathing treatments and steroids with improved air movement on exam.  She is maintaining oxygen saturation on room air. Will discharge with steroid taper and doxycycline. Advised patient to continue nebulizer treatments at home every 4-6 hours. Advised close follow up. ED Course:   Initial assessment performed. The patients presenting problems have been discussed, and they are in agreement with the care plan formulated and outlined with them. I have encouraged them to ask questions as they arise throughout their visit. Progress Note:  2:49 PM  The patient was re-evaluated. She has improved air movement with some wheezing. Will attempt to ambulate and re-assess. Written by Josselyn Bha, ED Scribe, as dictated by Leonia Canavan, MD.    Progress Note:  4:00 PM  The patient was re-evaluated and she has good air exchange with few faint expiratory wheezes. Pt ambulated and maintained SpO2 at 95% on room air. Patient comfortable with plan for discharge with close follow up. Discussed results, prescriptions and follow up plan with patient. Provided customary return to ED instructions. Patient expressed understanding. Alvaro Pugh MD    Critical Care Time: 0 minutes    Disposition:  Discharge Note:  4:10 PM  The pt is ready for discharge. The pt's signs, symptoms, diagnosis, and discharge instructions have been discussed and pt has conveyed their understanding. The pt is to follow up as recommended or return to ER should their symptoms worsen. Plan has been discussed and pt is in agreement. PLAN:  1.    Current Discharge Medication List      START taking these medications    Details   ! ! predniSONE (DELTASONE) 10 mg tablet Please take 60 mg daily for four days, 50 mg for one day, 40 mg for one day, 30 mg for one day, 20 mg for one day, 10 mg for one day. Qty: 39 Tab, Refills: 0      doxycycline (VIBRA-TABS) 100 mg tablet Take 1 Tab by mouth two (2) times a day for 7 days. Qty: 14 Tab, Refills: 0       !! - Potential duplicate medications found. Please discuss with provider. CONTINUE these medications which have NOT CHANGED    Details   ! ! predniSONE (DELTASONE) 5 mg tablet Take 1 Tab by mouth daily. Qty: 90 Tab, Refills: 3    Associated Diagnoses: Pulmonary emphysema, unspecified emphysema type ([facility] [address]       ! ! - Potential duplicate medications found. Please discuss with provider. 2.   Follow-up Information     Follow up With Details Comments [address], DO In 2 days  [address]  [address] [facility]  [phone]      [facility] [place] EM[facility]  As needed, If symptoms worsen [address] [place] [address]  [phone]        Return to ED if worse     Diagnosis     Clinical Impression:   1. Acute exacerbation of chronic obstructive pulmonary disease (COPD) ([facility] [address]        Attestations: This note is prepared by Delta Kennedy, acting as Scribe for  Pb Galeano MD.    Pb Galeano MD: The scribe's documentation has been prepared under my direction and personally reviewed by me in its entirety. I confirm that the note above accurately reflects all work, treatment, procedures, and medical decision making performed by me. This note will not be viewable in [address]

## 2018-06-07 NOTE — DISCHARGE INSTRUCTIONS
Chronic Obstructive Pulmonary Disease (COPD): Care Instructions  Your Care Instructions    Chronic obstructive pulmonary disease (COPD) is a general term for a group of lung diseases, including emphysema and chronic bronchitis. People with COPD have decreased airflow in and out of the lungs, which makes it hard to breathe. The airways also can get clogged with thick mucus. Cigarette smoking is a major cause of COPD. Although there is no cure for COPD, you can slow its progress. Following your treatment plan and taking care of yourself can help you feel better and live longer. Follow-up care is a key part of your treatment and safety. Be sure to make and go to all appointments, and call your doctor if you are having problems. It's also a good idea to know your test results and keep a list of the medicines you take. How can you care for yourself at home? ?Staying healthy  ? · Do not smoke. This is the most important step you can take to prevent more damage to your lungs. If you need help quitting, talk to your doctor about stop-smoking programs and medicines. These can increase your chances of quitting for good. ? · Avoid colds and flu. Get a pneumococcal vaccine shot. If you have had one before, ask your doctor whether you need a second dose. Get the flu vaccine every fall. If you must be around people with colds or the flu, wash your hands often. ? · Avoid secondhand smoke, air pollution, and high altitudes. Also avoid cold, dry air and hot, humid air. Stay at home with your windows closed when air pollution is bad. ?Medicines and oxygen therapy  ? · Take your medicines exactly as prescribed. Call your doctor if you think you are having a problem with your medicine. ? · You may be taking medicines such as:  ¨ Bronchodilators. These help open your airways and make breathing easier. Bronchodilators are either short-acting (work for 6 to 9 hours) or long-acting (work for 24 hours).  You inhale most bronchodilators, so they start to act quickly. Always carry your quick-relief inhaler with you in case you need it while you are away from home. ¨ Corticosteroids (prednisone, budesonide). These reduce airway inflammation. They come in pill or inhaled form. You must take these medicines every day for them to work well. ? · A spacer may help you get more inhaled medicine to your lungs. Ask your doctor or pharmacist if a spacer is right for you. If it is, ask how to use it properly. ? · Do not take any vitamins, over-the-counter medicine, or herbal products without talking to your doctor first.   ? · If your doctor prescribed antibiotics, take them as directed. Do not stop taking them just because you feel better. You need to take the full course of antibiotics. ? · Oxygen therapy boosts the amount of oxygen in your blood and helps you breathe easier. Use the flow rate your doctor has recommended, and do not change it without talking to your doctor first.   Activity  ? · Get regular exercise. Walking is an easy way to get exercise. Start out slowly, and walk a little more each day. ? · Pay attention to your breathing. You are exercising too hard if you cannot talk while you are exercising. ? · Take short rest breaks when doing household chores and other activities. ? · Learn breathing methods-such as breathing through pursed lips-to help you become less short of breath. ? · If your doctor has not set you up with a pulmonary rehabilitation program, talk to him or her about whether rehab is right for you. Rehab includes exercise programs, education about your disease and how to manage it, help with diet and other changes, and emotional support. Diet  ? · Eat regular, healthy meals. Use bronchodilators about 1 hour before you eat to make it easier to eat. Eat several small meals instead of three large ones. Drink beverages at the end of the meal. Avoid foods that are hard to chew.    ? · Eat foods that contain protein so that you do not lose muscle mass. ? · Talk with your doctor if you gain too much weight or if you lose weight without trying. ?Mental health  ? · Talk to your family, friends, or a therapist about your feelings. It is normal to feel frightened, angry, hopeless, helpless, and even guilty. Talking openly about bad feelings can help you cope. If these feelings last, talk to your doctor. When should you call for help? Call 911 anytime you think you may need emergency care. For example, call if:  ? · You have severe trouble breathing. ?Call your doctor now or seek immediate medical care if:  ? · You have new or worse trouble breathing. ? · You cough up blood. ? · You have a fever. ? Watch closely for changes in your health, and be sure to contact your doctor if:  ? · You cough more deeply or more often, especially if you notice more mucus or a change in the color of your mucus. ? · You have new or worse swelling in your legs or belly. ? · You are not getting better as expected. Where can you learn more? Go to http://hayden-дмитрий.info/. Cleo Saldana in the search box to learn more about \"Chronic Obstructive Pulmonary Disease (COPD): Care Instructions. \"  Current as of: May 12, 2017  Content Version: 11.4  © 2544-5865 [employer]. Care instructions adapted under license by [employer] (which disclaims liability or warranty for this information). If you have questions about a medical condition or this instruction, always ask your healthcare professional. [address] any warranty or liability for your use of this information.

## 2018-06-07 NOTE — ED NOTES
Pt ambulated with PCT on pulsox. Pt maintained O2 sat at 95% without oxygen and did receive any gait assistance.  Pt placed back in bed and states that she feels more comfortable on 2L NC.

## 2018-06-07 NOTE — ED NOTES
Bedside shift change report given to Migue Nguyen (oncoming nurse) by Tanna Giles  (offgoing nurse). Report included the following information ED Summary.

## 2018-06-19 NOTE — PATIENT INSTRUCTIONS
High INR Test Result: Care Instructions  Your Care Instructions    You had a blood test to check how long it takes your blood to clot. This test is called a PT or prothrombin time test. The result of the test is called the INR level. A high INR level can happen when you take warfarin (Coumadin). Warfarin helps prevent blood clots. To do this, it slows the amount of time it takes for your blood to clot. This raises your INR level. The INR goal for people who take warfarin is usually from 2 to 3. A value higher than 3.5 increases the risk of bleeding problems. Many things can affect the way warfarin works. Some natural health products and other medicines can make warfarin work too well. That can raise the risk of bleeding. If you drink a lot of alcohol, that may raise your INR. And severe diarrhea or vomiting can also raise your INR. The best way to lower your INR will depend on several things. In some cases, the doctor may have you stop taking warfarin for a few days. You may also be given other medicines to take. You will need to be tested often to make sure your INR level is going down. You will also need to watch for signs of bleeding. The doctor has checked you carefully, but problems can develop later. If you notice any problems or new symptoms, get medical treatment right away. Follow-up care is a key part of your treatment and safety. Be sure to make and go to all appointments, and call your doctor if you are having problems. It's also a good idea to know your test results and keep a list of the medicines you take. How can you care for yourself at home? Be careful with medicines and foods  · Don't start or stop taking any medicines, vitamins, or natural remedies unless you first talk to your doctor. · Keep the amount of vitamin K in your diet about the same from day to day. Do not suddenly eat a lot more or a lot less food that is rich in vitamin K than you usually do.  Vitamin K affects how warfarin works and how your blood clots. · Limit your use of alcohol. Avoid bleeding by preventing falls  · Wear slippers or shoes with nonskid soles. · Remove throw rugs and clutter. · Rearrange furniture and electrical cords to keep them out of walking paths. · Keep stairways, porches, and outside walkways well lit. Use night-lights in hallways and bathrooms. · Be extra careful when you work with sharp tools or knives. When should you call for help? Call 911 anytime you think you may need emergency care. For example, call if:  ? · You passed out (lost consciousness). ? · You have signs of severe bleeding, such as:  ¨ A severe headache that is different from past headaches. ¨ Vomiting blood or what looks like coffee grounds. ¨ Passing maroon or very bloody stools. ?Call your doctor now or seek immediate medical care if:  ? · You have unexpected bleeding, including:  ¨ Blood in stools or black stools that look like tar. ¨ Blood in your urine. ¨ Bruises or blood spots under the skin. ? · You feel dizzy or lightheaded. ? Watch closely for changes in your health, and be sure to contact your doctor if:  ? · You do not get better as expected. Where can you learn more? Go to http://hayden-дмитрий.info/. Enter C178 in the search box to learn more about \"High INR Test Result: Care Instructions. \"  Current as of: September 21, 2016  Content Version: 11.4  © 2986-0384 [employer]. Care instructions adapted under license by [employer] (which disclaims liability or warranty for this information). If you have questions about a medical condition or this instruction, always ask your healthcare professional. [place] [address] any warranty or liability for your use of this information.

## 2018-06-19 NOTE — PROGRESS NOTES
Lonnie Horta is a 66 y.o. female   Chief Complaint   Patient presents with    Coagulation disorder     Pt states taking warfarin as rx. Follow from [facility] for Breathing 6/7/2018. Pt states still having some SOB. pt here for refill of her prednisone and pt is requesting to go back to 10mg daily states this has worked much better in the past for her. Agreeable to this but would not like to go higher on a chronic basis. Symbicort has been helping a bit tho and will need this refilled. Pt does continue to smoke and does not plan to quit despite breathing issues. Pt also due for an INR check. Which is elevated and checked 2 machines since have elham getting odd readings lately will also send out to lab to check as well. Will hold coumadin until f/u Thursday for recheck. Pt also for refill of her pain medication which is taken for her chronic back pain from her vertebral fracture. Pt has been compliant with urine samples and pill counts in past.   checked last fill 5/19/18. Pain without med is a 8/10 and with is a 3-5/10. Opioid/Pain Management:    1. Has the patient signed a pain contract for chronic narcotic use? yes  2. Has the patient had a UDS or Serum screen as per guidelines as per [place] of Medicine?:   yes    3. Does patient meet necessary guidelines for Naloxone treatement per the [place] of Medicine?:    no  4. Has the Prescription Monitoring Program been reviewed? yes  5. Does patient have a long term condition that requires long term use of a Narcotic?  yes  6. Has patient been tolerant of therapy and responsible to routine follow up and specialist follow-up? Yes      Pt also reports pain with swallowing but does not feel like food gets stuck but feels like a knot in her esophagus. she is a 66y.o. year old female who presents for evalution. Reviewed PmHx, RxHx, FmHx, SocHx, AllgHx and updated and dated in the chart.     Review of Systems - negative except as listed above in the HPI    Objective:     Vitals:    06/19/18 0908   BP: 132/77   Pulse: 95   Resp: 12   Temp: 98.5 °F (36.9 °C)   TempSrc: Oral   SpO2: 95%   Weight: 132 lb 9.6 oz (60.1 kg)   Height: 5' 5\" (1.651 m)       Current Outpatient Prescriptions   Medication Sig    predniSONE (DELTASONE) 10 mg tablet Take 1 Tab by mouth daily.  budesonide-formoterol (SYMBICORT) 160-4.5 mcg/actuation HFAA INHALE 2 PUFFS TWICE DAILY    oxyCODONE-acetaminophen (PERCOCET) 5-325 mg per tablet Take 1 Tab by mouth every six (6) hours as needed for Pain. Max Daily Amount: 4 Tabs.  warfarin (COUMADIN) 2 mg tablet Saturday Sunday Monday Tuesday Wednesday take 3mg and take 2mg Thursday Friday.  albuterol (VENTOLIN HFA) 90 mcg/actuation inhaler Take 2 Puffs by inhalation every four (4) hours as needed for Wheezing or Shortness of Breath.  lovastatin (MEVACOR) 10 mg tablet TAKE 1 TABLET EVERY NIGHT    bumetanide (BUMEX) 1 mg tablet TAKE 1 TABLET EVERY DAY    dilTIAZem (CARDIZEM) 60 mg tablet TAKE 1 TABLET BEFORE BREAKFAST, LUNCH, DINNER AND AT BEDTIME    potassium chloride (KLOR-CON) 10 mEq tablet Take 1 Tab by mouth every Monday, Wednesday, Friday.  docusate sodium (COLACE) 100 mg capsule Take 100 mg by mouth two (2) times daily as needed for Constipation.  albuterol (PROVENTIL VENTOLIN) 2.5 mg /3 mL (0.083 %) nebulizer solution 3 mL by Nebulization route every four (4) hours as needed for Wheezing or Shortness of Breath.  predniSONE (DELTASONE) 10 mg tablet Please take 60 mg daily for four days, 50 mg for one day, 40 mg for one day, 30 mg for one day, 20 mg for one day, 10 mg for one day.  doxycycline (ADOXA) 100 mg tablet Take 1 Tab by mouth two (2) times a day.  umeclidinium (INCRUSE ELLIPTA) 62.5 mcg/actuation inhaler Take 1 Puff by inhalation daily.  Indications: BRONCHOSPASM PREVENTION WITH COPD    buPROPion SR (WELLBUTRIN SR) 150 mg SR tablet 1 tab PO qday x 3 days then 1 tab PO bid for smoking cessation    varenicline (CHANTIX STARTER ROSSY) 0.5 mg (11)- 1 mg (42) DsPk Take as directed    ferrous sulfate (SLOW FE) 142 mg (45 mg iron) ER tablet Take 1 Tab by mouth two (2) times daily (with meals). No current facility-administered medications for this visit. Physical Examination: General appearance - alert, well appearing, and in no distress  Mental status - alert, oriented to person, place, and time  Chest - coarse b/l at baseline  Heart - irregularly irregular rhythm with rate 29'E with audible click  Back exam - limited range of motion, pain with motion noted during exam, tenderness noted midline lumbar      Assessment/ Plan:   Diagnoses and all orders for this visit:    1. Warfarin anticoagulation  -     AMB POC PT/INR  -     PROTHROMBIN TIME + INR    2. Pulmonary emphysema, unspecified emphysema type ([address]  -     predniSONE (DELTASONE) 10 mg tablet; Take 1 Tab by mouth daily. -     budesonide-formoterol (SYMBICORT) 160-4.5 mcg/actuation HFAA; INHALE 2 PUFFS TWICE DAILY    3. Painful swallowing  -     XR BA SWALLOW ESOPHOGRAM; Future    4. Chronic midline low back pain without sciatica  -     oxyCODONE-acetaminophen (PERCOCET) 5-325 mg per tablet; Take 1 Tab by mouth every six (6) hours as needed for Pain. Max Daily Amount: 4 Tabs. 5. Lumbar compression fracture, sequela  -     oxyCODONE-acetaminophen (PERCOCET) 5-325 mg per tablet; Take 1 Tab by mouth every six (6) hours as needed for Pain. Max Daily Amount: 4 Tabs.     skip today tomorrow and f/u in office for Thursday INR check. Follow-up Disposition:  Return if symptoms worsen or fail to improve. I have discussed the diagnosis with the patient and the intended plan as seen in the above orders. The patient has received an after-visit summary and questions were answered concerning future plans. Pt conveyed understanding of plan.     Medication Side Effects and Warnings were discussed with patient      Bisi Sheldon DO

## 2018-06-19 NOTE — PROGRESS NOTES
1. Have you been to the ER, urgent care clinic since your last visit? Hospitalized since your last visit? Yes When: 6/7/2018 Where: [address] Reason for visit: SOB    2. Have you seen or consulted any other health care providers outside of the [address] [place] [address] since your last visit? Include any pap smears or colon screening. No     Chief Complaint   Patient presents with    Coagulation disorder     Follow from [address] for Breathing 6/7/2018. Pt states still having some SOB.

## 2018-06-20 NOTE — TELEPHONE ENCOUNTER
[facility] critical lab  INR- 6.8  Dr. Ratna Wright was already aware and had previously addressed next steps with the pt.

## 2018-06-21 NOTE — PROGRESS NOTES
Chief Complaint   Patient presents with    Anticoagulation     Patient in office today for pt/inr check. 1. Have you been to the ER, urgent care clinic since your last visit? Hospitalized since your last visit? No    2. Have you seen or consulted any other health care providers outside of the [address] [place] [address] since your last visit? Include any pap smears or colon screening.  No

## 2018-06-21 NOTE — PROGRESS NOTES
Faith January is a 66 y.o. female   Chief Complaint   Patient presents with    Anticoagulation    pt here for recheck of INR was recently 8 and we had her skip a few days and now at 3.6 which is just abover her upper limit of normal since she uses for mechanical valve also a fib. Pt otherwise well and is having her barium swallow today. No bleeding episodes. she is a 66y.o. year old female who presents for evalution. Reviewed PmHx, RxHx, FmHx, SocHx, AllgHx and updated and dated in the chart. Review of Systems - negative except as listed above in the HPI    Objective:     Vitals:    06/21/18 0728   BP: 155/67   Pulse: 87   Resp: 18   Temp: 97.9 °F (36.6 °C)   TempSrc: Oral   SpO2: 99%   Weight: 132 lb (59.9 kg)   Height: 5' 5\" (1.651 m)       Current Outpatient Prescriptions   Medication Sig    warfarin (COUMADIN) 2 mg tablet Monday Tuesday Wednesday take 3mg and take 2mg Thursday Friday Saturday Sunday    predniSONE (DELTASONE) 10 mg tablet Take 1 Tab by mouth daily.  budesonide-formoterol (SYMBICORT) 160-4.5 mcg/actuation HFAA INHALE 2 PUFFS TWICE DAILY    oxyCODONE-acetaminophen (PERCOCET) 5-325 mg per tablet Take 1 Tab by mouth every six (6) hours as needed for Pain. Max Daily Amount: 4 Tabs.  predniSONE (DELTASONE) 10 mg tablet Please take 60 mg daily for four days, 50 mg for one day, 40 mg for one day, 30 mg for one day, 20 mg for one day, 10 mg for one day.  albuterol (VENTOLIN HFA) 90 mcg/actuation inhaler Take 2 Puffs by inhalation every four (4) hours as needed for Wheezing or Shortness of Breath.  doxycycline (ADOXA) 100 mg tablet Take 1 Tab by mouth two (2) times a day.  lovastatin (MEVACOR) 10 mg tablet TAKE 1 TABLET EVERY NIGHT    bumetanide (BUMEX) 1 mg tablet TAKE 1 TABLET EVERY DAY    umeclidinium (INCRUSE ELLIPTA) 62.5 mcg/actuation inhaler Take 1 Puff by inhalation daily.  Indications: BRONCHOSPASM PREVENTION WITH COPD    buPROPion SR (WELLBUTRIN SR) 150 mg SR tablet 1 tab PO qday x 3 days then 1 tab PO bid for smoking cessation    dilTIAZem (CARDIZEM) 60 mg tablet TAKE 1 TABLET BEFORE BREAKFAST, LUNCH, DINNER AND AT BEDTIME    varenicline (CHANTIX STARTER ROSSY) 0.5 mg (11)- 1 mg (42) DsPk Take as directed    potassium chloride (KLOR-CON) 10 mEq tablet Take 1 Tab by mouth every Monday, Wednesday, Friday.  ferrous sulfate (SLOW FE) 142 mg (45 mg iron) ER tablet Take 1 Tab by mouth two (2) times daily (with meals).  docusate sodium (COLACE) 100 mg capsule Take 100 mg by mouth two (2) times daily as needed for Constipation.  albuterol (PROVENTIL VENTOLIN) 2.5 mg /3 mL (0.083 %) nebulizer solution 3 mL by Nebulization route every four (4) hours as needed for Wheezing or Shortness of Breath. No current facility-administered medications for this visit. Physical Examination: General appearance - alert, well appearing, and in no distress  Mental status - alert, oriented to person, place, and time      Assessment/ Plan:   Diagnoses and all orders for this visit:    1. Chronic atrial fibrillation (HCC)  -     AMB POC PT/INR    2. S/P AVR (aortic valve replacement)  -     warfarin (COUMADIN) 2 mg tablet; Monday Tuesday Wednesday take 3mg and take 2mg Thursday Friday Saturday Sunday     recheck 1 week  Follow-up Disposition:  Return in about 1 year (around 6/21/2019), or if symptoms worsen or fail to improve. I have discussed the diagnosis with the patient and the intended plan as seen in the above orders. The patient has received an after-visit summary and questions were answered concerning future plans. Pt conveyed understanding of plan.     Medication Side Effects and Warnings were discussed with patient      Bibiana Garber DO

## 2018-06-21 NOTE — MR AVS SNAPSHOT
[address] [address] 
[phone] Patient: Omari Apodaca MRN: [id number] HDK:[birth year] Visit Information Date & Time Provider Department Dept. Phone Encounter #  
 6/21/2018  7:15 AM Harrison Ruelas, [address] [phone] [id number] Upcoming Health Maintenance Date Due  
 MEDICARE YEARLY EXAM 6/9/2018 Influenza Age 5 to Adult 8/1/2018 COLONOSCOPY 11/28/2019 BREAST CANCER SCRN MAMMOGRAM 2/27/2020 DTaP/Tdap/Td series (2 - Td) 9/26/2027 Allergies as of 6/21/2018  Review Complete On: 6/21/2018 By: Harrison Ruelas DO Severity Noted Reaction Type Reactions Spiriva Respimat [Tiotropium Bromide] High 01/08/2015   Systemic Anaphylaxis, Other (comments) Adverse effects; Per RN - pt states that Spiriva caused throat swelling and could not breathe well. Celebrex [Celecoxib]  01/08/2015   Intolerance Rash Tomato  01/08/2015   Intolerance Rash Current Immunizations  Reviewed on 12/1/2017 Name Date Influenza Vaccine (Quad) PF 9/26/2017, 10/21/2016, 11/13/2015 Pneumococcal Conjugate (PCV-13) 2/18/2016 Pneumococcal Polysaccharide (PPSV-23) 2/21/2017 Not reviewed this visit You Were Diagnosed With   
  
 Codes Comments Chronic atrial fibrillation (HCC)    -  Primary ICD-10-CM: R51.5 ICD-9-CM: 427.31 S/P AVR (aortic valve replacement)     ICD-10-CM: I84.1 ICD-9-CM: V43.3 Vitals BP Pulse Temp Resp Height(growth percentile) Weight(growth percentile) 155/67 (BP 1 Location: Left arm, BP Patient Position: Sitting) 87 97.9 °F (36.6 °C) (Oral) 18 5' 5\" (1.651 m) 132 lb (59.9 kg) SpO2 BMI OB Status Smoking Status 99% 21.97 kg/m2 Hysterectomy Current Every Day Smoker Vitals History BMI and BSA Data Body Mass Index Body Surface Area  
 21.97 kg/m 2 1.66 m 2 [facility] [address] [phone] Your Updated Medication List  
  
   
This list is accurate as of 6/21/18  7:41 AM.  Always use your most recent med list.  
  
  
  
  
 * albuterol 2.5 mg /3 mL (0.083 %) nebulizer solution Commonly known as:  PROVENTIL VENTOLIN  
3 mL by Nebulization route every four (4) hours as needed for Wheezing or Shortness of Breath. * albuterol 90 mcg/actuation inhaler Commonly known as:  VENTOLIN HFA Take 2 Puffs by inhalation every four (4) hours as needed for Wheezing or Shortness of Breath. budesonide-formoterol 160-4.5 mcg/actuation Hfaa Commonly known as:  SYMBICORT  
INHALE 2 PUFFS TWICE DAILY  
  
 bumetanide 1 mg tablet Commonly known as:  Ezio Sanchez TAKE 1 TABLET EVERY DAY  
  
 buPROPion  mg SR tablet Commonly known as:  WELLBUTRIN SR  
1 tab PO qday x 3 days then 1 tab PO bid for smoking cessation  
  
 dilTIAZem 60 mg tablet Commonly known as:  CARDIZEM  
TAKE 1 TABLET BEFORE BREAKFAST, LUNCH, DINNER AND AT BEDTIME  
  
 docusate sodium 100 mg capsule Commonly known as:  Bulmaro Pian Take 100 mg by mouth two (2) times daily as needed for Constipation. doxycycline 100 mg tablet Commonly known as:  ADOXA Take 1 Tab by mouth two (2) times a day. ferrous sulfate 142 mg (45 mg iron) ER tablet Commonly known as:  SLOW FE Take 1 Tab by mouth two (2) times daily (with meals). lovastatin 10 mg tablet Commonly known as:  MEVACOR  
TAKE 1 TABLET EVERY NIGHT  
  
 oxyCODONE-acetaminophen 5-325 mg per tablet Commonly known as:  PERCOCET Take 1 Tab by mouth every six (6) hours as needed for Pain. Max Daily Amount: 4 Tabs. potassium chloride 10 mEq tablet Commonly known as:  KLOR-CON Take 1 Tab by mouth every Monday, Wednesday, Friday. * predniSONE 10 mg tablet Commonly known as:  Natasha Rincon  
 Please take 60 mg daily for four days, 50 mg for one day, 40 mg for one day, 30 mg for one day, 20 mg for one day, 10 mg for one day. * predniSONE 10 mg tablet Commonly known as:  Crystal Norlander Take 1 Tab by mouth daily. umeclidinium 62.5 mcg/actuation inhaler Commonly known as:  INCRUSE ELLIPTA Take 1 Puff by inhalation daily. Indications: BRONCHOSPASM PREVENTION WITH COPD  
  
 varenicline 0.5 mg (11)- 1 mg (42) Dspk Commonly known as:  CHANTIX STARTER ROSSY Take as directed  
  
 warfarin 2 mg tablet Commonly known as:  COUMADIN  take 3mg and take 2mg  * Notice: This list has 4 medication(s) that are the same as other medications prescribed for you. Read the directions carefully, and ask your doctor or other care provider to review them with you. We Performed the Following AMB POC PT/INR [86637 CPT(R)] To-Do List   
 2018  8:30 AM  
  Appointment with Kavitha Hawkins MD; [facility] at [address] [facility] ([phone]) **Exams including a Small Bowel series may take up to 4 hours. Patient needs to register 30 minutes prior to exam.  1.  Adults:  Nothing to eat or drink after midnight. 2.  [place] to 6 months:  Nothing to eat or drink for 3 hours prior to the study. 3.  6 months to 1 year:  Nothing to eat or drink for 4 hours prior to the study. 4.  1 year to 4 years:  Nothing to eat or drink after midnight except for routine medications, if early morning study.  Otherwise, nothing to eat or drink 4 hours prior to study.  5.  5 years to 12 years:  Nothing to eat or drink after midnight except for routine medications, if early morning study.  Otherwise, nothing to eat or drink 6 hours prior to study.  6.  13 years to 18 years:  Nothing to eat or drink after midnight except for routing medications, if early morning study.  Otherwise, nothing to eat or drink 8 hours prior to study.  7.  You must bring a LIST or BAG of all current medication you are taking with you to your appointment. Introducing [facility]! Dear Valente Jauregui: 
Thank you for requesting a [employer] account. Our records indicate that you already have an active [employer] account. You can access your account anytime at [email or web address]. [email or web address] Did you know that you can access your hospital and ER discharge instructions at any time in [employer]? You can also review all of your test results from your hospital stay or ER visit. Additional Information If you have questions, please visit the Frequently Asked Questions section of the [employer] website at [email or web address]. [email or web address] [email or web address] Remember, [employer] is NOT to be used for urgent needs. For medical emergencies, dial 911. Now available from your iPhone and Android! Please provide this summary of care documentation to your next provider. Your primary care clinician is listed as Chuck Walker. If you have any questions after today's visit, please call [phone].

## 2018-06-21 NOTE — PROGRESS NOTES
Study came back normal, you may want to make an appt with an  [facility], [facility]  Phone: [phone]

## 2018-07-03 NOTE — PROGRESS NOTES
Chief Complaint   Patient presents with    Sore Throat    Coagulation disorder     PT/INR check       Pt seen in the office today for PT/INR check  Also has c/o of a sore throat x's 2-3 days. Reports a recent Barium swallow with negative results  Endorses taking her Coumadin as prescribed. Has limited foods high in Vit K     MTW - 3mg  TFSS - 2mg    Pt denies any bleeding, does have bruising on right arm. Subjective: (As above and below)     Chief Complaint   Patient presents with    Sore Throat    Coagulation disorder     PT/INR check     she is a 66y.o. year old female who presents for evaluation. Reviewed PmHx, RxHx, FmHx, SocHx, AllgHx and updated in chart. Review of Systems - negative except as listed above    Objective:     Vitals:    07/03/18 0745   BP: 157/71   Pulse: 87   Resp: 16   Temp: 98.5 °F (36.9 °C)   TempSrc: Oral   SpO2: 98%   Weight: 134 lb (60.8 kg)   Height: 5' 5\" (1.651 m)     Physical Examination: General appearance - alert, well appearing, and in no distress  Mental status - normal mood, behavior, speech, dress, motor activity, and thought processes  Mouth - thrush noted  Chest - clear to auscultation, no wheezes, rales or rhonchi, symmetric air entry  Heart - normal rate, regular rhythm, normal S1, S2, no murmurs, rubs, clicks or gallops  Skin - ecchymosis on right arm    Assessment/ Plan:   1. Encounter for monitoring coumadin therapy  -hold coumadin x 3 days, then resume 2mg every day  -recheck 2 weeks  - AMB POC PT/INR    2. Thrush  -nystatin as written  - nystatin (MYCOSTATIN) 100,000 unit/mL suspension; Take 5 mL by mouth four (4) times daily for 10 days. swish and spit  Dispense: 200 mL; Refill: 0     Follow-up Disposition: As needed  I have discussed the diagnosis with the patient and the intended plan as seen in the above orders. The patient has received an after-visit summary and questions were answered concerning future plans.      Medication Side Effects and Warnings were discussed with patient: yes  Patient Labs were reviewed: yes  Patient Past Records were reviewed:  yes    Carmita Nunez M.D.

## 2018-07-03 NOTE — MR AVS SNAPSHOT
[address] [address] 
[phone] Patient: Oleg Krishnan MRN: [id number] GI:2[birth year] Visit Information Date & Time Provider Department Dept. Phone Encounter #  
 7/3/2018  7:30 AM Reginaldo Mackenzie MD 5[address] [facility] [phone] [id number] Upcoming Health Maintenance Date Due  
 MEDICARE YEARLY EXAM 6/9/2018 Influenza Age 5 to Adult 8/1/2018 COLONOSCOPY 11/28/2019 BREAST CANCER SCRN MAMMOGRAM 2/27/2020 DTaP/Tdap/Td series (2 - Td) 9/26/2027 Allergies as of 7/3/2018  Review Complete On: 7/3/2018 By: Reginaldo Mackenzie MD  
  
 Severity Noted Reaction Type Reactions Spiriva Respimat [Tiotropium Bromide] High 01/08/2015   Systemic Anaphylaxis, Other (comments) Adverse effects; Per RN - pt states that Spiriva caused throat swelling and could not breathe well. Celebrex [Celecoxib]  01/08/2015   Intolerance Rash Tomato  01/08/2015   Intolerance Rash Current Immunizations  Reviewed on 12/1/2017 Name Date Influenza Vaccine (Quad) PF 9/26/2017, 10/21/2016, 11/13/2015 Pneumococcal Conjugate (PCV-13) 2/18/2016 Pneumococcal Polysaccharide (PPSV-23) 2/21/2017 Not reviewed this visit You Were Diagnosed With   
  
 Codes Comments Encounter for monitoring coumadin therapy    -  Primary ICD-10-CM: Z51.81, Z79.01 
ICD-9-CM: V58.83, V58.61 Thrush     ICD-10-CM: B37.0 ICD-9-CM: 112.0 Vitals BP Pulse Temp Resp Height(growth percentile) Weight(growth percentile) 157/71 (BP 1 Location: Left arm, BP Patient Position: Sitting) 87 98.5 °F (36.9 °C) (Oral) 16 5' 5\" (1.651 m) 134 lb (60.8 kg) SpO2 BMI OB Status Smoking Status 98% 22.3 kg/m2 Hysterectomy Current Every Day Smoker Vitals History BMI and BSA Data Body Mass Index Body Surface Area  
 22.3 kg/m 2 1.67 m 2 Preferred Pharmacy Pharmacy Name Phone [address] [place] [phone] Your Updated Medication List  
  
   
This list is accurate as of 7/3/18  8:24 AM.  Always use your most recent med list.  
  
  
  
  
 * albuterol 2.5 mg /3 mL (0.083 %) nebulizer solution Commonly known as:  PROVENTIL VENTOLIN  
3 mL by Nebulization route every four (4) hours as needed for Wheezing or Shortness of Breath. * albuterol 90 mcg/actuation inhaler Commonly known as:  VENTOLIN HFA Take 2 Puffs by inhalation every four (4) hours as needed for Wheezing or Shortness of Breath. budesonide-formoterol 160-4.5 mcg/actuation Hfaa Commonly known as:  SYMBICORT  
INHALE 2 PUFFS TWICE DAILY  
  
 bumetanide 1 mg tablet Commonly known as:  Delfino Stan TAKE 1 TABLET EVERY DAY  
  
 buPROPion  mg SR tablet Commonly known as:  WELLBUTRIN SR  
1 tab PO qday x 3 days then 1 tab PO bid for smoking cessation  
  
 dilTIAZem 60 mg tablet Commonly known as:  CARDIZEM  
TAKE 1 TABLET BEFORE BREAKFAST, LUNCH, DINNER AND AT BEDTIME  
  
 docusate sodium 100 mg capsule Commonly known as:  Yocasta Escobar Take 100 mg by mouth two (2) times daily as needed for Constipation. ferrous sulfate 142 mg (45 mg iron) ER tablet Commonly known as:  SLOW FE Take 1 Tab by mouth two (2) times daily (with meals). lovastatin 10 mg tablet Commonly known as:  MEVACOR  
TAKE 1 TABLET EVERY NIGHT  
  
 nystatin 100,000 unit/mL suspension Commonly known as:  MYCOSTATIN Take 5 mL by mouth four (4) times daily for 10 days. swish and spit  
  
 oxyCODONE-acetaminophen 5-325 mg per tablet Commonly known as:  PERCOCET Take 1 Tab by mouth every six (6) hours as needed for Pain. Max Daily Amount: 4 Tabs. potassium chloride 10 mEq tablet Commonly known as:  KLOR-CON Take 1 Tab by mouth every Monday, Wednesday, Friday. * predniSONE 10 mg tablet Commonly known as:  Ulysses Lever  
 Please take 60 mg daily for four days, 50 mg for one day, 40 mg for one day, 30 mg for one day, 20 mg for one day, 10 mg for one day. * predniSONE 10 mg tablet Commonly known as:  Karan Sprawls Take 1 Tab by mouth daily. umeclidinium 62.5 mcg/actuation inhaler Commonly known as:  INCRUSE ELLIPTA Take 1 Puff by inhalation daily. Indications: BRONCHOSPASM PREVENTION WITH COPD  
  
 varenicline 0.5 mg (11)- 1 mg (42) Dspk Commonly known as:  CHANTIX STARTER ROSSY Take as directed  
  
 warfarin 2 mg tablet Commonly known as:  COUMADIN Monday Tuesday Wednesday take 3mg and take 2mg Thursday Friday Saturday Sunday * Notice: This list has 4 medication(s) that are the same as other medications prescribed for you. Read the directions carefully, and ask your doctor or other care provider to review them with you. Prescriptions Sent to Pharmacy Refills  
 nystatin (MYCOSTATIN) 100,000 unit/mL suspension 0 Sig: Take 5 mL by mouth four (4) times daily for 10 days. swish and spit Class: Normal  
 Pharmacy: [address], [address] [facility] #: [phone] Route: Oral  
  
We Performed the Following AMB POC PT/INR [52820 CPT(R)] Introducing [facility] SERVICES! Dear Simi Null: 
Thank you for requesting a [employer] account. Our records indicate that you already have an active [employer] account. You can access your account anytime at [email or web address] Did you know that you can access your hospital and ER discharge instructions at any time in [employer]? You can also review all of your test results from your hospital stay or ER visit. Additional Information If you have questions, please visit the Frequently Asked Questions section of the [employer] website at [email or web address] Remember, [employer] is NOT to be used for urgent needs. For medical emergencies, dial 911. Now available from your iPhone and Android! Please provide this summary of care documentation to your next provider. Your primary care clinician is listed as Angel Ford. If you have any questions after today's visit, please call [phone].

## 2018-07-17 NOTE — MR AVS SNAPSHOT
[address] [address] 
[phone] Patient: Leonila Swan MRN: [id number] ZDM:9/33/[birth year] Visit Information Date & Time Provider Department Dept. Phone Encounter #  
 7/17/2018  7:15 AM Kenneth Craft MD [address] [facility] [phone] [id number] Upcoming Health Maintenance Date Due  
 MEDICARE YEARLY EXAM 6/9/2018 Influenza Age 5 to Adult 8/1/2018 COLONOSCOPY 11/28/2019 BREAST CANCER SCRN MAMMOGRAM 2/27/2020 DTaP/Tdap/Td series (2 - Td) 9/26/2027 Allergies as of 7/17/2018  Review Complete On: 7/17/2018 By: Kenneth Craft MD  
  
 Severity Noted Reaction Type Reactions Spiriva Respimat [Tiotropium Bromide] High 01/08/2015   Systemic Anaphylaxis, Other (comments) Adverse effects; Per RN - pt states that Spiriva caused throat swelling and could not breathe well. Celebrex [Celecoxib]  01/08/2015   Intolerance Rash Tomato  01/08/2015   Intolerance Rash Current Immunizations  Reviewed on 12/1/2017 Name Date Influenza Vaccine (Quad) PF 9/26/2017, 10/21/2016, 11/13/2015 Pneumococcal Conjugate (PCV-13) 2/18/2016 Pneumococcal Polysaccharide (PPSV-23) 2/21/2017 Not reviewed this visit You Were Diagnosed With   
  
 Codes Comments Chronic atrial fibrillation (HCC)    -  Primary ICD-10-CM: U72.4 ICD-9-CM: 427.31 Pulmonary emphysema, unspecified emphysema type ([facility]     ICD-10-CM: J43.9 ICD-9-CM: 492.8 Chronic midline low back pain without sciatica     ICD-10-CM: M54.5, G89.29 ICD-9-CM: 724.2, 338.29 Lumbar compression fracture, sequela     ICD-10-CM: S32.000S ICD-9-CM: 905.1 Vitals BP Pulse Temp Resp Height(growth percentile) Weight(growth percentile) 139/74 (BP 1 Location: Right arm, BP Patient Position: Sitting) 93 97.8 °F (36.6 °C) (Oral) 18 5' 5\" (1.651 m) 133 lb (60.3 kg) SpO2 BMI OB Status Smoking Status 99% 22.13 kg/m2 Hysterectomy Current Every Day Smoker Vitals History BMI and BSA Data Body Mass Index Body Surface Area  
 22.13 kg/m 2 1.66 m 2 Preferred Pharmacy Pharmacy Name Phone [address], [place] [phone] Your Updated Medication List  
  
   
This list is accurate as of 7/17/18  7:44 AM.  Always use your most recent med list.  
  
  
  
  
 * albuterol 2.5 mg /3 mL (0.083 %) nebulizer solution Commonly known as:  PROVENTIL VENTOLIN  
3 mL by Nebulization route every four (4) hours as needed for Wheezing or Shortness of Breath. * albuterol 90 mcg/actuation inhaler Commonly known as:  VENTOLIN HFA Take 2 Puffs by inhalation every four (4) hours as needed for Wheezing or Shortness of Breath. budesonide-formoterol 160-4.5 mcg/actuation Hfaa Commonly known as:  SYMBICORT  
INHALE 2 PUFFS TWICE DAILY  
  
 bumetanide 1 mg tablet Commonly known as:  Darby Rivas TAKE 1 TABLET EVERY DAY  
  
 buPROPion  mg SR tablet Commonly known as:  WELLBUTRIN SR  
1 tab PO qday x 3 days then 1 tab PO bid for smoking cessation  
  
 dilTIAZem 60 mg tablet Commonly known as:  CARDIZEM  
TAKE 1 TABLET BEFORE BREAKFAST, LUNCH, DINNER AND AT BEDTIME  
  
 docusate sodium 100 mg capsule Commonly known as:  Jose Pata Take 100 mg by mouth two (2) times daily as needed for Constipation. ferrous sulfate 142 mg (45 mg iron) ER tablet Commonly known as:  SLOW FE Take 1 Tab by mouth two (2) times daily (with meals). lovastatin 10 mg tablet Commonly known as:  MEVACOR  
TAKE 1 TABLET EVERY NIGHT  
  
 oxyCODONE-acetaminophen 5-325 mg per tablet Commonly known as:  PERCOCET Take 1 Tab by mouth every six (6) hours as needed for Pain. Max Daily Amount: 4 Tabs. Start taking on:  7/19/2018 potassium chloride 10 mEq tablet Commonly known as:  KLOR-CON  
 Take 1 Tab by mouth every Monday, Wednesday, Friday. predniSONE 10 mg tablet Commonly known as:  Babin Fossa Take 1 Tab by mouth daily. umeclidinium 62.5 mcg/actuation inhaler Commonly known as:  INCRUSE ELLIPTA Take 1 Puff by inhalation daily. Indications: BRONCHOSPASM PREVENTION WITH COPD  
  
 varenicline 0.5 mg (11)- 1 mg (42) Dspk Commonly known as:  CHANTIX STARTER ROSSY Take as directed  
  
 warfarin 2 mg tablet Commonly known as:  COUMADIN Monday Tuesday Wednesday take 3mg and take 2mg Thursday Friday Saturday Sunday * Notice: This list has 2 medication(s) that are the same as other medications prescribed for you. Read the directions carefully, and ask your doctor or other care provider to review them with you. Prescriptions Printed Refills  
 oxyCODONE-acetaminophen (PERCOCET) 5-325 mg per tablet 0 Starting on: 7/19/2018 Sig: Take 1 Tab by mouth every six (6) hours as needed for Pain. Max Daily Amount: 4 Tabs. Class: Print Route: Oral  
  
Prescriptions Sent to Pharmacy Refills  
 predniSONE (DELTASONE) 10 mg tablet 3 Sig: Take 1 Tab by mouth daily. Class: Normal  
 Pharmacy: [address] [place] [address] [address] [place] [address] #: [phone] Route: Oral  
  
We Performed the Following AMB POC PT/INR [09561 CPT(R)] Introducing [facility] SERVICES! Dear Naseem Kiser: 
Thank you for requesting a [employer] account. Our records indicate that you already have an active [employer] account. You can access your account anytime at [email or web address] Did you know that you can access your hospital and ER discharge instructions at any time in [employer]? You can also review all of your test results from your hospital stay or ER visit. Additional Information If you have questions, please visit the Frequently Asked Questions section of the [employer] website at [email or web address] Remember, MyChart is NOT to be used for urgent needs. For medical emergencies, dial 911. Now available from your iPhone and Android! Please provide this summary of care documentation to your next provider. Your primary care clinician is listed as Dannielle Cooks. If you have any questions after today's visit, please call [phone].

## 2018-07-17 NOTE — PROGRESS NOTES
Pt here to follow up on INR. Currently taking 2 mg of Coumadin daily. Pt requested a refill on pain medication and prednisone. Lab Results   Component Value Date/Time    INR POC 2.8 07/17/2018 07:13 AM    INR POC 5.1 07/03/2018 07:58 AM    INR POC 3.6 06/21/2018 07:31 AM     Florinda Barry RTC today to follow up on chronic pain diagnosis. We discussed her arthralgias and myalgias that is affecting her back. Significant changes since last visit: none. She is  able to do her normal daily activities. She reports the following adverse side effects: none. Least pain over the last week has been 5/10. Worst pain over the last week has been 10/10. Opioid Risk Tool Reviewed: YES    Aberrant behaviors: None. Urine Drug Screen: reviewed and up to date. Controlled substance agreement on file: YES.  reviewed:yes    Pill count is consistent with her prescription: pt is out of medication    Concomitant use of a benzodiazepine: no    Subjective: (As above and below)     Chief Complaint   Patient presents with    Coagulation disorder     she is a 66y.o. year old female who presents for evaluation. Reviewed PmHx, RxHx, FmHx, SocHx, AllgHx and updated in chart. Review of Systems - negative except as listed above    Objective:     Vitals:    07/17/18 0708   BP: 139/74   Pulse: 93   Resp: 18   Temp: 97.8 °F (36.6 °C)   TempSrc: Oral   SpO2: 99%   Weight: 133 lb (60.3 kg)   Height: 5' 5\" (1.651 m)     Physical Examination: General appearance - alert, well appearing, and in no distress  Mental status - normal mood, behavior, speech, dress, motor activity, and thought processes  Mouth - mucous membranes moist, pharynx normal without lesions  Chest - wheezing noted diffusely, dry hacking cough  Heart - irregularly irregular   Back exam - limited range of motion, pain with motion noted during exam    Assessment/ Plan:   1.  Chronic atrial fibrillation (HCC)  -continue on current dosing  - AMB POC PT/INR    2. Pulmonary emphysema, unspecified emphysema type ([address]  -continue prednisone and other medications    3. Chronic midline low back pain without sciatica  -This is a chronic problem that is not changed. Per review of available records and patients , there are not sign of overuse, misuse, diversion, or concerning side effects. Today we reviewed: the risk of overdose, addiction, and dependency proper storage and disposal of medications the goals of treatment (improve functionality, quality of life, and pain) alternative treatment options including non-narcotic modalities the risks and benefits of continuing with a narcotic based pain regimen  The following changes were made to the patients current treatment plan: nothing, medications refilled. 4. Lumbar compression fracture, sequela  -refilled medication today     Follow-up Disposition: One month  I have discussed the diagnosis with the patient and the intended plan as seen in the above orders. The patient has received an after-visit summary and questions were answered concerning future plans.      Medication Side Effects and Warnings were discussed with patient: yes  Patient Labs were reviewed: yes  Patient Past Records were reviewed:  yes    Michael Prabhakar M.D.

## 2018-07-26 PROBLEM — B37.0 THRUSH OF MOUTH AND ESOPHAGUS (HCC): Status: ACTIVE | Noted: 2018-01-01

## 2018-07-26 PROBLEM — B37.81 THRUSH OF MOUTH AND ESOPHAGUS (HCC): Status: ACTIVE | Noted: 2018-01-01

## 2018-07-26 PROBLEM — E87.6 HYPOKALEMIA: Status: ACTIVE | Noted: 2018-01-01

## 2018-07-26 NOTE — PROGRESS NOTES
Problem: Mobility Impaired (Adult and Pediatric)  Goal: *Acute Goals and Plan of Care (Insert Text)  Physical Therapy Goals  Initiated 7/26/2018  1. Patient will move from supine to sit and sit to supine  in bed with independence within 7 day(s). 2.  Patient will transfer from bed to chair and chair to bed with independence using the least restrictive device within 7 day(s). 3.  Patient will perform sit to stand with independence within 7 day(s). 4.  Patient will ambulate with independence for 150 feet with the least restrictive device within 7 day(s). 5.  Patient will ascend/descend 4 stairs with 1 handrail(s) with supervision/set-up within 7 day(s). physical Therapy EVALUATION  Patient: Tisha Jaramillo (69 y.o. female)  Date: 7/26/2018  Primary Diagnosis: Hypokalemia        Precautions:   Fall    ASSESSMENT :  Based on the objective data described below, the patient presents with decreased functional ambulation, SOB, impulsivity, decreased safety awareness,  and high INR (5.1) following admission for hypokalemia. Patient presented to ER with SOB and BLE edema. Dopplers negative for DVT. Patient refused CT scan but xray to chest negative for acute process; showed moderate compression deformity to T6 vertebral space. Patient with extensive medical history including COPD and per chart, still smokes 1 ppd. Patient has had home O2 in past but not for last few years because of cost. Today patient was received in bed on 2 liters of O2.  present. Patient reported 5/10 back pain but states that it is nothing new. Patient was able to stand and ambulate in room 10 feet with CGA. She declined assistive device and refused to have chair alarm placed. Patient impulsive moving before PT could organize lines. Ambulation kept to a minimum this date due to high INR. Patient appeared SOB with activity but O2 sats stable at 98-99% on 2 liters.  Though patient is on fall precautions, she tested well on Tinetti test for balance (25/28) and is at low risk. However PT encouraged her to call for assistance when wanting to get up. Patient stated, \"I will get up if I want to get up\". Notified nursing. Anticipate one more session with patient to ambulate further distance and monitor O2 sats and encourage safety. She was agreeable to that plan . Patient will benefit from skilled intervention to address the above impairments. Patients rehabilitation potential is considered to be Good  Factors which may influence rehabilitation potential include:   []         None noted  []         Mental ability/status  [x]         Medical condition  []         Home/family situation and support systems  [x]         Safety awareness  []         Pain tolerance/management  []         Other:      PLAN :  Recommendations and Planned Interventions:  [x]           Bed Mobility Training             []    Neuromuscular Re-Education  [x]           Transfer Training                   []    Orthotic/Prosthetic Training  [x]           Gait Training                         []    Modalities  []           Therapeutic Exercises           []    Edema Management/Control  []           Therapeutic Activities            []    Patient and Family Training/Education  []           Other (comment):    Frequency/Duration: Patient will be followed by physical therapy  5 times a week to address goals. Discharge Recommendations: None likely  Further Equipment Recommendations for Discharge: to be determined; home O2? SUBJECTIVE:   Patient stated What do I need physical therapy for? 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    Tissue MVR 1989 following failed balloon valvuloplasty for MS Redo MVR 2004 due to severe MR, AVR due to AR ([facility])     S/P AVR (aortic valve replacement) 1/8/2015    S/P MVR (mitral valve replacement) 1/8/2015     Past Surgical History:   Procedure Laterality Date    COLONOSCOPY N/A 11/28/2016    [facility] ENDOSCOPY    HX COLECTOMY      HX HEART CATHETERIZATION      cabg    HX HYSTERECTOMY      BSO    HX MITRAL VALVE REPLACEMENT      and redo MVR and AVR     Prior Level of Function/Home Situation: independent  Personal factors and/or comorbidities impacting plan of care: medical history,     Home Situation  Home Environment: Private residence  # Steps to Enter: 6  Rails to Enter: Yes  One/Two Story Residence: Split level  # of Interior Steps: 8  Living Alone: No  Support Systems: Spouse/Significant Other/Partner, Child(lauryn)  Patient Expects to be Discharged to[de-identified] Private residence  Current DME Used/Available at Home: None    EXAMINATION/PRESENTATION/DECISION MAKING:   Critical Behavior:  Neurologic State: Alert  Orientation Level: Oriented X4  Cognition: Appropriate for age attention/concentration, Appropriate decision making, Appropriate safety awareness  Safety/Judgement: Decreased awareness of need for safety  Hearing: Auditory  Auditory Impairment: None  Skin:  Not fully observed  Edema: left foot edematous  Range Of Motion:  AROM: Within functional limits                       Strength:    Strength:  Within functional limits                    Tone & Sensation:   Tone: Normal              Sensation: Intact               Coordination:  Coordination: Within functional limits       Functional Mobility:  Bed Mobility:     Supine to Sit: Modified independent        Transfers:  Sit to Stand: Contact guard assistance  Stand to Sit: Contact guard assistance        Bed to Chair: Minimum assistance              Balance: Sitting: Intact  Standing: Intact  Ambulation/Gait Training:  Distance (ft): 10 Feet (ft)  Assistive Device: Gait belt (supplemental O2 on 2 liters)  Ambulation - Level of Assistance: Contact guard assistance                                                             Therapeutic Exercises: Ankle pumps    Functional Measure:  Tinetti test:    Sitting Balance: 1  Arises: 1  Attempts to Rise: 2  Immediate Standing Balance: 2  Standing Balance: 2  Nudged: 2  Eyes Closed: 1  Turn 360 Degrees - Continuous/Discontinuous: 1  Turn 360 Degrees - Steady/Unsteady: 1  Sitting Down: 1  Balance Score: 14  Indication of Gait: 1  R Step Length/Height: 1  L Step Length/Height: 1  R Foot Clearance: 1  L Foot Clearance: 1  Step Symmetry: 1  Step Continuity: 1  Path: 1  Trunk: 2  Walking Time: 1  Gait Score: 11  Total Score: 25       Tinetti Test and G-code impairment scale:  Percentage of Impairment CH    0%   CI    1-19% CJ    20-39% CK    40-59% CL    60-79% CM    80-99% CN     100%   Tinetti  Score 0-28 28 23-27 17-22 12-16 6-11 1-5 0       Tinetti Tool Score Risk of Falls  <19 = High Fall Risk  19-24 = Moderate Fall Risk  25-28 = Low Fall Risk  Tinetti ME. Performance-Oriented Assessment of Mobility Problems in Elderly Patients. [address]; [id number]. (Scoring Description: PT Bulletin Feb. 10, 1993)    Older adults: Bela Mark et al, 2009; n = 1000 [facility] elderly evaluated with ABC, STEVE, ADL, and IADL)  · Mean STEVE score for males aged 69-68 years = 26.21(3.40)  · Mean STEVE score for females age 69-68 years = 25.16(4.30)  · Mean STEVE score for males over 80 years = 23.29(6.02)  · Mean STEVE score for females over 80 years = 17.20(8.32)       G codes: In compliance with CMSs Claims Based Outcome Reporting, the following G-code set was chosen for this patient based on their primary functional limitation being treated:     The outcome measure chosen to determine the severity of the functional limitation was the Tinetti with a score of 25/28 which was correlated with the impairment scale. ? Mobility - Walking and Moving Around:     - CURRENT STATUS: CI - 1%-19% impaired, limited or restricted    - GOAL STATUS: CH - 0% impaired, limited or restricted    - D/C STATUS:  ---------------To be determined---------------      Physical Therapy Evaluation Charge Determination   History Examination Presentation Decision-Making   HIGH Complexity :3+ comorbidities / personal factors will impact the outcome/ POC  LOW Complexity : 1-2 Standardized tests and measures addressing body structure, function, activity limitation and / or participation in recreation  LOW Complexity : Stable, uncomplicated  Other outcome measures Tinetti  LOW       Based on the above components, the patient evaluation is determined to be of the following complexity level: LOW     Pain:  Pain Scale 1: Numeric (0 - 10)  Pain Intensity 1: 5  Pain Location 1: back           Activity Tolerance:   SOB but O2 sats stable. Please refer to the flowsheet for vital signs taken during this treatment. After treatment:   [x]         Patient left in no apparent distress sitting up in chair  []         Patient left in no apparent distress in bed  [x]         Call bell left within reach  [x]         Nursing notified  [x]         Caregiver present  []         Bed alarm activated    COMMUNICATION/EDUCATION:   The patients plan of care was discussed with: Registered Nurse. [x]         Fall prevention education was provided and the patient/caregiver indicated understanding. []         Patient/family have participated as able in goal setting and plan of care. [x]         Patient/family agree to work toward stated goals and plan of care. []         Patient understands intent and goals of therapy, but is neutral about his/her participation. []         Patient is unable to participate in goal setting and plan of care.     Thank you for this referral.  Aurora Kilpatrick, PT   Time Calculation: 27 mins

## 2018-07-26 NOTE — H&P
[address] [address] Office [phone] Fax [phone] Admission H&P Name: Darron Hernandez MRN: [id number]  Sex: Female YOB: 1955  Age: 66 y.o. PCP: [address] [facility], DO Source of Information: patient, medical records Chief complaint: LLE swelling and SOB History of Present Illness Darron Hernandez is a 66 y.o. female with known PMH of Atrial Fibrillation, CHF, COPD, mechanical valve replacement (s/p AVR and MVR) on Coumadin, Dyslipidemia, Migraine  who presents to the ER complaining of L leg swelling. Patient stated that about 2 days ago she started noticing LLE swelling (without pain) and also started feeling short of breath. She states she usually has such symptoms due to her CHF. She complains of not being able to sleep flat for many years now. She increased her albuterol inhaler use to 5 times a day over the past few days with not much relief and has been compliant with her diuretic medicine nightly. She also states that she has had a non productive cough for 2 weeks now. She complains of thrush in her mouth which has caused her to have a sore throat with trouble swallowing. She has been using Nystatin at home and has had some symptom improvement. Of note, pt has a hx of COPD and CHF and has been on home oxygen 2 years ago but has not been able to afford it in the past few years. In the ED: - Vitals - T 98.4F, , /67, RR 24, O2-95% on 2 L/min - Labs - Remarkable for: K- 2.3, WBC- 15.5, , INR 5.1  
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 
- Treatment - Received 40 mEq K Klor Con + potassium chloride 10 mEq IV, Albuterol nebulizer 2.5 mg treatment EKG: Atrial Fibrillation Past Medical History:  
Diagnosis Date  A-fib ([facility] [address]  Anticoagulant long-term use  CAD (coronary artery disease), native coronary artery   
 mild to moderate by cath  CHF (congestive heart failure) ([facility] [address]  Chronic obstructive pulmonary disease ([facility] [address]  Dyslipidemia  Ill-defined condition   
 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 
 Temp Pulse Resp BP SpO2  
07/26/18 1245 - (!) 106 23 130/69 99 % 07/26/18 1218 - (!) 102 21 152/73 99 % 07/26/18 1142 - 96 20 127/67 98 %  
07/26/18 1115 - 93 26 161/65 99 % 07/26/18 1100 - 98 20 144/57 98 %  
07/26/18 1030 - (!) 102 20 150/61 95 %  
07/26/18 0918 98.4 °F (36.9 °C) (!) 105 24 146/67 98 % Home Medications Prior to Admission medications Medication Sig Start Date End Date Taking? Authorizing Provider  
predniSONE (DELTASONE) 10 mg tablet Take 1 Tab by mouth daily. 7/17/18   Trisha Travis MD  
oxyCODONE-acetaminophen (PERCOCET) 5-325 mg per tablet Take 1 Tab by mouth every six (6) hours as needed for Pain. Max Daily Amount: 4 Tabs. 7/19/18   Trisha Travis MD  
nystatin (MYCOSTATIN) 100,000 unit/mL suspension Take 5 mL by mouth four (4) times daily for 10 days. swish and spit 7/17/18 7/27/18  Trisha Travis MD  
warfarin (COUMADIN) 2 mg tablet Monday Tuesday Wednesday take 3mg and take 2mg Thursday Friday Saturday Sunday 6/21/18   Felicia Rodriguezb, DO  
budesonide-formoterol (SYMBICORT) 160-4.5 mcg/actuation HFAA INHALE 2 PUFFS TWICE DAILY 6/19/18   Dian FONSECA Lobb, DO  
albuterol (VENTOLIN HFA) 90 mcg/actuation inhaler Take 2 Puffs by inhalation every four (4) hours as needed for Wheezing or Shortness of Breath.  3/16/18   Dian French,   
lovastatin (MEVACOR) 10 mg tablet TAKE 1 TABLET EVERY NIGHT 2/13/18   Dian French DO  
bumetanide (BUMEX) 1 mg tablet TAKE 1 TABLET EVERY DAY 12/18/17   Felicia Vincent MARIAN French, DO  
umeclidinium (INCRUSE ELLIPTA) 62.5 mcg/actuation inhaler Take 1 Puff by inhalation daily. Indications: BRONCHOSPASM PREVENTION WITH COPD 12/3/17   Ramakrishna Voss MD  
buPROPion SR [facility] SR) 150 mg SR tablet 1 tab PO qday x 3 days then 1 tab PO bid for smoking cessation 9/14/17   Dian French, DO  
dilTIAZem (CARDIZEM) 60 mg tablet TAKE 1 TABLET BEFORE BREAKFAST, LUNCH, DINNER AND AT BEDTIME 8/22/17   Idelle Felty Lobb, DO  
varenicline (CHANTIX STARTER ROSSY) 0.5 mg (11)- 1 mg (42) DsPk Take as directed 8/22/17   Idelle Felty Lobb, DO  
potassium chloride (KLOR-CON) 10 mEq tablet Take 1 Tab by mouth every Monday, Wednesday, Friday. 7/12/17   Alonso Yung MD  
ferrous sulfate (SLOW FE) 142 mg (45 mg iron) ER tablet Take 1 Tab by mouth two (2) times daily (with meals). 12/2/16   Dian French DO  
docusate sodium (COLACE) 100 mg capsule Take 100 mg by mouth two (2) times daily as needed for Constipation. Historical Provider  
albuterol (PROVENTIL VENTOLIN) 2.5 mg /3 mL (0.083 %) nebulizer solution 3 mL by Nebulization route every four (4) hours as needed for Wheezing or Shortness of Breath. 4/8/16   Dee Junior MD  
 
 
Allergies Allergies Allergen Reactions  Spiriva Respimat [Tiotropium Bromide] Anaphylaxis and Other (comments) Adverse effects; Per RN - pt states that Spiriva caused throat swelling and could not breathe well.  Celebrex [Celecoxib] Rash  Tomato Rash Past Medical History:  
Diagnosis Date  A-fib ([facility] [address]  Anticoagulant long-term use  CAD (coronary artery disease), native coronary artery   
 mild to moderate by cath  CHF (congestive heart failure) ([facility] [address]  Chronic obstructive pulmonary disease ([facility] [address]  Dyslipidemia  Ill-defined condition   
 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  
Procedure Laterality Date  COLONOSCOPY N/A 11/28/2016 COLONOSCOPY performed by Maris Carlisle MD at [facility]  HX HEART CATHETERIZATION    
 cabg  HX HYSTERECTOMY    
 BSO  
 HX MITRAL VALVE REPLACEMENT    
 and redo MVR and AVR Family History Problem Relation Age of Onset  Cancer Brother Social History Social History Social History  Marital status:  Spouse name: N/A  
 Number of children: N/A  
 Years of education: N/A Occupational History  Not on file. Social History Main Topics  Smoking status: Current Every Day Smoker Packs/day: 0.50 Types: Cigarettes Last attempt to quit: 11/1/2015  Smokeless tobacco: Never Used Comment: 11/1/2014  Alcohol use No  
 Drug use: No  
 Sexual activity: Not on file Other Topics Concern  Not on file Social History Narrative Alcohol history: None Smoking history: 1/2 pack per day, 50 years Illicit drug history: Not at all Living arrangement: patient lives with their spouse. Ambulates: Independently Review of Systems Review of Systems Constitutional: Negative for activity change, appetite change, chills and unexpected weight change. HENT: Positive for sore throat. Respiratory: Positive for cough, shortness of breath and wheezing. Cardiovascular: Positive for palpitations. Negative for chest pain. Gastrointestinal: Negative for abdominal distention, abdominal pain, constipation, diarrhea and nausea. Genitourinary: Negative for difficulty urinating, dysuria and frequency. Musculoskeletal: Positive for back pain. Neurological: Negative for dizziness, syncope, numbness and headaches. Physical Exam 
Visit Vitals  /69  Pulse (!) 106  Temp 98.4 °F (36.9 °C)  Resp 23  
 Ht 5' 4\" (1.626 m)  Wt 130 lb (59 kg)  SpO2 99%  BMI 22.31 kg/m2 General: No acute distress. Alert. Cooperative. On NC 2L oxygen Head: Normocephalic. Atraumatic. Throat: Mucosa pink. Moist mucous membranes. White plaque on tongue Neck: Supple. Normal ROM. No stiffness. Respiratory: Mild scattered expiratory wheezing throughout Cardiovascular: Irregularly Irregular. No m/r/g. Pulses 2+ throughout. GI: + bowel sounds. Nontender. No rebound tenderness or guarding. Nondistended Extremities: LLE anterior tibia 2+ pitting edema with increased edema distally towards the ankle with greatest edema on dorsal surface of foot at 3+ , no warmth or erythema compared to the other leg, no signs of cellulitis Musculoskeletal: Full ROM in all extremities. Distal pulses intact. Skin: Warm, dry. No rashes. Neuro: CN II-XII grossly intact. Strength 5/5 in all extremities. Laboratory Data Recent Results (from the past 8 hour(s)) METABOLIC PANEL, COMPREHENSIVE Collection Time: 07/26/18 10:20 AM  
Result Value Ref Range Sodium 137 136 - 145 mmol/L Potassium 2.3 (LL) 3.5 - 5.1 mmol/L Chloride 95 (L) 97 - 108 mmol/L  
 CO2 40 (H) 21 - 32 mmol/L Anion gap 2 (L) 5 - 15 mmol/L Glucose 154 (H) 65 - 100 mg/dL BUN 15 6 - 20 MG/DL Creatinine 0.84 0.55 - 1.02 MG/DL  
 BUN/Creatinine ratio 18 12 - 20 GFR est AA >60 >60 ml/min/1.73m2 GFR est non-AA >60 >60 ml/min/1.73m2 Calcium 8.3 (L) 8.5 - 10.1 MG/DL Bilirubin, total 0.5 0.2 - 1.0 MG/DL  
 ALT (SGPT) 68 12 - 78 U/L  
 AST (SGOT) 43 (H) 15 - 37 U/L Alk. phosphatase 104 45 - 117 U/L Protein, total 6.0 (L) 6.4 - 8.2 g/dL Albumin 3.0 (L) 3.5 - 5.0 g/dL Globulin 3.0 2.0 - 4.0 g/dL A-G Ratio 1.0 (L) 1.1 - 2.2 NT-PRO BNP Collection Time: 07/26/18 10:20 AM  
Result Value Ref Range NT pro- (H) 0 - 125 PG/ML  
PROTHROMBIN TIME + INR  Collection Time: 07/26/18 10:20 AM  
Result Value Ref Range INR 5.1 (HH) 0.9 - 1.1 Prothrombin time 51.6 (H) 9.0 - 11.1 sec CBC WITH AUTOMATED DIFF Collection Time: 07/26/18 10:20 AM  
Result Value Ref Range WBC 15.5 (H) 3.6 - 11.0 K/uL  
 RBC 4.01 3.80 - 5.20 M/uL  
 HGB 11.7 11.5 - 16.0 g/dL HCT 35.2 35.0 - 47.0 % MCV 87.8 80.0 - 99.0 FL  
 MCH 29.2 26.0 - 34.0 PG  
 MCHC 33.2 30.0 - 36.5 g/dL RDW 20.6 (H) 11.5 - 14.5 % PLATELET 944 641 - 853 K/uL MPV 9.7 8.9 - 12.9 FL  
 NRBC 0.1 (H) 0  WBC ABSOLUTE NRBC 0.02 (H) 0.00 - 0.01 K/uL NEUTROPHILS 83 (H) 32 - 75 % BAND NEUTROPHILS 3 0 - 6 % LYMPHOCYTES 5 (L) 12 - 49 % MONOCYTES 9 5 - 13 % EOSINOPHILS 0 0 - 7 % BASOPHILS 0 0 - 1 % IMMATURE GRANULOCYTES 0 %  
 ABS. NEUTROPHILS 13.3 (H) 1.8 - 8.0 K/UL  
 ABS. LYMPHOCYTES 0.8 0.8 - 3.5 K/UL  
 ABS. MONOCYTES 1.4 (H) 0.0 - 1.0 K/UL  
 ABS. EOSINOPHILS 0.0 0.0 - 0.4 K/UL  
 ABS. BASOPHILS 0.0 0.0 - 0.1 K/UL  
 ABS. IMM. GRANS. 0.0 K/UL  
 DF MANUAL    
 RBC COMMENTS MACROCYTOSIS 
PRESENT 
    
MAGNESIUM Collection Time: 07/26/18 10:20 AM  
Result Value Ref Range Magnesium 1.6 1.6 - 2.4 mg/dL STREP AG SCREEN, GROUP A Collection Time: 07/26/18 10:33 AM  
Result Value Ref Range Group A Strep Ag ID NEGATIVE  NEG Imaging CXR Results  (Last 48 hours) 07/26/18 1006  XR CHEST PA LAT Final result Impression:  IMPRESSION:  
1. The lungs are clear acute process. 2. Probable pulmonary artery hypertension 3. Moderate compression deformity of the T6 vertebral body has increased in the  
interval  
  
 Narrative:  Exam:  2 view chest  
   
Indication: Shortness of breath, leg swelling. COMPARISON: 6/21/2018 and 3/6/2018 PA and lateral views demonstrate normal heart size. Patient status post median  
sternotomy and aortic and mitral valve replacement. There is prominence to central pulmonary arteries suggesting pulmonary artery  
hypertension.   
   
There is no pneumonia or pulmonary edema. Pleural thickening at the left lung  
base is unchanged. Visualized osseous structures reveal a moderate compression  
deformity of the T6 vertebral body which has increased in the interval.  
   
  
  
 
CT Results  (Last 48 hours) None Assessment and Plan Melquiades Ellington is a 66 y.o. female with a PMH of Atrial Fibrillation, CHF, COPD, mechanical valve replacement (s/p AVR and MVR) on Coumadin, Dyslipidemia, Migraine who is admitted for LLE swelling and SOB 
 
SOB: CHF vs COPD Exacerbation (Pt does not follow a pulmonologist OP, she states she has had O2 requirement at home about 2 years ago but has been unable to afford it, currently on 10 mg of Prednisone, Albuterol inhaler and Symbicort at home for COPD & Bumex 1 mg nightly for CHF) -Meds: Duo Nebs scheduled q4h, Prednisone 50 mg today 7/26 (pt already took home 10 mg for COPD) and 60 mg starting tomorrow 7/27 for 4 days, Bumex 1 mg nightly 
-Labs: Throat culture, RVP, Strict I & Os, Daily CBC & CMP  
-Imaging: ECHO (Last ECHO on 7/2017: Normal with EF of 55-60%) -Consults: Cards- appreciate recs, PT&OT  
-Consider PE- Pt is unable to stay flat for a CTA, imaging will not change tx plan since pt is already on coumadin and is supra therapeutic Unilateral Left Lower Extremity Swelling  
-Imaging: Lower extremity prelim duplex- negative, will f/u final read Hyperkalemia: K on POA: 2.3 (Per chart review Hyperkalemia is a recurrent problem for pt presumably from Bumex & Albuterol use- she takes klor-con 10 mEq M, W and F at home but dose will need readjustment on discharge) - Pt was repleted in the ED with 40 mEq K Klor Con + potassium chloride 10 mEq IV 
- 40 mEq Klor Con for 3 doses today 7/26 on transfer from ED 
- Slightly over repleting K due to expected loss from Duoneb treatment and Bumex - Recheck K tomorrow 7/27 and replete again as necessary Atrial Fibrillation s/p MVR on Coumadin (INR on POA- 5.1) (Pt has been seen OP by Dr Ksenia Mohamud but states she has not followed up with him since 2016) - Home Warfarin continued- to be dosed by pharmacy for a INR goal of 2.5-3.5 
- Home Diltiazem 60 mg four times daily- breakfast, lunch, dinner and bedtime Blase Press - Nystatin- 4 times daily for thrust- swish and swallow Chronic Back Pain - Home Percocet 5-325 mg resumed- q6h PRN Hyperlipidemia (: TC: 196, T, HDL 88, VLDL 30 & LDL 78) - Home Lovastatin 10 mg  
 
FEN/GI - Regular diet. Activity - Ambulate as tolerated DVT prophylaxis - On Warfrain GI prophylaxis - Not indicated at this time Fall prophylaxis - Not indicated at this time. Disposition - Admit to Remote Telemetry. Plan to d/c to Home. Code Status - DNR, discussed with patient / caregivers. Next of Denise [address] Name and Contact : ROCHELLE Chen [phone] Patient Zacarias Crawford will be discussed Dr. Sarkis Rodriguez. 1:22 PM, 18 Rin Messina MD 
Family Medicine Resident For Billing Chief Complaint Patient presents with  Shortness of Breath  Leg Swelling Hospital Problems  Date Reviewed: 2018 Codes Class Noted POA Hypokalemia ICD-10-CM: E87.6 ICD-9-CM: 276.8  2018 Unknown

## 2018-07-26 NOTE — PROGRESS NOTES
Anastacio Vazquez [address] Office [phone] Fax [phone] Assessment and Plan Wally Tejeda is a 66 y.o. female with a PMH of Atrial Fibrillation, CHF, COPD, mechanical valve replacement (s/p AVR and MVR) on Coumadin, Dyslipidemia, Migraine who is admitted for LLE swelling and SOB 
 
24 Hour Events: 
-ECHO results: Left ventricle: Systolic function was normal. Ejection fraction was estimated to be 55 % 
-Cards Recs: Symptoms not clearly related to CHF per cards, hold home dieuretic until K is stabilized, hold coumadin until INR stabilizes  
-I&O: none documented  
-Mg and K were repleted today  
 
  
SOB: CHF vs COPD Exacerbation (Pt does not follow a pulmonologist OP, she states she has had O2 requirement at home about 2 years ago but has been unable to afford it, currently on 10 mg of Prednisone, Albuterol inhaler and Symbicort at home for COPD & Bumex 1 mg nightly for CHF) -Meds: Duo Nebs scheduled q4h, Prednisone 50 mg today 7/26 (pt already took home 10 mg for COPD) and 60 mg starting tomorrow 7/27 for 4 days, Bumex 1 mg nightly (holding bumex starting 7/26 per cards rec) -Labs: Throat culture, RVP, Strict I & Os, Daily CBC & CMP  
-Imaging: ECHO (Last ECHO on 7/2017: Normal with EF of 55-60%) EF of 55%  
-Consults: Cards- appreciate recs, PT&OT  
-Consider PE- Pt is unable to stay flat for a CTA, imaging will not change tx plan since pt is already on coumadin and is supra therapeutic  
  
Unilateral Left Lower Extremity Swelling  
-Imaging: Lower extremity prelim duplex- negative, will f/u final read 
  
Hyperkalemia: K on POA: 2.3 (Per chart review Hyperkalemia is a recurrent problem for pt presumably from Bumex & Albuterol use- she takes klor-con 10 mEq M, W and F at home but dose will need readjustment on discharge) - Pt was repleted in the ED with 40 mEq K Klor Con + potassium chloride 10 mEq IV 
- 40 mEq Klor Con for 3 doses today 7/26 on transfer from ED 
- Slightly over repleting K due to expected loss from Duoneb treatment and Bumex - K this am : 3.6 
  
Atrial Fibrillation s/p AVR & MVR on Coumadin (INR on POA- 5.1) (Pt has been seen OP by Dr Candelario Finch but states she has not followed up with him since ) - Home Warfarin held- to be dosed by pharmacy for a INR goal of 2.5-3.5 
- Home Diltiazem 60 mg four times daily- breakfast, lunch, dinner and bedtime  
  
Thrush - Nystatin- 4 times daily for thrust- swish and swallow  
  
Chronic Back Pain - Home Percocet 5-325 mg resumed- q6h PRN  
  
Hyperlipidemia (: TC: 196, T, HDL 88, VLDL 30 & LDL 78) - Home Lovastatin 10 mg  
  
FEN/GI - Regular diet. Activity - Ambulate as tolerated DVT prophylaxis - On Warfrain GI prophylaxis - Not indicated at this time Fall prophylaxis - Not indicated at this time. Disposition - Admit to Remote Telemetry. Plan to d/c to Home. Code Status - DNR, discussed with patient / caregivers. Next of Denise [address] Name and Contact : ROCHELLE Ellsworth [phone] I appreciate the opportunity to participate in the care of this patient, Angel Barajas MD 
Family Medicine Resident Subjective / Objective Subjective States overall feels better and is not as SOB Temp (24hrs), Av.4 °F (36.9 °C), Min:98.4 °F (36.9 °C), Max:98.4 °F (36.9 °C) Objective Respiratory: O2 Flow Rate (L/min): 2 l/min O2 Device: Nasal cannula Visit Vitals  /54  Pulse 98  Temp 98.4 °F (36.9 °C)  Resp 20  
 Ht 5' 4\" (1.626 m)  Wt 130 lb (59 kg)  SpO2 98%  BMI 22.31 kg/m2 General: No acute distress. Alert. Cooperative. Head: Normocephalic. Atraumatic. Throat: Mucosa pink. Moist mucous membranes. White plaque noted on tongue- improved Neck: Supple. Normal ROM. No stiffness. Respiratory: Mild scattered expiratory wheezing throughout Cardiovascular: RRR. Normal S1,S2. No m/r/g. Pulses 2+ throughout. GI: + bowel sounds. Nontender. No rebound tenderness or guarding. Nondistended. Extremities: LLE anterior tibia 2+ pitting edema with increased edema distally towards the ankle with greatest edema on dorsal surface of foot at 3+ , no warmth or erythema compared to the other leg, no signs of cellulitis- slightly improved Musculoskeletal: Distal pulses intact. Skin: Warm, dry. No rashes. Neuro: CN II-XII grossly intact. Strength 5/5 in all extremities. I/O: 
  
 
CBC: 
Recent Labs  
   07/26/18 
 1020 WBC  15.5* HGB  11.7 HCT  35.2 PLT  243 Metabolic Panel: 
Recent Labs  
   07/26/18 
 1020 NA  137  
K  2.3*  
CL  95* CO2  40* BUN  15  
CREA  0.84 GLU  154* CA  8.3*  
MG  1.6 ALB  3.0*  
SGOT  43* ALT  68 INR  5.1* For Billing Chief Complaint Patient presents with  Shortness of Breath  Leg Swelling Hospital Problems  Date Reviewed: 7/17/2018 Codes Class Noted POA * (Principal)Hypokalemia ICD-10-CM: E87.6 ICD-9-CM: 276.8  7/26/2018 Unknown Thrush of mouth and esophagus ([facility] [address] ICD-10-CM: B37.81, B37.0 ICD-9-CM: 112.84, 112.0  7/26/2018 Yes Diastolic CHF, chronic (HCC) ICD-10-CM: I50.32 
ICD-9-CM: 428.32, 428.0  12/17/2015 Yes Chronic atrial fibrillation (HCC) ICD-10-CM: E27.9 ICD-9-CM: 427.31  12/17/2015 Yes COPD (chronic obstructive pulmonary disease) (HCC) ICD-10-CM: J44.9 ICD-9-CM: 394  11/13/2015 Yes CAD (coronary artery disease), native coronary artery ICD-10-CM: I25.10 ICD-9-CM: 414.01  1/8/2015 Yes

## 2018-07-26 NOTE — PROGRESS NOTES
[facility]: MED RECONCILIATION    Comments/Recommendations: Discussed PTA medication list with patient, reviewed outpatient Rx. Of note- patient is no longer taking bupropion and varenicline for smoking cessation and states that she does not need a nicotine patch this admission. She takes diltiazem 4 times daily and has not taken her 1200 dose yet today. Medications added:     · None    Medications removed:    · Albuterol nebulizer- Patient reports that she does not use her nebulizer at home and has not recently filled the albuterol nebulizer solution  · Bupropion  mg (smoking cessation)- Not a current medication  · Varenicline 0.5 mg- Not a current medication  · Ferrous sulfate 142 mg ER- Not a current medication  · Umeclidinium 62.5 mg inhaler- Not a current medication. Patient's only maintenance inhaler is the symbicort. Medications adjusted:    · Warfarin dose adjusted to 2 mg daily (current PTA dose). Of note- INR 5.1 on admission. · Nystatin adjusted to start from 7/17; patient is currently day 9 of 10 on this regimen but stated that this medication has not been effective for her thrush symptoms  · Prednisone adjusted to 10 mg daily    Information obtained from: Patient, home medication list, RxQuery, outpatient MD notes    Allergies: Spiriva respimat [tiotropium bromide]; Celebrex [celecoxib]; and Tomato    Prior to Admission Medications:     Medication Documentation Review Audit       Reviewed by Agustina Obregon, PHARMD (Pharmacist) on 07/26/18 at [phone]         Medication Sig Documenting Provider Last Dose Status Taking? albuterol (VENTOLIN HFA) 90 mcg/actuation inhaler Take 2 Puffs by inhalation every four (4) hours as needed for Wheezing or Shortness of Breath.  Amna French, DO 7/26/2018 AM Active Yes    budesonide-formoterol (SYMBICORT) 160-4.5 mcg/actuation HFAA INHALE 2 PUFFS TWICE DAILY Dian French, DO 7/26/2018 AM Active Yes    bumetanide (BUMEX) 1 mg tablet Take 1 mg by mouth every evening. Historical Provider 7/25/2018 PM Active Yes    dilTIAZem (CARDIZEM) 60 mg tablet TAKE 1 TABLET BEFORE BREAKFAST, LUNCH, DINNER AND AT BEDTIME Luisitobatool Torres Manolo, DO 7/26/2018 AM Active Yes    docusate sodium (COLACE) 100 mg capsule Take 100 mg by mouth every evening. Historical Provider 7/25/2018 PM Active Yes    lovastatin (MEVACOR) 10 mg tablet TAKE 1 TABLET EVERY NIGHT Dian H Manolo,  7/25/2018 PM Active Yes    nystatin (MYCOSTATIN) 100,000 unit/mL suspension Take 1 tsp by mouth four (4) times daily. swish and spit   Indications: oral candidiasis Historical Provider 7/25/2018 AM Active Yes    oxyCODONE-acetaminophen (PERCOCET) 5-325 mg per tablet Take 1 Tab by mouth every six (6) hours as needed for Pain. Max Daily Amount: 4 Tabs. Morena Travis MD 7/26/2018 AM Active Yes    potassium chloride SR (KLOR-CON 10) 10 mEq tablet Take 10 mEq by mouth every Monday, Wednesday, Friday. Indications: Patient takes in the AM with meals three times weekly Historical Provider 7/25/2018 AM Active Yes    predniSONE (DELTASONE) 10 mg tablet Take 1 Tab by mouth daily. Morena Travis MD 7/26/2018 AM Active Yes    warfarin (COUMADIN) 2 mg tablet Take 2 mg by mouth every evening.  Historical Provider 7/25/2018 PM Active Yes                  Aldair Peace, PHARMD   Contact: [phone]

## 2018-07-26 NOTE — IP AVS SNAPSHOT
[address] [address] 100[address] [facility] 
[phone] Patient: Emily Bhandari MRN: [id number] NWS:[id number]/17/[id number] About your hospitalization You were admitted on:  July 26, 2018 You last received care in the:  [facility]  MED SURG 2 You were discharged on:  July 31, 2018 Why you were hospitalized Your primary diagnosis was:  Hypokalemia Your diagnoses also included:  Copd (Chronic Obstructive Pulmonary Disease) (Hcc), Diastolic Chf, Chronic (Hcc), Chronic Atrial Fibrillation (Hcc), Cad (Coronary Artery Disease), Native Coronary Artery, Thrush Of Mouth And Esophagus (Hcc), Sob (Shortness Of Breath) Follow-up Information Follow up With Details Comments Contact Info [address] to Home/COPD [address] 
1st Floor [facility] [address] 
[phone] Pamela Travis MD On 8/2/2018 10.40 AM for hospital follow up [address] [address] [phone] Zion Frausto MD  Schedule an appointment for COPD management  [address] [facility] [address] 
[phone] Amy Duran DO   [address] [address] [phone] Your Scheduled Appointments Thursday August 02, 2018 10:40 AM EDT ACUTE CARE with Pamela Travis MD  
[address] [facility] ([address] [facility]) [address] [address] [phone] Discharge Orders None A check tram indicates which time of day the medication should be taken. My Medications START taking these medications Instructions Each Dose to Equal  
 Morning Noon Evening Bedtime  
 amoxicillin-clavulanate 875-125 mg per tablet Commonly known as:  AUGMENTIN Your last dose was: Your next dose is:     
   
   
 Take 1 tablet tonight 7/31 at 9 PM then take 1 pill every 12 hours starting tomorrow 8/1/2018 till 8/8/2018 CHANGE how you take these medications Instructions Each Dose to Equal  
 Morning Noon Evening Bedtime  
 nystatin 100,000 unit/mL suspension Commonly known as:  MYCOSTATIN What changed:   
- how much to take 
- how to take this - when to take this 
- additional instructions - Another medication with the same name was removed. Continue taking this medication, and follow the directions you see here. Your last dose was: Your next dose is: Take 5 ml by mouth and swish and split 4 times daily from 7/31/2018 to 8/4/2018  Indications: oral candidiasis  
     
   
   
   
  
 potassium chloride SR 10 mEq tablet Commonly known as:  KLOR-CON 10 What changed:  Another medication with the same name was removed. Continue taking this medication, and follow the directions you see here. Your last dose was: Your next dose is: Take 10 mEq by mouth every Monday, Wednesday, Friday. Indications: Patient takes in the AM with meals three times weekly 10 mEq * predniSONE 20 mg tablet Commonly known as:  Chikis Beam What changed:   
- medication strength 
- how much to take 
- how to take this - when to take this 
- additional instructions Your last dose was: Your next dose is: Take 3 tablets on 8/1, 8/2 and 8/3 Take 2.5 tablets on 8/4, 8/5 and 8/6 Take 2 tablets on 8/7, 8/8 and 8/9 Take 1.5 tablets on 8/10, 8/11 and 8/12 * predniSONE 10 mg tablet Commonly known as:  Chiksi Beam What changed: You were already taking a medication with the same name, and this prescription was added. Make sure you understand how and when to take each. Your last dose was: Your next dose is:    
   
   
 Resume on 8/13/2018 after completing prednisone taper  
     
   
   
   
  
 warfarin 1 mg tablet Commonly known as:  COUMADIN What changed:   
- medication strength 
- how much to take 
- how to take this - when to take this 
- additional instructions Your last dose was: Your next dose is: Take 2 mg on Monday, Wednesday and Friday and 1 mg on Tuesday, Thursday, Saturday and Sunday * Notice: This list has 2 medication(s) that are the same as other medications prescribed for you. Read the directions carefully, and ask your doctor or other care provider to review them with you. CONTINUE taking these medications Instructions Each Dose to Equal  
 Morning Noon Evening Bedtime  
 albuterol 90 mcg/actuation inhaler Commonly known as:  VENTOLIN HFA Your last dose was: Your next dose is: Take 2 Puffs by inhalation every four (4) hours as needed for Wheezing or Shortness of Breath. 2 Puff  
    
   
   
   
  
 budesonide-formoterol 160-4.5 mcg/actuation Hfaa Commonly known as:  SYMBICORT Your last dose was: Your next dose is:    
   
   
 INHALE 2 PUFFS TWICE DAILY  
     
   
   
   
  
 bumetanide 1 mg tablet Commonly known as:  Renan Allyson Your last dose was: Your next dose is: Take 1 mg by mouth every evening. 1 mg  
    
   
   
   
  
 dilTIAZem 60 mg tablet Commonly known as:  CARDIZEM Your last dose was: Your next dose is: TAKE 1 TABLET BEFORE BREAKFAST, LUNCH, DINNER AND AT BEDTIME  
     
   
   
   
  
 docusate sodium 100 mg capsule Commonly known as:  Arman Duster Your last dose was: Your next dose is: Take 100 mg by mouth every evening. 100 mg  
    
   
   
   
  
 lovastatin 10 mg tablet Commonly known as:  MEVACOR Your last dose was: Your next dose is: TAKE 1 TABLET EVERY NIGHT  
     
   
   
   
  
 oxyCODONE-acetaminophen 5-325 mg per tablet Commonly known as:  PERCOCET  
   
 Your last dose was: Your next dose is: Take 1 Tab by mouth every six (6) hours as needed for Pain. Max Daily Amount: 4 Tabs. 1 Tab Where to Get Your Medications Information on where to get these meds will be given to you by the nurse or doctor. ! Ask your nurse or doctor about these medications  
  amoxicillin-clavulanate 875-125 mg per tablet  
 nystatin 100,000 unit/mL suspension  
 predniSONE 10 mg tablet  
 predniSONE 20 mg tablet  
 warfarin 1 mg tablet Opioid Education Prescription Opioids: What You Need to Know: 
 
 
ACUTE DIAGNOSES: 
Hypokalemia SOB (shortness of breath) Froylan Dorsey . . . . . . . . . . . . . . . . . . . . . . . . . . . . . . . . . . . . . . . . . . . . . . . . . . . . . . . . . . . . . . . . . . . . . . Froylan Dorsey FOLLOW-UP CARE RECOMMENDATIONS: 
 
Appointments Follow-up Information Follow up With Details Comments Contact Info [address] to Home/COPD [address] 
1st Floor [facility] [address] 
[phone] Kisha Travis MD On 2018 10.40 AM for hospital follow up [address]  [address] [id number] [phone] Ashley Headley MD  Schedule an appointment for COPD management  [address] [facility] [address] [place] [address] 
[phone] Please follow up with your PCP regardin. Leukocytosis (Increase in WBC count)- follow up CBC to ensure down trending (18.2 on discharge) 2. PT/INR- ensure in therapeutic range of 2.5-3.5 3. Hypokalemia (Low Potassium on admission)- follow up BMP to ensure Potassium is still in range ( 4.3 on discharge), discuss with your PCP about maybe changing your outpatient potassium regimen MEDICATION CHANGES: 
1. Warfarin alternate with taking 1 mg and then 2 mg as follows: Take 2 mg daily on Mon/Wed/Fri and 1 mg daily on Tues/Thurs/Saturday/ 2. Nystatin- swish and split- use 5 ml 4 times a day from 2018 to 2018 to help with the oral yeast infection 3. Hold home prednisone 10 mg while taking the following prednisone taper: - Take 3 tablets (60mg total) on ,  and 8/3 
- Take 2.5 tablets (50mg total) on ,  and  
- Take 2 tablets (40 mg total) on ,  and  
- Take 1.5 tablets (30 mg total) on 8/10,  and  
- On , resume home prednisone 10mg daily. 4. Augmentin: Take 1 tablet tonight  at 9 PM. Then take 1 pill every 12 hours from 2018 through 2018 Follow-up tests needed: 
 
PT/INR- to readjust Warfarin level BMP- Recheck Potassium level CBC- Ensure WBC count is down trending Pending test results: At the time of your discharge the following test results are still pending: none Please make sure you review these results with your outpatient follow-up provider(s). Specific symptoms to watch for: chest pain, shortness of breath, fever, chills, nausea, vomiting, diarrhea, change in mentation, falling, weakness, bleeding. DIET/what to eat:  Cardiac Diet ACTIVITY:  Activity as tolerated Wound care: none Equipment needed: None What to do if new or unexpected symptoms occur?    
If you experience any of the above symptoms (or should other concerns or questions arise after discharge) please call your primary care physician. Return to the emergency room if you cannot get hold of your doctor. · It is very important that you keep your follow-up appointment(s). · Please bring discharge papers, medication list (and/or medication bottles) to your follow-up appointments for review by your outpatient provider(s). · Please check the list of medications and be sure it includes every medication (even non-prescription medications) that your provider wants you to take. · It is important that you take the medication exactly as they are prescribed. · Keep your medication in the bottles provided by the pharmacist and keep a list of the medication names, dosages, and times to be taken in your wallet. · Do not take other medications without consulting your doctor. · If you have any questions about your medications or other instructions, please talk to your nurse or care provider before you leave the hospital.  
 
Information obtained by:  
 
I understand that if any problems occur once I am at home I am to contact my physician. These instructions were explained to me and I had the opportunity to ask questions. I understand and acknowledge receipt of the instructions indicated above. Physician's or R.N.'s Signature                                                                  Date/Time Patient or Representative Signature                                                          Date/Time [employer] Announcement We are excited to announce that we are making your provider's discharge notes available to you in [employer].   You will see these notes when they are completed and signed by the physician that discharged you from your recent hospital stay. If you have any questions or concerns about any information you see in [employer], please call the Health Information Department where you were seen or reach out to your Primary Care Provider for more information about your plan of care. Introducing [facility]! Dear Mauro Bethea: 
Thank you for requesting a Science account. Our records indicate that you already have an active Science account. You can access your account anytime at [email or web address] Did you know that you can access your hospital and ER discharge instructions at any time in Science? You can also review all of your test results from your hospital stay or ER visit. Additional Information If you have questions, please visit the Frequently Asked Questions section of the Science website at [email or web address] Remember, Science is NOT to be used for urgent needs. For medical emergencies, dial 911. Now available from your iPhone and Android! Introducing [employer] As a [facility] patient, I wanted to make you aware of our electronic visit tool called Noam Figuero[facility]in. [facility] 24/7 allows you to connect within minutes with a medical provider 24 hours a day, seven days a week via a mobile device or tablet or logging into a secure website from your computer. You can access Noam Peng from anywhere in the [place]. A virtual visit might be right for you when you have a simple condition and feel like you just dont want to get out of bed, or cant get away from work for an appointment, when your regular [facility] provider is not available (evenings, weekends or holidays), or when youre out of town and need minor care.   Electronic visits cost only $49 and if the Noam Figueroafin provider determines a prescription is needed to treat your condition, one can be electronically transmitted to a nearby pharmacy*. Please take a moment to enroll today if you have not already done so. The enrollment process is free and takes just a few minutes. To enroll, please download the [employer] 24/7 reji to your tablet or phone, or visit [email or web address] to enroll on your computer. And, as an [address] [place] [address] patient with a [employer] account, the results of your visits will be scanned into your electronic medical record and your primary care provider will be able to view the scanned results. We urge you to continue to see your regular [facility] provider for your ongoing medical care. And while your primary care provider may not be the one available when you seek a [employer] virtual visit, the peace of mind you get from getting a real diagnosis real time can be priceless. For more information on [employer], view our Frequently Asked Questions (FAQs) at [email or web address]. Sincerely, 
 
Brian Dickinson MD 
Chief Medical Officer [address] [place] [address] *:  certain medications cannot be prescribed via [employer] Unresulted Labs-Please follow up with your PCP about these lab tests Order Current Status CULTURE, RESPIRATORY/SPUTUM/BRONCH W GRAM STAIN Preliminary result Providers Seen During Your Hospitalization Provider Specialty Primary office phone Luis F Ceron MD Emergency Medicine [phone] Conrado Lucero MD [facility] [phone] Sylvain Velasquez MD Family Practice [phone] Your Primary Care Physician (PCP) Primary Care Physician Office Phone Office Fax Yanelis Jose [phone] You are allergic to the following Allergen Reactions Spiriva Respimat (Tiotropium Bromide) Anaphylaxis Other (comments) Adverse effects; Per RN - pt states that Spiriva caused throat swelling and could not breathe well. Celebrex (Celecoxib) Rash Tomato Rash Recent Documentation Height Weight BMI OB Status Smoking Status 1.626 m 64 kg 24.22 kg/m2 Hysterectomy Current Every Day Smoker Emergency Contacts Name Discharge Info Relation Home Work Mobile ROCHELLE Johns DISCHARGE CAREGIVER [3] Spouse [3] [phone] Maryrobert Roberts CAREGIVER [3] Daughter [21] [phone] Patient Belongings The following personal items are in your possession at time of discharge: 
  Dental Appliances: Uppers  Visual Aid: Glasses      Home Medications: Kept at bedside (emergency inhaler)   Jewelry: Ring  Clothing: None Please provide this summary of care documentation to your next provider. Signatures-by signing, you are acknowledging that this After Visit Summary has been reviewed with you and you have received a copy. Patient Signature:  ____________________________________________________________ Date:  ____________________________________________________________  
  
[facility] Provider Signature:  ____________________________________________________________ Date:  ____________________________________________________________

## 2018-07-26 NOTE — ED TRIAGE NOTES
\"I can't hardly swallow because it feels like there's something in my throat, my feet are all swollen, and it feels like I'm drowning. I've got this nasty taste in my mouth I can't get rid of.\" Patient has hx of CHF, A-fib, COPD, and two mechanical valves, takes Coumadin. Denies any fever.

## 2018-07-26 NOTE — PROGRESS NOTES
7/26/2018  12:47 PM  Case management note    Met with patient to discuss planning. Patient lives with  in a 3 story home with 7 steps to enter and about 7 to the other 2 levels. Patient stated she does not like to use Home health and mostly likely would not accept if needed. She has no medical equipment. She has had oxygen in the past but turned it in as she could not afford. She stated her medical insurance only paid $ 50.00 on it monthly. She gets her RX at [facility] [address] and follows with Dr. Zain Robles. NN notified. Patient has cell number [phone]. Reason for Admission:   SOB                  RRAT Score:     16             Do you (patient/family) have any concerns for transition/discharge? Yes patient does not have oxygen              Plan for utilizing home health:     Patient will most likely refuse    Likelihood of readmission? Moderate/yellow            Transition of Care Plan:      Patient appears to decline services and not compliant with medications and follow up. Care Management Interventions  PCP Verified by CM:  Yes  Mode of Transport at Discharge: [address] (CM Consult): Discharge Planning  Current Support Network: Lives with Spouse  Confirm Follow Up Transport: Family  Plan discussed with Pt/Family/Caregiver: Yes  Discharge Location  Discharge Placement: Unable to determine at this time  Brandi Crockett

## 2018-07-26 NOTE — PROCEDURES
[facility]  *** FINAL REPORT ***    Name: Aura Robison  MRN: [id number]    Outpatient  : 13 Mar 1955  HIS Order #: [id number]  [address] [facility] Visit #: [id number]  Date: 2018    TYPE OF TEST: Peripheral Venous Testing    REASON FOR TEST  Pain in limb, Limb swelling    Left Leg:-  Deep venous thrombosis:           No  Superficial venous thrombosis:    No  Deep venous insufficiency:        No  Superficial venous insufficiency: No      INTERPRETATION/FINDINGS  PROCEDURE:  LEFT LOWER EXTREMITY VENOUS DUPLEX . Evaluation of lower  extremity veins with ultrasound (B-mode imaging, pulsed Doppler, color   Doppler). Includes the common femoral, deep femoral, femoral,  popliteal, posterior tibial, peroneal, and great saphenous veins. Other veins, for example the gastrocnemius and soleal veins, may also  be visualized. FINDINGS: Mk Paddy scale and color flow duplex images of the veins in the  left lower extremity demonstrate normal compressibility, spontaneous  and augmented flow profiles, and absence of filling defects throughout   the deep and superficial veins in the left lower extremity. CONCLUSION:  Left lower extremity venous duplex negative for deep  venous thrombosis or thrombophlebitis. Right common femoral vein is  thrombus free. ADDITIONAL COMMENTS    I have personally reviewed the data relevant to the interpretation of  this  study. TECHNOLOGIST: Geoff Young RDCS  Signed: 2018 11:31 AM    PHYSICIAN: Vinnie King.  Anthony Ward MD  Signed: 2018 07:22 AM

## 2018-07-26 NOTE — PROGRESS NOTES
[facility] Dosing Services: 7/26/18    Consult for Warfarin Dosing by Pharmacy by Dr. Dominique Farooq provided for this 66 y.o.  female , for indication of AVR, MVR, Afib  PTA Dose: 2mg daily  Dose to achieve an INR goal of 2.5- 3.5    Hold warfarin tonight, 7/26/18    Significant drug interactions: None  Previous dose given 2mg (PTA, 7/25/18)   PT/INR Lab Results   Component Value Date/Time    INR 5.1 (HH) 07/26/2018 10:20 AM      Platelets Lab Results   Component Value Date/Time    PLATELET 304 21/24/5380 10:20 AM      H/H Lab Results   Component Value Date/Time    HGB 11.7 07/26/2018 10:20 AM        Pharmacy to follow daily and will provide subsequent Warfarin dosing based on clinical status. ANDI Badillo 23 usxapmyahye [phone]

## 2018-07-26 NOTE — ED PROVIDER NOTES
HPI Comments: Pt is a 66 y.o. female who presents to the emergency room with chief complaint of left lower extremity swelling and shortness of breath. PMHx: CHF, a.fib, COPD, mechanical valve on coumadin. Pt states that she noticed worsening swelling in her left lower extremity for 2 days. She denies lower extremity pain. She denies calf tenderness, recent travel. She does take coumadin 2mg daily. Last INR was last week and 2.8. She also reports increased SOB over the last 2 days as well. She states she does have a history of CHF and is on a \"water pill\" daily. She has had SOB like this in the past and it was related to her CHF. She continues to smoke 1ppd. Pt. Also reports some throat discomfort as well for the last 24-48 hours. Denies chest pain, n/v, f/c, abd pain, dysuria, leg pain, sick contacts. Of note, patient was seen at [facility] ED on 6/7/2018 for CHF exacerbation. Recent office visit with PCP, INR 2.8 dose of coumadin 2 mg daily.      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 ENDOSCOPY    HX COLECTOMY      HX HEART CATHETERIZATION      cabg    HX HYSTERECTOMY      BSO    HX MITRAL VALVE REPLACEMENT      and redo MVR and AVR         Family History:   Problem Relation Age of Onset    Cancer Brother Social History     Social History    Marital status:      Spouse name: N/A    Number of children: N/A    Years of education: N/A     Occupational History    Not on file. Social History Main Topics    Smoking status: Current Every Day Smoker     Packs/day: 0.50     Types: Cigarettes     Last attempt to quit: 11/1/2015    Smokeless tobacco: Never Used      Comment: 11/1/2014    Alcohol use No    Drug use: No    Sexual activity: Not on file     Other Topics Concern    Not on file     Social History Narrative         ALLERGIES: Spiriva respimat [tiotropium bromide]; Celebrex [celecoxib]; and Tomato    Review of Systems   Constitutional: Negative for chills, fever and unexpected weight change. HENT: Negative for congestion. Eyes: Negative for visual disturbance. Respiratory: Positive for cough and shortness of breath. Negative for wheezing. Cardiovascular: Positive for leg swelling. Negative for chest pain. Gastrointestinal: Negative for abdominal pain, constipation, diarrhea, nausea and vomiting. Genitourinary: Negative for dysuria. Musculoskeletal: Positive for back pain (chronic). Skin: Negative for color change and rash. Neurological: Negative for dizziness, weakness, light-headedness and headaches. Psychiatric/Behavioral: Negative for confusion. Vitals:    07/26/18 0918   BP: 146/67   Pulse: (!) 105   Resp: 24   Temp: 98.4 °F (36.9 °C)   SpO2: 98%   Weight: 59 kg (130 lb)   Height: 5' 4\" (1.626 m)            Physical Exam   Constitutional: She is oriented to person, place, and time. She appears well-developed and well-nourished. HENT:   Head: Normocephalic and atraumatic. Eyes: Conjunctivae and EOM are normal.   Neck: Neck supple. Cardiovascular: Intact distal pulses. Irregularly irregular   Pulmonary/Chest: Effort normal. No respiratory distress. She has no wheezes. Decreased breath sounds b/l bases. No W/R   Abdominal: Soft.  Bowel sounds are normal. There is no tenderness. Musculoskeletal: Normal range of motion. 2+ pitting edema in left ankle to the knee. DP/PT 2+ b/l. Negative anisha's signs b/l. No edema in Right lower extremity. Neurological: She is alert and oriented to person, place, and time. Skin: Skin is warm and dry. No rash noted. Psychiatric: She has a normal mood and affect. Her behavior is normal. Judgment and thought content normal.        MDM  Number of Diagnoses or Management Options  Hypokalemia:   Supratherapeutic INR:   Diagnosis management comments: Lower extremity edema likely related to CHF. Doppler negative for DVT. INR was supratherapeutic at 5.1. No evidence of bleeding at this time, so will hold off on vitamin K. CXR showed now pneumonia or fluid overload, but did show a chronic T6 vertebral fracture. Pt. Hypokalemic on lab work with potassium of 2.3. K repleted in ED. Pt refused CT of the neck for her intermittent difficulty swallowing. Pt admitted to family medicine service for hypokalemia and supratherapeutic INR. Amount and/or Complexity of Data Reviewed  Clinical lab tests: ordered and reviewed  Tests in the radiology section of CPT®: ordered and reviewed  Review and summarize past medical records: yes  Discuss the patient with other providers: yes (Dr. Yulisa Avendaño- Family Medicine)    Risk of Complications, Morbidity, and/or Mortality  Presenting problems: moderate  Diagnostic procedures: moderate  Management options: moderate    Patient Progress  Patient progress: stable      Pt discussed with supervising attending, Dr. Yang Felix. ED Course   11:05 AM   Critical lab value. K of 2.3. INR 5.1. Ordered 40meq PO KCL and 10meq IV KCl. CT scan and duplex pending. 11:32 AM  Pt. Refusing CT scan. Will get XR of neck. 11:59 AM  Updated patient regarding need for admission. Discussed patient with Family Practice service. They accept for admission.      EKG  Date/Time: 7/26/2018 10:04 AM  Performed by: Preeti [place] Katya Rojas by: Dallas Holm     Previous ECG:     Previous ECG:  Compared to current    Comparison ECG info:  6/2018  Interpretation:     Interpretation: abnormal    Quality:     Tracing quality:  Limited by artifact  Rate:     ECG rate:  101  Rhythm:     Rhythm: atrial fibrillation    Ectopy:     Ectopy: PVCs    QRS:     QRS axis:  Normal  Conduction:     Conduction: normal    ST segments:     ST segments:  Normal  T waves:     T waves: normal

## 2018-07-26 NOTE — CONSULTS
Shai Rogers DO [facility] [address], [address] [place], [address] [facility] Office 21  [phone] Office 21  [phone] Date of  Admission: 7/26/2018  9:20 AM 
PCP- [address] [facility] [address], DO Tyler Spivey is a 66 y.o. female admitted for Hypokalemia. Consult requested by Raya Brown MD 
 
Assessment/Plan 1. Acute dyspnea- she does have COPD. Symptoms and objective data not clearly related to heart failure. NT-probnp is not significantly elevated to prior markers. Would hold home diuretic until potassium is stabilized. Once potassium is stabilized, resume home diuretics. Repeat echocardiogram is pending. 2. Hx of aortic and mitral valve replacement- INR is supratherapeutic. She has very labile INR which is very concerning. She should have intensive review on how she is taking medicine with pharmacist.  Hold coumadin until INR stabilizes. Once INR has stablized over the long term she should be on asa 81mg due aortic valve prosthesis. However, would avoid asa at this time because bleeding risk is too high with labile INR and asa. Repeat echo is pending. 3. Severe hypokalemia- defer to family practice for supplementation and evaluation. Hold diuretic until potassium level has stabilized 4. afib- continue dilt for rate control. supratherapeutic INR for stroke ppx. I appreciate the opportunity to be involved in Ms. Quiroga. See below note for details. Please do not hesitate to contact us with questions or concerns. Cruz Grubbs DO Subjective: 
Tyler Spivey is a 66 y.o. female with a hx of Atrial Fibrillation, CHF, COPD, mechanical valve replacement (s/p AVR and MVR) on Coumadin, Dyslipidemia. Presented to ED with subacute onset of dyspnea. Symptoms of shortness of breath Worsened today so she presented to ED.   Chronic issue with laying flat to sleep. Still smoking. Admits to coughing up significant sputum with elevated white coutn. CXR without much edema. NT-Pro bNP is not really elevated compared to prior markers. INR is supratherapeutic. Potassium is critical low. Past Medical History:  
Diagnosis Date  A-fib ([facility] [address]  Anticoagulant long-term use  CAD (coronary artery disease), native coronary artery   
 mild to moderate by cath  CHF (congestive heart failure) ([facility] [address]  Chronic obstructive pulmonary disease ([facility] [address]  Dyslipidemia  Ill-defined condition   
 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  
Procedure Laterality Date  COLONOSCOPY N/A 11/28/2016 COLONOSCOPY performed by Petey Marrero MD at [facility] HEART CATHETERIZATION    
 cabg  HX HYSTERECTOMY    
 BSO  
 HX MITRAL VALVE REPLACEMENT    
 and redo MVR and AVR Allergies Allergen Reactions  Spiriva Respimat [Tiotropium Bromide] Anaphylaxis and Other (comments) Adverse effects; Per RN - pt states that Spiriva caused throat swelling and could not breathe well.  Celebrex [Celecoxib] Rash  Tomato Rash Family History Problem Relation Age of Onset  Cancer Brother Social History Substance Use Topics  Smoking status: Current Every Day Smoker Packs/day: 0.50 Types: Cigarettes Last attempt to quit: 11/1/2015  Smokeless tobacco: Never Used Comment: 11/1/2014  Alcohol use No  
 
 
 
 Medications: (Not in a hospital admission) Current Facility-Administered Medications Medication Dose Route Frequency  iopamidol (ISOVUE-370) 76 % injection 100 mL  100 mL IntraVENous RAD ONCE  
 sodium chloride (NS) flush 5-10 mL  5-10 mL IntraVENous Q8H  
 sodium chloride (NS) flush 5-10 mL  5-10 mL IntraVENous PRN  
 albuterol (PROVENTIL VENTOLIN) nebulizer solution 2.5 mg  2.5 mg Nebulization Q4H RT  
 nystatin (MYCOSTATIN) 100,000 unit/mL oral suspension 500,000 Units  500,000 Units Oral QID  potassium chloride (KLOR-CON) packet 40 mEq  40 mEq Oral Q4H  
 bumetanide (BUMEX) tablet 1 mg  1 mg Oral QPM  
 dilTIAZem (CARDIZEM) IR tablet 60 mg  60 mg Oral AC&HS  
 oxyCODONE-acetaminophen (PERCOCET) 5-325 mg per tablet 1 Tab  1 Tab Oral Q6H PRN  
 Warfarin- Pharmacy Dosing   Other Rx Dosing/Monitoring  lovastatin (MEVACOR) tablet 10 mg  10 mg Oral QHS  [START ON 7/27/2018] predniSONE (DELTASONE) tablet 60 mg  60 mg Oral DAILY Current Outpatient Prescriptions Medication Sig  bumetanide (BUMEX) 1 mg tablet Take 1 mg by mouth every evening.  docusate sodium (COLACE) 100 mg capsule Take 100 mg by mouth every evening.  nystatin (MYCOSTATIN) 100,000 unit/mL suspension Take 1 tsp by mouth four (4) times daily. swish and spit   Indications: oral candidiasis  potassium chloride SR (KLOR-CON 10) 10 mEq tablet Take 10 mEq by mouth every Monday, Wednesday, Friday. Indications: Patient takes in the AM with meals three times weekly  warfarin (COUMADIN) 2 mg tablet Take 2 mg by mouth every evening.  predniSONE (DELTASONE) 10 mg tablet Take 1 Tab by mouth daily.  oxyCODONE-acetaminophen (PERCOCET) 5-325 mg per tablet Take 1 Tab by mouth every six (6) hours as needed for Pain. Max Daily Amount: 4 Tabs.  budesonide-formoterol (SYMBICORT) 160-4.5 mcg/actuation HFAA INHALE 2 PUFFS TWICE DAILY  albuterol (VENTOLIN HFA) 90 mcg/actuation inhaler Take 2 Puffs by inhalation every four (4) hours as needed for Wheezing or Shortness of Breath.  lovastatin (MEVACOR) 10 mg tablet TAKE 1 TABLET EVERY NIGHT  dilTIAZem (CARDIZEM) 60 mg tablet TAKE 1 TABLET BEFORE BREAKFAST, LUNCH, DINNER AND AT BEDTIME Review of Systems: 
Pertinent Positive: dyspnea, cough, sputum production Pertinent Negative: No chest pain, orthopnea, PND. No bleeding. All Other systems reviewed and are negative for a Comprehensive ROS (10+) Physical Exam: 
Visit Vitals  /54  Pulse 98  Temp 98.4 °F (36.9 °C)  Resp 20  
 Ht 5' 4\" (1.626 m)  Wt 130 lb (59 kg)  SpO2 98%  BMI 22.31 kg/m2 Gen: Well-developed, well-nourished, in no acute distress HEENT:  Pink conjunctivae, hearing intact to voice, moist mucous membranes Neck: Supple,No JVD, No Carotid Bruit Resp: No accessory muscle use,very prolonged expiratory phase with mild crackles Card: Normal rate,Irregular Rythm, valve click with diastolic murmur Abd:  Soft, non-tender, non-distended, normoactive bowel sounds are present,  
MSK: No cyanosis or clubbing Skin: No rashes or ulcers Neuro:  Cranial nerves are grossly intact, moving all four extremities, no focal deficit, follows commands appropriately Psych:  Good insight, oriented to person, place and time, alert, Nml Affect LE: No edema Vascular:Distal Pulses 2+ and symmetric EKG: Atrial fibrillation Cardiac Testing/ Procedures: 
 
ECHO/JOSEF: 
SUMMARY: 
Left ventricle: Systolic function was at the lower limits of normal. 
Ejection fraction was estimated in the range of 50 % to 55 %. No obvious 
wall motion abnormalities identified in the views obtained. Wall thickness 
was at the upper limits of normal. 
 
Mitral valve: A mechanical prosthesis was present. It exhibited normal 
function. Aortic valve: A mechanical prosthesis was present. It exhibited normal 
function. Cxray:  
IMPRESSION: 
1. The lungs are clear acute process. 2. Probable pulmonary artery hypertension 3. Moderate compression deformity of the T6 vertebral body has increased in the 
interval 
 
LABS: 
 
Lab Results Component Value Date/Time  WBC 15.5 (H) 07/26/2018 10:20 AM  
 HGB 11.7 07/26/2018 10:20 AM  
 HCT 35.2 07/26/2018 10:20 AM  
 PLATELET 726 11/16/3508 10:20 AM  
 MCV 87.8 07/26/2018 10:20 AM  
 
Lab Results Component Value Date/Time Sodium 137 07/26/2018 10:20 AM  
 Potassium 2.3 (LL) 07/26/2018 10:20 AM  
 Chloride 95 (L) 07/26/2018 10:20 AM  
 CO2 40 (H) 07/26/2018 10:20 AM  
 Anion gap 2 (L) 07/26/2018 10:20 AM  
 Glucose 154 (H) 07/26/2018 10:20 AM  
 BUN 15 07/26/2018 10:20 AM  
 Creatinine 0.84 07/26/2018 10:20 AM  
 BUN/Creatinine ratio 18 07/26/2018 10:20 AM  
 GFR est AA >60 07/26/2018 10:20 AM  
 GFR est non-AA >60 07/26/2018 10:20 AM  
 Calcium 8.3 (L) 07/26/2018 10:20 AM  
 
Lab Results Component Value Date/Time CK 47 06/07/2018 12:18 PM  
 CK-MB Index 4.0 (H) 06/07/2018 12:18 PM  
 Troponin-I, Qt. <0.04 06/07/2018 12:18 PM  
 
 
Lab Results Component Value Date/Time  INR 5.1 (HH) 07/26/2018 10:20 AM  
 INR 6.8 (>) 06/19/2018 09:40 AM  
 INR 2.1 (H) 06/07/2018 12:18 PM  
 INR POC 2.8 07/17/2018 07:13 AM  
 INR POC 5.1 07/03/2018 07:58 AM  
 INR POC 3.6 06/21/2018 07:31 AM  
 Prothrombin time 51.6 (H) 07/26/2018 10:20 AM  
 Prothrombin time 63.1 (H) 06/19/2018 09:40 AM  
 Prothrombin time 21.4 (H) 06/07/2018 12:18 PM  
 
 
 
Tosin Morley DO

## 2018-07-27 NOTE — PROGRESS NOTES
[address] Medicine Residency Resident Note in Brief 
---------------------------------------- 
 
S: Patient seen and examined at bedside as a follow up from AM rounds. Sitting up in bed, watching TV. States she has \"a tiny bit\" of dyspnea but is  improved from admission. Denies any fever, chills, HA, dizziness, N/V, abdominal pain. Eating and drinking okay. Took the potassium tablets without difficulty (had refused the potassium packets earlier and then lost IV access). No new complaints or concerns. O: 
Visit Vitals  /74 (BP 1 Location: Right arm, BP Patient Position: At rest)  Pulse 90  Temp 98.3 °F (36.8 °C)  Resp 16  
 Ht 5' 4\" (1.626 m)  Wt 130 lb (59 kg)  SpO2 100%  BMI 22.31 kg/m2 Gen: NAD Resp: diminished breath sounds at bilateral mid-lung to lung bases. No wheezing, crackles, or rhonchi. Poor air movement in the bilateral upper lungs (appears slightly worse than initial exam at 7am but stable from attending exam) Cardio: irregularly irregular rhythm GI: +BS, nondistended, nonTTP Extremities: 2+ pitting edema in LLE (improved), L foot edema 1+ (significantly improved). A/P  
65 yo F with hx of Afib s/p valve replacement on coumadin, CHF, COPD admitted for COPD exacerbation, hypokalemia, and LLE edema.  
- K this AM 3.6, patient has taken the oral potassium tablets totaling 40mEq, did not receive any runs of IV potassium. Refused potassium packets. Will recheck K with AM labs. - No IV access at this time but no IV medications required - Continue prednisone 60mg daily, low threshold to switch to IV solumedrol at which time patient would need IV access - Nursing has continued to attempt IV access and per nursing, PICC team to attempt routine IV access later today - Per PT patient will need 2L continuous O2 at discharge, CM already consulted and working - Patient has already done teaching with Pharmacy regarding home warfarin 
- Hopeful discharge over the weekend. Please see full daily progress note for complete plan.  
Kodak Molina MD 
3:06 PM

## 2018-07-27 NOTE — PROGRESS NOTES
Bedside shift change report given to [facility] (1-[facility]) (oncoming nurse) by Hiwot Ortiz (offgoing nurse). Report included the following information SBAR, Kardex, Intake/Output, MAR, Recent Results and Cardiac Rhythm A-Fib.

## 2018-07-27 NOTE — PROGRESS NOTES
Physical Therapy - Assessed patient this morning. Full report to follow. Patient insist she does not want home health PT. Patient unable to tolerate much activity on RA, desaturates immediately to 87% and  bpm very dyspneic. Patient will need 2L home oxygen and is agreeable to having Oxygen set up for home. Informed CM Paul. Full report to follow.   
Bobie Osgood, DPT

## 2018-07-27 NOTE — PROGRESS NOTES
Occupational Therapy EVALUATION/discharge Patient: Viet Macedo (89 y.o. female) Date: 7/27/2018 Primary Diagnosis: Hypokalemia Precautions:  Fall, Contact (droplet) ASSESSMENT:  
Based on the objective data described below, the patient presents at an overall supervision to mod I level with ADLs and functional mobility. She is limited by HA and decreased endurance, but aware of her limitations. Educated pt and her  on energy conservation techniques with written handout provided and both verbalized good understanding. Further skilled acute occupational therapy is not indicated at this time. Discharge Recommendations: None for OT Further Equipment Recommendations for Discharge: none for OT SUBJECTIVE:  
Patient stated I just can't breathe.  OBJECTIVE DATA SUMMARY:  
HISTORY:  
Past Medical History:  
Diagnosis Date  A-fib ([facility] [address]  Anticoagulant long-term use  CAD (coronary artery disease), native coronary artery   
 mild to moderate by cath  CHF (congestive heart failure) ([facility] [address]  Chronic obstructive pulmonary disease ([facility] [address]  Dyslipidemia  Ill-defined condition   
 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  
Procedure Laterality Date  COLONOSCOPY N/A 11/28/2016 COLONOSCOPY performed by Cathy Bergman MD at [facility]  HX HEART CATHETERIZATION    
 cabg  HX HYSTERECTOMY    
 BSO  
 HX MITRAL VALVE REPLACEMENT    
 and redo MVR and AVR Prior Level of Function/Environment/Context: Independent, drives, cares for 1 y/o grandson during day Home Situation Home Environment: Private residence # Steps to Enter: 6 Rails to Enter: Yes One/Two Story Residence: [facility] # of Interior Steps: 8 Living Alone: No 
Support Systems: Spouse/Significant Other/Partner, Child(lauryn) Patient Expects to be Discharged to[de-identified] Private residence Current DME Used/Available at Home: None Tub or Shower Type: Tub/Shower combination Hand dominance: Right EXAMINATION OF PERFORMANCE DEFICITS: 
Cognitive/Behavioral Status: 
Neurologic State: Alert; Appropriate for age Orientation Level: Oriented X4 Cognition: Appropriate decision making; Appropriate for age attention/concentration; Appropriate safety awareness; Follows commands Perception: Appears intact Perseveration: No perseveration noted Safety/Judgement: Awareness of environment;Driving appropriateness; Fall prevention;Good awareness of safety precautions; Home safety; Insight into deficits Hearing: Auditory Auditory Impairment: None Vision/Perceptual:   
Acuity: Within Defined Limits Corrective Lenses: Glasses Range of Motion: 
AROM: Within functional limits Strength: 
Strength: Generally decreased, functional 
  
  
  
  
 
Coordination: 
Coordination: Generally decreased, functional 
Fine Motor Skills-Upper: Left Impaired;Right Impaired Gross Motor Skills-Upper: Left Intact; Right Intact Tone & Sensation: 
Tone: Normal 
Sensation: Intact Balance: 
Sitting: Intact Standing: Intact Functional Mobility and Transfers for ADLs: 
Transfers: 
Sit to Stand: Supervision Stand to Sit: Supervision Toilet Transfer : Supervision ADL Assessment: 
Feeding: Independent Oral Facial Hygiene/Grooming: Supervision (standing at sink) Bathing: Supervision Upper Body Dressing: Independent Lower Body Dressing: Supervision Toileting: Supervision ADL Intervention and task modifications: 
Patient instructed and indicated understanding energy conservation techniques to increase independence and safety during all ADLs for end goal of returning back home. Provided instruction body is like a jar of marbles, marbles represent energy, at end of day need as many marbles as possible to obtain a good night sleep, REM sleep is when the body repairs itself. This ensures a full jar of marbles, full of energy when wake up to start a new day. Use energy conservation techniques during ADLs so can increase participation in life activities patient prefers, to ensure more frequent good days. If having a bad day, evaluate tasks completed day before and re-plan how to save energy to complete same tasks, for example if going grocery shopping do not complete full bathing/dressing/grooming. Visual handout provided. Patient indicated understanding by stating tasks already completing to save energy ie sitting to don all clothing. Cognitive Retraining Safety/Judgement: Awareness of environment;Driving appropriateness; Fall prevention;Good awareness of safety precautions; Home safety; Insight into deficits Functional Measure: 
Barthel Index: 
 
Bathin Bladder: 10 Bowels: 10 
Groomin Dressin Feeding: 10 Mobility: 10 Stairs: 5 Toilet Use: 5 Transfer (Bed to Chair and Back): 10 Total: 70 Barthel and G-code impairment scale: 
Percentage of impairment CH 
0% CI 
1-19% CJ 
20-39% CK 
40-59% CL 
60-79% CM 
80-99% CN 
100% Barthel Score 0-100 100 99-80 79-60 59-40 20-39 1-19 
 0 Barthel Score 0-20 20 17-19 13-16 9-12 5-8 1-4 0 The Barthel ADL Index: Guidelines 1. The index should be used as a record of what a patient does, not as a record of what a patient could do. 2. The main aim is to establish degree of independence from any help, physical or verbal, however minor and for whatever reason. 3. The need for supervision renders the patient not independent. 4. A patient's performance should be established using the best available evidence.  Asking the patient, friends/relatives and nurses are the usual sources, but direct observation and common sense are also important. However direct testing is not needed. 5. Usually the patient's performance over the preceding 24-48 hours is important, but occasionally longer periods will be relevant. 6. Middle categories imply that the patient supplies over 50 per cent of the effort. 7. Use of aids to be independent is allowed. Juan Luis Martins., Barthel, D.W. (3602). Functional evaluation: the Barthel Index. [address] (14)2. Irina Hyman celso FAIZAN Vásquez, Flakita Beasley., Bharath Gonzales., Daren, [address] (1999). Measuring the change indisability after inpatient rehabilitation; comparison of the responsiveness of the Barthel Index and Functional [place] Measure. Journal of Neurology, Neurosurgery, and Psychiatry, 66(4), 650-004. RONALD uNr, FE Manriquez, & Beth Fields M.A. (2004.) Assessment of post-stroke quality of life in cost-effectiveness studies: The usefulness of the Barthel Index and the EuroQoL-5D. [facility], 38, 913-09 G codes: In compliance with CMSs Claims Based Outcome Reporting, the following G-code set was chosen for this patient based on their primary functional limitation being treated: The outcome measure chosen to determine the severity of the functional limitation was the Barthel Index with a score of 70/100 which was correlated with the impairment scale. ? Self Care:  
  - CURRENT STATUS: CJ - 20%-39% impaired, limited or restricted  - GOAL STATUS: CJ - 20%-39% impaired, limited or restricted  - D/C STATUS:  CJ - 20%-39% impaired, limited or restricted Occupational Therapy Evaluation Charge Determination History Examination Decision-Making LOW Complexity : Brief history review  MEDIUM Complexity : 3-5 performance deficits relating to physical, cognitive , or psychosocial skils that result in activity limitations and / or participation restrictions MEDIUM Complexity : Patient may present with comorbidities that affect occupational performnce. Miniml to moderate modification of tasks or assistance (eg, physical or verbal ) with assesment(s) is necessary to enable patient to complete evaluation Based on the above components, the patient evaluation is determined to be of the following complexity level: LOW Pain: 
Pain Scale 1: Numeric (0 - 10) Pain Intensity 1: 5 Pain Intervention(s) 1: Medication (see MAR) Activity Tolerance:  
Fair Please refer to the flowsheet for vital signs taken during this treatment. After treatment:  
[]  Patient left in no apparent distress sitting up in chair 
[x]  Patient left in no apparent distress in bed 
[x]  Call bell left within reach [x]  Nursing notified 
[x]  Caregiver present 
[]  Bed alarm activated COMMUNICATION/EDUCATION:  
Communication/Collaboration: 
[x]      Home safety education was provided and the patient/caregiver indicated understanding. [x]      Patient/family have participated as able and agree with findings and recommendations. []      Patient is unable to participate in plan of care at this time. Findings and recommendations were discussed with: Registered Nurse Nu Feliciano OT Time Calculation: 25 mins

## 2018-07-27 NOTE — PROGRESS NOTES
Notified MD of patient's refusal to take potassium powder in beverage because it makes her very nauseous. Will try again with 8am order. MD will place order for anti-nausea med. Also notified MD of pt's K+ level, 3.6, this AM.

## 2018-07-27 NOTE — PROGRESS NOTES
7/27/2018  
4:16 PM 
Multiple TC placed to pt's PCP to obtain order for home O2, could not obtain order from PCP, pt understands, gave contact number for [facility] for pt to set up account. CM will follow. Lydia Marroquin  11:47 AM  
CM met w/ pt to discuss need for home O2 as per walking oximetry this AM, pt states she had it before but was unable to pay monthly cost, agreed to referral to [facility] to determine cost, CM also discussed H2H/COPD visit, pt is agreeable, order obtained, referral sent in [address] [place] [address], DME referral in [place]. Will follow. Lydia Marroquin  9:03 AM 
CM consult for home O2 noted, CM left VM for PT to check walking oximetry this AM to determine needs. Will follow. Lydia Marroquin

## 2018-07-27 NOTE — PROGRESS NOTES
[facility] Dosing Services: 7/27/2018 Consult for Warfarin Dosing by Dr. Felisha Isaac. Consult provided for this 66 y.o.  female , for indication AVR, MVR, Afib. Therapy resumed: home regimen: 2 mg daily Dose to achieve an INR goal of 2.5- 3.5 Order entered for  Warfarin 1 mg to be given today at 18:00.  
(Giving 1/2 of home regimen due to supra-therapeutic INR upon admission). Significant drug interactions: None Previous dose given 7/25 PTA (2 mg) PT/INR Lab Results Component Value Date/Time INR 3.7 (H) 07/27/2018 05:50 AM  
  
Platelets Lab Results Component Value Date/Time PLATELET 677 02/54/6966 05:50 AM  
  
H/H Lab Results Component Value Date/Time HGB 10.8 (L) 07/27/2018 05:50 AM  
  
 
Pharmacy to follow daily and will provide subsequent Warfarin dosing based on clinical status. Divine Castellanos, [facility])  Contact information [phone] Attention Lorie Turner (RE: Supratherapeutic INR concern consult): 
**NOTE: Patient's warfarin regimen was switched from 3 mg on MTW and 2 mg on TFSS to 2 mg daily on 7/3/18, due to a supra-therapeutic INR. Patient has a h/o supra-therapeutic levels which required adjustments. At this moment, it is difficult to determine a precise regimen that will keep the patient's INR within range. Over the next few days, we will try to determine and ideal regimen for discharge and provide a recommendation prior to discharge. ** Thanks, 
 
Divine Castellanos, PHARMD

## 2018-07-27 NOTE — PROGRESS NOTES
Pharmacy Warfarin Counseling [facility] asked for pharmacy to speak to patient regarding Warfarin. The patient Sherry Regan is a 66 y.o. 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 She also has no past medical history of Arthritis. . The patient is on continued therapy Warfarin therapy for AVR, MVR, Afib. The goal range International Normalized Ratio (INR) for this patient is 2.5- 3.5. Warfarin counseling was provided by Julee Mello on 7/27/2018. The patient and pharmacist discussed Vit K rich foods, routine administration of Warfarin daily, and the addition of medications (that may or may not affect INR). The patient was interactive and knowledgeable of the information presented. Patient provided information about her recent regimen adjustments and normal diet. Patient had no questions and seemed to understand information provided. Signed: Fouzia Rogers PHARMD     Contact information:  [phone]

## 2018-07-27 NOTE — PROGRESS NOTES
Problem: Mobility Impaired (Adult and Pediatric) Goal: *Acute Goals and Plan of Care (Insert Text) Physical Therapy Goals Initiated 7/26/2018 1. Patient will move from supine to sit and sit to supine  in bed with independence within 7 day(s). 2.  Patient will transfer from bed to chair and chair to bed with independence using the least restrictive device within 7 day(s). 3.  Patient will perform sit to stand with independence within 7 day(s). 4.  Patient will ambulate with independence for 150 feet with the least restrictive device within 7 day(s). 5.  Patient will ascend/descend 4 stairs with 1 handrail(s) with supervision/set-up within 7 day(s). physical Therapy TREATMENT Including HOME OXYGEN ASSESSMENT Patient: Jesus Jacobsen (76 y.o. female) Date: 7/27/2018 Diagnosis: Hypokalemia Hypokalemia Precautions: Fall, Contact (droplet) Chart, physical therapy assessment, plan of care and goals were reviewed. ASSESSMENT: 
Patient reports she does not want home health PT whatsoever. Patient does appear to need home oxygen set up 2L for activity. Patient becomes extremely short of breath and tachycardic with extremely minimal activity. She reports this is her baseline with her COPD and she has been recommended to use home oxygen in the past. Patient states she can only tolerate short distance ambulation in the home 20-30' at most because she becomes so dyspneic. On Ophelia Exertion Scale patient rated activity as \"10\" very very severe. \"  
Patient educated in fall prevention and energy conservation as well as s/sx of low SPO2. Education was rushed as patient indicates she knows the information from past encounters. Patient could benefit from pulmonary rehab on outpt or HHPT basis to improve her tolerance for activity and QOL but she is not interested and declines tx. Documentation for home O2: 
  
ROOM AIR  
 AT REST 
 O2 SATS 92 HR 
85 ROOM AIR WITH ACTIVITY 02 SATS 
87 HR 
110  
(2  ) LITERS OF O2 WITH ACTIVITY O2 SATS 
91 HR 
110  
(2   )LITERS OF 02 PATIENT LEFT COMFORTABLY SITTING/SUPINE 02 SATS 
93 HR 
97 Progression toward goals: 
[]    Improving appropriately and progressing toward goals [x]    Improving slowly and progressing toward goals 
[]    Not making progress toward goals and plan of care will be adjusted PLAN: 
Patient continues to benefit from skilled intervention to address the above impairments. Continue treatment per established plan of care. Discharge Recommendations:  Recommend HHPT or outpt pulmonary rehab but pt declines Further Equipment Recommendations for Discharge: oxygen device SUBJECTIVE:  
Patient stated I absolutely don't want HHPT - I dont need it! Jameson George OBJECTIVE DATA SUMMARY:  
Critical Behavior: 
Neurologic State: Alert, Appropriate for age Orientation Level: Oriented X4 Cognition: Appropriate decision making, Appropriate for age attention/concentration, Appropriate safety awareness, Follows commands Safety/Judgement: Awareness of environment, Driving appropriateness, Fall prevention, Good awareness of safety precautions, Home safety, Insight into deficits Functional Mobility Training: 
Bed Mobility: 
Rolling: Modified independent Supine to Sit: Modified independent Sit to Supine: Modified independent Scooting: Modified independent Transfers: 
Sit to Stand: Modified independent;Supervision Stand to Sit: Modified independent Stand Pivot Transfers: Modified independent Bed to Chair: Modified independent Balance: 
Sitting: Intact Standing: Intact Ambulation/Gait Training: 
Distance (ft): 25 Feet (ft) Assistive Device: Gait belt Ambulation - Level of Assistance: Modified independent Pain: 
Pain Scale 1: Numeric (0 - 10) Pain Intensity 1: 5 Pain Intervention(s) 1: Medication (see MAR) Activity Tolerance:  
poor After treatment:  
[x]    Patient left in no apparent distress sitting up in chair 
[]    Patient left in no apparent distress in bed 
[x]    Call bell left within reach 
[]    Nursing notified 
[x]    Caregiver present 
[]    Bed alarm activated COMMUNICATION/COLLABORATION:  
The patients plan of care was discussed with: Registered Nurse and ROSA ISELA Montemayor, PT Time Calculation: 23 mins

## 2018-07-27 NOTE — PROGRESS NOTES
Bedside shift change report given to Scott (oncoming nurse) by Esau Castillo (offgoing nurse). Report included the following information SBAR, Kardex, ED Summary, Intake/Output, MAR and Recent Results. Cardiac rhythm =  A-Fib.

## 2018-07-27 NOTE — ROUTINE PROCESS
Bedside shift change report given to Chelsie Yao (oncoming nurse) by Thu Roque (offgoing nurse). Report included the following information SBAR, Kardex, MAR, Accordion, Recent Results and Med Rec Status.

## 2018-07-27 NOTE — PROGRESS NOTES
Cardiology Progress Note    5th floor NAME:  Farhana Rebolledo :   1955 MRN:   [id number] Assessment/Plan: 1. Acute dyspnea 2/2 COPD, still smoking.  
- No acute heart failure 
- resume PTA diuretics today 
  
  
2. Hx of aortic and mitral valve replacement:  
- echo shows nml valve function - INR is supratherapeutic on admission.   
- She has very labile INR which is very concerning.   
- will ask pharmacy to see re home med mgt. -  Once INR has stablized over the long term she should be on asa 81mg due aortic valve prosthesis. However, would avoid asa at this time because bleeding risk is too high with labile INR and asa.  
  
3. Severe hypokalemia:  
- 2.3 on admission, corrected to 3.6 today 
 
  
4. Chronic afib-  
- continue dilt for rate control.   
- supratherapeutic INR 3.7 today Will see prn pls call if needed. Subjective:  
Farhana Rebolledo is a 66 y.o. female with a hx of Atrial Fibrillation, CHF, COPD, mechanical valve replacement (s/p AVR and MVR) on Coumadin, Dyslipidemia. Presented to ED with subacute onset of dyspnea. Symptoms of shortness of breath Worsened today so she presented to ED. Chronic issue with laying flat to sleep. Still smoking. Admits to coughing up significant sputum with elevated white coutn. CXR without much edema. NT-Pro bNP is not really elevated compared to prior markers. INR is supratherapeutic. Potassium is critical low. 
  
 
 
 
 
Cardiac ROS: Patient denies any exertional chest pain, palpitations, syncope, orthopnea, edema or paroxysmal nocturnal dyspnea. Previous Cardiac Eval 
ECHO 2004 - normal [facility] AVR/MVR, EF 50%, PA pressures nl ECHO 1/16/15-EF 55 % to 60 %, [facility], AVR/MVR, PA pressures normal 
STRESS: Dobutamine cardiolite 2/5/15 - normal perfusion ECHO 2015: EF 65%, no WMA, LAE, normal [facility] MVR/AVR AoV gradient 25 mmHg mean gradient 13 mmHg.  
ECHO 11/28/15: EF 65-70%, LAE, mechanical MVR- normal fn,mehanical AOV- normal fn, mild-mod TR  
ECHO 18: LVEF 55% No WMA, RV dilated, LA dilated. [facility] MV nml function, [facility] [place] prosthesis stable, physiologic rerurg. Review of Systems: No nausea, indigestion, vomiting, pain, cough, sputum. No bleeding. Taking po. Appetite ok. Objective:  
 
Visit Vitals  /76 (BP 1 Location: Right arm, BP Patient Position: At rest)  Pulse 94  Temp 98.1 °F (36.7 °C)  Resp 18  Ht 5' 4\" (1.626 m)  Wt 130 lb (59 kg)  SpO2 99%  BMI 22.31 kg/m2 O2 Flow Rate (L/min): 3 l/min O2 Device: Nasal cannula Temp (24hrs), Av.2 °F (36.8 °C), Min:97.9 °F (36.6 °C), Max:98.4 °F (36.9 °C) TELE: a fib General: AAOx3 cooperative, no acute distress. HEENT: Atraumatic. Pink and moist.   
Neck : Supple Lungs: scattered rhonchi, expiratory wheezing, moist productive cough. SPO2 100 % 3 liters Heart: irregular rhythm, nml valve click, d murmur,  . No JVD. Abdomen: Soft, non-distended, non-tender. + Bowel sounds. Extremities: No edema, no clubbing, no cyanosis. Neurologic: Grossly intact. Alert and oriented X 3. No acute neurological distress. Psych: Good insight. Not anxious or agitated. Care Plan discussed with: 
  Comments Patient x Family RN x Care Manager Consultant:     
 
 
Data Review: No lab exists for component: ITNL No results for input(s): CPK, CKMB, TROIQ in the last 72 hours. Recent Labs  
   18 
 0550  18 
 1020 NA  137  137  
K  3.6  2.3*  
CL  99  95* CO2  36*  40* BUN  16  15 CREA  0.68  0.84 GLU  169*  154* MG  1.8  1.6 ALB  2.8*  3.0* WBC  11.2*  15.5* HGB  10.8*  11.7 HCT  34.3*  35.2 PLT  229  243 Recent Labs  
   18 
 0550  18 
 1020 INR  3.7*  5.1* PTP  37.5*  51.6* Medications reviewed Current Facility-Administered Medications Medication Dose Route Frequency  magnesium sulfate 2 g/50 ml IVPB (premix or compounded)  2 g IntraVENous ONCE  prochlorperazine (COMPAZINE) with saline injection 5 mg  5 mg IntraVENous Q4H PRN  potassium chloride 10 mEq in 100 ml IVPB  10 mEq IntraVENous Q1H  
 sodium chloride (NS) flush 5-10 mL  5-10 mL IntraVENous Q8H  
 sodium chloride (NS) flush 5-10 mL  5-10 mL IntraVENous PRN  
 albuterol (PROVENTIL VENTOLIN) nebulizer solution 2.5 mg  2.5 mg Nebulization Q4H RT  
 nystatin (MYCOSTATIN) 100,000 unit/mL oral suspension 500,000 Units  500,000 Units Oral QID  dilTIAZem (CARDIZEM) IR tablet 60 mg  60 mg Oral AC&HS  
 oxyCODONE-acetaminophen (PERCOCET) 5-325 mg per tablet 1 Tab  1 Tab Oral Q6H PRN  
 Warfarin- Pharmacy Dosing   Other Rx Dosing/Monitoring  lovastatin (MEVACOR) tablet 10 mg  10 mg Oral QHS  predniSONE (DELTASONE) tablet 60 mg  60 mg Oral DAILY Lincoln Figueroa NP

## 2018-07-28 NOTE — PROGRESS NOTES
Anastacio Vazquez [address] Office [phone] Fax [phone] Assessment and Plan Mendel Sprain is a 66 y.o. female with a PMH of Atrial Fibrillation, CHF, COPD, mechanical valve replacement (s/p AVR and MVR) on Coumadin, Dyslipidemia, Migraine who is admitted for LLE swelling and SOB 
 
24 Hour Events: 
-Started patient on IV steroids: 80 mg Q8H this am 7/28 
-Added Guaifenesin 600 mg Q12H this am 7/28  
-6 min O2 walk: Patient will require 2 L of O2 at home- working with CM to establish needs. SOB: CHF vs COPD Exacerbation (Pt does not follow a pulmonologist OP, she states she has had O2 requirement at home about 2 years ago but has been unable to afford it, currently on 10 mg of Prednisone, Albuterol inhaler and Symbicort at home for COPD & Bumex 1 mg nightly for CHF) -Meds: Duo Nebs scheduled q4h, Prednisone 50 mg today 7/26 (pt already took home 10 mg for COPD) and 60 mg starting tomorrow 7/27 for 4 days --> switched to IV steroids: 80 mg Q8H this am 7/28 Bumex 1 mg nightly (holding bumex starting 7/26 per cards rec) -Labs: Throat culture, RVP, Strict I & Os, Daily CBC & CMP  
-Imaging: ECHO (Last ECHO on 7/2017: Normal with EF of 55-60%) EF of 55%  
-Consults: Cards- appreciate recs, PT&OT  
-Consider PE- Pt is unable to stay flat for a CTA, imaging will not change tx plan since pt is already on coumadin and is supra therapeutic  
  
Unilateral Left Lower Extremity Swelling  
-Imaging: Lower extremity prelim duplex- negative, will f/u final read 
  
Hyperkalemia: K on POA: 2.3 RESOLVED 
(Per chart review Hyperkalemia is a recurrent problem for pt presumably from Bumex & Albuterol use- she takes klor-con 10 mEq M, W and F at home but dose will need readjustment on discharge) - Pt was repleted in the ED with 40 mEq K Klor Con + potassium chloride 10 mEq IV 
- 40 mEq Klor Con for 3 doses on 7/26 on transfer from ED 
 
-  this am 7/28: 4.2 
  
Atrial Fibrillation s/p AVR & MVR on Coumadin (INR on POA- 5.1) (Pt has been seen OP by Dr Luisana Tirado but states she has not followed up with him since 2016) - Home Warfarin held- to be dosed by pharmacy for a INR goal of 2.5-3.5 
- Home Diltiazem 60 mg four times daily- breakfast, lunch, dinner and bedtime  
  
Thrush - Nystatin- 4 times daily for thrust- swish and swallow  
  
Chronic Back Pain - Home Percocet 5-325 mg resumed- q6h PRN  
  
Hyperlipidemia (: TC: 196, T, HDL 88, VLDL 30 & LDL 78) - Home Lovastatin 10 mg  
  
FEN/GI - Regular diet. Activity - Ambulate as tolerated DVT prophylaxis - On Warfrain GI prophylaxis - Not indicated at this time Fall prophylaxis - Not indicated at this time. Disposition - Admit to Remote Telemetry. Plan to d/c to Home. Code Status - DNR, discussed with patient / caregivers. Next of Denise Myles Name and Contact : ROCHELLE Womack [phone] I appreciate the opportunity to participate in the care of this patient, Kiki Elaine MD 
Family Medicine Resident Subjective / Objective Subjective States overall feels better and is not as SOB on the NC O2 Temp (24hrs), Av.1 °F (36.7 °C), Min:98 °F (36.7 °C), Max:98.3 °F (36.8 °C) Objective: 
Vital signs: (most recent): Blood pressure 146/72, pulse 92, temperature 98.2 °F (36.8 °C), resp. rate 18, height 5' 4\" (1.626 m), weight 140 lb 4.8 oz (63.6 kg), SpO2 100 %. Respiratory: O2 Flow Rate (L/min): 2 l/min O2 Device: Nasal cannula Visit Vitals  /72 (BP 1 Location: Right arm, BP Patient Position: At rest)  Pulse 92  Temp 98.2 °F (36.8 °C)  Resp 18  Ht 5' 4\" (1.626 m)  Wt 140 lb 4.8 oz (63.6 kg)  SpO2 100%  BMI 24.08 kg/m2 General: No acute distress. Alert. Cooperative. Head: Normocephalic. Atraumatic. Throat: Mucosa pink. Moist mucous membranes. White plaque noted on tongue- improved Neck: Supple. Normal ROM. No stiffness.   
Respiratory: Mild scattered expiratory wheezing throughout, improved air movement in lower lung bases Cardiovascular: RRR. Normal S1,S2. No m/r/g. Pulses 2+ throughout. GI: + bowel sounds. Nontender. No rebound tenderness or guarding. Nondistended. Extremities: LLE anterior tibia 1+ pitting edema with increased edema distally towards the ankle with greatest edema on dorsal surface of foot at 2+ , no warmth or erythema compared to the other leg, no signs of cellulitis- slightly improved Musculoskeletal: Distal pulses intact. Skin: Warm, dry. No rashes. Neuro: CN II-XII grossly intact. Strength 5/5 in all extremities. I/O: 
  
 
CBC: 
Recent Labs  
   07/28/18 
 0312  07/27/18 
 0550  07/26/18 
 1020 WBC  14.2*  11.2*  15.5* HGB  10.0*  10.8*  11.7 HCT  32.1*  34.3*  35.2 PLT  242  229  243 Metabolic Panel: 
Recent Labs  
   07/28/18 
 0312  07/27/18 
 0550  07/26/18 
 1020 NA  134*  137  137  
K  4.2  3.6  2.3*  
CL  96*  99  95* CO2  34*  36*  40* BUN  13  16  15 CREA  0.91  0.68  0.84 GLU  244*  169*  154* CA  8.3*  8.3*  8.3*  
MG  1.6  1.8  1.6 ALB  2.8*  2.8*  3.0*  
SGOT  25  28  43* ALT  52  54  68 INR  2.9*  3.7*  5.1* For Billing Chief Complaint Patient presents with  Shortness of Breath  Leg Swelling Hospital Problems  Date Reviewed: 7/17/2018 Codes Class Noted POA * (Principal)Hypokalemia ICD-10-CM: E87.6 ICD-9-CM: 276.8  7/26/2018 Unknown Thrush of mouth and esophagus ([facility] ICD-10-CM: B37.81, B37.0 ICD-9-CM: 112.84, 112.0  7/26/2018 Yes Diastolic CHF, chronic (HCC) ICD-10-CM: I50.32 
ICD-9-CM: 428.32, 428.0  12/17/2015 Yes Chronic atrial fibrillation (HCC) ICD-10-CM: E57.0 ICD-9-CM: 427.31  12/17/2015 Yes COPD (chronic obstructive pulmonary disease) (HCC) ICD-10-CM: J44.9 ICD-9-CM: 886  11/13/2015 Yes CAD (coronary artery disease), native coronary artery ICD-10-CM: I25.10 ICD-9-CM: 414.01 1/8/2015 Yes

## 2018-07-28 NOTE — PROGRESS NOTES
Detailed Written Order: home O2, continious 2L, via nasal cannula, with portable gas and supplies     Order Date: 07/28/18  Hospital Admit Date: 07/26/18  Discharge Date: 07/28/18     Diagnosis:  COPD (chronic obstructive pulmonary disease) ([facility] J44.9     Length of Need: 99     Face to Face Discussion Date: 07/28/18     Discussed the need for continuous home oxygen therapy with patient and patient understands need.     Physician Name: Ricardo Winston  NPI:  [id number]

## 2018-07-28 NOTE — PROGRESS NOTES
0730- Pt sitting up in bed eating breakfast, she has asked for percocet this morning no acute concerns at this time. ~ YOSEPH Resendiz RN  
 
5634- Residents have rounded on the patient  at bedside no acute concerns to address at this time. ~ YOSEPH Resendiz RN  
 
0071- Patient eating lunch no concerns at this time. Juanita WEEMS  
 
5876- Bedside and Verbal shift change report given to Figma  (oncoming nurse) by Wally Resendiz RN  (offgoing nurse). Report included the following information SBAR, Kardex and Recent Results.

## 2018-07-28 NOTE — PROGRESS NOTES
[facility] Pharmacy Dosing Services: 7/28/2018 Consult for Warfarin Dosing by Pharmacy by Dr. Mino Martinez Consult provided for this 66 y.o.  female , for indication of AVR, MVR, Afib PTA Dose: 2mg daily Dose to achieve an INR goal of 2.5- 3.5 
 
- INR continuing to trend down. Resulted as 2.9 this AM which is in her therapeutic range. Will order 1 mg dose again for tonight. Significant drug interactions: None Previous dose given 2mg (PTA, 7/25/18) PT/INR Lab Results Component Value Date/Time INR 2.9 (H) 07/28/2018 03:12 AM  
  
Platelets Lab Results Component Value Date/Time PLATELET 916 81/82/8139 03:12 AM  
  
H/H Lab Results Component Value Date/Time HGB 10.0 (L) 07/28/2018 03:12 AM  
  
 
Pharmacy to follow daily and will provide subsequent Warfarin dosing based on clinical status. Malachi Melo, [address] information [phone]

## 2018-07-28 NOTE — PROGRESS NOTES
Bedside and Verbal shift change report given to [address] [place] [address] (oncoming nurse) by Esau Curran RN (offgoing nurse). Report included the following information SBAR, Kardex, MAR, Recent Results and Cardiac Rhythm A fib.

## 2018-07-28 NOTE — PROGRESS NOTES
Name: Mabel Moreno : 1955 Medicare Number: [id number] Address: [facility]; [place], [address] [place] [address] Phone: [phone] Detailed Written Order: home O2, continious 2L, via nasal cannula, with portable gas and supplies Order Date: 18 Hospital Admit Date: 18 Discharge Date: 18 Diagnosis: [facility] J[facility].9 Length of Need: 99 Face to Face Discussion Date: 18 Discussed the need for continuous home oxygen therapy with patient and patient understands need. Physician Name: Evelyn Gallegos 
NPI:  [id number]

## 2018-07-29 PROBLEM — R06.02 SOB (SHORTNESS OF BREATH): Status: ACTIVE | Noted: 2018-01-01

## 2018-07-29 NOTE — PROGRESS NOTES
Bedside shift change report given to Dennys Cash (oncoming nurse) by Alicja Bonilla (offgoing nurse). Report included the following information SBAR, Kardex, Procedure Summary, Intake/Output, MAR, Recent Results and Cardiac Rhythm afib.

## 2018-07-29 NOTE — PROGRESS NOTES
Bedside and Verbal shift change report given to Betty Addison (oncoming nurse) by Shania Barrios (offgoing nurse). Report included the following information SBAR, Kardex, Procedure Summary, Intake/Output, MAR and Recent Results.

## 2018-07-29 NOTE — PROGRESS NOTES
[facility] Pharmacy Dosing Services: 7/29/2018 Consult for Warfarin Dosing by Pharmacy by Dr. Roseline Tirado Consult provided for this 66 y.o.  female , for indication of AVR, MVR, Afib PTA Dose: 2mg daily Dose to achieve an INR goal of 2.5- 3.5 INR continuing to trend down. Resulted as 2.5 this AM which is in her therapeutic range. Will order 2 mg dose for tonight. - Steroids changed to IV which may affect INR. INR is continuing to trend down reflective of warfarin 2 mg over the past 48 hours. - Alternating doses of 1 mg and 2 mg may be a reasonable outpatient regimen, will continue to monitor. INR previously supratherapeutic on 2 mg daily. Significant drug interactions: None Previous dose given 1 mg (7/28/18) PT/INR Lab Results Component Value Date/Time INR 2.5 (H) 07/29/2018 06:30 AM  
  
Platelets Lab Results Component Value Date/Time PLATELET 902 17/21/6101 06:30 AM  
  
H/H Lab Results Component Value Date/Time HGB 10.7 (L) 07/29/2018 06:30 AM  
  
 
Pharmacy to follow daily and will provide subsequent Warfarin dosing based on clinical status. Tonya Adorno, [address] information [phone]

## 2018-07-29 NOTE — PROGRESS NOTES
[facility] [facility]  Office [phone] Fax [phone] Assessment and Plan Alice Sullivan is a 66 y.o. female with a PMH of Atrial Fibrillation, CHF, COPD, mechanical valve replacement (s/p AVR and MVR) on Coumadin, Dyslipidemia, Migraine who is admitted for LLE swelling and SOB 
 
24 Hour Events: 
- Yesterday patient switched from oral prednisone to IV solumedrol 80 mg Q8H 
- Working with CM for home O2 2L continuous Acute Hypoxic Respiratory Distress 2/2 likely COPD Exacerbation POA - Pt with known home O2 requirement (2L continuous) but has been unable to afford it, currently on 10 mg of Prednisone, Albuterol inhaler and Symbicort at home for COPD) - Duo Nebs scheduled q4h, mucinex - Prednisone 60mg daily --> solumedrol 80mg q8h on 7/29. Switched to prednisone 60mg daily to start later today. - Throat culture negative, RVP negative - PT recommending home O2 continuous, working with CM 
  
Chronic dHF not in acute exacerbation - ECHO 7/26/18 with EF 55%, no RWMA 
- Cardiology following, appreciate reccs. - Home bumex 1mg resumed per cardiology on 7/27 Unilateral Left Lower Extremity Swelling POA - IMPROVING.  
- Lower extremity prelim duplex- negative, will f/u final read (still pending) - Patient unable to tolerate CTA to rule out PE 
  
Hypokalemia: K on POA: 2.3 RESOLVED -  Per chart review Hypokalemia is a recurrent problem for pt presumably from Bumex & Albuterol use- she takes klor-con 10 mEq M, W and F at home but dose will need readjustment on discharge - Replete PRN 
  
Atrial Fibrillation s/p AVR & MVR on Coumadin - Pt has been seen OP by Dr Lisa Dalton but states she has not followed up with him since 2016. INR supratherapeutic on admission (5.1) - Warfarin to be dosed by pharmacy for a INR goal of 2.5-3.5 - INR therapeutic today - Home Diltiazem 60 mg four times daily - Per cardiology request, pharmacy has done warfarin teaching with patient - Per pharmacy, dc on regimen of warfarin 1mg daily with close outpatient f/u - Per cardiology, would recommend patient be on daily ASA once INR therapeutic and stable. Patient with hx of allergy to celecoxib, will defer to cardiology 
  Demaris Deems - Nystatin- 4 times daily for thrust- swish and swallow  
  
Chronic Back Pain - Home Percocet 5-325 mg resumed- q6h PRN  
  
Hyperlipidemia (: TC: 196, T, HDL 88, VLDL 30 & LDL 78) - Home Lovastatin 10 mg  
  
FEN/GI - Regular diet. Activity - Ambulate as tolerated DVT prophylaxis - On Warfrain GI prophylaxis - Not indicated at this time Fall prophylaxis - Not indicated at this time. Disposition - Admit to Remote Telemetry. Plan to d/c to Home. Code Status - DNR, discussed with patient / caregivers. Next of Denise [address] Name and Contact : ROCHELLE Augustine [phone] I appreciate the opportunity to participate in the care of this patient, Melany Zimmerman MD 
Family Medicine Resident Subjective / Objective Subjective Patient doing well this AM, reports the mucinex has helped tremendously, is coughing up productive green phlegm. Breathing improved. Mild SOB only presnt with cough. NO fever or chills. Good PO. Using ICS often. LE edema has now switched to RLE, LLE significantly improved from admission. Says she has been up walking more. Objective: 
Vital signs: (most recent): Blood pressure 155/67, pulse 88, temperature 97.7 °F (36.5 °C), resp. rate 18, height 5' 4\" (1.626 m), weight 140 lb 4.8 oz (63.6 kg), SpO2 99 %. Respiratory: O2 Flow Rate (L/min): 2 l/min O2 Device: Nasal cannula Visit Vitals  /67 (BP 1 Location: Right arm, BP Patient Position: At rest)  Pulse 88  Temp 97.7 °F (36.5 °C)  Resp 18  Ht 5' 4\" (1.626 m)  Wt 140 lb 4.8 oz (63.6 kg)  SpO2 99%  BMI 24.08 kg/m2 General: No acute distress. Alert. Cooperative. Head: Normocephalic. Atraumatic. Throat: Mucosa pink. Moist mucous membranes. Neck: Supple. Normal ROM. No stiffness. Respiratory: Mild scattered expiratory wheezing throughout, improved air movement throughout, mild inspiratory crackles at lung bases. Cardiovascular: RRR. Normal S1,S2. No m/r/g. Pulses 2+ throughout. GI: + bowel sounds. Nontender. No rebound tenderness or guarding. Nondistended. Extremities: LLE edema RESOLVED. Now with 2+ pitting edema in RLE. Distal pulses intact bilaterally. Musculoskeletal: Distal pulses intact. Skin: Warm, dry. No rashes. Neuro: CN II-XII grossly intact. Strength 5/5 in all extremities. I/O: 
 
Date 07/28/18 0700 - 07/29/18 2580 07/29/18 0700 - 07/30/18 7286 Shift [id number] 1900-0659 24 Hour Total [id number] 1900-0659 24 Hour Total  
I 
N 
T 
A 
K 
E 
 P. O. 600  600     
   P. O. 600  600 Shift Total 
(mL/kg) 600 
(9.4)  600 
(9.4) O 
U T 
P 
U Gutierrez Quivers Urine (mL/kg/hr) Urine Occurrence(s) 3 x 2 x 5 x Shift Total 
(mL/kg)   600 Weight (kg) 63.6 63.6 63.6 63.6 63.6 63.6 CBC: 
Recent Labs  
   07/28/18 
 0312  07/27/18 
 0550  07/26/18 
 1020 WBC  14.2*  11.2*  15.5* HGB  10.0*  10.8*  11.7 HCT  32.1*  34.3*  35.2 PLT  242  229  243 Metabolic Panel: 
Recent Labs  
   07/28/18 0312  07/27/18 
 0550  07/26/18 
 1020 NA  134*  137  137  
K  4.2  3.6  2.3*  
CL  96*  99  95* CO2  34*  36*  40* BUN  13  16  15 CREA  0.91  0.68  0.84 GLU  244*  169*  154* CA  8.3*  8.3*  8.3*  
MG  1.6  1.8  1.6 ALB  2.8*  2.8*  3.0*  
SGOT  25  28  43* ALT  52  54  68 INR  2.9*  3.7*  5.1* For Billing Chief Complaint Patient presents with  Shortness of Breath  Leg Swelling Hospital Problems  Date Reviewed: 7/17/2018 Codes Class Noted POA * (Principal)Hypokalemia ICD-10-CM: E87.6 ICD-9-CM: 276.8  7/26/2018 Unknown [facility] ICD-10-CM: B37.81, B37.0 ICD-9-CM: 112.84, 112.0  7/26/2018 Yes Diastolic CHF, chronic (HCC) ICD-10-CM: I50.32 
ICD-9-CM: 428.32, 428.0  12/17/2015 Yes Chronic atrial fibrillation (HCC) ICD-10-CM: V17.4 ICD-9-CM: 427.31  12/17/2015 Yes COPD (chronic obstructive pulmonary disease) (HCC) ICD-10-CM: J44.9 ICD-9-CM: 064  11/13/2015 Yes CAD (coronary artery disease), native coronary artery ICD-10-CM: I25.10 ICD-9-CM: 414.01  1/8/2015 Yes

## 2018-07-30 NOTE — ROUTINE PROCESS
Bedside and Verbal shift change report given to Dior Magana RN (oncoming nurse) by Katie Castrejon RN (offgoing nurse). Report included the following information SBAR, Kardex, MAR, Accordion and Recent Results.

## 2018-07-30 NOTE — ROUTINE PROCESS
Bedside and Verbal shift change report given to Claudia Pina RN (oncoming nurse) by Leora Carvajal RN (offgoing nurse). Report included the following information SBAR, Kardex, MAR, Accordion and Recent Results

## 2018-07-30 NOTE — PROGRESS NOTES
S: Pt reports no acute events this evening, feels like she is improving and states she hope she is leaving the hospital soon. Pt reports doing her duo nebs and breathing exercises. Pt reports bilateral mid back pain with her cough. Pt says pain is not new and is present only when she coughs. O:  
Visit Vitals  /69 (BP 1 Location: Right arm, BP Patient Position: At rest)  Pulse 97  Temp 98 °F (36.7 °C)  Resp 18  Ht 5' 4\" (1.626 m)  Wt 140 lb (63.5 kg)  SpO2 99%  BMI 24.03 kg/m2 GEN: Pt in no acute distress, completes full sentences in one breath HEART: RRR 
LUNG: O2 nasal cannula, effort normal, scattered wheezing bilaterally, worse in upper lung fields bilaterally PSYCH:  Normal mood/affect, pt cooperative A/P: 67 y/o F with COPD exacerbation, pt doing well 
-Continue per AM note

## 2018-07-30 NOTE — PROGRESS NOTES
Problem: Patient Education: Go to Patient Education Activity Goal: Patient/Family Education 
physical Therapy TREATMENT Patient: Pablo Jacobs (34 y.o. female) Date: 7/30/2018 Diagnosis: Hypokalemia SOB (shortness of breath) Hypokalemia Precautions: Fall, Contact (droplet) Chart, physical therapy assessment, plan of care and goals were reviewed. ASSESSMENT: 
Pt seen for walking oximetry. Pt was 96% on RA sitting on EOB. Pt ambulated 60ft with no device CGA to SBA. Pts SPO2 was 93% on RA during ambulation. Pt is limited to short distance ambulating secondary to fatigue ability. After a brief rest pt ambulated same distance with SPO2 93% and above on RA. Pts SPO2 is easily staying 93% and above with mobility today. Nurse notified. Progression toward goals: 
[]    Improving appropriately and progressing toward goals [x]    Improving slowly and progressing toward goals 
[]    Not making progress toward goals and plan of care will be adjusted PLAN: 
Patient continues to benefit from skilled intervention to address the above impairments. Continue treatment per established plan of care. Discharge Recommendations:  Home Health Further Equipment Recommendations for Discharge: SUBJECTIVE:  
 
 
OBJECTIVE DATA SUMMARY:  
Critical Behavior: 
Neurologic State: Alert Orientation Level: Oriented X4 Cognition: Appropriate for age attention/concentration, Follows commands Safety/Judgement: Awareness of environment, Driving appropriateness, Fall prevention, Good awareness of safety precautions, Home safety, Insight into deficits Functional Mobility Training: 
  
  
  
  
  
Transfers: 
Sit to Stand: Contact guard assistance;Stand-by assistance Stand to Sit: Contact guard assistance;Stand-by assistance Balance: 
Sitting: Intact Standing: Intact Ambulation/Gait Training: 
Distance (ft): 60 Feet (ft) Assistive Device: Gait belt Ambulation - Level of Assistance: Contact guard assistance;Stand-by assistance Gait Abnormalities: Decreased step clearance Base of Support: Narrowed Speed/Stephanie: Pace decreased (<100 feet/min) Step Length: Left shortened;Right shortened Documentation for home O2: 
  
ROOM AIR  
 AT REST 
 O2 SATS 96% ROOM AIR WITH ACTIVITY 02 SATS 93% (0    ) LITERS OF O2 WITH ACTIVITY O2 SATS 93% (0    )LITERS OF 02 PATIENT LEFT COMFORTABLY SITTING/SUPINE 02 SATS 95% Pain: 
Pain Scale 1: Numeric (0 - 10) Pain Intensity 1: 8 Pain Intervention(s) 1: Refused (n/v) Activity Tolerance:  
Pt tolerated treatment fairly well. Please refer to the flowsheet for vital signs taken during this treatment. After treatment:  
[]    Patient left in no apparent distress sitting up in chair 
[x]    Patient left in no apparent distress in bed 
[]    Call bell left within reach 
[]    Nursing notified 
[]    Caregiver present 
[]    Bed alarm activated COMMUNICATION/COLLABORATION:  
The patients plan of care was discussed with: Physical Therapist 
 
Dru Alonzo PTA Time Calculation: 38 mins

## 2018-07-30 NOTE — PROGRESS NOTES
[address] [facility] [address]  Office [phone] Fax [phone] Assessment and Plan Jake Doll is a 66 y.o. female with a PMH of Atrial Fibrillation, CHF, COPD, mechanical valve replacement (s/p AVR and MVR) on Coumadin, Dyslipidemia, Migraine who is admitted for LLE swelling and SOB 
 
24 Hour Events: 
- Pulm Consulted this am 7/30 
- Per pharmacy, dc on regimen of warfarin alternating btw 1 mg and 2 mg with close outpatient f/u Acute Hypoxic Respiratory Distress 2/2 likely COPD Exacerbation POA - Pt with known home O2 requirement (2L continuous) but has been unable to afford it, currently on 10 mg of Prednisone, Albuterol inhaler and Symbicort at home for COPD) - Duo Nebs scheduled q4h, mucinex - Prednisone 60mg daily --> solumedrol 80mg q8h on 7/29 --> prednisone 60mg daily on 7/30 - Throat culture negative, RVP negative - PT recommending home O2 continuous --> 6 min walk on 7/27 pt required 2 L O2 
  
Chronic dHF not in acute exacerbation - ECHO 7/26/18 with EF 55%, no RWMA 
- Cardiology following, appreciate reccs. - Home bumex 1mg resumed per cardiology on 7/27 Unilateral Left Lower Extremity Swelling POA - RESOLVED 
- Lower extremity prelim duplex- negative, will f/u final read (still pending) - Patient unable to tolerate CTA to rule out PE 
  
Hypokalemia: K on POA: 2.3 RESOLVED -  Per chart review Hypokalemia is a recurrent problem for pt presumably from Bumex & Albuterol use- she takes klor-con 10 mEq M, W and F at home but dose will need readjustment on discharge - Replete PRN 
  
Atrial Fibrillation s/p AVR & MVR on Coumadin - Pt has been seen OP by Dr Eduardo Miguel but states she has not followed up with him since 2016. INR supratherapeutic on admission (5.1) - Warfarin to be dosed by pharmacy for a INR goal of 2.5-3.5 - INR therapeutic today - Home Diltiazem 60 mg four times daily - Per cardiology request, pharmacy has done warfarin teaching with patient - Per pharmacy, dc on regimen of warfarin alternating btw 1 mg and 2 mg with close outpatient f/u - Per cardiology, would recommend patient be on daily ASA once INR therapeutic and stable. Patient with hx of allergy to celecoxib, will defer to cardiology 
  Oumou Kang - Nystatin- 4 times daily for thrust- swish and swallow  
  
Chronic Back Pain - Home Percocet 5-325 mg resumed- q6h PRN  
  
Hyperlipidemia (: TC: 196, T, HDL 88, VLDL 30 & LDL 78) - Home Lovastatin 10 mg  
  
FEN/GI - Regular diet. Activity - Ambulate as tolerated DVT prophylaxis - On Warfrain GI prophylaxis - Not indicated at this time Fall prophylaxis - Not indicated at this time. Disposition - Admit to Remote Telemetry. Plan to d/c to Home. Code Status - DNR, discussed with patient / caregivers. Next of Denise Myles Name and Contact : ROCHELLE Galaviz [facility] [phone] I appreciate the opportunity to participate in the care of this patient, Kelly Bolivar MD 
Family Medicine Resident Subjective / Objective Subjective Patient doing well this AM, states she is feeling better and less SOB but not back to her baseline Objective: 
Vital signs: (most recent): Blood pressure 151/70, pulse 97, temperature 98.3 °F (36.8 °C), resp. rate 18, height 5' 4\" (1.626 m), weight 140 lb 4.8 oz (63.6 kg), SpO2 99 %. Respiratory: O2 Flow Rate (L/min): 2 l/min O2 Device: Nasal cannula Visit Vitals  /70 (BP 1 Location: Right arm, BP Patient Position: At rest)  Pulse 97  Temp 98.3 °F (36.8 °C)  Resp 18  Ht 5' 4\" (1.626 m)  Wt 140 lb 4.8 oz (63.6 kg)  SpO2 99%  BMI 24.08 kg/m2 General: No acute distress. Alert. Cooperative. Head: Normocephalic. Atraumatic. Throat: Mucosa pink. Moist mucous membranes. Neck: Supple. Normal ROM. No stiffness. Respiratory: Decreased air movement in mid and lower lung bases, mild inspiratory crackles at lung bases. Cardiovascular: RRR. Normal S1,S2. No m/r/g. Pulses 2+ throughout. GI: + bowel sounds. Nontender. No rebound tenderness or guarding. Nondistended. Extremities: LLE edema RESOLVED. Now with 2+ pitting edema in RLE. Distal pulses intact bilaterally. Musculoskeletal: Distal pulses intact. Skin: Warm, dry. No rashes. Neuro: CN II-XII grossly intact. Strength 5/5 in all extremities. I/O: 
 
Date 07/28/18 1900 - 07/29/18 3216 07/29/18 0700 - 07/30/18 4076 Shift [id number] 24 Hour Total 5035-7307 4834-9957 24 Hour Total  
I 
N 
T 
A 
K 
E 
 P. O.  600     
   P. O.  600 Shift Total 
(mL/kg)  600 
(9.4) O 
U T 
P 
U Chyrel Kiefer Urine (mL/kg/hr) Urine Occurrence(s) 2 x 5 x Shift Total 
(mL/kg) NET  600 Weight (kg) 63.6 63.6 63.6 63.6 63.6 CBC: 
Recent Labs  
   07/29/18 
 0630  07/28/18 
 0312  07/27/18 
 0550 WBC  17.3*  14.2*  11.2* HGB  10.7*  10.0*  10.8* HCT  34.4*  32.1*  34.3*  
PLT  237  242  229 Metabolic Panel: 
Recent Labs  
   07/29/18 
 0630  07/28/18 
 0312  07/27/18 
 0550 NA  135*  134*  137  
K  5.2*  4.2  3.6 CL  96*  96*  99  
CO2  34*  34*  36* BUN  16  13  16 CREA  1.06*  0.91  0.68 GLU  263*  244*  169* CA  8.7  8.3*  8.3*  
MG  2.0  1.6  1.8 ALB  3.0*  2.8*  2.8* SGOT  35  25  28 ALT  57  52  54 INR  2.5*  2.9*  3.7* For Billing Chief Complaint Patient presents with  Shortness of Breath  Leg Swelling Hospital Problems  Date Reviewed: 7/17/2018 Codes Class Noted POA  
 SOB (shortness of breath) ICD-10-CM: R06.02 
ICD-9-CM: 786.05  7/29/2018 Unknown * (Principal)Hypokalemia ICD-10-CM: E87.6 ICD-9-CM: 276.8  7/26/2018 Unknown [facility] esophagus [facility] ICD-10-CM: B37.81, B37.0 ICD-9-CM: 112.84, 112.0  7/26/2018 Yes Diastolic CHF, chronic (HCC) ICD-10-CM: I50.32 
ICD-9-CM: 428.32, 428.0  12/17/2015 Yes Chronic atrial fibrillation (HCC) ICD-10-CM: X98.5 ICD-9-CM: 427.31  12/17/2015 Yes COPD (chronic obstructive pulmonary disease) (HCC) ICD-10-CM: J44.9 ICD-9-CM: 414  11/13/2015 Yes CAD (coronary artery disease), native coronary artery ICD-10-CM: I25.10 ICD-9-CM: 414.01  1/8/2015 Yes

## 2018-07-30 NOTE — DISCHARGE SUMMARY
Anastacio Pham Jesus [address]  Office [phone] Fax [phone] Discharge / Transfer / Off-Service Note Name: Mabel Moreno MRN: [id number]  Sex: Female YOB: 1955  Age: 66 y.o. PCP: Tricia Beard DO Date of admission: 7/26/2018 Date of discharge/transfer: 7/31/2018 Attending physician at admission: Dr. Adelia Escoto Attending physician at discharge/transfer: Dr. Adelia Escoto Resident physician at discharge/transfer: Neena Stern MD 
  
Consultants during hospitalization Merlin Flower- cardiology Admission diagnoses Hypokalemia SOB (shortness of breath) Recommended follow-up after discharge 1. PCP Dr Carmen [place] Things to follow up on with PCP: 
 
PT/INR- to readjust Warfarin level BMP- Recheck Potassium level--> At this time pt is taking Klor Con 10 mEq Monday, Wednesday and Friday but this might need readjusting based on Potassium levels. CBC- Ensure WBC count is down trending Medication Changes: 
 
1. Warfarin alternate with taking 1 mg and then 2 mg as follows: Take 2 mg daily on Mon/Wed/Fri and 1 mg daily on Tues/Thurs/Saturday/Sunday 2. Nystatin- swish and split- use 5 ml 4 times a day from 7/31/2018 to 8/4/2018 to help with the oral yeast infection 3. Hold home prednisone 10 mg while taking the following prednisone taper: - Take 3 tablets (60mg total) on 8/1, 8/2 and 8/3 
- Take 2.5 tablets (50mg total) on 8/4, 8/5 and 8/6 
- Take 2 tablets (40 mg total) on 8/7, 8/8 and 8/9 
- Take 1.5 tablets (30 mg total) on 8/10, 8/11 and 8/12 
- On 8/13, resume home prednisone 10mg daily. 4. Augmentin: Take 1 tablet tonight 7/31 at 9 PM. Then take 1 pill every 12 hours from 8/1/2018 through 8/8/2018 History of Present Illness Per admitting provider Dr. Blair Bustillo, \"[address] is a 66 y.o. female with known PMH of Atrial Fibrillation, CHF, COPD, mechanical valve replacement (s/p AVR and MVR) on Coumadin, Dyslipidemia, Migraine  who presents to the ER complaining of L leg swelling. Patient stated that about 2 days ago she started noticing LLE swelling (without pain) and also started feeling short of breath. She states she usually has such symptoms due to her CHF. She complains of not being able to sleep flat for many years now. She increased her albuterol inhaler use to 5 times a day over the past few days with not much relief and has been compliant with her diuretic medicine nightly.  
  
She also states that she has had a non productive cough for 2 weeks now. She complains of thrush in her mouth which has caused her to have a sore throat with trouble swallowing. She has been using Nystatin at home and has had some symptom improvement.  
  
Of note, pt has a hx of COPD and CHF and has been on home oxygen 2 years ago but has not been able to afford it in the past few years.   
  
In the ED: - Vitals - T 98.4F, , /67, RR 24, O2-95% on 2 L/min - Labs - Remarkable for: K- 2.3, WBC- 15.5, , INR 5.1  
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 
- Treatment - Received 40 mEq K Klor Con + potassium chloride 10 mEq IV, Albuterol nebulizer 2.5 mg treatment  
  
EKG: Atrial Fibrillation\" HOSPITAL COURSE Acute Hypoxic Respiratory Distress 2/2 likely COPD Exacerbation POA - Pt with known home O2 requirement (2L continuous) but has been unable to afford it, currently on 10 mg of Prednisone, Albuterol inhaler and Symbicort at home for COPD. On admission pt was started on scheduled duo nebs and oral steroids. When it was noted that symptoms were not improving she was switched to IV steroids for a day and then switched back to oral steroids. Mucinex was also added to the regimen. Throat culture and RVP were both negative.   
 
Pulm was consulted to assist with care and they suggested addition of pulmicort/brovana, continuing the prednisone oral taper and addition of augmentin due to purulent sputum production. Smoking cessation to help control COPD symptoms was also discussed with the patient. Patient passed the 6 minute walk and did not qualify for home oxygen. On discharge pt will complete the course of prednisone taper and Augmentin. The importance of following up with a pulmonologist for COPD for discussed with patient. Chronic dHF not in acute exacerbation ECHO on 7/26/18 with EF 55%, no RWMA. Per Cardiology this episode of hypoxic respiratory failure was unrelated to patients chronic dHF. Patient to continue Bumex 1 mg daily on discharge. Unilateral Left Lower Extremity Swelling POA-Lower extremity US was negative for DVT. Swelling resolved during the course of admission. Hypokalemia: K on POA: 2.3 Per chart review Hypokalemia is a recurrent problem for pt presumably from Bumex & Albuterol use. Potassium was repleted on admission. She takes klor-con 10 mEq M, W and F at home but dose will need readjustment on discharge per the PCP. Persistent Atrial Fibrillation s/p AVR & MVR on Coumadin - Pt has been seen OP by Dr Candance Corning but states she has not followed up with him since 2016. INR was supratherapeutic on admission (5.1). During admission Warfarin was dosed by pharmacy for a INR goal of 2.5-3.5. Home Diltiazem 60 mg four times daily was restarted. [address] teaching was performed by pharmacy. Dosing on discharge will be as follows: Warfarin alternate with taking 1 mg and then 2 mg as follows: Take 2 mg daily on Mon/Wed/Fri and 1 mg daily on Tues/Thurs/Saturday/Sunday. A PCP appointment has been set up for close follow up on PT/INR to ensure pt stays in the therapeutic range with the current regimen. Thrush- Pt presented with thrush on admission. Nystatin- 4 times daily for thrust- swish and spit was started. This will be continued till 8/4/2018. Chronic Back Pain- Home Percocet 5-325 mg Q6H was resumed.    
   
Hyperlipidemia (: TC: 196, T, HDL 88, VLDL 30 & LDL 78). Home Lovastatin 10 mg to be resumed on discharge. Physical exam at discharge: 
 
Vitals Reviewed. General Oriented to person, place, and time and well-developed. Appears well-nourished, no distress. Not diaphoretic. Cardio Normal rate, regular rhythm. Exam reveals no gallop and no friction rub. No murmur heard. No chest wall tenderness. Pulmonary  Decreased breath sounds in mid/lower lobe, mild scattered wheezing in upper lobes Abdominal Soft. Bowel sounds normal. No distension. No tenderness.  Deferred. Extremities No edema of lower extremities. No tenderness. Distal pulses intact. Neurological Alert and oriented to person, place, and time. Dermatology Skin is warm and dry. No rash noted. No erythema or pallor. Psychiatric Affect and judgment normal.   
 
 
Condition at discharge: Stable. Labs Recent Labs  
   18 
 0630  18 
 5925  18 
 0550 WBC  17.3*  14.2*  11.2* HGB  10.7*  10.0*  10.8* HCT  34.4*  32.1*  34.3*  
PLT  237  242  229 Recent Labs  
   18 
 0630  18 
 9029  18 
 0550 NA  135*  134*  137  
K  5.2*  4.2  3.6 CL  96*  96*  99  
CO2  34*  34*  36* BUN  16  13  16 CREA  1.06*  0.91  0.68 GLU  263*  244*  169* CA  8.7  8.3*  8.3*  
MG  2.0  1.6  1.8 Recent Labs  
   18 
 0630  18 
 3481  18 
 0550 SGOT  35  25  28 ALT  57  52  54 AP  100  103  85 TBILI  0.4  0.3  0.3 TP  6.2*  5.7*  5.7* ALB  3.0*  2.8*  2.8*  
GLOB  3.2  2.9  2.9 Recent Labs  
   18 
 0630  18 
 1364  18 
 0550 INR  2.5*  2.9*  3.7* PTP  24.7*  29.3*  37.5* Cultures · none Procedures / Diagnostic Studies · none Imaging Results from [facility] Encounter encounter on 18 XR NECK SOFT TISSUE Narrative CLINICAL HISTORY: Difficulty swallowing AP and lateral views of the neck demonstrate normal epiglottic and subglottic 
soft tissues. No prevertebral edema. The osseous structures are unremarkable. Impression IMPRESSION: No acute process. Results from Hospital Encounter encounter on 01/08/16 US ABD LTD Narrative **Final Report** 
 
 
ICD Codes / Adm. Diagnosis: 12  038.9 / Back Pain  Low Back and Tail Bone Pain Examination:  US ABDOMEN LTD  - [id number] - Jan 8 2016  4:42PM 
Accession No:  [id number] Reason:  eval of gallbladder REPORT: 
INDICATION:  Acute abdominal pain EXAMINATION:  ULTRASOUND ABDOMEN, limited COMPARISON:  CT abdomen and pelvis 1/8/2016 FINDINGS:  
 
Routine ultrasound images of the abdomen were obtained. The liver is without focal abnormality seen. Mild intrahepatic biliary  
ductal dilatation. The common bile duct is dilated measuring 11 mm in  
diameter. The gallbladder contains several stones but is otherwise  
unremarkable. The main portal vein is patent with hepatopedal flow. The pancreas head is unremarkable. The remainder is obscured by bowel gas. The right kidney measures 8.6 cm in length. No focal lesion or  
hydronephrosis. IMPRESSION:  
 
Cholelithiasis without definite evidence for acute cholecystitis. Mild  
biliary ductal dilatation. Signing/Reading Doctor: Tima Pederson [facility] ([id number]) Reyna Sparrow [facility] ([id number])  Jan 8 2016  4:54PM                       
 
 
   
        
 
Results from [facility] Encounter encounter on 09/27/16 CT SPINE LUMB WO CONT Narrative INDICATION:  continued lumbar spine from compression fracture > 6 months EXAM: CT lumbar spine. Comparison February 26, 2016. Thin section axial images were obtained. From these sagittal and coronal 
reformats were performed. CT dose reduction was achieved through use of a 
standardized protocol tailored for this examination and automatic exposure 
control for dose modulation.   
 
FINDINGS: Alignment is normal. Bone mineral density is markedly decreased. Fracture of the anterior margin of S2 in the bilateral sacral ala is again 
noted. There is sclerosis indicating chronic nature to these injuries. Acute 
sacral insufficiency fractures are not demonstrated. No new fractures are 
demonstrated. There are borderline disc bulges at L3-4, L4-5 and L5-S1 without 
significant stenosis. There are vascular calcifications. Presumed atelectasis at 
the lung bases. Impression IMPRESSION: 
1. Chronic sacral insufficiency fractures with diffuse osteopenia. Is not demonstrated 2. Atherosclerotic vascular change No procedure found. Chronic diagnoses Problem List as of 7/29/2018  Date Reviewed: 7/17/2018 Codes Class Noted - Resolved SOB (shortness of breath) ICD-10-CM: R06.02 
ICD-9-CM: 786.05  7/29/2018 - Present * (Principal)Hypokalemia ICD-10-CM: E87.6 ICD-9-CM: 276.8  7/26/2018 - Present Thrush of mouth and esophagus ([address] ICD-10-CM: B37.81, B37.0 ICD-9-CM: 112.84, 112.0  7/26/2018 - Present H/O total hysterectomy ICD-10-CM: Z90.710 ICD-9-CM: V88.01  9/26/2017 - Present History of GI bleed ICD-10-CM: Z87.19 ICD-9-CM: V12.79 Present on Admission 11/25/2016 - Present Diastolic CHF, chronic (HCC) ICD-10-CM: I50.32 
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  
  Tissue MVR 1989 following failed balloon valvuloplasty for MS Redo MVR 2004 due to severe MR, AVR due to AR ([facility]) CAD (coronary artery disease), native coronary artery ICD-10-CM: I25.10 ICD-9-CM: 414.01  1/8/2015 - Present S/P MVR (mitral valve replacement) ICD-10-CM: A85.5 ICD-9-CM: V43.3  1/8/2015 - Present RESOLVED: Acute exacerbation of chronic obstructive pulmonary disease (COPD) ([facility] ICD-10-CM: J44.1 ICD-9-CM: 491.21  11/26/2017 - 3/16/2018 RESOLVED: COPD exacerbation ([facility] ICD-10-CM: J44.1 ICD-9-CM: 491.21  4/5/2016 - 11/26/2017 RESOLVED: ACP (advance care planning) ICD-10-CM: Z71.89 ICD-9-CM: V65.49  2/18/2016 - 11/26/2017 Overview Signed 2/18/2016  8:21 AM by Wilbur Nazario DO  
  discussed with pt RESOLVED: ADHF (acute decompensated heart failure) ICD-10-CM: I50.9 ICD-9-CM: 428.0  12/17/2015 - 12/18/2015 RESOLVED: HCAP (healthcare-associated pneumonia) ICD-10-CM: J18.9 ICD-9-CM: 846  12/7/2015 - 12/18/2015 RESOLVED: Hypokalemia ICD-10-CM: E87.6 ICD-9-CM: 276.8  12/6/2015 - 12/18/2015 RESOLVED: Supratherapeutic INR ICD-10-CM: R79.1 ICD-9-CM: 790.92  12/5/2015 - 12/18/2015 RESOLVED: Hypotension ICD-10-CM: I95.9 ICD-9-CM: 458.9  12/4/2015 - 12/18/2015 RESOLVED: Diarrhea ICD-10-CM: R19.7 ICD-9-CM: 787.91  12/4/2015 - 12/18/2015 RESOLVED: Acute kidney injury ([facility] ICD-10-CM: N17.9 ICD-9-CM: 584.9  12/4/2015 - 12/18/2015 RESOLVED: GIB (gastrointestinal bleeding) ICD-10-CM: K92.2 ICD-9-CM: 578.9  11/25/2015 - 12/18/2015 RESOLVED: On home oxygen therapy ICD-10-CM: Z99.81 ICD-9-CM: V46.2  11/19/2015 - 12/18/2015 Overview Signed 11/19/2015 10:12 AM by Forrest Shoemaker RN  
  2LPM continous RESOLVED: COPD exacerbation ([facility] ICD-10-CM: J44.1 ICD-9-CM: 491.21  11/14/2015 - 12/14/2015 RESOLVED: Leukocytosis ICD-10-CM: M53.662 ICD-9-CM: 288.60  11/14/2015 - 12/18/2015 RESOLVED: Pleural effusion, left ICD-10-CM: J90 ICD-9-CM: 511.9  11/14/2015 - 12/18/2015 RESOLVED: Elevated brain natriuretic peptide (BNP) level ICD-10-CM: R79.89 ICD-9-CM: 790.99  11/14/2015 - 12/18/2015 RESOLVED: History of artificial heart valve ICD-10-CM: Z95.2 ICD-9-CM: V43.3  11/14/2015 - 12/17/2015 RESOLVED: Paroxysmal atrial fibrillation (HCC) ICD-10-CM: I48.0 ICD-9-CM: 427.31  11/14/2015 - 12/18/2015 RESOLVED: Atrial fibrillation with RVR ([facility] ICD-10-CM: I48.91 
ICD-9-CM: 427.31  11/14/2015 - 12/18/2015 RESOLVED: H/O mitral valve replacement ICD-10-CM: Z95.2 ICD-9-CM: V43.3  1/8/2015 - 1/8/2015 RESOLVED: A-fib [facility]) ICD-10-CM: I48.91 
ICD-9-CM: 427.31  1/8/2015 - 1/8/2015 RESOLVED: Mitral regurgitation ICD-10-CM: I34.0 ICD-9-CM: 424.0  1/8/2015 - 1/8/2015 RESOLVED: Inadequate anticoagulation ICD-10-CM: Z51.81, Z79.01 
ICD-9-CM: V58.83, V58.61  1/8/2015 - 12/18/2015 RESOLVED: Atrial fibrillation, new onset ([facility] ICD-10-CM: I48.91 
ICD-9-CM: 427.31  1/8/2015 - 11/14/2015 Discharge/Transfer Medications Current Discharge Medication List  
  
CONTINUE these medications which have CHANGED Details  
nystatin (MYCOSTATIN) 100,000 unit/mL suspension Take 5 ml by mouth and swish and split 4 times daily from 7/30/2018 to 8/4/2018  Indications: oral candidiasis Qty: 200 mL, Refills: 0  
  
warfarin (COUMADIN) 1 mg tablet Take 1 Tab by mouth daily. Qty: 30 Tab, Refills: 0  
  
!! predniSONE (DELTASONE) 20 mg tablet Take 3 tablets on 7/30, 7/31 and 8/1 Take 2 tablets on 8/2, 8/3 and 8/4 Take 1 tablet on 8/5, 8/6 and 8/7 Take 1/2 tablet on 8/8, 8/9 and 8/10 Qty: 20 Tab, Refills: 0  
  
!! predniSONE (DELTASONE) 10 mg tablet Resume on 8/11.2018 after completing prednisone taper Qty: 90 Tab, Refills: 3 Associated Diagnoses: Pulmonary emphysema, unspecified emphysema type ([facility] ! ! - Potential duplicate medications found. Please discuss with provider.   
  
CONTINUE these medications which have NOT CHANGED Details  
bumetanide (BUMEX) 1 mg tablet Take 1 mg by mouth every evening. docusate sodium (COLACE) 100 mg capsule Take 100 mg by mouth every evening. potassium chloride SR (KLOR-CON 10) 10 mEq tablet Take 10 mEq by mouth every Monday, Wednesday, Friday. Indications: Patient takes in the AM with meals three times weekly  
  
oxyCODONE-acetaminophen (PERCOCET) 5-325 mg per tablet Take 1 Tab by mouth every six (6) hours as needed for Pain. Max Daily Amount: 4 Tabs. Qty: 120 Tab, Refills: 0 Associated Diagnoses: Chronic midline low back pain without sciatica; Lumbar compression fracture, sequela  
  
budesonide-formoterol (SYMBICORT) 160-4.5 mcg/actuation HFAA INHALE 2 PUFFS TWICE DAILY Qty: 3 Inhaler, Refills: 3 Associated Diagnoses: Pulmonary emphysema, unspecified emphysema type ([address]  
  
albuterol (VENTOLIN HFA) 90 mcg/actuation inhaler Take 2 Puffs by inhalation every four (4) hours as needed for Wheezing or Shortness of Breath. Qty: 1 Inhaler, Refills: 11  
 Associated Diagnoses: COPD with exacerbation ([address] lovastatin (MEVACOR) 10 mg tablet TAKE 1 TABLET EVERY NIGHT Qty: 90 Tab, Refills: 3 Associated Diagnoses: Coronary artery disease involving native coronary artery of native heart without angina pectoris  
  
dilTIAZem (CARDIZEM) 60 mg tablet TAKE 1 TABLET BEFORE BREAKFAST, LUNCH, DINNER AND AT BEDTIME Qty: 360 Tab, Refills: 3 Associated Diagnoses: Chronic atrial fibrillation ([address] Diastolic CHF, chronic (HCC) STOP taking these medications  
  
 potassium chloride (KLOR-CON) 10 mEq tablet Comments:  
Reason for Stopping:   
   
  
 
  
Diet:  Cardiac diet. Activity:  As tolerated Disposition: Home or Self Care Discharge instructions to patient/family Please seek medical attention for any new or worsening symptoms particularly fever, chest pain, shortness of breath, abdominal pain, nausea, vomiting Follow up plans/appointments Follow-up Information Follow up With Details Comments Contact Info [address] to Home/COPD [address] 
[address] [place] [address] 
[phone] Marilyn Husbands Lobb, DO  IMPORTANT: Schedule an appointment for 7/30 to recheck INR  [address] [address] [address] [phone] Aniket Siddiqui MD  Schedule an appointment for COPD management  [address] [facility] [address] [place] 
[phone] Taniya Moore MD 
Family Medicine Resident For Billing Chief Complaint Patient presents with  Shortness of Breath  Leg Swelling Hospital Problems  Date Reviewed: 7/17/2018 Codes Class Noted POA  
 SOB (shortness of breath) ICD-10-CM: R06.02 
ICD-9-CM: 786.05  7/29/2018 Unknown * (Principal)Hypokalemia ICD-10-CM: E87.6 ICD-9-CM: 276.8  7/26/2018 Unknown Thrush of mouth and esophagus ([facility] ICD-10-CM: B37.81, B37.0 ICD-9-CM: 112.84, 112.0  7/26/2018 Yes Diastolic CHF, chronic (HCC) ICD-10-CM: I50.32 
ICD-9-CM: 428.32, 428.0  12/17/2015 Yes Chronic atrial fibrillation (HCC) ICD-10-CM: D13.6 ICD-9-CM: 427.31  12/17/2015 Yes COPD (chronic obstructive pulmonary disease) (HCC) ICD-10-CM: J44.9 ICD-9-CM: 123  11/13/2015 Yes CAD (coronary artery disease), native coronary artery ICD-10-CM: I25.10 ICD-9-CM: 414.01  1/8/2015 Yes

## 2018-07-30 NOTE — CONSULTS
Name: Ashleigh Ballard: [address]  
: 1955 Admit Date: 2018 Phone: [phone]  Room: [facility] PCP: Amy Soriano DO  MRN: [id number] Date: 2018  Code: DNR   
   
HPI: 
 
 
Chart and notes reviewed. Data reviewed. I review the patient's current medications in the medical record at each encounter.  I have evaluated and examined the patient. 2:11 PM    
 
History was obtained from patient. I was asked by Yoon Calles MD to see Vahid Jacques in consultation for a chief complaint of COPD exacerbation. History of Present Illness:   is a very pleasant, 66year old lady that persented to [facility] on  with worsening shortness of breath. She reports that on Thursday, she woke up and felt more short of breath and wheezy. She tried to smoke a cigarette and it made her feel worse. She has been coughing up thick, grey sputum that is copious in amounts. Reports that her phlegm is normally clear. Denies fever or chills but has had some sweats. Has also had some LE swelling: initially in the left leg but now more predominately in the right. Her PCP manages her COPD: she is on high dose Symbicort and albuterol as needed. Also takes 10mg prednisone daily. When she feels well, rarely uses her rescue inhaler, however required in 2-3 times on the day she arrived here. She is supposed to be on home O2 but it was too expensive for her in the past.    
    She has a history of severe COPD: her FEV1 in  was . 77L (35% predicted). Her other PMH includes CAD, a.fib, history of AV and MV replacement, diastolic heart failure, history of GI bleed and tobacco use. Prior to admission she was smoking about 1/2 pack of cigarettes, but is motivated to quit now. Currently, she reports that she feels about 50% better, but is still coughing up a lot of discolored, grey phlegm. Images:  Personally reviewed.  
 
CXR:  Lungs hyperexpanded but clear. 
 
ECHO:  EF 55%, RV dilated, LA dilated, MV [employer] mechanical prosthesis, Aortic valve with [employer].Judes aortic valve prosthesis WBC 21 Hgb 10.9 K 5.5 Creat 1.14   Alk Phos 132 RVP negative Doppler in left leg negative Past Medical History:  
Diagnosis Date  A-fib ([facility] [address]  Anticoagulant long-term use  CAD (coronary artery disease), native coronary artery   
 mild to moderate by cath  CHF (congestive heart failure) ([facility] [address]  Chronic obstructive pulmonary disease ([facility] [address]  Dyslipidemia  Ill-defined condition   
 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  
Procedure Laterality Date  COLONOSCOPY N/A 11/28/2016 COLONOSCOPY performed by Connie Dubon MD at [facility]  HX HEART CATHETERIZATION    
 cabg  HX HYSTERECTOMY    
 BSO  
 HX MITRAL VALVE REPLACEMENT    
 and redo MVR and AVR Family History Problem Relation Age of Onset  Cancer Brother Social History Substance Use Topics  Smoking status: Current Every Day Smoker Packs/day: 0.50 Types: Cigarettes Last attempt to quit: 11/1/2015  Smokeless tobacco: Never Used Comment: 11/1/2014  Alcohol use No  
 
 
Allergies Allergen Reactions  Spiriva Respimat [Tiotropium Bromide] Anaphylaxis and Other (comments) Adverse effects; Per RN - pt states that Spiriva caused throat swelling and could not breathe well.  Celebrex [Celecoxib] Rash  Tomato Rash Current Facility-Administered Medications Medication Dose Route Frequency  glycerin (adult) suppository 1 Suppository  1 Suppository Rectal DAILY PRN  
 warfarin (COUMADIN) tablet 2 mg  2 mg Oral ONCE  
 docusate sodium (COLACE) capsule 100 mg  100 mg Oral BID  polyethylene glycol (MIRALAX) packet 17 g  17 g Oral DAILY PRN  predniSONE (DELTASONE) tablet 60 mg  60 mg Oral DAILY WITH BREAKFAST  guaiFENesin ER (MUCINEX) tablet 600 mg  600 mg Oral Q12H  prochlorperazine (COMPAZINE) with saline injection 5 mg  5 mg IntraVENous Q4H PRN  
 bumetanide (BUMEX) tablet 1 mg  1 mg Oral DAILY WITH DINNER  
 sodium chloride (NS) flush 5-10 mL  5-10 mL IntraVENous Q8H  
 sodium chloride (NS) flush 5-10 mL  5-10 mL IntraVENous PRN  
 albuterol (PROVENTIL VENTOLIN) nebulizer solution 2.5 mg  2.5 mg Nebulization Q4H RT  
 nystatin (MYCOSTATIN) 100,000 unit/mL oral suspension 500,000 Units  500,000 Units Oral QID  dilTIAZem (CARDIZEM) IR tablet 60 mg  60 mg Oral AC&HS  
 oxyCODONE-acetaminophen (PERCOCET) 5-325 mg per tablet 1 Tab  1 Tab Oral Q6H PRN  
 Warfarin- Pharmacy Dosing   Other Rx Dosing/Monitoring  lovastatin (MEVACOR) tablet 10 mg  10 mg Oral QHS REVIEW OF SYSTEMS Negative except as stated in the HPI. Physical Exam:  
Visit Vitals  /74 (BP 1 Location: Right arm, BP Patient Position: Sitting)  Pulse 95  Temp 98.2 °F (36.8 °C)  Resp 18  Ht 5' 4\" (1.626 m)  Wt 63.5 kg (140 lb)  SpO2 96%  BMI 24.03 kg/m2 General:  Alert, cooperative, no distress, appears stated age. Head:  Normocephalic, without obvious abnormality, atraumatic. Eyes:  Conjunctivae/corneas clear. Nose: Nares normal. Septum midline. Mucosa normal  
Throat: Lips, mucosa, and tongue normal. Teeth and gums normal.  
Neck: Supple, symmetrical, trachea midline, no adenopathy. Lungs:   Decreased breath sounds with end expiratory wheeze, prolonged exhalation phase Chest wall:  No tenderness or deformity. Heart:  Regular rate and rhythm, S1, S2 normal, no murmur, click, rub or gallop. Abdomen:   Soft, non-tender.  Bowel sounds normal.   
Extremities: Extremities normal, atraumatic, no cyanosis: RLE with 1+ edema, LLE no edema Pulses: 2+ and symmetric all extremities. Skin: Skin color, texture, turgor normal. No rashes or lesions Lymph nodes: Cervical nodes normal.  
Neurologic: Grossly nonfocal  
 
 
Lab Results Component Value Date/Time Sodium 139 07/30/2018 06:22 AM  
 Potassium 5.5 (H) 07/30/2018 06:22 AM  
 Chloride 99 07/30/2018 06:22 AM  
 CO2 37 (H) 07/30/2018 06:22 AM  
 BUN 17 07/30/2018 06:22 AM  
 Creatinine 1.14 (H) 07/30/2018 06:22 AM  
 Glucose 244 (H) 07/30/2018 06:22 AM  
 Calcium 8.6 07/30/2018 06:22 AM  
 Magnesium 2.0 07/30/2018 06:22 AM  
 Phosphorus 3.2 12/08/2015 05:30 AM  
 Lactic acid 1.6 11/26/2017 11:33 AM  
 
 
Lab Results Component Value Date/Time WBC 21.0 (H) 07/30/2018 06:22 AM  
 HGB 10.9 (L) 07/30/2018 06:22 AM  
 PLATELET 838 09/75/0291 06:22 AM  
 MCV 93.4 07/30/2018 06:22 AM  
 
 
Lab Results Component Value Date/Time INR 2.7 (H) 07/30/2018 06:22 AM  
 aPTT 27.3 11/28/2016 03:42 AM  
 AST (SGOT) 156 (H) 07/30/2018 06:22 AM  
 Alk. phosphatase 132 (H) 07/30/2018 06:22 AM  
 Protein, total 6.1 (L) 07/30/2018 06:22 AM  
 Albumin 3.1 (L) 07/30/2018 06:22 AM  
 Globulin 3.0 07/30/2018 06:22 AM  
 
 
No results found for: IRON, FE, TIBC, IBCT, PSAT, FERR Lab Results Component Value Date/Time TSH 1.08 11/26/2016 05:36 AM  
  
 
No results found for: PH, PHI, PCO2, PCO2I, PO2, PO2I, HCO3, HCO3I, FIO2, FIO2I Lab Results Component Value Date/Time CK 47 06/07/2018 12:18 PM  
 CK-MB Index 4.0 (H) 06/07/2018 12:18 PM  
 Troponin-I, Qt. <0.04 06/07/2018 12:18 PM  
  
 
Lab Results Component Value Date/Time  Culture result: NORMAL RESPIRATORY GIOVANNI/NO BETA STREP ISOLATED 07/26/2018 10:33 AM  
 Culture result: NO GROWTH 5 DAYS 04/06/2016 12:02 AM  
 Culture result: NO GROWTH 6 DAYS 01/12/2016 05:49 PM  
 Culture result: NO GROWTH 6 DAYS 01/12/2016 05:49 PM  
 
 
No results found for: TOXA1, RPR, HBCM, HBSAG, HAAB, HCAB1, HAAT, G6PD, CRYAC, HIVGT, HIVR, HIV1, HIV12, HIVPC, HIVRPI Lab Results Component Value Date/Time Vancomycin,trough 33.8 (HH) 12/08/2015 12:10 PM  
 CK 47 06/07/2018 12:18 PM  
 CK 29 11/25/2015 03:15 PM  
 
 
Lab Results Component Value Date/Time Color YELLOW/STRAW 11/26/2017 10:36 AM  
 Appearance CLEAR 11/26/2017 10:36 AM  
 pH (UA) 7.5 11/26/2017 10:36 AM  
 Protein NEGATIVE  11/26/2017 10:36 AM  
 Glucose NEGATIVE  11/26/2017 10:36 AM  
 Ketone NEGATIVE  11/26/2017 10:36 AM  
 Bilirubin NEGATIVE  11/26/2017 10:36 AM  
 Blood MODERATE (A) 11/26/2017 10:36 AM  
 Urobilinogen 0.2 11/26/2017 10:36 AM  
 Nitrites NEGATIVE  11/26/2017 10:36 AM  
 Leukocyte Esterase NEGATIVE  11/26/2017 10:36 AM  
 WBC 0-4 11/26/2017 10:36 AM  
 RBC 20-50 11/26/2017 10:36 AM  
 Epithelial cells 0-10 02/18/2016 08:26 AM  
 Bacteria NEGATIVE  11/26/2017 10:36 AM  
 
 
IMPRESSION 
· Chronic Respiratory Failure · Severe COPD: with acute exacerbation · Leukocytosis · History of Aortic and Mitral Valve Replacement · CAD · Diastolic Heart Failure · Tobacco Abuse PLAN 
· Supplemental O2 to keep sats >90%: home O2 that is affordable has already been arranged on discharge · Continue PO prednisone taper · Add scheduled Pulmicort/Brovana nebs in place of home Symbicort · Scheduled albuterol · Check sputum culture · Repeat CXR · Would add Augmentin given copious amounts of phlegm · Mucinex · Encouraged smoking cessation · Trend LFTs: up today · Needs doppler to right leg: will order · AC with Warfarin: pharmacy dosing Thank you for allowing us to participate in Ms. Johns's care.  
 
 
Arthur Alamo NP

## 2018-07-30 NOTE — PROGRESS NOTES
0600:  Spoke to Dr. Raphael Carmen regarding patient's c/o constipation and the need for stool softener and laxative. Patient's abdomen is distended and reports last BM as a very small one on the morning of Friday the 27th. Orders to follow.

## 2018-07-30 NOTE — PROGRESS NOTES
7/30/2018 EMR reviewed, per PT notes pt sats remained above 88% at all times for walking oximetry,  Does not currently qualify for home O2. HH arranged for d/c H2H/COPD. CM discuss w/ pt she understands, given [facility] information. Eileen Baird  10:39 AM 
ROSA ISELA discussed pt w/ PT walking oximetry pt stats currently above 88, not qualifying for home O2 after 2 walks. PT will check again, MD notified. Eileen Baird  8:56 AM 
ROSA ISELA discussed home O2 requirement w/ MD Resident, will require new walking oximetry today and order from PCP as CM could not obtain order from PCP 7/27. ROSA ISELA notified, PT to assess pt this AM as pt likely to d/c today. Eileen Baird

## 2018-07-30 NOTE — CDMP QUERY
2. Thank you for documenting the diagnosis of A-fib for this patient. Please clarify if this diagnosis could be further specified as: 
  
=>Paroxysmal 
=>Persistent 
=>Permanent 
=>Other explanations of clinical findings =>Clinically Undetermined (no explanation for clinical findings) Please document your response indicating your clinical opinion in your progress notes and discharge summary. ------------------------------------------------------------------------------------------------- 
Paroxysmal:  begins suddenly and stops on its own. Symptoms can be mild or severe. They stop within about a week, but usually in less than 24 hours. Persistent: continues for more than a week. It may stop on its own, or it can be stopped with treatment. Permanent: cannot be restored with treatment Thank you, 
Balbir Le, MSN, [facility], [facility]

## 2018-07-30 NOTE — PROGRESS NOTES
[facility] Dosing Services: 7/30/18 Consult for Warfarin Dosing by Pharmacy by Dr. Adrianne Bhatt Consult provided for this 66 y.o.  female , for indication of Atrial Fibrillation, AVR, MVR. Day of Therapy: continued from home-2mg daily Dose to achieve an INR goal of 2.5- 3.5 Order entered for  Warfarin  2 (mg) ordered to be given today at 18:00. INR has stopped declining after holding doses, should be able to better determine home regimen now. Alternating regimen of 1mg and 2mg may be best outpatient regimen as 2mg daily cause supratherapeutic INR at admission. Significant drug interactions: none Previous dose given 2mg given 7/29/18 PT/INR Lab Results Component Value Date/Time INR 2.7 (H) 07/30/2018 06:22 AM  
  
Platelets Lab Results Component Value Date/Time PLATELET 948 49/32/7274 06:22 AM  
  
H/H Lab Results Component Value Date/Time HGB 10.9 (L) 07/30/2018 06:22 AM  
  
 
Pharmacy to follow daily and will provide subsequent Warfarin dosing based on clinical status. Elmer Abreu)  Contact information [phone]

## 2018-07-30 NOTE — CDMP QUERY
1. There is noted documentation of: \"Thrush\" for this patient, could this be further specified as 
 
=> Oral candidiasis or Oral thrush; nystatin 4 times daily 
=> Other explanation of clinical findings  
=> Clinically Undetermined (no explanation for clinical findings) The medical record reflects the following clinical findings, treatment, and risk factors. Risk Factors:  COPD Clinical Indicators:  7/29 Progress note: Thrush- Nystatin- 4 times daily for thrust- swish and swallow Treatment: Nystatin QID Please clarify and document your clinical opinion in the progress notes and discharge summary including the definitive and/or presumptive diagnosis, (suspected or probable), related to the above clinical findings. Please include clinical findings supporting your diagnosis. Thank you, 
Bertha Alexander, MSN, [facility], [address] [place] [address]

## 2018-07-31 NOTE — PROGRESS NOTES
Bedside shift change report given to Ada (oncoming nurse) by Kervin Magaña (offgoing nurse). Report included the following information SBAR, Kardex, Procedure Summary, Intake/Output, MAR, Recent Results and Cardiac Rhythm A-Fib.

## 2018-07-31 NOTE — PROCEDURES
[facility] *** FINAL REPORT *** Name: Alma Rosa Ellis 
MRN: [id number]    Inpatient : 13 Mar 1955 HIS Order #: [id number] [facility] Visit #: [id number] Date: 2018 TYPE OF TEST: Peripheral Venous Testing REASON FOR TEST Pain in limb, Limb swelling Right Leg:- 
Deep venous thrombosis:           No 
Superficial venous thrombosis:    No 
Deep venous insufficiency:        No 
Superficial venous insufficiency: No 
 
 
INTERPRETATION/FINDINGS 
PROCEDURE:  RIGHT LOWER EXTREMITY  VENOUS DUPLEX. Evaluation of lower 
extremity veins with ultrasound (B-mode imaging, pulsed Doppler, color Doppler). Includes the common femoral, deep femoral, femoral, 
popliteal, posterior tibial, peroneal, and great saphenous veins. Other veins, for example the gastrocnemius and soleal veins, may also 
be visualized. FINDINGS: Cher Spillers scale and color flow duplex images of the visualized 
veins in the right lower extremity demonstrate normal compressibility, 
 spontaneous and augmented flow profiles, and absence of filling 
defects throughout the deep and superficial veins in the right lower 
extremity. NOTE: The posterior tibial vein was not visualized due to 
small vessel caliper therefore ruling out DVT cannot be concluded. CONCLUSION: Right lower extremity venous duplex negative for deep 
venous thrombosis or thrombophlebitis. Left common femoral vein is 
thrombus free. NOTE: The posterior tibial vein was not visualized due 
to small vessel caliper therefore ruling out DVT cannot be concluded. ADDITIONAL COMMENTS I have personally reviewed the data relevant to the interpretation of 
this 
study. TECHNOLOGIST: Eros Yin Signed: 2018 09:46 PM 
 
PHYSICIAN: Gamal Ca.  Pillo Smith MD 
Signed: 2018 10:10 AM

## 2018-07-31 NOTE — PROGRESS NOTES
Spiritual Care Partner Volunteer visited patient in 5 Med Surg on 7/31/18. Documented by: Visited by: Elizabeth Sherwood [address] [place] [address] ([id number])

## 2018-07-31 NOTE — PROGRESS NOTES
Problem: Patient Education: Go to Patient Education Activity Goal: Patient/Family Education 
physical Therapy TREATMENT Patient: Florinda Barry (62 y.o. female) Date: 7/31/2018 Diagnosis: Hypokalemia SOB (shortness of breath) Hypokalemia Precautions: Fall, Contact (droplet) Chart, physical therapy assessment, plan of care and goals were reviewed. ASSESSMENT: 
Pt seen for walking oximetry. Pt is at supervision level ambulating ad-sudhir in room with no device. Pt is limited to very short distance ambulating secondary to her respiratory issues. Pt was able to ambulate 30ft in room with supervision with SPO2 93% and above with mobility. Pt needs to sit at this distance but SPO2 remains 93% and above on RA. Pt progressing slowly. Continue goals. Progression toward goals: 
[]    Improving appropriately and progressing toward goals [x]    Improving slowly and progressing toward goals 
[]    Not making progress toward goals and plan of care will be adjusted PLAN: 
Patient continues to benefit from skilled intervention to address the above impairments. Continue treatment per established plan of care. Discharge Recommendations:  Home Health Further Equipment Recommendations for Discharge:  none SUBJECTIVE:  
 
 
OBJECTIVE DATA SUMMARY:  
Critical Behavior: 
Neurologic State: Alert, Appropriate for age, Eyes open spontaneously Orientation Level: Oriented X4 Cognition: Follows commands, Appropriate safety awareness, Appropriate for age attention/concentration, Appropriate decision making Safety/Judgement: Awareness of environment, Driving appropriateness, Fall prevention, Good awareness of safety precautions, Home safety, Insight into deficits Functional Mobility Training: 
Bed Mobility: 
  
  
  
  
  
  
Transfers: 
Sit to Stand: Supervision Stand to Sit: Supervision Balance: 
Sitting: Intact Standing: Intact Ambulation/Gait Training: 
Distance (ft): 30 Feet (ft) 
Assistive Device: Gait belt Ambulation - Level of Assistance: Supervision Gait Abnormalities: Decreased step clearance Base of Support: Narrowed Speed/Stephanie: Pace decreased (<100 feet/min) Step Length: Left shortened;Right shortened Documentation for home O2: 
  
ROOM AIR  
 AT REST 
 O2 SATS 97% ROOM AIR WITH ACTIVITY 02 SATS 93% (0    ) LITERS OF O2 WITH ACTIVITY O2 SATS 93% (0   )LITERS OF 02 PATIENT LEFT COMFORTABLY SITTING/SUPINE 02 SATS 94% Pain: 
Pain Scale 1: Numeric (0 - 10) Pain Intensity 1: 6 Pain Location 1: Back Pain Orientation 1: Mid 
Pain Description 1: Aching Pain Intervention(s) 1: Medication (see MAR); Emotional support Activity Tolerance:  
Pt tolerated treatment fair Please refer to the flowsheet for vital signs taken during this treatment. After treatment:  
[x]    Patient left in no apparent distress sitting up in chair 
[]    Patient left in no apparent distress in bed 
[]    Call bell left within reach 
[]    Nursing notified 
[]    Caregiver present 
[]    Bed alarm activated COMMUNICATION/COLLABORATION:  
The patients plan of care was discussed with: Physical Therapist 
 
Ender Luis PTA Time Calculation: 23 mins

## 2018-07-31 NOTE — PROGRESS NOTES
Anastacio Pham Jesus [address] Office [phone] Fax [phone] Assessment and Plan Emily Bhandari is a 66 y.o. female with a PMH of Atrial Fibrillation, CHF, COPD, mechanical valve replacement (s/p AVR and MVR) on Coumadin, Dyslipidemia, Migraine who is admitted for LLE swelling and SOB 
 
24 Hour Events: - Home Bumex 1 mg held starting 7/30 
- Per Pulm 7/31: Repear CXR- No acute process, RLE doppler: negative, added scheduled pulmicort/brovana, continue PO taper, Augmentin added, smoking cessation counseling Acute Hypoxic Respiratory Distress 2/2 likely COPD Exacerbation POA - Pt with known home O2 requirement (2L continuous) but has been unable to afford it, currently on 10 mg of Prednisone, Albuterol inhaler and Symbicort at home for COPD) - Duo Nebs scheduled q4h, mucinex - Prednisone 60mg daily --> solumedrol 80mg q8h on 7/29 --> prednisone 60mg daily on 7/30 - Throat culture negative, RVP negative - PT recommending home O2 continuous --> 6 min walk on 7/27 pt required 2 L O2 
- Per Pulm 7/31: Repear CXR- No acute process, RLE doppler: negative, added scheduled pulmicort/brovana, continue PO taper, Augmentin added, smoking cessation  
  
Chronic dHF not in acute exacerbation - ECHO 7/26/18 with EF 55%, no RWMA 
- Cardiology following, appreciate recs. - Home bumex 1mg held on 7/30 Unilateral Left Lower Extremity Swelling POA - RESOLVED 
- Lower extremity prelim duplex- negative,  
- Patient unable to tolerate CTA to rule out PE 
  
Hypokalemia: K on POA: 2.3 RESOLVED -  Per chart review Hypokalemia is a recurrent problem for pt presumably from Bumex & Albuterol use- she takes klor-con 10 mEq M, W and F at home but dose will need readjustment on discharge - Replete PRN 
  
Persistent Atrial Fibrillation s/p AVR & MVR on Coumadin - Pt has been seen OP by Dr Marlon Evans but states she has not followed up with him since 2016.  INR supratherapeutic on admission (5.1) - Warfarin to be dosed by pharmacy for a INR goal of 2.5-3.5 - INR therapeutic today - Home Diltiazem 60 mg four times daily - Per cardiology request, pharmacy has done warfarin teaching with patient - Per pharmacy, dc on regimen of warfarin alternating btw 1 mg and 2 mg with close outpatient f/u - Per cardiology, would recommend patient be on daily ASA once INR therapeutic and stable. Patient with hx of allergy to celecoxib, will defer to cardiology 
  Rubi Bent - Nystatin- 4 times daily for thrust- swish and swallow  
  
Chronic Back Pain - Home Percocet 5-325 mg resumed- q6h PRN  
  
Hyperlipidemia (: TC: 196, T, HDL 88, VLDL 30 & LDL 78) - Home Lovastatin 10 mg  
  
FEN/GI - Regular diet. Activity - Ambulate as tolerated DVT prophylaxis - On Warfrain GI prophylaxis - Not indicated at this time Fall prophylaxis - Not indicated at this time. Disposition - Admit to Remote Telemetry. Plan to d/c to Home. Code Status - DNR, discussed with patient / caregivers. Next of Denise 71 Name and Contact : ROCHELLE Santos [phone] I appreciate the opportunity to participate in the care of this patient, Mk Ragland MD 
Family Medicine Resident Subjective / Objective Subjective States she feels much better and is able to take her NC O2 off without getting SOB Objective: 
Vital signs: (most recent): Blood pressure 183/90, pulse (!) 103, temperature 98.1 °F (36.7 °C), resp. rate 20, height 5' 4\" (1.626 m), weight 140 lb (63.5 kg), SpO2 96 %. Respiratory: O2 Flow Rate (L/min): 2 l/min O2 Device: Room air Visit Vitals  /90 (BP 1 Location: Right arm, BP Patient Position: At rest;Sitting)  Pulse (!) 103  Temp 98.1 °F (36.7 °C)  Resp 20  
 Ht 5' 4\" (1.626 m)  Wt 140 lb (63.5 kg)  SpO2 96%  BMI 24.03 kg/m2 General: No acute distress. Alert. Cooperative. Head: Normocephalic. Atraumatic. Throat: Mucosa pink.  Moist mucous membranes. Neck: Supple. Normal ROM. No stiffness. Respiratory: Decreased air movement in mid and lower lung bases, mild inspiratory crackles at lung bases, no wheezing noted Cardiovascular: RRR. Normal S1,S2. No m/r/g. Pulses 2+ throughout. GI: + bowel sounds. Nontender. No rebound tenderness or guarding. Nondistended. Extremities: LLE edema RESOLVED. Now with 2+ pitting edema in RLE. Distal pulses intact bilaterally. Musculoskeletal: Distal pulses intact. Skin: Warm, dry. No rashes. Neuro: CN II-XII grossly intact. Strength 5/5 in all extremities. I/O: 
  
 
CBC: 
Recent Labs  
   07/31/18 
 0307 07/30/18 
 0622 07/29/18 
 0630 WBC  18.2*  21.0*  17.3* HGB  10.7*  10.9*  10.7* HCT  34.5*  35.4  34.4*  
PLT  243  266  237 Metabolic Panel: 
Recent Labs  
   07/31/18 0307 07/30/18 0622 07/29/18 
 0630 NA  136  139  135* K  4.3  5.5*  5.2*  
CL  97  99  96* CO2  36*  37*  34* BUN  25*  17  16 CREA  1.12*  1.14*  1.06* GLU  276*  244*  263* CA  8.5  8.6  8.7 MG  2.0  2.0  2.0 ALB  3.0*  3.1*  3.0*  
SGOT  57*  156*  35 ALT  132*  145*  57 INR  3.4*  2.7*  2.5* For Billing Chief Complaint Patient presents with  Shortness of Breath  Leg Swelling Hospital Problems  Date Reviewed: 7/17/2018 Codes Class Noted POA  
 SOB (shortness of breath) ICD-10-CM: R06.02 
ICD-9-CM: 786.05  7/29/2018 Unknown * (Principal)Hypokalemia ICD-10-CM: E87.6 ICD-9-CM: 276.8  7/26/2018 Unknown Thrush of mouth and esophagus ([facility] [address] ICD-10-CM: B37.81, B37.0 ICD-9-CM: 112.84, 112.0  7/26/2018 Yes Diastolic CHF, chronic (HCC) ICD-10-CM: I50.32 
ICD-9-CM: 428.32, 428.0  12/17/2015 Yes Chronic atrial fibrillation (HCC) ICD-10-CM: U63.2 ICD-9-CM: 427.31  12/17/2015 Yes COPD (chronic obstructive pulmonary disease) (HCC) ICD-10-CM: J44.9 ICD-9-CM: 535  11/13/2015 Yes  CAD (coronary artery disease), native coronary artery ICD-10-CM: I25.10 ICD-9-CM: 414.01  1/8/2015 Yes

## 2018-07-31 NOTE — DISCHARGE INSTRUCTIONS
HOME DISCHARGE INSTRUCTIONS    Leonila Swan / [id number] : 1955    Admission date: 2018 Discharge date: 2018     Please bring this form with you to show your care provider at your follow-up appointment. Primary care provider:  Lyudmila Fleming DO    Discharging provider:  Logan Ha MD  - Family Medicine Resident  Dr. Zhane Naylor - Attending, Family Medicine     You have been admitted to the hospital with the following diagnoses:    ACUTE DIAGNOSES:  Hypokalemia  SOB (shortness of breath)  . . . . . . . . . . . . . . . . . . . . . . . . . . . . . . . . . . . . . . . . . . . . . . . . . . . . . . . . . . . . . . . . . . . . . . . Nena Karri FOLLOW-UP CARE RECOMMENDATIONS:    Appointments  Follow-up Information     Follow up With Details Comments [place] to Home/COPD [facility]  1st [address] [facility] [address]    Kenneth Craft MD On 2018 10.40 AM for hospital follow up [address]  [address]  [phone]      Audelia Thao MD  Schedule an appointment for COPD management  [place] [address]  [phone]             Please follow up with your PCP regardin. Leukocytosis (Increase in WBC count)- follow up CBC to ensure down trending (18.2 on discharge)  2. PT/INR- ensure in therapeutic range of 2.5-3.5  3. Hypokalemia (Low Potassium on admission)- follow up BMP to ensure Potassium is still in range ( 4.3 on discharge), discuss with your PCP about maybe changing your outpatient potassium regimen        MEDICATION CHANGES:  1. Warfarin alternate with taking 1 mg and then 2 mg as follows: Take 2 mg daily on Mon/Wed/Fri and 1 mg daily on Tues/Thurs/Saturday/    2. Nystatin- swish and split- use 5 ml 4 times a day from 2018 to 2018 to help with the oral yeast infection    3.  Hold home prednisone 10 mg while taking the following prednisone taper:   - Take 3 tablets (60mg total) on 8/1, 8/2 and 8/3  - Take 2.5 tablets (50mg total) on 8/4, 8/5 and 8/6  - Take 2 tablets (40 mg total) on 8/7, 8/8 and 8/9  - Take 1.5 tablets (30 mg total) on 8/10, 8/11 and 8/12  - On 8/13, resume home prednisone 10mg daily. 4. Augmentin: Take 1 tablet tonight 7/31 at 9 PM. Then take 1 pill every 12 hours from 8/1/2018 through 8/8/2018    Follow-up tests needed:    PT/INR- to readjust Warfarin level   BMP- Recheck Potassium level   CBC- Ensure WBC count is down trending     Pending test results: At the time of your discharge the following test results are still pending: none   Please make sure you review these results with your outpatient follow-up provider(s). Specific symptoms to watch for: chest pain, shortness of breath, fever, chills, nausea, vomiting, diarrhea, change in mentation, falling, weakness, bleeding. DIET/what to eat:  Cardiac Diet    ACTIVITY:  Activity as tolerated    Wound care: none    Equipment needed: None    What to do if new or unexpected symptoms occur? If you experience any of the above symptoms (or should other concerns or questions arise after discharge) please call your primary care physician. Return to the emergency room if you cannot get hold of your doctor. · It is very important that you keep your follow-up appointment(s). · Please bring discharge papers, medication list (and/or medication bottles) to your follow-up appointments for review by your outpatient provider(s). · Please check the list of medications and be sure it includes every medication (even non-prescription medications) that your provider wants you to take. · It is important that you take the medication exactly as they are prescribed. · Keep your medication in the bottles provided by the pharmacist and keep a list of the medication names, dosages, and times to be taken in your wallet.    · Do not take other medications without consulting your doctor. · If you have any questions about your medications or other instructions, please talk to your nurse or care provider before you leave the hospital.     Information obtained by:     I understand that if any problems occur once I am at home I am to contact my physician. These instructions were explained to me and I had the opportunity to ask questions. I understand and acknowledge receipt of the instructions indicated above.                                                                                                                                                Physician's or R.N.'s Signature                                                                  Date/Time                                                                                                                                              Patient or Representative Signature                                                          Date/Time

## 2018-07-31 NOTE — PROGRESS NOTES
[facility] Dosing Services: 7/31/18 Consult for Warfarin Dosing by Pharmacy by Dr. Whitney Staley Consult provided for this 66 y.o.  female , for indication of Atrial Fibrillation, AVR, MVR. Day of Therapy: continued from home-2mg daily Dose to achieve an INR goal of 2.5- 3.5 Order entered for  Warfarin  1 (mg) ordered to be given today at 18:00. Large increase in INR of 0.7 since yesterday. Will give lower dose today. Alternating regimen of 1mg and 2mg may be best outpatient regimen as 2mg daily caused supratherapeutic INR at admission. Significant drug interactions: Augmentin Previous dose given 2mg given 7/30/18 PT/INR Lab Results Component Value Date/Time INR 3.4 (H) 07/31/2018 03:07 AM  
  
Platelets Lab Results Component Value Date/Time PLATELET 205 94/76/0090 03:07 AM  
  
H/H Lab Results Component Value Date/Time HGB 10.7 (L) 07/31/2018 03:07 AM  
  
 
Pharmacy to follow daily and will provide subsequent Warfarin dosing based on clinical status. Elmer Abreu)  Contact information [phone]

## 2018-07-31 NOTE — PROGRESS NOTES
[facility] PROGRESS NOTE Pulmonary, Critical Care, and Sleep Medicine Name: Diane Heading MRN: [id number] : 1955 Hospital: [address] [place] [address] Dr Date: 2018  Admission Date: 2018 Chart and notes reviewed. Data reviewed. I review the patient's current medications in the medical record at each encounter.  I have evaluated and examined the patient. .  
 
Overnight events reviewed: 
Afebrile overnight BP stable O2 sats 96% on RA 
HR low 100s WBC 18.2: down slightly Creat 1.12 
 AST 57, both improved Sputum culture with occasional gram positive cocci RLE doppler negative ROS:  Feels better today. Breathing has improved and wants to go home. Coughing up elss phlegm today. Denies fever/chills. Denies CP, LE pain. Vital Signs: 
Visit Vitals  /85 (BP 1 Location: Right arm, BP Patient Position: At rest)  Pulse (!) 107  Temp 98 °F (36.7 °C)  Resp 20  
 Ht 5' 4\" (1.626 m)  Wt 64 kg (141 lb 1.5 oz)  SpO2 96%  BMI 24.22 kg/m2 O2 Device: Room air O2 Flow Rate (L/min): 2 l/min Temp (24hrs), Av °F (36.7 °C), Min:97.7 °F (36.5 °C), Max:98.2 °F (36.8 °C) Intake/Output:  
Last shift:        
Last 3 shifts:   
No intake or output data in the 24 hours ending 18 1410 Telemetry:  
 
Physical Exam:  
General:  Alert, cooperative, no distress, appears stated age. Head:  Normocephalic, without obvious abnormality, atraumatic. Eyes:  Conjunctivae/corneas clear. Nose: Nares normal. Septum midline. Mucosa normal.   
Throat: Lips, mucosa, and tongue normal. Teeth and gums normal.  
Neck: Supple, symmetrical, trachea midline, no adenopathy. Lungs:   Decreased bs with better air movement and Chest wall:  No tenderness or deformity. Heart:  Regular rate and rhythm, S1, S2 normal, no murmur, click, rub or gallop. Abdomen:   Soft, non-tender.  Bowel sounds normal.  
Extremities: Extremities normal, atraumatic, no cyanosis, RLE with trace edema Pulses: 2+ and symmetric all extremities. Skin: Skin color, texture, turgor normal.   
Lymph nodes: Cervical, supraclavicular, and axillary nodes normal.  
Neurologic: Grossly nonfocal  
 
DATA: 
MAR reviewed and pertinent medications noted or modified as needed Labs: 
Recent Labs  
   07/31/18 
 0307  07/30/18 0622  07/29/18 
 0630 WBC  18.2*  21.0*  17.3* HGB  10.7*  10.9*  10.7* HCT  34.5*  35.4  34.4*  
PLT  243  266  237 Recent Labs  
   07/31/18 
 0307 07/30/18 
 0622  07/29/18 
 0630 NA  136  139  135* K  4.3  5.5*  5.2*  
CL  97  99  96* CO2  36*  37*  34* GLU  276*  244*  263* BUN  25*  17  16 CREA  1.12*  1.14*  1.06* CA  8.5  8.6  8.7 MG  2.0  2.0  2.0 ALB  3.0*  3.1*  3.0* TBILI  0.4  0.5  0.4 SGOT  57*  156*  35 ALT  132*  145*  57 INR  3.4*  2.7*  2.5* Imaging:  I have personally reviewed the patients radiographs and reports. CXR:  No acute process IMPRESSION 
· Chronic Respiratory Failure · Severe COPD: with acute exacerbation · Leukocytosis · History of Aortic and Mitral Valve Replacement · CAD · Diastolic Heart Failure · Tobacco Abuse 
  
PLAN 
· Supplemental O2 to keep sats >90%: home O2 that is affordable has already been arranged on discharge · Continue PO prednisone taper · Add scheduled Pulmicort/Brovana nebs in place of home Symbicort · Scheduled albuterol · F/U sputum culture · Would c/w Augmentin for 7 days total given copious amounts of phlegm · Mucinex · Encouraged smoking cessation · Trend LFTs: up today · Needs doppler to right leg: will order · AC with Warfarin: pharmacy dosing Ms. Carlitos Gaffney is stable from a pulmonary standpoint. We will sign off and arrange for outpatient follow-up in 2-3 weeks. Thank you for allowing us to participate in Ms. Johns's care. 
  
 
   
Joy Sultana NP

## 2018-07-31 NOTE — PROGRESS NOTES
7/31/2018 
3:03 PM 
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 CM notified [facility] of pt's d/c. Requested CM specialist scheduled PCP f/u. CM notified NN of pt's d/c. Pt ready for d/c from CM standpoint.  will transport. Maricruz Lewis

## 2018-08-01 NOTE — PROGRESS NOTES
Hospital Discharge Follow-Up Date/Time:  2018 2:43 PM 
 
Patient was admitted to [facility] on 18 and discharged on 18 for hypokalemia. The physician discharge summary was available at the time of outreach. Patient was contacted within 2 business days of discharge. Top Challenges reviewed with the provider 1. Leukocytosis - follow up CBC to ensure down trending (18.2 on discharge) 2. PT/INR- Patient taking 2 mg coumadin on // and 1 mg daily on //Sat/Sun 
 
3. Hypokalemia - follow up BMP (K+ 2.3 on admission, 4.3 on discharge); may need potassium dose adjusted 4. Cardiology referral? 
  
 
Method of communication with provider :chart routing Inpatient RRAT score: 23 Was this a readmission? no  
Patient stated reason for the readmission: n/a Nurse Navigator (NN) contacted the patient by telephone to perform post hospital discharge assessment. Verified name and  with patient as identifiers. Provided introduction to self, and explanation of the Nurse Navigator role. Reviewed discharge instructions and red flags with patient who verbalized understanding. Patient given an opportunity to ask questions and does not have any further questions or concerns at this time. The patient agrees to contact the PCP office for questions related to their healthcare. NN provided contact information for future reference. Disease Specific:   CHF Heart Failure Note Do you have a Scale:    yes How often do you weigh:  Patient states she is going to start weighing daily beginning tomorrow AM. Daily Weight (document daily weights in flowsheets):   discharge weight 141 EF: 55% (result) on 18 (date) Cardiac Device present: none Heart Failure Medications: Diuretic, Potassium, Anticoagulant Home Health orders at discharge: Patient refused [facility] PT, [facility] [address]: n/a Date of initial visit: H2H on  
 
Durable Medical Equipment ordered/company: none Durable Medical Equipment received: n/a Barriers to care? utilization of services - patient declined pulmonry rehab due to needing to babysit her grandson Advance Care Planning:  
Does patient have an Advance Directive:  not on file Medication(s):  
New Medications at Discharge: Amoxicillin Changed Medications at Discharge: nystatin, potassium, prednisone, coumadin Discontinued Medications at Discharge: none Medication reconciliation was performed with patient, who verbalizes understanding of administration of home medications. There were no barriers to obtaining medications identified at this time. Referral to Pharm D needed: no  
 
Current Outpatient Prescriptions Medication Sig  
 nystatin (MYCOSTATIN) 100,000 unit/mL suspension Take 5 ml by mouth and swish and split 4 times daily from 7/31/2018 to 8/4/2018  Indications: oral candidiasis  amoxicillin-clavulanate (AUGMENTIN) 875-125 mg per tablet Take 1 tablet tonight 7/31 at 9 PM then take 1 pill every 12 hours starting tomorrow 8/1/2018 till 8/8/2018  predniSONE (DELTASONE) 20 mg tablet Take 3 tablets on 8/1, 8/2 and 8/3 Take 2.5 tablets on 8/4, 8/5 and 8/6 Take 2 tablets on 8/7, 8/8 and 8/9 Take 1.5 tablets on 8/10, 8/11 and 8/12  warfarin (COUMADIN) 1 mg tablet Take 2 mg on Monday, Wednesday and Friday and 1 mg on Tuesday, Thursday, Saturday and Sunday  bumetanide (BUMEX) 1 mg tablet Take 1 mg by mouth every evening.  docusate sodium (COLACE) 100 mg capsule Take 100 mg by mouth every evening.  potassium chloride SR (KLOR-CON 10) 10 mEq tablet Take 10 mEq by mouth every Monday, Wednesday, Friday. Indications: Patient takes in the AM with meals three times weekly  oxyCODONE-acetaminophen (PERCOCET) 5-325 mg per tablet Take 1 Tab by mouth every six (6) hours as needed for Pain. Max Daily Amount: 4 Tabs.   
 budesonide-formoterol (SYMBICORT) 160-4.5 mcg/actuation HFAA INHALE 2 PUFFS TWICE DAILY  albuterol (VENTOLIN HFA) 90 mcg/actuation inhaler Take 2 Puffs by inhalation every four (4) hours as needed for Wheezing or Shortness of Breath.  lovastatin (MEVACOR) 10 mg tablet TAKE 1 TABLET EVERY NIGHT  dilTIAZem (CARDIZEM) 60 mg tablet TAKE 1 TABLET BEFORE BREAKFAST, LUNCH, DINNER AND AT BEDTIME  
 predniSONE (DELTASONE) 10 mg tablet Resume on 8/13/2018 after completing prednisone taper No current facility-administered medications for this visit. There are no discontinued medications. [facility] follow up appointment(s):  
Future Appointments Date Time Provider Jerry Archuletai 8/2/2018 10:40 AM Khushboo Travis MD [facility] [address] Non-[facility] follow up appointment(s): Suma Vega (Pulmonology) [facility]:  n/a  
 
 
Goals  Reduce risk of CHF exacerbations and complications. 8/1/18 - Discussed low sodium diet and daily weight. Patient states that she never adds additional sodium to her foods. Reports that she is going to start weighing daily. Discussed weight gain parameters and when to report. At next call will discuss weights and patient will verbalize 3 foods high in sodium that should be avoided.  Reduce risk of COPD Exacerbations 8/1/18 - Patient quit smoking this past Thursday. Patient states that she does not have any desire to smoke again. Not using any smoking cessation aides. Will reassess status at each call and offer support/resources as needed. Also discussed pulmonary rehab, but patient declined due to having to watch her grandson. [facility] nurse requested orders to continue seeing patient for PT/OT/SN. Patient will participate in home health. Discussed red flags and when to report/seek treatment. Discussed importance of deep breathing and coughing. Patient stated understanding.  NICK

## 2018-08-01 NOTE — TELEPHONE ENCOUNTER
Titi RENTERIA nurse called in and would like to get orders for this patient to receive home health.   Phone [phone]

## 2018-08-02 NOTE — PROGRESS NOTES
Pt here to follow up from recent hospital admission. Reports having bilateral foot swelling and pain. Also states potassium levels were low when checked. Pt is currently taking 2 mg of Coumadin MWF and 1 mg TTSS. Lab Results Component Value Date/Time INR 3.4 (H) 07/31/2018 03:07 AM  
 INR 2.7 (H) 07/30/2018 06:22 AM  
 INR 2.5 (H) 07/29/2018 06:30 AM  
 INR POC 2.9 08/02/2018 11:04 AM  
 
Pt reports that breathing feels better, but swelling in feet has not improved. Pt resumed Bumex yesterday. Subjective: (As above and below) Chief Complaint Patient presents with  [facility]  
  hospital follow up for bilateral foot swelling  
 
she is a 66y.o. year old female who presents for evaluation. Reviewed PmHx, RxHx, FmHx, SocHx, AllgHx and updated in chart. Review of Systems - negative except as listed above Objective:  
 
Vitals:  
 08/02/18 1058 BP: 132/75 Pulse: 82 Resp: 20 Temp: 98.8 °F (37.1 °C) TempSrc: Oral  
SpO2: 94% Weight: 138 lb (62.6 kg) Height: 5' 4\" (1.626 m) Physical Examination: General appearance - alert, well appearing, and in no distress Mental status - normal mood, behavior, speech, dress, motor activity, and thought processes Mouth - mucous membranes moist, pharynx normal without lesions Chest - wheezing noted lower lung fields Heart - normal rate, regular rhythm, normal S1, S2, no murmurs, rubs, clicks or gallops Extremities - pedal edema 2 + bilaterally Assessment/ Plan: 1. Chronic atrial fibrillation (HCC) 
-continue on current dose - AMB POC PT/INR 
- [address] 2. Chronic obstructive pulmonary disease with acute exacerbation ([facility] [address] 
-continue on current medication 
- CBC WITH AUTOMATED DIFF 3. Hypokalemia Recheck lab level - METABOLIC PANEL, COMPREHENSIVE Follow-up Disposition: As needed I have discussed the diagnosis with the patient and the intended plan as seen in the above orders.   The patient has received an after-visit summary and questions were answered concerning future plans. Medication Side Effects and Warnings were discussed with patient: yes Patient Labs were reviewed: yes Patient Past Records were reviewed:  yes Eric Cooley M.D.

## 2018-08-02 NOTE — MR AVS SNAPSHOT
[address] [address] 
[phone] Patient: Pablo Jacobs MRN: [id number] DILLON:[birth year] Visit Information Date & Time Provider Department Dept. Phone Encounter #  
 8/2/2018 10:40 AM Josué Currie MD [address] [facility] [phone] [id number] Upcoming Health Maintenance Date Due  
 MEDICARE YEARLY EXAM 6/9/2018 Influenza Age 5 to Adult 8/1/2018 COLONOSCOPY 11/28/2019 BREAST CANCER SCRN MAMMOGRAM 2/27/2020 DTaP/Tdap/Td series (2 - Td) 9/26/2027 Allergies as of 8/2/2018  Review Complete On: 8/2/2018 By: Lizz Farris LPN Severity Noted Reaction Type Reactions Spiriva Respimat [Tiotropium Bromide] High 01/08/2015   Systemic Anaphylaxis, Other (comments) Adverse effects; Per RN - pt states that Spiriva caused throat swelling and could not breathe well. Celebrex [Celecoxib]  01/08/2015   Intolerance Rash Tomato  01/08/2015   Intolerance Rash Current Immunizations  Reviewed on 12/1/2017 Name Date Influenza Vaccine (Quad) PF 9/26/2017, 10/21/2016, 11/13/2015 Pneumococcal Conjugate (PCV-13) 2/18/2016 Pneumococcal Polysaccharide (PPSV-23) 2/21/2017 Not reviewed this visit You Were Diagnosed With   
  
 Codes Comments Chronic atrial fibrillation (HCC)    -  Primary ICD-10-CM: N59.6 ICD-9-CM: 427.31 Chronic obstructive pulmonary disease with acute exacerbation (HCC)     ICD-10-CM: J44.1 ICD-9-CM: 491.21 Vitals BP Pulse Temp Resp Height(growth percentile) Weight(growth percentile) 132/75 (BP 1 Location: Right arm, BP Patient Position: Sitting) 82 98.8 °F (37.1 °C) (Oral) 20 5' 4\" (1.626 m) 138 lb (62.6 kg) SpO2 BMI OB Status Smoking Status 94% 23.69 kg/m2 Hysterectomy Current Every Day Smoker Vitals History BMI and BSA Data Body Mass Index Body Surface Area  
 23.69 kg/m 2 1.68 m 2 Preferred Pharmacy Pharmacy Name Phone [facility] [address] [phone] Your Updated Medication List  
  
   
This list is accurate as of 8/2/18 11:49 AM.  Always use your most recent med list.  
  
  
  
  
 albuterol 90 mcg/actuation inhaler Commonly known as:  VENTOLIN HFA Take 2 Puffs by inhalation every four (4) hours as needed for Wheezing or Shortness of Breath. amoxicillin-clavulanate 875-125 mg per tablet Commonly known as:  AUGMENTIN Take 1 tablet tonight 7/31 at 9 PM then take 1 pill every 12 hours starting tomorrow 8/1/2018 till 8/8/2018  
  
 budesonide-formoterol 160-4.5 mcg/actuation Hfaa Commonly known as:  SYMBICORT  
INHALE 2 PUFFS TWICE DAILY  
  
 bumetanide 1 mg tablet Commonly known as:  Arletta Tavo Take 1 mg by mouth every evening. dilTIAZem 60 mg tablet Commonly known as:  CARDIZEM  
TAKE 1 TABLET BEFORE BREAKFAST, LUNCH, DINNER AND AT BEDTIME  
  
 docusate sodium 100 mg capsule Commonly known as:  Henreitta [place] Take 100 mg by mouth every evening.  
  
 lovastatin 10 mg tablet Commonly known as:  MEVACOR  
TAKE 1 TABLET EVERY NIGHT  
  
 nystatin 100,000 unit/mL suspension Commonly known as:  MYCOSTATIN Take 5 ml by mouth and swish and split 4 times daily from 7/31/2018 to 8/4/2018  Indications: oral candidiasis  
  
 oxyCODONE-acetaminophen 5-325 mg per tablet Commonly known as:  PERCOCET Take 1 Tab by mouth every six (6) hours as needed for Pain. Max Daily Amount: 4 Tabs. potassium chloride SR 10 mEq tablet Commonly known as:  KLOR-CON 10 Take 10 mEq by mouth every Monday, Wednesday, Friday. Indications: Patient takes in the AM with meals three times weekly * predniSONE 20 mg tablet Commonly known as:  Dora Roperis Take 3 tablets on 8/1, 8/2 and 8/3 Take 2.5 tablets on 8/4, 8/5 and 8/6 Take 2 tablets on 8/7, 8/8 and 8/9 Take 1.5 tablets on 8/10, 8/11 and 8/12 * predniSONE 10 mg tablet Commonly known as:  Mark Mcintosh Resume on 8/13/2018 after completing prednisone taper  
  
 warfarin 1 mg tablet Commonly known as:  COUMADIN Take 2 mg on Monday, Wednesday and Friday and 1 mg on Tuesday, Thursday, Saturday and Sunday * Notice: This list has 2 medication(s) that are the same as other medications prescribed for you. Read the directions carefully, and ask your doctor or other care provider to review them with you. We Performed the Following AMB POC PT/INR [55140 CPT(R)] Introducing [facility]! Dear Dora Rios: 
Thank you for requesting a BrandFiesta account. Our records indicate that you already have an active BrandFiesta account. You can access your account anytime at [email or web address] Did you know that you can access your hospital and ER discharge instructions at any time in BrandFiesta? You can also review all of your test results from your hospital stay or ER visit. Additional Information If you have questions, please visit the Frequently Asked Questions section of the BrandFiesta website at [email or web address] Remember, BrandFiesta is NOT to be used for urgent needs. For medical emergencies, dial 911. Now available from your iPhone and Android! Please provide this summary of care documentation to your next provider. Your primary care clinician is listed as Kimberli Irizarry. If you have any questions after today's visit, please call [phone].

## 2018-08-02 NOTE — TELEPHONE ENCOUNTER
Spoke with Skilljar she did complete med rec with pt, unsure if pt is taking medication daily, reports O2 86%-88% was at while at rest once pt started moving O2 increased to 94-96%, due to pt's appetite she will start taking a protein supplement. Pt has an appt with provider today. Hannah Jaime will place [facility] order in pt's chart, would like to know if coumadin checks needs to be done with [facility]. Please let them know, if so an order needs to be given. Do you want them to check monthly per pt's regimen or weekly?

## 2018-08-03 NOTE — TELEPHONE ENCOUNTER
VB order has been placed inr will begin weekly on Wednesday. If labs need to be drawn please call [facility] office to notify.

## 2018-08-03 NOTE — ADT AUTH CERT NOTES
Patient Demographics     
  Patient Name [address] Sex  Address Phone    
  Ana Mehta [phone] Female 1955 31 [address] 94 20 56 (Home) *Preferred* 
[phone] (Mobile)    
   
  CSN:    
  [id number]    
   
  Admit Date: Admit Time Room Bed    
  2018  9:20  [68384] 01 [08538]    
   
  Attending Providers     
  Provider Pager From To    
  Nannette Pang MD  18    
  Vitor Sevilla MD  18    
  Vignesh Vasques MD  18    
   
  Emergency Contact(s)     
  Name Relation Home Work Mobile    
  AndreasROCHELLE Spouse [phone]      
  Becki Grande Daughter [phone]      
  AndreasEric Spouse [phone]      
   
Utilization Review  
  
  
  POTASSIUM LEVELS by Rimma Rock RN     
  Review Entered Review Status    
  8/3/2018 In Primary    
  Details    
      
   
    
   
  INPATIENT RECOMMENDATION by Rimma Rock RN     
  Review Entered Review Status    
  2018 In Primary    
  Details    
    Mono Alcazar is a 66year old female who was seen in the ED on 2018 with complaint of left lower extremity swelling and shortness of breath. Her past medical history was significant for congestive heart failure, atrial fibrillation, COPD and mechanical valve on coumadin. Her home medications were prednisone, Percocet, Symbicort, albuterol, lovastatin, Bumex, Incruse, Cardizem, and Colase. Her vitals showed temperature was 98.4, pulse 105, respiratory rate 24 and blood pressure 146/67. Her physical examination was significant for decreased breath sounds in the bases, heart was irregularly irregular, she had 2+ edema. Her labs showed sodium 137, potassium 2.3, creatinine 0.84,, INR 5.1, white count 15.5, hematocrit 35.2, and platelets 806.  She was admitted on 2018 with diagnosis of CHF vs COPD, Unilateral lower extremity edema,hypokalemia, atrial fibrillation, and supratheraputic INR. In the ED she received both IV and oral potassium 40meq oral and 10 IV, she got a second dose of 40 meq orally, she was started on DuoNebs and she was given increased oral prednisone. She had a viral panel done and throat culture. Cardiology consult was requested. She was seen on 7/26/2018 by Cardiology, they were concerned about her labile INR and planned on holding Coumadin until INR was stable. They also felt she needed a pharmacy consult. She was seen on 7/27/2018 her potassium was improved with replacement to 3.6. Her breathing was improved with saturations % on 2 liters at rest. She was still having poor air movement with scattered wheezing. Her prednisone remained oral but if continued to not improve plan to change to IV. She was seen on 7/28/2018, she was feeling better and breathing better. Her vitals were stable. She was still on oxygen. She had poor airmovement and expiratory wheezing, somewhat better from day prior. Since she was not improving she was changed to IV steroids specifically 80mg IV every 8 hours. In summary, Kandis Lundborg is a 66year old female whose past medical history was significant for congestive heart failure, atrial fibrillation, COPD and mechanical valve on coumadin who was seen in the ED on 7/26/2018 with complaint of left lower extremity swelling and shortness of breath. She was admitted on 7/26/2018 with diagnosis of CHF vs COPD, Unilateral lower extremity edema,hypokalemia, atrial fibrillation, and supratheraputic INR. At this time she has spent over two midnights in the hospital. She has been on oxygen and frequent breathing treatments but has still failed to improve, her care has escalated and now includes IV steroids. As she has failed to improve and is requiring increasing intensity of care she does demonstrate medical necessity for her continued inpatient level of care. For Kandis Lundborg, we recommend inpatient. INPATIENT.

## 2018-08-07 NOTE — PROGRESS NOTES
[facility] Clarification form faxed on 8/2/18 @ 17:16 with confirmation and placed in the scan folder

## 2018-08-07 NOTE — PROGRESS NOTES
Liver enzymes improved  White blood cell count coming down slowly. Recheck in one week. A message has been sent in [facility] and the lab work released to the patient.

## 2018-08-09 NOTE — PROGRESS NOTES
1. Have you been to the ER, urgent care clinic since your last visit? Hospitalized since your last visit? No    2. Have you seen or consulted any other health care providers outside of the [address] [place] [address] since your last visit? Include any pap smears or colon screening. No    Chief Complaint   Patient presents with    Coagulation disorder     inr check- took coumadin 1 mg yesterday & inr was 1.9 yesterday     INR/Prothrombin Time  1.3/15.6    Pt has been taking coumadin as ordered, 1mg MWF and 2mg TTSS. Pt also reports feeling some lightheadedness. Subjective: (As above and below)     Chief Complaint   Patient presents with    Coagulation disorder     inr check- took coumadin 1 mg yesterday & inr was 1.9 yesterday     she is a 66y.o. year old female who presents for evaluation. Aj Shen RTC today to follow up on chronic pain diagnosis. We discussed her myalgias that is affecting her back. Significant changes since last visit: none. She is  able to do her normal daily activities. She reports the following adverse side effects: none. Least pain over the last week has been 4/10. Worst pain over the last week has been 8/10. Opioid Risk Tool Reviewed: YES    Aberrant behaviors: None. Urine Drug Screen: reviewed and up to date. Controlled substance agreement on file: YES.  reviewed:yes    Pill count is consistent with her prescription: not available    Concomitant use of a benzodiazepine: no    Reviewed PmHx, RxHx, FmHx, SocHx, AllgHx and updated in chart.     Review of Systems - negative except as listed above    Objective:     Vitals:    08/09/18 1132   BP: 141/89   Pulse: 100   Resp: 24   Temp: 98.3 °F (36.8 °C)   TempSrc: Oral   SpO2: 98%   Weight: 127 lb 6 oz (57.8 kg)   Height: 5' 4\" (1.626 m)     Physical Examination: General appearance - alert, well appearing, and in no distress  Mental status - normal mood, behavior, speech, dress, motor activity, and thought processes  Eyes - pupils equal and reactive, extraocular eye movements intact  Mouth - mucous membranes moist, pharynx normal without lesions  Chest - clear to auscultation, no wheezes, rales or rhonchi, symmetric air entry  Heart - normal rate, regular rhythm, normal S1, S2, no murmurs, rubs, clicks or gallops  Extremities - peripheral pulses normal, no pedal edema, no clubbing or cyanosis  Back- pain with forward flexion  Assessment/ Plan:   1. INR (international normal ratio) abnormal  -increase to 2mg daily  - AMB POC PT/INR    2. Pulmonary emphysema, unspecified emphysema type (HCC)  - budesonide-formoterol (SYMBICORT) 160-4.5 mcg/actuation HFAA; INHALE 2 PUFFS TWICE DAILY  Dispense: 3 Inhaler; Refill: 3    3. COPD with exacerbation (HCC)  - albuterol (VENTOLIN HFA) 90 mcg/actuation inhaler; Take 2 Puffs by inhalation every four (4) hours as needed for Wheezing or Shortness of Breath. Dispense: 1 Inhaler; Refill: 11    4. Chronic midline low back pain without sciatica  This is a chronic problem that is not changed. Per review of available records and patients , there are not sign of overuse, misuse, diversion, or concerning side effects. Today we reviewed: the risk of overdose, addiction, and dependency proper storage and disposal of medications the goals of treatment (improve functionality, quality of life, and pain) alternative treatment options including non-narcotic modalities  The following changes were made to the patients current treatment plan: nothing, medications refilled. - oxyCODONE-acetaminophen (PERCOCET) 5-325 mg per tablet; Take 1 Tab by mouth every six (6) hours as needed for Pain. Max Daily Amount: 4 Tabs. Dispense: 120 Tab; Refill: 0    5. Lumbar compression fracture, sequela  - oxyCODONE-acetaminophen (PERCOCET) 5-325 mg per tablet; Take 1 Tab by mouth every six (6) hours as needed for Pain. Max Daily Amount: 4 Tabs. Dispense: 120 Tab;  Refill: 0       Follow-up Disposition: As needed  I have discussed the diagnosis with the patient and the intended plan as seen in the above orders. The patient has received an after-visit summary and questions were answered concerning future plans.      Medication Side Effects and Warnings were discussed with patient: yes  Patient Labs were reviewed: yes  Patient Past Records were reviewed:  yes    Jose E Bahena M.D.

## 2018-08-09 NOTE — PROGRESS NOTES
Please advise pt to increase to 2mg everyday.    If pt does not answer please inform [address], [phone]    (pt is currently taking 1mg TTSS, 2mg on MWF)

## 2018-08-09 NOTE — MR AVS SNAPSHOT
[address] [address] 
[phone] Patient: Farhana Rebolledo MRN: [id number] HWF:7/88/6790 Visit Information Date & Time Provider Department Dept. Phone Encounter #  
 8/9/2018 10:50 AM Atul Pruitt MD [facility] [phone] [id number] Upcoming Health Maintenance Date Due  
 MEDICARE YEARLY EXAM 6/9/2018 Influenza Age 5 to Adult 8/1/2018 COLONOSCOPY 11/28/2019 BREAST CANCER SCRN MAMMOGRAM 2/27/2020 DTaP/Tdap/Td series (2 - Td) 9/26/2027 Allergies as of 8/9/2018  Review Complete On: 8/9/2018 By: Atul Pruitt MD  
  
 Severity Noted Reaction Type Reactions Spiriva Respimat [Tiotropium Bromide] High 01/08/2015   Systemic Anaphylaxis, Other (comments) Adverse effects; Per RN - pt states that Spiriva caused throat swelling and could not breathe well. Celebrex [Celecoxib]  01/08/2015   Intolerance Rash Tomato  01/08/2015   Intolerance Rash Current Immunizations  Reviewed on 12/1/2017 Name Date Influenza Vaccine (Quad) PF 9/26/2017, 10/21/2016, 11/13/2015 Pneumococcal Conjugate (PCV-13) 2/18/2016 Pneumococcal Polysaccharide (PPSV-23) 2/21/2017 Not reviewed this visit You Were Diagnosed With   
  
 Codes Comments INR (international normal ratio) abnormal     ICD-10-CM: R79.1 ICD-9-CM: 790.92 Pulmonary emphysema, unspecified emphysema type ([facility] [address]     ICD-10-CM: J43.9 ICD-9-CM: 492.8 COPD with exacerbation ([facility]     ICD-10-CM: J44.1 ICD-9-CM: 491.21 Chronic midline low back pain without sciatica     ICD-10-CM: M54.5, G89.29 ICD-9-CM: 724.2, 338.29 Lumbar compression fracture, sequela     ICD-10-CM: S32.000S ICD-9-CM: 905.1 Vitals BP Pulse Temp Resp Height(growth percentile) Weight(growth percentile)  141/89 (BP 1 Location: Left arm, BP Patient Position: Sitting) 100 98.3 °F (36.8 °C) (Oral) 24 5' 4\" (1.626 m) 127 lb 6 oz (57.8 kg) SpO2 BMI OB Status Smoking Status 98% 21.86 kg/m2 Hysterectomy Current Every Day Smoker Vitals History BMI and BSA Data Body Mass Index Body Surface Area  
 21.86 kg/m 2 1.62 m 2 Preferred Pharmacy Pharmacy Name Phone [facility] [address] [phone] Your Updated Medication List  
  
   
This list is accurate as of 8/9/18 12:05 PM.  Always use your most recent med list.  
  
  
  
  
 albuterol 90 mcg/actuation inhaler Commonly known as:  VENTOLIN HFA Take 2 Puffs by inhalation every four (4) hours as needed for Wheezing or Shortness of Breath. budesonide-formoterol 160-4.5 mcg/actuation Hfaa Commonly known as:  SYMBICORT  
INHALE 2 PUFFS TWICE DAILY  
  
 bumetanide 1 mg tablet Commonly known as:  Renan Allyson Take 1 Tab by mouth every evening. dilTIAZem 60 mg tablet Commonly known as:  CARDIZEM  
TAKE 1 TABLET BEFORE BREAKFAST, LUNCH, DINNER AND AT BEDTIME  
  
 docusate sodium 100 mg capsule Commonly known as:  Arman Duster Take 100 mg by mouth every evening.  
  
 lovastatin 10 mg tablet Commonly known as:  MEVACOR  
TAKE 1 TABLET EVERY NIGHT  
  
 MUCINEX -30 mg per tablet Generic drug:  guaiFENesin-dextromethorphan SR Take 1 Tab by mouth two (2) times a day. nystatin 100,000 unit/mL suspension Commonly known as:  MYCOSTATIN Take 5 ml by mouth and swish and split 4 times daily from 7/31/2018 to 8/4/2018  Indications: oral candidiasis  
  
 oxyCODONE-acetaminophen 5-325 mg per tablet Commonly known as:  PERCOCET Take 1 Tab by mouth every six (6) hours as needed for Pain. Max Daily Amount: 4 Tabs. Start taking on:  8/17/2018 potassium chloride SR 10 mEq tablet Commonly known as:  KLOR-CON 10 Take 10 mEq by mouth every Monday, Wednesday, Friday.  Indications: Patient takes in the AM with meals three times weekly * predniSONE 20 mg tablet Commonly known as:  Reyes Zimmer Take 3 tablets on 8/1, 8/2 and 8/3 Take 2.5 tablets on 8/4, 8/5 and 8/6 Take 2 tablets on 8/7, 8/8 and 8/9 Take 1.5 tablets on 8/10, 8/11 and 8/12 * predniSONE 10 mg tablet Commonly known as:  Reyes Zimmer Resume on 8/13/2018 after completing prednisone taper  
  
 warfarin 1 mg tablet Commonly known as:  COUMADIN Take 2 mg on Monday, Wednesday and Friday and 1 mg on Tuesday, Thursday, Saturday and Sunday * Notice: This list has 2 medication(s) that are the same as other medications prescribed for you. Read the directions carefully, and ask your doctor or other care provider to review them with you. Prescriptions Printed Refills  
 oxyCODONE-acetaminophen (PERCOCET) 5-325 mg per tablet 0 Starting on: 8/17/2018 Sig: Take 1 Tab by mouth every six (6) hours as needed for Pain. Max Daily Amount: 4 Tabs. Class: Print Route: Oral  
  
Prescriptions Sent to Pharmacy Refills  
 bumetanide (BUMEX) 1 mg tablet 3 Sig: Take 1 Tab by mouth every evening. Class: Normal  
 Pharmacy: [address] [place] [address] Ph #: [phone] Route: Oral  
 budesonide-formoterol (SYMBICORT) 160-4.5 mcg/actuation HFAA 3 Sig: INHALE 2 PUFFS TWICE DAILY Class: Normal  
 Pharmacy: [address] [place] [address] Ph #: [phone]  
 albuterol (VENTOLIN HFA) 90 mcg/actuation inhaler 11 Sig: Take 2 Puffs by inhalation every four (4) hours as needed for Wheezing or Shortness of Breath. Class: Normal  
 Pharmacy: [facility] DR KURT YOUNGER [address] [place] [address] Ph #: [phone] Route: Inhalation We Performed the Following AMB POC PT/INR [70955 CPT(R)] To-Do List   
 08/10/2018 To Be Determined Appointment with Stephany Causey LPN at [address] [address]  
  
 08/13/2018 To Be Determined Appointment with Stiven Richardson RN at [address] [address]  
  
 08/13/2018 To Be Determined Appointment with Stiven Richardson RN at [address] [address]  
  
 08/15/2018 To Be Determined Appointment with Stephany Causey LPN at [address] [address]  
  
 08/17/2018 To Be Determined Appointment with Stephany Causey LPN at [address] [address]  
  
 08/21/2018 To Be Determined Appointment with Stephany Causey LPN at [address] [address]  
  
 08/24/2018 To Be Determined Appointment with Stephany Causey LPN at [address] [address]  
  
 08/30/2018 To Be Determined Appointment with Stephany Casuey LPN at [address] [address]  
  
 09/06/2018 To Be Determined Appointment with Stephany Causey LPN at [address] [address]  
  
 09/13/2018 To Be Determined Appointment with Stiven Richardson RN at [address] [address]  
  
 09/13/2018 To Be Determined Appointment with Stiven Richardson RN at [address] [address]  
  
 09/20/2018 To Be Determined Appointment with Stephany Causey LPN at [address] [address]  
  
 09/27/2018 To Be Determined Appointment with Stephany Causey LPN at [address] [address]  
  
 10/01/2018 To Be Determined Appointment with Stiven Richardson RN at [address] [facility]! Dear Chayito Contreras: 
Thank you for requesting a MyCFision account. Our records indicate that you already have an active MyChar"[employer]" account. You can access your account anytime at [email or web address] Did you know that you can access your hospital and ER discharge instructions at any time in InfiniDB? You can also review all of your test results from your hospital stay or ER visit. Additional Information If you have questions, please visit the Frequently Asked Questions section of the Inf[employer] website at [email or web address] Remember, InfiniDB is NOT to be used for urgent needs. For medical emergencies, dial 911. Now available from your iPhone and Android! Please provide this summary of care documentation to your next provider. Your primary care clinician is listed as Mahad Phan. If you have any questions after today's visit, please call [phone].

## 2018-08-10 NOTE — PROGRESS NOTES
Late Entry for 8/9/18. Call placed to pt on 8/9/18@ 6:44 pm. The pt has been notified to increase her Coumadin to 2 mg everyday. Pt expressed understanding. Writer requested a verbal read back with understanding, all questions answered.

## 2018-08-13 NOTE — TELEPHONE ENCOUNTER
[place] nurse calling to report pts INR is 6.3 and PT is 75.6  Pt is currently taking 2 mg of Coumadin daily (taking 2 1mg tablets)     nurse callback number is [phone]

## 2018-08-13 NOTE — PROGRESS NOTES
Goals      Reduce risk of CHF exacerbations and complications. 8/1/18 - Discussed low sodium diet and daily weight. Patient states that she never adds additional sodium to her foods. Reports that she is going to start weighing daily. Discussed weight gain parameters and when to report. At next call will discuss weights and patient will verbalize 3 foods high in sodium that should be avoided. 8/13/18 - Patient states she is doing well at home. She has continued to follow low sodium diet. Patient could not recall 3 foods high in sodium to avoid, however, she does report not adding any additional salt to food. Discussed foods like frozen meals, canned food, processed meats, high sodium condiments. Patient states that she has been eating the meals that were delivered by [facility], however, they are all low fat and low sodium. Patient has been weighing every day and weight is staying 124-125 pounds. Patient verbalized when to notify cardiology if weight gain occurs. Patient had no questions or concerns. Also discussed that INR was 6.3 and patient verbalized understanding to hold coumadin for 3 days per order from Dr. Rogers Gaffney. HH will recheck in 3 days. Plan to discuss ACP at next call. KEB       Reduce risk of COPD Exacerbations            8/1/18 - Patient quit smoking this past Thursday. Patient states that she does not have any desire to smoke again. Not using any smoking cessation aides. Will reassess status at each call and offer support/resources as needed. Also discussed pulmonary rehab, but patient declined due to having to watch her grandson. [facility] nurse requested orders to continue seeing patient for PT/OT/SN. Patient will participate in home health. Discussed red flags and when to report/seek treatment. Discussed importance of deep breathing and coughing. Patient stated understanding. KEB    8/13/18 - Patient reports that she is still smoke free.  Patient states she is doing well, has no cravings or desire to smoke again. Encouragement and praise given. Patient is being seen by [facility]. No questions or concerns at this time.  NICK

## 2018-08-16 NOTE — TELEPHONE ENCOUNTER
Received incoming call from [facility] for pt pt/inr. PT: 22.5  INR:1.9    Pt current coumadin regimen 2 mg Mon,Wed,Fri 1 mg all other days pt stopped coumadin on Monday due to elevated INR. Cb # [id number].

## 2018-08-21 NOTE — PROGRESS NOTES
Patients  called, stated that she feels lightheaded and going to fall when stands. Spoke with Dr Travis,  informed patient to go to emergency room.

## 2018-08-22 NOTE — TELEPHONE ENCOUNTER
[facility] home health nurse reporting pt/inr results as follows:PT-35.3 INR-2.9   1 mg Coumadin T & TH, 2 mg on M-W-F.

## 2018-08-23 NOTE — ACP (ADVANCE CARE PLANNING)
8/23/18 - Discussed Advanced Care Planning and offered information on Honoring Choices. Patient declined.

## 2018-08-23 NOTE — PROGRESS NOTES
Goals      Reduce risk of CHF exacerbations and complications. 8/1/18 - Discussed low sodium diet and daily weight. Patient states that she never adds additional sodium to her foods. Reports that she is going to start weighing daily. Discussed weight gain parameters and when to report. At next call will discuss weights and patient will verbalize 3 foods high in sodium that should be avoided. 8/13/18 - Patient states she is doing well at home. She has continued to follow low sodium diet. Patient could not recall 3 foods high in sodium to avoid, however, she does report not adding any additional salt to food. Discussed foods like frozen meals, canned food, processed meats, high sodium condiments. Patient states that she has been eating the meals that were delivered by [facility], however, they are all low fat and low sodium. Patient has been weighing every day and weight is staying 124-125 pounds. Patient verbalized when to notify cardiology if weight gain occurs. Patient had no questions or concerns. Also discussed that INR was 6.3 and patient verbalized understanding to hold coumadin for 3 days per order from Dr. Maikel Fairbanks. HH will recheck in 3 days. Plan to discuss ACP at next call. KEB    8/23/18 - Discussed INR results and instructions from Dr. Ciara Ashton. HH to recheck again in 1 week. Discussed Advanced Care Planning and offered information on Honoring Choices. Patient declined. Plan to resolve episode at next call if no further needs. KEB       Reduce risk of COPD Exacerbations            8/1/18 - Patient quit smoking this past Thursday. Patient states that she does not have any desire to smoke again. Not using any smoking cessation aides. Will reassess status at each call and offer support/resources as needed. Also discussed pulmonary rehab, but patient declined due to having to watch her grandson. [facility] nurse requested orders to continue seeing patient for PT/OT/SN. Patient will participate in home health. Discussed red flags and when to report/seek treatment. Discussed importance of deep breathing and coughing. Patient stated understanding. KEMARGARITA    8/13/18 - Patient reports that she is still smoke free. Patient states she is doing well, has no cravings or desire to smoke again. Encouragement and praise given. Patient is being seen by [facility]. No questions or concerns at this time. KEMARGARITA    8/23/18 - patient remains smoke free.  NICK

## 2018-08-29 NOTE — TELEPHONE ENCOUNTER
[phone] left a VM for Taryn Guajardo advising per 's instructions. Taryn Guajardo stated in previous encounter to leave a detailed message if she does not answer.

## 2018-08-29 NOTE — TELEPHONE ENCOUNTER
I need her current dosing before I can change things, please get current coumadin dosing incase has been changed since last visit

## 2018-08-31 NOTE — PROGRESS NOTES
Patient has graduated from the Transitions of Care Coordination  program on 8/31/18. Patient's symptoms are stable at this time. Patient/family has the ability to self-manage. Care management goals have been completed at this time. No further nurse navigator follow up scheduled. Goals Addressed Most Recent  COMPLETED: Reduce risk of CHF exacerbations and complications. On track (8/23/2018)  
       
  8/1/18 - Discussed low sodium diet and daily weight. Patient states that she never adds additional sodium to her foods. Reports that she is going to start weighing daily. Discussed weight gain parameters and when to report. At next call will discuss weights and patient will verbalize 3 foods high in sodium that should be avoided. 8/13/18 - Patient states she is doing well at home. She has continued to follow low sodium diet. Patient could not recall 3 foods high in sodium to avoid, however, she does report not adding any additional salt to food. Discussed foods like frozen meals, canned food, processed meats, high sodium condiments. Patient states that she has been eating the meals that were delivered by [facility] [employer], however, they are all low fat and low sodium. Patient has been weighing every day and weight is staying 124-125 pounds. Patient verbalized when to notify cardiology if weight gain occurs. Patient had no questions or concerns. Also discussed that INR was 6.3 and patient verbalized understanding to hold coumadin for 3 days per order from Dr. Mark Martinez. HH will recheck in 3 days. Plan to discuss ACP at next call. [facility] 
 
8/23/18 - Discussed INR results and instructions from Dr. Perez Credit. HH to recheck again in 1 week. Discussed Advanced Care Planning and offered information on Honoring Choices. Patient declined. Plan to resolve episode at next call if no further needs. [facility] 
 
8/31/18 - Patient requested to book an appointment for INR check.  She has been discharged from [facility]. Appointment scheduled. Resolving episode. NICK  COMPLETED: Reduce risk of COPD Exacerbations   On track (8/23/2018)  
       
  8/1/18 - Patient quit smoking this past Thursday. Patient states that she does not have any desire to smoke again. Not using any smoking cessation aides. Will reassess status at each call and offer support/resources as needed. Also discussed pulmonary rehab, but patient declined due to having to watch her grandson. [facility] nurse requested orders to continue seeing patient for PT/OT/SN. Patient will participate in home health. Discussed red flags and when to report/seek treatment. Discussed importance of deep breathing and coughing. Patient stated understanding. KE 
 
8/13/18 - Patient reports that she is still smoke free. Patient states she is doing well, has no cravings or desire to smoke again. Encouragement and praise given. Patient is being seen by [facility]. No questions or concerns at this time. KEB 
 
8/23/18 - patient remains smoke free. KEB 
 
8/31/18 - Patient remains smoke free. No questions or concerns. Resolving episode. NICK Pt has nurse navigator's contact information for any further questions, concerns, or needs. Patients upcoming visits:  Future Appointments Date Time Provider Jerry Barrera 9/6/2018 To Be Determined Stephany Causey LPN [address]  
9/6/2018 3:30 PM DO YESIKA Mercer  
9/13/2018 To Be Determined Stiven Richardson RN [address]  
9/13/2018 To Be Determined Stiven Richardson RN [address] [facility]  
9/20/2018 To Be Determined Stephany Causey LPN [facility]  
9/27/2018 To Be Determined Stephany Causey LPN [facility]  
10/1/2018 To Be Determined Stiven Richardson RN [address]

## 2018-09-06 NOTE — PROGRESS NOTES
Chief Complaint Patient presents with  Anticoagulation Patient in office today for pt/inr. Have no concerns. 1. Have you been to the ER, urgent care clinic since your last visit? Hospitalized since your last visit? No 
 
2. Have you seen or consulted any other health care providers outside of the [address] [place] [address] since your last visit? Include any pap smears or colon screening.  No

## 2018-09-06 NOTE — PATIENT INSTRUCTIONS

## 2018-09-06 NOTE — PROGRESS NOTES
Jake Doll is a 66 y.o. female Chief Complaint Patient presents with  Anticoagulation  
 pt here for recheck of her INR and is currently at 2.1 and is not at goal.  Pt should be 2.5-3.5. Pt does also have home health coming and they had been checking her INR as well. Pt also with copd and can not afford her inhalers states she has ppwk for me to complete she will bring to office. There are no samples available to give her either. she is a 66y.o. year old female who presents for evalution. Reviewed PmHx, RxHx, FmHx, SocHx, AllgHx and updated and dated in the chart. Review of Systems - negative except as listed above in the HPI Objective:  
 
Vitals:  
 09/06/18 1525 BP: 126/79 Pulse: 68 Resp: 20 Temp: 98.2 °F (36.8 °C) TempSrc: Oral  
SpO2: 93% Weight: 130 lb (59 kg) Height: 5' 4\" (1.626 m) Current Outpatient Prescriptions Medication Sig  warfarin (COUMADIN) 2 mg tablet starting 8-30-18 take 2 tabs x 1 day then 1 tab daily except 0.5 tabs on Sa, Alfonso  
 warfarin (COUMADIN) 1 mg tablet Take 2 mg on all days except Sat Sun take 1 mg  dilTIAZem (CARDIZEM) 60 mg tablet Take 60 mg by mouth Before breakfast, lunch, dinner and at bedtime.  lovastatin (MEVACOR) 10 mg tablet Take 10 mg by mouth nightly.  bumetanide (BUMEX) 1 mg tablet Take 1 Tab by mouth every evening.  albuterol (VENTOLIN HFA) 90 mcg/actuation inhaler Take 2 Puffs by inhalation every four (4) hours as needed for Wheezing or Shortness of Breath.  oxyCODONE-acetaminophen (PERCOCET) 5-325 mg per tablet Take 1 Tab by mouth every six (6) hours as needed for Pain. Max Daily Amount: 4 Tabs.  predniSONE (DELTASONE) 10 mg tablet Resume on 8/13/2018 after completing prednisone taper  docusate sodium (COLACE) 100 mg capsule Take 100 mg by mouth every evening.   
 potassium chloride SR (KLOR-CON 10) 10 mEq tablet Take 10 mEq by mouth every Monday, Wednesday, Friday. Indications: Patient takes in the AM with meals three times weekly  lovastatin (MEVACOR) 10 mg tablet TAKE 1 TABLET EVERY NIGHT  dilTIAZem (CARDIZEM) 60 mg tablet TAKE 1 TABLET BEFORE BREAKFAST, LUNCH, DINNER AND AT BEDTIME  ibuprofen (MOTRIN) 200 mg tablet Take 400 mg by mouth every six (6) hours as needed for Pain.  budesonide-formoterol (SYMBICORT) 160-4.5 mcg/actuation HFAA INHALE 2 PUFFS TWICE DAILY  guaiFENesin-dextromethorphan SR (MUCINEX DM) 600-30 mg per tablet Take 1 Tab by mouth two (2) times a day.  nystatin (MYCOSTATIN) 100,000 unit/mL suspension Take 5 ml by mouth and swish and split 4 times daily from 7/31/2018 to 8/4/2018  Indications: oral candidiasis  predniSONE (DELTASONE) 20 mg tablet Take 3 tablets on 8/1, 8/2 and 8/3 Take 2.5 tablets on 8/4, 8/5 and 8/6 Take 2 tablets on 8/7, 8/8 and 8/9 Take 1.5 tablets on 8/10, 8/11 and 8/12 (Patient not taking: Reported on 8/22/2018) No current facility-administered medications for this visit. Physical Examination: General appearance - alert, well appearing, and in no distress Chest - scattered rhonchi Heart - audible click irregularly ireregular Assessment/ Plan:  
Diagnoses and all orders for this visit: 
 
1. Chronic atrial fibrillation (HCC) -     AMB POC PT/INR 2. Encounter for immunization -     Administration fee () for Medicare insured patients -     Influenza virus vaccine (QUADRIVALENT PRES FREE SYRINGE) IM ([id number]) 
 
 double dose today so 4mg then continue regular dosing Follow-up Disposition: 
Return in about 1 week (around 9/13/2018), or if symptoms worsen or fail to improve. I have discussed the diagnosis with the patient and the intended plan as seen in the above orders. The patient has received an after-visit summary and questions were answered concerning future plans. Pt conveyed understanding of plan. Medication Side Effects and Warnings were discussed with patient Park Warren DO

## 2018-09-10 NOTE — PROGRESS NOTES
[facility] Oxygen Therapy order was placed on [facility]'s desk for processing on 9/10/18.

## 2018-09-13 NOTE — PROGRESS NOTES
Mendel Sprain is a 66 y.o. female Fasting Chief Complaint Patient presents with  Anticoagulation 1. Have you been to the ER, urgent care clinic since your last visit? Hospitalized since your last visit? No 
M 
2. Have you seen or consulted any other health care providers outside of the [address] [place] [address] since your last visit? Include any pap smears or colon screening. No 
 
 
Visit Vitals  /81 (BP 1 Location: Left arm, BP Patient Position: Sitting)  Pulse 98  Temp 98.2 °F (36.8 °C) (Oral)  Resp 16  
 Ht 5' 4\" (1.626 m)  Wt 123 lb 9.6 oz (56.1 kg)  SpO2 95%  BMI 21.22 kg/m2 Health Maintenance Due Topic Date Due  MEDICARE YEARLY EXAM  06/09/2018

## 2018-09-13 NOTE — PATIENT INSTRUCTIONS
double coumadin today 4mg then 3 mg MWF and 1 mg all other days Oxygen Therapy for Heart Failure: Care Instructions Your Care Instructions When you have heart failure, your heart does not pump as well as it should. So it does not send enough oxygen-rich blood to the rest of your body. Oxygen therapy increases the amount of oxygen sent to your body's tissues. This helps reduce your heart's workload. It can help you breathe easier and let you do more. Follow-up care is a key part of your treatment and safety. Be sure to make and go to all appointments, and call your doctor if you are having problems. It's also a good idea to know your test results and keep a list of the medicines you take. How can you care for yourself at home? To help yourself · Using oxygen may dry out your nose or lips. Use water-based lubricants on your lips or nostrils. Do not use an oil-based product like petroleum jelly. · If you use a nasal cannula, the tubing may rub under your nostrils and around your ears. To keep your skin from getting sore, tuck some gauze under the tubing. Use a water-based lotion on rubbed areas. · Do not use alcohol or take drugs that relax you, because they will slow your breathing rate. · Keep track of how much oxygen is in the tank, and reorder before it runs out. If a holiday is coming up or you expect bad weather, order in advance or make your regular order larger. · You may need extra oxygen when you travel to high altitudes or travel by plane. Ask your doctor about this. · If you are getting oxygen directly to your windpipe through an opening in your neck, your doctor will teach you how to care for the equipment. Check your airflow if you think you are not getting oxygen · Check the flow by holding your mask or cannula up to your ear and listening for the \"hiss\" of airflow. · If you have a nasal cannula, dip the prongs in a glass of water. If you see bubbles, oxygen is coming through. · Check your pressure gauge or contents indicator. · If you use an oxygen concentrator, make sure it is turned on and plugged in. If you use a cylinder, make sure the valve is open. · Look for kinks, blockages, or water in the tubing. Be sure the tubing is connected to the oxygen source. · Do not change your oxygen flow rate. Your doctor sets this at the correct level. Higher flow rates usually do not help and can increase the risk of harmful carbon dioxide buildup in the blood. To be safe · Do not leave cords or tubing running across an area where you or someone else may trip on it. · Do not let oxygen containers get hot. Store them in a cool place where there is airflow. Do not leave them in a car trunk or a hot vehicle. · Keep oxygen containers upright. Make sure they do not fall over and get damaged. · Watch for signs of oxygen leaks. If you hear a loud hissing from your container or if it empties too fast, stay away from the container. Open windows. Call the company that brought the oxygen system to your home right away. · Do not use oxygen around anything that could spark or easily cause a fire. ¨ Do not smoke or let others smoke while you are using oxygen. Put up \"No smoking\" signs in your home. ¨ Do not use oxygen near open flames, such as candles, fireplaces, gas stoves, or hot water heaters. Do not use it near electric razors, hair dryers, heating pads, or anything that may spark. ¨ Keep a working fire extinguisher in your home that is easy to get to. ¨ If a fire starts, turn off the oxygen right away and leave the house. ¨ If you have an oxygen concentrator, do not use it if the cord looks damaged. Do not use an extension cord to plug it in. Do not plug it into an outlet that has other appliances plugged into it. To care for the equipment · Follow the directions that come with the equipment for using and caring for it. · Wash your cannula or mask with a liquid soap and warm water 1 or 2 times a week. Replace them every 2 to 4 weeks. · If you have a cold, change the nasal prongs when your cold symptoms are done. · If you have an oxygen concentrator, unplug the unit and wipe down the cabinet with a damp cloth daily. Clean the air filter at least 2 times a week. Where can you learn more? Go to http://hayden-дмитрий.info/. Enter N901 in the search box to learn more about \"Oxygen Therapy for Heart Failure: Care Instructions. \" Current as of: December 6, 2017 Content Version: 11.7 © 6459-8938 [employer]. Care instructions adapted under license by [employer] (which disclaims liability or warranty for this information). If you have questions about a medical condition or this instruction, always ask your healthcare professional. [address] any warranty or liability for your use of this information.

## 2018-09-13 NOTE — MR AVS SNAPSHOT
[address] [place] [address] 
[phone] Patient: Melquiades Ellington MRN: [id number] IZN:[id number] Visit Information Date & Time Provider Department Dept. Phone Encounter #  
 9/13/2018  9:00 AM Gloria Ramachandran, [address] [phone] [id number] Follow-up Instructions Return in about 10 years (around 9/13/2028), or if symptoms worsen or fail to improve. Upcoming Health Maintenance Date Due  
 MEDICARE YEARLY EXAM 6/9/2018 COLONOSCOPY 11/28/2019 BREAST CANCER SCRN MAMMOGRAM 2/27/2020 DTaP/Tdap/Td series (2 - Td) 9/26/2027 Allergies as of 9/13/2018  Review Complete On: 9/13/2018 By: Gloria Ramachandran,  Severity Noted Reaction Type Reactions Spiriva Respimat [Tiotropium Bromide] High 01/08/2015   Systemic Anaphylaxis, Other (comments) Adverse effects; Per RN - pt states that Spiriva caused throat swelling and could not breathe well. Celebrex [Celecoxib]  01/08/2015   Intolerance Rash Tomato  01/08/2015   Intolerance Rash Current Immunizations  Reviewed on 12/1/2017 Name Date Influenza Vaccine (Quad) PF 9/6/2018  4:24 PM, 9/26/2017, 10/21/2016, 11/13/2015 Pneumococcal Conjugate (PCV-13) 2/18/2016 Pneumococcal Polysaccharide (PPSV-23) 2/21/2017 Not reviewed this visit You Were Diagnosed With   
  
 Codes Comments Chronic atrial fibrillation (HCC)    -  Primary ICD-10-CM: K41.2 ICD-9-CM: 427.31 Chronic obstructive pulmonary disease with acute exacerbation (HCC)     ICD-10-CM: J44.1 ICD-9-CM: 491.21 Elevated LFTs     ICD-10-CM: R79.89 ICD-9-CM: 790.6 Leukocytosis, unspecified type     ICD-10-CM: D72.829 ICD-9-CM: 288.60 Vitals BP Pulse Temp Resp Height(growth percentile) Weight(growth percentile) 128/81 (BP 1 Location: Left arm, BP Patient Position: Sitting) 98 98.2 °F (36.8 °C) (Oral) 16 5' 4\" (1.626 m) 123 lb 9.6 oz (56.1 kg) SpO2 BMI OB Status Smoking Status 95% 21.22 kg/m2 Hysterectomy Current Every Day Smoker Vitals History BMI and BSA Data Body Mass Index Body Surface Area  
 21.22 kg/m 2 1.59 m 2 Preferred Pharmacy Pharmacy Name Phone [address] [phone] Your Updated Medication List  
  
   
This list is accurate as of 9/13/18  9:42 AM.  Always use your most recent med list.  
  
  
  
  
 albuterol 90 mcg/actuation inhaler Commonly known as:  VENTOLIN HFA Take 2 Puffs by inhalation every four (4) hours as needed for Wheezing or Shortness of Breath. budesonide-formoterol 160-4.5 mcg/actuation Hfaa Commonly known as:  SYMBICORT  
INHALE 2 PUFFS TWICE DAILY  
  
 bumetanide 1 mg tablet Commonly known as:  Patino Riana Take 1 Tab by mouth every evening. * dilTIAZem 60 mg tablet Commonly known as:  CARDIZEM Take 60 mg by mouth Before breakfast, lunch, dinner and at bedtime. * dilTIAZem 60 mg tablet Commonly known as:  CARDIZEM  
TAKE 1 TABLET BEFORE BREAKFAST, LUNCH, DINNER AND AT BEDTIME  
  
 docusate sodium 100 mg capsule Commonly known as:  Uzma Dilling Take 100 mg by mouth every evening. ibuprofen 200 mg tablet Commonly known as:  MOTRIN Take 400 mg by mouth every six (6) hours as needed for Pain. * lovastatin 10 mg tablet Commonly known as:  MEVACOR Take 10 mg by mouth nightly. * lovastatin 10 mg tablet Commonly known as:  MEVACOR  
TAKE 1 TABLET EVERY NIGHT  
  
 MUCINEX -30 mg per tablet Generic drug:  guaiFENesin-dextromethorphan SR Take 1 Tab by mouth two (2) times a day. nystatin 100,000 unit/mL suspension Commonly known as:  MYCOSTATIN Take 5 ml by mouth and swish and split 4 times daily from 7/31/2018 to 8/4/2018  Indications: oral candidiasis  
  
 oxyCODONE-acetaminophen 5-325 mg per tablet Commonly known as:  PERCOCET  
 Take 1 Tab by mouth every six (6) hours as needed for Pain. Max Daily Amount: 4 Tabs. potassium chloride SR 10 mEq tablet Commonly known as:  KLOR-CON 10 Take 10 mEq by mouth every Monday, Wednesday, Friday. Indications: Patient takes in the AM with meals three times weekly * predniSONE 20 mg tablet Commonly known as:  Alinda Awilda Take 3 tablets on 8/1, 8/2 and 8/3 Take 2.5 tablets on 8/4, 8/5 and 8/6 Take 2 tablets on 8/7, 8/8 and 8/9 Take 1.5 tablets on 8/10, 8/11 and 8/12 * predniSONE 10 mg tablet Commonly known as:  Alinda Awilda Resume on 8/13/2018 after completing prednisone taper * warfarin 2 mg tablet Commonly known as:  COUMADIN  
starting 8-30-18 take 2 tabs x 1 day then 1 tab daily except 0.5 tabs on Sa, Alfonso  
  
 * warfarin 1 mg tablet Commonly known as:  COUMADIN Take 2 mg on all days except Sat Sun take 1 mg * Notice: This list has 8 medication(s) that are the same as other medications prescribed for you. Read the directions carefully, and ask your doctor or other care provider to review them with you. We Performed the Following AMB POC PT/INR [78668 CPT(R)] CBC WITH AUTOMATED DIFF [23042 CPT(R)] HEPATIC FUNCTION PANEL [78057 CPT(R)] Follow-up Instructions Return in about 10 years (around 9/13/2028), or if symptoms worsen or fail to improve. To-Do List   
 09/13/2018 To Be Determined Appointment with Zackary Novoa RN at [place] [address]  
  
 09/13/2018 To Be Determined Appointment with Zackary Novoa RN at [place] [address]  
  
 09/20/2018 To Be Determined Appointment with Abril Nixon LPN at [place] [address]  
  
 09/27/2018 To Be Determined Appointment with Abril Nixon LPN at [place] [address]  
  
 10/01/2018 To Be Determined Appointment with Zackary Novoa RN at [place] [address] Patient Instructions   
double coumadin today 4mg then 3 mg MWF and 1 mg all other days Oxygen Therapy for Heart Failure: Care Instructions Your Care Instructions When you have heart failure, your heart does not pump as well as it should. So it does not send enough oxygen-rich blood to the rest of your body. Oxygen therapy increases the amount of oxygen sent to your body's tissues. This helps reduce your heart's workload. It can help you breathe easier and let you do more. Follow-up care is a key part of your treatment and safety. Be sure to make and go to all appointments, and call your doctor if you are having problems. It's also a good idea to know your test results and keep a list of the medicines you take. How can you care for yourself at home? To help yourself · Using oxygen may dry out your nose or lips. Use water-based lubricants on your lips or nostrils. Do not use an oil-based product like petroleum jelly. · If you use a nasal cannula, the tubing may rub under your nostrils and around your ears. To keep your skin from getting sore, tuck some gauze under the tubing. Use a water-based lotion on rubbed areas. · Do not use alcohol or take drugs that relax you, because they will slow your breathing rate. · Keep track of how much oxygen is in the tank, and reorder before it runs out. If a holiday is coming up or you expect bad weather, order in advance or make your regular order larger. · You may need extra oxygen when you travel to high altitudes or travel by plane. Ask your doctor about this. · If you are getting oxygen directly to your windpipe through an opening in your neck, your doctor will teach you how to care for the equipment. Check your airflow if you think you are not getting oxygen · Check the flow by holding your mask or cannula up to your ear and listening for the \"hiss\" of airflow. · If you have a nasal cannula, dip the prongs in a glass of water. If you see bubbles, oxygen is coming through. · Check your pressure gauge or contents indicator. · If you use an oxygen concentrator, make sure it is turned on and plugged in. If you use a cylinder, make sure the valve is open. · Look for kinks, blockages, or water in the tubing. Be sure the tubing is connected to the oxygen source. · Do not change your oxygen flow rate. Your doctor sets this at the correct level. Higher flow rates usually do not help and can increase the risk of harmful carbon dioxide buildup in the blood. To be safe · Do not leave cords or tubing running across an area where you or someone else may trip on it. · Do not let oxygen containers get hot. Store them in a cool place where there is airflow. Do not leave them in a car trunk or a hot vehicle. · Keep oxygen containers upright. Make sure they do not fall over and get damaged. · Watch for signs of oxygen leaks. If you hear a loud hissing from your container or if it empties too fast, stay away from the container. Open windows. Call the company that brought the oxygen system to your home right away. · Do not use oxygen around anything that could spark or easily cause a fire. ¨ Do not smoke or let others smoke while you are using oxygen. Put up \"No smoking\" signs in your home. ¨ Do not use oxygen near open flames, such as candles, fireplaces, gas stoves, or hot water heaters. Do not use it near electric razors, hair dryers, heating pads, or anything that may spark. ¨ Keep a working fire extinguisher in your home that is easy to get to. ¨ If a fire starts, turn off the oxygen right away and leave the house. ¨ If you have an oxygen concentrator, do not use it if the cord looks damaged. Do not use an extension cord to plug it in. Do not plug it into an outlet that has other appliances plugged into it. To care for the equipment · Follow the directions that come with the equipment for using and caring for it. · Wash your cannula or mask with a liquid soap and warm water 1 or 2 times a week. Replace them every 2 to 4 weeks. · If you have a cold, change the nasal prongs when your cold symptoms are done. · If you have an oxygen concentrator, unplug the unit and wipe down the cabinet with a damp cloth daily. Clean the air filter at least 2 times a week. Where can you learn more? Go to http://hayden-дмитрий.info/. Enter N901 in the search box to learn more about \"Oxygen Therapy for Heart Failure: Care Instructions. \" Current as of: December 6, 2017 Content Version: 11.7 © 5044-9880 [employer]. Care instructions adapted under license by [employer] (which disclaims liability or warranty for this information). If you have questions about a medical condition or this instruction, always ask your healthcare professional. [place] [address] any warranty or liability for your use of this information. Introducing [facility]! Dear Eugene Polo: 
Thank you for requesting a "[employer]" account. Our records indicate that you already have an active "[employer]" account. You can access your account anytime at [email or web address] Did you know that you can access your hospital and ER discharge instructions at any time in "[employer]"? You can also review all of your test results from your hospital stay or ER visit. Additional Information If you have questions, please visit the Frequently Asked Questions section of the "[employer]" website at [email or web address] Remember, "[employer]" is NOT to be used for urgent needs. For medical emergencies, dial 911. Now available from your iPhone and Android! Please provide this summary of care documentation to your next provider. Your primary care clinician is listed as Tiffany Hathaway.  If you have any questions after today's visit, please call [phone].

## 2018-09-13 NOTE — PROGRESS NOTES
Faxed oxygen form to [address] @ [phone]. Confirmation number [id number]. Original form placed in scan folder for central scanning.

## 2018-09-13 NOTE — PROGRESS NOTES
Farhana Rebolledo is a 66 y.o. female Chief Complaint Patient presents with  Anticoagulation  
 pt here for f/u on her INR and pt states nothing has changed and she did double the dose and c/w current dosing. We will double the dose today and then do 3mg MWF and 1mg all other day. Will recheck in 7-10 days. Pt also is in need of O2 a portable concentrator. Pt is very SOB with any ambulation at all and has been using her inhaler excessively due to this. Pt is also have light headedness when this occurs. Her resting O2 sat was 92, walking it went down to 85% and walking with 2 L O2 was 95%. Will submit paperwork to [facility]. Pt has also purchased OOP a symbicort inhaler for $350 and will attempt to obtain pt samples since she can not afford this continuously since she is in the donut hole. Pt also with elevated WBC and LFT and is due for recheck. Beside the breathing pt feels fine. she is a 66y.o. year old female who presents for evalution. Reviewed PmHx, RxHx, FmHx, SocHx, AllgHx and updated and dated in the chart. Review of Systems - negative except as listed above in the HPI Objective:  
 
Vitals:  
 09/13/18 0901 BP: 128/81 Pulse: 98 Resp: 16 Temp: 98.2 °F (36.8 °C) TempSrc: Oral  
SpO2: 95% Weight: 123 lb 9.6 oz (56.1 kg) Height: 5' 4\" (1.626 m) Current Outpatient Prescriptions Medication Sig  warfarin (COUMADIN) 2 mg tablet starting 8-30-18 take 2 tabs x 1 day then 1 tab daily except 0.5 tabs on Sa, Alfonso  
 warfarin (COUMADIN) 1 mg tablet Take 2 mg on all days except Sat Sun take 1 mg  bumetanide (BUMEX) 1 mg tablet Take 1 Tab by mouth every evening.  budesonide-formoterol (SYMBICORT) 160-4.5 mcg/actuation HFAA INHALE 2 PUFFS TWICE DAILY  albuterol (VENTOLIN HFA) 90 mcg/actuation inhaler Take 2 Puffs by inhalation every four (4) hours as needed for Wheezing or Shortness of Breath.  oxyCODONE-acetaminophen (PERCOCET) 5-325 mg per tablet Take 1 Tab by mouth every six (6) hours as needed for Pain. Max Daily Amount: 4 Tabs.  predniSONE (DELTASONE) 10 mg tablet Resume on 8/13/2018 after completing prednisone taper  docusate sodium (COLACE) 100 mg capsule Take 100 mg by mouth every evening.  potassium chloride SR (KLOR-CON 10) 10 mEq tablet Take 10 mEq by mouth every Monday, Wednesday, Friday. Indications: Patient takes in the AM with meals three times weekly  lovastatin (MEVACOR) 10 mg tablet TAKE 1 TABLET EVERY NIGHT  dilTIAZem (CARDIZEM) 60 mg tablet TAKE 1 TABLET BEFORE BREAKFAST, LUNCH, DINNER AND AT BEDTIME  dilTIAZem (CARDIZEM) 60 mg tablet Take 60 mg by mouth Before breakfast, lunch, dinner and at bedtime.  lovastatin (MEVACOR) 10 mg tablet Take 10 mg by mouth nightly.  ibuprofen (MOTRIN) 200 mg tablet Take 400 mg by mouth every six (6) hours as needed for Pain.  guaiFENesin-dextromethorphan SR (MUCINEX DM) 600-30 mg per tablet Take 1 Tab by mouth two (2) times a day.  nystatin (MYCOSTATIN) 100,000 unit/mL suspension Take 5 ml by mouth and swish and split 4 times daily from 7/31/2018 to 8/4/2018  Indications: oral candidiasis (Patient not taking: Reported on 9/13/2018)  predniSONE (DELTASONE) 20 mg tablet Take 3 tablets on 8/1, 8/2 and 8/3 Take 2.5 tablets on 8/4, 8/5 and 8/6 Take 2 tablets on 8/7, 8/8 and 8/9 Take 1.5 tablets on 8/10, 8/11 and 8/12 (Patient not taking: Reported on 9/13/2018) No current facility-administered medications for this visit. Physical Examination: General appearance - alert, well appearing, and in no distress Eyes - pupils equal and reactive, extraocular eye movements intact Chest - scattered rhonchi with decreased bs b/l pt baseline Heart - irregularly irregular with audible click. Assessment/ Plan:  
Diagnoses and all orders for this visit: 
 
1. Chronic atrial fibrillation (HCC) -     AMB POC PT/INR 
 double coumadin today 4mg then 3 mg MWF and 1 mg all other days 2. Chronic obstructive pulmonary disease with acute exacerbation ([facility] [address] Will submit forms for portable concentrator, O2 at rest 92%, with ambulation went to 85% and with 2L with ambulation went to 95% 3. Elevated LFTs 
-     HEPATIC FUNCTION PANEL 4. Leukocytosis, unspecified type 
-     CBC WITH AUTOMATED DIFF 5. Diastolic CHF, chronic (HCC) portable oxygen therapy Follow-up Disposition: 
Return in about 10 years (around 9/13/2028), or if symptoms worsen or fail to improve. I have discussed the diagnosis with the patient and the intended plan as seen in the above orders. The patient has received an after-visit summary and questions were answered concerning future plans. Pt conveyed understanding of plan. Medication Side Effects and Warnings were discussed with patient [id number], DO

## 2018-09-27 NOTE — PROGRESS NOTES
[employer] form faxed to [phone] with RX for Symbicort.  Confirmation was received and forms placed in scan folder

## 2018-09-27 NOTE — MR AVS SNAPSHOT
[address] [place] [address] 
[phone] Patient: Mendel Sprain MRN: [id number] WDX:[id number] Visit Information Date & Time Provider Department Dept. Phone Encounter #  
 9/27/2018  7:15 AM Leisa Mccoy, Hanny Crowley [phone] [id number] Follow-up Instructions Return in about 1 week (around 10/4/2018), or if symptoms worsen or fail to improve. Upcoming Health Maintenance Date Due Shingrix Vaccine Age 50> (1 of 2) 3/13/2007 MEDICARE YEARLY EXAM 6/9/2018 COLONOSCOPY 11/28/2019 BREAST CANCER SCRN MAMMOGRAM 2/27/2020 Pneumococcal 19-64 Highest Risk (3 of 3 - PPSV23) 2/21/2022 DTaP/Tdap/Td series (2 - Td) 9/26/2027 Allergies as of 9/27/2018  Review Complete On: 9/27/2018 By: Leisa Mccoy DO Severity Noted Reaction Type Reactions Spiriva Respimat [Tiotropium Bromide] High 01/08/2015   Systemic Anaphylaxis, Other (comments) Adverse effects; Per RN - pt states that Spiriva caused throat swelling and could not breathe well. Celebrex [Celecoxib]  01/08/2015   Intolerance Rash Tomato  01/08/2015   Intolerance Rash Current Immunizations  Reviewed on 12/1/2017 Name Date Influenza Vaccine (Quad) PF 9/6/2018  4:24 PM, 9/26/2017, 10/21/2016, 11/13/2015 Pneumococcal Conjugate (PCV-13) 2/18/2016 Pneumococcal Polysaccharide (PPSV-23) 2/21/2017 Not reviewed this visit You Were Diagnosed With   
  
 Codes Comments Chronic atrial fibrillation (HCC)    -  Primary ICD-10-CM: H90.1 ICD-9-CM: 427.31 Chronic midline low back pain without sciatica     ICD-10-CM: M54.5, G89.29 ICD-9-CM: 724.2, 338.29 Lumbar compression fracture, sequela     ICD-10-CM: S32.000S ICD-9-CM: 905.1 S/P AVR (aortic valve replacement)     ICD-10-CM: Y81.2 ICD-9-CM: V43.3 Medicare annual wellness visit, subsequent     ICD-10-CM: Z00.00 ICD-9-CM: V70.0 Vitals BP Pulse Temp Resp Height(growth percentile) Weight(growth percentile) 130/88 (BP 1 Location: Left arm, BP Patient Position: Sitting) 87 97.8 °F (36.6 °C) (Oral) 18 5' 4\" (1.626 m) 126 lb (57.2 kg) SpO2 BMI OB Status Smoking Status 98% 21.63 kg/m2 Hysterectomy Current Every Day Smoker BMI and BSA Data Body Mass Index Body Surface Area  
 21.63 kg/m 2 1.61 m 2 Preferred Pharmacy Pharmacy Name Phone [phone] [phone] Your Updated Medication List  
  
   
This list is accurate as of 9/27/18  7:42 AM.  Always use your most recent med list.  
  
  
  
  
 albuterol 90 mcg/actuation inhaler Commonly known as:  VENTOLIN HFA Take 2 Puffs by inhalation every four (4) hours as needed for Wheezing or Shortness of Breath. budesonide-formoterol 160-4.5 mcg/actuation Hfaa Commonly known as:  SYMBICORT  
INHALE 2 PUFFS TWICE DAILY  
  
 bumetanide 1 mg tablet Commonly known as:  Assunta Brink Take 1 Tab by mouth every evening. dilTIAZem 60 mg tablet Commonly known as:  CARDIZEM  
TAKE 1 TABLET BEFORE BREAKFAST, LUNCH, DINNER AND AT BEDTIME  
  
 docusate sodium 100 mg capsule Commonly known as:  Dorthey Matsu Take 100 mg by mouth every evening. ibuprofen 200 mg tablet Commonly known as:  MOTRIN Take 400 mg by mouth every six (6) hours as needed for Pain.  
  
 lovastatin 10 mg tablet Commonly known as:  MEVACOR  
TAKE 1 TABLET EVERY NIGHT  
  
 MUCINEX -30 mg per tablet Generic drug:  guaiFENesin-dextromethorphan SR Take 1 Tab by mouth two (2) times a day. nystatin 100,000 unit/mL suspension Commonly known as:  MYCOSTATIN Take 5 ml by mouth and swish and split 4 times daily from 7/31/2018 to 8/4/2018  Indications: oral candidiasis  
  
 oxyCODONE-acetaminophen 5-325 mg per tablet Commonly known as:  PERCOCET Take 1 Tab by mouth every six (6) hours as needed for Pain.  Max Daily Amount: 4 Tabs. Must last 30 days  
  
 potassium chloride SR 10 mEq tablet Commonly known as:  KLOR-CON 10 Take 10 mEq by mouth every Monday, Wednesday, Friday. Indications: Patient takes in the AM with meals three times weekly * predniSONE 20 mg tablet Commonly known as:  Maribela Paramjit Take 3 tablets on 8/1, 8/2 and 8/3 Take 2.5 tablets on 8/4, 8/5 and 8/6 Take 2 tablets on 8/7, 8/8 and 8/9 Take 1.5 tablets on 8/10, 8/11 and 8/12 * predniSONE 10 mg tablet Commonly known as:  Juma Yusuf Resume on 8/13/2018 after completing prednisone taper  
  
 warfarin 1 mg tablet Commonly known as:  COUMADIN  
3mg every day except Tuesday Thursday take 1mg f/u 1 week * Notice: This list has 2 medication(s) that are the same as other medications prescribed for you. Read the directions carefully, and ask your doctor or other care provider to review them with you. Prescriptions Printed Refills  
 oxyCODONE-acetaminophen (PERCOCET) 5-325 mg per tablet 0 Sig: Take 1 Tab by mouth every six (6) hours as needed for Pain. Max Daily Amount: 4 Tabs. Must last 30 days Class: Print Route: Oral  
  
We Performed the Following AMB POC PT/INR [25078 CPT(R)] Follow-up Instructions Return in about 1 week (around 10/4/2018), or if symptoms worsen or fail to improve. To-Do List   
 10/01/2018 To Be Determined Appointment with Steph Johnson RN at [facility] 39 Patient Instructions A Healthy Lifestyle: Care Instructions Your Care Instructions A healthy lifestyle can help you feel good, stay at a healthy weight, and have plenty of energy for both work and play. A healthy lifestyle is something you can share with your whole family. A healthy lifestyle also can lower your risk for serious health problems, such as high blood pressure, heart disease, and diabetes. You can follow a few steps listed below to improve your health and the health of your family. Follow-up care is a key part of your treatment and safety. Be sure to make and go to all appointments, and call your doctor if you are having problems. It's also a good idea to know your test results and keep a list of the medicines you take. How can you care for yourself at home? · Do not eat too much sugar, fat, or fast foods. You can still have dessert and treats now and then. The goal is moderation. · Start small to improve your eating habits. Pay attention to portion sizes, drink less juice and soda pop, and eat more fruits and vegetables. ¨ Eat a healthy amount of food. A 3-ounce serving of meat, for example, is about the size of a deck of cards. Fill the rest of your plate with vegetables and whole grains. ¨ Limit the amount of soda and sports drinks you have every day. Drink more water when you are thirsty. ¨ Eat at least 5 servings of fruits and vegetables every day. It may seem like a lot, but it is not hard to reach this goal. A serving or helping is 1 piece of fruit, 1 cup of vegetables, or 2 cups of leafy, raw vegetables. Have an apple or some carrot sticks as an afternoon snack instead of a candy bar. Try to have fruits and/or vegetables at every meal. 
· Make exercise part of your daily routine. You may want to start with simple activities, such as walking, bicycling, or slow swimming. Try to be active 30 to 60 minutes every day. You do not need to do all 30 to 60 minutes all at once. For example, you can exercise 3 times a day for 10 or 20 minutes. Moderate exercise is safe for most people, but it is always a good idea to talk to your doctor before starting an exercise program. 
· Keep moving. Tad Winston the lawn, work in the garden, or [employer]. Take the stairs instead of the elevator at work. · If you smoke, quit.  People who smoke have an increased risk for heart attack, stroke, cancer, and other lung illnesses. Quitting is hard, but there are ways to boost your chance of quitting tobacco for good. ¨ Use nicotine gum, patches, or lozenges. ¨ Ask your doctor about stop-smoking programs and medicines. ¨ Keep trying. In addition to reducing your risk of diseases in the future, you will notice some benefits soon after you stop using tobacco. If you have shortness of breath or asthma symptoms, they will likely get better within a few weeks after you quit. · Limit how much alcohol you drink. Moderate amounts of alcohol (up to 2 drinks a day for men, 1 drink a day for women) are okay. But drinking too much can lead to liver problems, high blood pressure, and other health problems. Family health If you have a family, there are many things you can do together to improve your health. · Eat meals together as a family as often as possible. · Eat healthy foods. This includes fruits, vegetables, lean meats and dairy, and whole grains. · Include your family in your fitness plan. Most people think of activities such as jogging or tennis as the way to fitness, but there are many ways you and your family can be more active. Anything that makes you breathe hard and gets your heart pumping is exercise. Here are some tips: 
¨ Walk to do errands or to take your child to school or the bus. ¨ Go for a family bike ride after dinner instead of watching TV. Where can you learn more? Go to http://hayden-дмитрий.info/. Enter K308 in the search box to learn more about \"A Healthy Lifestyle: Care Instructions. \" Current as of: December 7, 2017 Content Version: 11.7 © 2321-6746 [employer]. Care instructions adapted under license by [employer] (which disclaims liability or warranty for this information).  If you have questions about a medical condition or this instruction, always ask your healthcare professional. [employer] disclaims any warranty or liability for your use of this information. Medicare Wellness Visit, Female The best way to live healthy is to have a lifestyle where you eat a well-balanced diet, exercise regularly, limit alcohol use, and quit all forms of tobacco/nicotine, if applicable. Regular preventive services are another way to keep healthy. Preventive services (vaccines, screening tests, monitoring & exams) can help personalize your care plan, which helps you manage your own care. Screening tests can find health problems at the earliest stages, when they are easiest to treat. [facility] Paulino follows the current, evidence-based guidelines published by the [facility] [place] (USPSTF) when recommending preventive services for our patients. Because we follow these guidelines, sometimes recommendations change over time as research supports it. (For example, mammograms used to be recommended annually. Even though Medicare will still pay for an annual mammogram, the newer guidelines recommend a mammogram every two years for women of average risk.) Of course, you and your doctor may decide to screen more often for some diseases, based on your risk and your health status. Preventive services for you include: - Medicare offers their members a free annual wellness visit, which is time for you and your primary care provider to discuss and plan for your preventive service needs. Take advantage of this benefit every year! 
-All adults over the age of 72 should receive the recommended pneumonia vaccines. Current USPSTF guidelines recommend a series of two vaccines for the best pneumonia protection.  
-All adults should have a flu vaccine yearly and a tetanus vaccine every 10 years.  All adults age 61 and older should receive a shingles vaccine once in their lifetime.   
-A bone mass density test is recommended when a woman turns 65 to screen for osteoporosis. This test is only recommended one time, as a screening. Some providers will use this same test as a disease monitoring tool if you already have osteoporosis. -All adults age 38-68 who are overweight should have a diabetes screening test once every three years.  
-Other screening tests and preventive services for persons with diabetes include: an eye exam to screen for diabetic retinopathy, a kidney function test, a foot exam, and stricter control over your cholesterol.  
-Cardiovascular screening for adults with routine risk involves an electrocardiogram (ECG) at intervals determined by your doctor.  
-Colorectal cancer screenings should be done for adults age 54-65 with no increased risk factors for colorectal cancer. There are a number of acceptable methods of screening for this type of cancer. Each test has its own benefits and drawbacks. Discuss with your doctor what is most appropriate for you during your annual wellness visit. The different tests include: colonoscopy (considered the best screening method), a fecal occult blood test, a fecal DNA test, and sigmoidoscopy. -Breast cancer screenings are recommended every other year for women of normal risk, age 54-69. 
-Cervical cancer screenings for women over age 72 are only recommended with certain risk factors.  
-All adults born between [facility] should be screened once for Hepatitis C. Here is a list of your current Health Maintenance items (your personalized list of preventive services) with a due date: 
Health Maintenance Due Topic Date Due  Shingles Vaccine (1 of 2) 03/13/2007 Brunilda Annual Well Visit  06/09/2018 [facility]! Dear Eugene Polo: 
Thank you for requesting a [employer] account. Our records indicate that you already have an active [employer] account. You can access your account anytime at [email or web address] Did you know that you can access your hospital and ER discharge instructions at any time in Onyvax? You can also review all of your test results from your hospital stay or ER visit. Additional Information If you have questions, please visit the Frequently Asked Questions section of the Onyvax website at [email or web address] Remember, Onyvax is NOT to be used for urgent needs. For medical emergencies, dial 911. Now available from your iPhone and Android! Please provide this summary of care documentation to your next provider. Your primary care clinician is listed as Toma Gonzales. If you have any questions after today's visit, please call [phone].

## 2018-09-27 NOTE — PATIENT INSTRUCTIONS
A Healthy Lifestyle: Care Instructions Your Care Instructions A healthy lifestyle can help you feel good, stay at a healthy weight, and have plenty of energy for both work and play. A healthy lifestyle is something you can share with your whole family. A healthy lifestyle also can lower your risk for serious health problems, such as high blood pressure, heart disease, and diabetes. You can follow a few steps listed below to improve your health and the health of your family. Follow-up care is a key part of your treatment and safety. Be sure to make and go to all appointments, and call your doctor if you are having problems. It's also a good idea to know your test results and keep a list of the medicines you take. How can you care for yourself at home? · Do not eat too much sugar, fat, or fast foods. You can still have dessert and treats now and then. The goal is moderation. · Start small to improve your eating habits. Pay attention to portion sizes, drink less juice and soda pop, and eat more fruits and vegetables. ¨ Eat a healthy amount of food. A 3-ounce serving of meat, for example, is about the size of a deck of cards. Fill the rest of your plate with vegetables and whole grains. ¨ Limit the amount of soda and sports drinks you have every day. Drink more water when you are thirsty. ¨ Eat at least 5 servings of fruits and vegetables every day. It may seem like a lot, but it is not hard to reach this goal. A serving or helping is 1 piece of fruit, 1 cup of vegetables, or 2 cups of leafy, raw vegetables. Have an apple or some carrot sticks as an afternoon snack instead of a candy bar. Try to have fruits and/or vegetables at every meal. 
· Make exercise part of your daily routine. You may want to start with simple activities, such as walking, bicycling, or slow swimming. Try to be active 30 to 60 minutes every day.  You do not need to do all 30 to 60 minutes all at once. For example, you can exercise 3 times a day for 10 or 20 minutes. Moderate exercise is safe for most people, but it is always a good idea to talk to your doctor before starting an exercise program. 
· Keep moving. Bryan Castañedas the lawn, work in the garden, or [employer]. Take the stairs instead of the elevator at work. · If you smoke, quit. People who smoke have an increased risk for heart attack, stroke, cancer, and other lung illnesses. Quitting is hard, but there are ways to boost your chance of quitting tobacco for good. ¨ Use nicotine gum, patches, or lozenges. ¨ Ask your doctor about stop-smoking programs and medicines. ¨ Keep trying. In addition to reducing your risk of diseases in the future, you will notice some benefits soon after you stop using tobacco. If you have shortness of breath or asthma symptoms, they will likely get better within a few weeks after you quit. · Limit how much alcohol you drink. Moderate amounts of alcohol (up to 2 drinks a day for men, 1 drink a day for women) are okay. But drinking too much can lead to liver problems, high blood pressure, and other health problems. Family health If you have a family, there are many things you can do together to improve your health. · Eat meals together as a family as often as possible. · Eat healthy foods. This includes fruits, vegetables, lean meats and dairy, and whole grains. · Include your family in your fitness plan. Most people think of activities such as jogging or tennis as the way to fitness, but there are many ways you and your family can be more active. Anything that makes you breathe hard and gets your heart pumping is exercise. Here are some tips: 
¨ Walk to do errands or to take your child to school or the bus. ¨ Go for a family bike ride after dinner instead of watching TV. Where can you learn more? Go to http://hayden-дмитрий.info/. Enter M956 in the search box to learn more about \"A Healthy Lifestyle: Care Instructions. \" Current as of: December 7, 2017 Content Version: 11.7 © 2205-4769 [employer], Click[employer]Ride. Care instructions adapted under license by [employer] (which disclaims liability or warranty for this information). If you have questions about a medical condition or this instruction, always ask your healthcare professional. [address] any warranty or liability for your use of this information. Medicare Wellness Visit, Female The best way to live healthy is to have a lifestyle where you eat a well-balanced diet, exercise regularly, limit alcohol use, and quit all forms of tobacco/nicotine, if applicable. Regular preventive services are another way to keep healthy. Preventive services (vaccines, screening tests, monitoring & exams) can help personalize your care plan, which helps you manage your own care. Screening tests can find health problems at the earliest stages, when they are easiest to treat. Maurice Secfaina follows the current, evidence-based guidelines published by the [facility] States [place] (USPSTF) when recommending preventive services for our patients. Because we follow these guidelines, sometimes recommendations change over time as research supports it. (For example, mammograms used to be recommended annually. Even though Medicare will still pay for an annual mammogram, the newer guidelines recommend a mammogram every two years for women of average risk.) Of course, you and your doctor may decide to screen more often for some diseases, based on your risk and your health status. Preventive services for you include: - Medicare offers their members a free annual wellness visit, which is time for you and your primary care provider to discuss and plan for your preventive service needs. Take advantage of this benefit every year! -All adults over the age of 72 should receive the recommended pneumonia vaccines. Current USPSTF guidelines recommend a series of two vaccines for the best pneumonia protection.  
-All adults should have a flu vaccine yearly and a tetanus vaccine every 10 years. All adults age 61 and older should receive a shingles vaccine once in their lifetime.   
-A bone mass density test is recommended when a woman turns 65 to screen for osteoporosis. This test is only recommended one time, as a screening. Some providers will use this same test as a disease monitoring tool if you already have osteoporosis. -All adults age 38-68 who are overweight should have a diabetes screening test once every three years.  
-Other screening tests and preventive services for persons with diabetes include: an eye exam to screen for diabetic retinopathy, a kidney function test, a foot exam, and stricter control over your cholesterol.  
-Cardiovascular screening for adults with routine risk involves an electrocardiogram (ECG) at intervals determined by your doctor.  
-Colorectal cancer screenings should be done for adults age 54-65 with no increased risk factors for colorectal cancer. There are a number of acceptable methods of screening for this type of cancer. Each test has its own benefits and drawbacks. Discuss with your doctor what is most appropriate for you during your annual wellness visit. The different tests include: colonoscopy (considered the best screening method), a fecal occult blood test, a fecal DNA test, and sigmoidoscopy. -Breast cancer screenings are recommended every other year for women of normal risk, age 54-69. 
-Cervical cancer screenings for women over age 72 are only recommended with certain risk factors.  
-All adults born between [facility] should be screened once for Hepatitis C. Here is a list of your current Health Maintenance items (your personalized list of preventive services) with a due date: Health Maintenance Due Topic Date Due  Shingles Vaccine (1 of 2) 03/13/2007 William.Mow Annual Well Visit  06/09/2018

## 2018-09-27 NOTE — PROGRESS NOTES
Mendel Sprain is a 66 y.o. female Chief Complaint Patient presents with  Labs Pt here for f/u on her INR and her INR has gone down to now 1.2 taking Currently taking 3 mg of Coumadin MWF and 1 mg on TThSS. Pt needs to be between 2.5-3.5 due to valve. Pt also here for refill of her chronic pain medication for her chronic low back pain from a prior compression fracture. We have discussed that we will slowly titrate her dosing down. Will decrease from 120 to 100 tabs monthly. The pain medication does help to control her pain but she is also using these for migraines which I discussed these are not indicated for. The pain in her back is a 5-7/10 and down to a 3/10 with the pain medication. Discussed with pt as we titrate down there are other medications we can use to help wit hthe back pain. Opioid/Pain Management: 
1. Has the patient signed a pain contract for chronic narcotic use? yes 2. Has the patient had a UDS or Serum screen as per guidelines as per [facility]?:   yes 3. Does patient meet necessary guidelines for Naloxone treatement per the [facility]?:    no 
4. Has the Prescription Monitoring Program been reviewed? yes 5. Does patient have a long term condition that requires long term use of a Narcotic?  yes 6. Has patient been tolerant of therapy and responsible to routine follow up and specialist follow-up? Yes 
 
 
she is a 66y.o. year old female who presents for evalution. Reviewed PmHx, RxHx, FmHx, SocHx, AllgHx and updated and dated in the chart. Review of Systems - negative except as listed above in the HPI Objective:  
 
Vitals:  
 09/27/18 9025 BP: 130/88 Pulse: 87 Resp: 18 Temp: 97.8 °F (36.6 °C) TempSrc: Oral  
SpO2: 98% Weight: 126 lb (57.2 kg) Height: 5' 4\" (1.626 m) Current Outpatient Prescriptions Medication Sig  
 oxyCODONE-acetaminophen (PERCOCET) 5-325 mg per tablet Take 1 Tab by mouth every six (6) hours as needed for Pain. Max Daily Amount: 4 Tabs. Must last 30 days  warfarin (COUMADIN) 1 mg tablet 3mg every day except Tuesday Thursday take 1mg f/u 1 week  budesonide-formoterol (SYMBICORT) 160-4.5 mcg/actuation HFAA INHALE 2 PUFFS TWICE DAILY  ibuprofen (MOTRIN) 200 mg tablet Take 400 mg by mouth every six (6) hours as needed for Pain.  bumetanide (BUMEX) 1 mg tablet Take 1 Tab by mouth every evening.  albuterol (VENTOLIN HFA) 90 mcg/actuation inhaler Take 2 Puffs by inhalation every four (4) hours as needed for Wheezing or Shortness of Breath.  guaiFENesin-dextromethorphan SR (MUCINEX DM) 600-30 mg per tablet Take 1 Tab by mouth two (2) times a day.  predniSONE (DELTASONE) 10 mg tablet Resume on 8/13/2018 after completing prednisone taper  docusate sodium (COLACE) 100 mg capsule Take 100 mg by mouth every evening.  potassium chloride SR (KLOR-CON 10) 10 mEq tablet Take 10 mEq by mouth every Monday, Wednesday, Friday. Indications: Patient takes in the AM with meals three times weekly  lovastatin (MEVACOR) 10 mg tablet TAKE 1 TABLET EVERY NIGHT  dilTIAZem (CARDIZEM) 60 mg tablet TAKE 1 TABLET BEFORE BREAKFAST, LUNCH, DINNER AND AT BEDTIME  nystatin (MYCOSTATIN) 100,000 unit/mL suspension Take 5 ml by mouth and swish and split 4 times daily from 7/31/2018 to 8/4/2018  Indications: oral candidiasis (Patient not taking: Reported on 9/13/2018)  predniSONE (DELTASONE) 20 mg tablet Take 3 tablets on 8/1, 8/2 and 8/3 Take 2.5 tablets on 8/4, 8/5 and 8/6 Take 2 tablets on 8/7, 8/8 and 8/9 Take 1.5 tablets on 8/10, 8/11 and 8/12 (Patient not taking: Reported on 9/13/2018) No current facility-administered medications for this visit. Physical Examination: General appearance - alert, well appearing, and in no distress Chest - scattered wheezes Heart - irregularly irregular with audible click from valve Back exam - tenderness noted midline lumbar spine, pain with extension at lumbar spine Assessment/ Plan:  
Diagnoses and all orders for this visit: 
 
1. Chronic atrial fibrillation (HCC) -     AMB POC PT/INR 
 
2. Chronic midline low back pain without sciatica 
-     oxyCODONE-acetaminophen (PERCOCET) 5-325 mg per tablet; Take 1 Tab by mouth every six (6) hours as needed for Pain. Max Daily Amount: 4 Tabs. Must last 30 days 3. Lumbar compression fracture, sequela 
-     oxyCODONE-acetaminophen (PERCOCET) 5-325 mg per tablet; Take 1 Tab by mouth every six (6) hours as needed for Pain. Max Daily Amount: 4 Tabs. Must last 30 days 4. S/P AVR (aortic valve replacement) -     warfarin (COUMADIN) 1 mg tablet; 3mg every day except Tuesday Thursday take 1mg f/u 1 week 5. Medicare annual wellness visit, subsequent 6mg today of coumadin then 3mg every day except Tuesday Thursday take 1mg f/u 1 week Follow-up Disposition: 
Return in about 1 week (around 10/4/2018), or if symptoms worsen or fail to improve. I have discussed the diagnosis with the patient and the intended plan as seen in the above orders. The patient has received an after-visit summary and questions were answered concerning future plans. Pt conveyed understanding of plan. Medication Side Effects and Warnings were discussed with patient Laury Tang, DO This is the Subsequent Medicare Annual Wellness Exam, performed 12 months or more after the Initial AWV or the last Subsequent AWV I have reviewed the patient's medical history in detail and updated the computerized patient record. History Past Medical History:  
Diagnosis Date  A-fib ([facility]  Anticoagulant long-term use  CAD (coronary artery disease), native coronary artery   
 mild to moderate by cath  CHF (congestive heart failure) ([facility]  Chronic obstructive pulmonary disease ([facility]  Dyslipidemia  Ill-defined condition   
 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  
Procedure Laterality Date  COLONOSCOPY N/A 11/28/2016 COLONOSCOPY performed by Davian Vasques MD at [facility] HEART CATHETERIZATION    
 cabg  HX HYSTERECTOMY    
 BSO  
 HX MITRAL VALVE REPLACEMENT    
 and redo MVR and AVR Current Outpatient Prescriptions Medication Sig Dispense Refill  oxyCODONE-acetaminophen (PERCOCET) 5-325 mg per tablet Take 1 Tab by mouth every six (6) hours as needed for Pain. Max Daily Amount: 4 Tabs. Must last 30 days 100 Tab 0  
 warfarin (COUMADIN) 1 mg tablet 3mg every day except Tuesday Thursday take 1mg f/u 1 week 45 Tab 0  
 budesonide-formoterol (SYMBICORT) 160-4.5 mcg/actuation HFAA INHALE 2 PUFFS TWICE DAILY 3 Inhaler 3  ibuprofen (MOTRIN) 200 mg tablet Take 400 mg by mouth every six (6) hours as needed for Pain.  bumetanide (BUMEX) 1 mg tablet Take 1 Tab by mouth every evening. 90 Tab 3  
 albuterol (VENTOLIN HFA) 90 mcg/actuation inhaler Take 2 Puffs by inhalation every four (4) hours as needed for Wheezing or Shortness of Breath. 1 Inhaler 11  
 guaiFENesin-dextromethorphan SR (MUCINEX DM) 600-30 mg per tablet Take 1 Tab by mouth two (2) times a day.  predniSONE (DELTASONE) 10 mg tablet Resume on 8/13/2018 after completing prednisone taper 30 Tab 0  
 docusate sodium (COLACE) 100 mg capsule Take 100 mg by mouth every evening.  potassium chloride SR (KLOR-CON 10) 10 mEq tablet Take 10 mEq by mouth every Monday, Wednesday, Friday. Indications: Patient takes in the AM with meals three times weekly  lovastatin (MEVACOR) 10 mg tablet TAKE 1 TABLET EVERY NIGHT 90 Tab 3  
  dilTIAZem (CARDIZEM) 60 mg tablet TAKE 1 TABLET BEFORE BREAKFAST, LUNCH, DINNER AND AT BEDTIME 360 Tab 3  
 nystatin (MYCOSTATIN) 100,000 unit/mL suspension Take 5 ml by mouth and swish and split 4 times daily from 7/31/2018 to 8/4/2018  Indications: oral candidiasis (Patient not taking: Reported on 9/13/2018) 200 mL 0  
 predniSONE (DELTASONE) 20 mg tablet Take 3 tablets on 8/1, 8/2 and 8/3 Take 2.5 tablets on 8/4, 8/5 and 8/6 Take 2 tablets on 8/7, 8/8 and 8/9 Take 1.5 tablets on 8/10, 8/11 and 8/12 (Patient not taking: Reported on 9/13/2018) 27 Tab 0 Allergies Allergen Reactions  Spiriva Respimat [Tiotropium Bromide] Anaphylaxis and Other (comments) Adverse effects; Per RN - pt states that Spiriva caused throat swelling and could not breathe well.  Celebrex [Celecoxib] Rash  Tomato Rash Family History Problem Relation Age of Onset  Cancer Brother Social History Substance Use Topics  Smoking status: Current Every Day Smoker Packs/day: 0.50 Types: Cigarettes Last attempt to quit: 11/1/2015  Smokeless tobacco: Never Used Comment: 11/1/2014  Alcohol use No  
 
Patient Active Problem List  
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  
 S/P MVR (mitral valve replacement) Z95.2  COPD (chronic obstructive pulmonary disease) ([facility]) J44.9  Tobacco abuse Z72.0  Diastolic CHF, chronic ([facility]) I50.32  Chronic atrial fibrillation ([facility]) I48.2  History of GI bleed Z87.19  
 H/O total hysterectomy Z90.710  
 Hypokalemia E87.6  Thrush of mouth and esophagus ([facility]) B37.81, B37.0  
 SOB (shortness of breath) R06.02 Depression Risk Factor Screening: PHQ over the last two weeks 9/27/2018 Little interest or pleasure in doing things Not at all Feeling down, depressed, irritable, or hopeless Not at all Total Score PHQ 2 0 Alcohol Risk Factor Screening: You do not drink alcohol or very rarely. Functional Ability and Level of Safety:  
Hearing Loss Hearing is good. Activities of Daily Living The home contains: no safety equipment. Patient does total self care Fall Risk Fall Risk Assessment, last 12 mths 2/18/2016 Able to walk? Yes Fall in past 12 months? No  
 
 
Abuse Screen Patient is not abused Cognitive Screening Evaluation of Cognitive Function: 
Has your family/caregiver stated any concerns about your memory: no 
Normal 
 
Patient Care Team  
Patient Care Team: 
Amy Soriano DO as PCP - General (Family Practice) Christopher Miranda MD as Physician (Cardiology) Assessment/Plan Education and counseling provided: 
Are appropriate based on today's review and evaluation End-of-Life planning (with patient's consent) Diagnoses and all orders for this visit: 
 
1. Chronic atrial fibrillation (HCC) -     AMB POC PT/INR 
 
2. Chronic midline low back pain without sciatica 
-     oxyCODONE-acetaminophen (PERCOCET) 5-325 mg per tablet; Take 1 Tab by mouth every six (6) hours as needed for Pain. Max Daily Amount: 4 Tabs. Must last 30 days 3. Lumbar compression fracture, sequela 
-     oxyCODONE-acetaminophen (PERCOCET) 5-325 mg per tablet; Take 1 Tab by mouth every six (6) hours as needed for Pain. Max Daily Amount: 4 Tabs. Must last 30 days 4. S/P AVR (aortic valve replacement) -     warfarin (COUMADIN) 1 mg tablet; 3mg every day except Tuesday Thursday take 1mg f/u 1 week 5. Medicare annual wellness visit, subsequent Health Maintenance Due Topic Date Due  Shingrix Vaccine Age 50> (1 of 2) 03/13/2007  MEDICARE YEARLY EXAM  06/09/2018 I have discussed the diagnosis with the patient and the intended plan as seen in the above orders. The patient has received an after-visit summary and questions were answered concerning future plans.  Pt conveyed understanding of plan.  [id number] [facility] [address]

## 2018-10-05 NOTE — PROGRESS NOTES
Jace Barraza is a 66 y.o. female had concerns including Coagulation disorder. Pt here for recheck of her INR currently at 1.9 it should be 2.5-3.5 dur to mechanical heart valve. Pt is taking a lower dose than we had discussed previously currently taking 2mg MWF and 1mg all other days of the week. She was supposed to be taking 3mg everyday except Tues Thurs take 1 mg. Pt is also asking about trelegy for breathing and pt would like to try this to help her breathing improve and I am agreeable to this. If too expensive pt will stay with symbicort which she has at home. she is a 66y.o. year old female who presents for evalution. Reviewed PmHx, RxHx, FmHx, SocHx, AllgHx and updated and dated in the chart. Review of Systems - negative except as listed above in the HPI Objective:  
 
Vitals:  
 10/05/18 0908 BP: 159/83 Pulse: 98 Resp: 24 Temp: 98.1 °F (36.7 °C) SpO2: 98% Weight: 125 lb (56.7 kg) Height: 5' 4\" (1.626 m) Current Outpatient Prescriptions Medication Sig  
 fluticasone-umeclidin-vilanter (TRELEGY ELLIPTA) 100-62.5-25 mcg dsdv Take 1 Puff by inhalation daily.  warfarin (COUMADIN) 1 mg tablet 3mg every day except Tuesday Thursday take 1mg f/u 1 week  budesonide-formoterol (SYMBICORT) 160-4.5 mcg/actuation HFAA INHALE 2 PUFFS TWICE DAILY  ibuprofen (MOTRIN) 200 mg tablet Take 400 mg by mouth every six (6) hours as needed for Pain.  bumetanide (BUMEX) 1 mg tablet Take 1 Tab by mouth every evening.  albuterol (VENTOLIN HFA) 90 mcg/actuation inhaler Take 2 Puffs by inhalation every four (4) hours as needed for Wheezing or Shortness of Breath.  guaiFENesin-dextromethorphan SR (MUCINEX DM) 600-30 mg per tablet Take 1 Tab by mouth two (2) times a day.  nystatin (MYCOSTATIN) 100,000 unit/mL suspension Take 5 ml by mouth and swish and split 4 times daily from 7/31/2018 to 8/4/2018  Indications: oral candidiasis  predniSONE (DELTASONE) 10 mg tablet Resume on 8/13/2018 after completing prednisone taper  docusate sodium (COLACE) 100 mg capsule Take 100 mg by mouth every evening.  potassium chloride SR (KLOR-CON 10) 10 mEq tablet Take 10 mEq by mouth every Monday, Wednesday, Friday. Indications: Patient takes in the AM with meals three times weekly  lovastatin (MEVACOR) 10 mg tablet TAKE 1 TABLET EVERY NIGHT  dilTIAZem (CARDIZEM) 60 mg tablet TAKE 1 TABLET BEFORE BREAKFAST, LUNCH, DINNER AND AT BEDTIME  oxyCODONE-acetaminophen (PERCOCET) 5-325 mg per tablet Take 1 Tab by mouth every six (6) hours as needed for Pain. Max Daily Amount: 4 Tabs. Must last 30 days No current facility-administered medications for this visit. Physical Examination: General appearance - alert, well appearing, and in no distress Chest - tight as per usual but clear Heart - audible click c/w her valve replacement, irregularly irregular Assessment/ Plan:  
Diagnoses and all orders for this visit: 1. Coagulation deficiency ([address] -     AMB POC PT/INR 
 
2. Chronic obstructive pulmonary disease with acute exacerbation (HCC) 
-     fluticasone-umeclidin-vilanter (TRELEGY ELLIPTA) 100-62.5-25 mcg dsdv; Take 1 Puff by inhalation daily. Free trial coupon for inhaler given 3mg everyday except Tuesday and Thursday take 1mg recheck 10 days Follow-up Disposition: 
Return in about 1 week (around 10/12/2018), or if symptoms worsen or fail to improve. I have discussed the diagnosis with the patient and the intended plan as seen in the above orders. The patient has received an after-visit summary and questions were answered concerning future plans. Pt conveyed understanding of plan. Medication Side Effects and Warnings were discussed with patient [id number] [facility] [address], DO

## 2018-10-05 NOTE — PROGRESS NOTES
Patient here for inr check. Last Thursday she took 6 mg as ordered, then alternating 1 mg ang 2 mg. 1. Have you been to the ER, urgent care clinic since your last visit? Hospitalized since your last visit? No 
 
2. Have you seen or consulted any other health care providers outside of the [address] [place] [address] since your last visit? Include any pap smears or colon screening. No  
 
Results for orders placed or performed in visit on 10/05/18 AMB POC PT/INR Result Value Ref Range VALID INTERNAL CONTROL POC Yes Prothrombin time (POC) 19.4 seconds INR POC 1.6

## 2018-10-05 NOTE — PATIENT INSTRUCTIONS
Ã¯Â»Â Anticoagulants: After Your Visit Your Care Instructions Your doctor prescribed an anticoagulant medicine. Anticoagulants, often called blood thinners, prevent new blood clots from forming and keep existing clots from getting larger. They do not actually thin the blood, but they make the blood take longer to clot. This lowers the risk of a blood clot moving to the lungs (pulmonary embolism) or moving to the brain and causing a stroke. Blood thinners come in two forms. Heparin is given by shot, either under your skin or through a needle in your vein, and starts working right away. Warfarin (Coumadin) comes in pill form and takes longer to work. Your doctor may have you begin taking both forms at the same time. As soon as the pills start to work, you will stop the shots but continue to take the pills. If you have a blood clot in your leg, you may need to take warfarin for several months. People who have heart conditions such as atrial fibrillation often need to take it for the rest of their lives. The right dose of this medicine is different for each person. You will need regular blood tests to see if your dose is correct. Follow-up care is a key part of your treatment and safety. Be sure to make and go to all appointments, and call your doctor if you are having problems. Itâs also a good idea to know your test results and keep a list of the medicines you take. How do you take blood thinners? · Take your medicines exactly as prescribed. Call your doctor if you think you are having a problem with your medicine. · Call your doctor if you are not sure what to do if you missed a dose of blood thinner. ¨ Your doctor can tell you exactly what to do so you do not take too much or too little blood thinner. Then you will be as safe as possible. · Some general rules for what to do if you miss a dose: ¨ If you remember it in the same day, take the missed dose. Then go back to your regular schedule. ¨ If it is the next day, or almost time to take the next dose, do not take the missed dose. Do not double the dose to make up for the missed one. At your next regularly scheduled time, take your normal dose. ¨ If you miss your dose for 2 or more days, call your doctor. · To help you stay on schedule, use a calendar to remind you when to take your blood thinner. When you take the medicine, note it on the calendar. · If you are going to give yourself shots, your doctor will give you instructions for how to safely inject the medicine. Follow the directions carefully. · Do not take any vitamins, over-the-counter medicines, or herbal products without talking to your doctor first. 
· Avoid contact sports and other activities that could lead to injury. Make your home safe and take other measures to reduce your risk of falling. Always wear a seat belt while in a car. · Do not suddenly change your intake of vitamin Kârich foods, such as broccoli, cabbage, asparagus, lettuce, and spinach. This will help blood thinners work evenly from day to day. · Limit alcohol to 2 drinks a day for men and 1 drink a day for women. Alcohol may interfere with blood thinners. It also increases your risk of falls, which can cause bruising and bleeding. · Tell your dentist, pharmacist, and other health professionals that you take blood thinners. Wear medical alert jewelry that says you take blood thinners. You can buy this at most drugstores. When should you call for help? Call 911 anytime you think you may need emergency care. For example, call if: 
· You cough up blood. · You vomit blood or what looks like coffee grounds. · You pass maroon or very bloody stools. Call your doctor now or seek immediate medical care if: 
· You have new bruises or blood spots under your skin. · You have a nosebleed. · Your gums bleed when you brush your teeth. · You have blood in your urine. · Your stools are black and tarlike or have streaks of blood. · You have vaginal bleeding when you are not having your period, or heavy period bleeding. Watch closely for changes in your health, and be sure to contact your doctor if: 
· You have questions about your medicine. Where can you learn more? Go to [employer] Enter C142 in the search box to learn more about \"Anticoagulants: After Your Visit. \" Â© 3588-8897 [employer]. Care instructions adapted under license by [employer] (which disclaims liability or warranty for this information). This care instruction is for use with your licensed healthcare professional. If you have questions about a medical condition or this instruction, always ask your healthcare professional. [place] [address] any warranty or liability for your use of this information. Content Version: 5.2.96276; Last Revised: July 1, 2009

## 2018-10-11 NOTE — PROGRESS NOTES
Pt here to follow up on INR. Currently taking 2 mg of Coumadin Sunday-Wednesday and 1 mg Thursday and Friday. Pt also reports difficulty swallowing, has trouble with steak and more solid foods. Pt denies any problems with liquids or soft foods. Lab Results   Component Value Date/Time    INR, (POC) 1.3 (A) 08/08/2018 12:40 PM    INR POC 3.0 10/11/2018 07:20 AM    INR POC 1.6 10/05/2018 09:10 AM    INR POC 1.2 09/27/2018 07:16 AM     Pt has had a barium swallow and an x-ray of her neck. Subjective: (As above and below)     Chief Complaint   Patient presents with    Coagulation disorder     she is a 66y.o. year old female who presents for evaluation. Reviewed PmHx, RxHx, FmHx, SocHx, AllgHx and updated in chart. Review of Systems - negative except as listed above    Objective:     Vitals:    10/11/18 0715   BP: 138/79   Pulse: 91   Resp: 20   Temp: 98 °F (36.7 °C)   TempSrc: Oral   SpO2: 94%   Weight: 122 lb (55.3 kg)   Height: 5' 4\" (1.626 m)     Physical Examination: General appearance - alert, well appearing, and in no distress  Mental status - normal mood, behavior, speech, dress, motor activity, and thought processes  Mouth - mucous membranes moist, pharynx normal without lesions  Chest - clear to auscultation, no wheezes, rales or rhonchi, symmetric air entry  Heart -a fib  Musculoskeletal - no joint tenderness, deformity or swelling  Extremities - peripheral pulses normal, no pedal edema, no clubbing or cyanosis    Assessment/ Plan:   1. Chronic atrial fibrillation (HCC)  -continue on current dose  - AMB POC PT/INR    2. Dysphagia, unspecified type  -refer to Gi for further eval - Aundra Koyanagi [facility]     Follow-up Disposition: As needed  I have discussed the diagnosis with the patient and the intended plan as seen in the above orders. The patient has received an after-visit summary and questions were answered concerning future plans.      Medication Side Effects and Warnings were discussed with patient: yes  Patient Labs were reviewed: yes  Patient Past Records were reviewed:  yes    Joe Deal M.D.

## 2018-10-11 NOTE — MR AVS SNAPSHOT
[address] [address] 
[phone] Patient: Edy Gomez MRN: [id number] NOT:9/02/7090 Visit Information Date & Time Provider Department Dept. Phone Encounter #  
 10/11/2018  7:15 AM Kwadwo Wiley MD [facility] [phone] [id number] Upcoming Health Maintenance Date Due Shingrix Vaccine Age 50> (1 of 2) 3/13/2007 MEDICARE YEARLY EXAM 9/28/2019 COLONOSCOPY 11/28/2019 BREAST CANCER SCRN MAMMOGRAM 2/27/2020 Pneumococcal 19-64 Highest Risk (3 of 3 - PPSV23) 2/21/2022 DTaP/Tdap/Td series (2 - Td) 9/26/2027 Allergies as of 10/11/2018  Review Complete On: 10/11/2018 By: Kwadwo Wiley MD  
  
 Severity Noted Reaction Type Reactions Spiriva Respimat [Tiotropium Bromide] High 01/08/2015   Systemic Anaphylaxis, Other (comments) Adverse effects; Per RN - pt states that Spiriva caused throat swelling and could not breathe well. Celebrex [Celecoxib]  01/08/2015   Intolerance Rash Tomato  01/08/2015   Intolerance Rash Current Immunizations  Reviewed on 12/1/2017 Name Date Influenza Vaccine (Quad) PF 9/6/2018  4:24 PM, 9/26/2017, 10/21/2016, 11/13/2015 Pneumococcal Conjugate (PCV-13) 2/18/2016 Pneumococcal Polysaccharide (PPSV-23) 2/21/2017 Not reviewed this visit You Were Diagnosed With   
  
 Codes Comments Chronic atrial fibrillation (HCC)    -  Primary ICD-10-CM: C98.6 ICD-9-CM: 427.31 Dysphagia, unspecified type     ICD-10-CM: R13.10 ICD-9-CM: 787.20 Vitals BP Pulse Temp Resp Height(growth percentile) Weight(growth percentile) 138/79 (BP 1 Location: Left arm, BP Patient Position: Sitting) 91 98 °F (36.7 °C) (Oral) 20 5' 4\" (1.626 m) 122 lb (55.3 kg) SpO2 BMI OB Status Smoking Status 94% 20.94 kg/m2 Hysterectomy Current Every Day Smoker Vitals History BMI and BSA Data Body Mass Index Body Surface Area  
 20.94 kg/m 2 1.58 m 2 Preferred Pharmacy Pharmacy Name Phone [address] [phone] Your Updated Medication List  
  
   
This list is accurate as of 10/11/18  7:38 AM.  Always use your most recent med list.  
  
  
  
  
 albuterol 90 mcg/actuation inhaler Commonly known as:  VENTOLIN HFA Take 2 Puffs by inhalation every four (4) hours as needed for Wheezing or Shortness of Breath. budesonide-formoterol 160-4.5 mcg/actuation Hfaa Commonly known as:  SYMBICORT  
INHALE 2 PUFFS TWICE DAILY  
  
 bumetanide 1 mg tablet Commonly known as:  Coco Tigre Take 1 Tab by mouth every evening. dilTIAZem 60 mg tablet Commonly known as:  CARDIZEM  
TAKE 1 TABLET BEFORE BREAKFAST, LUNCH, DINNER AND AT BEDTIME  
  
 docusate sodium 100 mg capsule Commonly known as:  Evelene Solar Take 100 mg by mouth every evening. fluticasone-umeclidin-vilanter 100-62.5-25 mcg Dsdv Commonly known as:  Suzzanne Dieter Take 1 Puff by inhalation daily. ibuprofen 200 mg tablet Commonly known as:  MOTRIN Take 400 mg by mouth every six (6) hours as needed for Pain.  
  
 lovastatin 10 mg tablet Commonly known as:  MEVACOR  
TAKE 1 TABLET EVERY NIGHT  
  
 MUCINEX -30 mg per tablet Generic drug:  guaiFENesin-dextromethorphan SR Take 1 Tab by mouth two (2) times a day. nystatin 100,000 unit/mL suspension Commonly known as:  MYCOSTATIN Take 5 ml by mouth and swish and split 4 times daily from 7/31/2018 to 8/4/2018  Indications: oral candidiasis  
  
 oxyCODONE-acetaminophen 5-325 mg per tablet Commonly known as:  PERCOCET Take 1 Tab by mouth every six (6) hours as needed for Pain. Max Daily Amount: 4 Tabs. Must last 30 days  
  
 potassium chloride SR 10 mEq tablet Commonly known as:  KLOR-CON 10 Take 10 mEq by mouth every Monday, Wednesday, Friday.  Indications: Patient takes in the AM with meals three times weekly  
  
 predniSONE 10 mg tablet Commonly known as:  Lea Saleh Resume on 8/13/2018 after completing prednisone taper  
  
 warfarin 1 mg tablet Commonly known as:  COUMADIN  
3mg every day except Tuesday Thursday take 1mg f/u 1 week We Performed the Following AMB POC PT/INR [70473 CPT(R)] REFERRAL TO GASTROENTEROLOGY [DVZ82 Custom] Referral Information Referral ID Referred By Referred To  
  
 [id number] [facility] [address] [place], [place] [address] [address] Phone: [phone] Fax: [phone] Visits Status Start Date End Date 1 New Request 10/11/18 10/11/19 If your referral has a status of pending review or denied, additional information will be sent to support the outcome of this decision. Introducing [facility]! Dear Bertin Baltazar: 
Thank you for requesting a [employer] account. Our records indicate that you already have an active [employer] account. You can access your account anytime at [email or web address] Did you know that you can access your hospital and ER discharge instructions at any time in Delivery Hero? You can also review all of your test results from your hospital stay or ER visit. Additional Information If you have questions, please visit the Frequently Asked Questions section of the Delivery Hero website at [email or web address] Remember, Delivery Hero is NOT to be used for urgent needs. For medical emergencies, dial 911. Now available from your iPhone and Android! Please provide this summary of care documentation to your next provider. Your primary care clinician is listed as Sunday Aleksandr. If you have any questions after today's visit, please call [phone].

## 2018-10-24 PROBLEM — R07.9 CHEST PAIN: Status: ACTIVE | Noted: 2018-01-01

## 2018-10-24 NOTE — PROGRESS NOTES
Spiritual Care Assessment/Progress Note [address] 
 
 
NAME: Rajeev Steward      MRN: [id number] AGE: 82 y.o. SEX: female Hinduism Affiliation:   
Language:   
 
10/24/2018     Total Time (in minutes): 5 Spiritual Assessment begun in [facility] EMERGENCY DEPT through conversation with: 
  
    []Patient        [] Family    [] Friend(s) Reason for Consult: Crisis, Imani Sharp Spiritual beliefs: (Please include comment if needed) 
   [] Identifies with a henok tradition:     
   [] Supported by a henok community:        
   [] Claims no spiritual orientation:       
   [] Seeking spiritual identity:            
   [] Adheres to an individual form of spirituality:       
   [x] Not able to assess:                   
 
    
Identified resources for coping:  
   [] Prayer                           
   [] Music                  [] Guided Imagery 
   [] Family/friends                 [] Pet visits [] Devotional reading                         [] Unknown 
   [] Other:                                          
 
 
Interventions offered during this visit: (See comments for more details) Patient Interventions: Other (comment) Plan of Care: 
 
 [] Support spiritual and/or cultural needs  
 [] Support AMD and/or advance care planning process    
 [] Support grieving process 
 [] Coordinate Rites and/or Rituals  
 [] Coordination with community clergy [] No spiritual needs identified at this time 
 [] Detailed Plan of Care below (See Comments)  [] Make referral to Music Therapy 
[] Make referral to Pet Therapy    
[] Make referral to Addiction services 
[] Make referral to [facility] 
[] Make referral to Spiritual Care Partner 
[] No future visits requested       
[] Follow up visits as needed Comments: [employer] is responding to Code STEMI in ER 13. No family present at the time. Pt is busy with clinical staff at the time.   
 
Chaplain Zenaida Taveras M.Div, M.S, [address] 
 Spiritual Care available at [address] [place] [phone])

## 2018-10-24 NOTE — H&P
Anastacio Pham [facility] [address]  Office [phone] Fax [phone] Admission H&P Name: Tony Prado MRN: [id number]  Sex: Female YOB: 1955  Age: 66 y.o. PCP: Antonina Carpio DO Source of Information: patient, medical records Chief complaint: Chest pain and SOB History of Present Illness Tony Prado is a 66 y.o. female with known PMH of Atrial Fibrillation s/p MVR on coumadin, CHF, COPD, mechanical valve replacement (s/p AVR and MVR) on Coumadin, Dyslipidemia, Migraine  who initially called the EMS due to L sided chest pain radiating to LUE. In the field STEMI was activated but was then cancelled on arrival to the ED after further evaluation of the EKG. On my exam pt stated that chest pain had completely resolved even prior to nitroglycerin administration. Pt also complains of increased SOB that worsened today. She has COPD and is on continuous home O2 at 3 L. She denies increased cough or increased sputum production. She has been complaint with her trelegy inhaler. When asked if her albuterol inhaler use has increased she stated she doesn't know all the answers. In the ED after administration of Rocephin pt started experiencing increased SOB, tongue numbness & hot flashes. On exam no airway swelling was noted. She was treated with Benadryl and Famotidine with symptom improvement. She was also experiencing a lot of anxiety/claustrophobia during neb tx and was given a one time dose of ativan 0.5 mg. Pt also complains of increased trouble swallowing that has been present for about a month. She was seen OP by Dr. Hosea Fleming recently and was supposed to see Dr. Yo Lewis for an endoscopy but has been unable to follow up due to not having a ride. Of note, pt was recently admitted for COPD exacerbation and discharged on 7/31.  She was treated with IV and then PO antibiotics and was sent home on augmentin and po steroid taper. Importance of follow up with pulmonology and smoking cessation were discussed in details with pt but she has been unable to follow up with pulm. She has decreased smoking from 1/2 pack/day to 5 cigarettes daily. In the ED: - Vitals - , /69, T 98.1F, RR 24, O2- 3L/min NC  
- Labs - Remarkable for Hgb 9.6 (BL 10-11), WBC 18.3, UA:Trubid appearance, Nitrites pos, LE small, 2+ Bact, INR 2.5, K 2.0, Mg 1.4, Trop <0.05, , Glu 118  
- Imaging - CXR: Small pleural effusion vs chronic pleural parenchymal scarring - Treatment - Nitroglycerin, NS Bolus 1000 mL, Klor con  40 mEq + IV K 10mEq X4, Mg 2 g EKG: Afib with RVR,  No past medical history on file. Patient Vitals for the past 12 hrs: 
 Temp Pulse Resp BP SpO2  
10/24/18 1940  (!) 124 (!) 33  99 % 10/24/18 1939  (!) 124 29 172/90   
10/24/18 1933  (!) 117 (!) 34  98 % 10/24/18 1925  (!) 118 27  98 % 10/24/18 1920  (!) 114 28  99 % 10/24/18 1905  (!) 115 27  99 % 10/24/18 1900  (!) 102 (!) 32 (!) 145/91 100 % 10/24/18 1815  (!) 102 24 153/79 94 % 10/24/18 1800  (!) 117 (!) 34 141/47   
10/24/18 1730  100 27 161/67 98 % 10/24/18 1715  (!) 105 29 (!) 142/93 99 % 10/24/18 1700  (!) 109 (!) 32  100 % 10/24/18 1657 98.1 °F (36.7 °C) (!) 105 (!) 35 164/69 100 % 10/24/18 1652  (!) 108 24 164/69 94 % Home Medications Prior to Admission medications Medication Sig Start Date End Date Taking? Authorizing Provider  
bumetanide (BUMEX) 1 mg tablet Take 1 mg by mouth nightly. Yes Other, MD Andre  
dilTIAZem (CARDIZEM) 60 mg tablet Take 60 mg by mouth four (4) times daily. Yes Other, MD Andre  
docusate sodium (COLACE) 100 mg capsule Take 100 mg by mouth nightly. Yes Other, MD Andre  
lovastatin (MEVACOR) 10 mg tablet Take 10 mg by mouth nightly.    Yes Other, MD Andre  
oxyCODONE-acetaminophen (PERCOCET) 5-325 mg per tablet Take 1 Tab by mouth every four (4) hours as needed for Pain. Yes Other, MD Andre  
predniSONE (DELTASONE) 10 mg tablet Take 10 mg by mouth nightly. Yes Other, MD Andre  
warfarin (COUMADIN) 1 mg tablet Take 2 mg by mouth two (2) times a week. Take 2 mg on Thursday & Friday Take 3 mg all other days   Yes Other, MD Andre  
fluticasone-umeclidin-vilanter (TRELEGY ELLIPTA) 100-62.5-25 mcg dsdv Take 1 Puff by inhalation nightly. Yes Provider, Historical  
albuterol (PROAIR HFA) 90 mcg/actuation inhaler Take 2 Puffs by inhalation every four (4) hours as needed for Wheezing. Yes Provider, Historical  
potassium chloride SR (KLOR-CON 10) 10 mEq tablet Take 10 mEq by mouth every Monday, Wednesday, Friday. Yes Provider, Historical  
warfarin (COUMADIN) 1 mg tablet Take 3 mg by mouth five (5) days a week. Take 2 mg on Thursday & Friday Take 3 mg all other days   Yes Provider, Historical  
 
 
Allergies Allergies Allergen Reactions  Spiriva Respimat [Tiotropium Bromide] Shortness of Breath  Tomato Shortness of Breath Only occurs with raw tomatoes, tolerates ketchup without issue  Celebrex [Celecoxib] Rash  Rocephin [Ceftriaxone] Shortness of Breath No past medical history on file. Previous Hospitalization(s) No past surgical history on file. No family history on file. Social History Social History Socioeconomic History  Marital status:  Spouse name: Not on file  Number of children: Not on file  Years of education: Not on file  Highest education level: Not on file Social Needs  Financial resource strain: Not on file  Food insecurity - worry: Not on file  Food insecurity - inability: Not on file  Transportation needs - medical: Not on file  Transportation needs - non-medical: Not on file Occupational History  Not on file Tobacco Use  Smoking status: Not on file Substance and Sexual Activity  Alcohol use: Not on file  Drug use: Not on file  Sexual activity: Not on file Other Topics Concern  Not on file Social History Narrative  Not on file Alcohol history: none Smoking history: 5 cigarettes daily Illicit drug history: none Living arrangement: patient lives with their family. Ambulates: Independently Review of Systems Review of Systems Constitutional: Positive for fatigue. Negative for chills, fever and unexpected weight change. HENT: Positive for trouble swallowing. Negative for congestion, sinus pressure and sinus pain. Respiratory: Positive for shortness of breath. Negative for cough, chest tightness and wheezing. Gastrointestinal: Negative for abdominal distention, abdominal pain, anal bleeding, constipation, diarrhea, nausea and vomiting. Genitourinary: Negative for dysuria, frequency and urgency. Musculoskeletal: Positive for myalgias. Skin: Negative for color change, pallor, rash and wound. Neurological: Positive for weakness. Negative for dizziness, numbness and headaches. Physical Exam 
Visit Vitals /90 Pulse (!) 124 Temp 98.1 °F (36.7 °C) Resp (!) 33 Ht 5' 4\" (1.626 m) Wt 134 lb (60.8 kg) SpO2 99% BMI 23.00 kg/m² General: Alert. Cooperative. Leaning forward while sitting in bed to help with SOB. On NC 3L oxygen Head: Normocephalic. Atraumatic. Throat: Mucosa pink. Moist mucous membranes. White plaque on tongue Neck: Supple. Normal ROM. No stiffness. Respiratory: Poor air movement throughout, slight scattered expiratory wheezing throughout. Cardiovascular: Irregularly Irregular. No m/r/g. Pulses 2+ throughout. GI: + bowel sounds. Nontender. No rebound tenderness or guarding. Nondistended Musculoskeletal: Full ROM in all extremities. Distal pulses intact. No LE edema Skin: Warm, dry. No rashes. Neuro: CN II-XII grossly intact. Laboratory Data Recent Results (from the past 8 hour(s)) SAMPLES BEING HELD Collection Time: 10/24/18  5:00 PM  
Result Value Ref Range SAMPLES BEING HELD [email or web address] COMMENT Add-on orders for these samples will be processed based on acceptable specimen integrity and analyte stability, which may vary by analyte. CBC WITH AUTOMATED DIFF Collection Time: 10/24/18  5:00 PM  
Result Value Ref Range WBC 18.3 (H) 3.6 - 11.0 K/uL  
 RBC 3.21 (L) 3.80 - 5.20 M/uL HGB 9.6 (L) 11.5 - 16.0 g/dL HCT 29.6 (L) 35.0 - 47.0 % MCV 92.2 80.0 - 99.0 FL  
 MCH 29.9 26.0 - 34.0 PG  
 MCHC 32.4 30.0 - 36.5 g/dL  
 RDW 18.4 (H) 11.5 - 14.5 % PLATELET 820 435 - 307 K/uL MPV 11.9 8.9 - 12.9 FL  
 NRBC 0.0 0  WBC ABSOLUTE NRBC 0.00 0.00 - 0.01 K/uL NEUTROPHILS 84 (H) 32 - 75 % LYMPHOCYTES 8 (L) 12 - 49 % MONOCYTES 7 5 - 13 % EOSINOPHILS 0 0 - 7 % BASOPHILS 0 0 - 1 % IMMATURE GRANULOCYTES 1 (H) 0.0 - 0.5 % ABS. NEUTROPHILS 15.3 (H) 1.8 - 8.0 K/UL  
 ABS. LYMPHOCYTES 1.4 0.8 - 3.5 K/UL  
 ABS. MONOCYTES 1.3 (H) 0.0 - 1.0 K/UL  
 ABS. EOSINOPHILS 0.0 0.0 - 0.4 K/UL  
 ABS. BASOPHILS 0.1 0.0 - 0.1 K/UL  
 ABS. IMM. GRANS. 0.2 (H) 0.00 - 0.04 K/UL  
 DF AUTOMATED METABOLIC PANEL, COMPREHENSIVE Collection Time: 10/24/18  5:00 PM  
Result Value Ref Range Sodium 140 136 - 145 mmol/L Potassium 2.0 (LL) 3.5 - 5.1 mmol/L Chloride 97 97 - 108 mmol/L  
 CO2 39 (H) 21 - 32 mmol/L Anion gap 4 (L) 5 - 15 mmol/L Glucose 118 (H) 65 - 100 mg/dL BUN 15 6 - 20 MG/DL Creatinine 0.96 0.55 - 1.02 MG/DL  
 BUN/Creatinine ratio 16 12 - 20 GFR est AA >60 >60 ml/min/1.73m2 GFR est non-AA 59 (L) >60 ml/min/1.73m2 Calcium 7.3 (L) 8.5 - 10.1 MG/DL Bilirubin, total 1.1 (H) 0.2 - 1.0 MG/DL  
 ALT (SGPT) 19 12 - 78 U/L  
 AST (SGOT) 74 (H) 15 - 37 U/L Alk. phosphatase 76 45 - 117 U/L Protein, total 5.8 (L) 6.4 - 8.2 g/dL Albumin 2.7 (L) 3.5 - 5.0 g/dL Globulin 3.1 2.0 - 4.0 g/dL A-G Ratio 0.9 (L) 1.1 - 2.2 NT-PRO BNP Collection Time: 10/24/18  5:00 PM  
Result Value Ref Range NT pro- (H) 0 - 125 PG/ML  
TROPONIN I Collection Time: 10/24/18  5:00 PM  
Result Value Ref Range Troponin-I, Qt. <0.05 <0.05 ng/mL MAGNESIUM Collection Time: 10/24/18  5:00 PM  
Result Value Ref Range Magnesium 1.4 (L) 1.6 - 2.4 mg/dL BLOOD GAS, ARTERIAL Collection Time: 10/24/18  5:23 PM  
Result Value Ref Range pH 7.54 (H) 7.35 - 7.45    
 PCO2 47 (H) 35.0 - 45.0 mmHg PO2 78 (L) 80 - 100 mmHg O2 SAT 97 92 - 97 % BICARBONATE 40 (H) 22 - 26 mmol/L  
 BASE EXCESS 15.0 mmol/L  
 O2 METHOD NASAL O2    
 O2 FLOW RATE 2.00 L/min Sample source ARTERIAL    
 SITE LEFT RADIAL TEE'S TEST YES    
PROTHROMBIN TIME + INR Collection Time: 10/24/18  5:30 PM  
Result Value Ref Range INR 2.5 (H) 0.9 - 1.1 Prothrombin time 24.0 (H) 9.0 - 11.1 sec PTT Collection Time: 10/24/18  5:30 PM  
Result Value Ref Range aPTT 32.9 (H) 22.1 - 32.0 sec  
 aPTT, therapeutic range     58.0 - 77.0 SECS  
D DIMER Collection Time: 10/24/18  5:30 PM  
Result Value Ref Range D-dimer 0.33 0.00 - 0.65 mg/L FEU  
URINALYSIS W/MICROSCOPIC Collection Time: 10/24/18  6:07 PM  
Result Value Ref Range Color CHAPIN Appearance TURBID (A) CLEAR Specific gravity 1.026 1.003 - 1.030    
 pH (UA) 5.5 5.0 - 8.0 Protein 300 (A) NEG mg/dL Glucose NEGATIVE  NEG mg/dL Ketone TRACE (A) NEG mg/dL Blood LARGE (A) NEG Urobilinogen 1.0 0.2 - 1.0 EU/dL Nitrites POSITIVE (A) NEG Leukocyte Esterase SMALL (A) NEG    
 WBC 10-20 0 - 4 /hpf  
 RBC 20-50 0 - 5 /hpf Epithelial cells MANY (A) FEW /lpf Bacteria 2+ (A) NEG /hpf Hyaline cast 10-20 0 - 5 /lpf URINE CULTURE HOLD SAMPLE Collection Time: 10/24/18  6:07 PM  
Result Value Ref Range Urine culture hold URINE ON HOLD IN MICROBIOLOGY DEPT FOR 3 DAYS.  IF UNPRESERVED URINE IS SUBMITTED, IT CANNOT BE USED FOR ADDITIONAL TESTING AFTER 24 HRS, RECOLLECTION WILL BE REQUIRED. BILIRUBIN, CONFIRM Collection Time: 10/24/18  6:07 PM  
Result Value Ref Range Bilirubin UA, confirm NEGATIVE  NEG Imaging CXR Results  (Last 48 hours) 10/24/18 1742  XR CHEST PORT Final result Impression:  IMPRESSION:  
Left costophrenic angle blunting, which may indicate a small left pleural  
effusion, versus chronic pleural parenchymal scarring. . There is an with prior  
chest radiographs would be helpful. .  
   
  
 Narrative:     
   
INDICATION:  Chest Pain EXAM: Chest single view. COMPARISON: None. Micah Headley FINDINGS: A single frontal view of the chest at 1730 hours shows blunting of the  
left costophrenic angle. Interstitial markings are mildly prominent, suggesting  
minimal atelectasis or scar. The heart, mediastinum and pulmonary vasculature  
are remarkable for median sternotomy and cardiac valvular replacement. .  The  
bony thorax is unremarkable for age. .  
   
  
  
 
CT Results  (Last 48 hours) None Assessment and Plan Mike Card is a 66 y.o. female with a PMH of Atrial Fibrillation, CHF, COPD, mechanical valve replacement (s/p AVR and MVR) on Coumadin, Dyslipidemia, Migraine who is admitted for chest pain and SOB 
 
SOB: COPD Exacerbation vs URI vs chest tightness from hypokalemia leading to SOB: CHF or PE unlikely- BNP is elevated at 821 but pt has chronic BNP elevation on previous admissions and CXR, Lung and Ext exam do not indicate CHF exacerbation. D-dimer neg and wells score of 1.5, pt is already anticoagulated on coumadin as well. Pt was supposed to follow up with pulmonologist after last admission but has been unable to f/u. She is on 3 L O2 continuously at home. She also uses Albuterol PRN, Prednisone 10 mg, Nebs & Trelegy (recently switched from Symbicort by pcp). S/p Solumedrol 125 mg once -Meds: Duo Nebs scheduled q4h, Pulmincort BID, Levaquin for UTI, Solumedrol 80 mg tomorrow am  
-Labs: Rapid Flu, RVP, Daily CBC & CMP, Daily Mg & Phos  
-Imaging: CXR only showed small pleural effusion, consider repeat tomorrow morning  
-Consults: Pulmonology consult placed (pt was followed by pulm during last visit) - NC O2 for goal of 88-92% sats Leukocytosis: It appears that pt has BL elevation in her WBC which could be due to daily prednisone use, other ddx include COPD exacerbation, UTI or stress reaction - Monitor with daily CBC Tachypnea: COPD Exacerbation vs PE unlikely vs URI: RR elevated in ED range from 24-37. ABG shows primary metabolic alkalosis with secondary respiratory alkalosis- likely due to hypokalemia with hyperventilation.  
- Continue management of possible copd exacerbation and evaluate for other URI causes - NC O2 for goal of 88-92% sats  
  
Chest Pain with hx of CAD: RESOLVED ACS vs Hypokalemia vs COPD Exacerbation, Pt called EMS for CP and SOB but in the ED she states that pain has now resolved. Dobutamine Stress Test in 2017 was non diagnostic and a previous cath has shown mild to mod CAD. EKG POA: Afib with RVR . - Trop neg X1, continue to trend 2 more times - Repleting K at this time - Cardiology consulted 
  
Hypokalemia: K on POA: 2.0 Per chart review Hyperkalemia is a recurrent problem for pt presumably from Bumex & Albuterol use- she takes klor-con 10 mEq M, W and F, she will likely need dose readjustment on discharge - Pt was repleted in the ED with 40 mEq K Klor Con + potassium chloride 10 mEq IV X4 = total 80 mEq - 40 mEq Klor Con for 3 doses q4hr = total 120 mEq  
- Holding Bumex to decrease K loss, consider restarting once K improves - Consider slightly over repleting K due to expected loss from Duoneb  
- Recheck K tomorrow and replete again as necessary Hypomagnesemia: Mg on POA: 1.4 Given 2 g in the ED  
 - Recheck on daily BMP and replete as necessary Urinary Tract Infection: UA POA:Trubid appearance, Nitrites pos, LE small, 2+ Bact. Pt was started on Rocephin in the ED but experienced an allergic rxn - Switched to Levaquin 750 mg daily  
- Urine culture pending  
  
Atrial Fibrillation s/p MVR on Coumadin (INR POA 2.5) Pt has been seen OP by Dr Asha Funes but has not followed up with him since 2016. EKG POA: Afib with RVR . Coumadin home dose is 3 mg 5 days a week & 2 mg on Thurs & Fri. Pt has only taken 2 doses of Diltiazem today, provided pt with a dose in the ED.  
- Home Warfarin continued- to be dosed by pharmacy for a INR goal of 2.5-3.5 
- Home Diltiazem 60 mg four times daily - Cardiology consulted- appreciate recs  
- Daily Mg & Phos  
  
Chronic Back Pain - Home Percocet 5-325 mg resumed- q6h PRN  
  
Hyperlipidemia (: TC: 196, T, HDL 88, VLDL 30 & LDL 78) - Home Lovastatin 10 mg  
 
FEN/GI - Regular diet. Activity - Ambulate as tolerated DVT prophylaxis - coumadin GI prophylaxis - Not indicated at this time Fall prophylaxis - Not indicated at this time. Disposition - Admit to Telemetry. Plan to d/c to Home. Code Status - Full, discussed with patient / caregivers. Patient Danilo Pacer will be discussed Dr. Irvin Zhou 7:46 PM, 10/24/18 Deborah Molina MD 
Family Medicine Resident For Billing Chief Complaint Patient presents with  Chest Pain Hospital Problems  Never Reviewed Codes Class Noted POA Chest pain ICD-10-CM: R07.9 ICD-9-CM: 786.50  10/24/2018 Unknown

## 2018-10-24 NOTE — ED TRIAGE NOTES
Pt presents via EMS for left sided chest pain that radiates to her left arm, started today. Pt reports light headedness. Pt has hx of COPD, CHF, and afib. Pt wears 3L nasal canula at baseline, still a smoker.

## 2018-10-24 NOTE — ED NOTES
Pt getting breathing treatment with the pipe and stated she could not hold it up. Treatment changed into the mask, pt ripped off mask and stated \"i cant do it\".

## 2018-10-24 NOTE — ED PROVIDER NOTES
82 y.o. female with past medical history significant for atrial fibrillation, dysphasia, who presents via EMS to the ED with cc of chest pain. Pt reports 2-week history of fatigue, dizziness, and lightheadedness. Today, she reports new onset of aching left-sided chest pain radiating into her LUE. She notes the pain began as 7/10 but has improved to mild intensity. She reports associated trouble swallowing. Pt endorses near-constant use of 3L O2 at home, noting she takes it off [place] now and then. \" Pt adds chronic productive cough that is unchanged. Pt denies history of the same chest pain, but she endorses history of atrial fibrillation, CHF, and 2 valve replacements in . She states she has had stress tests and cardiac catheterizations in the past, and she is followed by cardiology. She denies history of stent placement. She notes family history of MI as cause of death to her father. Pt specifically denies any nausea, vomiting, or urinary symptoms. There are no other acute medical concerns at this time. Social Hx: daily Tobacco use (5 cigarettes/day) Significant Family Hx: Father  of MI 
PCP: Cass Santillan DO Cardiologist: Vernon Tellez MD 
 
Note written by Nadia Julien, as dictated by Ayo Chi MD 4:50 PM  
 
 
 
The history is provided by the patient. No  was used. No past medical history on file. No past surgical history on file. No family history on file. Social History Socioeconomic History  Marital status: Not on file Spouse name: Not on file  Number of children: Not on file  Years of education: Not on file  Highest education level: Not on file Social Needs  Financial resource strain: Not on file  Food insecurity - worry: Not on file  Food insecurity - inability: Not on file  Transportation needs - medical: Not on file  Transportation needs - non-medical: Not on file Occupational History  Not on file Tobacco Use  Smoking status: Not on file Substance and Sexual Activity  Alcohol use: Not on file  Drug use: Not on file  Sexual activity: Not on file Other Topics Concern  Not on file Social History Narrative  Not on file ALLERGIES: Spiriva respimat [tiotropium bromide] Review of Systems Constitutional: Positive for fatigue. Negative for activity change, chills and fever. HENT: Positive for trouble swallowing. Negative for nosebleeds, sore throat and voice change. Eyes: Negative for visual disturbance. Respiratory: Positive for cough (chronic). Negative for shortness of breath. Cardiovascular: Positive for chest pain. Negative for palpitations. Gastrointestinal: Negative for abdominal pain, constipation, diarrhea, nausea and vomiting. Genitourinary: Negative for decreased urine volume, difficulty urinating, dysuria, frequency, hematuria and urgency. Musculoskeletal: Positive for myalgias (LUE). Negative for back pain, neck pain and neck stiffness. Skin: Negative for color change. Allergic/Immunologic: Negative for immunocompromised state. Neurological: Positive for dizziness and light-headedness. Negative for seizures, syncope, weakness, numbness and headaches. Psychiatric/Behavioral: Negative for behavioral problems, confusion, hallucinations, self-injury and suicidal ideas. Vitals:  
 10/24/18 1652 10/24/18 1657 BP: 164/69 164/69 Pulse: (!) 108 (!) 105 Resp: 24 (!) 35 Temp:  98.1 °F (36.7 °C) SpO2: 94% 100% Weight:  60.8 kg (134 lb) Height:  5' 4\" (1.626 m) Physical Exam  
Constitutional: She is oriented to person, place, and time. She appears well-developed and well-nourished. No distress. Disheveled appearance HENT:  
Head: Normocephalic and atraumatic. Eyes: Pupils are equal, round, and reactive to light. Neck: Normal range of motion. Neck supple. Cardiovascular: Regular rhythm and normal heart sounds. Tachycardia present. Exam reveals no gallop and no friction rub. No murmur heard. Pulmonary/Chest: Effort normal and breath sounds normal. No respiratory distress. She has no wheezes. Abdominal: Soft. Bowel sounds are normal. She exhibits no distension. There is no tenderness. There is no rebound and no guarding. Musculoskeletal: Normal range of motion. Neurological: She is alert and oriented to person, place, and time. Skin: Skin is warm. No rash noted. She is not diaphoretic. Psychiatric: She has a normal mood and affect. Her behavior is normal. Judgment and thought content normal.  
Nursing note and vitals reviewed. Note written by Nadia Manzanares, as dictated by Va Miller MD 4:50 PM  
 
MDM Number of Diagnoses or Management Options Hypokalemia:  
Hypomagnesemia:  
Urinary tract infection with hematuria, site unspecified:  
Diagnosis management comments: Total critical care time spent exclusive of procedures:  45min Critical Care Total time providing critical care: 30-74 minutes This is a 21-year-old female with past medical history, review of systems, physical exam as above, presenting via EMS for concerns of STEMI. Per the patient she has been experiencing malaise and fatigue for approximately 2 weeks, worsening, developing left-sided chest pain, radiating up into her left upper arm, without worsening of her shortness of breath, nausea, does complain of lightheadedness and dizziness. She endorses a history of COPD, on 3 L of oxygen continuously, CHF, previous sternotomy for valve replacement, for unclear reasons. Patient states she continues to smoke half a pack a day. Upon arrival bedside twelve-lead EKG reviewed by cardiology, and STEMI alert canceled.  Physical exam remarkable for well-appearing elderly female, mildly dyspneic, with clear breath sounds, soft nontender abdomen, irregular rate and rhythm, A. fib with RVR in the low 100s. Differential includes COPD exacerbation, CHF exacerbation, acute coronary syndrome, PE. Plan to continue oxygen therapy, offer nitrates for pain, obtain CMP, CBC, chest x-ray, cardiac enzymes, d-dimer. We will reevaluate, and make a disposition based on the patient's diagnostics and response to therapy Procedures ED EKG interpretation: 
Atrial fibrillation with RVR, rate 102, mild ST depression V1-V5, depressed T waves in V1-V2, normal intervals. Note written by aNdia Olvera, as dictated by Genevieve Haddad MD 4:58 PM 
 
Old Chart Review: 
5:14 PM 
Seen by Dr. Hosea Fleming 2 weeks ago. Hx of chronic atrial fibrillation, dysphasia. Pt had an echo in July with EF of 55%, nuclear stress in Dec 2017 with no fixed ischemia. PROGRESS NOTE: 
6:08 PM 
Pt has K of 2. Plan to admit. CONSULT NOTE: 
6:38 PM Genevieve Haddad MD spoke with Family Medicine Resident, Consult for [facility]. Discussed available diagnostic tests and clinical findings. Resident will admit. 6:40 PM 
Patient is being admitted to the hospital.  The results of their tests and reasons for their admission have been discussed with them and/or available family. They convey agreement and understanding for the need to be admitted and for their admission diagnosis. Consultation will be made now with the inpatient physician for hospitalization.

## 2018-10-24 NOTE — ED NOTES
Bedside shift change report given to Jesi Leger (oncoming nurse) by [address] [facility] [address] RN (offgoing nurse). Report included the following information SBAR, ED Summary, MAR and Recent Results.

## 2018-10-24 NOTE — TELEPHONE ENCOUNTER
Rx ready for  can be filled on 10/26,  checked.   Rx for 90 tabs as discussed we are slowly titrating down

## 2018-10-24 NOTE — PROGRESS NOTES
Patient was a field activation for STEMI. She reports feeling crummy for about 2 weeks. This morning she awoke with substernal chest pain was 7 out of 10 that improved to 2 out of 10 without specific intervention due to persistent chest pain EMS was called. STEMI was activated in the field. Upon arrival to the ED she still having 2 out of 10 chest pain. EKG without evidence of ST elevation. STEMI activation was canceled. Spoke with Dr. Mariann Burrows, can consult cardiology for further questions based on workup.

## 2018-10-24 NOTE — PROGRESS NOTES
[facility]: MED RECONCILIATION Comments/Recommendations:  
? Patient reports good compliance with current medications ? She takes most of her meds at night d/t convenience, except for diltiazem which she takes QID ? Updated allergies to include tomatoes (raw only), Spiriva, and Celebrex Medications added:  
 
· none Medications removed: · Symbicort - She now uses Trelegy, however she does have a backup Symbicort inhaler in case she runs out · Mucinex DM · Ibuprofen · Nystatin suspension - was using for thrush but no longer uses Medications adjusted: · Albuterol - changed from nebs to inhaler which she uses PRN 
· Bumetanide 1 mg PO QHS (instead of daily) - She takes this at night because when she is in bed she is closer to her bathroom. During the day she has to go up and down steps to get to the bathroom so taking at night is more convenient. · Docusate 100 mg PO QHS (instead of BID) · Potassium - changed to KCl (instead of K citrate) 10 mEq MWF  
· Prednisone 10 mg QHS (instead of daily) · Warfarin - Patient takes 2 mg on Thursday & Friday and 3 mg all other days Information obtained from: patient, paperwork from MD office Significant PMH/Disease States: No past medical history on file. Chief Complaint for this Admission: Chief Complaint Patient presents with  Chest Pain Allergies: Spiriva respimat [tiotropium bromide]; Tomato; and Celebrex [celecoxib] Prior to Admission Medications:  
 
Medication Documentation Review Audit Reviewed by ASHISH RodriguezD (Pharmacist) on 10/24/18 at 0345 [id number] Medication Sig Documenting Provider Last Dose Status Taking? albuterol (PROAIR HFA) 90 mcg/actuation inhaler Take 2 Puffs by inhalation every four (4) hours as needed for Wheezing. Provider, Historical  Active Yes  
bumetanide (BUMEX) 1 mg tablet Take 1 mg by mouth nightly. Other, MD Andre 10/23/2018 pm Active Yes Med Note (Gin Suero   Wed Oct 24, 2018  6:00 PM) Takes at bedtime because she is close to her bathroom. During the day she would have to go up and down stairs to use the bathroom so taking at night is more convenient when she is at home. dilTIAZem (CARDIZEM) 60 mg tablet Take 60 mg by mouth four (4) times daily. Andre Tello MD 10/24/2018 am Active Yes  
docusate sodium (COLACE) 100 mg capsule Take 100 mg by mouth nightly. Andre Tello MD 10/23/2018 pm Active Yes  
fluticasone-umeclidin-vilanter (TRELEGY ELLIPTA) 100-62.5-25 mcg dsdv Take 1 Puff by inhalation nightly. Provider, Historical 10/23/2018 pm Active Yes  
lovastatin (MEVACOR) 10 mg tablet Take 10 mg by mouth nightly. Andre Tello MD 10/23/2018 pm Active Yes  
oxyCODONE-acetaminophen (PERCOCET) 5-325 mg per tablet Take 1 Tab by mouth every four (4) hours as needed for Pain. Andre Tello MD 10/23/2018 pm Active Yes  
potassium chloride SR (KLOR-CON 10) 10 mEq tablet Take 10 mEq by mouth every Monday, Wednesday, Friday. Provider, Historical 10/24/2018 afternoon Active Yes  
predniSONE (DELTASONE) 10 mg tablet Take 10 mg by mouth nightly. Andre Tello MD 10/23/2018 pm Active Yes  
warfarin (COUMADIN) 1 mg tablet Take 2 mg by mouth two (2) times a week. Take 2 mg on Thursday & Friday Take 3 mg all other days Andre Tello MD 10/19/2018 pm Active Yes  
warfarin (COUMADIN) 1 mg tablet Take 3 mg by mouth five (5) days a week. Take 2 mg on Thursday & Friday Take 3 mg all other days Provider, Historical 10/23/2018 pm Active Yes Thank you for the consult, 
Varun Pederson D, [address]

## 2018-10-25 NOTE — CONSULTS
Mamta Berman MD [facility] [address] [phone] Date of  Admission: 10/24/2018  4:49 PM 
  
 
Assessment/Plan: · Atypical chest pain in setting of a fib RVR and COPD exacerbation: ECHO pending. Can consider non invasive ischemia eval when COPD exac improves. · Chronic a fib : wean IV dilt and resume home dosing. Avoid BB use given COPD. · Diastolic CHF not in acute exacerbation, would resume home diuretics · S/p AVR and MVR:  Warfarin managed by her PCP Dr Rolando Navarrete · Anemia: defer to attending · COPD exac: per pulmonary: · Hyperkalemia: defer to primary team. Would ask renal to see as Cr rising. Serial EKG's. T waves now peaked on tele. · HLD: cont statin Thank you for allowing us to participate in Jamie Hollingsworth care. We will be happy to follow along. Please call for any questions. Consult requested by Bebe Newton MD for *Chest pain Subjective: 
Jamie Hollingsworth is a 66 y.o.   female  with PMH significant for COPD, chronic a fib on warfarin,  CHF, s/p  mechanical valve replacement (s/p AVR and MVR) and dyslipidemia. She presented to the ED yesterday with chief complaint of chest pain. A STEMI had originally been activated however after Dr Seferino Ceballos evaluated the EKG it was cancelled. Her troponins are negative. Her cp resoved 30 minutes after she arrived and had not returned. It was associated with a \"lump\" in her throat. She was found to be in a fib RVR with profoundly low K and Mg++. Her K has been repleted and is now 6.7. She was started on IV dilt, now at 2.5 mg/hrly and HR currently in the 90's. She continues to smoker 5 cigarettes per day.  
  
 
The patient denies chest pain, dependent edema, diaphoresis,  orthopnea, palpitations, PND,  claudication or syncope. Cardiac risk factors   
HTN Smoker  
post menopausal female LABS:   Troponin:    0.05   ProBNP:   822   CXR:  Left costophrenic angle blunting, which may indicate a small left pleural 
effusion, versus chronic pleural parenchymal scarring. Cardiographics:   Telemetry: afib    ECG: a fib Cardiac Testing/ Procedures:  
ECHO 11/2004 - normal [facility] AVR/MVR, EF 50%, PA pressures nl ECHO 1/16/15-EF 55 % to 60 %, [facility], AVR/MVR, PA pressures normal 
STRESS: Dobutamine cardiolite 2/5/15 - normal perfusion ECHO 11/14/2015: EF 65%, no WMA, LAE, normal [facility] MVR/AVR AoV gradient 25 mmHg mean gradient 13 mmHg. ECHO 11/28/15: EF 65-70%, LAE, mechanical MVR- normal fn,mehanical AOV- normal fn, mild-mod TR 
ECHO 7/26/18: LVEF 55% No WMA, RV dilated, LA dilated. [facility] MV nml function, [facility] [place] prosthesis stable, physiologic rerurg. Patient Active Problem List  
 Diagnosis Date Noted  Chest pain 10/24/2018 No past medical history on file. No past surgical history on file. Allergies Allergen Reactions  Spiriva Respimat [Tiotropium Bromide] Shortness of Breath  Tomato Shortness of Breath Only occurs with raw tomatoes, tolerates ketchup without issue  Celebrex [Celecoxib] Rash  Rocephin [Ceftriaxone] Shortness of Breath Current Facility-Administered Medications Medication Dose Route Frequency  glucose chewable tablet 16 g  4 Tab Oral PRN  
 dextrose (D50W) injection syrg 12.5-25 g  25-50 mL IntraVENous PRN  
 glucagon (GLUCAGEN) injection 1 mg  1 mg IntraMUSCular PRN  
 insulin lispro (HUMALOG) injection   SubCUTAneous AC&HS  
 0.9% sodium chloride infusion  75 mL/hr IntraVENous CONTINUOUS  
 dilTIAZem (CARDIZEM) IR tablet 60 mg  60 mg Oral QID  dilTIAZem (CARDIZEM) 125 mg in dextrose 5% 125 mL infusion  0-15 mg/hr IntraVENous TITRATE  pantoprazole (PROTONIX) tablet 40 mg  40 mg Oral DAILY  methylPREDNISolone (PF) (SOLU-MEDROL) injection 80 mg  80 mg IntraVENous Q8H  
  sodium chloride (NS) flush 5-10 mL  5-10 mL IntraVENous Q8H  
 sodium chloride (NS) flush 5-10 mL  5-10 mL IntraVENous PRN  
 calcium gluconate 1 g in 0.9% sodium chloride 100 mL IVPB  1 g IntraVENous ONCE  
 doxycycline (VIBRA-TABS) tablet 100 mg  100 mg Oral Q12H  
 sodium chloride (NS) flush 5-10 mL  5-10 mL IntraVENous Q8H  
 sodium chloride (NS) flush 5-10 mL  5-10 mL IntraVENous PRN  
 albuterol-ipratropium (DUO-NEB) 2.5 MG-0.5 MG/3 ML  3 mL Nebulization Q4H RT  
 sodium chloride (NS) flush 5-10 mL  5-10 mL IntraVENous Q8H  
 sodium chloride (NS) flush 5-10 mL  5-10 mL IntraVENous PRN  
 docusate sodium (COLACE) capsule 100 mg  100 mg Oral QHS  lovastatin (MEVACOR) tablet 10 mg  10 mg Oral QHS  oxyCODONE-acetaminophen (PERCOCET) 5-325 mg per tablet 1 Tab  1 Tab Oral Q4H PRN  
 Warfarin - Pharmacy to dose   Other Rx Dosing/Monitoring  budesonide (PULMICORT) 500 mcg/2 ml nebulizer suspension  500 mcg Nebulization BID RT Review of Symptoms: 
Constitutional: positive for fatigue ENT: negative Respiratory: positive for cough, sputum, wheezing or dyspnea on exertion Gastrointestinal: negative for nausea and vomiting Genitourinary: no dysuria, hematuria, frequency Musculoskeletal:negative for back pain Neurological: negative for memory problems Other systems reviewed and negative except as above. Social History Socioeconomic History  Marital status:  Spouse name: Not on file  Number of children: Not on file  Years of education: Not on file  Highest education level: Not on file Social Needs  Financial resource strain: Not on file  Food insecurity - worry: Not on file  Food insecurity - inability: Not on file  Transportation needs - medical: Not on file  Transportation needs - non-medical: Not on file Occupational History  Not on file Tobacco Use  Smoking status: Not on file Substance and Sexual Activity  Alcohol use: Not on file  Drug use: Not on file  Sexual activity: Not on file Other Topics Concern  Not on file Social History Narrative  Not on file No family history on file. Physical Exam 
 
Visit Vitals /59 Pulse 94 Temp 98 °F (36.7 °C) Resp (!) 34 Ht 5' 4\" (1.626 m) Wt 133 lb 6.1 oz (60.5 kg) SpO2 100% BMI 22.89 kg/m² General: awake, alert, and oriented. MAEx4. No acute distress HEENT Exam:   
 Normocephalic, atraumatic. Sclera anicteric . Neck: supple, no lymphadenopathy Lung Exam:   
 Tachypneic,  Respirations unlabored. O2 @ 100 % 3 liters  Sat:  . Lungs: Diminished throughout with scattered wheezing. Heart Exam: PMI laterally displaced, rhythm irregular, 2/6 systolic murmur, crisp mechanical valve sounds. No JVD Abdomen Exam:   
  soft, nontender. + Bowel sounds. No palpable masses. : voiding Extremities Exam:   
 Multiple ecchymotic areas on arms,legs. Vascular Exam:   
  radial,  dorsalis pedis, posterior tibial, are equal and strong bilaterally Neuro exam:  No focal deficits Psy Exam: Normal affect, does not appear anxious or agitated Recent Results (from the past 12 hour(s)) TROPONIN I Collection Time: 10/25/18  1:04 AM  
Result Value Ref Range Troponin-I, Qt. <0.05 <0.05 ng/mL METABOLIC PANEL, BASIC Collection Time: 10/25/18  3:53 AM  
Result Value Ref Range Sodium 131 (L) 136 - 145 mmol/L Potassium 5.3 (H) 3.5 - 5.1 mmol/L Chloride 94 (L) 97 - 108 mmol/L  
 CO2 30 21 - 32 mmol/L Anion gap 7 5 - 15 mmol/L Glucose 271 (H) 65 - 100 mg/dL BUN 19 6 - 20 MG/DL Creatinine 1.44 (H) 0.55 - 1.02 MG/DL  
 BUN/Creatinine ratio 13 12 - 20 GFR est AA 45 (L) >60 ml/min/1.73m2 GFR est non-AA 37 (L) >60 ml/min/1.73m2 Calcium 7.6 (L) 8.5 - 10.1 MG/DL  
CBC W/O DIFF Collection Time: 10/25/18  3:53 AM  
Result Value Ref Range WBC 23.6 (H) 3.6 - 11.0 K/uL RBC 3.03 (L) 3.80 - 5.20 M/uL HGB 9.1 (L) 11.5 - 16.0 g/dL HCT 28.5 (L) 35.0 - 47.0 % MCV 94.1 80.0 - 99.0 FL  
 MCH 30.0 26.0 - 34.0 PG  
 MCHC 31.9 30.0 - 36.5 g/dL  
 RDW 18.1 (H) 11.5 - 14.5 % PLATELET 671 756 - 627 K/uL MPV 12.2 8.9 - 12.9 FL  
 NRBC 0.0 0  WBC ABSOLUTE NRBC 0.00 0.00 - 0.01 K/uL  
TROPONIN I Collection Time: 10/25/18  3:53 AM  
Result Value Ref Range Troponin-I, Qt. <0.05 <0.05 ng/mL PROTHROMBIN TIME + INR Collection Time: 10/25/18  3:53 AM  
Result Value Ref Range INR 2.2 (H) 0.9 - 1.1 Prothrombin time 21.3 (H) 9.0 - 11.1 sec PHOSPHORUS Collection Time: 10/25/18  3:53 AM  
Result Value Ref Range Phosphorus 3.1 2.6 - 4.7 MG/DL MAGNESIUM Collection Time: 10/25/18  3:53 AM  
Result Value Ref Range Magnesium 2.1 1.6 - 2.4 mg/dL EKG, 12 LEAD, INITIAL Collection Time: 10/25/18  5:25 AM  
Result Value Ref Range Ventricular Rate 107 BPM  
 Atrial Rate 94 BPM  
 QRS Duration 86 ms  
 Q-T Interval 404 ms QTC Calculation (Bezet) 539 ms Calculated R Axis 86 degrees Calculated T Axis 95 degrees Diagnosis Atrial fibrillation with rapid ventricular response with premature  
ventricular or aberrantly conducted complexes Nonspecific ST and T wave abnormality Prolonged QT Abnormal ECG When compared with ECG of 24-OCT-2018 16:54, 
MANUAL COMPARISON REQUIRED, DATA IS UNCONFIRMED IRON PROFILE Collection Time: 10/25/18  5:47 AM  
Result Value Ref Range Iron 66 35 - 150 ug/dL TIBC 336 250 - 450 ug/dL Iron % saturation 20 20 - 50 % FERRITIN Collection Time: 10/25/18  5:47 AM  
Result Value Ref Range Ferritin 327 (H) 8 - 252 NG/ML  
VITAMIN B12 Collection Time: 10/25/18  5:47 AM  
Result Value Ref Range Vitamin B12 374 193 - 986 pg/mL FOLATE Collection Time: 10/25/18  5:47 AM  
Result Value Ref Range Folate 8.3 5.0 - 21.0 ng/mL RETICULOCYTE COUNT  Collection Time: 10/25/18  5:47 AM  
 Result Value Ref Range Reticulocyte count 7.1 (H) 0.7 - 2.1 % Absolute Retic Cnt. 0.2262 (H) 0.0164 - 0.0776 M/ul GLUCOSE, POC Collection Time: 10/25/18  7:39 AM  
Result Value Ref Range Glucose (POC) 271 (H) 65 - 100 mg/dL Performed by Cristine Zuluaga   
CBC WITH AUTOMATED DIFF Collection Time: 10/25/18  7:40 AM  
Result Value Ref Range WBC 23.4 (H) 3.6 - 11.0 K/uL  
 RBC 2.91 (L) 3.80 - 5.20 M/uL HGB 8.7 (L) 11.5 - 16.0 g/dL HCT 27.8 (L) 35.0 - 47.0 % MCV 95.5 80.0 - 99.0 FL  
 MCH 29.9 26.0 - 34.0 PG  
 MCHC 31.3 30.0 - 36.5 g/dL  
 RDW 18.2 (H) 11.5 - 14.5 % PLATELET 212 920 - 542 K/uL MPV 12.2 8.9 - 12.9 FL  
 NRBC 0.0 0  WBC ABSOLUTE NRBC 0.00 0.00 - 0.01 K/uL NEUTROPHILS 96 (H) 32 - 75 % LYMPHOCYTES 1 (L) 12 - 49 % MONOCYTES 2 (L) 5 - 13 % EOSINOPHILS 0 0 - 7 % BASOPHILS 0 0 - 1 % IMMATURE GRANULOCYTES 1 (H) 0.0 - 0.5 % ABS. NEUTROPHILS 22.5 (H) 1.8 - 8.0 K/UL  
 ABS. LYMPHOCYTES 0.2 (L) 0.8 - 3.5 K/UL  
 ABS. MONOCYTES 0.5 0.0 - 1.0 K/UL  
 ABS. EOSINOPHILS 0.0 0.0 - 0.4 K/UL  
 ABS. BASOPHILS 0.0 0.0 - 0.1 K/UL  
 ABS. IMM. GRANS. 0.2 (H) 0.00 - 0.04 K/UL  
 DF SMEAR SCANNED    
 RBC COMMENTS ANISOCYTOSIS 
1+ METABOLIC PANEL, BASIC Collection Time: 10/25/18  7:40 AM  
Result Value Ref Range Sodium 128 (L) 136 - 145 mmol/L Potassium 6.7 (HH) 3.5 - 5.1 mmol/L Chloride 95 (L) 97 - 108 mmol/L  
 CO2 25 21 - 32 mmol/L Anion gap 8 5 - 15 mmol/L Glucose 254 (H) 65 - 100 mg/dL BUN 20 6 - 20 MG/DL Creatinine 1.43 (H) 0.55 - 1.02 MG/DL  
 BUN/Creatinine ratio 14 12 - 20 GFR est AA 45 (L) >60 ml/min/1.73m2 GFR est non-AA 37 (L) >60 ml/min/1.73m2 Calcium 7.5 (L) 8.5 - 10.1 MG/DL  
TROPONIN I Collection Time: 10/25/18  7:40 AM  
Result Value Ref Range Troponin-I, Qt. <0.05 <0.05 ng/mL Fe Richards MS [facility]

## 2018-10-25 NOTE — PROGRESS NOTES
Spoke with Ms Corinne Lynn about the risk of not having IV access overnight. She has declined central line and will only allow matt catheter to be placed in the morning. Explained that we have no access to check for labs and also administer IV medication if needed in a setting of heart arrhthymias and code blues. Patient is admit that she will be alright overnight and that nothing will happen to her. Pt was alert and oriented during this conversation and had the capacity to make the decision.   
 
Yany Guadarrama MD

## 2018-10-25 NOTE — PROGRESS NOTES
I personally discussed with the patient the events that resulted in her elevated potassium. At the time of encounter, I was accompanied by Gergg Gonzalez and Buck Boland (hospital admin) and Dr. German Boone [facility] resident). Patient presented with potassium of 2.0. Repleted overnight with 200meq of potassium chloride (40meq IV and 160meq PO). As a result, she is now hyperkalemic with a peak level of 6.7. Informed patient that her elevated potassium is likely due to over repletion of her potassium in the setting of chronic kidney disease. Elevated potassium could result in cardiac complications, namely arrhythmia. As a result, she will be transferred to the ICU, Nephrology was consulted and she will be/has been given medications to assist in protecting her heart and bringing down her potassium level. Informed her that the case is under review by administration and our team to identify in order to better care for her and to prevent recurrence in the future. Patient in agreement with plan of treatment. Opportunity given to answer questions and discuss concerns. She would like for us to inform her  as well. Roro Whitlock MD   
[address] [facility] attending

## 2018-10-25 NOTE — PROGRESS NOTES
[id number] TRANSFER - IN REPORT: 
 
Verbal report received from Yesica RN(name) on Danielle Bellamy  being received from [facility](unit) for urgent transfer Report consisted of patients Situation, Background, Assessment and  
Recommendations(SBAR). Information from the following report(s) SBAR, Kardex, Procedure Summary, Intake/Output, MAR and Cardiac Rhythm Afib RVR  was reviewed with the receiving nurse. Opportunity for questions and clarification was provided. Assessment completed upon patients arrival to unit and care assumed. 0640:Patient transferred to room 3004, AxOx4, on 3 liters O2, in Afib HR 90-130s, BP 86/46, afebrile, Cardizem started infusing at 2.5, NS infusing at 75. Patient complaining 4/10 back pain. Will continue to monitor. Call bell within reach 
 
0700: Bedside and Verbal shift change report given to Tammy Montoya RN (oncoming nurse) by Donnie Rogers RN (offgoing nurse). Report included the following information SBAR, Kardex, MAR, Recent Results and Cardiac Rhythm Afib .

## 2018-10-25 NOTE — PROGRESS NOTES
Problem: Self Care Deficits Care Plan (Adult) Goal: *Acute Goals and Plan of Care (Insert Text) Occupational Therapy Goals Initiated 10/25/2018 1. Patient will perform grooming with modified independence standing at the sink with < or = 2 rest breaks and maintain oxygen sats > or = 90 on 3 liters within 7 day(s). 2. Patient will perform upper body dressing and bathing with independence with < or = 2 rest breaks and maintain oxygen sats > or = 90 on 3 liters within 7 day(s). 3. Patient will perform lower body dressing and bathing with supervision/setup with < or = 2 rest breaks and maintain oxygen sats > or = 90 on 3 liters within 7 day(s). 4. Patient will perform toileting and toilet transfer with modified independence and maintain oxygen sats > or = 90 on 3 liters within 7 day(s). 5. Patient will demonstrate pursed lip breathing with min cues during functional tasks within 7 day(s).   
6.  Patient will participate in upper extremity therapeutic exercise/activities with modified independence for 10 minutes within 7 day(s). 7.  Patient will utilize energy conservation techniques during functional activities with verbal cues within 7 day(s). Occupational Therapy EVALUATION Patient: Jamie Hollingsworth (19 y.o. female) Date: 10/25/2018 Primary Diagnosis: Chest pain Precautions: fall ASSESSMENT : 
Based on the objective data described below, the patient presents with decreased activity tolerance following admission on 10/24 for chest pain which is now resolved. Patient lives with her  and daughter, manages all care independently, and uses 3L O2 at all times PTA. Today, patient was received on 4L O2, weaned to 3L O2 for activity with SpO2 remaining >90% on 3L with all activity. Education provided regarding energy conservation, pacing, and pursed lip breathing techniques; Max verbal cues needed for carryover with activity.   Patient is currently independent/mod I level for completion for UB ADLs and CGA to min A for LB ADLs. Patient would benefit from continued skilled OT to progress towards goals and improve overall independence. Would recommend home health at discharge. Patient will benefit from skilled intervention to address the above impairments. Patients rehabilitation potential is considered to be Good Factors which may influence rehabilitation potential include:  
[x]             None noted []             Mental ability/status []             Medical condition []             Home/family situation and support systems []             Safety awareness []             Pain tolerance/management 
[]             Other: PLAN : 
Recommendations and Planned Interventions: 
[x]               Self Care Training                  [x]        Therapeutic Activities [x]               Functional Mobility Training    []        Cognitive Retraining 
[x]               Therapeutic Exercises           [x]        Endurance Activities []               Balance Training                   []        Neuromuscular Re-Education []               Visual/Perceptual Training     [x]   Home Safety Training 
[x]               Patient Education                 [x]        Family Training/Education []               Other (comment): Frequency/Duration: Patient will be followed by occupational therapy 3 times a week to address goals. Discharge Recommendations: Home Health Further Equipment Recommendations for Discharge: none at this time SUBJECTIVE:  
Patient stated I am too scared to go anywhere since I got this oxygen.  OBJECTIVE DATA SUMMARY:  
HISTORY:  
No past medical history on file. No past surgical history on file. Prior Level of Function/Environment/Context: Patient lives with her . See assessment section for PLOF information reported by pt. Home Situation Home Environment: Private residence # Steps to Enter: 7 One/Two Story Residence: (tri-level; 8 steps to bedroom) Living Alone: No 
Support Systems: Spouse/Significant Other/Partner, Child(lauryn), Family member(s) Patient Expects to be Discharged to[de-identified] Private residence Current DME Used/Available at Home: Oxygen, portable, None(3L O2 ) Tub or Shower Type: Tub/Shower combination Hand dominance: RightEXAMINATION OF PERFORMANCE DEFICITS: 
Cognitive/Behavioral Status: 
Neurologic State: Alert Orientation Level: Oriented X4 Cognition: Appropriate decision making; Appropriate for age attention/concentration; Appropriate safety awareness Perception: Appears intact Perseveration: No perseveration noted Safety/Judgement: Awareness of environment;Good awareness of safety precautions Skin: Intact in the uppers Edema: None noted in the uppers Hearing: Auditory Auditory Impairment: None Vision/Perceptual:   
Tracking: Able to track stimulus in all quadrants w/o difficulty Diplopia: No   
Acuity: Within Defined Limits Range of Motion: WDL In the uppers Strength: 
Decreased but functional in the uppers Coordination: 
Fine Motor Skills-Upper: Left Intact; Right Intact Gross Motor Skills-Upper: Left Intact; Right Intact Tone & Sensation: 
Tone: normal 
Sensation: intact Balance: 
Sitting: Intact Standing: Intact Functional Mobility and Transfers for ADLs:Bed Mobility: 
Rolling: Supervision; Additional time;Assist x1 Supine to Sit: Supervision; Additional time;Assist x1 Sit to Supine: (pt remained up at end of tx) Scooting: Supervision; Additional time;Assist x1 Transfers: 
Sit to Stand: Contact guard assistance;Assist x1;Additional time Stand to Sit: Contact guard assistance; Additional time;Assist x1 Bed to Chair: Contact guard assistance; Additional time;Assist x1 Toilet Transfer : Contact guard assistance;Assist x1;Additional time ADL Assessment: 
Feeding: Independent Oral Facial Hygiene/Grooming: Modified Independent Bathing: Minimum assistance Upper Body Dressing: Modified independent Lower Body Dressing: Minimum assistance Toileting: Contact guard assistance Cognitive Retraining Safety/Judgement: Awareness of environment;Good awareness of safety precautions Patient instructed and indicated understanding energy conservation techniques to increase independence and safety during all ADLs for end goal of returning home. Patient encouraged to use energy conservation techniques during ADLs to increase participation in life activities patient prefers, to ensure more frequent good days. If having a bad day, evaluate tasks completed day before and re-plan how to save energy to complete same task. Patient confirmed understanding of energy conservation techniques and demonstrated understanding during activity. Provided verbal cuing for education for breathing techniques including pursed lip breathing techniques (PLB) and circulatory breathing. Additionally, patient was educated on energy conservation techniques, including how they specifically apply to functional activities; This includes pacing, rest breaks, and planning. Patient with good verbal understanding however needing additional cuing through out tasks to use techniques. Additional time needed during tasks. Functional Measure: 
Barthel Index: 
 
Bathin Bladder: 10 Bowels: 10 
Groomin Dressin Feeding: 10 Mobility: 0 Stairs: 0 Toilet Use: 5 Transfer (Bed to Chair and Back): 10 Total: 55 Barthel and G-code impairment scale: 
Percentage of impairment CH 
0% CI 
1-19% CJ 
20-39% CK 
40-59% CL 
60-79% CM 
80-99% CN 
100% Barthel Score 0-100 100 99-80 79-60 59-40 20-39 1-19 
 0 Barthel Score 0-20 20 17-19 13-16 9-12 5-8 1-4 0 The Barthel ADL Index: Guidelines 1. The index should be used as a record of what a patient does, not as a record of what a patient could do. 2. The main aim is to establish degree of independence from any help, physical or verbal, however minor and for whatever reason. 3. The need for supervision renders the patient not independent. 4. A patient's performance should be established using the best available evidence. Asking the patient, friends/relatives and nurses are the usual sources, but direct observation and common sense are also important. However direct testing is not needed. 5. Usually the patient's performance over the preceding 24-48 hours is important, but occasionally longer periods will be relevant. 6. Middle categories imply that the patient supplies over 50 per cent of the effort. 7. Use of aids to be independent is allowed. Jones Herndon., Barthel, D.W. (9709). Functional evaluation: the Barthel Index. [address] [facility] (14)2. FAIZAN Mary, Hanna Reeves., Thu Mata., [place], [address] [place] [address] (1999). Measuring the change indisability after inpatient rehabilitation; comparison of the responsiveness of the Barthel Index and Functional Rupert Measure. Journal of Neurology, Neurosurgery, and Psychiatry, 66(4), 733-233. Anai Penaloza, NAILYN.A, FE Manriquez, & Deng Ryan, MNettieA. (2004.) Assessment of post-stroke quality of life in cost-effectiveness studies: The usefulness of the Barthel Index and the EuroQoL-5D. [facility], 13, 138-25 G codes: In compliance with CMSs Claims Based Outcome Reporting, the following G-code set was chosen for this patient based on their primary functional limitation being treated: The outcome measure chosen to determine the severity of the functional limitation was the Barthel Index with a score of 55/100 which was correlated with the impairment scale. ? Self Care:  
  - CURRENT STATUS: CK - 40%-59% impaired, limited or restricted  - GOAL STATUS: CJ - 20%-39% impaired, limited or restricted  - D/C STATUS:  ---------------To be determined--------------- Occupational Therapy Evaluation Charge Determination History Examination Decision-Making LOW Complexity : Brief history review  LOW Complexity : 1-3 performance deficits relating to physical, cognitive , or psychosocial skils that result in activity limitations and / or participation restrictions  LOW Complexity : No comorbidities that affect functional and no verbal or physical assistance needed to complete eval tasks Based on the above components, the patient evaluation is determined to be of the following complexity level: LOW Activity Tolerance:  
Patient tolerated eval well. Please refer to the flowsheet for vital signs taken during this treatment. After treatment:  
[] Patient left in no apparent distress sitting up in chair 
[x] Patient left in no apparent distress in bed 
[x] Call bell left within reach [x] Nursing notified 
[] Caregiver present 
[] Bed alarm activated COMMUNICATION/EDUCATION:  
The patients plan of care was discussed with: Physical Therapist, Registered Nurse and patient. Carley Lopez [x] Home safety education was provided and the patient/caregiver indicated understanding. [x] Patient/family have participated as able in goal setting and plan of care. [x] Patient/family agree to work toward stated goals and plan of care. [] Patient understands intent and goals of therapy, but is neutral about his/her participation. [] Patient is unable to participate in goal setting and plan of care. This patients plan of care is appropriate for delegation to hospitals. Thank you for this referral. 
Jose Newsome OTR/L Time Calculation: 26 mins

## 2018-10-25 NOTE — CONSULTS
[address] [facility] [place] 
MR#: [id number] : 1955 ACCOUNT #: [de-identified] DATE OF SERVICE: 10/25/2018 REQUESTING PHYSICIAN:  Dr. Al Marcum. CHIEF COMPLAINT:  Hyperkalemia and elevated creatinine. HISTORY OF PRESENT ILLNESS:  The patient is a 72-year-old white female with multiple medical problems including mechanical aortic and mitral valve replacements on chronic anticoagulation. She presented to the hospital with chest pain and shortness of breath and her course has been complicated by rapid atrial fibrillation. Her potassium on admission was 2.0 and she received apparently 200 mEq of total potassium repletion and this morning, the potassium was up to 6.7. Serum creatinine was normal on admission, it is up to 1.5 today. She is making urine. She has been transferred to [facility] and has received a dose of Kayexalate and intravenous calcium. She has been started on some intravenous fluids. She has not yet had a bowel movement since receiving the Kayexalate. Blood pressure has been marginal with blood pressures in the 80s and currently blood pressure is in the 90s. REVIEW OF SYSTEMS: 
CONSTITUTIONAL:  No fevers or chills. GASTROINTESTINAL:  No nausea or vomiting. She reports good intake recently. CARDIOVASCULAR:  Positive for chest pain and shortness of breath. NEUROLOGIC:  No numbness, weakness, seizures, syncope. All other systems reviewed and are negative except as per history of present illness. PAST MEDICAL HISTORY: 
1. History of mechanical aortic and mitral valve replacement. 2.  Hyperlipidemia. 3.  Congestive heart failure. 4.  COPD. 5.  Atrial fibrillation. FAMILY HISTORY:  No known family history of renal disease. SOCIAL HISTORY:  She is a smoker. PHYSICAL EXAMINATION: 
VITAL SIGNS:  Temperature 98, pulse 94, blood pressure 106/59. GENERAL:  Chronically ill white female. EYES:  Anicteric. Extraocular movements intact. ENMT:  Mucous membranes are moist.  There is no epistaxis. NECK:  Nontender. There is no JVD. HEART:  Irregular. EXTREMITY:  There is no lower extremity edema. RESPIRATORY:  Lungs sounds are diminished throughout. Respiratory effort is fair. She is on oxygen. ABDOMEN:  Soft, nontender, bowel sounds are active. Hepatosplenomegaly is not appreciated. SKIN:  Warm. Normal turgor. There is no rash. MUSCULOSKELETAL:  There is no cyanosis or clubbing. There is no synovitis of fingers or wrists bilaterally. LABORATORY DATA:  Sodium is 127, potassium 6.2, chloride 94, bicarbonate 23, BUN 21, creatinine 1.52, urinalysis on admission was positive for everything. ASSESSMENT: 
1. Hyperkalemia due to overshoot repletion for hypokalemia and worsening renal function. 2.  Acute renal failure, presumably due to prerenal azotemia related to rapid atrial fibrillation and low blood pressure. Admission urinalysis was abnormal, but hopefully was just a dirty specimen. 3.  Mild hyponatremia, perhaps related to low blood pressure and effective arterial blood volume deficit. 4.  Chronic obstructive pulmonary disease. 5.  Multiple medical problems as detailed above. PLAN: 
1. Hold diuretics. 2.  Volume expansion with isotonic saline. 3.  She has received Kayexalate and calcium. 4.  Repeat chemistries in an hour. 5.  Monitor labs until potassium normalizes. Renal replacement therapy would be an option but hopefully this can be managed medically. 6.  We will repeat urinalysis and check urine chemistries and renal ultrasound. 7.  We will follow with you to assist in her management during his hospitalization. MD JANNET Black / [place] 
D: 10/25/2018 12:10    
T: 10/25/2018 12:52 JOB #: [id number]

## 2018-10-25 NOTE — PROGRESS NOTES
Brief progress note: After receiving patient's verbal permission I attempted to call  patient's  Ag Jocelyne Meza at [phone] to update on patient's status. Left the message. Will attempt to call again. Will discuss the patient's condition  with the  as soon as the patient's  come to the hospital.  
 
2:39 PM 
10/25/2018 Fernando Soliz MD

## 2018-10-25 NOTE — PROGRESS NOTES
[facility] Dosing Services: 10/24/18 Consult for Warfarin Dosing by Pharmacy by Dr. Emely Shaffer Consult provided for this 66 y.o.  female , for indication of Atrial Fibrillation. Day of Therapy: Continued from home (2 mg Thurs/Fri and 3 mg all other days) Dose to achieve an INR goal of 2-3 Order entered for Warfarin  2 (mg) ordered to be given today at 18:00. Significant drug interactions: none Previous dose given 3mg PT/INR Lab Results Component Value Date/Time INR 2.2 (H) 10/25/2018 03:53 AM  
  
Platelets Lab Results Component Value Date/Time PLATELET 015 34/82/7913 07:40 AM  
  
H/H Lab Results Component Value Date/Time HGB 8.7 (L) 10/25/2018 07:40 AM  
  
 
Pharmacy to follow daily and will provide subsequent Warfarin dosing based on clinical status. Thank you, 
Juan Carlos Pickard, Pharm. D.

## 2018-10-25 NOTE — PROGRESS NOTES
[address] Medicine Residency Resident Note in Brief 
---------------------------------------- 
 
S: 
 
- Examined pt at bedside - Pt is resting comfortably in bed - She states she is mildly SOB but has no other concerns - Nursing does not have any new concerns O: 
Visit Vitals /77 Pulse (!) 110 Temp 98 °F (36.7 °C) Resp 25 Ht 5' 4\" (1.626 m) Wt 133 lb 6.1 oz (60.5 kg) SpO2 100% BMI 22.89 kg/m² General: Alert. Cooperative. On NC 3L oxygen   
Neck: Supple. Normal ROM. No stiffness.   
Respiratory: air movement is improved from yesterday but still decreased in the bases, no wheezing, rhonchi or rales Cardiovascular: Irregularly Irregular. No m/r/g. Pulses 2+ throughout. GI: + bowel sounds. Nontender. No rebound tenderness or guarding. Nondistended Musculoskeletal: Full ROM in all extremities. Distal pulses intact. No LE edema Skin: Warm, dry. No rashes. Neuro: CN II-XII grossly intact.   
 
 
A/P 
 
- Discussed the fact that we do not have any IV access at this time, pt again denied central line stating she understands the risk of not having access but would like to wait for Abrahan catheter tomorrow - 1850 labs showing improvement: K is 4.0 from 5.0 & Na is 131 from 129 Please see full daily progress note for complete plan.  
Domonique Nielsen MD 
7:10 PM

## 2018-10-25 NOTE — PROGRESS NOTES
[facility] Pharmacy Dosing Services: 10/24/18 Consult for Warfarin Dosing by Pharmacy by Dr. Emily Diez Consult provided for this 66 y.o.  female , for indication of Atrial Fibrillation. Day of Therapy: Continued from home (2 mg Thurs & Fri and 3 mg all other days) Dose to achieve an INR goal of 2-3 Order entered for Warfarin  3 (mg) ordered to be given today at 18:00. Significant drug interactions: none Previous dose given 3 mg given 10/23 evening (per med rec) PT/INR Lab Results Component Value Date/Time INR 2.5 (H) 10/24/2018 05:30 PM  
  
Platelets Lab Results Component Value Date/Time PLATELET 695 14/79/8824 05:00 PM  
  
H/H Lab Results Component Value Date/Time HGB 9.6 (L) 10/24/2018 05:00 PM  
  
 
Pharmacy to follow daily and will provide subsequent Warfarin dosing based on clinical status. Thank you for the consult, 
Varun CHAUDHARI, [address]

## 2018-10-25 NOTE — ROUTINE PROCESS
TRANSFER - IN REPORT: 
 
Verbal report received from Heydi Whitt RN (name) on Radha Tidwell  being received from ED (unit) for routine progression of care Report consisted of patients Situation, Background, Assessment and  
Recommendations(SBAR). Information from the following report(s) SBAR, Kardex, Intake/Output, MAR, Accordion and Recent Results was reviewed with the receiving nurse. Opportunity for questions and clarification was provided. Assessment completed upon patients arrival to unit and care assumed. 7976- Notified 2292- Notified resident of change in pt in status. TRANSFER - OUT REPORT: 
 
Verbal report given to Jose G Alves RN (name) on Radha Tidwell  being transferred to [facility] (unit) for routine progression of care Report consisted of patients Situation, Background, Assessment and  
Recommendations(SBAR). Information from the following report(s) SBAR, Kardex, Intake/Output, MAR, Accordion and Recent Results was reviewed with the receiving nurse. Lines:  
Peripheral IV 10/24/18 Right Antecubital (Active) Site Assessment Clean, dry, & intact 10/24/2018  4:51 PM  
Phlebitis Assessment 0 10/24/2018  4:51 PM  
Infiltration Assessment 0 10/24/2018  4:51 PM  
  
 
Opportunity for questions and clarification was provided. Patient transported with: 
 Registered Nurse

## 2018-10-25 NOTE — PROGRESS NOTES
1558: Bedside shift change report given to Jesus Trejo RN (oncoming nurse) by Rachel Burch RN (offgoing nurse). Report included the following information SBAR, Kardex, ED Summary, Procedure Summary and MAR.  
0800: Initial assessment complete. Patient alert and oriented. Patient sitting on side of bed eating breakfast. VSS. Patient on Cardizem drip at 2.5 mg/hr. IV drip verified with RN. Patient on 3 L nasal cannula with oxygen saturations in the upper 90%s. Call bell within reach. No additional needs at this time. Will continue to monitor patient. 0900: Family Practice resident at bedside evaluating patient. 1045: Per patient's chart, patient's potassium was 2.0. Patient received a total of 200 mEq of potassium in a 9 hour period yesterday. Patient's potassium was 6.7 upon repeat this morning. Patient given D50, 10 units of insulin, and calcium gluconate this morning. Repeat lab showed potassium of 6.2. Patient given Kayexalate and EKG performed. EKG showed peaked T waves. Patient upgraded to ICU status as a result. Will continue to monitor patient. 1200: Reassessment completed at this time. Patient resting comfortably in bed watching TV. Patient remains off Cardizem drip. Patient on heart monitor and RN continues to monitor rhythm due to hyperkalemia. VSS. Call bell within reach. No additional needs at this time. Will continue to monitor patient. 1315: Blood collected and sent to lab. Will continue to monitor patient. 1430: Patient's IV leaking. IV attempted to be retaped with no success. Charge RN attempted ultrasound access three times with no success. Patient refusing central line. Family Practice notified. Orders received for PICC/midline. PICC team to be notified. 1445: PICC team states that they are really backed up and are unable to obtain access today. Family Practice notified. 1500: Family Practice MD on unit to see patient.  MD notified of inability to obtain access. Per Family Practice, IR to be contacted regarding line placement. 1600: Reassessment performed. Patient sitting in chair watching TV. Patient remains on heart monitor and off Cardizem drip. VSS. Call bell within reach. Will continue to monitor patient. 1630: IR nurse at bedside explaining central line placement with patient. Patient states she wants to discuss with her  who is on the way. Will notify IR nurse when  arrives. 1700:  on unit. IR nurse at bedside discussing line placement. 1715: Plan is for IR to place Abrahan catheter in the morning. Will continue to monitor patient.  at bedside. 1900: Bedside shift change report given to Sara adames RN (oncoming nurse) by Kathryn Haile RN (offgoing nurse). Report included the following information SBAR, Kardex, ED Summary, Procedure Summary and MAR.

## 2018-10-25 NOTE — CONSULTS
Consult dict ARF ? pre-renal azotemia/low BP Hyperkalemia due to arf and overshoot repletion Rec: 
IVF 
SPS Check urine studies and US Serial labs

## 2018-10-25 NOTE — PROGRESS NOTES
Called for K 6.7. Went down to see patient. Pt is asymptomatic. Tele with afib HR , on cardizem drip. Visit Vitals /59 Pulse 94 Temp 98 °F (36.7 °C) Resp (!) 34 Ht 5' 4\" (1.626 m) Wt 133 lb 6.1 oz (60.5 kg) SpO2 100% BMI 22.89 kg/m² Exam:  
General: NAD, alert Heart: irregular rate and rhythm, no murmur Extremities: no edema, cyanosis Neuro: no focal deficits Tele: afib w/o t wave abnormality A/P: asymptomatic but will treat to prevent cardiac complication - Ca gluconate 1g - Regular insulin 10 unit with D50 
- EKG Refer to progress note for further details.   
 
Marcin Fisher MD

## 2018-10-25 NOTE — CONSULTS
Name: [facility] [place]: [address]  
: 1955 Admit Date: 10/24/2018 Phone:   Room: 0126/01 PCP: Katerina Johnston DO  MRN: [id number] Date: 10/25/2018  Code: Full Code Chart and notes reviewed. Data reviewed. I review the patient's current medications in the medical record at each encounter. I have evaluated and examined the patient. HPI: 
 
8:46 AM    
 
History was obtained from patient and chart. I was asked by Harry Wade MD to see Carmita Swain in consultation for a chief complaint of SOB, ? COPD. Ms. Rani Hardin is a very pleasant 66year old female with history of COPD (FEV1 0.77 - 35% in ), followed outpatient by her PCP Dr. Indra Tejada, who presented to the [facility] ED via EMS with SOB and CP with radiation to the left arm. Patient reports fatigue for the last couple weeks, but symptoms became much worse yesterday. Reports associated cough with grey sputum. Denies fever or chills. Denies known sick contacts. She is on 3L continuous O2. Was recently started on Trelegy after previously being on Symbicort. States she can't take Spiriva due to SOB and rash. She is also on chronic prednisone 10mg daily and uses albuterol rescue inhaler as needed. Flu chot and pna vaccines up to date. Additionally has history of CHF, afib, and valve replacement x 2 in . Follows Dr. Kyree Padilla outpatient. Reports difficulty swallowing and sore throat. States for the last few weeks, \"it feels like something gets stuck. \"  CP currently resolved this morning. CXR is personally visualized and report reviewed. Blunting of the left costophrenic angle. Interstitial markings are mildly prominent, suggesting minimal atelectasis or scar. WBC 23.4 (18.3 at admission) Hgb 8.7 (9.6 at admission) K 6.7 (2.0 at admission) - according to med encounter, patient appears to have received 160 mEq potassium since admission Mag 2.1 Creat 1.43 (0.96 at admission) Trop < 0.05 
RVP negative ABG 7.54/47/78/40 on 2L Past Medical History Diagnosis Date  A-fib ([facility] [address]    
 Anticoagulant long-term use    
 CAD (coronary artery disease), native coronary artery    
    mild to moderate by cath  CHF (congestive heart failure) (HCC)    
 Chronic obstructive pulmonary disease (HCC)    
 Dyslipidemia    
 Ill-defined condition    
    migraines  Migraine    
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  
    Tissue MVR 1989 following failed balloon valvuloplasty for MS Redo MVR 2004 due to severe MR, AVR due to AR ([facility])  S/P AVR (aortic valve replacement) 1/8/2015  S/P MVR (mitral valve replacement) 1/8/2015  
  
  
      
Past Surgical History Procedure Laterality Date  Hx hysterectomy      
    BSO  Hx colectomy      
 Hx mitral valve replacement      
    and redo MVR and AVR  Hx heart catheterization      
    cabg Alcohol history: none Smoking history: 5 cigarettes daily Illicit drug history: none Allergies Allergen Reactions  Spiriva Respimat [Tiotropium Bromide] Shortness of Breath  Tomato Shortness of Breath Only occurs with raw tomatoes, tolerates ketchup without issue  Celebrex [Celecoxib] Rash  Rocephin [Ceftriaxone] Shortness of Breath Current Facility-Administered Medications Medication Dose Route Frequency  glucose chewable tablet 16 g  4 Tab Oral PRN  
 dextrose (D50W) injection syrg 12.5-25 g  25-50 mL IntraVENous PRN  
 glucagon (GLUCAGEN) injection 1 mg  1 mg IntraMUSCular PRN  
 insulin lispro (HUMALOG) injection   SubCUTAneous AC&HS  
 0.9% sodium chloride infusion  75 mL/hr IntraVENous CONTINUOUS  
 dilTIAZem (CARDIZEM) IR tablet 60 mg  60 mg Oral QID  dilTIAZem (CARDIZEM) 125 mg in dextrose 5% 125 mL infusion  0-15 mg/hr IntraVENous TITRATE  pantoprazole (PROTONIX) tablet 40 mg  40 mg Oral DAILY  methylPREDNISolone (PF) (SOLU-MEDROL) injection 80 mg  80 mg IntraVENous Q8H  
 sodium chloride (NS) flush 5-10 mL  5-10 mL IntraVENous Q8H  
 sodium chloride (NS) flush 5-10 mL  5-10 mL IntraVENous PRN  
 albuterol-ipratropium (DUO-NEB) 2.5 MG-0.5 MG/3 ML  3 mL Nebulization Q4H RT  
 sodium chloride (NS) flush 5-10 mL  5-10 mL IntraVENous Q8H  
 sodium chloride (NS) flush 5-10 mL  5-10 mL IntraVENous PRN  
 docusate sodium (COLACE) capsule 100 mg  100 mg Oral QHS  lovastatin (MEVACOR) tablet 10 mg  10 mg Oral QHS  oxyCODONE-acetaminophen (PERCOCET) 5-325 mg per tablet 1 Tab  1 Tab Oral Q4H PRN  
 Warfarin - Pharmacy to dose   Other Rx Dosing/Monitoring  budesonide (PULMICORT) 500 mcg/2 ml nebulizer suspension  500 mcg Nebulization BID RT  
 
 
 
REVIEW OF SYSTEMS  
12 point ROS negative except as stated in the HPI. Physical Exam:  
Visit Vitals /59 Pulse 94 Temp 98 °F (36.7 °C) Resp (!) 34 Ht 5' 4\" (1.626 m) Wt 60.5 kg (133 lb 6.1 oz) SpO2 100% BMI 22.89 kg/m² General:  Alert, cooperative, no distress, appears older than stated age. Head:  Normocephalic, without obvious abnormality, atraumatic. Eyes:  Conjunctivae/corneas clear. Nose: Nares normal. Septum midline. Mucosa normal.   
Throat: Lips, mucosa, and tongue normal.   
Neck: Supple, symmetrical, trachea midline, no adenopathy. Lungs:   Very diminished, no wheeze or rales. Chest wall:  No tenderness or deformity. Heart:  Irregular, normal, no murmur, click, rub or gallop. Abdomen:   Soft, non-tender. Bowel sounds normal. No masses,  No organomegaly. Extremities: Extremities normal, atraumatic, no cyanosis or edema. Pulses: 2+ and symmetric all extremities. Skin: Skin color, texture, turgor normal. No rashes or lesions Lymph nodes: Cervical, supraclavicular nodes normal.  
Neurologic: Grossly nonfocal  
 
 
Lab Results Component Value Date/Time  Sodium 128 (L) 10/25/2018 07:40 AM  
 Potassium 6.7 (HH) 10/25/2018 07:40 AM  
 Chloride 95 (L) 10/25/2018 07:40 AM  
 CO2 25 10/25/2018 07:40 AM  
 BUN 20 10/25/2018 07:40 AM  
 Creatinine 1.43 (H) 10/25/2018 07:40 AM  
 Glucose 254 (H) 10/25/2018 07:40 AM  
 Calcium 7.5 (L) 10/25/2018 07:40 AM  
 Magnesium 2.1 10/25/2018 03:53 AM  
 Phosphorus 3.1 10/25/2018 03:53 AM  
 
 
Lab Results Component Value Date/Time WBC 23.4 (H) 10/25/2018 07:40 AM  
 HGB 8.7 (L) 10/25/2018 07:40 AM  
 PLATELET 815 28/40/6795 07:40 AM  
 MCV 95.5 10/25/2018 07:40 AM  
 
 
Lab Results Component Value Date/Time INR 2.2 (H) 10/25/2018 03:53 AM  
 aPTT 32.9 (H) 10/24/2018 05:30 PM  
 AST (SGOT) 74 (H) 10/24/2018 05:00 PM  
 Alk. phosphatase 76 10/24/2018 05:00 PM  
 Protein, total 5.8 (L) 10/24/2018 05:00 PM  
 Albumin 2.7 (L) 10/24/2018 05:00 PM  
 Globulin 3.1 10/24/2018 05:00 PM  
 
 
No results found for: IRON, FE, TIBC, IBCT, PSAT, FERR No results found for: SR, CRP, LUCY, ANAIGG, RA, RPR, RPRT, VDRLT, VDRLS, TSH, TSHEXT Lab Results Component Value Date/Time PH 7.54 (H) 10/24/2018 05:23 PM  
 PCO2 47 (H) 10/24/2018 05:23 PM  
 PO2 78 (L) 10/24/2018 05:23 PM  
 HCO3 40 (H) 10/24/2018 05:23 PM  
 
 
Lab Results Component Value Date/Time Troponin-I, Qt. <0.05 10/25/2018 07:40 AM  
  
 
No results found for: CULT No results found for: TOXA1, RPR, HBCM, HBSAG, HAAB, HCAB1, HAAT, G6PD, CRYAC, HIVGT, HIVR, HIV1, HIV12, HIVPC, HIVRPI No results found for: VANCT, CPK Lab Results Component Value Date/Time  Color CHAPIN 10/24/2018 06:07 PM  
 Appearance TURBID (A) 10/24/2018 06:07 PM  
 pH (UA) 5.5 10/24/2018 06:07 PM  
 Protein 300 (A) 10/24/2018 06:07 PM  
 Glucose NEGATIVE  10/24/2018 06:07 PM  
 Ketone TRACE (A) 10/24/2018 06:07 PM  
 Blood LARGE (A) 10/24/2018 06:07 PM  
 Urobilinogen 1.0 10/24/2018 06:07 PM  
 Nitrites POSITIVE (A) 10/24/2018 06:07 PM  
 Leukocyte Esterase SMALL (A) 10/24/2018 06:07 PM  
 WBC 10-20 10/24/2018 06:07 PM  
 RBC 20-50 10/24/2018 06:07 PM  
 Bacteria 2+ (A) 10/24/2018 06:07 PM  
 
 
IMPRESSION 
· Dyspnea with potential COPD exacerbation · Severe COPD at baseline · Chest pain · Dysphagia · Hyperkalemia: likely due to over repletion · Atrial fibrillation with RVR · CAD/CHF · S/p MVR and AVR; hx of rheumatic disease · Tobacco use PLAN 
· Continue O2 and wean as able to keep sats > 90%; baseline is 3L · Continue scheduled Duonebs, IV Solumedrol, and Brovana/Pulmicort nebs · Place on Doxy for 5 days; obtain sputum cx if able · Cardiology has been consulted. Rate control with Cardizem drip and ECHO ordered · Calcium gluconate per primary team 
· Speech consult · Patient would benefit from outpatient pulmonary follow up and repeat PFTs · Complete smoking cessation. Patient states she is cutting back and trying to quit. · GI prophylaxis: Protonix · DVT prophylaxis: Coumadin Thank you for allowing us to participate in the care of this patient.    
 
Kim Salmeron

## 2018-10-25 NOTE — PROGRESS NOTES
Brief Progress Note S: Went to see patient at bedside. IV had just come loose. Patient stated she was warm despite room being very warm. Concerned because she was bleeding from IV site. RN at bedside stating this was the second peripheral IV that displaced and nursing had attempted multiple times to get access. Called PICC team to place midline as patient refused central line. PICC team stated they \"get off at 330pm and no one has had lunch\" so they could not come to place the midline. Patient refused midline placement. O: 
Visit Vitals /64 Pulse (!) 109 Temp 98.2 °F (36.8 °C) Resp 25 Ht 5' 4\" (1.626 m) Wt 133 lb 6.1 oz (60.5 kg) SpO2 100% BMI 22.89 kg/m² Patient appears tremulous, not warm to touch, no diaphoresis Breathing comfortably but quickly. Scattered wheezes with poor air movement Irregular rhythm, rate ~105bpm 
No edema in lower extremities Right antecubital IV displaced, + bleeding that slows with pressure A/P: 67 yo F with A Fib, CHF, COPD, AVR and MVR on coumadin admitted for chest pain, SOB and Severe Hypokalemia now with prerenal kidney injury. Afebrile on exam but Tachycardic. Hyperkalemic following repletion and treated with Insulin, D50, Calcium gluconate + SPS. Last K @ 1300 5.0. 
- IR to evaluate for peripheral vs central line placement - Repeat BMP at 1700, then q4hr - Repeat EKG 0600 per cards rec - Remainder of plan per daily progress note Irene Otero MD 
[address] [place] [address] Patient discussed with Dr. Billy Vizcarra MD attending physician.

## 2018-10-25 NOTE — PROGRESS NOTES
Patient and  are agreeable to [facility] catheter. Will have place in the AM. Spoke with [facility] Resident and Annemarie Shaikh RN and will hold Coumadin tonight. Will place catheter in AM and patient and  in agreement.

## 2018-10-25 NOTE — ED NOTES
TRANSFER - OUT REPORT: 
 
Verbal report given to Darrin Velarde RN on Tosin Garay  being transferred to [facility] for routine progression of care Report consisted of patients Situation, Background, Assessment and  
Recommendations(SBAR). Information from the following report(s) SBAR, MAR, Recent Results and Cardiac Rhythm A-Fib was reviewed with the receiving nurse. Lines:  
Peripheral IV 10/24/18 Right Antecubital (Active) Site Assessment Clean, dry, & intact 10/24/2018  4:51 PM  
Phlebitis Assessment 0 10/24/2018  4:51 PM  
Infiltration Assessment 0 10/24/2018  4:51 PM  
  
 
Opportunity for questions and clarification was provided. Patient transported with: 
 Monitor O2 @ 2 liters Registered Nurse

## 2018-10-25 NOTE — PROGRESS NOTES
[address] [facility] [address]  Office [phone] Fax [phone] Assessment and Plan Carlitos Hare is a 66 y.o. female with a PMH of Atrial Fibrillation, CHF, COPD, mechanical valve replacement (s/p AVR and MVR) on Coumadin, Dyslipidemia, Migraine who is admitted for chest pain and SOB Overnight Events: 
 
- Developed At Risk JAZZMINE: 0.98 --> 1.44, started MIVF at 75 cc/hr, recheck BMP at 0900  
- Hypokalemia and HypoMag resolved - Trop neg X3, leukocytosis worsened likely due to steroids - Developed hyperglycemia after receiving steroids --> SS ACHS ordered - RR called this morning for Afib with rvr: [facility] Afib is rate controlled now, Dilt drip is ordered incase it is required, PO home diltiazem is scheduled for 0800. Labs ordered were Trop, CBC & BMP  
- Hgb dropped to 9.1 from 9.6 (BL 10-11), ordered anemia workup labs SOB: COPD Exacerbation vs URI vs chest tightness from hypokalemia leading to SOB: CHF or PE unlikely- BNP is elevated at 821 but pt has chronic BNP elevation on previous admissions and CXR, Lung and Ext exam do not indicate CHF exacerbation. D-dimer neg and wells score of 1.5, pt is already anticoagulated on coumadin as well. Pt was supposed to follow up with pulmonologist after last admission but has been unable to f/u. She is on 3 L O2 continuously at home. She also uses Albuterol PRN, Prednisone 10 mg, Nebs & Trelegy (recently switched from Symbicort by pcp). S/p Solumedrol 125 mg once  
-Meds: Duo Nebs scheduled q4h, Pulmincort BID, Levaquin for UTI, Solumedrol 80 mg today am and then based on Pulm evaluation  
-Labs: Rapid Flu, RVP, Daily CBC & CMP, Daily Mg & Phos  
-Imaging: CXR only showed small pleural effusion, consider repeat tomorrow morning  
-Consults: Pulmonology consult placed - NC O2 for goal of 88-92% sats Leukocytosis: It appears that pt has BL elevation in her WBC which could be due to daily prednisone use, other ddx include COPD exacerbation, UTI or stress reaction - Monitor with daily CBC 
- Increased to 23.6, likely due to steroids Tachypnea: COPD Exacerbation vs PE unlikely vs URI: IMPROVED RR elevated in ED range from 24-37. ABG shows primary metabolic alkalosis with secondary respiratory alkalosis- likely due to hypokalemia with hyperventilation.  
- Continue management of possible copd exacerbation and evaluate for other URI causes - NC O2 for goal of 88-92% sats  
  
Chest Pain with hx of CAD: RESOLVED ACS vs Hypokalemia vs COPD Exacerbation, Pt called EMS for CP and SOB but in the ED she states that pain has now resolved. Dobutamine Stress Test in 2017 was non diagnostic and a previous cath has shown mild to mod CAD. EKG POA: Afib with RVR . - Trop neg X3 
- Cardiology consulted Urinary Tract Infection: UA POA:Trubid appearance, Nitrites pos, LE small, 2+ Bact. Pt was started on Rocephin in the ED but experienced an allergic rxn - Levaquin 750 mg daily  
- Urine culture pending At Risk JAZZMINE: Creatinine increase from . 98 --> 1.44  
- Started MIVF at 75 cc/hr for 4 hours, recheck BMP at 0900 (pt has CHF so we need to carefully monitor for overload) 
- Holding nephrotoxic drugs- bumex Hyperglycemia: Developed high glucose after receiving solumedrol, increase from 118 --> 271  
- Started ACHS glu check with sliding scale lispro - Hypoglycemia protocol placed Anemia: Hgb drop from 9.6 POA to 9.1, (recent OP hgb reads- 11.8, 11.4), denies any blood in stool - Work up includes: Iron profile, ferritin, Vit B12, Folate, retic count Dysphagia: States she has had dysphagia for one month now.  She is supposed to see Dr. Amy Pineda for an endoscopy but has not been able to follow up 
- Consider GI evaluation during this admission.  
  
 Atrial Fibrillation s/p MVR on Coumadin (INR POA 2.5) Pt has been seen OP by Dr Alma Delia Durant but has not followed up with him since 2016. EKG POA: Afib with RVR . Coumadin home dose is 3 mg 5 days a week & 2 mg on Th & Fri. Pt has only taken 2 doses of Diltiazem today, provided pt with a dose in the ED.  
- Home Warfarin continued- to be dosed by pharmacy for a INR goal of 2.5-3.5 
- Home Diltiazem 60 mg four times daily - Cardiology consulted- appreciate recs  
- Daily Mg & Phos wnl this morning Hypokalemia: K on POA: 2.0 RESOLVED Per chart review Hyperkalemia is a recurrent problem for pt presumably from Bumex & Albuterol use- she takes klor-con 10 mEq M, W and F, she will likely need dose readjustment on discharge. Pt was repleted in the ED with 40 mEq K Klor Con + potassium chloride 10 mEq IV X4 = total 80 mEq - 40 mEq Klor Con for 3 doses q4hr = total 120 mEq  
- Holding Bumex to decrease K loss, consider restarting once K improves - K this morning elevated at 5.3 Hypomagnesemia: RESOLVED Mg on POA: 1.4 Given 2 g in the ED  
- Recheck on daily BMP and replete as necessary - Mg this morning 2.1  
  
Chronic Back Pain - Home Percocet 5-325 mg resumed- q6h PRN  
  
Hyperlipidemia (: TC: 196, T, HDL 88, VLDL 30 & LDL 78) - Home Lovastatin 10 mg  
 
FEN/GI - Regular diet. Activity - Ambulate as tolerated DVT prophylaxis - coumadin GI prophylaxis - Not indicated at this time Fall prophylaxis - Not indicated at this time. Disposition - Admit to Telemetry. Plan to d/c to Home. Code Status - Full, discussed with patient / caregivers. I appreciate the opportunity to participate in the care of this patient, Carroll Grande MD 
Family Medicine Resident Subjective / Objective Subjective Pt seen during RR for Afib with RVR this am- she was asymptomatic at the time and stated that her breathing felt a lot better since last night Temp (24hrs), Av.1 °F (36.7 °C), Min:98.1 °F (36.7 °C), Max:98.1 °F (36.7 °C) Objective Respiratory: O2 Flow Rate (L/min): 2 l/min O2 Device: Nasal cannula Visit Vitals /71 Pulse (!) 105 Temp 98.1 °F (36.7 °C) Resp 24 Ht 5' 4\" (1.626 m) Wt 134 lb (60.8 kg) SpO2 97% BMI 23.00 kg/m² General: Alert. Cooperative. On NC 3L oxygen   
Head: Normocephalic. Atraumatic. Throat: Mucosa pink. Moist mucous membranes. White plaque on tongue   
Neck: Supple. Normal ROM. No stiffness.   
Respiratory: Improved air movement but still decreased in the bases, slight scattered expiratory wheezing throughout. Cardiovascular: Irregularly Irregular. No m/r/g. Pulses 2+ throughout. GI: + bowel sounds. Nontender. No rebound tenderness or guarding. Nondistended Musculoskeletal: Full ROM in all extremities. Distal pulses intact. No LE edema Skin: Warm, dry. No rashes. Neuro: CN II-XII grossly intact. CBC: 
Recent Labs 10/24/18 
1700 WBC 18.3* HGB 9.6* HCT 29.6*  Metabolic Panel: 
Recent Labs 10/24/18 
1730 10/24/18 
1700 NA  --  140 K  --  2.0*  
CL  --  97  
CO2  --  39* BUN  --  15  
CREA  --  0.96  
GLU  --  118* CA  --  7.3*  
MG  --  1.4* ALB  --  2.7* SGOT  --  74* ALT  --  19 INR 2.5*  -- For Billing Chief Complaint Patient presents with  Chest Pain Hospital Problems  Never Reviewed Codes Class Noted POA Chest pain ICD-10-CM: R07.9 ICD-9-CM: 786.50  10/24/2018 Unknown

## 2018-10-25 NOTE — PROGRESS NOTES
1900: Bedside and Verbal shift change report given to Carlos Enrique Walker RN (oncoming nurse) by Dana Chávez RN (offgoing nurse). Report included the following information SBAR, Procedure Summary, Intake/Output, MAR, Recent Results and Cardiac Rhythm Afib . 2030: Patient assessment noted, see flow sheet. Patient has no IV access, [facility] has spoken with patient multiple times about the risk of not having access till the am, patient understands. Patient is AxOx4,  Afib, /51, afebrile. Potassium 4.0. Will continue to monitor. 2200: Patient removed NC while eating, O2 sat 99%, educated patient that if O2 drops below 92% they will need to put O2 back on. Patient states she just takes it off every once in a while and then puts it back on again, Family practice resident aware, will continue to monitor. 0030: No new changes, Patient desat to 70% when ambulating to bathroom, back up to 100% on 3 liters NC. 
 
0200: No new changes, patient resting in bed. 
 
0430: No new changes at this time, VSS, remains in Afib, HR 97, on 3 liters O2 NC  
 
0600: Labs drawn and sent 
0700: Bedside and Verbal shift change report given to Saul Mace RN (oncoming nurse) by Carlos Enrique Walker RN (offgoing nurse). Report included the following information SBAR, Kardex, Procedure Summary, Intake/Output, MAR, Recent Results and Cardiac Rhythm Afib.

## 2018-10-25 NOTE — PROGRESS NOTES
Problem: Mobility Impaired (Adult and Pediatric) Goal: *Acute Goals and Plan of Care (Insert Text) Physical Therapy Goals Initiated 10/25/2018 1. Patient will move from supine to sit and sit to supine , scoot up and down and roll side to side in bed with independence within 7 day(s). 2.  Patient will transfer from bed to chair and chair to bed with independence using the least restrictive device within 7 day(s). 3.  Patient will perform sit to stand with independence within 7 day(s). 4.  Patient will ambulate with modified independence for 100 feet with the least restrictive device within 7 day(s). 5.  Patient will ascend/descend 4 stairs with  handrail(s) with modified independence within 7 day(s). physical Therapy EVALUATION Patient: Jonathan Rashid (55 y.o. female) Date: 10/25/2018 Primary Diagnosis: Chest pain Precautions: fall ASSESSMENT : 
Based on the objective data described below, the patient presents with generalized weakness and debility. Rolled on the edge of be supervision, supine to sit  Supervision, sit to stand CGA, ambulate with rolling walker towards the recliner min assist. OOB to chair as tolerated performed some active range of motion exercise on both LE all planes. Notified nurse who agreed to monitor and assist back to bed when ready. Patient will benefit from skilled intervention to address the above impairments. Patients rehabilitation potential is considered to be Excellent Factors which may influence rehabilitation potential include:  
[]         None noted 
[]         Mental ability/status [x]         Medical condition 
[]         Home/family situation and support systems 
[x]         Safety awareness 
[]         Pain tolerance/management 
[]         Other: PLAN : 
Recommendations and Planned Interventions: 
[x]           Bed Mobility Training             []    Neuromuscular Re-Education 
[x]           Transfer Training                   [] Orthotic/Prosthetic Training 
[x]           Gait Training                         []    Modalities [x]           Therapeutic Exercises           []    Edema Management/Control 
[x]           Therapeutic Activities            []    Patient and Family Training/Education 
[]           Other (comment): Frequency/Duration: Patient will be followed by physical therapy  5 times a week to address goals. Discharge Recommendations: Home Health Further Equipment Recommendations for Discharge: TBD SUBJECTIVE:  
Patient stated ok.  OBJECTIVE DATA SUMMARY:  
HISTORY:   
No past medical history on file. No past surgical history on file. Prior Level of Function/Home Situation: lives with family has steps to enter and to the bedroom multi level house . Short distance only oxygen dependent on 3 liters 24/7 Personal factors and/or comorbidities impacting plan of care:  
 
Home Situation Home Environment: Private residence # Steps to Enter: 7 One/Two Story Residence: (tri-level; 8 steps to bedroom) Living Alone: No 
Support Systems: Spouse/Significant Other/Partner, Child(lauryn), Family member(s) Patient Expects to be Discharged to[de-identified] Private residence Current DME Used/Available at Home: Oxygen, portable, None(3L O2 ) Tub or Shower Type: Tub/Shower combination EXAMINATION/PRESENTATION/DECISION MAKING: Critical Behavior: 
Neurologic State: Alert Orientation Level: Oriented X4 Cognition: Appropriate decision making, Appropriate for age attention/concentration, Appropriate safety awareness Safety/Judgement: Awareness of environment, Good awareness of safety precautions Hearing: Auditory Auditory Impairment: None Range Of Motion: 
AROM: Within functional limits PROM: Within functional limits Strength:   
Strength: Generally decreased, functional 
  
  
  
  
  
  
Tone & Sensation:  
  
  
  
  
  
  
  
  
  
   
Coordination: 
Coordination: Within functional limits Vision: Tracking: Able to track stimulus in all quadrants w/o difficulty Diplopia: No 
Acuity: Within Defined Limits Functional Mobility: 
Bed Mobility: 
Rolling: Supervision; Additional time;Assist x1 Supine to Sit: Supervision; Additional time;Assist x1 Sit to Supine: (pt remained up at end of tx) Scooting: Supervision; Additional time;Assist x1 Transfers: 
Sit to Stand: Contact guard assistance;Assist x1;Additional time Stand to Sit: Contact guard assistance; Additional time;Assist x1 Stand Pivot Transfers: Contact guard assistance Bed to Chair: Contact guard assistance; Additional time;Assist x1 Balance:  
Sitting: Intact Standing: With support; Impaired Standing - Static: Fair Standing - Dynamic : FairAmbulation/Gait Training:Distance (ft): 5 Feet (ft) Assistive Device: Walker, rolling;Gait belt Ambulation - Level of Assistance: Minimal assistance Gait Description (WDL): Exceptions to [facility] of Support: Widened Speed/Stephanie: Fluctuations Therapeutic Exercises:  
 Instructed patient to continue active range of motion exercise on both legs while up on chair or on bed. Functional Measure: 
Barthel Index: 
 
Bathin Bladder: 10 Bowels: 10 
Groomin Dressin Feeding: 10 Mobility: 0 Stairs: 0 Toilet Use: 5 Transfer (Bed to Chair and Back): 10 Total: 55 Barthel and G-code impairment scale: 
Percentage of impairment CH 
0% CI 
1-19% CJ 
20-39% CK 
40-59% CL 
60-79% CM 
80-99% CN 
100% Barthel Score 0-100 100 99-80 79-60 59-40 20-39 1-19 
 0 Barthel Score 0-20 20 17-19 13-16 9-12 5-8 1-4 0 The Barthel ADL Index: Guidelines 1. The index should be used as a record of what a patient does, not as a record of what a patient could do. 2. The main aim is to establish degree of independence from any help, physical or verbal, however minor and for whatever reason. 3. The need for supervision renders the patient not independent. 4. A patient's performance should be established using the best available evidence. Asking the patient, friends/relatives and nurses are the usual sources, but direct observation and common sense are also important. However direct testing is not needed. 5. Usually the patient's performance over the preceding 24-48 hours is important, but occasionally longer periods will be relevant. 6. Middle categories imply that the patient supplies over 50 per cent of the effort. 7. Use of aids to be independent is allowed. Claudell Seal., Barthel DNettieW. (2001). Functional evaluation: the Barthel Index. [address] [facility] (14)2. FAIZAN Sweeney, Jose Alberto Gonzáles, Eddie Huynh., [place], [address] (1999). Measuring the change indisability after inpatient rehabilitation; comparison of the responsiveness of the Barthel Index and Functional [place] Measure. Journal of Neurology, Neurosurgery, and Psychiatry, 66(4), 730-780. RONALD Rahman, FE Manriquez, & Gorge Naidu M.A. (2004.) Assessment of post-stroke quality of life in cost-effectiveness studies: The usefulness of the Barthel Index and the EuroQoL-5D. [facility], 32, 408-86 G codes: In compliance with CMSs Claims Based Outcome Reporting, the following G-code set was chosen for this patient based on their primary functional limitation being treated: The outcome measure chosen to determine the severity of the functional limitation was the barthel with a score of 55/100 which was correlated with the impairment scale. ? Mobility - Walking and Moving Around:  
  - CURRENT STATUS: CK - 40%-59% impaired, limited or restricted  - GOAL STATUS: CJ - 20%-39% impaired, limited or restricted  - D/C STATUS:  ---------------To be determined--------------- Physical Therapy Evaluation Charge Determination History Examination Presentation Decision-Making MEDIUM  Complexity : 1-2 comorbidities / personal factors will impact the outcome/ POC  MEDIUM Complexity : 3 Standardized tests and measures addressing body structure, function, activity limitation and / or participation in recreation  MEDIUM Complexity : Evolving with changing characteristics  Other outcome measures barthel  MEDIUM Based on the above components, the patient evaluation is determined to be of the following complexity level: MEDIUM Pain: 
Pain Scale 1: Numeric (0 - 10) Pain Intensity 1: 5 Pain Location 1: Back Pain Orientation 1: Mid 
Pain Description 1: Pressure Pain Intervention(s) 1: Medication (see MAR) Activity Tolerance:  
Good. Please refer to the flowsheet for vital signs taken during this treatment. After treatment:  
[x]         Patient left in no apparent distress sitting up in chair 
[]         Patient left in no apparent distress in bed 
[x]         Call bell left within reach [x]         Nursing notified 
[]         Caregiver present 
[]         Bed alarm activated COMMUNICATION/EDUCATION:  
The patients plan of care was discussed with: Occupational Therapist, Registered Nurse,  and patient. [x]         Fall prevention education was provided and the patient/caregiver indicated understanding. [x]         Patient/family have participated as able in goal setting and plan of care. [x]         Patient/family agree to work toward stated goals and plan of care. []         Patient understands intent and goals of therapy, but is neutral about his/her participation. []         Patient is unable to participate in goal setting and plan of care. Thank you for this referral. 
Gregory Barrios, PT,WCC. Time Calculation: 26 mins

## 2018-10-25 NOTE — PROGRESS NOTES
Speech Pathology bedside swallow evaluation/discharge Patient: Isabela Luo (26 y.o. female) Date: 10/25/2018 Primary Diagnosis: Chest pain Precautions: aspiration ASSESSMENT : 
Based on the objective data described below, the patient presents with c/o sore throat and difficulty swallowing her saliva. Patient admitted with COPD exac. CXR: small pleural effusion. PMH: COPD, CHF, a-fib, on 3L 02 at home +h/o tobacco-current. . 
Skilled therapy provided by a speech-language pathologist is not indicated at this time. PLAN : 
Recommendations: 
Diet as tolerated. Recommended she consider OP ENT eval. She wants it done in hospital b/c of transportation issues, but SLP advised that this was not realistic, as it was not life threatening. Discharge Recommendations: OP ENT appt if sore throat continues. SUBJECTIVE:  
Patient stated \"this food doesn't taste right, so I didn't want to eat it. . OBJECTIVE:  
No past medical history on file. No past surgical history on file. Prior Level of Function/Home Situation: lives with family Diet prior to admission: regualr, thins Current Diet:  Regular, thins Cognitive and Communication Status: 
Neurologic State: Alert Orientation Level: Oriented to person, Disoriented to situation, Disoriented to place, Disoriented to time Cognition: Impaired decision making, Decreased attention/concentration, Follows commands, Memory loss Safety/Judgement: Decreased insight into deficits Oral Assessment: 
Oral Assessment Labial: No impairment Dentition: Limited;Natural 
Oral Hygiene: WNL Lingual: No impairment Velum: (red posterior pharyngeal wall) P.O. Trials: 
Patient Position: sitting up on side of bed Vocal quality prior to P.O.: No impairment Consistency Presented: Solid; Thin liquid ORAL PHASE:  
WNL PHARYNGEAL PHASE:  
WNL 
 
pateint reported to pulm NP some dysphagia with feeling that material was [facility]' Patient denied dysphagia to SLP and c/o only of sore throat on R side for a month, especially when she turns her head to the R She has seen FP and they referred her to GI. She reports that she had a barium swallow \"here\" a few weeks ago, which was normal. This must have been at a different hospital, as no evidence of BA SW at [address] in her record. C/o difficutly swallowing her own saliva b/c of dry mouth NOMS:  
The NOMS functional outcome measure was used to quantify this patient's level of swallowing impairment. Based on the NOMS, the patient was determined to be at level 7 for swallow function G Codes: In compliance with CMSs Claims Based Outcome Reporting, the following G-code set was chosen for this patient based the use of the NOMS functional outcome to quantify this patient's level of swallowing impairment. Using the NOMS, the patient was determined to be at level 7 for swallow function which correlates with the CH= 0% level of severity. Based on the objective assessment provided within this note, the current, goal, and discharge g-codes are as follows: 
 
Swallow  Swallowing: 
 Swallow Current Status CH= 0% 
 Swallow Goal Status CH= 0% 
 Swallow D/C Status CH= 0% NOMS Swallowing Levels: 
Level 1 (CN): NPO Level 2 (CM): NPO but takes consistency in therapy Level 3 (CL): Takes less than 50% of nutrition p.o. and continues with nonoral feedings; and/or safe with mod cues; and/or max diet restriction Level 4 (CK): Safe swallow but needs mod cues; and/or mod diet restriction; and/or still requires some nonoral feeding/supplements Level 5 (CJ): Safe swallow with min diet restriction; and/or needs min cues Level 6 (CI): Independent with p.o.; rare cues; usually self cues; may need to avoid some foods or needs extra time Level 7 (CH): Independent for all p.o. AYUSH. (2003).  National Outcomes Measurement System (NOMS): Adult Speech-Language Pathology User's Guide. Pain: 
Pain Scale 1: Numeric (0 - 10) Pain Intensity 1: 5 Pain Location 1: Back After treatment:  
[] Patient left in no apparent distress sitting up in chair 
[x] Patient left in no apparent distress in bed 
[] Call bell left within reach [x] Nursing notified 
[] Caregiver present 
[] Bed alarm activated COMMUNICATION/EDUCATION:  
The patients plan of care including findings, recommendations, and recommended diet changes were discussed with: Physician. Patient was educated regarding Her deficit(s) of throat pain as this relates to Her diagnosis of COPD exac. She demonstrated Fair understanding as evidenced by discussion. Navya Pruitt [] Posted safety precautions in patient's room. [x] Patient/family have participated as able and agree with findings and recommendations. [] Patient is unable to participate in plan of care at this time. Thank you for this referral. 
Richard Fraser, SLP Time Calculation: 10 mins

## 2018-10-25 NOTE — PROGRESS NOTES
VAT Note;  
Spoke to Dr. Agatha Jimenez concerning midline ordered for patient. End of day for vascular access team and two other lines ordered previous to this order and recommend calling IR for placement.

## 2018-10-25 NOTE — PROGRESS NOTES
The recommendations are for pt to have home health PT,OT and SN for CHF and COPD management.  
Jemal Martinez

## 2018-10-25 NOTE — PROGRESS NOTES
Reason for Admission:   Chest Pain RRAT Score:     5 Plan for utilizing home health:      Pt has used [employer] before in the past.  
                 
Likelihood of Readmission:  Green/Low Transition of Care Plan:           I called the pt's daughter as patient was having a test in her room. Pt lives with her daughter Yuliana Farfan's  and their child. Pt's main contact is her  AG who can be reached on the home phone [phone]. Pt lives in a Tri Level home with 7 steps to enter the home. Pt drives and is independent with all of her ADLs. Pt's has prescription coverage under her insurance plan, she gets her prescriptions filled at [facility] on [address] or through [facility] program. Pts PCP is Jodee Hough. I have notified NNs of hospital admission. DME - pt has cont. 02 through [address]. No other issues or concerns at this time. CM will continue to follow. Thanks JENNIFER Zhu Care Management Interventions PCP Verified by CM: Gavi Cazares Signup: No 
Discharge Durable Medical Equipment: No 
Physical Therapy Consult: No 
Occupational Therapy Consult: No 
Speech Therapy Consult: No 
Current Support Network: Lives with Spouse Confirm Follow Up Transport: Family Plan discussed with Pt/Family/Caregiver: Yes Freedom of Choice Offered: Yes Discharge Location Discharge Placement: Home

## 2018-10-25 NOTE — ED NOTES
Admission Time Out Check signed & held orders:  [] Yes or  [] No  
 
Assess Vital Signs over the last 2 hours:  [] Stable or  [] Unstable Patient Vitals for the past 4 hrs: 
 Pulse Resp BP SpO2  
10/25/18 0245 96 23 109/58 98 % 10/25/18 0230 86 25 114/47   
10/25/18 0215 91 22 108/69   
10/25/18 0200 89 23 123/64   
10/25/18 0145 100 27 125/71 100 % 10/25/18 0130 (!) 106 27 130/65   
10/25/18 0115 97 29 116/73 96 % 10/25/18 0100 (!) 108 28 136/62   
10/25/18 0054    97 % 10/25/18 0045 91 23 138/72   
10/25/18 0030 (!) 105 24 134/71   
10/25/18 0015 92 24 135/79   
10/25/18 0000   125/71  Does this patient meet Code Purple criteria or other Core Measure protocol? [] Yes or  [] No or  [] Already being treated Unit patient assigned to: [facility] Does the patient need any special isolation? []  Yes or  [x] No 
 
Is the assigned unit appropriate? [x] Yes or  [] No 
 
Does the patient need to be transported on a monitor and/or has critical drips? [] Yes or  [x] No or  [] Order obtained to travel without Monitor/nurse Any needs/concerns that need to be addressed prior to admission? (i.e. ED/Admit provider or Nursing Supervisor) 
    [] Yes or  [x] No 
 
Does patient require IV fluid continued on admission?  
                             [] Yes or  [x] No 
 
 
Easton Galindo RN

## 2018-10-26 PROBLEM — E87.6 HYPOKALEMIA: Status: ACTIVE | Noted: 2018-01-01

## 2018-10-26 PROBLEM — R73.9 HYPERGLYCEMIA: Status: ACTIVE | Noted: 2018-01-01

## 2018-10-26 PROBLEM — E78.5 HYPERLIPIDEMIA: Status: ACTIVE | Noted: 2018-01-01

## 2018-10-26 PROBLEM — I48.91 A-FIB (HCC): Status: ACTIVE | Noted: 2018-01-01

## 2018-10-26 PROBLEM — Z79.01 CHRONIC ANTICOAGULATION: Status: ACTIVE | Noted: 2018-01-01

## 2018-10-26 PROBLEM — G89.29 CHRONIC BACK PAIN: Status: ACTIVE | Noted: 2018-01-01

## 2018-10-26 PROBLEM — J44.1 COPD EXACERBATION (HCC): Status: ACTIVE | Noted: 2018-01-01

## 2018-10-26 PROBLEM — M54.9 CHRONIC BACK PAIN: Status: ACTIVE | Noted: 2018-01-01

## 2018-10-26 PROBLEM — N17.9 AKI (ACUTE KIDNEY INJURY) (HCC): Status: ACTIVE | Noted: 2018-01-01

## 2018-10-26 NOTE — PROGRESS NOTES
[facility] [place]: [address]  
: 1955 Admit Date: 10/24/2018 Phone:   Room: 9126/01 PCP: Juventino Clement DO  MRN: [id number] Date: 10/26/2018  Code: Full Code Chart and notes reviewed. Data reviewed. I review the patient's current medications in the medical record at each encounter. I have evaluated and examined the patient. Overnight events Afebrile HR 110s BP stable Sats 98% on RA during my exam 
WBC 44.7 - trending up significantly from 18.3 at admission Hgb 8.3 Ma 134 
K 3.8 - down from 6.7 following IVF, SPS, and calcium gluconate Creat 1.02 - better INR 3.2 Urine cx with GNR 
RVP negative ECHO: EF 45-50% ROS: States she feels \"heavy. \"  Denies specific CP or arm/shoulder pain. Feels fatigued and reports HA. Denies fever or chills. Denies abd pain. Reports mild feet swelling. Past Medical History Diagnosis Date  A-fib ([facility] [address]    
 Anticoagulant long-term use    
 CAD (coronary artery disease), native coronary artery    
    mild to moderate by cath  CHF (congestive heart failure) (HCC)    
 Chronic obstructive pulmonary disease (HCC)    
 Dyslipidemia    
 Ill-defined condition    
    migraines  Migraine    
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  
    Tissue MVR  following failed balloon valvuloplasty for MS Redo MVR  due to severe MR, AVR due to AR ([facility])  S/P AVR (aortic valve replacement) 2015  S/P MVR (mitral valve replacement) 2015  
  
  
      
Past Surgical History Procedure Laterality Date  Hx hysterectomy      
    BSO  Hx colectomy      
 Hx mitral valve replacement      
    and redo MVR and AVR  Hx heart catheterization      
    cabg Alcohol history: none Smoking history: 5 cigarettes daily Illicit drug history: none Allergies Allergen Reactions  Spiriva Respimat [Tiotropium Bromide] Shortness of Breath  Tomato Shortness of Breath Only occurs with raw tomatoes, tolerates ketchup without issue  Celebrex [Celecoxib] Rash  Rocephin [Ceftriaxone] Shortness of Breath Current Facility-Administered Medications Medication Dose Route Frequency  potassium chloride (KLOR-CON) packet 20 mEq  20 mEq Oral NOW  magnesium sulfate 2 g/50 ml IVPB (premix or compounded)  2 g IntraVENous ONCE  
 bumetanide (BUMEX) tablet 1 mg  1 mg Oral ONCE  
 glucose chewable tablet 16 g  4 Tab Oral PRN  
 dextrose (D50W) injection syrg 12.5-25 g  25-50 mL IntraVENous PRN  
 glucagon (GLUCAGEN) injection 1 mg  1 mg IntraMUSCular PRN  
 insulin lispro (HUMALOG) injection   SubCUTAneous AC&HS  dilTIAZem (CARDIZEM) IR tablet 60 mg  60 mg Oral QID  dilTIAZem (CARDIZEM) 125 mg in dextrose 5% 125 mL infusion  0-15 mg/hr IntraVENous TITRATE  pantoprazole (PROTONIX) tablet 40 mg  40 mg Oral DAILY  methylPREDNISolone (PF) (SOLU-MEDROL) injection 80 mg  80 mg IntraVENous Q8H  
 doxycycline (VIBRA-TABS) tablet 100 mg  100 mg Oral Q12H  
 insulin NPH (NOVOLIN N, HUMULIN N) injection 8 Units  8 Units SubCUTAneous Q12H  
 sodium chloride (NS) flush 5-10 mL  5-10 mL IntraVENous PRN  
 albuterol-ipratropium (DUO-NEB) 2.5 MG-0.5 MG/3 ML  3 mL Nebulization Q4H RT  
 sodium chloride (NS) flush 5-10 mL  5-10 mL IntraVENous Q8H  
 docusate sodium (COLACE) capsule 100 mg  100 mg Oral QHS  lovastatin (MEVACOR) tablet 10 mg  10 mg Oral QHS  oxyCODONE-acetaminophen (PERCOCET) 5-325 mg per tablet 1 Tab  1 Tab Oral Q4H PRN  
 Warfarin - Pharmacy to dose   Other Rx Dosing/Monitoring  budesonide (PULMICORT) 500 mcg/2 ml nebulizer suspension  500 mcg Nebulization BID RT  
 
 
 
REVIEW OF SYSTEMS  
12 point ROS negative except as stated in the HPI. Physical Exam:  
Visit Vitals /76 Pulse (!) 115 Temp 97.8 °F (36.6 °C) Resp 27 Ht 5' 4\" (1.626 m) Wt 60.5 kg (133 lb 6.1 oz) SpO2 99% BMI 22.89 kg/m² General:  Alert, cooperative, no distress, appears older than stated age. Head:  Normocephalic, without obvious abnormality, atraumatic. Eyes:  Conjunctivae/corneas clear. Nose: Nares normal. Septum midline. Mucosa normal.   
Throat: Lips, mucosa, and tongue normal.   
Neck: Supple, symmetrical, trachea midline, no adenopathy. Lungs:   Very diminished, no wheeze appreciated. Trace rales in the bases. Chest wall:  No tenderness or deformity. Heart:  Irregular, normal, no murmur, click, rub or gallop. Abdomen:   Soft, non-tender. Bowel sounds normal. No masses,  No organomegaly. Extremities: Extremities normal, atraumatic, no cyanosis or edema. Pulses: 2+ and symmetric all extremities. Skin: Skin color, texture, turgor normal. No rashes or lesions Lymph nodes: Cervical, supraclavicular nodes normal.  
Neurologic: Grossly nonfocal  
 
 
Lab Results Component Value Date/Time Sodium 134 (L) 10/26/2018 05:59 AM  
 Potassium 3.8 10/26/2018 05:59 AM  
 Chloride 97 10/26/2018 05:59 AM  
 CO2 29 10/26/2018 05:59 AM  
 BUN 16 10/26/2018 05:59 AM  
 Creatinine 1.02 10/26/2018 05:59 AM  
 Glucose 147 (H) 10/26/2018 05:59 AM  
 Calcium 8.1 (L) 10/26/2018 05:59 AM  
 Magnesium 1.7 10/26/2018 05:59 AM  
 Phosphorus 3.3 10/26/2018 05:59 AM  
 
 
Lab Results Component Value Date/Time WBC 44.7 (H) 10/26/2018 05:59 AM  
 HGB 8.3 (L) 10/26/2018 05:59 AM  
 PLATELET 619 87/71/3878 05:59 AM  
 MCV 93.9 10/26/2018 05:59 AM  
 
 
Lab Results Component Value Date/Time INR 3.2 (H) 10/26/2018 05:59 AM  
 aPTT 32.9 (H) 10/24/2018 05:30 PM  
 AST (SGOT) 74 (H) 10/24/2018 05:00 PM  
 Alk. phosphatase 76 10/24/2018 05:00 PM  
 Protein, total 5.8 (L) 10/24/2018 05:00 PM  
 Albumin 2.7 (L) 10/24/2018 05:00 PM  
 Globulin 3.1 10/24/2018 05:00 PM  
 
 
Lab Results Component Value Date/Time  Iron 66 10/25/2018 05:47 AM  
 TIBC 336 10/25/2018 05:47 AM  
 Iron % saturation 20 10/25/2018 05:47 AM  
 Ferritin 327 (H) 10/25/2018 05:47 AM  
 
 
No results found for: SR, CRP, LUCY, ANAIGG, RA, RPR, RPRT, VDRLT, VDRLS, TSH, TSHEXT, TSHEXT No results found for: PH, PHI, PCO2, PCO2I, PO2, PO2I, HCO3, HCO3I, FIO2, FIO2I Lab Results Component Value Date/Time Troponin-I, Qt. <0.05 10/25/2018 07:40 AM  
  
 
Lab Results Component Value Date/Time Culture result: GRAM NEGATIVE RODS (A) 10/24/2018 06:07 PM  
 
 
No results found for: TOXA1, RPR, HBCM, HBSAG, HAAB, HCAB1, HAAT, G6PD, CRYAC, HIVGT, HIVR, HIV1, HIV12, HIVPC, HIVRPI No results found for: VANCT, CPK Lab Results Component Value Date/Time Color YELLOW/STRAW 10/26/2018 02:47 AM  
 Appearance CLEAR 10/26/2018 02:47 AM  
 pH (UA) 5.5 10/26/2018 02:47 AM  
 Protein NEGATIVE  10/26/2018 02:47 AM  
 Glucose 250 (A) 10/26/2018 02:47 AM  
 Ketone NEGATIVE  10/26/2018 02:47 AM  
 Bilirubin NEGATIVE  10/26/2018 02:47 AM  
 Blood MODERATE (A) 10/26/2018 02:47 AM  
 Urobilinogen 0.2 10/26/2018 02:47 AM  
 Nitrites NEGATIVE  10/26/2018 02:47 AM  
 Leukocyte Esterase NEGATIVE  10/26/2018 02:47 AM  
 WBC 0-4 10/26/2018 02:47 AM  
 RBC 0-5 10/26/2018 02:47 AM  
 Bacteria NEGATIVE  10/26/2018 02:47 AM  
 
 
Images: repeat CXR pending IMPRESSION 
· Dyspnea with potential COPD exacerbation · Severe COPD at baseline · Chest pain · Leukocytosis: GNR growing in urine cx · Dysphagia · Hyperkalemia: likely due to over repletion · Atrial fibrillation with RVR · JAZZMINE: better · CAD/CHF · S/p MVR and AVR; hx of rheumatic disease · Tobacco use PLAN 
· Continue O2 and wean as able to keep sats > 90%; baseline is 3L · Continue scheduled Duonebs · Wean IV Solumedrol to 60 mg q 8hr · Continue Brovana/Pulmicort nebs · Switch Doxy to meropenem. Had rxn to ceftriaxone in the ED. · Check blood cx, lactic acid, and f/u repeat CXR 
· S/p 1 mg Bumex this morning per primary team 
· Cardiology following. Rate control with Cardizem po. Check TSH. · Replete K and Mag to keep > 4 and >2, respectively · Speech eval appreciated; outpatient ENT eval advised · Patient would benefit from outpatient pulmonary follow up and repeat PFTs · Complete smoking cessation. Patient states she is cutting back and trying to quit. · GI prophylaxis: Protonix · DVT prophylaxis: Coumadin; pharmacy to dose Kim Cardoso

## 2018-10-26 NOTE — PROGRESS NOTES
Brief Note S: Called by nurse for concern over patient feeling poorly and having more difficulty breathing. Patient less comfortable when lying flat because she feels \"like I'm suffocating\". Was refusing breathing treatment because it makes her feel shaky. Readdressed code status regarding intubation. Patient desires to remain full code and would want intubation to assist with breathing. O: 
Visit Vitals /85 Pulse (!) 108 Temp 97.6 °F (36.4 °C) Resp 26 Ht 5' 4\" (1.626 m) Wt 133 lb 6.1 oz (60.5 kg) SpO2 100% BMI 22.89 kg/m² Patient looks sick and is breathing quickly with N/c in place on 3L Diminished breath sounds at lung bases with scattered wheezing HR irregular and rapid to 132bpm 
Tachypneic to 32 breaths per minute on monitor A/P: 
Patient with worsening respiratory status after refusing multiple breathing treatments. Agreed to treatment with something to calm her. 
- 1x dose ativan . 5mg given prior to breathing treatment 
- nebulizer treatment with RT then repeat lung exam. 
- No change in code status

## 2018-10-26 NOTE — PROGRESS NOTES
[facility] Dosing Services: 10/26/18 Consult for Warfarin Dosing by Pharmacy by Dr. Franks Delay Consult provided for this 66 y.o.  female , for indication of Atrial Fibrillation. Day of Therapy: Continued from home (2 mg Thurs/Fri and 3 mg all other days) Dose to achieve an INR goal of 2-3 Order entered for Warfarin  0.5 (mg) ordered to be given today at 18:00.  
- concerned for significant increase but held on 10/25/18 for midline placement 
- will give small amount to ensure significant drop in INR Significant drug interactions: none Previous dose given Held, 10/25 PT/INR Lab Results Component Value Date/Time INR 3.2 (H) 10/26/2018 05:59 AM  
  
Platelets Lab Results Component Value Date/Time PLATELET 271 99/18/2503 05:59 AM  
  
H/H Lab Results Component Value Date/Time HGB 8.3 (L) 10/26/2018 05:59 AM  
  
 
Pharmacy to follow daily and will provide subsequent Warfarin dosing based on clinical status. Thank you, 
Tristan Cm, Pharm. D.

## 2018-10-26 NOTE — PROGRESS NOTES
Brief Progress Note S: Per nursing, patient combative and disoriented following ativan prior to breathing treatment. Patient continued to refuse treatment and became combative with nursing. Required 2mg Versed to calm down. Patient placed on BiPAP and sedated on precedex. O:  
Visit Vitals BP 93/54 Pulse 72 Temp 97.5 °F (36.4 °C) Resp 25 Ht 5' 4\" (1.626 m) Wt 133 lb 6.1 oz (60.5 kg) SpO2 100% BMI 22.89 kg/m² Patient lying in bed with BiPAP mask in place, sedated Precedex running at 15mL/hr 
Lungs clear on anterior exam without wheezes Regular rhythm and rate 72bpm 
 
A/P: 65yo F admitted for SOB in setting of cOPD exacerbation refusing breathing treatments with worsening respiratory status now on BiPAP and precedex with improved HR and lung exam. 
- Keep patient on BiPAP ovrenight 
- wean precedex as tolerated 
- remainder of plan per daily progress note - Patient's  recently updated by nursing of events, will continue to update him daily Vernon Gregory MD 
[id number] [place] [address]

## 2018-10-26 NOTE — PROGRESS NOTES
0700: Bedside shift change report given to Sunil Batista RN (oncoming nurse) by Manjula Ruiz RN (offgoing nurse). Report included the following information SBAR, Intake/Output, MAR, Accordion, Recent Results, Med Rec Status and Cardiac Rhythm A fib. Chart, orders, labs reviewed. Patient is without any IV access at this time, all attending physicians are aware of this. Patient is refusing further IV sticks at this time, patient is refusing central line. Per previous notes, patient appears to be agreeable to a RUI catheter. INR today is 3.2. Will discuss with attending physicians on plan. 9584: Family practice at bedside. Midline catheter being discussed vs. RUI catheter. Patient is requesting to speak with FAUSTINA Davies from IR before making a decision. 0900: PICC team at bedside to place a midline catheter. Patient is agreeable to this. 9807: Order received from Dr. Aga Barclay to obtain urine culture from UA that is already in the lab. Called lab, lab to call back to see if they have available. 1000: Lactic, blood cultures, TSH, drawn and sent. Patient requesting something for anxiety. Called and notified family practice. Will await MD orders/call back. 1200: Re assessment complete see flowsheet. 1330: Patient sitting on edge of bed complaining of SOB and \"I can't do anything I just feel bad. \" Sp02 96% on RA, 99% on 3L NC. Lungs diminished with insp/exp wheezing on left posterior upper and lower lobes. Offered breathing treatment, patient is refusing at this time. Scheduled solu medrol given. Notified Dr. Maryann Bence and family practice. /86, . Patient appears dyspnic at rest at this time. Dr. Maryann Bence and family practice to come see patient. 1342: Family practice at bedside. Dr. Maryann Bence at bedside. RT at bedside. Assessments completed. Orders received for PRN ativan. Patient is agreeable to breathing treatment at this time.  /99, , Sp02 100% on RA, RR 31. Patient appears dyspnic at rest. Will continue to monitor closely. 1400: 1mg ativan given. Breathing treatment in progress. 1404: Patient ripped breathing treatment off, states \"i'm clothrophobic! \" educated patient that she needs her treatment to help her breath. Patient continues to rip off treatment. Offered to hold treatment on face for patient, patient swatting head back and forth saying \"no! \" Dr. Josseline Smith notified. [id number]: Patient found by RT walking in corner of room, unsteady gait, \"looking for something to eat. \" Patient assisted back to bed, RT, RN at bedside. Dr. Josseline Smith notified and is at bedside. Patient alert to self only at this time. BIPAP placed on patient. Patient appears agitated and restless, angry, defiant. Breathing appears more labored, wheezing. Patient attempting to pull BIPAP off. Combative. Sitter brought to bedside. Patient verbally yelling out, attempting to pull BIPAP off. CPI techniques utilized to maintain patient and staff safety. 1530: Dr. Josseline Smith remains at bedside, patient continues with agitation, continues to try and take BIPAP off, continues with combative behavior. Order received for precedex. Patient upgraded to ICU. 
 
1602: Patient remains combative and agitated. 1mg versed given per Dr. Josseline Smith. MD, RT, this RN remain at bedside. 1611: Precedex gtt started. Titrating for RASS 0. Rass currently +4.  
 
1640: Patient moved to bed 8 in ICU for closer observation. Patient appears calm at this time. 1700: Attempting to place rosenbaum. Patient becoming agitated, kicking, hitting. Dr. Josseline Smith notified. Dr. Josseline Smith assessing at bedside. Precedex infusing at 1.4mcg/kg/min. Order received for 1mg versed. 1713: 1mg versed given. 1730: Patient appears calm at this time, resting comfortably in bed, eyes closed. HR 77, / 
, RR 27, Sp02 100%. Remains on BIPAP at this time. No distress noted at this time. Will continue to monitor. 1820: lactic drawn and sent 1850: VBG drawn and sent 
 
1900: Bedside shift change report given to NasreenRN (oncoming nurse) by Gavino Daniels RN (offgoing nurse). Report included the following information SBAR, MAR, Accordion, Recent Results, Med Rec Status, Cardiac Rhythm A fib and Alarm Parameters .

## 2018-10-26 NOTE — PROGRESS NOTES
Physical Therapy 1540 Chart reviewed, spoke with RN Pt with labored breathing currently on BiPAP, confused, agitated and not appropriate to participate with PT at this time. PT will continue to follow. Yuval Hemphill

## 2018-10-26 NOTE — PROGRESS NOTES
Problem: Afib Pathway: Day 1 Goal: *Hemodynamically stable Outcome: Progressing Towards Goal 
BP stable, remains in Afib Goal: *Stable cardiac rhythm Outcome: Progressing Towards Goal 
Remains in Afib Goal: *Lungs clear or at baseline Outcome: Not Progressing Towards Goal 
Patient wears 3 liters O2 at baseline, desats when ambulating Goal: *Labs within defined limits Outcome: Progressing Towards Goal 
Potassium 4.0

## 2018-10-26 NOTE — PROGRESS NOTES
Called to reassess the patient because she was noted to be wheezing and refusing neb treatments. Breathing was comfortable and non-labored at that time. Ripping neb treatments off of her face because she was anxious, agreed to a trial of ativan to assist with breathing treatments but continued to refuse breathing treatments. Ultimately her breathing became more labored and placed on BiPAP, agitated and confused. Too combative to obtain an abg. If needed, will place on precedex to be able to provide NIV. Would avoid haldol given QTc prolongation.

## 2018-10-26 NOTE — PROGRESS NOTES
[facility] [address] Office [phone] Fax [phone] Assessment and Plan Tony Prado is a 66 y.o. female with a PMH of Atrial Fibrillation, CHF, COPD, mechanical valve replacement (s/p AVR and MVR) on Coumadin, Dyslipidemia, Migraine who is admitted for chest pain and SOB 
 
COPD Exacerbation: Patient is on 3 L O2 continuously at home. She also uses Albuterol PRN, Prednisone 10 mg, Nebs & Trelegy (recently switched from Symbicort by pcp). S/p Solumedrol 125 mg in ED.  
- Solumedrol 80mg Q8hr - Duo-Nebs Q4hr - Budesonide Nebs BID 
- CXR today - Pulmonology consulted, appreciate recs - NC o2 goal 88-92% Leukocytosis: It appears that pt has BL elevation in her WBC ~ 16. Elevated to 44.7 today. On high dose steroids however elevation seems inappropriate for corresponding increase. - Monitor with daily CBC 
- CXR today - Doxycycline 100mg BID A Fib with RVR s/p MVR on Coumadin: patient intermittentl in a fib with -110s. On and off cardizem drip no without IV access 
- diltiazem 60mg QID 
- Resume Bumex 1mg daily Urinary Tract Infection: UA POA:Trubid appearance, Nitrites pos, LE small, 2+ Bact. Pt was started on Rocephin in the ED but experienced an allergic rxn. Initial UCx <100,000 GNR 
- Repeat UA with Culture At Risk JAZZMINE: Improving. Creatinine increase from . 98 --> 1.44 --> 1.02 today 
- Caution with nephrotoxic drugs - Will restart bumex today with low threshold to hold back if Cr inappropriately elevated Hyperglycemia: Developed high glucose after receiving solumedrol, increase from 118 --> 271  
- Started ACHS glu check with sliding scale lispro - Hypoglycemia protocol placed Anemia: Hgb drop from 9.6 POA to 9.1, (recent OP hgb reads- 11.8, 11.4), denies any blood in stool. Haptoglobin very low and Ferritin high. Remainder of Fe studies WNL. Suggestive of hemolysis may be 2/2 MV. 
- Daily CBC to monitor Dysphagia: States she has had dysphagia for one month now. She is supposed to see Dr. Leona Antoine for an endoscopy but has not been able to follow up - GI evaluation during this admission once patient stable from respiratory and cardiac standpoints 
  
Atrial Fibrillation s/p MVR on Coumadin (INR POA 2.5) Pt has been seen OP by Dr Rohit Coats but has not followed up with him since 2016. EKG POA: Afib with RVR . Coumadin home dose is 3 mg 5 days a week & 2 mg on Thurs & Fri. Pt has only taken 2 doses of Diltiazem today, provided pt with a dose in the ED.  
- Home Warfarin continued- to be dosed by pharmacy for a INR goal of 2.5-3.5 
- Home Diltiazem 60 mg four times daily - Cardiology consulted- appreciate recs  
- Daily BMP, Mg, Phos with repletions prn Hypokalemia: K on POA: 2.0 RESOLVED Per chart review Hyperkalemia is a recurrent problem for pt presumably from Bumex & Albuterol use- she takes klor-con 10 mEq M, W and F, she will likely need dose readjustment on discharge. Pt was repleted in the ED with 40 mEq K Klor Con + potassium chloride 10 mEq IV X4 = total 80 mEq 
- monitor electrolytes q4hr until stable x 2 then space to daily BMP 
- repletions as needed Hypomagnesemia: Mg 1.7 this morning but patient without access. Will replete once access is obtained. - daily BMP and replete as necessary Chronic Back Pain - Home Percocet 5-325 mg resumed- q6h PRN  
  
Hyperlipidemia (: TC: 196, T, HDL 88, VLDL 30 & LDL 78) - Home Lovastatin 10 mg  
 
FEN/GI - Regular diet. Activity - Ambulate as tolerated DVT prophylaxis - coumadin GI prophylaxis - Not indicated at this time Fall prophylaxis - Not indicated at this time. Disposition - Admit to Telemetry. Plan to d/c to Home. Code Status - Full, discussed with patient / caregivers. I appreciate the opportunity to participate in the care of this patient, 
Renee Castillo MD 
Family Medicine Resident Subjective / Objective Subjective Pt feeling ok this morning but not like her usual self. She states that breathing is at baseline and understands spO2 should be 88-92% because of her COPD and agreed to titrate O2 to 3L which is her O2 requirement at home. Denies CP, SOB, Abd pain, N/V/D. States her legs look more swollen than yesterday and that she usually takes fluid pill Temp (24hrs), Av.1 °F (36.7 °C), Min:97.6 °F (36.4 °C), Max:98.5 °F (36.9 °C) Objective: 
Vital signs: (most recent): Blood pressure 132/59, pulse 99, temperature 98.2 °F (36.8 °C), resp. rate 26, height 5' 4\" (1.626 m), weight 133 lb 6.1 oz (60.5 kg), SpO2 100 %. Respiratory: O2 Flow Rate (L/min): 3 l/min O2 Device: Nasal cannula Visit Vitals /77 Pulse (!) 112 Temp 97.6 °F (36.4 °C) Resp (!) 38 Ht 5' 4\" (1.626 m) Wt 133 lb 6.1 oz (60.5 kg) SpO2 100% BMI 22.89 kg/m² General: Alert. Cooperative. On NC 3L oxygen   
Head: Normocephalic. Atraumatic. Throat: Mucosa pink. Moist mucous membranes. White plaque on tongue   
Neck: Supple. Normal ROM. No stiffness.   
Respiratory: Improved air movement but still decreased in the bases, crackles at bases bilaterally. No wheezes on auscultation. Cardiovascular: Irregularly Irregular. No m/r/g. Pulses 2+ throughout. GI: + bowel sounds. Nontender. No rebound tenderness or guarding. Nondistended Musculoskeletal: Full ROM in all extremities. Distal pulses intact. 1+ LE edema Skin: Warm, dry. No rashes. Neuro: CN II-XII grossly intact. CBC: 
Recent Labs 10/26/18 
0559 10/25/18 
1739 10/25/18 
0740 WBC 44.7* 31.0* 23.4* HGB 8.3* 8.0* 8.7* HCT 26.1* 25.8* 27.8*  248 269 Metabolic Panel: 
Recent Labs 10/26/18 
0950 10/26/18 
0559 10/25/18 
1850  10/25/18 
0353 10/24/18 
1730 10/24/18 
1700  134* 131*   < > 131*  --  140  
K 3.8 3.8 4.0   < > 5.3*  --  2.0*  
CL 97 97 94*   < > 94*  --  97  
CO2 33* 29 26   < > 30  --  39*  
 BUN 16 16 18   < > 19  --  15  
CREA 0.92 1.02 1.33*   < > 1.44*  --  0.96  
* 147* 279*   < > 271*  --  118* CA 8.0* 8.1* 7.6*   < > 7.6*  --  7.3*  
MG  --  1.7  --   --  2.1  --  1.4* PHOS  --  3.3  --   --  3.1  --   --   
ALB  --   --   --   --   --   --  2.7* SGOT  --   --   --   --   --   --  74* ALT  --   --   --   --   --   --  19 INR  --  3.2*  --   --  2.2* 2.5*  --   
 < > = values in this interval not displayed. For Billing Chief Complaint Patient presents with  Chest Pain Hospital Problems  Never Reviewed Codes Class Noted POA  
 COPD exacerbation ([facility] ICD-10-CM: J44.1 ICD-9-CM: 491.21  10/26/2018 Unknown Hypokalemia ICD-10-CM: E87.6 ICD-9-CM: 276.8  10/26/2018 Unknown JAZZMINE (acute kidney injury) ([facility] ICD-10-CM: N17.9 ICD-9-CM: 584.9  10/26/2018 Unknown Hyperglycemia ICD-10-CM: R73.9 ICD-9-CM: 790.29  10/26/2018 Unknown A-fib [facility]) ICD-10-CM: I48.91 
ICD-9-CM: 427.31  10/26/2018 Unknown Chronic anticoagulation ICD-10-CM: Z79.01 
ICD-9-CM: V58.61  10/26/2018 Unknown Hyperlipidemia ICD-10-CM: E78.5 ICD-9-CM: 272.4  10/26/2018 Unknown Chronic back pain ICD-10-CM: M54.9, G89.29 ICD-9-CM: 724.5, 338.29  10/26/2018 Unknown Chest pain ICD-10-CM: R07.9 ICD-9-CM: 786.50  10/24/2018 Unknown

## 2018-10-26 NOTE — PROGRESS NOTES
Win Luong martha Austin 81 [address] [employer] YOB: 1955 Assessment & Plan: Hyperkalemia · Due to overshoot repletion of hypokalemia, ARF · Resolved ARF · Due to pre-renal azotemia related to tachycardia and hypotension · Improved Hyponatremia, better COPD AFib Subjective:  
CC: f/u hyperkalemia HPI: Potassium and renal function better. ROS: +sob +nausea Current Facility-Administered Medications Medication Dose Route Frequency  magnesium sulfate 2 g/50 ml IVPB (premix or compounded)  2 g IntraVENous ONCE  
 methylPREDNISolone (PF) (SOLU-MEDROL) injection 60 mg  60 mg IntraVENous Q8H  
 meropenem (MERREM) 500 mg in 0.9% sodium chloride (MBP/ADV) 50 mL  0.5 g IntraVENous Q6H  
 hydrOXYzine HCl (ATARAX) tablet 25 mg  25 mg Oral ONCE  
 glucose chewable tablet 16 g  4 Tab Oral PRN  
 dextrose (D50W) injection syrg 12.5-25 g  25-50 mL IntraVENous PRN  
 glucagon (GLUCAGEN) injection 1 mg  1 mg IntraMUSCular PRN  
 insulin lispro (HUMALOG) injection   SubCUTAneous AC&HS  dilTIAZem (CARDIZEM) IR tablet 60 mg  60 mg Oral QID  dilTIAZem (CARDIZEM) 125 mg in dextrose 5% 125 mL infusion  0-15 mg/hr IntraVENous TITRATE  pantoprazole (PROTONIX) tablet 40 mg  40 mg Oral DAILY  insulin NPH (NOVOLIN N, HUMULIN N) injection 8 Units  8 Units SubCUTAneous Q12H  
 sodium chloride (NS) flush 5-10 mL  5-10 mL IntraVENous PRN  
 albuterol-ipratropium (DUO-NEB) 2.5 MG-0.5 MG/3 ML  3 mL Nebulization Q4H RT  
 sodium chloride (NS) flush 5-10 mL  5-10 mL IntraVENous Q8H  
 docusate sodium (COLACE) capsule 100 mg  100 mg Oral QHS  lovastatin (MEVACOR) tablet 10 mg  10 mg Oral QHS  oxyCODONE-acetaminophen (PERCOCET) 5-325 mg per tablet 1 Tab  1 Tab Oral Q4H PRN  
 Warfarin - Pharmacy to dose   Other Rx Dosing/Monitoring  budesonide (PULMICORT) 500 mcg/2 ml nebulizer suspension  500 mcg Nebulization BID RT  
  
 
 
 Objective:  
 
Vitals: 
Blood pressure 154/90, pulse (!) 124, temperature 97.8 °F (36.6 °C), resp. rate 29, height 5' 4\" (1.626 m), weight 60.5 kg (133 lb 6.1 oz), SpO2 98 %. Temp (24hrs), Av.2 °F (36.8 °C), Min:97.8 °F (36.6 °C), Max:98.5 °F (36.9 °C) Intake and Output: 
10/26 0701 - 10/26 1900 In: 240 [P.O.:240] Out: -  
10/24 1901 - 10/26 0700 In: 8719 [I.V.:1348] Out: - Physical Exam:              
GENERAL ASSESSMENT: NAD 
CHEST: on oxygen, diminished BS HEART: tachy, irreg ABDOMEN: Soft,NT 
EXTREMITY: no EDEMA 
 
 
   
ECG/rhythm: 
 
Data Review No results for input(s): TNIPOC in the last 72 hours. No lab exists for component: ITNL Recent Labs 10/25/18 
0740 10/25/18 
3771 10/25/18 
0104 TROIQ <0.05 <0.05 <0.05 Recent Labs 10/26/18 
0559 10/25/18 
1850 10/25/18 
1739 10/25/18 
1317  10/25/18 
0740 10/25/18 
0353 10/24/18 
1700 * 131*  --  129*   < > 128* 131* 140  
K 3.8 4.0  --  5.0   < > 6.7* 5.3* 2.0*  
CL 97 94*  --  93*   < > 95* 94* 97  
CO2 29 26  --  25   < > 25 30 39* BUN 16 18  --  19   < > 20 19 15 CREA 1.02 1.33*  --  1.32*   < > 1.43* 1.44* 0.96  
* 279*  --  279*   < > 254* 271* 118* PHOS 3.3  --   --   --   --   --  3.1  --   
MG 1.7  --   --   --   --   --  2.1 1.4*  
CA 8.1* 7.6*  --  7.8*   < > 7.5* 7.6* 7.3* ALB  --   --   --   --   --   --   --  2.7* WBC 44.7*  --  31.0*  --   --  23.4* 23.6* 18.3* HGB 8.3*  --  8.0*  --   --  8.7* 9.1* 9.6* HCT 26.1*  --  25.8*  --   --  27.8* 28.5* 29.6*   --  248  --   --  269 264 296  
 < > = values in this interval not displayed. Recent Labs 10/26/18 
0559 10/25/18 
0353 10/24/18 
1730 INR 3.2* 2.2* 2.5* PTP 30.9* 21.3* 24.0* APTT  --   --  32.9* Needs: urine analysis, urine sodium, protein and creatinine Lab Results Component Value Date/Time  Sodium,urine random 8 10/26/2018 02:47 AM  
 Creatinine, urine 60.34 10/26/2018 02:47 AM  
 
 
 
 Quentin Mejía MD 
10/26/2018 [facility]: 
[email or web address] [facility] office: 
[address] [place] [address], [address] Phone: [phone] Fax :     [phone] [place] office: 
[address] [facility], [address] Phone - [phone] Fax - [phone]

## 2018-10-26 NOTE — CDMP QUERY
2. Please clarify if this patient is (was) being treated/managed for:  
 
=> Acute on Chronic Hypoxic Respiratory failure related to COPD exacerbation, anxiety; On home O2 at 3 L; now on Bipap, ativan given 
=> Other explanation of clinical findings  
=> Clinically Undetermined (no explanation for clinical findings) The medical record reflects the following clinical findings, treatment, and risk factors. Risk Factors:  COPD, O2 dependent Clinical Indicators:  COPD; on home O2 3 L; 10/26 Progress note: wheezing, ripping off treatments, anxious, ativan given, continued to refuse treatments. Ultimately her breathing became more labored and placed on BiPAP, agitated and confused Treatment: Home O2 3l, bipap, ativan, duonebs q6h, solumedrol Please clarify and document your clinical opinion in the progress notes and discharge summary including the definitive and/or presumptive diagnosis, (suspected or probable), related to the above clinical findings. Please include clinical findings supporting your diagnosis. Thank you, 
Ifrah Paul, MSN, [address] [facility], [address] [facility] [place] [facility]

## 2018-10-26 NOTE — CDMP QUERY
1. There is noted documentation of: \" CAD/HFpEF with Angina (POA): resolved. Troponin negative x4. No EKG changes. Mildly elevated BNP likely 2/2 Afib w/ RVR. \" Could you further specify the acuity of HF for this patient? 
 
=> Chronic HFpEF 
=> Acute/chronic HFpEF 
=> Other explanation of clinical findings  
=> Clinically Undetermined (no explanation for clinical findings) The medical record reflects the following clinical findings, treatment, and risk factors. Risk Factors:  HFpEF Clinical Indicators:  10/26 Progress note: Mildly elevated BNP likely 2/2 Afib w/ RVR. EKG without evidence of ischemia. 10/26 CXR: Unchanged blunting of the left costophrenic angle, which may represent a small left pleural effusion or chronic pleural thickening. Treatment: Bumex X1 today and daily, check O2 sats, I/O Please clarify and document your clinical opinion in the progress notes and discharge summary including the definitive and/or presumptive diagnosis, (suspected or probable), related to the above clinical findings. Please include clinical findings supporting your diagnosis. Thank you, 
Conor Johnston, MSN, [facility], [address] [facility] [place]

## 2018-10-26 NOTE — PROGRESS NOTES
MIDLINE PLACEMENT NOTE Midline catheters are NOT central lines. Approved midline products are peripheral infusion devices with the tip terminating in the arm at or below the axillary vein, distal to the shoulder. The tip DOES NOT ENTER THE CENTRAL VASCULATURE. A Midline is for reliable short term (less than 30 days) vascular access. PRE-PROCEDURE VERIFICATION Correct Procedure: yes Correct Site:  yes Temperature: Temp: 97.8 °F (36.6 °C), Temperature Source: Temp Source: Oral 
Recent Labs 10/26/18 
0559 BUN 16  
CREA 1.02  
 INR 3.2* WBC 44.7* Allergies: Spiriva respimat [tiotropium bromide]; Tomato; Celebrex [celecoxib]; and Rocephin [ceftriaxone] Education materials for Midline Care given: yes. See Patient Education activity for further details. Midline Booklet placed at bedside: yes Midline Catheter Maintenance: For complete information reference Policy # [id number] A. Dressing Changes 1. Assess the dressing in the first 24 hours for accumulation of blood, fluid or moisture beneath the dressing. During dressing changes, assess the external length of the catheter to determine if migration of the catheter has occurred. Periodically confirm catheter placement, tip location, patency and security of dressing. Dressing changes should be done every 7 days, and PRN using sterile technique if integrity of dressing is compromised. Apply new dressing per hospital policy. Caution: Do not use scissors to remove dressing to minimize the risk of cutting the  Catheter. B. Flushing 1. Flush each lumen of the catheter with 10 mL of sterile saline every 12 hours or after each use. In addition, lock each lumen of the catheter with sterile saline. Note: Flush with 20 mL of sterile saline after blood therapy Caution: DO NOT USE A SYRINGE SMALLER THAN 10 mL TO FLUSH AND CONFIRM PATENCY.   Patency should be assessed with a 10 mL or larger syringe and sterile saline. Upon confirmation of patency, administration of medication should be  given in a syringe appropriately sized for the dose. Do not infuse against resistance. C. Blood Draws: 1. Stop infusion, draw off and waste 10 ml of blood. Draw sample with 10 mL syringe or          greater. DO NOT USE VACUTAINER . Transfer with appropriate device to lab tubes. Flush        with 20 ml NS. 
D. Occluded or Partially Occluded Catheter 1. Catheters that present resistance to flushing and aspiration may be partially or completely  occluded. Do not flush against resistance. PROCEDURE DETAIL: 
 
Verbal consent was obtained and all questions were answered related to risks and benefits. A Midline was inserted as a sterile procedure using ultrasound and Modified Seldinger Technique for vascular access. The following documentation is in addition to the Midline properties in the lines/airways Doc Flow Sheet: Lot #: [id number] Lidocaine 1% administered intradermally :yes Internal Catheter Total Length: 9 (cm)   External catheter length: 0 (cm) Vein Selection for Midline:right basilic Sterile CVC Insertion Bundle was used to place line yes Complication Related to Insertion:no Prior to use, line patency MUST be verified by flushing at least a 10 mL syringe. Line should flush easily without resistance, and withdrawal of brisk blood return to verify patency. Line is okay to use: yes        (Remove Midline by:    11/25/18          ) Jennifer Ramsay RN

## 2018-10-27 NOTE — PROGRESS NOTES
[address] Medicine Residency Resident Note in Brief 
---------------------------------------- 
 
S: 
Notified of LA 3.5, increased from 3.1 earlier today. Patient received sedated, on precedex, on BiPap. Sitter at bedside. Reports she has been sedated, has not been responding to questions or aggressive/combative. O: 
Visit Vitals BP 91/56 Pulse 72 Temp 97.5 °F (36.4 °C) Resp 22 Ht 5' 4\" (1.626 m) Wt 133 lb 6.1 oz (60.5 kg) SpO2 100% BMI 22.89 kg/m² Gen: Sedated, on BiPAP Resp: Inspiratory crackles present at bilateral lung bases. Diminished breath sounds throughout, with moderate air movement. No wheezing noted. Cardio: irregularly irregular Abdomen: nondistended, nontender to palpation Extremities: 1+ edema in pretibial region only, bilaterally A/P 
67 yo F admitted for COPD exacerbation, currently on BiPap, sedated w precedex 
- Continue BiPap - Wean precedex as tolerated 
- 250cc bolus NS at this time for LA 3.5, repeat in 4 hours. Monitor volume status closely, was diuresed this morning with bumex. Please see full daily progress note for complete plan. Daphne Forrester MD 
7:40 PM 
 
----------------------------------------------------------- Checked in on patient, physical exam unchanged. Resting on L side, sitter present in room. BiPap in place.   
 
Daphne Forrester MD  
3:45 AM

## 2018-10-27 NOTE — PROGRESS NOTES
Spiritual Care Assessment/Progress Note [facility] 
 
 
NAME: Jack Kam      MRN: [id number] AGE: 66 y.o. SEX: female Adventist Affiliation: No preference Language: English  
 
10/27/2018     Total Time (in minutes): 14 Spiritual Assessment begun in [facility] 3 INTENSIVE CARE through conversation with: 
  
    [x]Patient        [x] Family    [] Friend(s) Reason for Consult: Initial/Spiritual assessment, critical care Spiritual beliefs: (Please include comment if needed) [x] Identifies with a henok tradition:  Quaker    
   [] Supported by a henok community:        
   [] Claims no spiritual orientation:       
   [] Seeking spiritual identity:            
   [] Adheres to an individual form of spirituality:       
   [] Not able to assess:                   
 
    
Identified resources for coping:  
   [] Prayer                           
   [] Music                  [] Guided Imagery 
   [] Family/friends                 [] Pet visits [] Devotional reading                         [] Unknown 
   [] Other:                                        
 
 
Interventions offered during this visit: (See comments for more details) Patient Interventions: Affirmation of henok, Affirmation of emotions/emotional suffering, Iconic (affirming the presence of God/Higher Power), Initial/Spiritual assessment, patient floor, Prayer (assurance of) Family/Friend(s): Affirmation of emotions/emotional suffering, Iconic (affirming the presence of God/Higher Power) Plan of Care: 
 
 [] Support spiritual and/or cultural needs  
 [] Support AMD and/or advance care planning process    
 [] Support grieving process 
 [] Coordinate Rites and/or Rituals  
 [] Coordination with community clergy [] No spiritual needs identified at this time 
 [] Detailed Plan of Care below (See Comments)  [] Make referral to Music Therapy 
[] Make referral to Pet Therapy [] Make referral to Addiction services 
[] Make referral to [facility] 
[] Make referral to Spiritual Care Partner 
[] No future visits requested       
[x] Follow up visits as needed Comments: Initial spiritual assessment in ICU. Miss Johns's  and daughter had just arrived. She shared she is Sabianism. She shared she enjoys sharing with people who also believe differently. Provided spiritual presence and assurance of prayer so family would be able to share together. Advised of  Availability Visited by: Jose Olivera [address] [place] [address] [phone] Provided spiritual presence and prayer. Advised of  Availability.

## 2018-10-27 NOTE — PROGRESS NOTES
Problem: Falls - Risk of 
Goal: *Absence of Falls Document Dannial Shadow Fall Risk and appropriate interventions in the flowsheet. Outcome: Progressing Towards Goal 
Fall Risk Interventions: 
Mobility Interventions: Communicate number of staff needed for ambulation/transfer Mentation Interventions: Adequate sleep, hydration, pain control, Family/sitter at bedside, More frequent rounding, Reorient patient, Room close to nurse's station, Toileting rounds, Update white board Medication Interventions: Evaluate medications/consider consulting pharmacy Elimination Interventions: Call light in reach, Toileting schedule/hourly rounds History of Falls Interventions: Room close to nurse's station Problem: Pressure Injury - Risk of 
Goal: *Prevention of pressure injury Document Jose Scale and appropriate interventions in the flowsheet. Outcome: Progressing Towards Goal 
Pressure Injury Interventions: 
Sensory Interventions: Assess changes in LOC, Check visual cues for pain, Keep linens dry and wrinkle-free, Minimize linen layers, Monitor skin under medical devices Activity Interventions: Pressure redistribution bed/mattress(bed type) Mobility Interventions: HOB 30 degrees or less, Pressure redistribution bed/mattress (bed type) Nutrition Interventions: Document food/fluid/supplement intake Friction and Shear Interventions: Apply protective barrier, creams and emollients, Foam dressings/transparent film/skin sealants, HOB 30 degrees or less, Lift sheet, Minimize layers

## 2018-10-27 NOTE — PROGRESS NOTES
1930 Received report. 2000 Patient resting , precedex drip at 1 mcg. , sitter in room. , bipap on. 
2200 No changes. 0003 Lactic acid drawn. 0200 Patient resting. 0448 Labs drawn. 0500 Patient asked for water to drink , drank water . 0630 Patient resting. 
0705 Bedside shift change report given to FAUSTINA Brink . Report included the following information SBAR, Kardex, ED Summary, Intake/Output, MAR, Accordion, Recent Results, Med Rec Status and Cardiac Rhythm at [facility].

## 2018-10-27 NOTE — PROGRESS NOTES
Anastacio Vazquez [address] Office [phone] Fax [phone] Assessment and Plan Amol Urban is a 66 y.o. female with a PMH of Atrial Fibrillation, CHF, COPD, mechanical valve replacement (s/p AVR and MVR) on Coumadin, Dyslipidemia, Migraine who is admitted for chest pain and SOB Acute on Chronic Respiratory Failure due to COPB Exacerbation: Patient on 3L N/C at home. Refusing Nebulizer treatments yesterday with worsening respiratory function necessitating BiPAP 
- Solumedrol 60mg Q8hr - Duo-Nebs Q6 hr 
- Budesonide Nebs 5oomcg  BID 
- Budesonide Nebs BID 
- Pulmonology consulted, appreciate recs Leukocytosis: It appears that pt has BL elevation in her WBC ~ 16. Elevated to 44.7 yesterday but back to 19.8 today. On high dose steroids and close to baseline. Likely in response to declining respiratory status yesterday. CXR yesterday did not show any new PNA nor worsening airspace disease. 
- Monitor with daily CBC 
- Doxycycline 100mg BID A Fib with RVR s/p MVR on Coumadin: patient intermittentl in a fib with -110s. On and off cardizem drip no without IV access 
- diltiazem 60mg QID 
- Resume Bumex 1mg daily Urinary Tract Infection: UA POA:Trubid appearance, Nitrites pos, LE small, 2+ Bact. Pt was started on Rocephin in the ED but experienced an allergic rxn. Initial UCx <100,000 GNR 
- f/u UCx 
- Zosyn 3.375g q8hr At Risk JAZZMINE: Improving. Creatinine increase from . 98 --> 1.44 --> 1.02-->1.19 today. Appropriate rise since resuming bumex - Caution with nephrotoxic drugs - Resumed Bumex 1mg daily Hyperglycemia: Developed high glucose after receiving solumedrol, increase from 118 --> 271  
- Started ACHS glu check with sliding scale lispro - Hypoglycemia protocol placed Anemia: HgB 7.2 today. Hgb drop from 9.6 POA to 9.1, (recent OP hgb reads- 11.8, 11.4), denies any blood in stool.  Haptoglobin very low and Ferritin high. Remainder of Fe studies WNL. Suggestive of hemolysis may be 2/2 MV.  
- FOBT 
- transfuse 1uPRBCs with 2 hr post transfusion H&H 
- Daily CBC to monitor 
- transfuse for HgB <7 Dysphagia: States she has had dysphagia for one month now. She is supposed to see Dr. Jono Cummings for an endoscopy but has not been able to follow up - GI evaluation during this admission once patient stable from respiratory and cardiac standpoints 
  
Atrial Fibrillation s/p MVR on Coumadin (INR POA 2.5) Pt has been seen OP by Dr Teddy Cohn but has not followed up with him since 2016. EKG POA: Afib with RVR . Coumadin home dose is 3 mg 5 days a week & 2 mg on Thurs & Fri. Pt has only taken 2 doses of Diltiazem today, provided pt with a dose in the ED.  
- Home Warfarin continued- to be dosed by pharmacy for a INR goal of 2.5-3.5 
- Home Diltiazem 60 mg four times daily - Cardiology consulted- appreciate recs  
- Daily BMP, Mg, Phos with repletions prn Hypokalemia: K on POA: 2.0 RESOLVED Per chart review Hyperkalemia is a recurrent problem for pt presumably from Bumex & Albuterol use- she takes klor-con 10 mEq M, W and F, she will likely need dose readjustment on discharge. Pt was repleted in the ED with 40 mEq K Klor Con + potassium chloride 10 mEq IV X4 = total 80 mEq - BMP daily 
- repletions as needed Hypomagnesemia: Mg 1.7 this morning but patient without access. Will replete once access is obtained. - daily BMP and replete as necessary Chronic Back Pain - Home Percocet 5-325 mg resumed- q6h PRN  
  
Hyperlipidemia (: TC: 196, T, HDL 88, VLDL 30 & LDL 78) - Home Lovastatin 10 mg  
 
FEN/GI - Regular diet. Activity - Ambulate as tolerated DVT prophylaxis - coumadin GI prophylaxis - Not indicated at this time Fall prophylaxis - Not indicated at this time. Disposition - Admit to Telemetry. Plan to d/c to Home. Code Status - Full, discussed with patient / caregivers. I appreciate the opportunity to participate in the care of this patient, 
Latonya Wilson MD 
Family Medicine Resident Subjective / Objective Subjective Pt feeling much better this morning after night on BiPAP and resuming breathing treatment.s Temp (24hrs), Av.9 °F (36.6 °C), Min:96.8 °F (36 °C), Max:98.6 °F (37 °C) Objective: 
Vital signs: (most recent): Blood pressure 111/60, pulse 76, temperature 97.4 °F (36.3 °C), resp. rate 21, height 5' 4\" (1.626 m), weight 133 lb 6.1 oz (60.5 kg), SpO2 99 %. Respiratory: O2 Flow Rate (L/min): 3 l/min O2 Device: Nasal cannula Visit Vitals BP 96/49 Pulse 80 Temp 98.5 °F (36.9 °C) Resp (!) 31 Ht 5' 4\" (1.626 m) Wt 133 lb 6.1 oz (60.5 kg) SpO2 100% BMI 22.89 kg/m² General: Alert. Cooperative. BiPAP in place Head: Normocephalic. Atraumatic. Throat: Mucosa pink. Moist mucous membranes. White plaque on tongue   
Neck: Supple. Normal ROM. No stiffness.   
Respiratory: Improved air movement . Scattered wheezes Cardiovascular: Regular Rhythm at 80bpm. No m/r/g. Pulses 2+ throughout. GI: + bowel sounds. Nontender. No rebound tenderness or guarding. Nondistended Musculoskeletal: Full ROM in all extremities. Distal pulses intact. 1+ LE edema Skin: Warm, dry. No rashes. Neuro: CN II-XII grossly intact. CBC: 
Recent Labs 10/27/18 
1256 10/27/18 
0448 10/26/18 
0559 10/25/18 
1739 WBC  --  19.8* 44.7* 31.0* HGB 7.0* 7.2* 8.3* 8.0*  
HCT 22.1* 22.1* 26.1* 25.8* PLT  --  184 273 248 Metabolic Panel: 
Recent Labs 10/27/18 
0448 10/26/18 
2137 10/26/18 
0559  10/25/18 
0353  10/24/18 
1700  137 134*   < > 131*  --  140  
K 3.9 3.8 3.8   < > 5.3*  --  2.0*  
 97 97   < > 94*  --  97  
CO2 34* 33* 29   < > 30  --  39* BUN 18 16 16   < > 19  --  15  
CREA 1.19* 0.92 1.02   < > 1.44*  --  0.96  
* 109* 147*   < > 271*  --  118* CA 7.5* 8.0* 8.1*   < > 7.6*  --  7.3*  
 MG 2.0  --  1.7  --  2.1  --  1.4* PHOS 3.7  --  3.3  --  3.1  --   --   
ALB  --   --   --   --   --   --  2.7* SGOT  --   --   --   --   --   --  74* ALT  --   --   --   --   --   --  19 INR 2.3*  --  3.2*  --  2.2*   < >  --   
 < > = values in this interval not displayed. For Billing Chief Complaint Patient presents with  Chest Pain Hospital Problems  Never Reviewed Codes Class Noted POA  
 COPD exacerbation ([facility] ICD-10-CM: J44.1 ICD-9-CM: 491.21  10/26/2018 Unknown Hypokalemia ICD-10-CM: E87.6 ICD-9-CM: 276.8  10/26/2018 Unknown JAZZMINE (acute kidney injury) ([facility] ICD-10-CM: N17.9 ICD-9-CM: 584.9  10/26/2018 Unknown Hyperglycemia ICD-10-CM: R73.9 ICD-9-CM: 790.29  10/26/2018 Unknown A-fib [facility]) ICD-10-CM: I48.91 
ICD-9-CM: 427.31  10/26/2018 Unknown Chronic anticoagulation ICD-10-CM: Z79.01 
ICD-9-CM: V58.61  10/26/2018 Unknown Hyperlipidemia ICD-10-CM: E78.5 ICD-9-CM: 272.4  10/26/2018 Unknown Chronic back pain ICD-10-CM: M54.9, G89.29 ICD-9-CM: 724.5, 338.29  10/26/2018 Unknown Chest pain ICD-10-CM: R07.9 ICD-9-CM: 786.50  10/24/2018 Unknown

## 2018-10-27 NOTE — PROGRESS NOTES
[facility] DAILY PROGRESS NOTE Name: Libia Wright : 1955 MRN: [id number] Date: 10/27/2018 7:26 AM  
I have reviewed the flowsheet and previous days notes. The patient was placed on precedex yesterday due to agitation and resp distress. Jenet Lanius Better this morning. Comfortable on BiPAP 10/5 this morning. Alert and cooperative. Precedex at 1. Vital Signs:   
Visit Vitals /57 Pulse 74 Temp 98.1 °F (36.7 °C) Resp 25 Ht 5' 4\" (1.626 m) Wt 60.5 kg (133 lb 6.1 oz) SpO2 100% BMI 22.89 kg/m² O2 Device: BIPAP  
O2 Flow Rate (L/min): 3 l/min Temp (24hrs), Av.5 °F (36.4 °C), Min:96.8 °F (36 °C), Max:98.1 °F (36.7 °C) Intake/Output:  
Last shift:      No intake/output data recorded. Last 3 shifts: 10/25 190 - 10/27 0700 In: 1269.3 [P.O.:480; I.V.:789.3] Out: 515 [HDMWK:962] Intake/Output Summary (Last 24 hours) at 10/27/2018 6253 Last data filed at 10/27/2018 0700 Gross per 24 hour Intake 1269.29 ml Output 515 ml Net 754.29 ml Physical Exam: 
General:  Alert, cooperative, no distress, a bit frail Head:  atraumatic. Eyes:  EOMs intact. Throat: BiPAP mask on Neck: nontender Lungs:   diminished but clear with good air movement Chest wall:  No tenderness or deformity. Heart:  irregular Abdomen:   Soft, non-tender. Extremities: No edema. Skin: Warm and dry Neurologic: Follows commands and answers questions DATA:  
Current Facility-Administered Medications Medication Dose Route Frequency  methylPREDNISolone (PF) (SOLU-MEDROL) injection 60 mg  60 mg IntraVENous Q8H  piperacillin-tazobactam (ZOSYN) 3.375 g in 0.9% sodium chloride (MBP/ADV) 100 mL  3.375 g IntraVENous Q8H  
 albuterol (PROVENTIL VENTOLIN) nebulizer solution 2.5 mg  2.5 mg Nebulization Q4H PRN  
 albuterol-ipratropium (DUO-NEB) 2.5 MG-0.5 MG/3 ML  3 mL Nebulization Q6HWA RT  
  dexmedeTOMidine (PRECEDEX) 400 mcg in 0.9% sodium chloride 100 mL infusion  0.2-1.4 mcg/kg/hr IntraVENous TITRATE  glucose chewable tablet 16 g  4 Tab Oral PRN  
 dextrose (D50W) injection syrg 12.5-25 g  25-50 mL IntraVENous PRN  
 glucagon (GLUCAGEN) injection 1 mg  1 mg IntraMUSCular PRN  
 insulin lispro (HUMALOG) injection   SubCUTAneous AC&HS  dilTIAZem (CARDIZEM) IR tablet 60 mg  60 mg Oral QID  dilTIAZem (CARDIZEM) 125 mg in dextrose 5% 125 mL infusion  0-15 mg/hr IntraVENous TITRATE  pantoprazole (PROTONIX) tablet 40 mg  40 mg Oral DAILY  insulin NPH (NOVOLIN N, HUMULIN N) injection 8 Units  8 Units SubCUTAneous Q12H  
 sodium chloride (NS) flush 5-10 mL  5-10 mL IntraVENous PRN  
 sodium chloride (NS) flush 5-10 mL  5-10 mL IntraVENous Q8H  
 docusate sodium (COLACE) capsule 100 mg  100 mg Oral QHS  lovastatin (MEVACOR) tablet 10 mg  10 mg Oral QHS  oxyCODONE-acetaminophen (PERCOCET) 5-325 mg per tablet 1 Tab  1 Tab Oral Q4H PRN  
 Warfarin - Pharmacy to dose   Other Rx Dosing/Monitoring  budesonide (PULMICORT) 500 mcg/2 ml nebulizer suspension  500 mcg Nebulization BID RT Telemetry:  Melissa Labs: 
Recent Labs 10/27/18 
0448 10/26/18 
0559 10/25/18 
1739 WBC 19.8* 44.7* 31.0* HGB 7.2* 8.3* 8.0*  
HCT 22.1* 26.1* 25.8*  
 273 248 Recent Labs 10/27/18 
0448 10/26/18 
6008 10/26/18 
0559  10/25/18 
0353  10/24/18 
1700  137 134*   < > 131*  --  140  
K 3.9 3.8 3.8   < > 5.3*  --  2.0*  
 97 97   < > 94*  --  97  
CO2 34* 33* 29   < > 30  --  39* * 109* 147*   < > 271*  --  118* BUN 18 16 16   < > 19  --  15  
CREA 1.19* 0.92 1.02   < > 1.44*  --  0.96  
CA 7.5* 8.0* 8.1*   < > 7.6*  --  7.3*  
MG 2.0  --  1.7  --  2.1  --  1.4* PHOS 3.7  --  3.3  --  3.1  --   --   
ALB  --   --   --   --   --   --  2.7* SGOT  --   --   --   --   --   --  74* ALT  --   --   --   --   --   --  19  
 INR 2.3*  --  3.2*  --  2.2*   < >  --   
 < > = values in this interval not displayed. Recent Labs 10/26/18 
1845 10/24/18 
1723 PH  --  7.54* PCO2  --  47* PO2  --  78* HCO3  --  40* FIO2 40  --   
 
 
Imaging:  I have personally reviewed the patients radiographs and reports. 10/26: looks like small left effusion Urine Cx: e.coli IMPRESSION:  
· COPD exacerbation: no wheeze currently · Severe COPD at baseline · UTI · Delirium · Dysphagia · Hyperkalemia: resolved · Atrial fibrillation · JAZZMINE: Cr up again today · CAD/CHF: echo ef 45-50% · S/p MVR and AVR; hx of rheumatic disease · Tobacco use PLAN:  
· Try off BiPAP and resume prn · Continue current IV steroids and nebs · Wean precedex as tolerated · Continue abx and likely narrow tomorrow · No diuretics today and follow cr  
GLOBAL ISSUES:  
· Head of bed elevated · DVT Prophylaxis: on coumadin The pt is critically ill. See my orders for details My assessment/plan was discussed with: nurse,  Monique Albrecht MD

## 2018-10-27 NOTE — PROGRESS NOTES
BEDSIDE REPORT - ASSUME CARE 
 
0800: Bedside shift change report received by Magdaleno Bateman RN. Report given with SBAR, Kardex, Intake/Output and Recent Results. Opportunity for questions and clarification was provided. Assumed care of patient. Safety instructions given and acknowledged by pt.  
9830: Precedex d/c'd. BIPAP off and NC applied 3 L. 
1000: Lab to dept 1100: Stool Specimen to lab. 12N: 1 Unit of PRBC started. 1700: Specimen to lab. BEDSIDE REPORT TO ONCOMING RN 
 
1900: Bedside shift change report given to RN (oncoming nurse) by Magdaleno Bateman RN (offgoing nurse). Report given with SBAR, Kardex, Intake/Output and Recent Results. Opportunity for questions and clarification was provided.

## 2018-10-27 NOTE — PROGRESS NOTES
Stable from renal stand point Dr Jalen Uribe will see her again Monday Sergey Singh MD 
[facility] Office :[phone] Fax: [phone]

## 2018-10-28 PROBLEM — D64.9 ANEMIA: Status: ACTIVE | Noted: 2018-01-01

## 2018-10-28 PROBLEM — K92.2 GI BLEED: Status: ACTIVE | Noted: 2018-01-01

## 2018-10-28 NOTE — PROGRESS NOTES
[facility] [address] Office [phone] Fax [phone] Assessment and Plan Amol Urban is a 66 y.o. female admitted for chest pain and SOB. She has spent 4 night(s) in the hospital. 
 
24 Hour Events: Transfused 1 unit pRBCs yesterday for HGB 7.0. Acute on Chronic Respiratory Failure due to COPD Exacerbation: Patient on 3L N/C at home. Refusing Nebulizer treatments on 10/26 with worsening respiratory function necessitating BiPAP but was weaned to 2-3L NC on 10/27.  
-Transfer to remote tele - Wean IV Solumedrol 60mg Q8hr to 40mg Q8hr (day 5) - Duo-Nebs Q6 hr 
- Budesonide Nebs 500 mcg  BID 
- Budesonide Nebs BID 
- Pulmonology consulted, appreciate recs 
  
Leukocytosis: It appears that pt has BL elevation in her WBC ~ 16. Elevated to 44.7 on 10/26 and now elevated to 20.6. On high dose steroids and close to baseline. Likely stress response to acute worsening in resp status. CXR on 10/26 without new PNA or worsening airspace disease.  
- Monitor with daily CBC 
- Discontinued Doxycycline 100mg BID on 10/26  
-Start Levaquin 750mg daily 
-F/u blood and resp culture 
  
Atrial Fibrillation s/p MVR on Coumadin (INR POA 2.5) Pt has been seen OP by Dr Gage Prieto but has not followed up with him since 2016. EKG POA: Afib with RVR . Coumadin home dose is 3 mg 5 days a week & 2 mg on Thurs & Fri.   
- Hep bridge to Warfarin  
- Home Diltiazem 60 mg four times daily - Cardiology consulted- appreciate recs  
- Daily BMP, Mg, Phos with repletions prn 
  
CAD/HFpEF with angina: Resolved. Troponin neg x4. EKG without evidence of ischemia.  in setting of Afib. Given Bumex 1mg once on 10/26. 
-Continue to monitor and diurese as needed 
  
Urinary Tract Infection: UA POA:Trubid appearance, Nitrites pos, LE small, 2+ Bact. Pt was started on Rocephin in the ED but experienced an allergic rxn. Initial UCx <100,000 GNR 
- f/u UCx - Switched Zosyn 3.375g q8hr (day 3) to Levaquin 750mg daily (day 1) 
  At Risk JAZZMINE: Improving. Creatinine increase from . 98 --> 1.44 --> 1.02-->1.19--> 1.14 on 10/28. Appropriate rise since resuming bumex - Caution with nephrotoxic drugs - Resumed Bumex 1mg daily -Nephrology following  
  
Hyperglycemia: Developed high glucose after receiving solumedrol, increase from 118 --> 271  
- Started ACHS glu check with sliding scale lispro  
-Increased NPH 8 units BID to 10 units BID 
- Hypoglycemia protocol placed  
  
Lower GI bleed with anemia: S/p 1 unit pRBCs on 10/27 for Hgb 7.0. Hgb stable today at 8.2. Hgb 9.6 POA, (recent OP hgb reads- 11.8, 11.4), denies any blood in stool. Haptoglobin very low and Ferritin high. Remainder of Fe studies WNL. Suggestive of hemolysis may be 2/2 MV. FOBT positive with dark stool on 10/28. No blood on external rectal exam. 
- Daily CBC to monitor -IV Protonix 40mg BID 
- transfuse for HgB <8 
  
Dysphagia: States she has had dysphagia for one month now. She is supposed to see Dr. Jono Cummings for an endoscopy but has not been able to follow up - GI evaluation during this admission once patient stable from respiratory and cardiac standpoints 
  
Hypokalemia: K on POA: 2.0 RESOLVED Per chart review Hyperkalemia is a recurrent problem for pt presumably from Bumex & Albuterol use- she takes klor-con 10 mEq M, W and F, she will likely need dose readjustment on discharge. Pt was repleted in the ED with 40 mEq K Klor Con + potassium chloride 10 mEq IV X4 = total 80 mEq - BMP daily 
- repletions as needed -Nephrology following (appreciate recs) 
  
Hypomagnesemia: Mg 1.8 and repleted. - daily BMP and replete as necessary  
  
Chronic Back Pain  
- Home Percocet 5-325 mg resumed- q6h PRN  
  
Hyperlipidemia (: TC: 196, T, HDL 88, VLDL 30 & LDL 78) - Home Lovastatin 10 mg  
  
FEN/GI - Regular diet. Activity - Ambulate as tolerated DVT prophylaxis - Hep bridge to Warfarin GI prophylaxis - Protonix Fall prophylaxis - Not indicated at this time. Disposition - Admit to Telemetry. Plan to d/c to Home with home health per PT/OT. Code Status - DNR, discussed with patient / caregivers. Patient will be discussed with Dr. Fransico Painter. I appreciate the opportunity to participate in the care of this patient, Kavya Martins MD 
Family Medicine Resident Subjective / Objective Subjective Transfused 1 unit pRBCs on 10/27. Had one dark, soft stool this morning. Denies lightheadedness,chest pain, n/v, fevers, or chills. Temp (24hrs), Av.4 °F (36.9 °C), Min:98 °F (36.7 °C), Max:98.8 °F (37.1 °C) Objective Respiratory: O2 Flow Rate (L/min): 2 l/min O2 Device: Nasal cannula Visit Vitals /62 (BP 1 Location: Left arm, BP Patient Position: Sitting) Pulse 99 Temp 98.1 °F (36.7 °C) Resp 22 Ht 5' 4\" (1.626 m) Wt 125 lb 0 oz (56.7 kg) SpO2 94% BMI 21.46 kg/m² General: No acute distress. Alert. Cooperative. Head: Normocephalic. Atraumatic. Eyes:              Conjunctiva pink. Sclera white. PERRL. Ears:              Ear canals patent. TM non-erythematous. Nose:             Septum midline. Mucosa pink. No drainage. Throat: Mucosa pink. Moist mucous membranes. No tonsillar exudates or erythema. Palate movement equal bilaterally. Neck: Supple. Normal ROM. No stiffness. Respiratory: Poor air movement throughout. No w/r/r/c.  
Cardiovascular: RRR. Normal S1,S2. No m/r/g. Pulses 2+ throughout. GI: 
 
Rectal: + bowel sounds. Nontender. No rebound tenderness or guarding. Nondistended. No active bleeding or dried blood on external rectum. Extremities: No LE edema. Distal pulses intact. Musculoskeletal: Full ROM in all extremities. Skin: Warm, dry. No rashes. Neuro: CN II-XII grossly intact. Strength 5/5 in all extremities. I/O: 
Date 10/27/18 07 - 10/28/18 0198 10/28/18 0700 - 10/29/18 2178 Shift 0670-2887 9502-9282 24 Hour Total 5123-4038 6317-0867 24 Hour Total  
INTAKE  
I.V.(mL/kg/hr) 15.1(0) 300(0.4) 315.1(0.2) Precedex Volume 15.1  15.1 Volume (piperacillin-tazobactam (ZOSYN) 3.375 g in 0.9% sodium chloride (MBP/ADV) 100 mL)  300 300 Blood 312.5  312.5 Volume (TRANSFUSE PACKED RBC'S) 312.5  312.5 Shift Total(mL/kg) 327. 6(5.4) 300(5.3) 627. 6(11.1) OUTPUT Urine(mL/kg/hr) 100(0.1) 600(0.9) 700(0.5) Urine Occurrence(s) 2 x 1 x 3 x Urine Output (mL) ([REMOVED] Urinary Catheter 10/26/18 Delgado) 100  100 Urine Output (mL) (External Female Catheter 10/27/18)  600 600 Stool 1  1 Stool Occurrence(s)  1 x 1 x Stool 1  1 Shift Total(mL/kg) 101(1.7) 600(10.6) 701(12.4) .6 -300 -73.4 Weight (kg) 60.5 56.7 56.7 56.7 56.7 56.7 CBC: 
Recent Labs 10/28/18 
1025 10/28/18 
4773 10/27/18 
1630  10/27/18 
0448 10/26/18 
0559 WBC  --  20.6*  --   --  19.8* 44.7* HGB 8.1* 8.2* 8.2*   < > 7.2* 8.3* HCT 25.5* 25.5* 25.5*   < > 22.1* 26.1*  
PLT  --  192  --   --  184 273  
 < > = values in this interval not displayed. Metabolic Panel: 
Recent Labs 10/28/18 
8291 10/27/18 
0448 10/26/18 
3452 10/26/18 
0559  138 137 134* K 3.2* 3.9 3.8 3.8 CL 99 100 97 97 CO2 31 34* 33* 29 BUN 22* 18 16 16 CREA 1.14* 1.19* 0.92 1.02  
* 149* 109* 147* CA 7.9* 7.5* 8.0* 8.1*  
MG 1.8 2.0  --  1.7 PHOS 3.1 3.7  --  3.3 INR 1.7* 2.3*  --  3.2* For Billing Chief Complaint Patient presents with  Chest Pain Hospital Problems  Date Reviewed: 10/27/2018 Codes Class Noted POA Anemia ICD-10-CM: D64.9 ICD-9-CM: 285.9  10/28/2018 Unknown GI bleed ICD-10-CM: K92.2 ICD-9-CM: 578.9  10/28/2018 Unknown * (Principal) COPD exacerbation ([facility] ICD-10-CM: J44.1 ICD-9-CM: 491.21  10/26/2018 Unknown Hypokalemia ICD-10-CM: E87.6 ICD-9-CM: 276.8  10/26/2018 Unknown JAZZMINE (acute kidney injury) ([facility] [address] ICD-10-CM: N17.9 ICD-9-CM: 584.9  10/26/2018 Unknown Hyperglycemia ICD-10-CM: R73.9 ICD-9-CM: 790.29  10/26/2018 Unknown A-fib [facility]) ICD-10-CM: I48.91 
ICD-9-CM: 427.31  10/26/2018 Unknown Chronic anticoagulation ICD-10-CM: Z79.01 
ICD-9-CM: V58.61  10/26/2018 Unknown Hyperlipidemia ICD-10-CM: E78.5 ICD-9-CM: 272.4  10/26/2018 Unknown Chronic back pain ICD-10-CM: M54.9, G89.29 ICD-9-CM: 724.5, 338.29  10/26/2018 Unknown Chest pain ICD-10-CM: R07.9 ICD-9-CM: 786.50  10/24/2018 Unknown

## 2018-10-28 NOTE — PROGRESS NOTES
[address] [facility], [address] with [address] [facility] Resident Progress Note in Brief S: Patient seen at bedside this evening. Patient is alert and oriented, receiving a breathing treatment and has no concerns. She denies any shortness of breath, chest pain, abdominal pain, fevers, chills, weakness or numbness. O: 
Visit Vitals /60 (BP 1 Location: Right arm, BP Patient Position: At rest) Pulse 96 Temp 98.3 °F (36.8 °C) Resp 21 Ht 5' 4\" (1.626 m) Wt 133 lb 6.1 oz (60.5 kg) SpO2 100% BMI 22.89 kg/m² Physical Examination:  
General appearance - alert, well appearing, and in no distress Chest - symmetric air entry, mild expiratory wheezes bilaterally Heart - irrergularly irregular. no murmurs, rubs, clicks or gallops, Abdomen - soft, nontender, nondistended, no masses or organomegaly Neurological - alert and oriented x3, no evidence of neurologic deficit, sensation in tact Extremities - peripheral pulses normal, no pedal edema, no clubbing or cyanosis A/P:  
Isabel Reese is a 66 y.o. with a PMH of Atrial Fibrillation, CHF, COPD, mechanical valve replacement (s/p AVR and MVR) on Coumadin, Dyslipidemia, Migraine who is admitted for chest pain and SOB Please see daily progress note for detailed plan. Edilberto Orlando MD 
Family Medicine Resident

## 2018-10-28 NOTE — PROGRESS NOTES
Gastrointestinal Progress Note 
 
10/28/2018 Admit Date: 10/24/2018 Subjective:  Feeling better New Complaints Today:  No: breathing easier Pain: Patient complains of abdominal pain no. Bowel Movements: Normal 
 
Bleeding:  None Current Facility-Administered Medications Medication Dose Route Frequency  methylPREDNISolone (PF) (SOLU-MEDROL) injection 40 mg  40 mg IntraVENous Q8H  
 insulin NPH (NOVOLIN N, HUMULIN N) injection 10 Units  10 Units SubCUTAneous Q12H  potassium chloride SR (KLOR-CON 10) tablet 40 mEq  40 mEq Oral BID WITH MEALS  
 levoFLOXacin (LEVAQUIN) tablet 750 mg  750 mg Oral Q48H  
 0.9% sodium chloride infusion 250 mL  250 mL IntraVENous PRN  pantoprazole (PROTONIX) 40 mg in sodium chloride 0.9% 10 mL injection  40 mg IntraVENous Q12H  
 albuterol (PROVENTIL VENTOLIN) nebulizer solution 2.5 mg  2.5 mg Nebulization Q4H PRN  
 albuterol-ipratropium (DUO-NEB) 2.5 MG-0.5 MG/3 ML  3 mL Nebulization Q6HWA RT  
 glucose chewable tablet 16 g  4 Tab Oral PRN  
 dextrose (D50W) injection syrg 12.5-25 g  25-50 mL IntraVENous PRN  
 glucagon (GLUCAGEN) injection 1 mg  1 mg IntraMUSCular PRN  
 insulin lispro (HUMALOG) injection   SubCUTAneous AC&HS  dilTIAZem (CARDIZEM) IR tablet 60 mg  60 mg Oral QID  dilTIAZem (CARDIZEM) 125 mg in dextrose 5% 125 mL infusion  0-15 mg/hr IntraVENous TITRATE  sodium chloride (NS) flush 5-10 mL  5-10 mL IntraVENous PRN  
 sodium chloride (NS) flush 5-10 mL  5-10 mL IntraVENous Q8H  
 docusate sodium (COLACE) capsule 100 mg  100 mg Oral QHS  lovastatin (MEVACOR) tablet 10 mg  10 mg Oral QHS  oxyCODONE-acetaminophen (PERCOCET) 5-325 mg per tablet 1 Tab  1 Tab Oral Q4H PRN  
 budesonide (PULMICORT) 500 mcg/2 ml nebulizer suspension  500 mcg Nebulization BID RT  
  
 
Objective:  
 
Blood pressure 109/62, pulse 92, temperature 98.1 °F (36.7 °C), resp.  rate 22, height 5' 4\" (1.626 m), weight 56.7 kg (125 lb 0 oz), SpO2 95 %. No intake/output data recorded. 10/26 1901 - 10/28 0700 In: 1483.7 [P.O.:240; I.V.:931.2] Out: 1216 [LWPIZ:7156] EXAM:  GENERAL: alert, cooperative, no distress, Normal posterior pharynx; no neck masses palpable HEART: irregular rate and rhythm, LUNGS: mild rales heard both lower lobes, ABDOMEN:  Bowel sounds are normal, liver is not enlarged, spleen is not enlarged Data Review Recent Results (from the past 24 hour(s)) HGB & HCT Collection Time: 10/27/18  4:30 PM  
Result Value Ref Range HGB 8.2 (L) 11.5 - 16.0 g/dL HCT 25.5 (L) 35.0 - 47.0 % GLUCOSE, POC Collection Time: 10/27/18  5:17 PM  
Result Value Ref Range Glucose (POC) 234 (H) 65 - 100 mg/dL Performed by Donny Favre CULTURE, RESPIRATORY/SPUTUM/BRONCH W GRAM STAIN Collection Time: 10/27/18  5:25 PM  
Result Value Ref Range Special Requests: NO SPECIAL REQUESTS    
 GRAM STAIN OCCASIONAL WBCS SEEN    
 GRAM STAIN OCCASIONAL EPITHELIAL CELLS SEEN    
 GRAM STAIN FEW BUDDING YEAST Culture result: PENDING   
GLUCOSE, POC Collection Time: 10/27/18  9:43 PM  
Result Value Ref Range Glucose (POC) 319 (H) 65 - 100 mg/dL Performed by Anice Enoch METABOLIC PANEL, BASIC Collection Time: 10/28/18  5:17 AM  
Result Value Ref Range Sodium 137 136 - 145 mmol/L Potassium 3.2 (L) 3.5 - 5.1 mmol/L Chloride 99 97 - 108 mmol/L  
 CO2 31 21 - 32 mmol/L Anion gap 7 5 - 15 mmol/L Glucose 188 (H) 65 - 100 mg/dL BUN 22 (H) 6 - 20 MG/DL Creatinine 1.14 (H) 0.55 - 1.02 MG/DL  
 BUN/Creatinine ratio 19 12 - 20 GFR est AA 59 (L) >60 ml/min/1.73m2 GFR est non-AA 48 (L) >60 ml/min/1.73m2 Calcium 7.9 (L) 8.5 - 10.1 MG/DL  
CBC W/O DIFF Collection Time: 10/28/18  5:17 AM  
Result Value Ref Range WBC 20.6 (H) 3.6 - 11.0 K/uL  
 RBC 2.71 (L) 3.80 - 5.20 M/uL HGB 8.2 (L) 11.5 - 16.0 g/dL HCT 25.5 (L) 35.0 - 47.0 % MCV 94.1 80.0 - 99.0 FL  
 MCH 30.3 26.0 - 34.0 PG  
 MCHC 32.2 30.0 - 36.5 g/dL  
 RDW 17.9 (H) 11.5 - 14.5 % PLATELET 566 882 - 941 K/uL MPV 12.8 8.9 - 12.9 FL  
 NRBC 0.0 0  WBC ABSOLUTE NRBC 0.00 0.00 - 0.01 K/uL PHOSPHORUS Collection Time: 10/28/18  5:17 AM  
Result Value Ref Range Phosphorus 3.1 2.6 - 4.7 MG/DL MAGNESIUM Collection Time: 10/28/18  5:17 AM  
Result Value Ref Range Magnesium 1.8 1.6 - 2.4 mg/dL PROTHROMBIN TIME + INR Collection Time: 10/28/18  5:17 AM  
Result Value Ref Range INR 1.7 (H) 0.9 - 1.1 Prothrombin time 16.7 (H) 9.0 - 11.1 sec HGB & HCT Collection Time: 10/28/18 10:25 AM  
Result Value Ref Range HGB 8.1 (L) 11.5 - 16.0 g/dL HCT 25.5 (L) 35.0 - 47.0 % Assessment:  
 
Principal Problem: COPD exacerbation ([facility] [address] (10/26/2018) Active Problems: 
  Chest pain (10/24/2018) Hypokalemia (10/26/2018) JAZZMINE (acute kidney injury) ([facility] (10/26/2018) Hyperglycemia (10/26/2018) A-fib ([facility] (10/26/2018) Chronic anticoagulation (10/26/2018) Hyperlipidemia (10/26/2018) Chronic back pain (10/26/2018) Anemia (10/28/2018) GI bleed (10/28/2018) Plan:  
 
1. With a metallic prosthetic heart valve embolic problems can occur even with few hours of restoring coagulation to normal parameters. She has not had any large volume GI bleed, so recommend restoration coagulation to usual prosthetic heart valve therapeutic levels. 2.  As she is still moving little air with respiratory efforts, will not schedule any endoscopic exam; will leave to Dr Arpita Hammonds according to hi clinical judgement

## 2018-10-28 NOTE — PROGRESS NOTES
[facility] Dosing Services: 10/28/18 Consult for Warfarin Dosing by Pharmacy by Dr. Elbert Adair Consult provided for this 66 y.o.  female , for indication of Atrial Fibrillation and mitral/aortic  valves. Day of Therapy: resuming from home (PTA regimen 2mg on Thurs/Fri and 3mg rest of week), has been on hold since 10/25/18 due to lower GI bleed with anemia, cleared by GI to restart anticoagulation. Dose to achieve an INR goal of 2.5- 3.5 Order entered for  Warfarin  3 (mg) ordered to be given today at 18:00. Significant drug interactions: heparin bridge, levaquin D1 Previous dose given 3mg on 10/24/18 PT/INR Lab Results Component Value Date/Time INR 1.7 (H) 10/28/2018 05:17 AM  
  
Platelets Lab Results Component Value Date/Time PLATELET 023 42/39/6921 05:17 AM  
  
H/H Lab Results Component Value Date/Time HGB 8.1 (L) 10/28/2018 10:25 AM  
  
 
Pharmacy to follow daily and will provide subsequent Warfarin dosing based on clinical status. Elmer Abreu)  Contact information [phone]

## 2018-10-28 NOTE — PROGRESS NOTES
[facility] DAILY PROGRESS NOTE Name: Jonathan Rashid : 1955 MRN: [id number] Date: 10/28/2018 7:39 AM  
I have reviewed the flowsheet and previous days notes. The patient reports feeling well this morning. No specific complaints. Off precedex and did not require bipap last night. Got a unit of blood and seen by GI to eval for GI bleeding. Work up on hold given other medical issues. Vital Signs:   
Visit Vitals BP (!) 121/100 Pulse 81 Temp 98.3 °F (36.8 °C) Resp 20 Ht 5' 4\" (1.626 m) Wt 56.7 kg (125 lb 0 oz) SpO2 100% BMI 21.46 kg/m² O2 Device: Nasal cannula O2 Flow Rate (L/min): 2 l/min Temp (24hrs), Av.6 °F (37 °C), Min:98 °F (36.7 °C), Max:98.8 °F (37.1 °C) Intake/Output:  
Last shift:      No intake/output data recorded. Last 3 shifts: 10/26 1901 - 10/28 0700 In: 1483.7 [P.O.:240; I.V.:931.2] Out: 1216 [id number] Intake/Output Summary (Last 24 hours) at 10/28/2018 9366 Last data filed at 10/28/2018 0500 Gross per 24 hour Intake 612.5 ml Output 701 ml Net -88.5 ml Physical Exam: 
General:  Alert, cooperative, no distress, pleasant Head:  Normocephalic Eyes:  EOMs intact. Nose: Nares normal.  
Nasal O2 Throat: Lips normal.  
Neck: nontender Lungs:   Diminished but clear Chest wall:  No tenderness or deformity. Heart:  irregular Abdomen:   Soft, non-tender. Extremities: No edema. Skin: dry Neurologic: Grossly nonfocal  
 
 
DATA:  
Current Facility-Administered Medications Medication Dose Route Frequency  0.9% sodium chloride infusion 250 mL  250 mL IntraVENous PRN  pantoprazole (PROTONIX) 40 mg in sodium chloride 0.9% 10 mL injection  40 mg IntraVENous Q12H  
 methylPREDNISolone (PF) (SOLU-MEDROL) injection 60 mg  60 mg IntraVENous Q8H  piperacillin-tazobactam (ZOSYN) 3.375 g in 0.9% sodium chloride (MBP/ADV) 100 mL  3.375 g IntraVENous Q8H  
  albuterol (PROVENTIL VENTOLIN) nebulizer solution 2.5 mg  2.5 mg Nebulization Q4H PRN  
 albuterol-ipratropium (DUO-NEB) 2.5 MG-0.5 MG/3 ML  3 mL Nebulization Q6HWA RT  
 glucose chewable tablet 16 g  4 Tab Oral PRN  
 dextrose (D50W) injection syrg 12.5-25 g  25-50 mL IntraVENous PRN  
 glucagon (GLUCAGEN) injection 1 mg  1 mg IntraMUSCular PRN  
 insulin lispro (HUMALOG) injection   SubCUTAneous AC&HS  dilTIAZem (CARDIZEM) IR tablet 60 mg  60 mg Oral QID  dilTIAZem (CARDIZEM) 125 mg in dextrose 5% 125 mL infusion  0-15 mg/hr IntraVENous TITRATE  insulin NPH (NOVOLIN N, HUMULIN N) injection 8 Units  8 Units SubCUTAneous Q12H  
 sodium chloride (NS) flush 5-10 mL  5-10 mL IntraVENous PRN  
 sodium chloride (NS) flush 5-10 mL  5-10 mL IntraVENous Q8H  
 docusate sodium (COLACE) capsule 100 mg  100 mg Oral QHS  lovastatin (MEVACOR) tablet 10 mg  10 mg Oral QHS  oxyCODONE-acetaminophen (PERCOCET) 5-325 mg per tablet 1 Tab  1 Tab Oral Q4H PRN  
 budesonide (PULMICORT) 500 mcg/2 ml nebulizer suspension  500 mcg Nebulization BID RT Telemetry: afib Labs: 
Recent Labs 10/28/18 
2942 10/27/18 
1630 10/27/18 
9160 10/27/18 
0448 10/26/18 
0559 WBC 20.6*  --   --  19.8* 44.7* HGB 8.2* 8.2* 7.0* 7.2* 8.3* HCT 25.5* 25.5* 22.1* 22.1* 26.1*  
  --   --  184 273 Recent Labs 10/28/18 
6812 10/27/18 
0448 10/26/18 
7299 10/26/18 
0559  138 137 134* K 3.2* 3.9 3.8 3.8 CL 99 100 97 97 CO2 31 34* 33* 29 * 149* 109* 147* BUN 22* 18 16 16 CREA 1.14* 1.19* 0.92 1.02  
CA 7.9* 7.5* 8.0* 8.1*  
MG 1.8 2.0  --  1.7 PHOS 3.1 3.7  --  3.3 INR 1.7* 2.3*  --  3.2* Recent Labs 10/26/18 
1845 FIO2 40 Imaging:  I have personally reviewed the patients radiographs and reports. No new xrays IMPRESSION:  
· COPD exacerbation: no wheeze currently · Severe COPD at baseline · UTI · Delirium: improved · Anemia · Atrial fibrillation · JAZZMINE · CAD/CHF: echo ef 45-50% · S/p MVR and AVR; hx of rheumatic disease · Tobacco use PLAN:  
· Wean steroids today · Discontinue BiPAP · Wean O2 for sat >90% · OK for medical bed from my perspective · Will defer antibiotic management for UTI and potassium repletion to primary team  
  
· My assessment/plan was discussed with: nurse Criss Weldon MD 
 Declined

## 2018-10-28 NOTE — PROGRESS NOTES
BEDSIDE REPORT - ASSUME CARE 
 
0800: Bedside shift change report received by Alfonso Conner RN. Report given with SBAR, Kardex, Intake/Output and Recent Results. Opportunity for questions and clarification was provided. Assumed care of patient. Safety instructions given and acknowledged by pt.  
 
1130 TRANSFER - OUT REPORT: 
 
Verbal report given to RN(name) on Shannan Vazqeuz  being transferred to [facility](unit) for routine progression of care Report consisted of patients Situation, Background, Assessment and  
Recommendations(SBAR). Information from the following report(s) SBAR, Kardex, Intake/Output, Recent Results and Cardiac Rhythm AFib was reviewed with the receiving nurse. Lines:    
 
Opportunity for questions and clarification was provided. Patient transported with: 
 Monitor O2 @ 2 liters Registered Nurse Tech Valuables transferred with patient are clothing. Medications sent with patient are none.

## 2018-10-28 NOTE — PROGRESS NOTES
Levofloxacin 750 mg PO every 24 hours changed to levofloxacin 750 mg PO every 48 hours for CrCl of 45 mL/min (between 20 and 49 mL/min) per [facility] Renal Dosing Policy.  
 
Thank you, 
Jimmie Hernandez, PHARMD

## 2018-10-28 NOTE — PROGRESS NOTES
Primary Nurse Ila Mayers RN and Nelli Thomas RN performed a dual skin assessment on this patient Impairment noted- see wound doc flow sheet Jose score is 17

## 2018-10-28 NOTE — PROGRESS NOTES
Bedside shift change report given to Ning Manriquez (oncoming nurse) by Reyes Staton (offgoing nurse). Report included the following information SBAR, Intake/Output, MAR, Accordion and Recent Results. 0000 No changes, VSS 
 
0400 No Changes, VSS Shift summary: Pt only slept approximately 2 hours, medicated for chronic back pain 3x, VSS, kept BiPap on for 1.5 hours, refused to wear it any longer, was pleasant and cooperative overnight. Bedside shift change report given to (oncoming nurse) by Pam Moss RN (offgoing nurse). Report included the following information SBAR, Intake/Output, MAR, Accordion and Recent Results.

## 2018-10-29 NOTE — DIABETES MGMT
DTC Progress Note Recommendations/ Comments: If appropriate, please consider increasing NPH dose to 15 units every 12 hours. Pt has required 29 units of correction over the past 24 hours. Current hospital DM medication:  
-Humalog normals sensitivity correction 
-NPH 10 units every 12 hours Chart reviewed on Jamie Hollingsworth. Patient is a 66 y.o. female with no reported history of DM. A1c:  
Lab Results Component Value Date/Time Hemoglobin A1c <3.5 (L) 10/25/2018 07:40 AM  
 
 
Recent Glucose Results:  
Lab Results Component Value Date/Time  (H) 10/29/2018 12:11 AM  
  (H) 10/28/2018 04:50 PM  
 GLUCPOC 224 (H) 10/29/2018 04:12 PM  
 GLUCPOC 180 (H) 10/29/2018 11:41 AM  
 GLUCPOC 245 (H) 10/29/2018 06:43 AM  
  
 
Lab Results Component Value Date/Time Creatinine 1.15 (H) 10/29/2018 12:11 AM  
 
Estimated Creatinine Clearance: 44.4 mL/min (A) (based on SCr of 1.15 mg/dL (H)). Active Orders Diet DIET CARDIAC Regular PO intake:  
Patient Vitals for the past 72 hrs: 
 % Diet Eaten 10/29/18 1251 40 % 10/29/18 0816 25 % 10/28/18 1530 50 % Will continue to follow as needed. Thank you Robert Edwards RD, CDE [facility] Office: [phone] Pager: [phone]

## 2018-10-29 NOTE — PROGRESS NOTES
Cardiology Progress Note    5th floor   NAME:  Jack Kam :   1955 MRN:   [id number] Assessment/Plan:  
1. S/P mechanical valve replacements: AVR/MVR: INR goal 2.5 -3.5. Her hgb  Has been 7.2 - 8.3 this admission. She has had one heme + stool. Given her 2 mechanical valves her INR has to remain as above. She should be bridged with heparin till INR at goal . Subtheapeutic INR can result in clot on the valve itself, causing malfunctionThere appears to be no further bleeding from her arms which are now wrapped. 2. Chronic a fib : rate stable. Cont CCB. 3. COPD exac: per pulmonary/medicine team.  
4. Anemia/heme + stool:  
 
 
  
 
 
Subjective: HPI:  Jack Kam is a 66 y.o.   female  with PMH significant for COPD, chronic a fib on warfarin,  CHF, s/p  mechanical valve replacement (s/p AVR and MVR) and dyslipidemia. She presented to the ED with chief complaint of chest pain. A STEMI had originally been activated however after Dr Argelia Kaur evaluated the EKG it was cancelled. Her troponins are negative. Her cp resoved 30 minutes after she arrived and had not returned. It was associated with a \"lump\" in her throat.  She was found to be in a fib RVR with profoundly low K and Mg++. Her K has been repleted and is now 6.7. She was started on IV dilt, now at 2.5 mg/hrly and HR currently in the 90's. She continues to smoker 5 cigarettes per day.  
  
 
Cardiology asked to resee pt today due to heme + stool and forearm bleeding on warfarin/heparin bridge with INR of 1.5. Pt has had intermittent rectal bleeding previous work up in 2016 (Dr. Edvin Ball) with EGD and colon exam.  Apparently well for couple years thereafter; Hb 11.4 earlier this year.   
No immediate plans for GI eval given COPD exacerbation.  
  
 
 
 
 
 
Cardiac ROS: + dyspnea, + cough, Patient denies any exertional chest pain, dyspnea, palpitations, syncope, orthopnea, edema or paroxysmal nocturnal dyspnea. Cardiac Testing/ Procedures:  
ECHO 2004 - normal [facility] AVR/MVR, EF 50%, PA pressures nl ECHO 1/16/15-EF 55 % to 60 %, [facility], AVR/MVR, PA pressures normal 
STRESS: Dobutamine cardiolite 2/5/15 - normal perfusion ECHO 2015: EF 65%, no WMA, LAE, normal [facility] MVR/AVR AoV gradient 25 mmHg mean gradient 13 mmHg. ECHO 11/28/15: EF 65-70%, LAE, mechanical MVR- normal fn,mehanical AOV- normal fn, mild-mod TR 
ECHO 18: LVEF 55% No WMA, RV dilated, LA dilated. [facility] MV nml function, [facility] [place] prosthesis stable, physiologic rerurg. 
  
  
 
 
 
Review of Systems: Feels a lump in R side of  Throat when she swallows. Taking po. Up to chair. Objective:  
 
Visit Vitals /73 (BP 1 Location: Left arm, BP Patient Position: Sitting) Pulse 93 Temp 97.4 °F (36.3 °C) Resp 22 Ht 5' 4\" (1.626 m) Wt 125 lb 0 oz (56.7 kg) SpO2 100% BMI 21.46 kg/m² O2 Flow Rate (L/min): 2 l/min O2 Device: Nasal cannula Temp (24hrs), Av.1 °F (36.7 °C), Min:97.4 °F (36.3 °C), Max:98.5 °F (36.9 °C) 
 
 
10/29 0701 - 10/29 1900 In: 480 [P.O.:480] Out: -  
 
10/27 1901 - 10/29 0700 In: 829.6 [P.O.:480; I.V.:349.6] Out:  [QYVNK:4272] TELE: a fib General: AAOx3 cooperative, no acute distress. HEENT: Atraumatic. Pink and moist.  Anicteric sclerae. Neck : Supple, no thyromegaly. Lungs: Dim with wheezing, pursed lip breathing. Heart: irregular rhythm, no murmur, No carotid bruits. Abdomen: Soft, non-distended, non-tender. + Bowel sounds. Extremities: No edema, Neurologic: Grossly intact. Alert and oriented X 3. No acute neurological distress. Psych: Good insight. Not anxious or agitated. Care Plan discussed with: 
  Comments Patient x Family RN Care Manager Consultant:     
 
 
Data Review: No lab exists for component: ITNL No results for input(s): CPK, CKMB, TROIQ in the last 72 hours. Recent Labs 10/29/18 
0840 10/29/18 
0011 10/28/18 
1650  10/28/18 
0517  10/27/18 
0448 NA  --  136 136  --  137  --  138 K  --  4.2 3.5  --  3.2*  --  3.9 CL  --  99 99  --  99  --  100 CO2  --  28 28  --  31  --  34* BUN  --  21* 23*  --  22*  --  18  
CREA  --  1.15* 1.07*  --  1.14*  --  1.19* GLU  --  233* 189*  --  188*  --  149* PHOS  --  2.4*  --   --  3.1  --  3.7 MG  --  1.9  --   --  1.8  --  2.0 WBC  --  19.1* 22.1*  --  20.6*  --  19.8* HGB 8.2* 7.9* 8.3*   < > 8.2*   < > 7.2* HCT 26.2* 24.8* 25.9*   < > 25.5*   < > 22.1*  
PLT  --  187 198  --  192  --  184  
 < > = values in this interval not displayed. Recent Labs 10/29/18 
0840 10/29/18 
0011 10/28/18 
1650 10/28/18 
0517 10/27/18 
0448 INR  --  1.5*  --  1.7* 2.3* PTP  --  15.0*  --  16.7* 22.8* APTT 26.5 29.3 28.5  --   -- Medications reviewed Current Facility-Administered Medications Medication Dose Route Frequency  [START ON 10/30/2018] predniSONE (DELTASONE) tablet 40 mg  40 mg Oral DAILY WITH BREAKFAST  [START ON 11/2/2018] predniSONE (DELTASONE) tablet 20 mg  20 mg Oral DAILY WITH BREAKFAST  [START ON 11/5/2018] predniSONE (DELTASONE) tablet 10 mg  10 mg Oral DAILY WITH BREAKFAST  amoxicillin-clavulanate (AUGMENTIN) 875-125 mg per tablet 1 Tab  1 Tab Oral Q12H  nystatin (MYCOSTATIN) 100,000 unit/mL oral suspension 500,000 Units  500,000 Units Oral QID  fluticasone-umeclidin-vilanter 100-62.5-25 mcg dsdv 1 Puff  (Patient Supplied)  1 Puff Inhalation DAILY  methylPREDNISolone (PF) (SOLU-MEDROL) injection 40 mg  40 mg IntraVENous Q8H  
 insulin NPH (NOVOLIN N, HUMULIN N) injection 10 Units  10 Units SubCUTAneous Q12H  Warfarin: pharmacy to dose    Other Rx Dosing/Monitoring  pantoprazole (PROTONIX) tablet 40 mg  40 mg Oral Q12H  
 0.9% sodium chloride infusion 250 mL  250 mL IntraVENous PRN  
 albuterol (PROVENTIL VENTOLIN) nebulizer solution 2.5 mg  2.5 mg Nebulization Q4H PRN  
  albuterol-ipratropium (DUO-NEB) 2.5 MG-0.5 MG/3 ML  3 mL Nebulization Q6HWA RT  
 glucose chewable tablet 16 g  4 Tab Oral PRN  
 dextrose (D50W) injection syrg 12.5-25 g  25-50 mL IntraVENous PRN  
 glucagon (GLUCAGEN) injection 1 mg  1 mg IntraMUSCular PRN  
 insulin lispro (HUMALOG) injection   SubCUTAneous AC&HS  dilTIAZem (CARDIZEM) IR tablet 60 mg  60 mg Oral QID  sodium chloride (NS) flush 5-10 mL  5-10 mL IntraVENous PRN  
 sodium chloride (NS) flush 5-10 mL  5-10 mL IntraVENous Q8H  
 docusate sodium (COLACE) capsule 100 mg  100 mg Oral QHS  lovastatin (MEVACOR) tablet 10 mg  10 mg Oral QHS  oxyCODONE-acetaminophen (PERCOCET) 5-325 mg per tablet 1 Tab  1 Tab Oral Q4H PRN Tim Mosley NP

## 2018-10-29 NOTE — PROGRESS NOTES
[address] [facility] Asael Jones  Office [phone] Fax [phone] Assessment and Plan Marisol Guerrier is a 66 y.o. female admitted for chest pain and SOB. She has spent 5 night(s) in the hospital. 
 
24 Hour Events: One dark BM overnight and oozing from arms bilaterally. Heparin drip stopped. Acute on Chronic Respiratory Failure due to COPD Exacerbation: Patient on 3L N/C at home. Refusing Nebulizer treatments on 10/26 with worsening respiratory function necessitating BiPAP but was weaned to 2-3L NC on 10/27.  
-Transfer to remote tele - Wean IV Solumedrol 60mg Q8hr to 40mg Q8hr (day 6) - Duo-Nebs Q6 hr 
- Budesonide Nebs 500 mcg  BID 
- Albuterol nebs q4hr prn - Levaquin q48hr, last dose 10/30 
- Pulmonology consulted, appreciate recs 
  
Leukocytosis: It appears that pt has BL elevation in her WBC ~ 16. Elevated to 44.7 on 10/26 and now elevated to 19.1. On high dose steroids and close to baseline. Likely stress response to acute worsening in resp status. CXR on 10/26 without new PNA or worsening airspace disease. BCx NG x 3 days, Resp Cx few candida. 
- Monitor with daily CBC 
-Start Levaquin 750mg daily 
-F/u blood and resp culture 
  
Atrial Fibrillation s/p MVR on Coumadin (INR POA 2.5) Pt has been seen OP by Dr Ac Gallagher but has not followed up with him since 2017. EKG POA: Afib with RVR . Coumadin home dose is 3 mg 5 days a week & 2 mg on Thurs & Fri.   
- Holding heparin and warfain for concern about GI and skin bleeding - Will determine plan for anticoagulation pending GI evaluation for possible scope - Home Diltiazem 60 mg four times daily - Cardiology consulted, appreciate recs  
- Daily BMP, Mg, Phos with repletions prn 
  
CAD/HFpEF Chronic with angina: Resolved. Troponin neg x4. EKG without evidence of ischemia.  in setting of Afib. Given Bumex 1mg once on 10/26.  
-Continue to monitor and diurese as needed - Transfusion threshold <8.0 
  
 Urinary Tract Infection: UA POA:Trubid appearance, Nitrites pos, LE small, 2+ Bact. Pt was started on Rocephin in the ED but experienced an allergic rxn. Initial UCx <100,000 pan-sensitive e Coli. Rocephin x1 (allergic reaction) --> Levaquin (held for prolonged QTc--> Doxycycline --> Zosyn --> Levaquin - Levaquin 750mg q48hr, last dose 7/30  
  
At Risk JAZZMINE: Improving. Creatinine increase from . 98 --> 1.44 --> 1.02-->1.19--> 1.14 on 10/28. Appropriate rise since resuming bumex - Caution with nephrotoxic drugs - Resumed Bumex 1mg daily -Nephrology following  
  
Hyperglycemia: Developed high glucose after receiving solumedrol, increase from 118 --> 271  
- Started ACHS glu check with sliding scale lispro  
-Increased NPH 8 units BID to 10 units BID 
- Hypoglycemia protocol placed  
  
Lower GI bleed with anemia: S/p 1 unit pRBCs on 10/27 for Hgb 7.0. Hgb stable today at 8.2. Hgb 9.6 POA, (recent OP hgb reads- 11.8, 11.4), denies any blood in stool. Haptoglobin very low and Ferritin high. Remainder of Fe studies WNL. Suggestive of hemolysis may be 2/2 MV. FOBT positive with dark stool on 10/28.  
- Next H&H at 0800 --> will determine whether to transfuse 
- Daily CBC and H&H q8hr - IV Protonix 40mg BID 
- transfuse for HgB <8 
  
Dysphagia: States she has had dysphagia for one month now. She is supposed to see Dr. Margurette Canavan for an endoscopy but has not been able to follow up. Consulted GI - concerns with respiratory status and bleeding risk plus previous scope that did not show bleed. - GI consulted, determine whether to scope today 
  
Hypokalemia: K on POA: 2.0 RESOLVED Per chart review Hyperkalemia is a recurrent problem for pt presumably from Bumex & Albuterol use- she takes klor-con 10 mEq M, W and F, she will likely need dose readjustment on discharge. Pt was repleted in the ED with 40 mEq K Klor Con + potassium chloride 10 mEq IV X4 = total 80 mEq - BMP daily 
- repletions as needed -Nephrology following (appreciate recs) 
  
Hypomagnesemia: Mg 1.8 and repleted. - daily BMP and replete as necessary  
  
Chronic Back Pain  
- Home Percocet 5-325 mg resumed- q6h PRN  
  
Hyperlipidemia (: TC: 196, T, HDL 88, VLDL 30 & LDL 78) - Home Lovastatin 10 mg  
  
FEN/GI - Regular diet. Activity - Ambulate as tolerated DVT prophylaxis - SCDS - need to weight risk benefit to determine [facility] plan GI prophylaxis - Protonix Fall prophylaxis - Not indicated at this time. Disposition - Admit to Telemetry. Plan to d/c to Home with home health per PT/OT. Code Status - DNR, discussed with patient / caregivers. Patient will be discussed with Dr. Julio Stringer, supervising physician. I appreciate the opportunity to participate in the care of this patient, 
Para MD Kirstin 
Family Medicine Resident Subjective / Objective Subjective [place] from arms and one dark stool overnight. Patient would like regular diet because she cannot have grilled cheese on cardiac diet. Otherwise doing well - no CP, SOB, Abd pain, HA. Temp (24hrs), Av.2 °F (36.8 °C), Min:97.8 °F (36.6 °C), Max:98.5 °F (36.9 °C) Objective: 
Vital signs: (most recent): Blood pressure 134/56, pulse 88, temperature 98.3 °F (36.8 °C), resp. rate 22, height 5' 4\" (1.626 m), weight 125 lb 0 oz (56.7 kg), SpO2 100 %. Respiratory: O2 Flow Rate (L/min): 2 l/min O2 Device: Nasal cannula Visit Vitals /56 (BP 1 Location: Left arm, BP Patient Position: At rest) Pulse 88 Temp 98.3 °F (36.8 °C) Resp 22 Ht 5' 4\" (1.626 m) Wt 125 lb 0 oz (56.7 kg) SpO2 100% BMI 21.46 kg/m² General: No acute distress. Alert. Cooperative. Head: Normocephalic. Atraumatic. Eyes:              Conjunctiva pink. Sclera white. PERRL. Ears:              Ear canals patent. TM non-erythematous. Nose:             Septum midline. Mucosa pink. No drainage. Throat: Mucosa pink. Moist mucous membranes. No tonsillar exudates or erythema. Palate movement equal bilaterally. Neck: Supple. Normal ROM. No stiffness. Respiratory: Moderate air movement. Crackles and coarse sounds at bases bilaterally. Cardiovascular: Irregular rhythm at 90bpm.  
GI: 
 
Rectal: + bowel sounds. Nontender. No rebound tenderness or guarding. Nondistended. Deferred. Extremities: Trace edema to mid-shin bilaterally. Distal pulses intact. Musculoskeletal: Full ROM in all extremities. Skin: Warm, dry. No rashes. Neuro: CN II-XII grossly intact. Strength 5/5 in all extremities. I/O: 
Date 10/28/18 0700 - 10/29/18 8110 10/29/18 0700 - 10/30/18 8141 Shift 6596-0701 8335-9334 24 Hour Total 6181-2218 3195-8267 24 Hour Total  
INTAKE  
P.O. 480  480     
  P. O. 480  480     
I. V.(mL/kg/hr)  49.6(0.1) 49.6(0) Heparin Volume  49.6 49.6 Shift Total(mL/kg) 480(8.5) 49.6(0.9) 529. 6(9.3) OUTPUT Urine(mL/kg/hr)  1050(1.5) 1050(0.8) Urine Voided  650 650 Urine Output (mL) (External Female Catheter 10/27/18)  400 400 Stool Stool Occurrence(s)  2 x 2 x Shift Total(mL/kg)  2753(70.5) [id number]  -1000.4 -520.4 Weight (kg) 56.7 56.7 56.7 56.7 56.7 56.7 CBC: 
Recent Labs 10/29/18 
0011 10/28/18 
1650 10/28/18 
1025 10/28/18 
1741 WBC 19.1* 22.1*  --  20.6* HGB 7.9* 8.3* 8.1* 8.2* HCT 24.8* 25.9* 25.5* 25.5*  
 198  --  192 Metabolic Panel: 
Recent Labs 10/29/18 
0011 10/28/18 
1650 10/28/18 
0517 10/27/18 
0448  136 137 138  
K 4.2 3.5 3.2* 3.9 CL 99 99 99 100 CO2 28 28 31 34* BUN 21* 23* 22* 18  
CREA 1.15* 1.07* 1.14* 1.19* * 189* 188* 149* CA 7.9* 7.9* 7.9* 7.5* MG 1.9  --  1.8 2.0 PHOS 2.4*  --  3.1 3.7 INR 1.5*  --  1.7* 2.3* Telemetry: Occasional PVCs overnight. Irregular rhythm at 85bpm this morning on review For Billing Chief Complaint Patient presents with  Chest Pain Hospital Problems  Date Reviewed: 10/27/2018 Codes Class Noted POA Anemia ICD-10-CM: D64.9 ICD-9-CM: 285.9  10/28/2018 Unknown GI bleed ICD-10-CM: K92.2 ICD-9-CM: 578.9  10/28/2018 Unknown * (Principal) COPD exacerbation ([facility] 75.) ICD-10-CM: J44.1 ICD-9-CM: 491.21  10/26/2018 Unknown Hypokalemia ICD-10-CM: E87.6 ICD-9-CM: 276.8  10/26/2018 Unknown JAZZMINE (acute kidney injury) ([facility] 75.) ICD-10-CM: N17.9 ICD-9-CM: 584.9  10/26/2018 Unknown Hyperglycemia ICD-10-CM: R73.9 ICD-9-CM: 790.29  10/26/2018 Unknown A-fib [facility]) ICD-10-CM: I48.91 
ICD-9-CM: 427.31  10/26/2018 Unknown Chronic anticoagulation ICD-10-CM: Z79.01 
ICD-9-CM: V58.61  10/26/2018 Unknown Hyperlipidemia ICD-10-CM: E78.5 ICD-9-CM: 272.4  10/26/2018 Unknown Chronic back pain ICD-10-CM: M54.9, G89.29 ICD-9-CM: 724.5, 338.29  10/26/2018 Unknown Chest pain ICD-10-CM: R07.9 ICD-9-CM: 786.50  10/24/2018 Unknown

## 2018-10-29 NOTE — PROGRESS NOTES
Name: [facility]: Cara Calle  
: 1955 Admit Date: 10/24/2018 Phone:   Room: 502/01 PCP: Juventino Clement DO  MRN: [id number] Date: 10/29/2018  Code: DNR Chart and notes reviewed. Data reviewed. I review the patient's current medications in the medical record at each encounter. I have evaluated and examined the patient. Overnight events Afebrile HR 80s 
BP stable Sats % on 2L WBC 19.1 - better Hgb 8.2 - stable Creat 1.15 - stable INR 1.5 Urine cx with pansensitive ecoli RVP negative Blood cx no growth x 3 days ECHO: EF 45-50% ROS: Feeling much better this morning. Denies SOB. Has occasional nonproductive cough. Denies fever or chills. Denies CP. Has sore throat/painful swallow. Denies abd pain or LE pain/swelling. Past Medical History Diagnosis Date  A-fib ([address]    
 Anticoagulant long-term use    
 CAD (coronary artery disease), native coronary artery    
    mild to moderate by cath  CHF (congestive heart failure) (HCC)    
 Chronic obstructive pulmonary disease (HCC)    
 Dyslipidemia    
 Ill-defined condition    
    migraines  Migraine    
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  
    Tissue MVR  following failed balloon valvuloplasty for MS Redo MVR  due to severe MR, AVR due to AR ([facility])  S/P AVR (aortic valve replacement) 2015  S/P MVR (mitral valve replacement) 2015  
  
  
      
Past Surgical History Procedure Laterality Date  Hx hysterectomy      
    BSO  Hx colectomy      
 Hx mitral valve replacement      
    and redo MVR and AVR  Hx heart catheterization      
    cabg Alcohol history: none Smoking history: 5 cigarettes daily Illicit drug history: none Allergies Allergen Reactions  Spiriva Respimat [Tiotropium Bromide] Shortness of Breath  Tomato Shortness of Breath Only occurs with raw tomatoes, tolerates ketchup without issue  Celebrex [Celecoxib] Rash  Rocephin [Ceftriaxone] Shortness of Breath Current Facility-Administered Medications Medication Dose Route Frequency  methylPREDNISolone (PF) (SOLU-MEDROL) injection 40 mg  40 mg IntraVENous Q8H  
 insulin NPH (NOVOLIN N, HUMULIN N) injection 10 Units  10 Units SubCUTAneous Q12H  levoFLOXacin (LEVAQUIN) tablet 750 mg  750 mg Oral Q48H  Warfarin: pharmacy to dose    Other Rx Dosing/Monitoring  pantoprazole (PROTONIX) tablet 40 mg  40 mg Oral Q12H  
 0.9% sodium chloride infusion 250 mL  250 mL IntraVENous PRN  
 albuterol (PROVENTIL VENTOLIN) nebulizer solution 2.5 mg  2.5 mg Nebulization Q4H PRN  
 albuterol-ipratropium (DUO-NEB) 2.5 MG-0.5 MG/3 ML  3 mL Nebulization Q6HWA RT  
 glucose chewable tablet 16 g  4 Tab Oral PRN  
 dextrose (D50W) injection syrg 12.5-25 g  25-50 mL IntraVENous PRN  
 glucagon (GLUCAGEN) injection 1 mg  1 mg IntraMUSCular PRN  
 insulin lispro (HUMALOG) injection   SubCUTAneous AC&HS  dilTIAZem (CARDIZEM) IR tablet 60 mg  60 mg Oral QID  sodium chloride (NS) flush 5-10 mL  5-10 mL IntraVENous PRN  
 sodium chloride (NS) flush 5-10 mL  5-10 mL IntraVENous Q8H  
 docusate sodium (COLACE) capsule 100 mg  100 mg Oral QHS  lovastatin (MEVACOR) tablet 10 mg  10 mg Oral QHS  oxyCODONE-acetaminophen (PERCOCET) 5-325 mg per tablet 1 Tab  1 Tab Oral Q4H PRN  
 budesonide (PULMICORT) 500 mcg/2 ml nebulizer suspension  500 mcg Nebulization BID RT  
 
 
 
REVIEW OF SYSTEMS  
12 point ROS negative except as stated in the HPI. Physical Exam:  
Visit Vitals /81 (BP 1 Location: Left arm, BP Patient Position: At rest;Supine) Pulse 81 Temp 98.3 °F (36.8 °C) Resp 22 Ht 5' 4\" (1.626 m) Wt 56.7 kg (125 lb 0 oz) SpO2 96% BMI 21.46 kg/m² General:  Alert, cooperative, no distress, appears older than stated age. Head:  Normocephalic, without obvious abnormality, atraumatic. Eyes:  Conjunctivae/corneas clear. Nose: Nares normal. Septum midline. Mucosa normal.   
Throat: Lips, mucosa, and tongue normal. Thrush noted on posterior soft palate and pharynx. Neck: Supple, symmetrical, trachea midline, no adenopathy. Lungs:   Diminished, no wheeze or rales appreciated. Chest wall:  No tenderness or deformity. Heart:  Irregular, no murmur, click, rub or gallop. Abdomen:   Soft, non-tender. Bowel sounds normal. No masses,  No organomegaly. Extremities: Extremities normal, atraumatic, no cyanosis or edema. Pulses: 2+ and symmetric all extremities. Skin: Skin color, texture, turgor normal. No rashes or lesions Lymph nodes: Cervical, supraclavicular nodes normal.  
Neurologic: Grossly nonfocal  
 
 
Lab Results Component Value Date/Time Sodium 136 10/29/2018 12:11 AM  
 Potassium 4.2 10/29/2018 12:11 AM  
 Chloride 99 10/29/2018 12:11 AM  
 CO2 28 10/29/2018 12:11 AM  
 BUN 21 (H) 10/29/2018 12:11 AM  
 Creatinine 1.15 (H) 10/29/2018 12:11 AM  
 Glucose 233 (H) 10/29/2018 12:11 AM  
 Calcium 7.9 (L) 10/29/2018 12:11 AM  
 Magnesium 1.9 10/29/2018 12:11 AM  
 Phosphorus 2.4 (L) 10/29/2018 12:11 AM  
 Lactic acid 1.7 10/27/2018 12:03 AM  
 
 
Lab Results Component Value Date/Time WBC 19.1 (H) 10/29/2018 12:11 AM  
 HGB 8.2 (L) 10/29/2018 08:40 AM  
 PLATELET 964 16/99/5818 12:11 AM  
 MCV 95.0 10/29/2018 12:11 AM  
 
 
Lab Results Component Value Date/Time INR 1.5 (H) 10/29/2018 12:11 AM  
 aPTT 26.5 10/29/2018 08:40 AM  
 AST (SGOT) 74 (H) 10/24/2018 05:00 PM  
 Alk. phosphatase 76 10/24/2018 05:00 PM  
 Protein, total 5.8 (L) 10/24/2018 05:00 PM  
 Albumin 2.7 (L) 10/24/2018 05:00 PM  
 Globulin 3.1 10/24/2018 05:00 PM  
 
 
Lab Results Component Value Date/Time  Iron 66 10/25/2018 05:47 AM  
 TIBC 336 10/25/2018 05:47 AM  
 Iron % saturation 20 10/25/2018 05:47 AM  
 Ferritin 327 (H) 10/25/2018 05:47 AM  
 
 
Lab Results Component Value Date/Time TSH 1.70 10/26/2018 09:50 AM  
  
 
No results found for: PH, PHI, PCO2, PCO2I, PO2, PO2I, HCO3, HCO3I, FIO2, FIO2I Lab Results Component Value Date/Time Troponin-I, Qt. <0.05 10/25/2018 07:40 AM  
  
 
Lab Results Component Value Date/Time Culture result: LIGHT YEAST, (APPARENT CANDIDA ALBICANS) (A) 10/27/2018 05:25 PM  
 Culture result: NO GROWTH 3 DAYS 10/26/2018 09:52 AM  
 Culture result: ESCHERICHIA COLI (A) 10/24/2018 06:07 PM  
 
 
No results found for: TOXA1, RPR, HBCM, HBSAG, HAAB, HCAB1, HAAT, G6PD, CRYAC, HIVGT, HIVR, HIV1, HIV12, HIVPC, HIVRPI No results found for: VANCT, CPK Lab Results Component Value Date/Time Color YELLOW/STRAW 10/26/2018 02:47 AM  
 Appearance CLEAR 10/26/2018 02:47 AM  
 pH (UA) 5.5 10/26/2018 02:47 AM  
 Protein NEGATIVE  10/26/2018 02:47 AM  
 Glucose 250 (A) 10/26/2018 02:47 AM  
 Ketone NEGATIVE  10/26/2018 02:47 AM  
 Bilirubin NEGATIVE  10/26/2018 02:47 AM  
 Blood MODERATE (A) 10/26/2018 02:47 AM  
 Urobilinogen 0.2 10/26/2018 02:47 AM  
 Nitrites NEGATIVE  10/26/2018 02:47 AM  
 Leukocyte Esterase NEGATIVE  10/26/2018 02:47 AM  
 WBC 0-4 10/26/2018 02:47 AM  
 RBC 0-5 10/26/2018 02:47 AM  
 Bacteria NEGATIVE  10/26/2018 02:47 AM  
 
 
Images: no new images this morning IMPRESSION 
· Dyspnea with likely COPD exacerbation · Severe COPD at baseline · Chest pain: resolved · Leukocytosis: improving; pansensitive ecoli · Dysphagia: likely due to thrush · Hyperkalemia: resolved, due to over repletion · Atrial fibrillation with RVR: HR currently contolled · JAZZMINE: better · CAD/CHF · S/p MVR and AVR; hx of rheumatic disease · Tobacco use PLAN 
· Continue O2 and wean as able to keep sats > 90%; baseline is 3L · Continue scheduled Duonebs · Switch Solumedrol to prednisone taper tomorrow morning · Continue Brovana/Pulmicort nebs · Switch abx to po Augmentin through 11/2. · Place on nystatin · Cardiology following. Rate control with Cardizem po. · Replete K and Mag to keep > 4 and >2, respectively · Speech eval appreciated; outpatient ENT eval advised · Patient would benefit from outpatient pulmonary follow up and repeat PFTs · Complete smoking cessation. Patient states she is cutting back and trying to quit. · GI prophylaxis: Protonix · DVT prophylaxis: Coumadin; pharmacy to dose Discussed with family practice resident.  
   
[place], [place]

## 2018-10-29 NOTE — PROGRESS NOTES
Problem: Self Care Deficits Care Plan (Adult) Goal: *Acute Goals and Plan of Care (Insert Text) Occupational Therapy Goals Initiated 10/25/2018 1. Patient will perform grooming with modified independence standing at the sink with < or = 2 rest breaks and maintain oxygen sats > or = 90 on 3 liters within 7 day(s). 2. Patient will perform upper body dressing and bathing with independence with < or = 2 rest breaks and maintain oxygen sats > or = 90 on 3 liters within 7 day(s). 3. Patient will perform lower body dressing and bathing with supervision/setup with < or = 2 rest breaks and maintain oxygen sats > or = 90 on 3 liters within 7 day(s). 4. Patient will perform toileting and toilet transfer with modified independence and maintain oxygen sats > or = 90 on 3 liters within 7 day(s). 5. Patient will demonstrate pursed lip breathing with min cues during functional tasks within 7 day(s).   
6.  Patient will participate in upper extremity therapeutic exercise/activities with modified independence for 10 minutes within 7 day(s). 7.  Patient will utilize energy conservation techniques during functional activities with verbal cues within 7 day(s). Occupational Therapy TREATMENT Patient: Jack Kam (58 y.o. female) Date: 10/29/2018 Diagnosis: Chest pain COPD exacerbation ([facility] [address] Precautions:   
Chart, occupational therapy assessment, plan of care, and goals were reviewed. ASSESSMENT: 
Patient received seated in chair, PCT present to initiate CHG bath. Patient encouraged to complete as many portions as she could tolerate; stating she has \"just been getting washed up\". Patient with bilateral wounds/dressings from wrist<>elbow and required Max A for BLE skin care. Using Laverda Nohemi in chair. Has been ambulating >5' to/from chair with RW. Patient was able to demonstrate functional reach to BLEs with tailor sit. Patient SOB after moderate exertion, VSS, on 2LO2 NC.  Recovered after 1 minute. Will benefit from continued OT services to maximize activity tolerance, bathroom mobility, and independence with all ADLs. Recommend HHOT at discharge once medically stable, with family support PRN. Progression toward goals: 
[]       Improving appropriately and progressing toward goals [x]       Improving slowly and progressing toward goals 
[]       Not making progress toward goals and plan of care will be adjusted PLAN: 
Patient continues to benefit from skilled intervention to address the above impairments. Continue treatment per established plan of care. Discharge Recommendations:  Home Health Further Equipment Recommendations for Discharge:  TBD SUBJECTIVE:  
Patient stated Tiff reed did all of my wound care this morning.  OBJECTIVE DATA SUMMARY:  
Cognitive/Behavioral Status: 
Neurologic State: Alert Orientation Level: Oriented X4 Cognition: Appropriate decision making; Appropriate for age attention/concentration; Appropriate safety awareness; Follows commands Perception: Appears intact Perseveration: No perseveration noted Safety/Judgement: Awareness of environment; Insight into deficits;Good awareness of safety precautions; Home safety Functional Mobility and Transfers for ADLs:Balance: 
Sitting: Intact Standing: Impaired; With support Standing - Static: Fair Standing - Dynamic : Fair ADL Intervention: 
 
Upper Body Bathing Bathing Assistance: Minimum assistance Position Performed: Seated in chair Lower Body Bathing Perineal  : Maximum assistance Position Performed: Seated in chair Lower Body : Moderate assistance(Able to assist from knees<>hips) Position Performed: Seated in chair Lower Body Dressing Assistance Socks: Minimum assistance(Encouraged for tailor sit - is able to don/doff ) Leg Crossed Method Used: Yes Position Performed: Seated in chair Cognitive Retraining Safety/Judgement: Awareness of environment; Insight into deficits;Good awareness of safety precautions; Home safety Pain: 
Pain Scale 1: Numeric (0 - 10) Pain Intensity 1: 5 Pain Location 1: Back Pain Orientation 1: Posterior Pain Description 1: Aching Pain Intervention(s) 1: Medication (see MAR) Activity Tolerance:  
Good. SOB with exertion. Please refer to the flowsheet for vital signs taken during this treatment. After treatment:  
[x] Patient left in no apparent distress sitting up in chair 
[] Patient left in no apparent distress in bed 
[x] Call bell left within reach [x] Nursing notified 
[] Caregiver present 
[] Bed alarm activated COMMUNICATION/COLLABORATION:  
The patients plan of care was discussed with: Registered Nurse Soni Park OT Time Calculation: 18 mins

## 2018-10-29 NOTE — PROGRESS NOTES
Problem: Mobility Impaired (Adult and Pediatric) Goal: *Acute Goals and Plan of Care (Insert Text) Physical Therapy Goals Initiated 10/25/2018 1. Patient will move from supine to sit and sit to supine , scoot up and down and roll side to side in bed with independence within 7 day(s). 2.  Patient will transfer from bed to chair and chair to bed with independence using the least restrictive device within 7 day(s). 3.  Patient will perform sit to stand with independence within 7 day(s). 4.  Patient will ambulate with modified independence for 100 feet with the least restrictive device within 7 day(s). 5.  Patient will ascend/descend 4 stairs with  handrail(s) with modified independence within 7 day(s). physical Therapy TREATMENT Patient: Danilo Nunez (36 y.o. female) Date: 10/29/2018 Diagnosis: Chest pain COPD exacerbation ([facility] [address] Precautions:   
Chart, physical therapy assessment, plan of care and goals were reviewed. ASSESSMENT: 
Pt comes to stand with CGA. Pt ambulated 60ft with RW CGA at slow pace with mild shortness of breath. Pts SPO2 was 96% to 100% on 2L of O2 with mobility. Pt left sitting. Pt progressing with mobility. Continue goals. Progression toward goals: 
[]    Improving appropriately and progressing toward goals [x]    Improving slowly and progressing toward goals 
[]    Not making progress toward goals and plan of care will be adjusted PLAN: 
Patient continues to benefit from skilled intervention to address the above impairments. Continue treatment per established plan of care. Discharge Recommendations:  Home Health Further Equipment Recommendations for Discharge:  rolling walker SUBJECTIVE:  
 
 
OBJECTIVE DATA SUMMARY:  
Critical Behavior: 
Neurologic State: Alert Orientation Level: Oriented X4 Cognition: Appropriate decision making, Appropriate for age attention/concentration, Appropriate safety awareness, Follows commands Safety/Judgement: Awareness of environment, Insight into deficits, Good awareness of safety precautions, Home safety Functional Mobility Training: 
  
  
  
  
Transfers: 
Sit to Stand: Contact guard assistance Stand to Sit: Contact guard assistance Balance: 
Sitting: Intact Standing: Intact; With support Standing - Static: Fair;Good Standing - Dynamic : FairAmbulation/Gait Training: 
Distance (ft): 60 Feet (ft) Assistive Device: Gait belt;Walker, rolling Ambulation - Level of Assistance: Contact guard assistance Gait Abnormalities: Decreased step clearance Speed/Stephanie: Pace decreased (<100 feet/min) Step Length: Left shortened;Right shortened Pain: 
Pain Scale 1: Numeric (0 - 10) Pain Intensity 1: 6 Pain Location 1: Back;Arm Pain Orientation 1: Posterior Pain Description 1: Aching Pain Intervention(s) 1: Medication (see MAR) Activity Tolerance:  
Pt tolerated treatment fairly well. Please refer to the flowsheet for vital signs taken during this treatment. After treatment:  
[x]    Patient left in no apparent distress sitting up in chair 
[]    Patient left in no apparent distress in bed 
[]    Call bell left within reach 
[]    Nursing notified 
[]    Caregiver present 
[]    Bed alarm activated COMMUNICATION/COLLABORATION:  
The patients plan of care was discussed with: Physical Therapist 
 
Maddy Luna PTA Time Calculation: 23 mins

## 2018-10-29 NOTE — PROGRESS NOTES
[facility] Dosing Services: 10/29/18 Consult for Warfarin Dosing by Pharmacy by Dr. Kevin Pablo Consult provided for this 66 y.o. female for indication of Atrial Fibrillation and mitral/aortic  valves. Day of Therapy: resuming from home (PTA regimen 2mg on Thurs/Fri and 3mg rest of week) Dose to achieve an INR goal of 2.5- 3.5 Order entered for  Warfarin  3 (mg) ordered to be given today at 18:00. Significant drug interactions: Heparin [place], Previous dose given 3 mg yesterday PT/INR Lab Results Component Value Date/Time INR 1.5 (H) 10/29/2018 12:11 AM  
  
Platelets Lab Results Component Value Date/Time PLATELET 012 50/07/0124 12:11 AM  
  
H/H Lab Results Component Value Date/Time HGB 8.6 (L) 10/29/2018 03:26 PM  
  
 
Pharmacy to follow daily and will provide subsequent Warfarin dosing based on clinical status. [employer])  Contact information [phone]

## 2018-10-29 NOTE — PROGRESS NOTES
[facility]  [facility]. Medicine Residency Program  
 
Resident Progress Note in Brief S. Nursing called to report blood soaked bandages following would care. Nurse noted active oozing of forearms R>L. Nursing also reporting that yajaira a few hours earlier looked dark. Patient seen and examined at bedside. Pt denies any pain and did not examine her last stool. Pt denies any HA,dizziness, abd pain, CP, SOB 
 
 
O: 
Visit Vitals /69 (BP 1 Location: Left arm, BP Patient Position: At rest) Pulse 83 Temp 98.5 °F (36.9 °C) Resp 21 Ht 5' 4\" (1.626 m) Wt 125 lb 0 oz (56.7 kg) SpO2 100% BMI 21.46 kg/m² Physical Examination:  
General appearance - alert, well appearing, and in no distress Chest - decreased lung sounds in lower lobes Heart - irregular rate and rhythm Abdomen - soft, nontender, nondistended, no masses or organomegaly Neurological - alert, oriented, normal speech, no focal findings Extremities - B/L forearms covered by bandages with no visible blood seen A/P:  
Guy Powell is a 66 y.o. female admitted for chest pain and SOB. Pt has + FOBT during admission and total of 1 pRBC has been transfused on 10/27/18 Anemia:  
Will hold Heparin gtt at this time due to concerns of worsening GI bleeding and wound bleeding. Continue to trend H&H q8 hours. Transfusion threshold <7 Georgia Mascorro MD 
Family Medicine Resident

## 2018-10-29 NOTE — PROGRESS NOTES
Bedside and Verbal shift change report given to Amada (oncoming nurse) by Lia Bloom (offgoing nurse). Report included the following information SBAR, Kardex and ED Summary.

## 2018-10-29 NOTE — WOUND CARE
66 y.o. female with PMH for Atrial Fibrillation, CHF, COPD, mechanical valve replacement (s/p AVR and MVR) on Coumadin, and Dyslipidemia. Admitted through ER with initial complaint of chest pain and concern for EKG changes. Once chest pain resolved in the ER patient experienced an exacerbation of COPD symptoms and anxiety. During hospital stay patient became combative and required interventions to protect airway. Was admitted to the ICU for further management and care. Wound care consulted by 5th floor RN to evaluate skin tears/abrasions noted on transfer of care. Wound consult: 
Patient in room sitting on bedside. Assisted to chair and transferred well with minimal assist.  
Patient states she has very fragile skin secondary to medications and prolonged steroid use. Head to toe assessment finds skin intact except for bilateral upper extremity ecchymosis, right proximal forearm skin tear approx. 4cm in length and left distal forearm skin tear approx. 2.5cm in length. Bilateral toes have a bluish discoloration, blanching, capillary refill 3-5 seconds, and thready pedal pulses. Feet have scattered calloused areas. Family practice to bedside for eval of patient and notified of skin findings at that time. Recommendation: To forearm skin tears recommend cleansing and application of xeroform, secure with ABD pad, kerlex and tape. Apply skin sleeves to protect from further injury. Change every other day. Daily with skin care cleanse and apply lotion to extremities and bony prominences for protection from friction/shear. Apply Vaseline to bilateral feet. Turn Q2h while in bed. Protect from lines and devices. When up in chair use a waffle cushion and reposition every 20 minutes. Will Follow, Consult as needed Marcin BEARDEN RN [facility].

## 2018-10-29 NOTE — PROGRESS NOTES
Problem: Falls - Risk of 
Goal: *Absence of Falls Document Bryan Abreu Fall Risk and appropriate interventions in the flowsheet. Outcome: Progressing Towards Goal 
Fall Risk Interventions: 
Mobility Interventions: Communicate number of staff needed for ambulation/transfer Mentation Interventions: Bed/chair exit alarm Medication Interventions: Bed/chair exit alarm Elimination Interventions: Patient to call for help with toileting needs History of Falls Interventions: Door open when patient unattended

## 2018-10-29 NOTE — PROGRESS NOTES
Win Luong [facility] [place] Devin Umana YOB: 1955 Assessment & Plan: Hyperkalemia · Normal now ARF · Cr better now Hyponatremia, better COPD AFib Subjective:  
CC: f/u hyperkalemia HPI: JAZZMINE is better K is better SOB + but better ROS: +sob +nausea Current Facility-Administered Medications Medication Dose Route Frequency  methylPREDNISolone (PF) (SOLU-MEDROL) injection 40 mg  40 mg IntraVENous Q8H  
 insulin NPH (NOVOLIN N, HUMULIN N) injection 10 Units  10 Units SubCUTAneous Q12H  levoFLOXacin (LEVAQUIN) tablet 750 mg  750 mg Oral Q48H  Warfarin: pharmacy to dose    Other Rx Dosing/Monitoring  pantoprazole (PROTONIX) tablet 40 mg  40 mg Oral Q12H  
 0.9% sodium chloride infusion 250 mL  250 mL IntraVENous PRN  
 albuterol (PROVENTIL VENTOLIN) nebulizer solution 2.5 mg  2.5 mg Nebulization Q4H PRN  
 albuterol-ipratropium (DUO-NEB) 2.5 MG-0.5 MG/3 ML  3 mL Nebulization Q6HWA RT  
 glucose chewable tablet 16 g  4 Tab Oral PRN  
 dextrose (D50W) injection syrg 12.5-25 g  25-50 mL IntraVENous PRN  
 glucagon (GLUCAGEN) injection 1 mg  1 mg IntraMUSCular PRN  
 insulin lispro (HUMALOG) injection   SubCUTAneous AC&HS  dilTIAZem (CARDIZEM) IR tablet 60 mg  60 mg Oral QID  sodium chloride (NS) flush 5-10 mL  5-10 mL IntraVENous PRN  
 sodium chloride (NS) flush 5-10 mL  5-10 mL IntraVENous Q8H  
 docusate sodium (COLACE) capsule 100 mg  100 mg Oral QHS  lovastatin (MEVACOR) tablet 10 mg  10 mg Oral QHS  oxyCODONE-acetaminophen (PERCOCET) 5-325 mg per tablet 1 Tab  1 Tab Oral Q4H PRN  
 budesonide (PULMICORT) 500 mcg/2 ml nebulizer suspension  500 mcg Nebulization BID RT  
  
 
 
Objective:  
 
Vitals: 
Blood pressure 134/81, pulse 81, temperature 98.3 °F (36.8 °C), resp. rate 22, height 5' 4\" (1.626 m), weight 56.7 kg (125 lb 0 oz), SpO2 96 %. Temp (24hrs), Av.2 °F (36.8 °C), Min:97.8 °F (36.6 °C), Max:98.5 °F (36.9 °C) Intake and Output: 
10/29 0701 - 10/29 1900 In: 240 [P.O.:240] Out: -  
10/27 1901 - 10/29 07 In: 829.6 [P.O.:480; I.V.:349.6] Out: 6229 [OXXHM:[id number]] Physical Exam:              
GENERAL ASSESSMENT: NAD 
CHEST: on oxygen, diminished BS HEART: tachy, irreg ABDOMEN: Soft,NT 
EXTREMITY: no EDEMA 
 
 
   
ECG/rhythm: 
 
Data Review No results for input(s): TNIPOC in the last 72 hours. No lab exists for component: ITNL No results for input(s): CPK, CKMB, TROIQ in the last 72 hours. Recent Labs 10/29/18 
0840 10/29/18 
0011 10/28/18 
1650  10/28/18 
0517  10/27/18 
0448 NA  --  136 136  --  137  --  138 K  --  4.2 3.5  --  3.2*  --  3.9 CL  --  99 99  --  99  --  100 CO2  --  28 28  --  31  --  34* BUN  --  21* 23*  --  22*  --  18  
CREA  --  1.15* 1.07*  --  1.14*  --  1.19* GLU  --  233* 189*  --  188*  --  149* PHOS  --  2.4*  --   --  3.1  --  3.7 MG  --  1.9  --   --  1.8  --  2.0  
CA  --  7.9* 7.9*  --  7.9*  --  7.5* WBC  --  19.1* 22.1*  --  20.6*  --  19.8* HGB 8.2* 7.9* 8.3*   < > 8.2*   < > 7.2* HCT 26.2* 24.8* 25.9*   < > 25.5*   < > 22.1*  
PLT  --  187 198  --  192  --  184  
 < > = values in this interval not displayed. Recent Labs 10/29/18 
0840 10/29/18 
0011 10/28/18 
1650 10/28/18 
0517 10/27/18 
0448 INR  --  1.5*  --  1.7* 2.3* PTP  --  15.0*  --  16.7* 22.8* APTT 26.5 29.3 28.5  --   --   
 
Needs: urine analysis, urine sodium, protein and creatinine Lab Results Component Value Date/Time Sodium,urine random 8 10/26/2018 02:47 AM  
 Creatinine, urine 60.34 10/26/2018 02:47 AM  
 
 
 
: Komal Lindsey MD 
10/29/2018 [facility]: 
[email or web address] Eboni Gallegos office: 
[address] Phone: [phone] Fax :     [phone] [place] office: 
[address] [place], [place] Phone - [phone] Fax - [phone]

## 2018-10-29 NOTE — PROGRESS NOTES
Problem: Falls - Risk of 
Goal: *Absence of Falls Document Anita Gómez Fall Risk and appropriate interventions in the flowsheet. Outcome: Progressing Towards Goal 
Fall Risk Interventions: 
Mobility Interventions: Communicate number of staff needed for ambulation/transfer Mentation Interventions: Adequate sleep, hydration, pain control Medication Interventions: Bed/chair exit alarm, Patient to call before getting OOB Elimination Interventions: Patient to call for help with toileting needs, Call light in reach, Bed/chair exit alarm History of Falls Interventions: Bed/chair exit alarm, Room close to nurse's station

## 2018-10-29 NOTE — DISCHARGE SUMMARY
Anastacio Vazquez [address]  Office [phone], Fax [phone] Discharge Summary Patient: Marisol Guerrier MRN: [id number] YOB: 1955 Age: 66 y.o. Date of admission:  10/24/2018 Date of discharge:  11/1/2018 Primary care provider:  Juventino Clement DO Admitting provider:  Valerie Camara MD 
 
Discharging provider(s): Mayra Underwood MD - Family Medicine Resident Dr Saige Levy MD - Family Medicine Attending Consultations · Dr. Katiuska Soto · Dr. Beryle Hoffman - Cardiology · Dr. Gabriela Mi - Nephrology · Dr Jass Ramesh - Gastroenterology Procedures · None Discharge destination: Home with [address] [place] [address]. The patient is stable for discharge. Admission diagnosis Chest pain MEDICATION CHANGES:  
STARTED Lisinopril 5mg daily for elevated Blood Pressures CONTINUE Prednisone 10mg taper (20mg daily for 3 days, 10mg daily for 3 days) CONTINUE using Nystatin mouthwash four times daily for throat discomfort Presentation: HPI as per admitting provider: Georgia Livingston is a 66 y.o. female with known PMH of Atrial Fibrillation s/p MVR on coumadin, CHF, COPD, mechanical valve replacement (s/p AVR and MVR) on Coumadin, Dyslipidemia, Migraine  who initially called the EMS due to L sided chest pain radiating to LUE. In the field STEMI was activated but was then cancelled on arrival to the ED after further evaluation of the EKG. On my exam pt stated that chest pain had completely resolved even prior to nitroglycerin administration. Pt also complains of increased SOB that worsened today. She has COPD and is on continuous home O2 at 3 L. She denies increased cough or increased sputum production. She has been complaint with her trelegy inhaler.  When asked if her albuterol inhaler use has increased she stated she doesn't know all the answers.  
  
In the ED after administration of Rocephin pt started experiencing increased SOB, tongue numbness & hot flashes. On exam no airway swelling was noted. She was treated with Benadryl and Famotidine with symptom improvement. She was also experiencing a lot of anxiety/claustrophobia during neb tx and was given a one time dose of ativan 0.5 mg.  
  
Pt also complains of increased trouble swallowing that has been present for about a month. She was seen OP by Dr. Ernst Carrasquillo recently and was supposed to see Dr. Whitney Ahmadi for an endoscopy but has been unable to follow up due to not having a ride.  
  
Of note, pt was recently admitted for COPD exacerbation and discharged on 7/31. She was treated with IV and then PO antibiotics and was sent home on augmentin and po steroid taper. Importance of follow up with pulmonology and smoking cessation were discussed in details with pt but she has been unable to follow up with pulm. She has decreased smoking from 1/2 pack/day to 5 cigarettes daily.  
  
In the ED, she was tachycardic and her labs were remarkable for Hb 9.6, WCC 18.3, K 2.0, INR 2.5 and a UA:Trubid appearance, Nitrites pos, LE small, 2+ Bact. A chest  Xray showed small pleural effusion vs chronic pleural parenchymal scarring. On EKG she had A. Fib with RVR. She was treated with a bolus, nitro and potassium repletion and admitted. Brief Hospital Course By Problem: 
Acute on Chronic Respiratory Failure due to COPD Exacerbation: Patient on 3L N/C at home. Patient received nebulizer treatments during this hospital stay but began to refuse the treatments on 10/26 with worsening respiratory function necessitating BiPAP. She was subsequently weaned to 2-3L NC on 10/27. She received IV Solumedrol for 6 days and was started on an oral taper. She also completed a course of Augmentin 875-125 on 11/01.  
  
Leukocytosis: Patient had persistent leukocytosis that weaked at 44.7 and improved with BiPAP. It was seemingly correlated with respiratory status. A CXR on 10/26 found no  new PNA or worsening airspace disease. Blood cultures were negative. Following worsening respiratory status, blood cultures were drawn again and a chest xray showed unchanged blunting of left costavertebral angle concerning for pulmonary thickening vs pleural effusion. Blood Cultures 10/31 were pending at time of discharge. Patient completed a course of Augmentin 875-125mg q12hr through 11/1. She was stable at the time of discharge.  
  
Atrial Fibrillation s/p MVR on Coumadin (INR POA 2.5) Pt has been seen OP by Dr Kory Torres but has not followed up with him since 2017. EKG POA: Afib with RVR . Coumadin home dose is 3 mg 5 days a week & 2 mg on Thurs & Fri. Shruthi Neighbours continued to receive her home Diltiazem 60mg four times daily. Will require INR check at PCP followup to adjust Warfarin as needed 
  
CAD/HFpEF Chronic with angina: Resolved. Troponin neg x4. EKG without evidence of ischemia.  in setting of Afib. Held Bumex initially for concern of JAZZMINE however resumed once shortness of breath improved.  
  
Urinary Tract Infection: Resolved. Initial treatment with Rocephin however patient had allergic reaction. Switched to Doxycycline then zosyn due to worsening leukocytosis 2/2 severe worsening of pulmonary function. Once respiratory status improved and WBC was near baseline transitioned to Augmentin through 11/1.  
  
At Risk JAZZMINE: Stable. Cr 0.96 on admission then elevated within first 24 hours. Held bumex as a result and Cr downtrended with another bump following resumption of bumex. Consulted Nephroogy for initial increase in Cr with concomittant overshoot of K with appropriate repletion. BMP recommended at PCP Followup to evaluate Cr function Bilateral SKin Tears on Forearms: Occurred during episode of agitation while patient was in ICU and declining nebs despite worsening respirator status. Patient agitated and needed to be restrained by multiple nurses. Skin was already very thin with multiple bruises and tore easily during this episode. Patient setup with Home Health for Wound Care. Hyperglycemia 2/2 High Dose Steroids: Patient received NPH10 units BID during this hospital stay. Blood glucose was well controlled. Lower GI bleed with anemia: Patient received 1 unit pRBCs on 10/27 for Hgb 7.0. Hgb 9.6 POA, (recent OP hgb reads- 11.8, 11.4). She denied any blood in stool. Haptoglobin very low and Ferritin high. Remainder of Fe studies WNL. Suggestive of hemolysis may be 2/2 MV. FOBT positive with dark stool on 10/28. Gastroenterology was consulted and followed the patient during her hospital stay. She received PO Protonix 40mg BID.  
  
Dysphagia: States she has had dysphagia for one month now. Candidiasis likely a component but should have ENT evaluation as outpatient. Last scope with no evidence of lesions or other contributing factors. Speech Language Pathology evaluation recommended Nystatin and ENT evaluation as outpatient.  
  
Hypokalemia: K on POA: 2.0 RESOLVED Per chart review Hyperkalemia is a recurrent problem for pt presumably from Bumex & Albuterol use- she takes klor-con 10 mEq M, W and F, she will likely need dose readjustment on discharge. Presented with K of 2.0 and repleted with 200mEq which she had tolerated on previous admission. Cr at time of admission was at baseline however on recheck of K 6.7 and Cr elevated so consulted nephrology. Needed interventions to bring K to WNL. K trended to ~5 after insulin, D50, kayexalate and needing repletions prn.  
  
Chronic Back Pain: Patient continued on her home home Percocet 5-325 mg q6h PRN  
  
Hyperlipidemia: Patient continued on her home Lovastatin 10 mg daily. Final Discharge Diagnosis: COPD Exacerbation with subsequent UTI and JAZZMINE Labs/Imaging Needed on follow up: PCP 
- CBC, BMP 
- INR 
- Blood culture Pulmonology - Updated PFTs ENT 
- Dysphagia Workup Please make sure you review these results with your outpatient follow-up provider(s). Specific symptoms to watch for: chest pain, shortness of breath, fever, chills, nausea, vomiting, diarrhea, change in mentation, falling, weakness, bleeding. DIET/what to eat:  Cardiac Diet ACTIVITY:  PT/OT per  Place [facility] Lemus Wound care: To forearm skin tears recommend cleansing and application of xeroform, secure with ABD pad, kerlex and tape. Apply skin sleeves to protect from further injury. Change every other day. Daily with skin care cleanse and apply lotion to extremities and bony prominences for protection from friction/shear. Apply Vaseline to bilateral feet. Turn Q2h while in bed. Protect from lines and devices. When up in chair use a waffle cushion and reposition every 20 minutes. Equipment needed:  Brace/Splint and EchoStar, remainder per home PT/OT Follow-up Care: Follow-up Information Follow up With Specialties Details Why Contact Info Chu Balbuena DO Family Practice Go on 11/8/2018 9am for [address] [facility] [id number] [place] [address] 
[phone] Keyona Franklin MD Otolaryngology Schedule an appointment as soon as possible for a visit For throat discomfort [address] [place] [address] [address] [facility] 
[phone] Physical examination at discharge Visit Vitals /69 (BP 1 Location: Left arm, BP Patient Position: At rest) Pulse 100 Temp 98.7 °F (37.1 °C) Resp 21 Ht 5' 4\" (1.626 m) Wt 138 lb 10.7 oz (62.9 kg) SpO2 96% BMI 23.80 kg/m² Physical Examination:  
General: No acute distress. Alert. Cooperative. Head: Normocephalic. Atraumatic. Eyes:              Conjunctiva pink. Sclera white. PERRL. Ears:              Ear canals patent. TM non-erythematous. Nose:             Septum midline. Mucosa pink. No drainage. Throat: Mucosa pink. Moist mucous membranes.  No tonsillar exudates or erythema. Palate movement equal bilaterally. Neck: Supple. Normal ROM. No stiffness. Respiratory: Equal air movement. Wheezes in left lung. Cardiovascular: Irregular rhythm in low 100s. Click of mechanical valve audible on auscultation. No murmur appreciated. Extremities: 1+ edema in bilateral LE slightly greater on L vs R. . Distal pulses intact. Musculoskeletal: Full ROM in all extremities. Skin: Warm, dry. No rashes. Neuro: Strength 5/5 in all extremities. Recent Results (from the past 24 hour(s)) GLUCOSE, POC Collection Time: 10/31/18  9:00 PM  
Result Value Ref Range Glucose (POC) 166 (H) 65 - 100 mg/dL Performed by Garry Montelongo (PCT) LACTIC ACID Collection Time: 10/31/18 10:34 PM  
Result Value Ref Range Lactic acid 3.7 (HH) 0.4 - 2.0 MMOL/L  
RENAL FUNCTION PANEL Collection Time: 11/01/18  3:47 AM  
Result Value Ref Range Sodium 140 136 - 145 mmol/L Potassium 3.8 3.5 - 5.1 mmol/L Chloride 101 97 - 108 mmol/L  
 CO2 35 (H) 21 - 32 mmol/L Anion gap 4 (L) 5 - 15 mmol/L Glucose 92 65 - 100 mg/dL BUN 22 (H) 6 - 20 MG/DL Creatinine 0.92 0.55 - 1.02 MG/DL  
 BUN/Creatinine ratio 24 (H) 12 - 20 GFR est AA >60 >60 ml/min/1.73m2 GFR est non-AA >60 >60 ml/min/1.73m2 Calcium 7.8 (L) 8.5 - 10.1 MG/DL Phosphorus 3.6 2.6 - 4.7 MG/DL Albumin 2.4 (L) 3.5 - 5.0 g/dL LACTIC ACID Collection Time: 11/01/18  3:47 AM  
Result Value Ref Range Lactic acid 1.8 0.4 - 2.0 MMOL/L  
CBC W/O DIFF Collection Time: 11/01/18  3:47 AM  
Result Value Ref Range WBC 22.2 (H) 3.6 - 11.0 K/uL  
 RBC 2.36 (L) 3.80 - 5.20 M/uL HGB 7.1 (L) 11.5 - 16.0 g/dL HCT 22.3 (L) 35.0 - 47.0 % MCV 94.5 80.0 - 99.0 FL  
 MCH 30.1 26.0 - 34.0 PG  
 MCHC 31.8 30.0 - 36.5 g/dL  
 RDW 18.4 (H) 11.5 - 14.5 % PLATELET 560 986 - 636 K/uL MPV 12.9 8.9 - 12.9 FL  
 NRBC 0.9 (H) 0  WBC ABSOLUTE NRBC 0.19 (H) 0.00 - 0.01 K/uL MAGNESIUM  
 Collection Time: 11/01/18  3:47 AM  
Result Value Ref Range Magnesium 1.6 1.6 - 2.4 mg/dL PROTHROMBIN TIME + INR Collection Time: 11/01/18  3:47 AM  
Result Value Ref Range INR 3.4 (H) 0.9 - 1.1 Prothrombin time 32.5 (H) 9.0 - 11.1 sec GLUCOSE, POC Collection Time: 11/01/18  7:26 AM  
Result Value Ref Range Glucose (POC) 108 (H) 65 - 100 mg/dL Performed by Lydia Mei GLUCOSE, POC Collection Time: 11/01/18 10:54 AM  
Result Value Ref Range Glucose (POC) 105 (H) 65 - 100 mg/dL Performed by Mercy Zurita GLUCOSE, POC Collection Time: 11/01/18  3:45 PM  
Result Value Ref Range Glucose (POC) 292 (H) 65 - 100 mg/dL Performed by Eddie Fischer (PCT) Discharge Medication List as of 11/1/2018  4:46 PM  
  
CONTINUE these medications which have CHANGED Details  
lisinopril (PRINIVIL, ZESTRIL) 5 mg tablet Take 1 Tab by mouth daily. , Print, Disp-30 Tab, R-0  
  
predniSONE (DELTASONE) 10 mg tablet Prednisone 10mg tabs: 11/2: 2 tabs (20mg) 11/3: 2 tabs (20mg) 11/4: 2 tabs (20mg) 11/5: 1 tab (10mg) 11/6: 1 tab (10mg) 11/7: 1 tab (10mg), Print, Disp-9 Tab, R-0  
  
nystatin (MYCOSTATIN) 100,000 unit/mL suspension Take 5 mL by mouth four (4) times daily. swish and spit, Print, Disp-100 mL, R-0  
  
  
CONTINUE these medications which have NOT CHANGED Details  
bumetanide (BUMEX) 1 mg tablet Take 1 mg by mouth nightly., Historical Med  
  
dilTIAZem (CARDIZEM) 60 mg tablet Take 60 mg by mouth four (4) times daily. , Historical Med  
  
docusate sodium (COLACE) 100 mg capsule Take 100 mg by mouth nightly., Historical Med  
  
lovastatin (MEVACOR) 10 mg tablet Take 10 mg by mouth nightly., Historical Med  
  
oxyCODONE-acetaminophen (PERCOCET) 5-325 mg per tablet Take 1 Tab by mouth every four (4) hours as needed for Pain., Historical Med  
  
!! warfarin (COUMADIN) 1 mg tablet Take 2 mg by mouth two (2) times a week. Take 2 mg on Thursday & Friday Take 3 mg all other days, Historical Med  
  
fluticasone-umeclidin-vilanter (TRELEGY ELLIPTA) 100-62.5-25 mcg dsdv Take 1 Puff by inhalation nightly., Historical Med  
  
albuterol (PROAIR HFA) 90 mcg/actuation inhaler Take 2 Puffs by inhalation every four (4) hours as needed for Wheezing., Historical Med  
  
potassium chloride SR (KLOR-CON 10) 10 mEq tablet Take 10 mEq by mouth every Monday, Wednesday, Friday., Historical Med  
  
!! warfarin (COUMADIN) 1 mg tablet Take 3 mg by mouth five (5) days a week. Take 2 mg on Thursday & Friday Take 3 mg all other days, Historical Med  
  
 !! - Potential duplicate medications found. Please discuss with provider. Admission imaging studies: 
Results from [facility] Encounter encounter on 10/24/18 XR CHEST PA LAT Narrative INDICATION: Crackles on exam. Tachycardia and increased WBC. EXAM: 
 
Frontal and lateral radiographs were obtained. FINDINGS: 
 
Comparison studies:  October 26, 2018. The heart size is top normal.  
 
The lungs are moderately well expanded bilaterally. The visualized bones are notable for possible moderate to severe mid to upper 
thoracic vertebral compression deformity of indeterminate age. Persistent mild blunting of left costophrenic angle. Prior median sternotomy and 
valve replacement. No confluent airspace disease. Impression IMPRESSION: 
 
Status post median sternotomy and valve replacement. No new confluent infiltrate or overt cardiac decompensation. Persistent blunting of left costophrenic angle may reflect trace pleural 
effusion versus pleural thickening. Possible moderate to severe mid to upper thoracic vertebral compression 
deformity of indeterminate age. Results from Hospital Encounter encounter on 10/24/18 US RETROPERITONEUM COMP Narrative EXAM:  US RETROPERITONEUM COMP INDICATION:  Renal failure, acute (kidney injury) COMPARISON: None.  
 
TECHNIQUE: 
 Real-time sonography of the kidneys, retroperitoneum and bladder was performed 
with multiple static images obtained. FINDINGS: 
The kidneys have normal echogenicity with no mass, stone or hydronephrosis. The 
right kidney measures 9.2 cm and the left kidney measures 10.5 cm in length. The aorta and iliac arteries are not well visualized secondary to bowel gas and 
body habitus. The IVC is within normal limits. No retroperitoneal mass is 
identified. The urinary bladder is incompletely distended. Impression IMPRESSION: Unremarkable renal ultrasound examination. 
 
 
 
  
        
------------------------------------------------------------------------------------------------------------------- Chronic Diagnoses:   
Problem List as of 11/1/2018 Date Reviewed: 10/27/2018 Codes Class Noted - Resolved Anemia ICD-10-CM: D64.9 ICD-9-CM: 285.9  10/28/2018 - Present GI bleed ICD-10-CM: K92.2 ICD-9-CM: 578.9  10/28/2018 - Present * (Principal) COPD exacerbation ([facility] ICD-10-CM: J44.1 ICD-9-CM: 491.21  10/26/2018 - Present Hypokalemia ICD-10-CM: E87.6 ICD-9-CM: 276.8  10/26/2018 - Present JAZZMINE (acute kidney injury) ([facility] ICD-10-CM: N17.9 ICD-9-CM: 584.9  10/26/2018 - Present Hyperglycemia ICD-10-CM: R73.9 ICD-9-CM: 790.29  10/26/2018 - Present A-fib [facility]) ICD-10-CM: I48.91 
ICD-9-CM: 427.31  10/26/2018 - Present Chronic anticoagulation ICD-10-CM: Z79.01 
ICD-9-CM: V58.61  10/26/2018 - Present Hyperlipidemia ICD-10-CM: E78.5 ICD-9-CM: 272.4  10/26/2018 - Present Chronic back pain ICD-10-CM: M54.9, G89.29 ICD-9-CM: 724.5, 338.29  10/26/2018 - Present Chest pain ICD-10-CM: R07.9 ICD-9-CM: 786.50  10/24/2018 - Present Signed:  
 
 Stanislaw Clancy MD 
 Family Medicine Resident 11/1/2018 Dr Loyda Westbrook MD 
 Family Medicine Attending

## 2018-10-29 NOTE — PROGRESS NOTES
Van Sanon M.D. 
[phone] GI PROGRESS NOTE 
 
 
NAME: Marisol Guerrier :  1955 MRN:  [id number] Subjective:  
Breathing heavy, right after receiving nebulizer treatment which usually happens as she reports. Reports swallowing feels like something lingers in her throat after she eats, however food or fluid do net get stuck Objective: VITALS:  
Last 24hrs VS reviewed since prior progress note. Most recent are: 
Visit Vitals /81 (BP 1 Location: Left arm, BP Patient Position: At rest;Supine) Pulse 81 Temp 98.3 °F (36.8 °C) Resp 22 Ht 5' 4\" (1.626 m) Wt 56.7 kg (125 lb 0 oz) SpO2 96% BMI 21.46 kg/m² Intake/Output Summary (Last 24 hours) at 10/29/2018 5890 Last data filed at 10/29/2018 1898 Gross per 24 hour Intake 769.63 ml Output 1050 ml Net -280.37 ml PHYSICAL EXAM: 
General: Alert, in no acute distress HEENT: Anicteric sclerae. Lungs:            Crackles and coarse breath sounds bilaterally, at bases especially Heart:  Irregular  rhythm, Abdomen: Soft, Non distended, Non tender.  (+)Bowel sounds, no HSM Extremities: No c/c/e Neurologic:  CN 2-12 gi, Alert and oriented X 3. No acute neurological distress Psych:   Good insight. Not anxious nor agitated. Lab Data Reviewed:  
Recent Labs 10/29/18 
0840 10/29/18 
0011 10/28/18 
1650 WBC  --  19.1* 22.1* HGB 8.2* 7.9* 8.3* HCT 26.2* 24.8* 25.9*  
PLT  --  187 198 Recent Labs 10/29/18 
0011 10/28/18 
1650 10/28/18 
8527  136 137  
K 4.2 3.5 3.2*  
CL 99 99 99 CO2 28 28 31 BUN 21* 23* 22* CREA 1.15* 1.07* 1.14* * 189* 188* PHOS 2.4*  --  3.1 CA 7.9* 7.9* 7.9* No results for input(s): SGOT, GPT, AP, TBIL, TP, ALB, GLOB, GGT, AML, LPSE in the last 72 hours. No lab exists for component: Rosario Barbosa 
 
________________________________________________________________________ Patient Active Problem List  
Diagnosis Code  Chest pain R07.9  COPD exacerbation ([facility] J44.1  Hypokalemia E87.6  JAZZMINE (acute kidney injury) ([facility] N17.9  Hyperglycemia R73.9  A-fib (HCC) I48.91  
 Chronic anticoagulation Z79.01  
 Hyperlipidemia E78.5  Chronic back pain M54.9, G89.29  
 Anemia D64.9  
 GI bleed K92.2 Assessment and Plan: Dysphagia likely related to esophageal candidiasis, certainly other etiologies need to be ruled out as well, however with her current respiratory status, endoscopic evaluation and sedation carry increased risks. Empiric treatment with fluconazole can be started, however will need to monitor her INR closely as well as monitor QT interval due to interactions with coumadin and levofloxacin.  
 
  
Signed By: Kamari Santacruz MD   
 10/29/2018  9:03 AM

## 2018-10-30 NOTE — PROGRESS NOTES
Problem: Patient Education: Go to Patient Education Activity Goal: Patient/Family Education 
physical Therapy TREATMENT Patient: Dexter Vazquez (85 y.o. female) Date: 10/30/2018 Diagnosis: Chest pain [facility] Precautions:   
Chart, physical therapy assessment, plan of care and goals were reviewed. ASSESSMENT: 
Pt supine to sit to stand CGA. Pt ambulated 50ft with RW CGA on  1L of O2. Pts fatigues rather quickly with mobility. Pts SPO2  98% on 1L post ambulation sitting. Pt progressing slowly with  Mobility. Continue goals. Progression toward goals: 
[]    Improving appropriately and progressing toward goals [x]    Improving slowly and progressing toward goals 
[]    Not making progress toward goals and plan of care will be adjusted PLAN: 
Patient continues to benefit from skilled intervention to address the above impairments. Continue treatment per established plan of care. Discharge Recommendations:  Home Health Further Equipment Recommendations for Discharge:  brace/splint and rolling walker SUBJECTIVE:  
 
 
OBJECTIVE DATA SUMMARY:  
Critical Behavior: 
Neurologic State: Alert Orientation Level: Oriented X4 Cognition: Appropriate decision making, Appropriate for age attention/concentration, Appropriate safety awareness, Recognition of people/places, Follows commands Safety/Judgement: Awareness of environment, Insight into deficits, Good awareness of safety precautions, Home safety Functional Mobility Training: 
Bed Mobility: 
  
Supine to Sit: Contact guard assistance Sit to Supine: Contact guard assistance Transfers: 
Sit to Stand: Contact guard assistance Stand to Sit: Contact guard assistance Balance: 
Sitting: Intact Standing: Intact; With support Standing - Static: Fair;GoodAmbulation/Gait Training: 
Distance (ft): 50 Feet (ft) Assistive Device: Walker, rolling;Gait belt Ambulation - Level of Assistance: Contact guard assistance Gait Abnormalities: Decreased step clearance Base of Support: Narrowed Speed/Stephanie: Pace decreased (<100 feet/min) Step Length: Left shortened;Right shortened Pain: 
Pain Scale 1: Numeric (0 - 10) Pain Intensity 1: 6 Activity Tolerance:  
Pt tolerated fairly well. Please refer to the flowsheet for vital signs taken during this treatment. After treatment:  
[x]    Patient left in no apparent distress sitting up in chair 
[]    Patient left in no apparent distress in bed 
[]    Call bell left within reach 
[]    Nursing notified 
[]    Caregiver present 
[]    Bed alarm activated COMMUNICATION/COLLABORATION:  
The patients plan of care was discussed with: Physical Therapist 
 
Wilver Estrada PTA Time Calculation: 24 mins

## 2018-10-30 NOTE — PROGRESS NOTES
[facility] [address]  
: 1955 Admit Date: 10/24/2018 Phone:   Room: 502/01 PCP: Zaid Christine DO  MRN: [id number] Date: 10/30/2018  Code: DNR Chart and notes reviewed. Data reviewed. I review the patient's current medications in the medical record at each encounter. I have evaluated and examined the patient. Overnight events Afebrile HR stable BP stable Sats 100% on 2L WBC 20.4 - stable Hgb 8.4 - stable Creat 1.49 - increasing INR 1.7 
proBNP 3771 - increased from 821 on 10/24 Urine cx with pansensitive ecoli RVP negative Blood cx no growth x 3 days ECHO: EF 45-50% ROS: Feeling fine this morning. Denies SOB at rest.  Did well wit PT yesterday. Has occasional nonproductive cough. Denies fever or chills. Denies CP. Denies abd pain or LE pain/swelling. Past Medical History Diagnosis Date  A-fib ([address]    
 Anticoagulant long-term use    
 CAD (coronary artery disease), native coronary artery    
    mild to moderate by cath  CHF (congestive heart failure) (HCC)    
 Chronic obstructive pulmonary disease (HCC)    
 Dyslipidemia    
 Ill-defined condition    
    migraines  Migraine    
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  
    Tissue MVR  following failed balloon valvuloplasty for MS Redo MVR  due to severe MR, AVR due to AR ([facility])  S/P AVR (aortic valve replacement) 2015  S/P MVR (mitral valve replacement) 2015  
  
  
      
Past Surgical History Procedure Laterality Date  Hx hysterectomy      
    BSO  Hx colectomy      
 Hx mitral valve replacement      
    and redo MVR and AVR  Hx heart catheterization      
    cabg Alcohol history: none Smoking history: 5 cigarettes daily Illicit drug history: none Allergies Allergen Reactions  Spiriva Respimat [Tiotropium Bromide] Shortness of Breath  Tomato Shortness of Breath Only occurs with raw tomatoes, tolerates ketchup without issue  Celebrex [Celecoxib] Rash  Rocephin [Ceftriaxone] Shortness of Breath Current Facility-Administered Medications Medication Dose Route Frequency  warfarin (COUMADIN) tablet 3 mg  3 mg Oral ONCE  
 [START ON 10/31/2018] bumetanide (BUMEX) tablet 1 mg  1 mg Oral DAILY  predniSONE (DELTASONE) tablet 40 mg  40 mg Oral DAILY WITH BREAKFAST  [START ON 11/2/2018] predniSONE (DELTASONE) tablet 20 mg  20 mg Oral DAILY WITH BREAKFAST  [START ON 11/5/2018] predniSONE (DELTASONE) tablet 10 mg  10 mg Oral DAILY WITH BREAKFAST  amoxicillin-clavulanate (AUGMENTIN) 875-125 mg per tablet 1 Tab  1 Tab Oral Q12H  nystatin (MYCOSTATIN) 100,000 unit/mL oral suspension 500,000 Units  500,000 Units Oral QID  fluticasone-umeclidin-vilanter 100-62.5-25 mcg dsdv 1 Puff  (Patient Supplied)  1 Puff Inhalation DAILY  heparin 25,000 units in D5W 250 ml infusion  12-25 Units/kg/hr IntraVENous TITRATE  insulin NPH (NOVOLIN N, HUMULIN N) injection 10 Units  10 Units SubCUTAneous Q12H  Warfarin: pharmacy to dose    Other Rx Dosing/Monitoring  pantoprazole (PROTONIX) tablet 40 mg  40 mg Oral Q12H  
 0.9% sodium chloride infusion 250 mL  250 mL IntraVENous PRN  
 albuterol (PROVENTIL VENTOLIN) nebulizer solution 2.5 mg  2.5 mg Nebulization Q4H PRN  
 albuterol-ipratropium (DUO-NEB) 2.5 MG-0.5 MG/3 ML  3 mL Nebulization Q6HWA RT  
 glucose chewable tablet 16 g  4 Tab Oral PRN  
 dextrose (D50W) injection syrg 12.5-25 g  25-50 mL IntraVENous PRN  
 glucagon (GLUCAGEN) injection 1 mg  1 mg IntraMUSCular PRN  
 insulin lispro (HUMALOG) injection   SubCUTAneous AC&HS  dilTIAZem (CARDIZEM) IR tablet 60 mg  60 mg Oral QID  sodium chloride (NS) flush 5-10 mL  5-10 mL IntraVENous PRN  
 sodium chloride (NS) flush 5-10 mL  5-10 mL IntraVENous Q8H  
 docusate sodium (COLACE) capsule 100 mg  100 mg Oral QHS  lovastatin (MEVACOR) tablet 10 mg  10 mg Oral QHS  oxyCODONE-acetaminophen (PERCOCET) 5-325 mg per tablet 1 Tab  1 Tab Oral Q4H PRN  
 
 
 
REVIEW OF SYSTEMS  
12 point ROS negative except as stated in the HPI. Physical Exam:  
Visit Vitals /71 (BP 1 Location: Left arm, BP Patient Position: Sitting) Pulse 90 Temp 98.5 °F (36.9 °C) Resp 16 Ht 5' 4\" (1.626 m) Wt 56.7 kg (125 lb 0 oz) SpO2 100% BMI 21.46 kg/m² General:  Alert, cooperative, no distress, appears older than stated age. Head:  Normocephalic, without obvious abnormality, atraumatic. Eyes:  Conjunctivae/corneas clear. Nose: Nares normal. Septum midline. Mucosa normal.   
Throat: Lips, mucosa, and tongue normal. Thrush noted on posterior soft palate and pharynx. Neck: Supple, symmetrical, trachea midline, no adenopathy. Lungs:   Diminished, no wheeze or rales appreciated. Chest wall:  No tenderness or deformity. Heart:  Irregular, no murmur, click, rub or gallop. Abdomen:   Soft, non-tender. Bowel sounds normal. No masses,  No organomegaly. Extremities: Extremities normal, atraumatic, no cyanosis, 1+ LE edema. Pulses: 2+ and symmetric all extremities. Skin: Skin color, texture, turgor normal. No rashes or lesions Lymph nodes: Cervical, supraclavicular nodes normal.  
Neurologic: Grossly nonfocal  
 
 
Lab Results Component Value Date/Time Sodium 136 10/29/2018 11:57 PM  
 Potassium 4.0 10/29/2018 11:57 PM  
 Chloride 98 10/29/2018 11:57 PM  
 CO2 27 10/29/2018 11:57 PM  
 BUN 24 (H) 10/29/2018 11:57 PM  
 Creatinine 1.49 (H) 10/29/2018 11:57 PM  
 Glucose 287 (H) 10/29/2018 11:57 PM  
 Calcium 8.1 (L) 10/29/2018 11:57 PM  
 Magnesium 1.8 10/29/2018 11:57 PM  
 Phosphorus 3.5 10/29/2018 11:57 PM  
 Lactic acid 1.7 10/27/2018 12:03 AM  
 
 
Lab Results Component Value Date/Time  WBC 20.4 (H) 10/29/2018 11:57 PM  
 HGB 8.4 (L) 10/30/2018 08:35 AM  
 PLATELET 703 64/60/9190 11:57 PM  
 MCV 96.2 10/29/2018 11:57 PM  
 
 
Lab Results Component Value Date/Time INR 1.7 (H) 10/29/2018 11:57 PM  
 aPTT 46.3 (H) 10/30/2018 06:20 AM  
 AST (SGOT) 74 (H) 10/24/2018 05:00 PM  
 Alk. phosphatase 76 10/24/2018 05:00 PM  
 Protein, total 5.8 (L) 10/24/2018 05:00 PM  
 Albumin 2.7 (L) 10/24/2018 05:00 PM  
 Globulin 3.1 10/24/2018 05:00 PM  
 
 
Lab Results Component Value Date/Time Iron 66 10/25/2018 05:47 AM  
 TIBC 336 10/25/2018 05:47 AM  
 Iron % saturation 20 10/25/2018 05:47 AM  
 Ferritin 327 (H) 10/25/2018 05:47 AM  
 
 
Lab Results Component Value Date/Time TSH 1.70 10/26/2018 09:50 AM  
  
 
No results found for: PH, PHI, PCO2, PCO2I, PO2, PO2I, HCO3, HCO3I, FIO2, FIO2I Lab Results Component Value Date/Time Troponin-I, Qt. <0.05 10/25/2018 07:40 AM  
  
 
Lab Results Component Value Date/Time Culture result: LIGHT YEAST, (APPARENT CANDIDA ALBICANS) (A) 10/27/2018 05:25 PM  
 Culture result: LIGHT 
APPARENT 
2ND COLONY TYPE OF YEAST 
 10/27/2018 05:25 PM  
 Culture result: NO NORMAL RESPIRATORY GIOVANNI ISOLATED 10/27/2018 05:25 PM  
 
 
No results found for: TOXA1, RPR, HBCM, HBSAG, HAAB, HCAB1, HAAT, G6PD, CRYAC, HIVGT, HIVR, HIV1, HIV12, HIVPC, HIVRPI No results found for: VANCT, CPK Lab Results Component Value Date/Time Color YELLOW/STRAW 10/26/2018 02:47 AM  
 Appearance CLEAR 10/26/2018 02:47 AM  
 pH (UA) 5.5 10/26/2018 02:47 AM  
 Protein NEGATIVE  10/26/2018 02:47 AM  
 Glucose 250 (A) 10/26/2018 02:47 AM  
 Ketone NEGATIVE  10/26/2018 02:47 AM  
 Bilirubin NEGATIVE  10/26/2018 02:47 AM  
 Blood MODERATE (A) 10/26/2018 02:47 AM  
 Urobilinogen 0.2 10/26/2018 02:47 AM  
 Nitrites NEGATIVE  10/26/2018 02:47 AM  
 Leukocyte Esterase NEGATIVE  10/26/2018 02:47 AM  
 WBC 0-4 10/26/2018 02:47 AM  
 RBC 0-5 10/26/2018 02:47 AM  
 Bacteria NEGATIVE  10/26/2018 02:47 AM  
 
 
Images: no new images this morning IMPRESSION 
· Dyspnea with likely COPD exacerbation · Severe COPD at baseline · Acute/chronic diastolic CHF · Chest pain: resolved · Leukocytosis: improving; pansensitive ecoli · Dysphagia: likely due to thrush · Hyperkalemia: resolved, due to over repletion · Atrial fibrillation with RVR: HR currently contolled · JAZZMINE: better · Anemia/heme + stool. Has chronic intermittent visible rectal bleeding; GI has evaluated. Evaluation 2016 Dr Xuan Nielsen with EGD and colon exam.  Apparently well for couple years thereafter; Hb 11.4 earlier this year. · CAD/CHF · S/p MVR and AVR; hx of rheumatic disease · Tobacco use PLAN 
· Continue O2 and wean as able to keep sats > 90%; baseline is 3L · Diuresis with Bumex per Cardiology · Continue scheduled Duonebs · Prednisone taper · Continue Brovana/Pulmicort nebs · Po Augmentin through 11/2. · Nystatin · Cardiology following. Rate control with Cardizem po. · Replete K and Mag to keep > 4 and >2, respectively - Mag repleted today · Speech eval appreciated; outpatient ENT eval advised · Patient would benefit from outpatient pulmonary follow up and repeat PFTs · Complete smoking cessation. Patient states she is cutting back and trying to quit. · PT/OT 
· GI prophylaxis: Protonix · DVT prophylaxis: Coumadin; pharmacy to dose Patient is stable from a pulmonary standpoint. We will sign off and arrange for outpatient follow up in 2-4 weeks. Please call with questions.  
   
Kim Mendez

## 2018-10-30 NOTE — PROGRESS NOTES
Anastacio Vazquez [address] Office [phone] Fax [phone] Assessment and Plan Jack Kam is a 66 y.o. female admitted for chest pain and SOB. She has spent 6 night(s) in the hospital. 
 
24 Hour Events: No Acute events overnight Acute on Chronic Respiratory Failure due to COPD Exacerbation: Patient on 3L N/C at home. Refusing Nebulizer treatments on 10/26 with worsening respiratory function necessitating BiPAP but was weaned to 2-3L NC on 10/27. S/p Six days IV solumedrol. 
-Transfer to remote tele - START Prednisone taper today 
- Continue Duo-Nebs Q6 hr 
- Trelegy home inhaler 1 puff QHS 
- Augmentin 875-125mg q12hr through 11/1 
- Pulmonology consulted, appreciate recs 
  
Leukocytosis: It appears that pt has BL elevation in her WBC ~ 16. Elevated to 44.7 on 10/26 and now elevated to 19.1. On high dose steroids and close to baseline. Likely stress response to acute worsening in resp status. CXR on 10/26 without new PNA or worsening airspace disease. BCx NG x 4 days, Resp Cx few candida - Final. 
- Monitor with daily CBC 
- Augmentin 875-125mg q12hr through 11/1 - F/u blood Cx 
  
Atrial Fibrillation s/p MVR on Coumadin (INR POA 2.5) Pt has been seen OP by Dr Cipriano Triana but has not followed up with him since 2017. EKG POA: Afib with RVR . Coumadin home dose is 3 mg 5 days a week & 2 mg on Thurs & Fri.   
- Heparin to Warfarin bridge with Goal INR 2.5 -3.5 
- Home Diltiazem 60 mg four times daily - Cardiology consulted, appreciate recs  
- Daily BMP, Mg, Phos with repletions prn Vaginal Lesions: Not concerning for acute infection and likely not cause of WBC. Likely sebaceous cysts vs chronic folliculitis - Continue to monitor lesions for changes specifically pus, erythema, pain - Outpatient f/u with gyn if worsening 
  
CAD/HFpEF Chronic with angina: Resolved. Troponin neg x4. EKG without evidence of ischemia.  in setting of Afib. Resumed Bumex 1mg BID yesterday. - Bumex 1mg BID, back to home regimen 10/31 
- Continue to monitor and diurese as needed - Transfusion threshold <8.0 
  
Urinary Tract Infection: Resolved. S/p 5days Abx At Risk JAZZMINE: Stable. Cr downtrending off bumex now with appropriate increased after resuming bumex. Appropriate rise since resuming bumex - Caution with nephrotoxic drugs - Bumex 1mg BID 
- Daily BMP to trend Cr 
- Nephrology following, appreciate recs 
  
Hyperglycemia: Developed high glucose after receiving solumedrol. BGs slowly trending down with decrease in prednisone. Required 12U of corrective Insulin yesterday and 29U the day prior so responding to decrease in steroid. - Started ACHS glu check with corrective lispro - Continue NPH10 units BID 
- Hypoglycemia protocol placed  
  
Lower GI bleed with anemia: S/p 1 unit pRBCs on 10/27 for Hgb 7.0. Hgb stable today at 8.2. Hgb 9.6 POA, (recent OP hgb reads- 11.8, 11.4), denies any blood in stool. Haptoglobin very low and Ferritin high. Remainder of Fe studies WNL. Suggestive of hemolysis may be 2/2 MV. FOBT positive with dark stool on 10/28.  
- Daily CBC and H&H q8hr 
- PO Protonix 40mg BID 
- transfuse for HgB <8 
  
Dysphagia: States she has had dysphagia for one month now. Candidiasis likely a component but should have ENT evaluation as outpatient. Last scope with no evidence of lesions or other contributing factors. - Nystatin started for candidasis - GI consulted, appreciate recs 
  
Hypokalemia: K on POA: 2.0 RESOLVED Per chart review Hyperkalemia is a recurrent problem for pt presumably from Bumex & Albuterol use- she takes klor-con 10 mEq M, W and F, she will likely need dose readjustment on discharge. Pt was repleted in the ED with 40 mEq K Klor Con + potassium chloride 10 mEq IV X4 = total 80 mEq - BMP daily 
- repletions as needed -Nephrology following (appreciate recs) 
  
 Hypomagnesemia: Mg 1.8 and repleted. - daily BMP and replete as necessary  
  
Chronic Back Pain  
- Home Percocet 5-325 mg resumed- q6h PRN  
  
Hyperlipidemia (: TC: 196, T, HDL 88, VLDL 30 & LDL 78) - Home Lovastatin 10 mg  
  
FEN/GI - Cardiac diet. Activity - Ambulate as tolerated DVT prophylaxis - Heparin to Warfarin GI prophylaxis - Protonix 40mg PO BID Fall prophylaxis - Not indicated at this time. Disposition - Admit to Telemetry. Plan to d/c to Home with home health per PT/OT. CM consulted. Code Status - DNR, discussed with patient / caregivers. Patient will be discussed with Dr. Loyda Westbrook, supervising physician. I appreciate the opportunity to participate in the care of this patient, 
Renee Castillo MD 
Family Medicine Resident Subjective / Objective Subjective Patient comfortable this morning without SOB. Denies abdominal pain, HA. Patient concerned about bumps on her vagina. Temp (24hrs), Av.2 °F (36.8 °C), Min:97.4 °F (36.3 °C), Max:98.6 °F (37 °C) Objective: 
Vital signs: (most recent): Blood pressure 151/89, pulse 91, temperature 98.6 °F (37 °C), resp. rate 26, height 5' 4\" (1.626 m), weight 125 lb 0 oz (56.7 kg), SpO2 100 %. Respiratory: O2 Flow Rate (L/min): 2 l/min O2 Device: Nasal cannula Visit Vitals /71 (BP 1 Location: Left arm, BP Patient Position: Sitting) Pulse 90 Temp 98.5 °F (36.9 °C) Resp 16 Ht 5' 4\" (1.626 m) Wt 125 lb 0 oz (56.7 kg) SpO2 100% BMI 21.46 kg/m² General: No acute distress. Alert. Cooperative. Head: Normocephalic. Atraumatic. Eyes:              Conjunctiva pink. Sclera white. PERRL. Ears:              Ear canals patent. TM non-erythematous. Nose:             Septum midline. Mucosa pink. No drainage. Throat: Mucosa pink. Moist mucous membranes. No tonsillar exudates or erythema. Palate movement equal bilaterally. Neck: Supple. Normal ROM. No stiffness. Respiratory: Moderate air movement. Coarse breath sounds at bases bilaterally. Cardiovascular: Irregular rhythm at 74YXL. Click of mechanical valve audible on auscultation. No murmur appreciated. GI: 
 
: + bowel sounds. Nontender. No rebound tenderness or guarding. Nondistended. 3-4 palpable white lesions of inner labia bilaterally from . 5 to 1cm in diameter. Slightly TTP. Nonerythematous. Not draining. Firm and mobile. Extremities: 1+ edema in bilateral LE slightly greater on L vs R. . Distal pulses intact. Musculoskeletal: Full ROM in all extremities. Skin: Warm, dry. No rashes. Neuro:  Strength 5/5 in all extremities. I/O: 
Date 10/29/18 0700 - 10/30/18 6115 10/30/18 0700 - 10/31/18 9790 Shift 8815-0272 0123-9929 24 Hour Total 7158-2289 8958-4671 24 Hour Total  
INTAKE  
P.O. 480 450 930     
  P. O. 480 450 930     
I. V.(mL/kg/hr)  67.3(0.1) 67.3(0) Heparin Volume  67.3 67.3 Volume (0.9% sodium chloride infusion 250 mL)  0 0 Shift Total(mL/kg) 480(8.5) 517. 3(9.1) 997. 3(17.6) OUTPUT Urine(mL/kg/hr)  800(1.2) 800(0.6) Urine Occurrence(s)  2 x 2 x Urine Output (mL) (External Female Catheter 10/27/18)  800 800 Stool Stool Occurrence(s) 1 x 1 x 2 x Shift Total(mL/kg)  800(14.1) 800(14.1)  -282.7 197.3 Weight (kg) 56.7 56.7 56.7 56.7 56.7 56.7 CBC: 
Recent Labs 10/29/18 
2357 10/29/18 
1526 10/29/18 
0840 10/29/18 
0011 10/28/18 
1650 WBC 20.4*  --   --  19.1* 22.1* HGB 8.0* 8.6* 8.2* 7.9* 8.3* HCT 25.5* 26.7* 26.2* 24.8* 25.9*  
  --   --  187 198 Metabolic Panel: 
Recent Labs 10/29/18 
2357 10/29/18 
0011 10/28/18 
1650 10/28/18 
0084  136 136 137  
K 4.0 4.2 3.5 3.2*  
CL 98 99 99 99 CO2 27 28 28 31 BUN 24* 21* 23* 22* CREA 1.49* 1.15* 1.07* 1.14* * 233* 189* 188* CA 8.1* 7.9* 7.9* 7.9*  
MG 1.8 1.9  --  1.8 PHOS 3.5 2.4*  --  3.1 INR 1.7* 1.5*  --  1.7* Telemetry: Occasional PVCs overnight. Irregular rhythm at 82bpm this morning on review For Billing Chief Complaint Patient presents with  Chest Pain Hospital Problems  Date Reviewed: 10/27/2018 Codes Class Noted POA Anemia ICD-10-CM: D64.9 ICD-9-CM: 285.9  10/28/2018 Unknown GI bleed ICD-10-CM: K92.2 ICD-9-CM: 578.9  10/28/2018 Unknown * (Principal) COPD exacerbation ([facility] ICD-10-CM: J44.1 ICD-9-CM: 491.21  10/26/2018 Unknown Hypokalemia ICD-10-CM: E87.6 ICD-9-CM: 276.8  10/26/2018 Unknown JAZZMINE (acute kidney injury) ([facility] ICD-10-CM: N17.9 ICD-9-CM: 584.9  10/26/2018 Unknown Hyperglycemia ICD-10-CM: R73.9 ICD-9-CM: 790.29  10/26/2018 Unknown A-fib [facility]) ICD-10-CM: I48.91 
ICD-9-CM: 427.31  10/26/2018 Unknown Chronic anticoagulation ICD-10-CM: Z79.01 
ICD-9-CM: V58.61  10/26/2018 Unknown Hyperlipidemia ICD-10-CM: E78.5 ICD-9-CM: 272.4  10/26/2018 Unknown Chronic back pain ICD-10-CM: M54.9, G89.29 ICD-9-CM: 724.5, 338.29  10/26/2018 Unknown Chest pain ICD-10-CM: R07.9 ICD-9-CM: 786.50  10/24/2018 Unknown

## 2018-10-30 NOTE — PROGRESS NOTES
Bedside and Verbal shift change report given to Maximus Capellan RN (oncoming nurse) by Maria Del Carmen Lux RN (offgoing nurse). Report included the following information SBAR, Kardex, Intake/Output, MAR and Recent Results.

## 2018-10-30 NOTE — PROGRESS NOTES
Cardiology Progress Note       5th floor NAME:  Rajeev Steward :   1955 MRN:   [id number] Assessment/Plan:  
1. A/C diastolic CHF: LVEF 68-43% Likely exacerbated by holding diuretics in settiing af ARF. Dose IV bumex this am then resume home dose tomorrow if ok with renal.  
2. S/P mechanical MVR/AVR: cont warfarin with heparin bridge given mechanical valves INR must be btw 2.5-3.5. Nml valve function by ECHO 10/28. 3. A fib : rate stable on QID CCB 4. Anemia: hgb 8 5. JAZZMINE: renal following. 6. COPD: per pulmonary Subjective:  
Benja Sagastume a 66 y.o.   female  with PMH significant for COPD, chronic a fib on warfarin,  dCHF, s/p  mechanical valve replacement (s/p AVR and MVR) and dyslipidemia. She presented to the ED with chief complaint of chest pain. A STEMI had originally been activated however after Dr Kyra Mccloud evaluated the EKG it was cancelled. Her troponins are negative. Her cp resoved 30 minutes after she arrived and had not returned. Ángel Wong was associated with a \"lump\" in her throat.  She was found to be in a fib RVR with profoundly low K and Mg++.  She continues to smoker 5 cigarettes per day.  
  
 
Cardiology asked to resee pt due to heme + stool and forearm bleeding on warfarin/heparin bridge with INR of 1.5. Pt has had intermittent rectal bleeding previous work up in 2016 (Dr. Cassandra Naik) with EGD and colon exam.  Apparently well for couple years thereafter; Hb 11.4 earlier this year. No immediate plans for GI eval given COPD exacerbation.  
  
 
 
 
 
 
Cardiac ROS: Patient denies any exertional chest pain, dyspnea, palpitations, syncope, orthopnea, edema or paroxysmal nocturnal dyspnea. Previous Cardiac Eval 
10/25/18 ECHO: LVEF 45-50% Mild diffuse HK. Biatrial enlargement. Mitral valve: A mechanical prosthesis was present. It exhibited normal 
function. Mean transmitral gradient was 5 mmHg. Aortic valve: A mechanical prosthesis was present. It exhibited normal 
function. Transaortic velocity was within the normal range. Valve mean 
gradient was 10 mmHg. Review of Systems: No nausea, indigestion, vomiting, pain, cough, sputum. No bleeding. Taking po. Appetite ok. Up with assistance in room. Objective:  
 
Visit Vitals /71 (BP 1 Location: Left arm, BP Patient Position: Sitting) Pulse 90 Temp 98.5 °F (36.9 °C) Resp 16 Ht 5' 4\" (1.626 m) Wt 125 lb 0 oz (56.7 kg) SpO2 100% BMI 21.46 kg/m² O2 Flow Rate (L/min): 2 l/min O2 Device: Nasal cannula Temp (24hrs), Av.2 °F (36.8 °C), Min:97.4 °F (36.3 °C), Max:98.6 °F (37 °C) 
 
 
10/30 0701 - 10/30 1900 In: 480 [P.O.:480] Out: -  
 
10/28 1901 - 10/30 0700 In: 1046.9 [P.O.:930; I.V.:116.9] Out: 1850 [WVDLF:8929] TELE: a fib General: AAOx3 cooperative, no acute distress. HEENT: Atraumatic. Pink and moist.   
Neck : Supple,  
Lungs: No rales/rhonchi Heart: irregular rhythm, no murmur, nl valve click. Mild JVD. Abdomen: Soft, non-distended, non-tender. + Bowel sounds. Extremities: Trace LE edema, no clubbing. Forearms wrapped. Neurologic: Grossly intact. Alert and oriented X 3. No acute neurological distress. Psych: Fair insight. Not anxious or agitated. Care Plan discussed with: 
  Comments Patient x Family RN x Care Manager Consultant:  josie WILSON Data Review: No lab exists for component: ITNL No results for input(s): CPK, CKMB, TROIQ in the last 72 hours. Recent Labs 10/30/18 
5182 10/29/18 
2357 10/29/18 
1526  10/29/18 
0011 10/28/18 
1650  10/28/18 
5030 NA  --  136  --   --  136 136  --  137 K  --  4.0  --   --  4.2 3.5  --  3.2*  
CL  --  98  --   --  99 99  --  99  
CO2  --  27  --   --  28 28  --  31 BUN  --  24*  --   --  21* 23*  --  22* CREA  --  1.49*  --   --  1.15* 1.07*  --  1.14* GLU  --  287*  --   --  233* 189*  --  188* PHOS  --  3.5  --   --  2.4*  --   --  3.1 MG  --  1.8  --   --  1.9  --   --  1.8 WBC  --  20.4*  --   --  19.1* 22.1*  --  20.6* HGB 8.4* 8.0* 8.6*   < > 7.9* 8.3*   < > 8.2* HCT 26.8* 25.5* 26.7*   < > 24.8* 25.9*   < > 25.5* PLT  --  202  --   --  187 198  --  192  
 < > = values in this interval not displayed. Recent Labs 10/30/18 
3620 10/30/18 
7890 10/29/18 
2357  10/29/18 
0011  10/28/18 
5757 INR  --   --  1.7*  --  1.5*  --  1.7* PTP  --   --  17.0*  --  15.0*  --  16.7* APTT 46.3* 66.0* 43.5*   < > 29.3   < >  --   
 < > = values in this interval not displayed. Medications reviewed Current Facility-Administered Medications Medication Dose Route Frequency  warfarin (COUMADIN) tablet 3 mg  3 mg Oral ONCE  
 [START ON 10/31/2018] bumetanide (BUMEX) tablet 1 mg  1 mg Oral DAILY  predniSONE (DELTASONE) tablet 40 mg  40 mg Oral DAILY WITH BREAKFAST  [START ON 11/2/2018] predniSONE (DELTASONE) tablet 20 mg  20 mg Oral DAILY WITH BREAKFAST  [START ON 11/5/2018] predniSONE (DELTASONE) tablet 10 mg  10 mg Oral DAILY WITH BREAKFAST  amoxicillin-clavulanate (AUGMENTIN) 875-125 mg per tablet 1 Tab  1 Tab Oral Q12H  nystatin (MYCOSTATIN) 100,000 unit/mL oral suspension 500,000 Units  500,000 Units Oral QID  fluticasone-umeclidin-vilanter 100-62.5-25 mcg dsdv 1 Puff  (Patient Supplied)  1 Puff Inhalation DAILY  heparin 25,000 units in D5W 250 ml infusion  12-25 Units/kg/hr IntraVENous TITRATE  insulin NPH (NOVOLIN N, HUMULIN N) injection 10 Units  10 Units SubCUTAneous Q12H  Warfarin: pharmacy to dose    Other Rx Dosing/Monitoring  pantoprazole (PROTONIX) tablet 40 mg  40 mg Oral Q12H  
 0.9% sodium chloride infusion 250 mL  250 mL IntraVENous PRN  
 albuterol (PROVENTIL VENTOLIN) nebulizer solution 2.5 mg  2.5 mg Nebulization Q4H PRN  
 albuterol-ipratropium (DUO-NEB) 2.5 MG-0.5 MG/3 ML  3 mL Nebulization Q6HWA RT  
  glucose chewable tablet 16 g  4 Tab Oral PRN  
 dextrose (D50W) injection syrg 12.5-25 g  25-50 mL IntraVENous PRN  
 glucagon (GLUCAGEN) injection 1 mg  1 mg IntraMUSCular PRN  
 insulin lispro (HUMALOG) injection   SubCUTAneous AC&HS  dilTIAZem (CARDIZEM) IR tablet 60 mg  60 mg Oral QID  sodium chloride (NS) flush 5-10 mL  5-10 mL IntraVENous PRN  
 sodium chloride (NS) flush 5-10 mL  5-10 mL IntraVENous Q8H  
 docusate sodium (COLACE) capsule 100 mg  100 mg Oral QHS  lovastatin (MEVACOR) tablet 10 mg  10 mg Oral QHS  oxyCODONE-acetaminophen (PERCOCET) 5-325 mg per tablet 1 Tab  1 Tab Oral Q4H PRN Ale Hogan NP

## 2018-10-30 NOTE — ROUTINE PROCESS
Bedside shift change report given to Natty Obrien (oncoming nurse) by Jake Smith (offgoing nurse). Report included the following information SBAR, Kardex, Procedure Summary, Intake/Output, MAR, Accordion, Recent Results and Med Rec Status.

## 2018-10-30 NOTE — ROUTINE PROCESS
Bedside and Verbal shift change report given to Robbie Gee (oncoming nurse) by Jayne Rodriguez (offgoing nurse). Report included the following information SBAR, Procedure Summary, Recent Results, Med Rec Status, Pre Procedure Checklist and Quality Measures.

## 2018-10-30 NOTE — PROGRESS NOTES
[facility] Dosing Services: 10/30/2018 Consult for Warfarin Dosing by Pharmacy by Dr. Demar Serrano Consult provided for this 66 y.o. female for indication of Atrial Fibrillation and mitral/aortic  valves. (PTA regimen 2mg on Thurs/Fri and 3mg rest of week) Dose to achieve an INR goal of 2.5- 3.5 Order entered for  Warfarin  3 (mg) ordered to be given today at 18:00. Significant drug interactions: Heparin Bridge Previous dose given 3 mg yesterday PT/INR Lab Results Component Value Date/Time INR 1.7 (H) 10/29/2018 11:57 PM  
  
Platelets Lab Results Component Value Date/Time PLATELET 782 76/84/6429 11:57 PM  
  
H/H Lab Results Component Value Date/Time HGB 8.0 (L) 10/29/2018 11:57 PM  
  
 
Pharmacy to follow daily and will provide subsequent Warfarin dosing based on clinical status. MARIYA Grewal)  Contact information [phone]

## 2018-10-30 NOTE — PROGRESS NOTES
CM Note: 
Order for hh noted. A referral was sent in [address] [place] [address] to Cayla Rodriguez.   FAUSTINA Lomas

## 2018-10-30 NOTE — PROGRESS NOTES
[address]  NP 
[phone] GI PROGRESS NOTE 
 
 
NAME: Lorie Baldwin :  1955 MRN:  [id number] Subjective:  
Has pain in throat when not eating or drinking. Does not feel as though things are getting stuck. Objective: VITALS:  
Last 24hrs VS reviewed since prior progress note. Most recent are: 
Visit Vitals /88 (BP 1 Location: Left arm, BP Patient Position: Sitting) Pulse 92 Temp 98.2 °F (36.8 °C) Resp 20 Ht 5' 4\" (1.626 m) Wt 61.1 kg (134 lb 11.2 oz) SpO2 98% BMI 23.12 kg/m² Intake/Output Summary (Last 24 hours) at 10/30/2018 1614 Last data filed at 10/30/2018 1142 Gross per 24 hour Intake 997.27 ml Output 1700 ml Net -702.73 ml PHYSICAL EXAM: 
General: Alert, in no acute distress HEENT: Anicteric sclerae. Lungs:            Very diminished throughout all lung fields. Heart:  Regular  rhythm, Abdomen: Soft, Non distended, Non tender.  (+)Bowel sounds, no HSM Extremities: No c/c/e Neurologic:  CN 2-12 gi, Alert and oriented X 3. No acute neurological distress Psych:   Good insight. Not anxious nor agitated. Lab Data Reviewed:  
Recent Labs 10/30/18 
8308 10/29/18 
2357  10/29/18 
0011 WBC  --  20.4*  --  19.1* HGB 8.4* 8.0*   < > 7.9*  
HCT 26.8* 25.5*   < > 24.8* PLT  --  202  --  187  
 < > = values in this interval not displayed. Recent Labs 10/30/18 
1354 10/29/18 
2357  136  
K 3.8 4.0  
CL 97 98 CO2 30 27 BUN 25* 24* CREA 1.27* 1.49* * 287* PHOS 3.8 3.5 CA 7.8* 8.1* Recent Labs 10/30/18 
1354 ALB 3.1*  
 
 
________________________________________________________________________ Patient Active Problem List  
Diagnosis Code  Chest pain R07.9  COPD exacerbation ([facility] J44.1  Hypokalemia E87.6  JAZZMINE (acute kidney injury) ([facility] N17.9  Hyperglycemia R73.9  A[facility]fib ([facility] I48.91  
  Chronic anticoagulation Z79.01  
 Hyperlipidemia E78.5  Chronic back pain M54.9, G89.29  
 Anemia D64.9  
 GI bleed K92.2 Assessment and Plan: Dysphagia:  Likely related to esophageal candida. Risks of EGD for further evaluation are high because of her severe lung disease. One option for treatment is fluconazole but this would affect her QTC interval and interfere with her INR. Another option would be oral nystatin. Reviewed this with Dr. William Casas. Will continue to follow.  
 
  
Signed By: Marlee Marcum NP   
 10/30/2018  4:14 PM

## 2018-10-30 NOTE — PROGRESS NOTES
Nutrition Assessment: 
 
RECOMMENDATIONS/INTERVENTION(S):  
Continue Cardiac diet - added No Conc Sweets Monitor PO intakes, weight, BG- steroid induced Reweigh pt. ASSESSMENT:  
10/30: 66 yr old female admitted for SOB, Pt screened for LOS. Pt BG elevated 2/2 steroids. , 233, 189, 188 mg/dL. Pt on cardiac diet. Added No concentrated sweets to remove sugar from trays along with some desserts to help reduce BG. Noted steroid taper starting. Monitor BG. A1C <3.5, inaccurate 2/2 Hgb 8.4. Intakes ~50% per chart. Pt ate >75% of meal this morning. No weight history in chart. Please reweigh patient. SUBJECTIVE/OBJECTIVE:  
Diet Order: Cardiac(NCS) % Eaten:   
Patient Vitals for the past 168 hrs: 
 % Diet Eaten 10/30/18 0828 80 % 10/29/18 1251 40 % 10/29/18 0816 25 % 10/28/18 1530 50 % Pertinent Medications: [x] Reviewed Past Medical History:  
Diagnosis Date  History of vascular access device 10/26/2018 [facility] VAT - 4 FR Midline, 9 cm, R basilic, for DIVA Chemistries: 
Lab Results Component Value Date/Time Sodium 136 10/29/2018 11:57 PM  
 Potassium 4.0 10/29/2018 11:57 PM  
 Chloride 98 10/29/2018 11:57 PM  
 CO2 27 10/29/2018 11:57 PM  
 Anion gap 11 10/29/2018 11:57 PM  
 Glucose 287 (H) 10/29/2018 11:57 PM  
 BUN 24 (H) 10/29/2018 11:57 PM  
 Creatinine 1.49 (H) 10/29/2018 11:57 PM  
 BUN/Creatinine ratio 16 10/29/2018 11:57 PM  
 GFR est AA 43 (L) 10/29/2018 11:57 PM  
 GFR est non-AA 36 (L) 10/29/2018 11:57 PM  
 Calcium 8.1 (L) 10/29/2018 11:57 PM  
 AST (SGOT) 74 (H) 10/24/2018 05:00 PM  
 Alk. phosphatase 76 10/24/2018 05:00 PM  
 Protein, total 5.8 (L) 10/24/2018 05:00 PM  
 Albumin 2.7 (L) 10/24/2018 05:00 PM  
 Globulin 3.1 10/24/2018 05:00 PM  
 A-G Ratio 0.9 (L) 10/24/2018 05:00 PM  
 ALT (SGPT) 19 10/24/2018 05:00 PM  
  
Anthropometrics: Height: 5' 4\" (162.6 cm) Weight: 56.7 kg (125 lb 0 oz)  IBW (%IBW):   ( ) UBW (%UBW):   (  %)   
 BMI: Body mass index is 21.46 kg/m². This BMI is indicative of: 
 
 [] Underweight    [] Normal    [] Overweight    []  Obesity    []  Extreme Obesity (BMI>40) Estimated Nutrition Needs (Based on): 6840 Kcals/day(BMR(1117x1.3)) , 45 g(-57g/day(0.8-1.0g/kg)) Protein Carbohydrate: At Least 130 g/day  Fluids: 1450 mL/day (1mL/kg rounded to 50 mL) Last BM: 10/29  [x]Active     []Hyperactive  []Hypoactive       [] Absent   BS Skin:    [] Intact   [] Incision  [] Breakdown   [] DTI   [] Tears/Excoriation/Abrasion  []Edema [] Other: Wt Readings from Last 30 Encounters:  
10/28/18 56.7 kg (125 lb 0 oz) NUTRITION DIAGNOSES:  
Problem:  Altered nutrition-related lab values Etiology: related to steroid induced hyperglycemia Signs/Symptoms: as evidenced by on steroids, , 233, 189, 188mg/dL. NUTRITION INTERVENTIONS: 
Meals/Snacks: General/healthful diet   Supplements: Commercial supplement GOAL:  
Pt will consume >50% of meals and ONS with BG < 180 mg/dL w/i 3-5 days Cultural, Sabianist, or Ethnic Dietary Needs: None LEARNING NEEDS (Diet, Food/Nutrient-Drug Interaction):  
 [x] None Identified 
 [] Identified and Education Provided/Documented 
 [] Identified and Pt declined/was not appropriate [x] Interdisciplinary Care Plan Reviewed/Documented  
 [x] Discharge Needs:    TBD [] No Nutrition Related Discharge Needs NUTRITION RISK:  
Pt Is At Nutrition Risk  [x] No Nutrition Risk Identified  [] PT SEEN FOR:  
 []  MD Consult: []Calorie Count []Diabetic Diet Education []Diet Education []Electrolyte Management []General Nutrition Management and Supplements []Management of Tube Feeding []TPN Recommendations []  RN Referral:  []MST score >=2 
   []Enteral/Parenteral Nutrition PTA []Pregnant: Gestational DM or Multigestation  
              [] Pressure Ulcer []  Low BMI      [x]  Length of Stay       [] Dysphagia Diet     [] Ventilator      [] Follow-Up Previous Recommendations: 
 [] Implemented          [] Not Implemented          [x] Not Applicable Previous Goal: 
 [] Met              [] Progressing Towards Goal              [] Not Progressing Towards Goal   [x] Not Applicable Brooklyn Moncada RD Pager: [phone] Office: [phone]

## 2018-10-31 NOTE — PROGRESS NOTES
Patients lactic acid is 4.7 per Idalia from lab family practice notified stated they would come assess patient. Will continue to monitor.

## 2018-10-31 NOTE — PROGRESS NOTES
[facility] Vaz[facility] [address] Office [phone] Fax [phone] Assessment and Plan Michael Blanchard is a 66 y.o. female admitted for chest pain and SOB. She has spent 7 night(s) in the hospital. 
 
24 Hour Events: No Acute events overnight Acute on Chronic Respiratory Failure due to COPD Exacerbation: Patient on 3L N/C at home. Refusing Nebulizer treatments on 10/26 with worsening respiratory function necessitating BiPAP but was weaned to 2-3L NC on 10/27. S/p Six days IV solumedrol, no on PO steroid taper. Patient denies increased SOB this morning however looks unwell on my exam and has crackles at bilateral bases worse on left vs right. - CXR for evidence of infection or fluid overload - bumex 1mg BID today - Prednisone taper - 40 mg PO every morning (day 2/3, then 20mg x 3 days) - Continue Duo-Nebs Q6 hr 
- Trelegy home inhaler 1 puff QHS 
- Augmentin 875-125mg q12hr through 11/1 
- Pulmonology consulted, appreciate recs 
  
Leukocytosis: It appears that pt has BL elevation in her WBC ~ 16. Elevated to 44.7 on 10/26 and now elevated to 30.0. On high dose steroids and close to baseline. Likely stress response to acute worsening in resp status. CXR on 10/26 without new PNA or worsening airspace disease. BCx NG x 5 days, Resp Cx few candida - Final.  
- Repeat BCx today - Repeat CBC w diff today - Augmentin 875-125mg q12hr through 11/1 - F/u BCx from 10/26 
  
Atrial Fibrillation s/p MVR on Coumadin (INR POA 2.5) Pt has been seen OP by Dr Hemant Poole but has not followed up with him since 2017. EKG POA: Afib with RVR . Coumadin home dose is 3 mg 5 days a week & 2 mg on Thurs & Fri.  INR today 2.7 so at goal. K+ 3.2 and phos 1.9 today - repleted. - Warfarin today, dosing per pharmacy for Goal INR 2.5 -3.5 
- Home Diltiazem 60 mg four times daily - Cardiology consulted, appreciate recs  
- Daily BMP, Mg, Phos with repletions prn 
 
 Lower GI bleed with anemia: S/p 1 unit pRBCs on 10/27 for Hgb 7.0. Hgb stable today at 8.2. Hgb 9.6 POA, (recent OP hgb reads- 11.8, 11.4), denies any blood in stool. Haptoglobin very low and Ferritin high. Remainder of Fe studies WNL. Suggestive of hemolysis may be 2/2 MV. FOBT positive with dark stool on 10/28. HgB today 7.7 
- Repeat CBC at 1000 
- PO Protonix 40mg BID 
- transfuse for HgB <8 Dysphagia: States she has had dysphagia for one month now. Candidiasis likely a component but should have ENT evaluation as outpatient. Last scope with no evidence of lesions or other contributing factors. On exam diffusely tender in mouth and neck more on L vs Right. No palpable masses nor lymphadenopathy of head and neck. - SLP for swallow study - Nystatin started for candidasis - GI consulted, appreciate recs HTN: BPs trending up yesterday with /63 this morning. Ay be worsening pulmonary HTN coupled with worsening respiratory status 
- Continue to monitor BP 
- Start Lisinopril 5mg daily Vaginal Lesions: Not concerning for acute infection and likely not cause of WBC. Likely sebaceous cysts vs chronic folliculitis - Continue to monitor lesions for changes specifically pus, erythema, pain - Outpatient f/u with gyn if worsening 
  
CAD/HFpEF Chronic with angina: Resolved. Troponin neg x4. EKG without evidence of ischemia.  in setting of Afib. - Bumex 1mg BID today 
- Continue to monitor and diurese as needed - Transfusion threshold <8.0 
  
Urinary Tract Infection: Resolved. S/p 5days Rocephin, Doxy, Zosyn. Transitioned to Augmentin 10/30 
- Augmentin through 11/1 At Risk JAZZMINE: Stable. Cr downtrending off bumex now with appropriate increased after resuming bumex. Cr improved today at 1.27. Good Uop of 1500cc ovre 24 hr  Appropriate rise since resuming bumex - Caution with nephrotoxic drugs - Bumex 1mg BID today 
- Daily BMP to trend Cr 
- Nephrology following, appreciate recs 
  
 Hyperglycemia: Developed high glucose after receiving solumedrol. BGs slowly trending down with decrease in prednisone. Required 13U of corrective Insulin yesterday  
- POC Glucose ACHS + corrective insulin 
- Continue NPH10 units BID 
- Hypoglycemia protocol placed  
  
Hypokalemia: K on POA: Stable. K on arrival 2.0 then increased to 6.7 with 200mEq of repletion which was treated with D50, insulin, albuterol and kayexelate x1. Stable since with repletions prn. Per chart review Hyperkalemia is a recurrent problem for pt presumably from Bumex & Albuterol use- she takes klor-con 10 mEq M, W and F, she will likely need dose readjustment on discharge.  
- BMP daily 
- repletions as needed -Nephrology following (appreciate recs) 
  
Hypomagnesemia: Mg 1.9 and repleted. - daily BMP and replete as necessary  
  
Chronic Back Pain  
- Home Percocet 5-325 mg resumed- q6h PRN  
  
Hyperlipidemia (: TC: 196, T, HDL 88, VLDL 30 & LDL 78) - Home Lovastatin 10 mg  
  
FEN/GI - Cardiac diet. Activity - Ambulate as tolerated DVT prophylaxis - Heparin to Warfarin GI prophylaxis - Protonix 40mg PO BID Fall prophylaxis - Not indicated at this time. Disposition - Admit to Telemetry. Plan to d/c to Home with home health per PT/OT. CM consulted. Code Status - DNR, discussed with patient / caregivers. Patient will be discussed with Dr. Felisha George, supervising physician. I appreciate the opportunity to participate in the care of this patient, 
Armin Buerger, MD 
Family Medicine Resident Subjective / Objective Subjective Patient feels \"icky\" this morning. Cannot specificy what this means. States breathing is comfortable and has been off O2 since early this morning. Denies CP,HA, N/V/D. Good Urine output and having regular BMs. Has not looked at BMs to know if the steve bloody or dark. Temp (24hrs), Av °F (36.7 °C), Min:97.8 °F (36.6 °C), Max:98.2 °F (36.8 °C) Objective: Vital signs: (most recent): Blood pressure 163/63, pulse 90, temperature 97.9 °F (36.6 °C), resp. rate 22, height 5' 4\" (1.626 m), weight 134 lb 11.2 oz (61.1 kg), SpO2 99 %. Respiratory: O2 Flow Rate (L/min): 2 l/min O2 Device: Nasal cannula Visit Vitals /85 (BP 1 Location: Left arm, BP Patient Position: Sitting) Pulse 100 Temp 98 °F (36.7 °C) Resp 22 Ht 5' 4\" (1.626 m) Wt 134 lb 11.2 oz (61.1 kg) SpO2 98% BMI 23.12 kg/m² General: No acute distress. Alert. Cooperative. Head: Normocephalic. Atraumatic. Eyes:              Conjunctiva pink. Sclera white. PERRL. Ears:              Ear canals patent. TM non-erythematous. Nose:             Septum midline. Mucosa pink. No drainage. Throat: Mucosa pink. Moist mucous membranes. No tonsillar exudates or erythema. Palate movement equal bilaterally. Neck: Supple. Normal ROM. No stiffness. Respiratory: Moderate air movement. Crackles at base of Left lung. Scattered coarse breath sounds throughout. Cardiovascular: Irregular rhythm at 00LFU. Click of mechanical valve audible on auscultation. No murmur appreciated. Extremities: 1+ edema in bilateral LE slightly greater on L vs R. . Distal pulses intact. Musculoskeletal: Full ROM in all extremities. Skin: Warm, dry. No rashes. Neuro: Strength 5/5 in all extremities. I/O: 
Date 10/30/18 0700 - 10/31/18 4261 10/31/18 0700 - 11/01/18 8553 Shift [id number] 1900-0659 24 Hour Total 5077-5595 3627-6192 24 Hour Total  
INTAKE  
P.O. 480 240 720 125  125 P. O. 480 240 720 125  125  
I. V.(mL/kg/hr)  250.6(0.3) 250.6(0.2) Heparin Volume  250.6 250.6 Volume (0.9% sodium chloride infusion 250 mL)  0 0 Shift Total(mL/kg) 480(7.9) 490.6(8) 970.6(15.9) 125(2)  125(2) OUTPUT Urine(mL/kg/hr) 1250(1.7) 250(0.3) 1500(1) Urine Occurrence(s)  3 x 3 x 2 x  2 x Urine Output (mL) (External Female Catheter 10/27/18) 7866 411 7352 Stool Stool Occurrence(s) 1 x 3 x 4 x 1 x  1 x Shift Total(mL/kg) 1250(20.5) 250(4.1) 1500(24.6) NET -770 240.6 -529.4 125  125 Weight (kg) 61.1 61.1 61.1 61.1 61.1 61.1  
 
 
CBC: 
Recent Labs 10/31/18 
8326 10/31/18 
8178 10/30/18 
5288 10/29/18 
2357 WBC 30.1* 30.0*  --  20.4* HGB 7.9* 7.7* 8.4* 8.0*  
HCT 25.0* 24.3* 26.8* 25.5*  
 210  --  202 Metabolic Panel: 
Recent Labs 10/31/18 
9512 10/30/18 
1354 10/29/18 
2357 10/29/18 
0011  137 136 136  
K 3.2* 3.8 4.0 4.2 CL 96* 97 98 99 CO2 31 30 27 28 BUN 25* 25* 24* 21* CREA 1.05* 1.27* 1.49* 1.15* * 187* 287* 233* CA 8.1* 7.8* 8.1* 7.9*  
MG 1.9  --  1.8 1.9 PHOS 4.2 3.8 3.5 2.4* ALB  --  3.1*  --   --   
INR 2.7*  --  1.7* 1.5* Telemetry: Occasional PVCs overnight. Irregular rhythm at 98 bpm this morning on review For Billing Chief Complaint Patient presents with  Chest Pain Hospital Problems  Date Reviewed: 10/27/2018 Codes Class Noted POA Anemia ICD-10-CM: D64.9 ICD-9-CM: 285.9  10/28/2018 Unknown GI bleed ICD-10-CM: K92.2 ICD-9-CM: 578.9  10/28/2018 Unknown * (Principal) COPD exacerbation ([facility] ICD-10-CM: J44.1 ICD-9-CM: 491.21  10/26/2018 Unknown Hypokalemia ICD-10-CM: E87.6 ICD-9-CM: 276.8  10/26/2018 Unknown JAZZMINE (acute kidney injury) ([address] ICD-10-CM: N17.9 ICD-9-CM: 584.9  10/26/2018 Unknown Hyperglycemia ICD-10-CM: R73.9 ICD-9-CM: 790.29  10/26/2018 Unknown A-fib [facility]) ICD-10-CM: I48.91 
ICD-9-CM: 427.31  10/26/2018 Unknown Chronic anticoagulation ICD-10-CM: Z79.01 
ICD-9-CM: V58.61  10/26/2018 Unknown Hyperlipidemia ICD-10-CM: E78.5 ICD-9-CM: 272.4  10/26/2018 Unknown Chronic back pain ICD-10-CM: M54.9, G89.29 ICD-9-CM: 724.5, 338.29  10/26/2018 Unknown Chest pain ICD-10-CM: R07.9 ICD-9-CM: 786.50  10/24/2018 Unknown

## 2018-10-31 NOTE — PROGRESS NOTES
Patient lactic 5.3 per [place] in lab. Md notified no new orders given. Will continue to monitor.

## 2018-10-31 NOTE — PROGRESS NOTES
Received reconsult from MD. Discussed with MD. Continue to think candidiasis is the culprit for odynophagia. On meds per GI. Could consider ENT as IP or OP, but not urgent.

## 2018-10-31 NOTE — PROGRESS NOTES
Win Luong Georgette Zillah 81 [facility] YOB: 1955 Assessment & Plan: JAZZMINE 
- Cr better despite diuresis CHF 
- Diastolic 
- s/p Mechanical MVR/AVR Anemia COPD AFib Subjective:  
CC: f/u hyperkalemia HPI: JAZZMINE is better She became sob, got loops: better now. ROS: +sob Current Facility-Administered Medications Medication Dose Route Frequency  magnesium sulfate 1 g/100 ml IVPB (premix or compounded)  1 g IntraVENous ONCE  
 lisinopril (PRINIVIL, ZESTRIL) tablet 5 mg  5 mg Oral DAILY  bumetanide (BUMEX) tablet 1 mg  1 mg Oral ONCE  potassium chloride (KLOR-CON) packet 40 mEq  40 mEq Oral ONCE  
 bumetanide (BUMEX) tablet 1 mg  1 mg Oral DAILY  melatonin tablet 3 mg  3 mg Oral QHS PRN  predniSONE (DELTASONE) tablet 40 mg  40 mg Oral DAILY WITH BREAKFAST  [START ON 11/2/2018] predniSONE (DELTASONE) tablet 20 mg  20 mg Oral DAILY WITH BREAKFAST  [START ON 11/5/2018] predniSONE (DELTASONE) tablet 10 mg  10 mg Oral DAILY WITH BREAKFAST  amoxicillin-clavulanate (AUGMENTIN) 875-125 mg per tablet 1 Tab  1 Tab Oral Q12H  nystatin (MYCOSTATIN) 100,000 unit/mL oral suspension 500,000 Units  500,000 Units Oral QID  fluticasone-umeclidin-vilanter 100-62.5-25 mcg dsdv 1 Puff  (Patient Supplied)  1 Puff Inhalation DAILY  heparin 25,000 units in D5W 250 ml infusion  12-25 Units/kg/hr IntraVENous TITRATE  insulin NPH (NOVOLIN N, HUMULIN N) injection 10 Units  10 Units SubCUTAneous Q12H  Warfarin: pharmacy to dose    Other Rx Dosing/Monitoring  pantoprazole (PROTONIX) tablet 40 mg  40 mg Oral Q12H  
 0.9% sodium chloride infusion 250 mL  250 mL IntraVENous PRN  
 albuterol (PROVENTIL VENTOLIN) nebulizer solution 2.5 mg  2.5 mg Nebulization Q4H PRN  
 albuterol-ipratropium (DUO-NEB) 2.5 MG-0.5 MG/3 ML  3 mL Nebulization Q6HWA RT  
 glucose chewable tablet 16 g  4 Tab Oral PRN  
  dextrose (D50W) injection syrg 12.5-25 g  25-50 mL IntraVENous PRN  
 glucagon (GLUCAGEN) injection 1 mg  1 mg IntraMUSCular PRN  
 insulin lispro (HUMALOG) injection   SubCUTAneous AC&HS  dilTIAZem (CARDIZEM) IR tablet 60 mg  60 mg Oral QID  sodium chloride (NS) flush 5-10 mL  5-10 mL IntraVENous PRN  
 sodium chloride (NS) flush 5-10 mL  5-10 mL IntraVENous Q8H  
 docusate sodium (COLACE) capsule 100 mg  100 mg Oral QHS  lovastatin (MEVACOR) tablet 10 mg  10 mg Oral QHS  oxyCODONE-acetaminophen (PERCOCET) 5-325 mg per tablet 1 Tab  1 Tab Oral Q4H PRN Objective:  
 
Vitals: 
Blood pressure 163/85, pulse 100, temperature 98 °F (36.7 °C), resp. rate 22, height 5' 4\" (1.626 m), weight 61.1 kg (134 lb 11.2 oz), SpO2 98 %. Temp (24hrs), Av °F (36.7 °C), Min:97.8 °F (36.6 °C), Max:98.2 °F (36.8 °C) Intake and Output: 
No intake/output data recorded. 10/29 1901 - 10/31 0700 In: 1487.9 [P.O.:1170; I.V.:317.9] Out: 2300 [DTLSY:5988] Physical Exam:              
GENERAL ASSESSMENT: NAD 
CHEST: on oxygen, rare crackles HEART: tachy, ABDOMEN: Soft,NT 
EXTREMITY: 1 EDEMA 
 
 
   
ECG/rhythm: 
 
Data Review No results for input(s): TNIPOC in the last 72 hours. No lab exists for component: ITNL No results for input(s): CPK, CKMB, TROIQ in the last 72 hours. Recent Labs 10/31/18 
6672 10/30/18 
1354 10/30/18 
4669 10/29/18 
3697  10/29/18 
0011  137  --  136  --  136  
K 3.2* 3.8  --  4.0  --  4.2 CL 96* 97  --  98  --  99  
CO2 31 30  --  27  --  28  
BUN 25* 25*  --  24*  --  21* CREA 1.05* 1.27*  --  1.49*  --  1.15* * 187*  --  287*  --  233* PHOS 4.2 3.8  --  3.5  --  2.4* MG 1.9  --   --  1.8  --  1.9 CA 8.1* 7.8*  --  8.1*  --  7.9* ALB  --  3.1*  --   --   --   --   
WBC 30.0*  --   --  20.4*  --  19.1* HGB 7.7*  --  8.4* 8.0*   < > 7.9*  
HCT 24.3*  --  26.8* 25.5*   < > 24.8*  
  --   --  -   < > = values in this interval not displayed. Recent Labs 10/31/18 
3175 10/30/18 
2150 10/30/18 
1354  10/29/18 
2357  10/29/18 
0011 INR 2.7*  --   --   --  1.7*  --  1.5* PTP 26.2*  --   --   --  17.0*  --  15.0* APTT 56.7* 63.8* 41.3*   < > 43.5*   < > 29.3  
 < > = values in this interval not displayed. Needs: urine analysis, urine sodium, protein and creatinine Lab Results Component Value Date/Time Sodium,urine random 8 10/26/2018 02:47 AM  
 Creatinine, urine 60.34 10/26/2018 02:47 AM  
 
 
 
: Maree Rinne, MD 
10/31/2018 [facility]: 
[email or web address] office: 
[address], [facility] [address] [place] [address] Phone: [phone] Fax :     [phone] [place] office: 
[address] [facility], [address] Phone - [phone] Fax - [phone]

## 2018-10-31 NOTE — NURSE NAVIGATOR
Chart reviewed by Heart Failure Nurse Navigator. Heart Failure database completed. EF:  45 to 50% with mild diffuse hypokinesis, normal function of mechanical AVR and MVR. ACEi/ARB/ARNi: lisinopril 5 mg daily. BB: not indicated. Aldosterone Antagonist: not indicated. CRT not indicated. NYHA Functional Class III. Chronic COPD contributes to symptoms. Heart Failure Teach Back in Patient Education. Heart Failure Avoiding Triggers on Discharge Instructions. Cardiologist: Dr. Caro Murphy

## 2018-10-31 NOTE — ROUTINE PROCESS
Bedside shift change report given to Josue Shahid (oncoming nurse) by Jo Abdullahi (offgoing nurse). Report included the following information SBAR, Kardex, ED Summary, Intake/Output, MAR, Accordion and Recent Results.

## 2018-10-31 NOTE — PROGRESS NOTES
Spiritual Care Assessment/Progress Note [address] 
 
 
NAME: Umm Fleming      MRN: [id number] AGE: 66 y.o. SEX: female Congregation Affiliation: No preference Language: English  
 
10/31/2018     Total Time (in minutes): 25 Spiritual Assessment begun in [facility] 5M1 MED SURG 1 through conversation with: 
  
    [x]Patient        [] Family    [] Friend(s) Reason for Consult: Initial/Spiritual assessment, patient floor Spiritual beliefs: (Please include comment if needed) [x] Identifies with a henok tradition:     
   [x] Supported by a henok community:        
   [] Claims no spiritual orientation:       
   [] Seeking spiritual identity:            
   [] Adheres to an individual form of spirituality:       
   [] Not able to assess:                   
 
    
Identified resources for coping:  
   [x] Prayer                           
   [] Music                  [] Guided Imagery [x] Family/friends                 [] Pet visits [] Devotional reading                         [] Unknown 
   [] Other:                                          
 
 
Interventions offered during this visit: (See comments for more details) Patient Interventions: Affirmation of emotions/emotional suffering, Affirmation of henok, Catharsis/review of pertinent events in supportive environment, Iconic (affirming the presence of God/Higher Power), Normalization of emotional/spiritual concerns, Life review/legacy, Prayer (actual), Prayer (assurance of), Congregation beliefs/image of God discussed Plan of Care: 
 
 [x] Support spiritual and/or cultural needs  
 [] Support AMD and/or advance care planning process    
 [] Support grieving process 
 [] Coordinate Rites and/or Rituals  
 [] Coordination with community clergy [] No spiritual needs identified at this time 
 [] Detailed Plan of Care below (See Comments)  [] Make referral to Music Therapy 
[] Make referral to Pet Therapy [] Make referral to Addiction services 
[] Make referral to [facility] 
[] Make referral to Spiritual Care Partner 
[] No future visits requested       
[] Follow up visits as needed Comments:  is visiting for an initial spiritual assessment with pt in room 502. Pt shared she is doing better and talked about her family and grand children. Conversation and prayer provided. Chaplain Chilango Frankel M.Div, M.S, [address] Spiritual Care available at [phone])

## 2018-10-31 NOTE — PROGRESS NOTES
[address] Medicine Residency Resident Note in Brief 
---------------------------------------- 
 
S: 
 
- Pt examined at bedside with Dr. Mateus Reich - Pt is receiving neb treatment at this time - She denies SOB, CP or N/V  
 
 
O: 
Visit Vitals /58 (BP 1 Location: Left arm, BP Patient Position: At rest;Sitting) Pulse 82 Temp 98.1 °F (36.7 °C) Resp 25 Ht 5' 4\" (1.626 m) Wt 138 lb 10.7 oz (62.9 kg) SpO2 95% BMI 23.80 kg/m² General: No acute distress. Alert. Cooperative. Head: Normocephalic. Atraumatic. Respiratory: Moderate air movement. Faint crackles scattered at bases bilaterally. Cardiovascular: Irregular rhythm. . Click of mechanical valve audible on auscultation. No murmur appreciated. Extremities: 2+ edema in bilateral LE. Mary Franklin Distal pulses intact. TORSTEN/ 
 
Rama Damian is a 66 y.o. female admitted for chest pain and SOB. She is currently being treated for acute on chronic respiratory failure due to COPD Exacerbation Lactic Acid Elevation today: 3.5-->4.7-->5. 3  
- s/p 1000 mL of NS today - ABG shows improvement since 10/24: pH 7.48, pCO2 36, pO2 86, O2 sat 97 - Will give another 250 cc NS bolus and monitor LA with 4 hour checks (Due to CHF we need to be careful with fluid boluses and perform frequent physical exams) Please see full daily progress note for complete plan.  
Wong Osorio MD 
7:34 PM

## 2018-10-31 NOTE — PROGRESS NOTES
Cardiology Progress Note       5th floor NAME:  Kal Rodriguez :   1955 MRN:   [id number] Assessment/Plan:  
1. A/C diastolic CHF: Volume status improved. She has chronic LE edema due to venous insufficiency. Resume maintenance diuretic - bumex 1 mg/d 
2. S/P mechanical MVR/AVR: INr 2.7 cont warfarin with heparin bridge given mechanical valves INR must be btw 2.5-3.5. Nml valve function by ECHO 10/28. 3. Chronic A fib : rate controlled on QID CCB 4. Anemia: hgb down to 7.7 5. JAZZMINE: Cr better 6. COPD: improved but limiting Cardiac wise she seems relatively stable. Will defer FEN and anticoag management to [facility] team. I will be happy to see again in hospital as needed. Vivian Macedo MD 
 
Subjective:  
Enrique Chandler a 66 y.o.   female  with PMH significant for COPD, chronic a fib on warfarin,  dCHF, s/p  mechanical valve replacement (s/p AVR and MVR) and dyslipidemia. She presented to the ED with chief complaint of chest pain. A STEMI had originally been activated however after Dr Bebeto Vieyra evaluated the EKG it was cancelled. Her troponins are negative. Her cp resoved 30 minutes after she arrived and had not returned. Kerry Jacome was associated with a \"lump\" in her throat.  She was found to be in a fib RVR with profoundly low K and Mg++.  She continues to smoker 5 cigarettes per day.  
  
 
Cardiology asked to resee pt due to heme + stool and forearm bleeding on warfarin/heparin bridge with INR of 1.5. Pt has had intermittent rectal bleeding previous work up in 2016 (Dr. Ricardo So) with EGD and colon exam.  Apparently well for couple years thereafter; Hb 11.4 earlier this year. No immediate plans for GI eval given COPD exacerbation.  
  
 
 
 
 
 
Cardiac ROS: stable class 3-4 HA, chronic orthopnea. Chronic LE edema. Not on oxygen. Feels jittery. Patient denies any exertional chest pain, , palpitations, syncope, , or paroxysmal nocturnal dyspnea. Previous Cardiac Eval 
10/25/18 ECHO: LVEF 45-50% Mild diffuse HK. Biatrial enlargement. Mitral valve: A mechanical prosthesis was present. It exhibited normal 
function. Mean transmitral gradient was 5 mmHg. Aortic valve: A mechanical prosthesis was present. It exhibited normal 
function. Transaortic velocity was within the normal range. Valve mean 
gradient was 10 mmHg. Review of Systems: No nausea, indigestion, vomiting, pain, cough, sputum. No bleeding. Taking po. Appetite ok. Up with assistance in room. Objective:  
 
Visit Vitals /85 (BP 1 Location: Left arm, BP Patient Position: Sitting) Pulse 100 Temp 98 °F (36.7 °C) Resp 22 Ht 5' 4\" (1.626 m) Wt 134 lb 11.2 oz (61.1 kg) SpO2 98% BMI 23.12 kg/m² O2 Flow Rate (L/min): 2 l/min O2 Device: Nasal cannula Temp (24hrs), Av °F (36.7 °C), Min:97.8 °F (36.6 °C), Max:98.2 °F (36.8 °C) No intake/output data recorded. 10/29 1901 - 10/31 0700 In: 1487.9 [P.O.:1170; I.V.:317.9] Out: 2300 [VADBU:0589] TELE: a fib General: AAOx3 cooperative, no acute distress. HEENT: Atraumatic. Pink and moist.   
Neck : Supple,  
Lungs: No rales/rhonchi Heart: irregular rhythm, no murmur, nl valve click. Mild JVD. Abdomen: Soft, non-distended, non-tender. + Bowel sounds. Extremities: Trace LE edema, no clubbing. Forearms wrapped. Neurologic: Grossly intact. Alert and oriented X 3. No acute neurological distress. Psych: Fair insight. Not anxious or agitated. Care Plan discussed with: 
  Comments Patient x Family RN x Care Manager Consultant:  josie WILSON Data Review: No lab exists for component: ITNL No results for input(s): CPK, CKMB, TROIQ in the last 72 hours. Recent Labs 10/31/18 
6531 10/30/18 
1354 10/30/18 
8415 10/29/18 
2357  10/29/18 
0011  137  --  136  --  136  
K 3.2* 3.8  --  4.0  --  4.2 CL 96* 97  --  98  --  99  
CO2 31 30  --    --    
 BUN 25* 25*  --  24*  --  21* CREA 1.05* 1.27*  --  1.49*  --  1.15* * 187*  --  287*  --  233* PHOS 4.2 3.8  --  3.5  --  2.4* MG 1.9  --   --  1.8  --  1.9 ALB  --  3.1*  --   --   --   --   
WBC 30.0*  --   --  20.4*  --  19.1* HGB 7.7*  --  8.4* 8.0*   < > 7.9*  
HCT 24.3*  --  26.8* 25.5*   < > 24.8*  
  --   --  202  --  187  
 < > = values in this interval not displayed. Recent Labs 10/31/18 
1090 10/30/18 
2150 10/30/18 
1354  10/29/18 
2357  10/29/18 
0011 INR 2.7*  --   --   --  1.7*  --  1.5* PTP 26.2*  --   --   --  17.0*  --  15.0* APTT 56.7* 63.8* 41.3*   < > 43.5*   < > 29.3  
 < > = values in this interval not displayed. Medications reviewed Current Facility-Administered Medications Medication Dose Route Frequency  magnesium sulfate 1 g/100 ml IVPB (premix or compounded)  1 g IntraVENous ONCE  potassium chloride (KLOR-CON) packet 40 mEq  40 mEq Oral NOW  lisinopril (PRINIVIL, ZESTRIL) tablet 5 mg  5 mg Oral DAILY  bumetanide (BUMEX) tablet 1 mg  1 mg Oral ONCE  potassium chloride (KLOR-CON) packet 40 mEq  40 mEq Oral ONCE  
 bumetanide (BUMEX) tablet 1 mg  1 mg Oral DAILY  melatonin tablet 3 mg  3 mg Oral QHS PRN  predniSONE (DELTASONE) tablet 40 mg  40 mg Oral DAILY WITH BREAKFAST  [START ON 11/2/2018] predniSONE (DELTASONE) tablet 20 mg  20 mg Oral DAILY WITH BREAKFAST  [START ON 11/5/2018] predniSONE (DELTASONE) tablet 10 mg  10 mg Oral DAILY WITH BREAKFAST  amoxicillin-clavulanate (AUGMENTIN) 875-125 mg per tablet 1 Tab  1 Tab Oral Q12H  nystatin (MYCOSTATIN) 100,000 unit/mL oral suspension 500,000 Units  500,000 Units Oral QID  fluticasone-umeclidin-vilanter 100-62.5-25 mcg dsdv 1 Puff  (Patient Supplied)  1 Puff Inhalation DAILY  heparin 25,000 units in D5W 250 ml infusion  12-25 Units/kg/hr IntraVENous TITRATE  insulin NPH (NOVOLIN N, HUMULIN N) injection 10 Units  10 Units SubCUTAneous Q12H  Warfarin: pharmacy to dose    Other Rx Dosing/Monitoring  pantoprazole (PROTONIX) tablet 40 mg  40 mg Oral Q12H  
 0.9% sodium chloride infusion 250 mL  250 mL IntraVENous PRN  
 albuterol (PROVENTIL VENTOLIN) nebulizer solution 2.5 mg  2.5 mg Nebulization Q4H PRN  
 albuterol-ipratropium (DUO-NEB) 2.5 MG-0.5 MG/3 ML  3 mL Nebulization Q6HWA RT  
 glucose chewable tablet 16 g  4 Tab Oral PRN  
 dextrose (D50W) injection syrg 12.5-25 g  25-50 mL IntraVENous PRN  
 glucagon (GLUCAGEN) injection 1 mg  1 mg IntraMUSCular PRN  
 insulin lispro (HUMALOG) injection   SubCUTAneous AC&HS  dilTIAZem (CARDIZEM) IR tablet 60 mg  60 mg Oral QID  sodium chloride (NS) flush 5-10 mL  5-10 mL IntraVENous PRN  
 sodium chloride (NS) flush 5-10 mL  5-10 mL IntraVENous Q8H  
 docusate sodium (COLACE) capsule 100 mg  100 mg Oral QHS  lovastatin (MEVACOR) tablet 10 mg  10 mg Oral QHS  oxyCODONE-acetaminophen (PERCOCET) 5-325 mg per tablet 1 Tab  1 Tab Oral Q4H PRN Ivone Pedroza MD

## 2018-10-31 NOTE — PROGRESS NOTES
Brief Progress Note for Transition of Care Patient is currently stable. Updates to daily plan below: - CXR this morning unchanged from previous imaging. 
- SLP suggested outpatient ENT referral for dysphagia. 
- HgB 7.9 this morning - stable; repeat CBC tomorrow AM per routine - pulmonology to see patient today 
- following blood cultures drawn today (10/31) Remainder of treatment plan per daily progress note. Oncoming FMS team to assume care from night residents beginning tomorrow 11/1/18. Lakshmi Macdonald MD 
[facility]

## 2018-10-31 NOTE — PROGRESS NOTES
Family Practice was notified regarding of patient's PT level (53.8) and the dose cannot be increased at this time due to out of range (12-25).

## 2018-10-31 NOTE — ROUTINE PROCESS
2135 - Heparin drip paused to obtain PTT 
2150 - Heparin drip restarted 0330 - Heparin drip paused to obtain PTT 
0445 - Heparin drip restarted.

## 2018-10-31 NOTE — PROGRESS NOTES
[facility] Dosing Services: 10/31/2018 Consult for Warfarin Dosing by Pharmacy by Dr. Richards [facility] Consult provided for this 66 y.o. female for indication of Atrial Fibrillation and mitral/aortic  valves. (PTA regimen 2mg on Thurs/Fri and 3mg rest of week) Dose to achieve an INR goal of 2.5- 3.5 Order entered for  Warfarin  2.5  (mg) ordered to be given today at 18:00. Will monitor INR closely as INR was 1.7 on 10/29 , if INR continues to increase quickly will give lower dose Significant drug interactions: Heparin Bridge, prednisone Previous dose given 3 mg yesterday PT/INR Lab Results Component Value Date/Time INR 2.7 (H) 10/31/2018 03:43 AM  
  
Platelets Lab Results Component Value Date/Time PLATELET 396 34/18/6460 09:11 AM  
  
H/H Lab Results Component Value Date/Time HGB 7.9 (L) 10/31/2018 09:11 AM  
  
 
Pharmacy to follow daily and will provide subsequent Warfarin dosing based on clinical status. ANDI Prescott  Contact information [phone]

## 2018-10-31 NOTE — PROGRESS NOTES
Problem: Mobility Impaired (Adult and Pediatric) Goal: *Acute Goals and Plan of Care (Insert Text) Physical Therapy Goals Initiated 10/25/2018 1. Patient will move from supine to sit and sit to supine , scoot up and down and roll side to side in bed with independence within 7 day(s). 2.  Patient will transfer from bed to chair and chair to bed with independence using the least restrictive device within 7 day(s). 3.  Patient will perform sit to stand with independence within 7 day(s). 4.  Patient will ambulate with modified independence for 100 feet with the least restrictive device within 7 day(s). 5.  Patient will ascend/descend 4 stairs with  handrail(s) with modified independence within 7 day(s). physical Therapy TREATMENT Patient: David Griffin (38 y.o. female) Date: 10/31/2018 Diagnosis: Chest pain COPD exacerbation ([facility] [address] Precautions:   
Chart, physical therapy assessment, plan of care and goals were reviewed. ASSESSMENT: 
Pt comes to stand with CGA. Pt ambulated 60ft with RW slightly flexed posture and  slow pace CGA on 2L of O2. Pt left sitting. Pt tolerated treatment fairly well. Continue goals. Progression toward goals: 
[]    Improving appropriately and progressing toward goals 
[]    Improving slowly and progressing toward goals 
[]    Not making progress toward goals and plan of care will be adjusted PLAN: 
Patient continues to benefit from skilled intervention to address the above impairments. Continue treatment per established plan of care. Discharge Recommendations:  Home Health  To Be Determined Further Equipment Recommendations for Discharge:  rolling walker SUBJECTIVE:  
 
 
OBJECTIVE DATA SUMMARY:  
Critical Behavior: 
Neurologic State: Sleeping Orientation Level: Oriented X4 Cognition: Appropriate decision making, Appropriate for age attention/concentration, Appropriate safety awareness, Recognition of people/places, Follows commands Safety/Judgement: Awareness of environment, Insight into deficits, Good awareness of safety precautions, Home safety Functional Mobility Training: 
Bed Mobility: 
  
  
  
  
  
  
Transfers: 
Sit to Stand: Contact guard assistance Stand to Sit: Contact guard assistance Balance: 
Sitting: Intact Standing: Intact; Without support Standing - Static: Fair;GoodAmbulation/Gait Training: 
Distance (ft): 60 Feet (ft) Assistive Device: Walker, rolling;Gait belt Ambulation - Level of Assistance: Contact guard assistance Base of Support: Narrowed Speed/Stephanie: Pace decreased (<100 feet/min) Step Length: Right shortened;Left shortened Pain: 
Pain Scale 1: Numeric (0 - 10) Pain Intensity 1: 2 Pain Location 1: Back;Arm Pain Orientation 1: Posterior Pain Description 1: Aching Pain Intervention(s) 1: Medication (see MAR) Activity Tolerance:  
Pt tolerated treatment fairly well. Please refer to the flowsheet for vital signs taken during this treatment. After treatment:  
[x]    Patient left in no apparent distress sitting up in chair 
[]    Patient left in no apparent distress in bed 
[]    Call bell left within reach 
[]    Nursing notified 
[]    Caregiver present 
[]    Bed alarm activated COMMUNICATION/COLLABORATION:  
The patients plan of care was discussed with: Physical Therapist 
 
Ranjeet Adams PTA Time Calculation: 23 mins

## 2018-10-31 NOTE — PROGRESS NOTES
[address] [facility] NP 
[phone] GI PROGRESS NOTE 
 
 
NAME: Kal Rodriguez :  1955 MRN:  [id number] Subjective:  
Breathing is better. Has generalized tenderness throughout her abdomen. Denies constipation. Objective: VITALS:  
Last 24hrs VS reviewed since prior progress note. Most recent are: 
Visit Vitals /60 (BP 1 Location: Right arm, BP Patient Position: Sitting) Pulse 91 Temp 97.7 °F (36.5 °C) Resp 23 Ht 5' 4\" (1.626 m) Wt 61.1 kg (134 lb 11.2 oz) SpO2 97% BMI 23.12 kg/m² Intake/Output Summary (Last 24 hours) at 10/31/2018 1334 Last data filed at 10/31/2018 1033 Gross per 24 hour Intake 615.61 ml Output 600 ml Net 15.61 ml PHYSICAL EXAM: 
General: Alert, in no acute distress HEENT: Anicteric sclerae. Lungs:            CTA Bilaterally. Heart:  Regular  rhythm, Abdomen: Soft, distended, Generalized tenderness to palpation. (+)Bowel sounds, no HSM Extremities: No c/c/e Neurologic:  CN 2-12 gi, Alert and oriented X 3. No acute neurological distress Psych:   Good insight. Not anxious nor agitated. Lab Data Reviewed:  
Recent Labs 10/31/18 
0911 10/31/18 
6355 WBC 30.1* 30.0* HGB 7.9* 7.7* HCT 25.0* 24.3*  
 210 Recent Labs 10/31/18 
0343 10/30/18 
1354  137  
K 3.2* 3.8 CL 96* 97  
CO2 31 30 BUN 25* 25* CREA 1.05* 1.27* * 187* PHOS 4.2 3.8  
CA 8.1* 7.8* Recent Labs 10/30/18 
1354 ALB 3.1*  
 
 
________________________________________________________________________ Patient Active Problem List  
Diagnosis Code  Chest pain R07.9  COPD exacerbation ([address] J44.1  Hypokalemia E87.6  JAZZMINE (acute kidney injury) ([facility] [address] N17.9  Hyperglycemia R73.9  A-fib (HCC) I48.91  
 Chronic anticoagulation Z79.01  
 Hyperlipidemia E78.5  Chronic back pain M54.9, G89.29  
 Anemia D64.9  
 GI bleed K92.2 Assessment and Plan: Dysphagia / Odynophagia:  Likely 2/2 esophageal candidiasis. Continue Nystatin swish and swallow. Anemia:  Hemoglobin is 7.9. RN reports dark stools but no black and tarry stools or bright red blood. Continue to trend hemoglobin and transfuse to goal of 7. No plans for endoscopic evaluation at this time because of her pulmonary disease. Abdominal Pain: new since yesterday. She denies constipation.   
- KUB to further evaluate. Will continue to follow.  
 
  
Signed By: Edilberto To NP   
 10/31/2018  1:34 PM

## 2018-10-31 NOTE — PROGRESS NOTES
Occupational Therapy Note; Pt unavailable at present, receiving procedure. Will attempt OT later as able.

## 2018-10-31 NOTE — ROUTINE PROCESS
Bedside and Verbal shift change report given to July (oncoming nurse) by Lili Peoples (offgoing nurse). Report included the following information SBAR, Kardex and Recent Results.

## 2018-10-31 NOTE — PROGRESS NOTES
CM Note: 
Pt accepted by [facility]. Not medically stable for d/c today.   FAUSTINA Lomas

## 2018-11-01 NOTE — PROGRESS NOTES
[facility] Dosing Services: 11/1/2018 Consult for Warfarin Dosing by Pharmacy by Dr. Mayte Bagley Consult provided for this 66 y.o. female for indication of Atrial Fibrillation and mitral/aortic  valves. (PTA regimen 2mg on Thurs/Fri and 3mg rest of week) Dose to achieve an INR goal of 2.5- 3.5 Due to rapidly rising INR will hold tonights dose, INR likely to continue to increase. Will monitor continue to monitor INR, CBC and s/sx of bleeding. Recommend lower dose when restarting warfarin. Significant drug interactions: prednisone, augmentin, lovastatin Previous dose given 3 mg yesterday PT/INR Lab Results Component Value Date/Time INR 3.4 (H) 11/01/2018 03:47 AM  
  
Platelets Lab Results Component Value Date/Time PLATELET 631 91/26/6921 03:47 AM  
  
H/H Lab Results Component Value Date/Time HGB 7.1 (L) 11/01/2018 03:47 AM  
  
 
Pharmacy to follow daily and will provide subsequent Warfarin dosing based on clinical status. ANDI Johnson  Contact information [phone]

## 2018-11-01 NOTE — PROGRESS NOTES
[address] [facility] NP 
[phone] GI PROGRESS NOTE 
 
 
NAME: Isabel Reese :  1955 MRN:  [id number] Subjective:  
Breathing is better. Abdominal pain is better. No bowel movements overnight or this morning. Objective:  
KUB not done yesterday as she was in chair and it takes her a while to recover after activity. VITALS:  
Last 24hrs VS reviewed since prior progress note. Most recent are: 
Visit Vitals /63 (BP 1 Location: Left arm, BP Patient Position: At rest) Pulse 73 Temp 97.5 °F (36.4 °C) Resp 22 Ht 5' 4\" (1.626 m) Wt 62.9 kg (138 lb 10.7 oz) SpO2 98% BMI 23.80 kg/m² Intake/Output Summary (Last 24 hours) at 2018 1006 Last data filed at 2018 8991 Gross per 24 hour Intake 448.77 ml Output 3050 ml Net -2601.23 ml PHYSICAL EXAM: 
General: Alert, in no acute distress HEENT: Anicteric sclerae. Lungs:            Diminished throughout all lung fields Heart:  Regular  rhythm, Abdomen: Soft, distended, generalized tenderness to palpation.   (+)Bowel sounds, no HSM Extremities: No c/c/e Neurologic:  CN 2-12 gi, Alert and oriented X 3. No acute neurological distress Psych:   Good insight. Not anxious nor agitated. Lab Data Reviewed:  
Recent Labs 18 
5630 10/31/18 
6790 WBC 22.2* 30.1* HGB 7.1* 7.9*  
HCT 22.3* 25.0*  
 223 Recent Labs 18 
077 [id number] 10/31/18 
9411  136  
K 3.8 3.2*  
 96* CO2 35* 31 BUN 22* 25* CREA 0.92 1.05* GLU 92 129* PHOS 3.6 4.2 CA 7.8* 8.1* Recent Labs 18 
0347 10/30/18 
1354 ALB 2.4* 3.1*  
 
 
________________________________________________________________________ Patient Active Problem List  
Diagnosis Code  Chest pain R07.9  [facility] exacerbation ([facility] J44.1  Hypokalemia E87.6  JAZZMINE (acute kidney injury) ([facility] N17.9  Hyperglycemia R73.9  A-fib ([facility] I48.91  
  Chronic anticoagulation Z79.01  
 Hyperlipidemia E78.5  Chronic back pain M54.9, G89.29  
 Anemia D64.9  
 GI bleed K92.2 Assessment and Plan: Dysphagia / Odynophagia:  Likely 2/2 esophageal candidiasis. Continue Nystatin swish and swallow. 
  
Anemia:  Hemoglobin is 7.1 today. No bowel movements overnight or this morning. Recommend continued close monitoring for active bleeding and trending of HgB as risks for procedural sedation with her severe lung disease are high. 
  
Abdominal Pain: Improving. Unable to do KUB yesterday.   Pt will try again this morning.  
- Follow results of KUB. 
  
 
 
 
  
Signed By: Edilberto To NP   
 11/1/2018  10:06 AM

## 2018-11-01 NOTE — PROGRESS NOTES
Anastacio Vazquez [address] Office [phone] Fax [phone] Assessment and Plan Renee Acuna is a 66 y.o. female admitted for chest pain and SOB. She has spent 8 night(s) in the hospital. 
 
24 Hour Events: No Acute events overnight Acute on Chronic Respiratory Failure due to COPD Exacerbation: Patient on 3L N/C at home. Refusing Nebulizer treatments on 10/26 with worsening respiratory function necessitating BiPAP but was weaned to 2-3L NC on 10/27. S/p Six days IV solumedrol, now on PO steroid taper. Patient denies increased SOB this morning however looks unwell on my exam and has crackles at bilateral bases worse on left vs right. - CXR for evidence of infection or fluid overload - Continue bumex 1mg daily today - Prednisone taper - 40mg today, then 20mg PO today for 3 days - Continue Duo-Nebs Q6 hr 
- Trelegy home inhaler 1 puff QHS 
- Augmentin 875-125mg q12hr through 11/1 
- Pulmonology consulted, appreciate recs 
  
Leukocytosis: It appears that pt has BL elevation in her WBC ~ 16. Elevated to 44.7 on 10/26 and now elevated to 30.0. On high dose steroids and close to baseline. Likely stress response to acute worsening in resp status. CXR on 10/26 without new PNA or worsening airspace disease. BCx NG x 5 days, Resp Cx few candida - Final.  
- Blood cultures pending - Augmentin 875-125mg q12hr through 11/1 
  
Atrial Fibrillation s/p MVR on Coumadin (INR POA 2.5) Pt has been seen OP by Dr Julia Marks but has not followed up with him since 2017. EKG POA: Afib with RVR . Coumadin home dose is 3 mg 5 days a week & 2 mg on Thurs & Fri.  INR today 2.7 so at goal. K+ 3.2 and phos 1.9 today - repleted. - On Warfarin, dosing per pharmacy for Goal INR 2.5 -3.5 
- Home Diltiazem 60 mg four times daily - Cardiology consulted, appreciate recs Lower GI bleed with anemia: S/p 1 unit pRBCs on 10/27 for Hgb 7.0.  Hgb stable today at 8.2. Hgb 9.6 POA, (recent OP hgb reads- 11.8, 11.4), denies any blood in stool. Haptoglobin very low and Ferritin high. Remainder of Fe studies WNL. Suggestive of hemolysis may be 2/2 MV. FOBT positive with dark stool on 10/28. HgB today 7.7 
- PO Protonix 40mg BID 
- transfuse for HgB <7 per GI Dysphagia: States she has had dysphagia for one month now. Candidiasis likely a component but should have ENT evaluation as outpatient. Last scope with no evidence of lesions or other contributing factors. On exam diffusely tender in mouth and neck more on L vs Right. No palpable masses nor lymphadenopathy of head and neck. - SLP; recommend nystatin and ENT OP follow up - Nystatin started for candidasis - GI consulted, appreciate recs -KUB to evaluate abdominal pain pending HTN: BPs trending up yesterday with /63 this morning. Ay be worsening pulmonary HTN coupled with worsening respiratory status 
- Continue to monitor BP 
- Continue Lisinopril 5mg daily Vaginal Lesions: Not concerning for acute infection and likely not cause of WBC. Likely sebaceous cysts vs chronic folliculitis - Continue to monitor lesions for changes specifically pus, erythema, pain - Outpatient f/u with gyn if worsening 
  
CAD/HFpEF Chronic with angina: Resolved. Troponin neg x4. EKG without evidence of ischemia.  in setting of Afib. - Bumex 1mg daily 
- Continue to monitor and diurese as needed 
  
Urinary Tract Infection: Resolved. S/p 5days Rocephin, Doxy, Zosyn. Transitioned to Augmentin 10/30 
- Augmentin through 11/1 At Risk JAZZMINE: Stable. Cr downtrending off bumex now with appropriate increased after resuming bumex. Cr improved today at 1.27. Good Uop of 1500cc ovre 24 hr  Appropriate rise since resuming bumex - Caution with nephrotoxic drugs - Bumex 1mg daily - Nephrology following, appreciate recs 
  
Hyperglycemia: Developed high glucose after receiving solumedrol.  BGs slowly trending down with decrease in prednisone.  
- POC Glucose ACHS + corrective insulin 
- Continue NPH10 units BID 
- Hypoglycemia protocol placed  
  
Hypokalemia: K on POA: Stable. K on arrival 2.0 then increased to 6.7 with 200mEq of repletion which was treated with D50, insulin, albuterol and kayexelate x1. Stable since with repletions prn. Per chart review Hyperkalemia is a recurrent problem for pt presumably from Bumex & Albuterol use- she takes klor-con 10 mEq M, W and F, she will likely need dose readjustment on discharge.  
  
Hypomagnesemia:  
- daily BMP and replete as necessary  
  
Chronic Back Pain  
- Home Percocet 5-325 mg resumed- q6h PRN  
  
Hyperlipidemia (: TC: 196, T, HDL 88, VLDL 30 & LDL 78) - Home Lovastatin 10 mg  
  
FEN/GI - Cardiac diet. Activity - Ambulate as tolerated DVT prophylaxis - Heparin to Warfarin GI prophylaxis - Protonix 40mg PO BID Fall prophylaxis - Not indicated at this time. Disposition - Admit to Telemetry. Plan to d/c to Home with home health per PT/OT. CM consulted. Code Status - DNR, discussed with patient / caregivers. Patient will be discussed with Dr. Alicia Medina, supervising physician. Eli Houser MD 
Family Medicine Resident Subjective / Objective Subjective Patient resting comfortably. She has been off her oxygen and not feeling short of breath. She denies any chest pain or abdominal pain this morning. She continues to feel like 'something is stuck' in her throat. Temp (24hrs), Av °F (36.7 °C), Min:97.5 °F (36.4 °C), Max:98.4 °F (36.9 °C) Objective: 
Vital signs: (most recent): Blood pressure 98/53, pulse 89, temperature 98.3 °F (36.8 °C), resp. rate 23, height 5' 4\" (1.626 m), weight 138 lb 10.7 oz (62.9 kg), SpO2 90 %. Respiratory: O2 Flow Rate (L/min): 2 l/min O2 Device: Nasal cannula Visit Vitals /63 (BP 1 Location: Left arm, BP Patient Position: At rest) Pulse 73 Temp 97.5 °F (36.4 °C) Resp 22 Ht 5' 4\" (1.626 m) Wt 138 lb 10.7 oz (62.9 kg) SpO2 98% BMI 23.80 kg/m² General: No acute distress. Alert. Cooperative. Head: Normocephalic. Atraumatic. Eyes:              Conjunctiva pink. Sclera white. PERRL. Ears:              Ear canals patent. TM non-erythematous. Nose:             Septum midline. Mucosa pink. No drainage. Throat: Mucosa pink. Moist mucous membranes. No tonsillar exudates or erythema. Palate movement equal bilaterally. Neck: Supple. Normal ROM. No stiffness. Respiratory: Equal air movement. Wheezes in left lung. Cardiovascular: Irregular rhythm in low 100s. Click of mechanical valve audible on auscultation. No murmur appreciated. Extremities: 1+ edema in bilateral LE slightly greater on L vs R. . Distal pulses intact. Musculoskeletal: Full ROM in all extremities. Skin: Warm, dry. No rashes. Neuro: Strength 5/5 in all extremities. I/O: 
Date 10/31/18 0700 - 11/01/18 0963 11/01/18 0700 - 11/02/18 5868 Shift 5380-4179 5975-2159 24 Hour Total 3529-1558 4541-1853 24 Hour Total  
INTAKE  
P.O. 325  325     
  P.O. 325  325     
I. V.(mL/kg/hr)  123.8(0.2) 123.8(0.1) Heparin Volume  123.8 123.8 Shift Total(mL/kg) 325(5.2) 123.8(2) 448. 8(7.1) OUTPUT Urine(mL/kg/hr) 1100(1.5) 1950(2.6) 3050(2) Urine Voided  850 850 Urine Occurrence(s) 3 x  3 x Urine Output (mL) (External Female Catheter 10/27/18) 1100 1100 2200 Stool Stool Occurrence(s) 2 x  2 x Shift Total(mL/kg) 1100(17.5) [id number]) [id number]) NET -456 -1826.2 -2601.2 Weight (kg) 62.9 62.9 62.9 62.9 62.9 62.9  
 
 
CBC: 
Recent Labs 11/01/18 
3661 10/31/18 
0911 10/31/18 
3164 WBC 22.2* 30.1* 30.0* HGB 7.1* 7.9* 7.7* HCT 22.3* 25.0* 24.3*  
 223 210 Metabolic Panel: 
Recent Labs 11/01/18 
7779 10/31/18 
3065 10/30/18 
1354 10/29/18 
2357  136 137 136 K 3.8 3.2* 3.8 4.0  
 96* 97 98 CO2 35* 31 30 27 BUN 22* 25* 25* 24* CREA 0.92 1.05* 1.27* 1.49* GLU 92 129* 187* 287* CA 7.8* 8.1* 7.8* 8.1*  
MG 1.6 1.9  --  1.8 PHOS 3.6 4.2 3.8 3.5 ALB 2.4*  --  3.1*  --   
INR 3.4* 2.7*  --  1.7* Telemetry: A.fib in low 100s this morning on review For Billing Chief Complaint Patient presents with  Chest Pain Hospital Problems  Date Reviewed: 10/27/2018 Codes Class Noted POA Anemia ICD-10-CM: D64.9 ICD-9-CM: 285.9  10/28/2018 Unknown GI bleed ICD-10-CM: K92.2 ICD-9-CM: 578.9  10/28/2018 Unknown * (Principal) COPD exacerbation ([facility] ICD-10-CM: J44.1 ICD-9-CM: 491.21  10/26/2018 Unknown Hypokalemia ICD-10-CM: E87.6 ICD-9-CM: 276.8  10/26/2018 Unknown JAZZMINE (acute kidney injury) ([facility] ICD-10-CM: N17.9 ICD-9-CM: 584.9  10/26/2018 Unknown Hyperglycemia ICD-10-CM: R73.9 ICD-9-CM: 790.29  10/26/2018 Unknown A-fib [facility]) ICD-10-CM: I48.91 
ICD-9-CM: 427.31  10/26/2018 Unknown Chronic anticoagulation ICD-10-CM: Z79.01 
ICD-9-CM: V58.61  10/26/2018 Unknown Hyperlipidemia ICD-10-CM: E78.5 ICD-9-CM: 272.4  10/26/2018 Unknown Chronic back pain ICD-10-CM: M54.9, G89.29 ICD-9-CM: 724.5, 338.29  10/26/2018 Unknown Chest pain ICD-10-CM: R07.9 ICD-9-CM: 786.50  10/24/2018 Unknown

## 2018-11-01 NOTE — PROGRESS NOTES
Physical Therapy 11/1/218 Chart reviewed and cleared with RN. Pt reports she should be discharging today and politely declining therapy session to save energy for KUB testing and stairs upon returning home. Will follow up tomorrow if pt does not discharge.   
 
Thank Carlitos Prasad, PT, DPT

## 2018-11-01 NOTE — DISCHARGE INSTRUCTIONS
HOME DISCHARGE INSTRUCTIONS    Danilo Nunez / [id number] : 1955    Admission date: 10/24/2018 Discharge date: 2018     Please bring this form with you to show your care provider at your follow-up appointment. Primary care provider:  Talat Carrillo DO    Discharging provider:  Ericka Gomez MD  - Family Medicine Resident  Dr. Artem Armenta - Attending, Family Medicine     You have been admitted to the hospital with the following diagnoses:    ACUTE DIAGNOSES:  Chest pain  . . . . . . . . . . . . . . . . . . . . . . . . . . . . . . . . . . . . . . . . . . . . . . . . . . . . . . . . . . . . . . . . . . . . . . . Monica Cota FOLLOW-UP CARE RECOMMENDATIONS  Appointments  Follow-up Information     Follow up With Specialties Details Why Contact Info    Talat Carrillo DO Family Practice Go on 2018 9am for [address]  [phone]      Adonis Oneil MD Otolaryngology Schedule an appointment as soon as possible for a visit For throat discomfort [address] [place] [address],[address]  [facility]  [phone]             Please follow up with your PCP regarding:  COPD exacerbation   Elevated blood pressure    MEDICATION CHANGES:  1. Steroid Taper   Prednisone 10mg tabs:  2: 2 tabs (20mg)   3: 2 tabs (20mg)  4: 2 tabs (20mg)   5: 1 tab (10mg)   6: 1 tab (10mg)  7: 1 tab (10mg)    Dispense 9 tabs    2. Continue taking Lisinopril 5mg by mouth daily for high blood pressure    3. Continue using Nystatin swish and swallow 4 times a day for throat discomfort     Follow-up tests needed: None    Pending test results: At the time of your discharge the following test results are still pending: Blood cultures  Please make sure you review these results with your outpatient follow-up provider(s).     Specific symptoms to watch for: chest pain, shortness of breath, fever, chills, nausea, vomiting, diarrhea, change in mentation, falling, weakness, bleeding. DIET/what to eat:  Cardiac Diet    ACTIVITY:  Activity as tolerated    Wound care: None    Equipment needed:  Rolling walker    What to do if new or unexpected symptoms occur? If you experience any of the above symptoms (or should other concerns or questions arise after discharge) please call your primary care physician. Return to the emergency room if you cannot get hold of your doctor. · It is very important that you keep your follow-up appointment(s). · Please bring discharge papers, medication list (and/or medication bottles) to your follow-up appointments for review by your outpatient provider(s). · Please check the list of medications and be sure it includes every medication (even non-prescription medications) that your provider wants you to take. · It is important that you take the medication exactly as they are prescribed. · Keep your medication in the bottles provided by the pharmacist and keep a list of the medication names, dosages, and times to be taken in your wallet. · Do not take other medications without consulting your doctor. · If you have any questions about your medications or other instructions, please talk to your nurse or care provider before you leave the hospital.     Information obtained by:     I understand that if any problems occur once I am at home I am to contact my physician. These instructions were explained to me and I had the opportunity to ask questions. I understand and acknowledge receipt of the instructions indicated above.                                                                                                                                                Physician's or R.N.'s Signature                                                                  Date/Time                                                                                                                                              Patient or Representative Signature                                                          Date/Time             Avoiding Triggers With Heart Failure: Care Instructions  Your Care Instructions    Triggers are anything that make your heart failure flare up. A flare-up is also called \"sudden heart failure\" or \"acute heart failure. \" When you have a flare-up, fluid builds up in your lungs, and you have problems breathing. You might need to go to the hospital. By watching for changes in your condition and avoiding triggers, you can prevent heart failure flare-ups. Follow-up care is a key part of your treatment and safety. Be sure to make and go to all appointments, and call your doctor if you are having problems. It's also a good idea to know your test results and keep a list of the medicines you take. How can you care for yourself at home? Watch for changes in your weight and condition  · Weigh yourself without clothing at the same time each day. Record your weight. Call your doctor if you have sudden weight gain, such as more than 2 to 3 pounds in a day or 5 pounds in a week. (Your doctor may suggest a different range of weight gain.) A sudden weight gain may mean that your heart failure is getting worse. · Keep a daily record of your symptoms. Write down any changes in how you feel, such as new shortness of breath, cough, or problems eating. Also record if your ankles are more swollen than usual and if you feel more tired than usual. Note anything that you ate or did that could have triggered these changes. Limit sodium  Sodium causes your body to hold on to extra water. This may cause your heart failure symptoms to get worse. People get most of their sodium from processed foods. Fast food and restaurant meals also tend to be very high in sodium. · Your doctor may suggest that you limit sodium to 2,000 milligrams (mg) a day or less.  That is less than 1 teaspoon of salt a day, including all the salt you eat in cooking or in packaged foods.  · Read food labels on cans and food packages. They tell you how much sodium you get in one serving. Check the serving size. If you eat more than one serving, you are getting more sodium. · Be aware that sodium can come in forms other than salt, including monosodium glutamate (MSG), sodium citrate, and sodium bicarbonate (baking soda). MSG is often added to Asian food. You can sometimes ask for food without MSG or salt. · Slowly reducing salt will help you adjust to the taste. Take the salt shaker off the table. · Flavor your food with garlic, lemon juice, onion, vinegar, herbs, and spices instead of salt. Do not use soy sauce, steak sauce, onion salt, garlic salt, mustard, or ketchup on your food, unless it is labeled \"low-sodium\" or \"low-salt. \"  · Make your own salad dressings, sauces, and ketchup without adding salt. · Use fresh or frozen ingredients, instead of canned ones, whenever you can. Choose low-sodium canned goods. · Eat less processed food and food from restaurants, including fast food. Exercise as directed  Moderate, regular exercise is very good for your heart. It improves your blood flow and helps control your weight. But too much exercise can stress your heart and cause a heart failure flare-up. · Check with your doctor before you start an exercise program.  · Walking is an easy way to get exercise. Start out slowly. Gradually increase the length and pace of your walk. Swimming, riding a bike, and using a treadmill are also good forms of exercise. · When you exercise, watch for signs that your heart is working too hard. You are pushing yourself too hard if you cannot talk while you are exercising. If you become short of breath or dizzy or have chest pain, stop, sit down, and rest.  · Do not exercise when you do not feel well. Take medicines correctly  · Take your medicines exactly as prescribed. Call your doctor if you think you are having a problem with your medicine.   · Make a list of all the medicines you take. Include those prescribed to you by other doctors and any over-the-counter medicines, vitamins, or supplements you take. Take this list with you when you go to any doctor. · Take your medicines at the same time every day. It may help you to post a list of all the medicines you take every day and what time of day you take them. · Make taking your medicine as simple as you can. Plan times to take your medicines when you are doing other things, such as eating a meal or getting ready for bed. This will make it easier to remember to take your medicines. · Get organized. Use helpful tools, such as daily or weekly pill containers. When should you call for help? Call 911 if you have symptoms of sudden heart failure such as:    · You have severe trouble breathing.     · You cough up pink, foamy mucus.     · You have a new irregular or rapid heartbeat.    Call your doctor now or seek immediate medical care if:    · You have new or increased shortness of breath.     · You are dizzy or lightheaded, or you feel like you may faint.     · You have sudden weight gain, such as more than 2 to 3 pounds in a day or 5 pounds in a week. (Your doctor may suggest a different range of weight gain.)     · You have increased swelling in your legs, ankles, or feet.     · You are suddenly so tired or weak that you cannot do your usual activities.    Watch closely for changes in your health, and be sure to contact your doctor if you develop new symptoms. Where can you learn more? Go to http://hayden-дмитрий.info/. Enter X521 in the search box to learn more about \"Avoiding Triggers With Heart Failure: Care Instructions. \"  Current as of: December 6, 2017  Content Version: 11.8  © 9325-4771 [employer]. Care instructions adapted under license by [employer] (which disclaims liability or warranty for this information).  If you have questions about a medical condition or this instruction, always ask your healthcare professional. [place] [address] any warranty or liability for your use of this information.

## 2018-11-01 NOTE — ROUTINE PROCESS
Bedside and Verbal shift change report given to Britt Li RN by Cortney Benitez RN. Report included the following information SBAR, Kardex, MAR, Accordion, Recent Results and Med Rec Status.

## 2018-11-01 NOTE — PROGRESS NOTES
Win Luong Georgette Bryan [address] YOB: 1955 Assessment & Plan: JAZZMINE 
- Cr normal 
- we will see PRN 
- Thank you for allowing us to participate in this patient's care. CHF 
- Diastolic 
- s/p Mechanical MVR/AVR Anemia COPD AFib Subjective:  
CC: f/u hyperkalemia HPI: JAZZMINE RESOLVED She became sob, got loops: better now. ROS: +sob Current Facility-Administered Medications Medication Dose Route Frequency  magnesium sulfate 2 g/50 ml IVPB (premix or compounded)  2 g IntraVENous ONCE  
 lisinopril (PRINIVIL, ZESTRIL) tablet 5 mg  5 mg Oral DAILY  docusate sodium (COLACE) capsule 100 mg  100 mg Oral DAILY PRN  
 albuterol (PROVENTIL VENTOLIN) nebulizer solution 2.5 mg  2.5 mg Nebulization Q6HWA RT  
 bumetanide (BUMEX) tablet 1 mg  1 mg Oral DAILY  melatonin tablet 3 mg  3 mg Oral QHS PRN  
 [START ON 11/2/2018] predniSONE (DELTASONE) tablet 20 mg  20 mg Oral DAILY WITH BREAKFAST  [START ON 11/5/2018] predniSONE (DELTASONE) tablet 10 mg  10 mg Oral DAILY WITH BREAKFAST  amoxicillin-clavulanate (AUGMENTIN) 875-125 mg per tablet 1 Tab  1 Tab Oral Q12H  nystatin (MYCOSTATIN) 100,000 unit/mL oral suspension 500,000 Units  500,000 Units Oral QID  fluticasone-umeclidin-vilanter 100-62.5-25 mcg dsdv 1 Puff  (Patient Supplied)  1 Puff Inhalation DAILY  insulin NPH (NOVOLIN N, HUMULIN N) injection 10 Units  10 Units SubCUTAneous Q12H  Warfarin: pharmacy to dose    Other Rx Dosing/Monitoring  pantoprazole (PROTONIX) tablet 40 mg  40 mg Oral Q12H  
 glucose chewable tablet 16 g  4 Tab Oral PRN  
 dextrose (D50W) injection syrg 12.5-25 g  25-50 mL IntraVENous PRN  
 glucagon (GLUCAGEN) injection 1 mg  1 mg IntraMUSCular PRN  
 insulin lispro (HUMALOG) injection   SubCUTAneous AC&HS  dilTIAZem (CARDIZEM) IR tablet 60 mg  60 mg Oral QID  sodium chloride (NS) flush 5-10 mL  5-10 mL IntraVENous PRN  
 sodium chloride (NS) flush 5-10 mL  5-10 mL IntraVENous Q8H  
 lovastatin (MEVACOR) tablet 10 mg  10 mg Oral QHS  oxyCODONE-acetaminophen (PERCOCET) 5-325 mg per tablet 1 Tab  1 Tab Oral Q4H PRN Objective:  
 
Vitals: 
Blood pressure 109/63, pulse 73, temperature 97.5 °F (36.4 °C), resp. rate 22, height 5' 4\" (1.626 m), weight 62.9 kg (138 lb 10.7 oz), SpO2 98 %. Temp (24hrs), Av °F (36.7 °C), Min:97.5 °F (36.4 °C), Max:98.4 °F (36.9 °C) Intake and Output: 
No intake/output data recorded. 10/30 1901 -  0700 In: 939.4 [P.O.:565; I.V.:374.4] Out: 3300 [[id number]] Physical Exam:              
GENERAL ASSESSMENT: NAD 
CHEST: on oxygen, rare crackles HEART: tachy, ABDOMEN: Soft,NT 
EXTREMITY: 1 EDEMA 
 
 
   
ECG/rhythm: 
 
Data Review No results for input(s): TNIPOC in the last 72 hours. No lab exists for component: ITNL No results for input(s): CPK, CKMB, TROIQ in the last 72 hours. Recent Labs 18 
0846 10/31/18 
0911 10/31/18 
3569 10/30/18 
1354  10/29/18 
2357   --  136 137  --  136  
K 3.8  --  3.2* 3.8  --  4.0  
  --  96* 97  --  98  
CO2 35*  --  31 30  --  27 BUN 22*  --  25* 25*  --  24* CREA 0.92  --  1.05* 1.27*  --  1.49* GLU 92  --  129* 187*  --  287* PHOS 3.6  --  4.2 3.8  --  3.5 MG 1.6  --  1.9  --   --  1.8  
CA 7.8*  --  8.1* 7.8*  --  8.1* ALB 2.4*  --   --  3.1*  --   --   
WBC 22.2* 30.1* 30.0*  --   --  20.4* HGB 7.1* 7.9* 7.7*  --    < > 8.0*  
HCT 22.3* 25.0* 24.3*  --    < > 25.5*  
 223 210  --   --  202  
 < > = values in this interval not displayed. Recent Labs 18 
7923 10/31/18 
1131 10/31/18 
0343 10/30/18 
2150  10/29/18 
2597 INR 3.4*  --  2.7*  --   --  1.7* PTP 32.5*  --  26.2*  --   --  17.0* APTT  --  53.8* 56.7* 63.8*   < > 43.5*  
 < > = values in this interval not displayed. Needs: urine analysis, urine sodium, protein and creatinine Lab Results Component Value Date/Time Sodium,urine random 8 10/26/2018 02:47 AM  
 Creatinine, urine 60.34 10/26/2018 02:47 AM  
 
 
 
: Emilia Daniel MD 
11/1/2018 [facility]: 
[email or web address] [email or web address] Post office: 
[address] [place] [address], [address] Phone: [phone] Fax :     [phone] [place] office: 
[address] [facility] [address] Phone - [phone] Fax - [phone]

## 2018-11-02 NOTE — PROGRESS NOTES
[address] [facility] Nursing Note 
[phone] Fax [phone] Patient Name: Sharmin Torres YOB: 1955 Hospital Discharge Follow-Up Date/Time:  2018 12:04 PM 
 
Patient was admitted to [facility] on 10/24/18 and discharged on 18 for COPD exacerbation, UTI and JAZZMINE. The physician discharge summary was available at the time of outreach. Patient was contacted within 2 business days of discharge. Top Challenges reviewed with the provider CBC, BMP(evaluate Cr function), INR check  recommended at PCP appt. 10/31 Blood cultures were pending at discharge; please review final results when available; she has completed a course of Augmentin Dysphagia work up needed-declined making appt stating since using swish and swallow symptoms have resolved. States trial inhaler is almost out. Will need to re-evaluate since patient state out of pocket expense would be $180. Prednisone taper to end 18. Forearm skin tears receiving wound care from [facility]. Method of communication with provider :chart routing Inpatient RRAT score: 17 Was this a readmission? no  
Patient stated reason for the readmission: N/A Nurse Navigator (NN) contacted the patient by telephone to perform post hospital discharge assessment. Verified name and  with patient as identifiers. Provided introduction to self, and explanation of the Nurse Navigator role. Reviewed discharge instructions and red flags with patient who verbalized understanding. Patient given an opportunity to ask questions and does not have any further questions or concerns at this time. The patient agrees to contact the PCP office for questions related to their healthcare. NN provided contact information for future reference. Disease Specific:   COPD Summary of patient's top problems: 
1. COPD 2. Dysphagia work up Home Health orders at discharge: PT, OT 
 [address]: Hugo Davies Date of initial visit: 11/1 Durable Medical Equipment ordered/company: N/A Barriers to care? None identified at this time Advance Care Planning:  
Does patient have an Advance Directive:  not on file ; providing patient with blue folder at [facility] appt with Dr Lisa Traore. Medication(s):  
New Medications at Discharge: N/A Changed Medications at Discharge: Lisinopril 5 mg, Prednisone taper and Nystatin swish and swallow Discontinued Medications at Discharge: N/A Medication reconciliation was performed with patient, who verbalizes understanding of administration of home medications. There were no barriers to obtaining medications identified at this time. Referral to Pharm D needed: no  
 
Current Outpatient Medications Medication Sig  
 lisinopril (PRINIVIL, ZESTRIL) 5 mg tablet Take 1 Tab by mouth daily.  predniSONE (DELTASONE) 10 mg tablet Prednisone 10mg tabs: 11/2: 2 tabs (20mg) 11/3: 2 tabs (20mg) 11/4: 2 tabs (20mg) 11/5: 1 tab (10mg) 11/6: 1 tab (10mg) 11/7: 1 tab (10mg)  nystatin (MYCOSTATIN) 100,000 unit/mL suspension Take 5 mL by mouth four (4) times daily. swish and spit  oxyCODONE-acetaminophen (PERCOCET) 5-325 mg per tablet Take 1 Tab by mouth every six (6) hours as needed for Pain for up to 30 days. Max Daily Amount: 4 Tabs. Must last 30 days  bumetanide (BUMEX) 1 mg tablet Take 1 mg by mouth nightly.  dilTIAZem (CARDIZEM) 60 mg tablet Take 60 mg by mouth four (4) times daily.  docusate sodium (COLACE) 100 mg capsule Take 100 mg by mouth nightly.  lovastatin (MEVACOR) 10 mg tablet Take 10 mg by mouth nightly.  oxyCODONE-acetaminophen (PERCOCET) 5-325 mg per tablet Take 1 Tab by mouth every four (4) hours as needed for Pain.  warfarin (COUMADIN) 1 mg tablet Take 2 mg by mouth two (2) times a week. Take 2 mg on Thursday & Friday Take 3 mg all other days  fluticasone-umeclidin-vilanter (TRELEGY ELLIPTA) 100-62.5-25 mcg dsdv Take 1 Puff by inhalation nightly.  albuterol (PROAIR HFA) 90 mcg/actuation inhaler Take 2 Puffs by inhalation every four (4) hours as needed for Wheezing.  potassium chloride SR (KLOR-CON 10) 10 mEq tablet Take 10 mEq by mouth every Monday, Wednesday, Friday.  warfarin (COUMADIN) 1 mg tablet Take 3 mg by mouth five (5) days a week. Take 2 mg on Thursday & Friday Take 3 mg all other days  fluticasone-umeclidin-vilanter (TRELEGY ELLIPTA) 100-62.5-25 mcg dsdv Take 1 Puff by inhalation daily.  warfarin (COUMADIN) 1 mg tablet 3mg every day except Tuesday Thursday take 1mg f/u 1 week  budesonide-formoterol (SYMBICORT) 160-4.5 mcg/actuation HFAA INHALE 2 PUFFS TWICE DAILY  ibuprofen (MOTRIN) 200 mg tablet Take 400 mg by mouth every six (6) hours as needed for Pain.  bumetanide (BUMEX) 1 mg tablet Take 1 Tab by mouth every evening.  albuterol (VENTOLIN HFA) 90 mcg/actuation inhaler Take 2 Puffs by inhalation every four (4) hours as needed for Wheezing or Shortness of Breath.  guaiFENesin-dextromethorphan SR (MUCINEX DM) 600-30 mg per tablet Take 1 Tab by mouth two (2) times a day.  nystatin (MYCOSTATIN) 100,000 unit/mL suspension Take 5 ml by mouth and swish and split 4 times daily from 7/31/2018 to 8/4/2018  Indications: oral candidiasis  predniSONE (DELTASONE) 10 mg tablet Resume on 8/13/2018 after completing prednisone taper  docusate sodium (COLACE) 100 mg capsule Take 100 mg by mouth every evening.  potassium chloride SR (KLOR-CON 10) 10 mEq tablet Take 10 mEq by mouth every Monday, Wednesday, Friday. Indications: Patient takes in the AM with meals three times weekly  lovastatin (MEVACOR) 10 mg tablet TAKE 1 TABLET EVERY NIGHT  dilTIAZem (CARDIZEM) 60 mg tablet TAKE 1 TABLET BEFORE BREAKFAST, LUNCH, DINNER AND AT BEDTIME No current facility-administered medications for this visit. [facility] follow up appointment(s):  
Future Appointments Date Time Provider Jerry Archuletai 11/8/2018  9:00 AM Hunter French, DO [facility] [address] [facility]:  n/a Patricia Quiet Patricia Quiet Goals  Attends follow-up appointments as directed. 11/2- Follow up appt scheduled with PCP 11/8/18. She is to make an appt with Dr Karl Schwartz, Otolaryngologist for dysphagia work up. 11/7- After discussing swallowing issues today with Ms Amish Briggs she states that the problem has resolved since taking the Nystatin swish and swallow and will not be following up with Dr Karl Schwartz.  Completion of an ACP   
  11/2/18 Will evaluate interest and send ACP information. 11/7- Providing Dr Lazarus Monae with [facility]. folder for patient to review. If interested, will contact ACP facilitator.  Supportive resources in place to maintain patient in the community (ie. Home Health, DME equipment, refer to, medication assistant plan, etc.)   
  11/2 [address] [place] [address] to start today. PT and OT. Needs wound care to forearms and assistance with walking. Will evaluate post home health visit. 11/7- States HH was to order wound care supplies and they did not. Nurse was out yesterday and will follow up again on Saturday to evaluate wound and place dressings if necessary.

## 2018-11-08 NOTE — PROGRESS NOTES
Chief Complaint Patient presents with  
[facility] Follow Up  
 Medication Refill Patient presents in office today for PERALES DONATO for hospital stay from [facility] for COPD exacerbation. Admitted 10/24/18 and discharged 11/1/18. Needs refills on lisinopril and Bumex. No concerns. 1. Have you been to the ER, urgent care clinic since your last visit? Hospitalized since your last visit? Yes 10/24/18 at [facility] for COPD exacerbation. 2. Have you seen or consulted any other health care providers outside of the [address] [place] [address] since your last visit? Include any pap smears or colon screening. No  
 
Learning Assessment 7/3/2018 PRIMARY LEARNER Patient HIGHEST LEVEL OF EDUCATION - PRIMARY LEARNER  GRADUATED HIGH SCHOOL OR GED  
BARRIERS PRIMARY LEARNER NONE  
CO-LEARNER CAREGIVER No  
PRIMARY LANGUAGE ENGLISH  
LEARNER PREFERENCE PRIMARY DEMONSTRATION  
LEARNING SPECIAL TOPICS no  
ANSWERED BY pt  
RELATIONSHIP SELF

## 2018-11-08 NOTE — PATIENT INSTRUCTIONS
Breathing Techniques for COPD: Care Instructions Your Care Instructions Breathing is hard when you have chronic obstructive pulmonary disease (COPD). You may take quick, short breaths. Breathing this way makes it harder to get air into your lungs. Learning new ways to control your breathing may help. You may feel better and be able to do more. You can try three basic ways to control your breathing. They are pursed-lip breathing, diaphragmatic breathing, and breathing while bending. Use these methods when you are more short of breath than normal. Practice them often so you can do them well. Follow-up care is a key part of your treatment and safety. Be sure to make and go to all appointments, and call your doctor if you are having problems. It's also a good idea to know your test results and keep a list of the medicines you take. How can you care for yourself at home? · Pursed-lip breathing helps you breathe more air out so that your next breath can be deeper. For this type of breathing, you breathe in through your nose and out through your mouth while almost closing your lips. Breathe in for about 2 seconds, and breathe out for 4 to 6 seconds. Pursed-lip breathing decreases shortness of breath and improves your ability to exercise. · Diaphragmatic breathing helps your lungs expand so that they take in more air. ? Lie on your back, or prop yourself up on several pillows. ? Put one hand on your belly and the other on your chest. When you breathe in, push your belly out as far as possible. You should feel the hand on your belly move out, while the hand on your chest does not move. ? When you breathe out, you should feel the hand on your belly move in. When you can do this type of breathing well while lying down, learn to do it while sitting or standing. Many people with COPD find this breathing method helpful. ? Practice diaphragmatic breathing for 20 minutes, 2 or 3 times a day. · Breathing while bending forward at the waist may make breathing easier. It can reduce shortness of breath while you exercise or rest. It helps the diaphragm move more easily. The diaphragm is a large muscle that separates your lungs from your belly. It helps draw air into your lungs as you breathe. · Do not smoke. Smoking makes COPD worse. If you need help quitting, talk to your doctor about stop-smoking programs and medicines. These can increase your chances of quitting for good. When should you call for help? Call your doctor now or seek immediate medical care if: 
  · Your breathing methods do not help.  
  · Your shortness of breath gets worse.  
  · You cough up blood.  
  · You have swelling in your belly and legs.  
  · You have severe chest pain.  
 Watch closely for changes in your health, and be sure to contact your doctor if you have any problems. Where can you learn more? Go to http://hayden-дмитрий.info/. Enter X585 in the search box to learn more about \"Breathing Techniques for COPD: Care Instructions. \" Current as of: December 6, 2017 Content Version: 11.8 © 7225-1000 5o9. Care instructions adapted under license by [employer] (which disclaims liability or warranty for this information). If you have questions about a medical condition or this instruction, always ask your healthcare professional. [address] any warranty or liability for your use of this information.

## 2018-11-08 NOTE — PROGRESS NOTES
Kandi Brown is a 66 y.o. female Chief Complaint Patient presents with  
[facility] Follow Up  
 Medication Refill  
 pt here for f/u from recent hospitalization for COPD and pt states she is not smoking anymore at all. No one is smoking in her house either they are going outside. Pt is using her oxygen at 2L and has hr portable in office but switched to our office due to battery low, pt does have a back up with her. Pt was able to get the trelegy inhaler and states this has been working well for her. States these cost $180 for a 30 day supply. Pt states she will go back to symbicort after this. She will pay for the trelegy if need be. Pt states her breathing has been ok since she got out of the hospital.  She is doing her breathing exercises as well. Pt does also have duoneb at home and uses as needed. Will add on daliresp to help prevent exacerbations Hospitalization and discharge summary reviewed. Pt with Saad when in hosp and needs recheck of her BMP for K and Cr. Pt also due for INR check and will send this out as well. Strips were recalled yesterday so in house machine not serviceable currently. Has afib and artifical valve should be 2.5-3.5 Pt will also be following up with ENT when she gets her transportation situation straightened out. Sonido Paz would also like repeat CBC and culture for her infection treated with augmentin will order. she is a 66y.o. year old female who presents for evalution. Reviewed PmHx, RxHx, FmHx, SocHx, AllgHx and updated and dated in the chart. Review of Systems - negative except as listed above in the HPI Objective:  
 
Vitals:  
 11/08/18 0913 BP: 117/69 Pulse: 78 Resp: 16 Temp: 97.7 °F (36.5 °C) TempSrc: Oral  
SpO2: 98% Weight: 136 lb (61.7 kg) Height: 5' 4\" (1.626 m) Current Outpatient Medications Medication Sig  
 lisinopril (PRINIVIL, ZESTRIL) 5 mg tablet Take 1 Tab by mouth daily.  bumetanide (BUMEX) 1 mg tablet Take 1 Tab by mouth every evening.  roflumilast (DALIRESP) 250 mcg tab Take 1 Tab by mouth daily.  OXYGEN-AIR DELIVERY SYSTEMS Take 2 L by inhalation as needed (shortness of breath).  predniSONE (DELTASONE) 10 mg tablet Prednisone 10mg tabs: 11/2: 2 tabs (20mg) 11/3: 2 tabs (20mg) 11/4: 2 tabs (20mg) 11/5: 1 tab (10mg) 11/6: 1 tab (10mg) 11/7: 1 tab (10mg)  nystatin (MYCOSTATIN) 100,000 unit/mL suspension Take 5 mL by mouth four (4) times daily. swish and spit  oxyCODONE-acetaminophen (PERCOCET) 5-325 mg per tablet Take 1 Tab by mouth every six (6) hours as needed for Pain for up to 30 days. Max Daily Amount: 4 Tabs. Must last 30 days  lovastatin (MEVACOR) 10 mg tablet Take 10 mg by mouth nightly.  oxyCODONE-acetaminophen (PERCOCET) 5-325 mg per tablet Take 1 Tab by mouth every four (4) hours as needed for Pain.  warfarin (COUMADIN) 1 mg tablet Take 2 mg by mouth two (2) times a week. Take 2 mg on Thursday & Friday Take 3 mg all other days  fluticasone-umeclidin-vilanter (TRELEGY ELLIPTA) 100-62.5-25 mcg dsdv Take 1 Puff by inhalation nightly.  warfarin (COUMADIN) 1 mg tablet Take 3 mg by mouth five (5) days a week. Take 2 mg on Thursday & Friday Take 3 mg all other days  fluticasone-umeclidin-vilanter (TRELEGY ELLIPTA) 100-62.5-25 mcg dsdv Take 1 Puff by inhalation daily.  warfarin (COUMADIN) 1 mg tablet 3mg every day except Tuesday Thursday take 1mg f/u 1 week  albuterol (VENTOLIN HFA) 90 mcg/actuation inhaler Take 2 Puffs by inhalation every four (4) hours as needed for Wheezing or Shortness of Breath.  guaiFENesin-dextromethorphan SR (MUCINEX DM) 600-30 mg per tablet Take 1 Tab by mouth two (2) times a day.  nystatin (MYCOSTATIN) 100,000 unit/mL suspension Take 5 ml by mouth and swish and split 4 times daily from 7/31/2018 to 8/4/2018  Indications: oral candidiasis  predniSONE (DELTASONE) 10 mg tablet Resume on 8/13/2018 after completing prednisone taper (Patient taking differently: Resume on 11/9//2018 after completing prednisone taper)  docusate sodium (COLACE) 100 mg capsule Take 100 mg by mouth every evening.  potassium chloride SR (KLOR-CON 10) 10 mEq tablet Take 10 mEq by mouth daily.  lovastatin (MEVACOR) 10 mg tablet TAKE 1 TABLET EVERY NIGHT  dilTIAZem (CARDIZEM) 60 mg tablet TAKE 1 TABLET BEFORE BREAKFAST, LUNCH, DINNER AND AT BEDTIME  budesonide-formoterol (SYMBICORT) 160-4.5 mcg/actuation HFAA INHALE 2 PUFFS TWICE DAILY No current facility-administered medications for this visit. Physical Examination: General appearance - alert, well appearing, and in no distress Mental status - alert, oriented to person, place, and time Chest - scattered rhonchi baseline for pt with fair airflow no wheezes tho today, speaking full sentences using her oxygen Heart - normal rate, regular rhythm, normal S1, S2, no murmurs, rubs, clicks or gallops Neurological - alert, oriented, normal speech, no focal findings or movement disorder noted Extremities - peripheral pulses normal, no pedal edema, no clubbing or cyanosis Assessment/ Plan:  
Diagnoses and all orders for this visit: 
 
1. COPD exacerbation (HCC) 
-     roflumilast (DALIRESP) 250 mcg tab; Take 1 Tab by mouth daily. 2. Chronic atrial fibrillation (HCC) 
-     PROTHROMBIN TIME + INR 3. JAZZMINE (acute kidney injury) ([facility] -     METABOLIC PANEL, BASIC 
 
4. S/P AVR (aortic valve replacement) 
-     PROTHROMBIN TIME + INR 
 
5. [facility] 
-     CBC WITH AUTOMATED DIFF 
-     CULTURE, BLOOD Other orders 
-     lisinopril (PRINIVIL, ZESTRIL) 5 mg tablet; Take 1 Tab by mouth daily. -     bumetanide (BUMEX) 1 mg tablet; Take 1 Tab by mouth every evening. 
 
 maintain smoking cessation Given info on honoring choices Follow-up Disposition: Return if symptoms worsen or fail to improve. I have discussed the diagnosis with the patient and the intended plan as seen in the above orders. The patient has received an after-visit summary and questions were answered concerning future plans. Pt conveyed understanding of plan. Medication Side Effects and Warnings were discussed with patient [address] [facility] [address], DO

## 2018-11-09 NOTE — PROGRESS NOTES
Called patient and spoke with her in reference to labs. She states she will have to call back to schedule a f/u.

## 2018-11-09 NOTE — PROGRESS NOTES
Coumadin level a little too low. If she is taking 3mg all days except Thursday ands Friday taking 2mg then start taking 3mg every day and recheck in 1 week. WBC are very elevated but has been on steroids and awaiting blood culture. Monica Iqbal this next week. Anemia is stable right now. WIll recheck anemia on return visit to ensure not dropping further.

## 2018-11-15 NOTE — PROGRESS NOTES
Chief Complaint Patient presents with  Labs INR Patient presents in office today for INR recheck. No concerns. 1. Have you been to the ER, urgent care clinic since your last visit? Hospitalized since your last visit? No 
 
2. Have you seen or consulted any other health care providers outside of the [address] [place] [address] since your last visit? Include any pap smears or colon screening. No  
 
Learning Assessment 7/3/2018 PRIMARY LEARNER Patient HIGHEST LEVEL OF EDUCATION - PRIMARY LEARNER  GRADUATED HIGH SCHOOL OR GED  
BARRIERS PRIMARY LEARNER NONE  
CO-LEARNER CAREGIVER No  
PRIMARY LANGUAGE ENGLISH  
LEARNER PREFERENCE PRIMARY DEMONSTRATION  
LEARNING SPECIAL TOPICS no  
ANSWERED BY pt  
RELATIONSHIP SELF

## 2018-11-15 NOTE — PATIENT INSTRUCTIONS
Anticoagulants: After Your Visit Your Care Instructions Your doctor prescribed an anticoagulant medicine. Anticoagulants, often called blood thinners, prevent new blood clots from forming and keep existing clots from getting larger. They do not actually thin the blood, but they make the blood take longer to clot. This lowers the risk of a blood clot moving to the lungs (pulmonary embolism) or moving to the brain and causing a stroke. Blood thinners come in two forms. Heparin is given by shot, either under your skin or through a needle in your vein, and starts working right away. Warfarin (Coumadin) comes in pill form and takes longer to work. Your doctor may have you begin taking both forms at the same time. As soon as the pills start to work, you will stop the shots but continue to take the pills. If you have a blood clot in your leg, you may need to take warfarin for several months. People who have heart conditions such as atrial fibrillation often need to take it for the rest of their lives. The right dose of this medicine is different for each person. You will need regular blood tests to see if your dose is correct. Follow-up care is a key part of your treatment and safety. Be sure to make and go to all appointments, and call your doctor if you are having problems. Itâs also a good idea to know your test results and keep a list of the medicines you take. How do you take blood thinners? · Take your medicines exactly as prescribed. Call your doctor if you think you are having a problem with your medicine. · Call your doctor if you are not sure what to do if you missed a dose of blood thinner. ¨ Your doctor can tell you exactly what to do so you do not take too much or too little blood thinner. Then you will be as safe as possible. · Some general rules for what to do if you miss a dose: ¨ If you remember it in the same day, take the missed dose. Then go back to your regular schedule. ¨ If it is the next day, or almost time to take the next dose, do not take the missed dose. Do not double the dose to make up for the missed one. At your next regularly scheduled time, take your normal dose. ¨ If you miss your dose for 2 or more days, call your doctor. · To help you stay on schedule, use a calendar to remind you when to take your blood thinner. When you take the medicine, note it on the calendar. · If you are going to give yourself shots, your doctor will give you instructions for how to safely inject the medicine. Follow the directions carefully. · Do not take any vitamins, over-the-counter medicines, or herbal products without talking to your doctor first. 
· Avoid contact sports and other activities that could lead to injury. Make your home safe and take other measures to reduce your risk of falling. Always wear a seat belt while in a car. · Do not suddenly change your intake of vitamin Kârich foods, such as broccoli, cabbage, asparagus, lettuce, and spinach. This will help blood thinners work evenly from day to day. · Limit alcohol to 2 drinks a day for men and 1 drink a day for women. Alcohol may interfere with blood thinners. It also increases your risk of falls, which can cause bruising and bleeding. · Tell your dentist, pharmacist, and other health professionals that you take blood thinners. Wear medical alert jewelry that says you take blood thinners. You can buy this at most drugstores. When should you call for help? Call 911 anytime you think you may need emergency care. For example, call if: 
· You cough up blood. · You vomit blood or what looks like coffee grounds. · You pass maroon or very bloody stools. Call your doctor now or seek immediate medical care if: 
· You have new bruises or blood spots under your skin. · You have a nosebleed. · Your gums bleed when you brush your teeth. · You have blood in your urine. · Your stools are black and tarlike or have streaks of blood. · You have vaginal bleeding when you are not having your period, or heavy period bleeding. Watch closely for changes in your health, and be sure to contact your doctor if: 
· You have questions about your medicine. Where can you learn more? Go to Book'n'Bloom.be Enter W283 in the search box to learn more about \"Anticoagulants: After Your Visit. \" Â© 2664-5169 Healthwise, Incorporated. Care instructions adapted under license by [facility] (which disclaims liability or warranty for this information). This care instruction is for use with your licensed healthcare professional. If you have questions about a medical condition or this instruction, always ask your healthcare professional. [address] any warranty or liability for your use of this information. Content Version: 7.9.79466; Last Revised: July 1, 2009

## 2018-11-29 PROBLEM — G89.29 CHRONIC BACK PAIN: Chronic | Status: ACTIVE | Noted: 2018-01-01

## 2018-11-29 PROBLEM — M54.9 CHRONIC BACK PAIN: Chronic | Status: ACTIVE | Noted: 2018-01-01

## 2018-11-29 NOTE — PATIENT INSTRUCTIONS
A Healthy Lifestyle: Care Instructions Your Care Instructions A healthy lifestyle can help you feel good, stay at a healthy weight, and have plenty of energy for both work and play. A healthy lifestyle is something you can share with your whole family. A healthy lifestyle also can lower your risk for serious health problems, such as high blood pressure, heart disease, and diabetes. You can follow a few steps listed below to improve your health and the health of your family. Follow-up care is a key part of your treatment and safety. Be sure to make and go to all appointments, and call your doctor if you are having problems. It's also a good idea to know your test results and keep a list of the medicines you take. How can you care for yourself at home? · Do not eat too much sugar, fat, or fast foods. You can still have dessert and treats now and then. The goal is moderation. · Start small to improve your eating habits. Pay attention to portion sizes, drink less juice and soda pop, and eat more fruits and vegetables. ? Eat a healthy amount of food. A 3-ounce serving of meat, for example, is about the size of a deck of cards. Fill the rest of your plate with vegetables and whole grains. ? Limit the amount of soda and sports drinks you have every day. Drink more water when you are thirsty. ? Eat at least 5 servings of fruits and vegetables every day. It may seem like a lot, but it is not hard to reach this goal. A serving or helping is 1 piece of fruit, 1 cup of vegetables, or 2 cups of leafy, raw vegetables. Have an apple or some carrot sticks as an afternoon snack instead of a candy bar. Try to have fruits and/or vegetables at every meal. 
· Make exercise part of your daily routine. You may want to start with simple activities, such as walking, bicycling, or slow swimming. Try to be active 30 to 60 minutes every day.  You do not need to do all 30 to 60 minutes all at once. For example, you can exercise 3 times a day for 10 or 20 minutes. Moderate exercise is safe for most people, but it is always a good idea to talk to your doctor before starting an exercise program. 
· Keep moving. Khadar Castrody the lawn, work in the garden, or [employer]. Take the stairs instead of the elevator at work. · If you smoke, quit. People who smoke have an increased risk for heart attack, stroke, cancer, and other lung illnesses. Quitting is hard, but there are ways to boost your chance of quitting tobacco for good. ? Use nicotine gum, patches, or lozenges. ? Ask your doctor about stop-smoking programs and medicines. ? Keep trying. In addition to reducing your risk of diseases in the future, you will notice some benefits soon after you stop using tobacco. If you have shortness of breath or asthma symptoms, they will likely get better within a few weeks after you quit. · Limit how much alcohol you drink. Moderate amounts of alcohol (up to 2 drinks a day for men, 1 drink a day for women) are okay. But drinking too much can lead to liver problems, high blood pressure, and other health problems. Family health If you have a family, there are many things you can do together to improve your health. · Eat meals together as a family as often as possible. · Eat healthy foods. This includes fruits, vegetables, lean meats and dairy, and whole grains. · Include your family in your fitness plan. Most people think of activities such as jogging or tennis as the way to fitness, but there are many ways you and your family can be more active. Anything that makes you breathe hard and gets your heart pumping is exercise. Here are some tips: 
? Walk to do errands or to take your child to school or the bus. 
? Go for a family bike ride after dinner instead of watching TV. Where can you learn more? Go to http://hayden-дмитрий.info/. Enter J015 in the search box to learn more about \"A Healthy Lifestyle: Care Instructions. \" Current as of: December 7, 2017 Content Version: 11.8 © 3017-6186 Healthwise, [employer]. Care instructions adapted under license by [employer] (which disclaims liability or warranty for this information). If you have questions about a medical condition or this instruction, always ask your healthcare professional. [place] [address] any warranty or liability for your use of this information.

## 2018-11-29 NOTE — PROGRESS NOTES
Blair Underwood is a 66 y.o. female had concerns including Follow-up and Decreased Appetite. Pt here for f/u of her coumadin level which is slightly low at 2.2. Will have her double her dose today and then resume normal dosing. Pt also reports she has no appetite at all. Denies any issues with stomach pain or nausea, no issues with swallowing. Weight has been stable. Pt does need recheck of her CBC for elevated WBC and anemia. Pt is also requesting a refill on her narcotic pain medication.  checked and appropriate and pt has been able to stretch out the medication and is doing ok with the weaning. Pt uses this for chronci LBP s/p compression fracture and the pain medication does bring her pain from a 5-6 to a 3/10. Her pain is slowing improving tho. We are weaning her by 10 tabs per month. No hx of drug abuse. Med does allow her to be more active tho she isd also limited by her severe copd oxygen dependent. Opioid/Pain Management: 
1. Has the patient signed a pain contract for chronic narcotic use? yes 2. Has the patient had a UDS or Serum screen as per guidelines as per [facility]?:   yes 3. Does patient meet necessary guidelines for Naloxone treatement per the [facility]?:    no 
4. Has the Prescription Monitoring Program been reviewed? yes 5. Does patient have a long term condition that requires long term use of a Narcotic?  yes 6. Has patient been tolerant of therapy and responsible to routine follow up and specialist follow-up? Yes 
 
 
she is a 66y.o. year old female who presents for evalution. Reviewed PmHx, RxHx, FmHx, SocHx, AllgHx and updated and dated in the chart. Review of Systems - negative except as listed above in the HPI Objective:  
 
Vitals:  
 11/29/18 0747 BP: (!) 81/51 Pulse: (!) 59 Resp: 16 Temp: 97.8 °F (36.6 °C) TempSrc: Oral  
SpO2: 99% Weight: 126 lb (57.2 kg) Height: 5' 4\" (1.626 m) Current Outpatient Medications Medication Sig  
 oxyCODONE-acetaminophen (PERCOCET) 5-325 mg per tablet Take 1 Tab by mouth every eight (8) hours as needed for Pain. Max Daily Amount: 3 Tabs. Must last 30 days  predniSONE (DELTASONE) 10 mg tablet Take 10 mg by mouth daily.  roflumilast (DALIRESP) 250 mcg tab Take 1 Tab by mouth daily.  warfarin (COUMADIN) 1 mg tablet 3mg everyday except for Friday 2mg  lisinopril (PRINIVIL, ZESTRIL) 5 mg tablet Take 1 Tab by mouth daily.  bumetanide (BUMEX) 1 mg tablet Take 1 Tab by mouth every evening.  OXYGEN-AIR DELIVERY SYSTEMS Take 2 L by inhalation as needed (shortness of breath).  predniSONE (DELTASONE) 10 mg tablet Prednisone 10mg tabs: 11/2: 2 tabs (20mg) 11/3: 2 tabs (20mg) 11/4: 2 tabs (20mg) 11/5: 1 tab (10mg) 11/6: 1 tab (10mg) 11/7: 1 tab (10mg)  nystatin (MYCOSTATIN) 100,000 unit/mL suspension Take 5 mL by mouth four (4) times daily. swish and spit  lovastatin (MEVACOR) 10 mg tablet Take 10 mg by mouth nightly.  fluticasone-umeclidin-vilanter (TRELEGY ELLIPTA) 100-62.5-25 mcg dsdv Take 1 Puff by inhalation nightly.  warfarin (COUMADIN) 1 mg tablet Take 3 mg by mouth five (5) days a week. Take 2 mg on Thursday & Friday Take 3 mg all other days  fluticasone-umeclidin-vilanter (TRELEGY ELLIPTA) 100-62.5-25 mcg dsdv Take 1 Puff by inhalation daily.  warfarin (COUMADIN) 1 mg tablet 3mg every day except Tuesday Thursday take 1mg f/u 1 week  albuterol (VENTOLIN HFA) 90 mcg/actuation inhaler Take 2 Puffs by inhalation every four (4) hours as needed for Wheezing or Shortness of Breath.  nystatin (MYCOSTATIN) 100,000 unit/mL suspension Take 5 ml by mouth and swish and split 4 times daily from 7/31/2018 to 8/4/2018  Indications: oral candidiasis  predniSONE (DELTASONE) 10 mg tablet Resume on 8/13/2018 after completing prednisone taper (Patient taking differently: Take 10 mg by mouth daily.)  docusate sodium (COLACE) 100 mg capsule Take 100 mg by mouth every evening.  potassium chloride SR (KLOR-CON 10) 10 mEq tablet Take 10 mEq by mouth daily.  dilTIAZem (CARDIZEM) 60 mg tablet TAKE 1 TABLET BEFORE BREAKFAST, LUNCH, DINNER AND AT BEDTIME  budesonide-formoterol (SYMBICORT) 160-4.5 mcg/actuation HFAA INHALE 2 PUFFS TWICE DAILY  guaiFENesin-dextromethorphan SR (MUCINEX DM) 600-30 mg per tablet Take 1 Tab by mouth two (2) times a day.  lovastatin (MEVACOR) 10 mg tablet TAKE 1 TABLET EVERY NIGHT No current facility-administered medications for this visit. Physical Examination: General appearance - alert, well appearing, and in no distress Mental status - alert, oriented to person, place, and time Chest - clear to auscultation, no wheezes, rales or rhonchi, symmetric air entry Heart - irregularly irregular rhythm with rate 90's Back- midline lumbar ttp at l4-5 region, no step off. Assessment/ Plan:  
Diagnoses and all orders for this visit: 
 
1. S/P AVR (aortic valve replacement) -     AMB POC PT/INR 2. Leukocytosis, unspecified type 
-     CBC WITH AUTOMATED DIFF 3. Medication monitoring encounter 
-     DRUG SCREEN 11 W/CONF, SERUM 4. Chronic bilateral low back pain without sciatica 
-     oxyCODONE-acetaminophen (PERCOCET) 5-325 mg per tablet; Take 1 Tab by mouth every eight (8) hours as needed for Pain. Max Daily Amount: 3 Tabs. Must last 30 days 5. Anemia, unspecified type 
-     CBC WITH AUTOMATED DIFF 
-     IRON PROFILE 
-     FERRITIN 
 
 pt may call for refill next month and will write for 70 tabs Follow-up Disposition: 
Return in about 1 week (around 12/6/2018), or if symptoms worsen or fail to improve. I have discussed the diagnosis with the patient and the intended plan as seen in the above orders. The patient has received an after-visit summary and questions were answered concerning future plans. Pt conveyed understanding of plan. Medication Side Effects and Warnings were discussed with patient Maria Elena End, DO

## 2018-11-29 NOTE — PROGRESS NOTES
Chief Complaint Patient presents with  Follow-up  Decreased Appetite Patient presents in office today for INR check. States she has some concerns about an increase in loss of appetite and feeling very lethargic. Yesterday all she ate was a cookie in the evening. No other concerns. 1. Have you been to the ER, urgent care clinic since your last visit? Hospitalized since your last visit? No 
 
2. Have you seen or consulted any other health care providers outside of the [address] [place] [address] since your last visit? Include any pap smears or colon screening. No 
 
Learning Assessment 7/3/2018 PRIMARY LEARNER Patient HIGHEST LEVEL OF EDUCATION - PRIMARY LEARNER  GRADUATED HIGH SCHOOL OR GED  
BARRIERS PRIMARY LEARNER NONE  
CO-LEARNER CAREGIVER No  
PRIMARY LANGUAGE ENGLISH  
LEARNER PREFERENCE PRIMARY DEMONSTRATION  
LEARNING SPECIAL TOPICS no  
ANSWERED BY pt  
RELATIONSHIP SELF

## 2018-12-07 NOTE — TELEPHONE ENCOUNTER
TREASURE with [facility] called in and stated that she needs a verbal order to continue home health with this patient.   Phone [phone]

## 2018-12-07 NOTE — TELEPHONE ENCOUNTER
Spoke with [facility] and she is improving slowly with [facility] and will therefore continue HH. Pt has not received her humidification yet for her O2 and HH states they britta lreach out to [facility] and contact me if anything else is needed.   I had prev called this in a week ago to [employer]

## 2018-12-12 NOTE — TELEPHONE ENCOUNTER
Mahin submitted to [facility] [employer] via Cover My Meds. Awaiting reponse.    Violeta HARGROVE Case ID: [id number]

## 2018-12-13 NOTE — TELEPHONE ENCOUNTER
Darian called in and stated that they need more questions answered regarding the PA for the patient's daliresp.   Phone [phone]  Ref# [id number]

## 2018-12-14 NOTE — TELEPHONE ENCOUNTER
RC to insurance. Per insurance max quantity of Daliresp 250mcg is 28/ 365 days. Also [facility] only filled 20 tabs. Per Dr. Marshall Crump, med is meant to be increased to 500mcg after 4 weeks. He will send this in for the pt. Called [facility] to find out why the pt only received 20 tabs. Was informed that the supplier is on backorder. Attempt to reach pt unsuccessful. LVM for patient to [facility] to find out if the med helped and if so where does she want the increased dose sent.

## 2018-12-14 NOTE — TELEPHONE ENCOUNTER
Received a message about a RC from  [facility] on [address]. RC to .  [facility]

## 2018-12-17 NOTE — TELEPHONE ENCOUNTER
Patient's  called in and stated that they need this refill called back in because the dosage was not listed for the medication. (per pharmacy)  Phone [phone]

## 2018-12-18 NOTE — TELEPHONE ENCOUNTER
Called and spoke with pharmacist. She stated they received the new Rx with the dosage and the  has picked it up.

## 2018-12-18 NOTE — TELEPHONE ENCOUNTER
----- Message from Hailey Power sent at 12/18/2018  2:22 PM EST -----  Regarding: [place]/telephone  Pt stated [facility] needs the mg to the new medication for COPD. She stated the pharmacy faxed a request a few times and she does not know the name of it. Pts number [place] [phone] or cell [phone].

## 2018-12-19 NOTE — TELEPHONE ENCOUNTER
Nehal with [facility] called in and stated that pt did not show up last week for her INR check so Nehal checked it. She would like a call back. Thanks!   Phone [phone]

## 2018-12-19 NOTE — TELEPHONE ENCOUNTER
Kirit with [address] calling to report pts PT/INR. PT is 32.6 and INR is 2.8. Pt is currently taking 3 mg all days except Friday she takes 2mg.  Kirit's call back number is [phone]

## 2018-12-19 NOTE — TELEPHONE ENCOUNTER
INR @ goal recheck 1 month. Please have patient increase hydration and eat something.   I can give them some boost if they need and  can pick it up

## 2018-12-19 NOTE — TELEPHONE ENCOUNTER
Called and spoke with Nehal in reference to patient. She states that the patient did no come in for her apt last week as she was not feeling well and is unsure when she will be back in. She checked patient's INR. PT was 33.6 and INR was 2.8. She states patinet's BP was low at 100/50 and she was having tremors. She states that patient was feeling weak and has not been eating much. She wanted to make you aware.

## 2018-12-19 NOTE — TELEPHONE ENCOUNTER
Called and spoke with  in reference to Dr. Saravia Apt recommendation.  verbalized understanding. He states not to worry about the boost. States patient does not like them and will not drink it.

## 2018-12-21 NOTE — TELEPHONE ENCOUNTER
Pt calling requesting refill on Percocet RX. States  will come pick RX up from office. Call back number is [phone].

## 2019-01-01 ENCOUNTER — APPOINTMENT (OUTPATIENT)
Dept: GENERAL RADIOLOGY | Age: 64
DRG: 189 | End: 2019-01-01
Attending: FAMILY MEDICINE
Payer: MEDICARE

## 2019-01-01 ENCOUNTER — APPOINTMENT (OUTPATIENT)
Dept: CT IMAGING | Age: 64
DRG: 189 | End: 2019-01-01
Attending: FAMILY MEDICINE
Payer: MEDICARE

## 2019-01-01 ENCOUNTER — APPOINTMENT (OUTPATIENT)
Dept: GENERAL RADIOLOGY | Age: 64
DRG: 189 | End: 2019-01-01
Attending: EMERGENCY MEDICINE
Payer: MEDICARE

## 2019-01-01 ENCOUNTER — HOME CARE VISIT (OUTPATIENT)
Dept: SCHEDULING | Facility: HOME HEALTH | Age: 64
End: 2019-01-01
Payer: MEDICARE

## 2019-01-01 ENCOUNTER — TELEPHONE (OUTPATIENT)
Dept: FAMILY MEDICINE CLINIC | Age: 64
End: 2019-01-01

## 2019-01-01 ENCOUNTER — APPOINTMENT (OUTPATIENT)
Dept: GENERAL RADIOLOGY | Age: 64
DRG: 189 | End: 2019-01-01
Attending: STUDENT IN AN ORGANIZED HEALTH CARE EDUCATION/TRAINING PROGRAM
Payer: MEDICARE

## 2019-01-01 ENCOUNTER — HOSPITAL ENCOUNTER (INPATIENT)
Age: 64
LOS: 16 days | DRG: 189 | End: 2019-01-22
Attending: EMERGENCY MEDICINE | Admitting: FAMILY MEDICINE
Payer: MEDICARE

## 2019-01-01 ENCOUNTER — HOME CARE VISIT (OUTPATIENT)
Dept: HOME HEALTH SERVICES | Facility: HOME HEALTH | Age: 64
End: 2019-01-01
Payer: MEDICARE

## 2019-01-01 ENCOUNTER — APPOINTMENT (OUTPATIENT)
Dept: ULTRASOUND IMAGING | Age: 64
DRG: 189 | End: 2019-01-01
Attending: STUDENT IN AN ORGANIZED HEALTH CARE EDUCATION/TRAINING PROGRAM
Payer: MEDICARE

## 2019-01-01 ENCOUNTER — APPOINTMENT (OUTPATIENT)
Dept: ULTRASOUND IMAGING | Age: 64
DRG: 189 | End: 2019-01-01
Attending: NURSE PRACTITIONER
Payer: MEDICARE

## 2019-01-01 VITALS
TEMPERATURE: 97.2 F | RESPIRATION RATE: 18 BRPM | OXYGEN SATURATION: 98 % | SYSTOLIC BLOOD PRESSURE: 100 MMHG | HEART RATE: 115 BPM | DIASTOLIC BLOOD PRESSURE: 60 MMHG

## 2019-01-01 VITALS
HEART RATE: 48 BPM | DIASTOLIC BLOOD PRESSURE: 13 MMHG | BODY MASS INDEX: 20.57 KG/M2 | OXYGEN SATURATION: 91 % | TEMPERATURE: 96.3 F | HEIGHT: 64 IN | RESPIRATION RATE: 15 BRPM | SYSTOLIC BLOOD PRESSURE: 46 MMHG | WEIGHT: 120.5 LBS

## 2019-01-01 DIAGNOSIS — J44.1 COPD WITH EXACERBATION (HCC): ICD-10-CM

## 2019-01-01 DIAGNOSIS — J44.1 CHRONIC OBSTRUCTIVE PULMONARY DISEASE WITH ACUTE EXACERBATION (HCC): ICD-10-CM

## 2019-01-01 DIAGNOSIS — J44.1 COPD EXACERBATION (HCC): Primary | ICD-10-CM

## 2019-01-01 DIAGNOSIS — R06.02 SOB (SHORTNESS OF BREATH): ICD-10-CM

## 2019-01-01 DIAGNOSIS — I50.32 DIASTOLIC CHF, CHRONIC (HCC): ICD-10-CM

## 2019-01-01 DIAGNOSIS — A41.9 SEPSIS, DUE TO UNSPECIFIED ORGANISM: ICD-10-CM

## 2019-01-01 DIAGNOSIS — R06.03 RESPIRATORY DISTRESS: ICD-10-CM

## 2019-01-01 DIAGNOSIS — F41.0 ANXIETY ATTACK: ICD-10-CM

## 2019-01-01 DIAGNOSIS — Z71.89 GOALS OF CARE, COUNSELING/DISCUSSION: ICD-10-CM

## 2019-01-01 DIAGNOSIS — D64.9 ANEMIA, UNSPECIFIED TYPE: ICD-10-CM

## 2019-01-01 LAB
ABO + RH BLD: NORMAL
ALBUMIN SERPL-MCNC: 2.3 G/DL (ref 3.5–5)
ALBUMIN SERPL-MCNC: 2.3 G/DL (ref 3.5–5)
ALBUMIN SERPL-MCNC: 2.4 G/DL (ref 3.5–5)
ALBUMIN SERPL-MCNC: 2.5 G/DL (ref 3.5–5)
ALBUMIN SERPL-MCNC: 2.6 G/DL (ref 3.5–5)
ALBUMIN SERPL-MCNC: 2.6 G/DL (ref 3.5–5)
ALBUMIN SERPL-MCNC: 2.7 G/DL (ref 3.5–5)
ALBUMIN SERPL-MCNC: 2.9 G/DL (ref 3.5–5)
ALBUMIN SERPL-MCNC: 3.1 G/DL (ref 3.5–5)
ALBUMIN SERPL-MCNC: 3.2 G/DL (ref 3.5–5)
ALBUMIN SERPL-MCNC: 3.3 G/DL (ref 3.5–5)
ALBUMIN SERPL-MCNC: 3.3 G/DL (ref 3.5–5)
ALBUMIN/GLOB SERPL: 0.7 {RATIO} (ref 1.1–2.2)
ALBUMIN/GLOB SERPL: 0.8 {RATIO} (ref 1.1–2.2)
ALBUMIN/GLOB SERPL: 0.8 {RATIO} (ref 1.1–2.2)
ALBUMIN/GLOB SERPL: 0.9 {RATIO} (ref 1.1–2.2)
ALBUMIN/GLOB SERPL: 1 {RATIO} (ref 1.1–2.2)
ALBUMIN/GLOB SERPL: 1.1 {RATIO} (ref 1.1–2.2)
ALBUMIN/GLOB SERPL: 1.1 {RATIO} (ref 1.1–2.2)
ALBUMIN/GLOB SERPL: 1.2 {RATIO} (ref 1.1–2.2)
ALBUMIN/GLOB SERPL: 1.3 {RATIO} (ref 1.1–2.2)
ALBUMIN/GLOB SERPL: 1.4 {RATIO} (ref 1.1–2.2)
ALP SERPL-CCNC: 102 U/L (ref 45–117)
ALP SERPL-CCNC: 57 U/L (ref 45–117)
ALP SERPL-CCNC: 60 U/L (ref 45–117)
ALP SERPL-CCNC: 63 U/L (ref 45–117)
ALP SERPL-CCNC: 64 U/L (ref 45–117)
ALP SERPL-CCNC: 67 U/L (ref 45–117)
ALP SERPL-CCNC: 67 U/L (ref 45–117)
ALP SERPL-CCNC: 71 U/L (ref 45–117)
ALP SERPL-CCNC: 72 U/L (ref 45–117)
ALP SERPL-CCNC: 72 U/L (ref 45–117)
ALP SERPL-CCNC: 73 U/L (ref 45–117)
ALP SERPL-CCNC: 74 U/L (ref 45–117)
ALP SERPL-CCNC: 75 U/L (ref 45–117)
ALP SERPL-CCNC: 76 U/L (ref 45–117)
ALP SERPL-CCNC: 76 U/L (ref 45–117)
ALP SERPL-CCNC: 83 U/L (ref 45–117)
ALP SERPL-CCNC: 88 U/L (ref 45–117)
ALP SERPL-CCNC: 93 U/L (ref 45–117)
ALP SERPL-CCNC: 98 U/L (ref 45–117)
ALT SERPL-CCNC: 106 U/L (ref 12–78)
ALT SERPL-CCNC: 122 U/L (ref 12–78)
ALT SERPL-CCNC: 135 U/L (ref 12–78)
ALT SERPL-CCNC: 200 U/L (ref 12–78)
ALT SERPL-CCNC: 248 U/L (ref 12–78)
ALT SERPL-CCNC: 301 U/L (ref 12–78)
ALT SERPL-CCNC: 410 U/L (ref 12–78)
ALT SERPL-CCNC: 414 U/L (ref 12–78)
ALT SERPL-CCNC: 42 U/L (ref 12–78)
ALT SERPL-CCNC: 42 U/L (ref 12–78)
ALT SERPL-CCNC: 47 U/L (ref 12–78)
ALT SERPL-CCNC: 56 U/L (ref 12–78)
ALT SERPL-CCNC: 64 U/L (ref 12–78)
ALT SERPL-CCNC: 68 U/L (ref 12–78)
ALT SERPL-CCNC: 78 U/L (ref 12–78)
ALT SERPL-CCNC: 87 U/L (ref 12–78)
ALT SERPL-CCNC: 90 U/L (ref 12–78)
ALT SERPL-CCNC: 92 U/L (ref 12–78)
ALT SERPL-CCNC: 93 U/L (ref 12–78)
ANION GAP SERPL CALC-SCNC: 10 MMOL/L (ref 5–15)
ANION GAP SERPL CALC-SCNC: 10 MMOL/L (ref 5–15)
ANION GAP SERPL CALC-SCNC: 11 MMOL/L (ref 5–15)
ANION GAP SERPL CALC-SCNC: 13 MMOL/L (ref 5–15)
ANION GAP SERPL CALC-SCNC: 15 MMOL/L (ref 5–15)
ANION GAP SERPL CALC-SCNC: 2 MMOL/L (ref 5–15)
ANION GAP SERPL CALC-SCNC: 4 MMOL/L (ref 5–15)
ANION GAP SERPL CALC-SCNC: 5 MMOL/L (ref 5–15)
ANION GAP SERPL CALC-SCNC: 5 MMOL/L (ref 5–15)
ANION GAP SERPL CALC-SCNC: 6 MMOL/L (ref 5–15)
ANION GAP SERPL CALC-SCNC: 6 MMOL/L (ref 5–15)
ANION GAP SERPL CALC-SCNC: 7 MMOL/L (ref 5–15)
ANION GAP SERPL CALC-SCNC: 8 MMOL/L (ref 5–15)
ANION GAP SERPL CALC-SCNC: 8 MMOL/L (ref 5–15)
ANION GAP SERPL CALC-SCNC: 9 MMOL/L (ref 5–15)
ANION GAP SERPL CALC-SCNC: 9 MMOL/L (ref 5–15)
APPEARANCE UR: ABNORMAL
APPEARANCE UR: ABNORMAL
APTT PPP: 21.9 SEC (ref 22.1–32)
APTT PPP: 24 SEC (ref 22.1–32)
APTT PPP: 25.2 SEC (ref 22.1–32)
APTT PPP: 26.8 SEC (ref 22.1–32)
APTT PPP: 27.8 SEC (ref 22.1–32)
APTT PPP: 27.9 SEC (ref 22.1–32)
APTT PPP: 29.3 SEC (ref 22.1–32)
APTT PPP: 30.8 SEC (ref 22.1–32)
APTT PPP: 36.5 SEC (ref 22.1–32)
APTT PPP: 38.2 SEC (ref 22.1–32)
APTT PPP: 50.9 SEC (ref 22.1–32)
APTT PPP: 58.4 SEC (ref 22.1–32)
APTT PPP: 58.9 SEC (ref 22.1–32)
APTT PPP: 64 SEC (ref 22.1–32)
APTT PPP: 74.9 SEC (ref 22.1–32)
APTT PPP: 90.5 SEC (ref 22.1–32)
APTT PPP: <20 SEC (ref 22.1–32)
ARTERIAL PATENCY WRIST A: ABNORMAL
ARTERIAL PATENCY WRIST A: ABNORMAL
ARTERIAL PATENCY WRIST A: YES
ARTERIAL PATENCY WRIST A: YES
AST SERPL-CCNC: 113 U/L (ref 15–37)
AST SERPL-CCNC: 119 U/L (ref 15–37)
AST SERPL-CCNC: 193 U/L (ref 15–37)
AST SERPL-CCNC: 197 U/L (ref 15–37)
AST SERPL-CCNC: 206 U/L (ref 15–37)
AST SERPL-CCNC: 223 U/L (ref 15–37)
AST SERPL-CCNC: 335 U/L (ref 15–37)
AST SERPL-CCNC: 342 U/L (ref 15–37)
AST SERPL-CCNC: 363 U/L (ref 15–37)
AST SERPL-CCNC: 48 U/L (ref 15–37)
AST SERPL-CCNC: 51 U/L (ref 15–37)
AST SERPL-CCNC: 54 U/L (ref 15–37)
AST SERPL-CCNC: 55 U/L (ref 15–37)
AST SERPL-CCNC: 64 U/L (ref 15–37)
AST SERPL-CCNC: 66 U/L (ref 15–37)
AST SERPL-CCNC: 69 U/L (ref 15–37)
AST SERPL-CCNC: 75 U/L (ref 15–37)
AST SERPL-CCNC: 84 U/L (ref 15–37)
AST SERPL-CCNC: 85 U/L (ref 15–37)
ATRIAL RATE: 131 BPM
ATRIAL RATE: 90 BPM
B PERT DNA SPEC QL NAA+PROBE: NOT DETECTED
BACTERIA SPEC CULT: ABNORMAL
BACTERIA SPEC CULT: ABNORMAL
BACTERIA SPEC CULT: NORMAL
BACTERIA URNS QL MICRO: NEGATIVE /HPF
BACTERIA URNS QL MICRO: NEGATIVE /HPF
BASE DEFICIT BLDA-SCNC: 2.9 MMOL/L
BASE EXCESS BLDA CALC-SCNC: 1.8 MMOL/L
BASE EXCESS BLDA CALC-SCNC: 6.2 MMOL/L
BASE EXCESS BLDA CALC-SCNC: 6.6 MMOL/L
BASOPHILS # BLD: 0 K/UL (ref 0–0.1)
BASOPHILS # BLD: 0.2 K/UL (ref 0–0.1)
BASOPHILS NFR BLD: 0 % (ref 0–1)
BASOPHILS NFR BLD: 1 % (ref 0–1)
BDY SITE: ABNORMAL
BILIRUB SERPL-MCNC: 1.1 MG/DL (ref 0.2–1)
BILIRUB SERPL-MCNC: 1.1 MG/DL (ref 0.2–1)
BILIRUB SERPL-MCNC: 1.2 MG/DL (ref 0.2–1)
BILIRUB SERPL-MCNC: 1.3 MG/DL (ref 0.2–1)
BILIRUB SERPL-MCNC: 1.3 MG/DL (ref 0.2–1)
BILIRUB SERPL-MCNC: 1.4 MG/DL (ref 0.2–1)
BILIRUB SERPL-MCNC: 1.4 MG/DL (ref 0.2–1)
BILIRUB SERPL-MCNC: 1.6 MG/DL (ref 0.2–1)
BILIRUB SERPL-MCNC: 1.7 MG/DL (ref 0.2–1)
BILIRUB SERPL-MCNC: 1.9 MG/DL (ref 0.2–1)
BILIRUB SERPL-MCNC: 2.1 MG/DL (ref 0.2–1)
BILIRUB SERPL-MCNC: 2.1 MG/DL (ref 0.2–1)
BILIRUB SERPL-MCNC: 2.2 MG/DL (ref 0.2–1)
BILIRUB SERPL-MCNC: 2.2 MG/DL (ref 0.2–1)
BILIRUB SERPL-MCNC: 2.3 MG/DL (ref 0.2–1)
BILIRUB SERPL-MCNC: 3 MG/DL (ref 0.2–1)
BILIRUB SERPL-MCNC: 3.8 MG/DL (ref 0.2–1)
BILIRUB UR QL CFM: NEGATIVE
BILIRUB UR QL: NEGATIVE
BLD PROD TYP BPU: NORMAL
BLOOD GROUP ANTIBODIES SERPL: NORMAL
BNP SERPL-MCNC: 2918 PG/ML (ref 0–125)
BNP SERPL-MCNC: 982 PG/ML (ref 0–125)
BPU ID: NORMAL
BUN SERPL-MCNC: 16 MG/DL (ref 6–20)
BUN SERPL-MCNC: 17 MG/DL (ref 6–20)
BUN SERPL-MCNC: 17 MG/DL (ref 6–20)
BUN SERPL-MCNC: 18 MG/DL (ref 6–20)
BUN SERPL-MCNC: 21 MG/DL (ref 6–20)
BUN SERPL-MCNC: 22 MG/DL (ref 6–20)
BUN SERPL-MCNC: 26 MG/DL (ref 6–20)
BUN SERPL-MCNC: 29 MG/DL (ref 6–20)
BUN SERPL-MCNC: 31 MG/DL (ref 6–20)
BUN SERPL-MCNC: 32 MG/DL (ref 6–20)
BUN SERPL-MCNC: 33 MG/DL (ref 6–20)
BUN SERPL-MCNC: 34 MG/DL (ref 6–20)
BUN SERPL-MCNC: 35 MG/DL (ref 6–20)
BUN SERPL-MCNC: 36 MG/DL (ref 6–20)
BUN SERPL-MCNC: 38 MG/DL (ref 6–20)
BUN SERPL-MCNC: 38 MG/DL (ref 6–20)
BUN SERPL-MCNC: 40 MG/DL (ref 6–20)
BUN/CREAT SERPL: 19 (ref 12–20)
BUN/CREAT SERPL: 19 (ref 12–20)
BUN/CREAT SERPL: 20 (ref 12–20)
BUN/CREAT SERPL: 21 (ref 12–20)
BUN/CREAT SERPL: 24 (ref 12–20)
BUN/CREAT SERPL: 25 (ref 12–20)
BUN/CREAT SERPL: 25 (ref 12–20)
BUN/CREAT SERPL: 26 (ref 12–20)
BUN/CREAT SERPL: 28 (ref 12–20)
BUN/CREAT SERPL: 28 (ref 12–20)
BUN/CREAT SERPL: 30 (ref 12–20)
BUN/CREAT SERPL: 30 (ref 12–20)
BUN/CREAT SERPL: 31 (ref 12–20)
BUN/CREAT SERPL: 31 (ref 12–20)
BUN/CREAT SERPL: 34 (ref 12–20)
BUN/CREAT SERPL: 34 (ref 12–20)
BUN/CREAT SERPL: 35 (ref 12–20)
BUN/CREAT SERPL: 36 (ref 12–20)
BUN/CREAT SERPL: 37 (ref 12–20)
C PNEUM DNA SPEC QL NAA+PROBE: NOT DETECTED
CALCIUM SERPL-MCNC: 7.8 MG/DL (ref 8.5–10.1)
CALCIUM SERPL-MCNC: 7.9 MG/DL (ref 8.5–10.1)
CALCIUM SERPL-MCNC: 8 MG/DL (ref 8.5–10.1)
CALCIUM SERPL-MCNC: 8 MG/DL (ref 8.5–10.1)
CALCIUM SERPL-MCNC: 8.1 MG/DL (ref 8.5–10.1)
CALCIUM SERPL-MCNC: 8.2 MG/DL (ref 8.5–10.1)
CALCIUM SERPL-MCNC: 8.3 MG/DL (ref 8.5–10.1)
CALCIUM SERPL-MCNC: 8.3 MG/DL (ref 8.5–10.1)
CALCIUM SERPL-MCNC: 8.4 MG/DL (ref 8.5–10.1)
CALCIUM SERPL-MCNC: 8.5 MG/DL (ref 8.5–10.1)
CALCIUM SERPL-MCNC: 8.6 MG/DL (ref 8.5–10.1)
CALCIUM SERPL-MCNC: 8.7 MG/DL (ref 8.5–10.1)
CALCIUM SERPL-MCNC: 8.7 MG/DL (ref 8.5–10.1)
CALCULATED R AXIS, ECG10: 68 DEGREES
CALCULATED R AXIS, ECG10: 79 DEGREES
CALCULATED T AXIS, ECG11: 114 DEGREES
CALCULATED T AXIS, ECG11: 84 DEGREES
CHLORIDE SERPL-SCNC: 100 MMOL/L (ref 97–108)
CHLORIDE SERPL-SCNC: 101 MMOL/L (ref 97–108)
CHLORIDE SERPL-SCNC: 102 MMOL/L (ref 97–108)
CHLORIDE SERPL-SCNC: 103 MMOL/L (ref 97–108)
CHLORIDE SERPL-SCNC: 104 MMOL/L (ref 97–108)
CHLORIDE SERPL-SCNC: 96 MMOL/L (ref 97–108)
CHLORIDE SERPL-SCNC: 97 MMOL/L (ref 97–108)
CHLORIDE SERPL-SCNC: 98 MMOL/L (ref 97–108)
CHLORIDE SERPL-SCNC: 99 MMOL/L (ref 97–108)
CHLORIDE SERPL-SCNC: 99 MMOL/L (ref 97–108)
CO2 SERPL-SCNC: 22 MMOL/L (ref 21–32)
CO2 SERPL-SCNC: 26 MMOL/L (ref 21–32)
CO2 SERPL-SCNC: 27 MMOL/L (ref 21–32)
CO2 SERPL-SCNC: 28 MMOL/L (ref 21–32)
CO2 SERPL-SCNC: 31 MMOL/L (ref 21–32)
CO2 SERPL-SCNC: 31 MMOL/L (ref 21–32)
CO2 SERPL-SCNC: 32 MMOL/L (ref 21–32)
CO2 SERPL-SCNC: 33 MMOL/L (ref 21–32)
CO2 SERPL-SCNC: 35 MMOL/L (ref 21–32)
CO2 SERPL-SCNC: 35 MMOL/L (ref 21–32)
CO2 SERPL-SCNC: 36 MMOL/L (ref 21–32)
CO2 SERPL-SCNC: 36 MMOL/L (ref 21–32)
CO2 SERPL-SCNC: 37 MMOL/L (ref 21–32)
CO2 SERPL-SCNC: 37 MMOL/L (ref 21–32)
CO2 SERPL-SCNC: 38 MMOL/L (ref 21–32)
CO2 SERPL-SCNC: 38 MMOL/L (ref 21–32)
CO2 SERPL-SCNC: 39 MMOL/L (ref 21–32)
COLOR UR: ABNORMAL
COLOR UR: ABNORMAL
COMMENT, HOLDF: NORMAL
COMMENT, HOLDF: NORMAL
CREAT SERPL-MCNC: 0.6 MG/DL (ref 0.55–1.02)
CREAT SERPL-MCNC: 0.61 MG/DL (ref 0.55–1.02)
CREAT SERPL-MCNC: 0.63 MG/DL (ref 0.55–1.02)
CREAT SERPL-MCNC: 0.68 MG/DL (ref 0.55–1.02)
CREAT SERPL-MCNC: 0.69 MG/DL (ref 0.55–1.02)
CREAT SERPL-MCNC: 0.73 MG/DL (ref 0.55–1.02)
CREAT SERPL-MCNC: 0.76 MG/DL (ref 0.55–1.02)
CREAT SERPL-MCNC: 0.8 MG/DL (ref 0.55–1.02)
CREAT SERPL-MCNC: 0.91 MG/DL (ref 0.55–1.02)
CREAT SERPL-MCNC: 0.95 MG/DL (ref 0.55–1.02)
CREAT SERPL-MCNC: 0.95 MG/DL (ref 0.55–1.02)
CREAT SERPL-MCNC: 1.01 MG/DL (ref 0.55–1.02)
CREAT SERPL-MCNC: 1.08 MG/DL (ref 0.55–1.02)
CREAT SERPL-MCNC: 1.11 MG/DL (ref 0.55–1.02)
CREAT SERPL-MCNC: 1.12 MG/DL (ref 0.55–1.02)
CREAT SERPL-MCNC: 1.23 MG/DL (ref 0.55–1.02)
CREAT SERPL-MCNC: 1.29 MG/DL (ref 0.55–1.02)
CREAT SERPL-MCNC: 1.44 MG/DL (ref 0.55–1.02)
CREAT SERPL-MCNC: 1.75 MG/DL (ref 0.55–1.02)
CREAT SERPL-MCNC: 1.78 MG/DL (ref 0.55–1.02)
CREAT SERPL-MCNC: 1.86 MG/DL (ref 0.55–1.02)
CREAT UR-MCNC: 17.38 MG/DL
CROSSMATCH RESULT,%XM: NORMAL
DIAGNOSIS, 93000: NORMAL
DIAGNOSIS, 93000: NORMAL
DIFFERENTIAL METHOD BLD: ABNORMAL
DIGOXIN SERPL-MCNC: 0.5 NG/ML (ref 0.9–2)
EOSINOPHIL # BLD: 0 K/UL (ref 0–0.4)
EOSINOPHIL # BLD: 0.2 K/UL (ref 0–0.4)
EOSINOPHIL #/AREA URNS HPF: NEGATIVE /[HPF]
EOSINOPHIL NFR BLD: 0 % (ref 0–7)
EOSINOPHIL NFR BLD: 1 % (ref 0–7)
EPITH CASTS URNS QL MICRO: ABNORMAL /LPF
EPITH CASTS URNS QL MICRO: ABNORMAL /LPF
ERYTHROCYTE [DISTWIDTH] IN BLOOD BY AUTOMATED COUNT: 16.4 % (ref 11.5–14.5)
ERYTHROCYTE [DISTWIDTH] IN BLOOD BY AUTOMATED COUNT: 16.9 % (ref 11.5–14.5)
ERYTHROCYTE [DISTWIDTH] IN BLOOD BY AUTOMATED COUNT: 17.2 % (ref 11.5–14.5)
ERYTHROCYTE [DISTWIDTH] IN BLOOD BY AUTOMATED COUNT: 17.2 % (ref 11.5–14.5)
ERYTHROCYTE [DISTWIDTH] IN BLOOD BY AUTOMATED COUNT: 17.3 % (ref 11.5–14.5)
ERYTHROCYTE [DISTWIDTH] IN BLOOD BY AUTOMATED COUNT: 17.4 % (ref 11.5–14.5)
ERYTHROCYTE [DISTWIDTH] IN BLOOD BY AUTOMATED COUNT: 17.4 % (ref 11.5–14.5)
ERYTHROCYTE [DISTWIDTH] IN BLOOD BY AUTOMATED COUNT: 17.9 % (ref 11.5–14.5)
ERYTHROCYTE [DISTWIDTH] IN BLOOD BY AUTOMATED COUNT: 18.2 % (ref 11.5–14.5)
ERYTHROCYTE [DISTWIDTH] IN BLOOD BY AUTOMATED COUNT: 18.6 % (ref 11.5–14.5)
ERYTHROCYTE [DISTWIDTH] IN BLOOD BY AUTOMATED COUNT: 18.8 % (ref 11.5–14.5)
ERYTHROCYTE [DISTWIDTH] IN BLOOD BY AUTOMATED COUNT: 19.3 % (ref 11.5–14.5)
ERYTHROCYTE [DISTWIDTH] IN BLOOD BY AUTOMATED COUNT: 19.4 % (ref 11.5–14.5)
ERYTHROCYTE [DISTWIDTH] IN BLOOD BY AUTOMATED COUNT: 19.6 % (ref 11.5–14.5)
ERYTHROCYTE [DISTWIDTH] IN BLOOD BY AUTOMATED COUNT: 19.8 % (ref 11.5–14.5)
ERYTHROCYTE [DISTWIDTH] IN BLOOD BY AUTOMATED COUNT: 20.5 % (ref 11.5–14.5)
ERYTHROCYTE [DISTWIDTH] IN BLOOD BY AUTOMATED COUNT: 20.6 % (ref 11.5–14.5)
ERYTHROCYTE [DISTWIDTH] IN BLOOD BY AUTOMATED COUNT: 20.8 % (ref 11.5–14.5)
ERYTHROCYTE [DISTWIDTH] IN BLOOD BY AUTOMATED COUNT: 21 % (ref 11.5–14.5)
ERYTHROCYTE [DISTWIDTH] IN BLOOD BY AUTOMATED COUNT: 21.1 % (ref 11.5–14.5)
ERYTHROCYTE [DISTWIDTH] IN BLOOD BY AUTOMATED COUNT: 21.6 % (ref 11.5–14.5)
EST. AVERAGE GLUCOSE BLD GHB EST-MCNC: ABNORMAL MG/DL
FLUAV H1 2009 PAND RNA SPEC QL NAA+PROBE: NOT DETECTED
FLUAV H1 RNA SPEC QL NAA+PROBE: NOT DETECTED
FLUAV H3 RNA SPEC QL NAA+PROBE: NOT DETECTED
FLUAV SUBTYP SPEC NAA+PROBE: NOT DETECTED
FLUBV RNA SPEC QL NAA+PROBE: NOT DETECTED
GAS FLOW.O2 O2 DELIVERY SYS: 2 L/MIN
GAS FLOW.O2 O2 DELIVERY SYS: 2 L/MIN
GAS FLOW.O2 O2 DELIVERY SYS: 3 L/MIN
GAS FLOW.O2 O2 DELIVERY SYS: 6 L/MIN
GLOBULIN SER CALC-MCNC: 2.2 G/DL (ref 2–4)
GLOBULIN SER CALC-MCNC: 2.2 G/DL (ref 2–4)
GLOBULIN SER CALC-MCNC: 2.3 G/DL (ref 2–4)
GLOBULIN SER CALC-MCNC: 2.4 G/DL (ref 2–4)
GLOBULIN SER CALC-MCNC: 2.4 G/DL (ref 2–4)
GLOBULIN SER CALC-MCNC: 2.5 G/DL (ref 2–4)
GLOBULIN SER CALC-MCNC: 2.6 G/DL (ref 2–4)
GLOBULIN SER CALC-MCNC: 2.7 G/DL (ref 2–4)
GLOBULIN SER CALC-MCNC: 2.8 G/DL (ref 2–4)
GLOBULIN SER CALC-MCNC: 2.9 G/DL (ref 2–4)
GLOBULIN SER CALC-MCNC: 3 G/DL (ref 2–4)
GLOBULIN SER CALC-MCNC: 3 G/DL (ref 2–4)
GLOBULIN SER CALC-MCNC: 3.2 G/DL (ref 2–4)
GLOBULIN SER CALC-MCNC: 3.3 G/DL (ref 2–4)
GLUCOSE BLD STRIP.AUTO-MCNC: 105 MG/DL (ref 65–100)
GLUCOSE BLD STRIP.AUTO-MCNC: 105 MG/DL (ref 65–100)
GLUCOSE BLD STRIP.AUTO-MCNC: 106 MG/DL (ref 65–100)
GLUCOSE BLD STRIP.AUTO-MCNC: 108 MG/DL (ref 65–100)
GLUCOSE BLD STRIP.AUTO-MCNC: 108 MG/DL (ref 65–100)
GLUCOSE BLD STRIP.AUTO-MCNC: 109 MG/DL (ref 65–100)
GLUCOSE BLD STRIP.AUTO-MCNC: 110 MG/DL (ref 65–100)
GLUCOSE BLD STRIP.AUTO-MCNC: 112 MG/DL (ref 65–100)
GLUCOSE BLD STRIP.AUTO-MCNC: 116 MG/DL (ref 65–100)
GLUCOSE BLD STRIP.AUTO-MCNC: 117 MG/DL (ref 65–100)
GLUCOSE BLD STRIP.AUTO-MCNC: 122 MG/DL (ref 65–100)
GLUCOSE BLD STRIP.AUTO-MCNC: 122 MG/DL (ref 65–100)
GLUCOSE BLD STRIP.AUTO-MCNC: 128 MG/DL (ref 65–100)
GLUCOSE BLD STRIP.AUTO-MCNC: 128 MG/DL (ref 65–100)
GLUCOSE BLD STRIP.AUTO-MCNC: 129 MG/DL (ref 65–100)
GLUCOSE BLD STRIP.AUTO-MCNC: 129 MG/DL (ref 65–100)
GLUCOSE BLD STRIP.AUTO-MCNC: 134 MG/DL (ref 65–100)
GLUCOSE BLD STRIP.AUTO-MCNC: 140 MG/DL (ref 65–100)
GLUCOSE BLD STRIP.AUTO-MCNC: 141 MG/DL (ref 65–100)
GLUCOSE BLD STRIP.AUTO-MCNC: 145 MG/DL (ref 65–100)
GLUCOSE BLD STRIP.AUTO-MCNC: 149 MG/DL (ref 65–100)
GLUCOSE BLD STRIP.AUTO-MCNC: 158 MG/DL (ref 65–100)
GLUCOSE BLD STRIP.AUTO-MCNC: 161 MG/DL (ref 65–100)
GLUCOSE BLD STRIP.AUTO-MCNC: 170 MG/DL (ref 65–100)
GLUCOSE BLD STRIP.AUTO-MCNC: 179 MG/DL (ref 65–100)
GLUCOSE BLD STRIP.AUTO-MCNC: 182 MG/DL (ref 65–100)
GLUCOSE BLD STRIP.AUTO-MCNC: 190 MG/DL (ref 65–100)
GLUCOSE BLD STRIP.AUTO-MCNC: 194 MG/DL (ref 65–100)
GLUCOSE BLD STRIP.AUTO-MCNC: 206 MG/DL (ref 65–100)
GLUCOSE BLD STRIP.AUTO-MCNC: 209 MG/DL (ref 65–100)
GLUCOSE BLD STRIP.AUTO-MCNC: 210 MG/DL (ref 65–100)
GLUCOSE BLD STRIP.AUTO-MCNC: 212 MG/DL (ref 65–100)
GLUCOSE BLD STRIP.AUTO-MCNC: 214 MG/DL (ref 65–100)
GLUCOSE BLD STRIP.AUTO-MCNC: 218 MG/DL (ref 65–100)
GLUCOSE BLD STRIP.AUTO-MCNC: 224 MG/DL (ref 65–100)
GLUCOSE BLD STRIP.AUTO-MCNC: 230 MG/DL (ref 65–100)
GLUCOSE BLD STRIP.AUTO-MCNC: 234 MG/DL (ref 65–100)
GLUCOSE BLD STRIP.AUTO-MCNC: 240 MG/DL (ref 65–100)
GLUCOSE BLD STRIP.AUTO-MCNC: 250 MG/DL (ref 65–100)
GLUCOSE BLD STRIP.AUTO-MCNC: 258 MG/DL (ref 65–100)
GLUCOSE BLD STRIP.AUTO-MCNC: 272 MG/DL (ref 65–100)
GLUCOSE BLD STRIP.AUTO-MCNC: 275 MG/DL (ref 65–100)
GLUCOSE BLD STRIP.AUTO-MCNC: 275 MG/DL (ref 65–100)
GLUCOSE BLD STRIP.AUTO-MCNC: 276 MG/DL (ref 65–100)
GLUCOSE BLD STRIP.AUTO-MCNC: 294 MG/DL (ref 65–100)
GLUCOSE BLD STRIP.AUTO-MCNC: 298 MG/DL (ref 65–100)
GLUCOSE BLD STRIP.AUTO-MCNC: 312 MG/DL (ref 65–100)
GLUCOSE BLD STRIP.AUTO-MCNC: 315 MG/DL (ref 65–100)
GLUCOSE BLD STRIP.AUTO-MCNC: 317 MG/DL (ref 65–100)
GLUCOSE BLD STRIP.AUTO-MCNC: 327 MG/DL (ref 65–100)
GLUCOSE BLD STRIP.AUTO-MCNC: 328 MG/DL (ref 65–100)
GLUCOSE BLD STRIP.AUTO-MCNC: 337 MG/DL (ref 65–100)
GLUCOSE BLD STRIP.AUTO-MCNC: 361 MG/DL (ref 65–100)
GLUCOSE BLD STRIP.AUTO-MCNC: 362 MG/DL (ref 65–100)
GLUCOSE BLD STRIP.AUTO-MCNC: 384 MG/DL (ref 65–100)
GLUCOSE BLD STRIP.AUTO-MCNC: 402 MG/DL (ref 65–100)
GLUCOSE BLD STRIP.AUTO-MCNC: 434 MG/DL (ref 65–100)
GLUCOSE BLD STRIP.AUTO-MCNC: 437 MG/DL (ref 65–100)
GLUCOSE BLD STRIP.AUTO-MCNC: 470 MG/DL (ref 65–100)
GLUCOSE BLD STRIP.AUTO-MCNC: 68 MG/DL (ref 65–100)
GLUCOSE BLD STRIP.AUTO-MCNC: 72 MG/DL (ref 65–100)
GLUCOSE BLD STRIP.AUTO-MCNC: 79 MG/DL (ref 65–100)
GLUCOSE BLD STRIP.AUTO-MCNC: 79 MG/DL (ref 65–100)
GLUCOSE BLD STRIP.AUTO-MCNC: 86 MG/DL (ref 65–100)
GLUCOSE BLD STRIP.AUTO-MCNC: 88 MG/DL (ref 65–100)
GLUCOSE BLD STRIP.AUTO-MCNC: 90 MG/DL (ref 65–100)
GLUCOSE BLD STRIP.AUTO-MCNC: 91 MG/DL (ref 65–100)
GLUCOSE BLD STRIP.AUTO-MCNC: 92 MG/DL (ref 65–100)
GLUCOSE BLD STRIP.AUTO-MCNC: 94 MG/DL (ref 65–100)
GLUCOSE BLD STRIP.AUTO-MCNC: NORMAL MG/DL (ref 65–100)
GLUCOSE SERPL-MCNC: 100 MG/DL (ref 65–100)
GLUCOSE SERPL-MCNC: 106 MG/DL (ref 65–100)
GLUCOSE SERPL-MCNC: 110 MG/DL (ref 65–100)
GLUCOSE SERPL-MCNC: 146 MG/DL (ref 65–100)
GLUCOSE SERPL-MCNC: 167 MG/DL (ref 65–100)
GLUCOSE SERPL-MCNC: 171 MG/DL (ref 65–100)
GLUCOSE SERPL-MCNC: 183 MG/DL (ref 65–100)
GLUCOSE SERPL-MCNC: 185 MG/DL (ref 65–100)
GLUCOSE SERPL-MCNC: 190 MG/DL (ref 65–100)
GLUCOSE SERPL-MCNC: 191 MG/DL (ref 65–100)
GLUCOSE SERPL-MCNC: 243 MG/DL (ref 65–100)
GLUCOSE SERPL-MCNC: 258 MG/DL (ref 65–100)
GLUCOSE SERPL-MCNC: 335 MG/DL (ref 65–100)
GLUCOSE SERPL-MCNC: 53 MG/DL (ref 65–100)
GLUCOSE SERPL-MCNC: 54 MG/DL (ref 65–100)
GLUCOSE SERPL-MCNC: 55 MG/DL (ref 65–100)
GLUCOSE SERPL-MCNC: 66 MG/DL (ref 65–100)
GLUCOSE SERPL-MCNC: 73 MG/DL (ref 65–100)
GLUCOSE SERPL-MCNC: 86 MG/DL (ref 65–100)
GLUCOSE SERPL-MCNC: 87 MG/DL (ref 65–100)
GLUCOSE SERPL-MCNC: 92 MG/DL (ref 65–100)
GLUCOSE UR STRIP.AUTO-MCNC: 250 MG/DL
GLUCOSE UR STRIP.AUTO-MCNC: NEGATIVE MG/DL
GRAM STN SPEC: ABNORMAL
HADV DNA SPEC QL NAA+PROBE: NOT DETECTED
HAV IGM SER QL: NONREACTIVE
HBA1C MFR BLD: <3.5 % (ref 4.2–6.3)
HBV CORE IGM SER QL: NONREACTIVE
HBV SURFACE AG SER QL: <0.1 INDEX
HBV SURFACE AG SER QL: NEGATIVE
HCO3 BLDA-SCNC: 24 MMOL/L (ref 22–26)
HCO3 BLDA-SCNC: 26 MMOL/L (ref 22–26)
HCO3 BLDA-SCNC: 32 MMOL/L (ref 22–26)
HCO3 BLDA-SCNC: 34 MMOL/L (ref 22–26)
HCOV 229E RNA SPEC QL NAA+PROBE: NOT DETECTED
HCOV HKU1 RNA SPEC QL NAA+PROBE: NOT DETECTED
HCOV NL63 RNA SPEC QL NAA+PROBE: NOT DETECTED
HCOV OC43 RNA SPEC QL NAA+PROBE: NOT DETECTED
HCT VFR BLD AUTO: 20.5 % (ref 35–47)
HCT VFR BLD AUTO: 21.8 % (ref 35–47)
HCT VFR BLD AUTO: 22.2 % (ref 35–47)
HCT VFR BLD AUTO: 22.2 % (ref 35–47)
HCT VFR BLD AUTO: 22.4 % (ref 35–47)
HCT VFR BLD AUTO: 22.5 % (ref 35–47)
HCT VFR BLD AUTO: 22.9 % (ref 35–47)
HCT VFR BLD AUTO: 23.2 % (ref 35–47)
HCT VFR BLD AUTO: 23.4 % (ref 35–47)
HCT VFR BLD AUTO: 23.8 % (ref 35–47)
HCT VFR BLD AUTO: 24.1 % (ref 35–47)
HCT VFR BLD AUTO: 24.3 % (ref 35–47)
HCT VFR BLD AUTO: 24.5 % (ref 35–47)
HCT VFR BLD AUTO: 24.5 % (ref 35–47)
HCT VFR BLD AUTO: 24.8 % (ref 35–47)
HCT VFR BLD AUTO: 24.9 % (ref 35–47)
HCT VFR BLD AUTO: 25 % (ref 35–47)
HCT VFR BLD AUTO: 25.3 % (ref 35–47)
HCT VFR BLD AUTO: 25.5 % (ref 35–47)
HCT VFR BLD AUTO: 25.6 % (ref 35–47)
HCT VFR BLD AUTO: 25.6 % (ref 35–47)
HCT VFR BLD AUTO: 26.3 % (ref 35–47)
HCT VFR BLD AUTO: 26.8 % (ref 35–47)
HCT VFR BLD AUTO: 27.1 % (ref 35–47)
HCT VFR BLD AUTO: 27.3 % (ref 35–47)
HCT VFR BLD AUTO: 28 % (ref 35–47)
HCT VFR BLD AUTO: 28.9 % (ref 35–47)
HCV AB SERPL QL IA: NONREACTIVE
HCV COMMENT,HCGAC: NORMAL
HEMOCCULT STL QL: NEGATIVE
HEMOCCULT STL QL: POSITIVE
HGB BLD-MCNC: 6.4 G/DL (ref 11.5–16)
HGB BLD-MCNC: 6.7 G/DL (ref 11.5–16)
HGB BLD-MCNC: 6.7 G/DL (ref 11.5–16)
HGB BLD-MCNC: 6.8 G/DL (ref 11.5–16)
HGB BLD-MCNC: 6.9 G/DL (ref 11.5–16)
HGB BLD-MCNC: 7.2 G/DL (ref 11.5–16)
HGB BLD-MCNC: 7.3 G/DL (ref 11.5–16)
HGB BLD-MCNC: 7.5 G/DL (ref 11.5–16)
HGB BLD-MCNC: 7.8 G/DL (ref 11.5–16)
HGB BLD-MCNC: 7.9 G/DL (ref 11.5–16)
HGB BLD-MCNC: 8 G/DL (ref 11.5–16)
HGB BLD-MCNC: 8 G/DL (ref 11.5–16)
HGB BLD-MCNC: 8.1 G/DL (ref 11.5–16)
HGB BLD-MCNC: 8.2 G/DL (ref 11.5–16)
HGB BLD-MCNC: 8.3 G/DL (ref 11.5–16)
HGB BLD-MCNC: 8.3 G/DL (ref 11.5–16)
HGB BLD-MCNC: 8.5 G/DL (ref 11.5–16)
HGB BLD-MCNC: 8.6 G/DL (ref 11.5–16)
HGB BLD-MCNC: 9.1 G/DL (ref 11.5–16)
HGB UR QL STRIP: ABNORMAL
HGB UR QL STRIP: ABNORMAL
HMPV RNA SPEC QL NAA+PROBE: NOT DETECTED
HPIV1 RNA SPEC QL NAA+PROBE: NOT DETECTED
HPIV2 RNA SPEC QL NAA+PROBE: NOT DETECTED
HPIV3 RNA SPEC QL NAA+PROBE: NOT DETECTED
HPIV4 RNA SPEC QL NAA+PROBE: NOT DETECTED
HYALINE CASTS URNS QL MICRO: ABNORMAL /LPF (ref 0–5)
IMM GRANULOCYTES # BLD AUTO: 0 K/UL
IMM GRANULOCYTES # BLD AUTO: 0 K/UL (ref 0–0.04)
IMM GRANULOCYTES # BLD AUTO: 0.2 K/UL (ref 0–0.04)
IMM GRANULOCYTES # BLD: 0 K/UL
IMM GRANULOCYTES # BLD: 0 K/UL (ref 0–0.04)
IMM GRANULOCYTES # BLD: 0.7 K/UL (ref 0–0.04)
IMM GRANULOCYTES NFR BLD AUTO: 0 %
IMM GRANULOCYTES NFR BLD AUTO: 0 % (ref 0–0.5)
IMM GRANULOCYTES NFR BLD AUTO: 0 % (ref 0–0.5)
IMM GRANULOCYTES NFR BLD AUTO: 1 % (ref 0–0.5)
IMM GRANULOCYTES NFR BLD AUTO: 4 % (ref 0–0.5)
INR PPP: 1.2 (ref 0.9–1.1)
INR PPP: 1.3 (ref 0.9–1.1)
INR PPP: 1.7 (ref 0.9–1.1)
INR PPP: 1.7 (ref 0.9–1.1)
INR PPP: 2 (ref 0.9–1.1)
INR PPP: 2.3 (ref 0.9–1.1)
INR PPP: 2.4 (ref 0.9–1.1)
INR PPP: 2.7 (ref 0.9–1.1)
INR PPP: 2.8 (ref 0.9–1.1)
INR PPP: 2.9 (ref 0.9–1.1)
INR PPP: 3 (ref 0.9–1.1)
INR PPP: 3 (ref 0.9–1.1)
INR PPP: 3.1 (ref 0.9–1.1)
INR PPP: 3.4 (ref 0.9–1.1)
INR PPP: 3.5 (ref 0.9–1.1)
INR PPP: 4.9 (ref 0.9–1.1)
INR PPP: 5.9 (ref 0.9–1.1)
INR PPP: 6 (ref 0.9–1.1)
INR PPP: 6 (ref 0.9–1.1)
KETONES UR QL STRIP.AUTO: ABNORMAL MG/DL
KETONES UR QL STRIP.AUTO: NEGATIVE MG/DL
LACTATE BLD-SCNC: 0.81 MMOL/L (ref 0.4–2)
LACTATE BLD-SCNC: 2.8 MMOL/L (ref 0.4–2)
LACTATE SERPL-SCNC: 0.6 MMOL/L (ref 0.4–2)
LACTATE SERPL-SCNC: 1.4 MMOL/L (ref 0.4–2)
LEUKOCYTE ESTERASE UR QL STRIP.AUTO: ABNORMAL
LEUKOCYTE ESTERASE UR QL STRIP.AUTO: NEGATIVE
LYMPHOCYTES # BLD: 0 K/UL (ref 0.8–3.5)
LYMPHOCYTES # BLD: 0 K/UL (ref 0.8–3.5)
LYMPHOCYTES # BLD: 0.2 K/UL (ref 0.8–3.5)
LYMPHOCYTES # BLD: 0.3 K/UL (ref 0.8–3.5)
LYMPHOCYTES # BLD: 0.4 K/UL (ref 0.8–3.5)
LYMPHOCYTES # BLD: 0.5 K/UL (ref 0.8–3.5)
LYMPHOCYTES # BLD: 0.5 K/UL (ref 0.8–3.5)
LYMPHOCYTES # BLD: 0.6 K/UL (ref 0.8–3.5)
LYMPHOCYTES # BLD: 0.8 K/UL (ref 0.8–3.5)
LYMPHOCYTES # BLD: 0.8 K/UL (ref 0.8–3.5)
LYMPHOCYTES # BLD: 1 K/UL (ref 0.8–3.5)
LYMPHOCYTES # BLD: 1.1 K/UL (ref 0.8–3.5)
LYMPHOCYTES # BLD: 1.4 K/UL (ref 0.8–3.5)
LYMPHOCYTES NFR BLD: 0 % (ref 12–49)
LYMPHOCYTES NFR BLD: 0 % (ref 12–49)
LYMPHOCYTES NFR BLD: 1 % (ref 12–49)
LYMPHOCYTES NFR BLD: 2 % (ref 12–49)
LYMPHOCYTES NFR BLD: 3 % (ref 12–49)
LYMPHOCYTES NFR BLD: 3 % (ref 12–49)
LYMPHOCYTES NFR BLD: 4 % (ref 12–49)
LYMPHOCYTES NFR BLD: 5 % (ref 12–49)
LYMPHOCYTES NFR BLD: 9 % (ref 12–49)
M PNEUMO DNA SPEC QL NAA+PROBE: NOT DETECTED
MAGNESIUM SERPL-MCNC: 1.6 MG/DL (ref 1.6–2.4)
MAGNESIUM SERPL-MCNC: 1.6 MG/DL (ref 1.6–2.4)
MAGNESIUM SERPL-MCNC: 1.7 MG/DL (ref 1.6–2.4)
MAGNESIUM SERPL-MCNC: 1.8 MG/DL (ref 1.6–2.4)
MAGNESIUM SERPL-MCNC: 1.9 MG/DL (ref 1.6–2.4)
MAGNESIUM SERPL-MCNC: 2 MG/DL (ref 1.6–2.4)
MAGNESIUM SERPL-MCNC: 2.1 MG/DL (ref 1.6–2.4)
MAGNESIUM SERPL-MCNC: 2.3 MG/DL (ref 1.6–2.4)
MAGNESIUM SERPL-MCNC: 2.3 MG/DL (ref 1.6–2.4)
MCH RBC QN AUTO: 27.8 PG (ref 26–34)
MCH RBC QN AUTO: 28 PG (ref 26–34)
MCH RBC QN AUTO: 28.2 PG (ref 26–34)
MCH RBC QN AUTO: 28.3 PG (ref 26–34)
MCH RBC QN AUTO: 28.3 PG (ref 26–34)
MCH RBC QN AUTO: 28.5 PG (ref 26–34)
MCH RBC QN AUTO: 28.6 PG (ref 26–34)
MCH RBC QN AUTO: 28.7 PG (ref 26–34)
MCH RBC QN AUTO: 28.8 PG (ref 26–34)
MCH RBC QN AUTO: 28.9 PG (ref 26–34)
MCH RBC QN AUTO: 29.1 PG (ref 26–34)
MCH RBC QN AUTO: 29.4 PG (ref 26–34)
MCH RBC QN AUTO: 29.9 PG (ref 26–34)
MCH RBC QN AUTO: 30 PG (ref 26–34)
MCH RBC QN AUTO: 30.2 PG (ref 26–34)
MCH RBC QN AUTO: 30.4 PG (ref 26–34)
MCH RBC QN AUTO: 30.6 PG (ref 26–34)
MCH RBC QN AUTO: 30.7 PG (ref 26–34)
MCH RBC QN AUTO: 31.5 PG (ref 26–34)
MCHC RBC AUTO-ENTMCNC: 29.8 G/DL (ref 30–36.5)
MCHC RBC AUTO-ENTMCNC: 29.9 G/DL (ref 30–36.5)
MCHC RBC AUTO-ENTMCNC: 30 G/DL (ref 30–36.5)
MCHC RBC AUTO-ENTMCNC: 30 G/DL (ref 30–36.5)
MCHC RBC AUTO-ENTMCNC: 30.6 G/DL (ref 30–36.5)
MCHC RBC AUTO-ENTMCNC: 30.8 G/DL (ref 30–36.5)
MCHC RBC AUTO-ENTMCNC: 30.9 G/DL (ref 30–36.5)
MCHC RBC AUTO-ENTMCNC: 31.1 G/DL (ref 30–36.5)
MCHC RBC AUTO-ENTMCNC: 31.1 G/DL (ref 30–36.5)
MCHC RBC AUTO-ENTMCNC: 31.2 G/DL (ref 30–36.5)
MCHC RBC AUTO-ENTMCNC: 31.3 G/DL (ref 30–36.5)
MCHC RBC AUTO-ENTMCNC: 31.3 G/DL (ref 30–36.5)
MCHC RBC AUTO-ENTMCNC: 31.4 G/DL (ref 30–36.5)
MCHC RBC AUTO-ENTMCNC: 31.5 G/DL (ref 30–36.5)
MCHC RBC AUTO-ENTMCNC: 31.6 G/DL (ref 30–36.5)
MCHC RBC AUTO-ENTMCNC: 31.8 G/DL (ref 30–36.5)
MCHC RBC AUTO-ENTMCNC: 32 G/DL (ref 30–36.5)
MCV RBC AUTO: 100.4 FL (ref 80–99)
MCV RBC AUTO: 88.8 FL (ref 80–99)
MCV RBC AUTO: 90.2 FL (ref 80–99)
MCV RBC AUTO: 90.7 FL (ref 80–99)
MCV RBC AUTO: 91.5 FL (ref 80–99)
MCV RBC AUTO: 91.6 FL (ref 80–99)
MCV RBC AUTO: 92.4 FL (ref 80–99)
MCV RBC AUTO: 93.1 FL (ref 80–99)
MCV RBC AUTO: 93.4 FL (ref 80–99)
MCV RBC AUTO: 94 FL (ref 80–99)
MCV RBC AUTO: 94.1 FL (ref 80–99)
MCV RBC AUTO: 94.4 FL (ref 80–99)
MCV RBC AUTO: 95 FL (ref 80–99)
MCV RBC AUTO: 95.5 FL (ref 80–99)
MCV RBC AUTO: 95.5 FL (ref 80–99)
MCV RBC AUTO: 95.6 FL (ref 80–99)
MCV RBC AUTO: 95.7 FL (ref 80–99)
MCV RBC AUTO: 95.7 FL (ref 80–99)
MCV RBC AUTO: 96.5 FL (ref 80–99)
MCV RBC AUTO: 97.1 FL (ref 80–99)
MCV RBC AUTO: 97.5 FL (ref 80–99)
METAMYELOCYTES NFR BLD MANUAL: 1 %
METAMYELOCYTES NFR BLD MANUAL: 2 %
METAMYELOCYTES NFR BLD MANUAL: 2 %
METAMYELOCYTES NFR BLD MANUAL: 3 %
MONOCYTES # BLD: 0.3 K/UL (ref 0–1)
MONOCYTES # BLD: 0.3 K/UL (ref 0–1)
MONOCYTES # BLD: 0.4 K/UL (ref 0–1)
MONOCYTES # BLD: 0.5 K/UL (ref 0–1)
MONOCYTES # BLD: 0.6 K/UL (ref 0–1)
MONOCYTES # BLD: 0.7 K/UL (ref 0–1)
MONOCYTES # BLD: 0.8 K/UL (ref 0–1)
MONOCYTES # BLD: 0.8 K/UL (ref 0–1)
MONOCYTES # BLD: 0.9 K/UL (ref 0–1)
MONOCYTES # BLD: 1 K/UL (ref 0–1)
MONOCYTES # BLD: 1.1 K/UL (ref 0–1)
MONOCYTES # BLD: 1.4 K/UL (ref 0–1)
MONOCYTES # BLD: 1.6 K/UL (ref 0–1)
MONOCYTES # BLD: 1.8 K/UL (ref 0–1)
MONOCYTES # BLD: 1.9 K/UL (ref 0–1)
MONOCYTES # BLD: 2.3 K/UL (ref 0–1)
MONOCYTES NFR BLD: 2 % (ref 5–13)
MONOCYTES NFR BLD: 3 % (ref 5–13)
MONOCYTES NFR BLD: 4 % (ref 5–13)
MONOCYTES NFR BLD: 5 % (ref 5–13)
MONOCYTES NFR BLD: 6 % (ref 5–13)
MONOCYTES NFR BLD: 7 % (ref 5–13)
MYELOCYTES NFR BLD MANUAL: 1 %
MYELOCYTES NFR BLD MANUAL: 2 %
NEUTS BAND NFR BLD MANUAL: 1 %
NEUTS BAND NFR BLD MANUAL: 1 % (ref 0–6)
NEUTS BAND NFR BLD MANUAL: 2 % (ref 0–6)
NEUTS SEG # BLD: 13.1 K/UL (ref 1.8–8)
NEUTS SEG # BLD: 14.7 K/UL (ref 1.8–8)
NEUTS SEG # BLD: 14.7 K/UL (ref 1.8–8)
NEUTS SEG # BLD: 15.2 K/UL (ref 1.8–8)
NEUTS SEG # BLD: 15.8 K/UL (ref 1.8–8)
NEUTS SEG # BLD: 16 K/UL (ref 1.8–8)
NEUTS SEG # BLD: 16.2 K/UL (ref 1.8–8)
NEUTS SEG # BLD: 16.5 K/UL (ref 1.8–8)
NEUTS SEG # BLD: 17.2 K/UL (ref 1.8–8)
NEUTS SEG # BLD: 17.4 K/UL (ref 1.8–8)
NEUTS SEG # BLD: 17.5 K/UL (ref 1.8–8)
NEUTS SEG # BLD: 17.6 K/UL (ref 1.8–8)
NEUTS SEG # BLD: 18.9 K/UL (ref 1.8–8)
NEUTS SEG # BLD: 19.4 K/UL (ref 1.8–8)
NEUTS SEG # BLD: 19.7 K/UL (ref 1.8–8)
NEUTS SEG # BLD: 23 K/UL (ref 1.8–8)
NEUTS SEG # BLD: 27.3 K/UL (ref 1.8–8)
NEUTS SEG # BLD: 28.9 K/UL (ref 1.8–8)
NEUTS SEG # BLD: 29.4 K/UL (ref 1.8–8)
NEUTS SEG # BLD: 29.9 K/UL (ref 1.8–8)
NEUTS SEG NFR BLD: 85 % (ref 32–75)
NEUTS SEG NFR BLD: 86 % (ref 32–75)
NEUTS SEG NFR BLD: 88 % (ref 32–75)
NEUTS SEG NFR BLD: 90 % (ref 32–75)
NEUTS SEG NFR BLD: 91 % (ref 32–75)
NEUTS SEG NFR BLD: 92 % (ref 32–75)
NEUTS SEG NFR BLD: 93 % (ref 32–75)
NEUTS SEG NFR BLD: 95 % (ref 32–75)
NEUTS SEG NFR BLD: 98 % (ref 32–75)
NITRITE UR QL STRIP.AUTO: NEGATIVE
NITRITE UR QL STRIP.AUTO: NEGATIVE
NRBC # BLD: 0.02 K/UL (ref 0–0.01)
NRBC # BLD: 0.03 K/UL (ref 0–0.01)
NRBC # BLD: 0.04 K/UL (ref 0–0.01)
NRBC # BLD: 0.05 K/UL (ref 0–0.01)
NRBC # BLD: 0.06 K/UL (ref 0–0.01)
NRBC # BLD: 0.06 K/UL (ref 0–0.01)
NRBC # BLD: 0.09 K/UL (ref 0–0.01)
NRBC # BLD: 0.1 K/UL (ref 0–0.01)
NRBC # BLD: 0.11 K/UL (ref 0–0.01)
NRBC # BLD: 0.17 K/UL (ref 0–0.01)
NRBC BLD-RTO: 0.1 PER 100 WBC
NRBC BLD-RTO: 0.2 PER 100 WBC
NRBC BLD-RTO: 0.3 PER 100 WBC
NRBC BLD-RTO: 0.4 PER 100 WBC
NRBC BLD-RTO: 0.4 PER 100 WBC
NRBC BLD-RTO: 0.8 PER 100 WBC
OSMOLALITY UR: 385 MOSM/KG H2O
PCO2 BLDA: 39 MMHG (ref 35–45)
PCO2 BLDA: 47 MMHG (ref 35–45)
PCO2 BLDA: 48 MMHG (ref 35–45)
PCO2 BLDA: 63 MMHG (ref 35–45)
PH BLDA: 7.31 [PH] (ref 7.35–7.45)
PH BLDA: 7.35 [PH] (ref 7.35–7.45)
PH BLDA: 7.44 [PH] (ref 7.35–7.45)
PH BLDA: 7.45 [PH] (ref 7.35–7.45)
PH UR STRIP: 5 [PH] (ref 5–8)
PH UR STRIP: 6 [PH] (ref 5–8)
PHOSPHATE SERPL-MCNC: 2.4 MG/DL (ref 2.6–4.7)
PHOSPHATE SERPL-MCNC: 3.1 MG/DL (ref 2.6–4.7)
PHOSPHATE SERPL-MCNC: 3.3 MG/DL (ref 2.6–4.7)
PHOSPHATE SERPL-MCNC: 3.5 MG/DL (ref 2.6–4.7)
PHOSPHATE SERPL-MCNC: 3.6 MG/DL (ref 2.6–4.7)
PHOSPHATE SERPL-MCNC: 3.7 MG/DL (ref 2.6–4.7)
PHOSPHATE SERPL-MCNC: 3.9 MG/DL (ref 2.6–4.7)
PHOSPHATE SERPL-MCNC: 4.1 MG/DL (ref 2.6–4.7)
PHOSPHATE SERPL-MCNC: 4.4 MG/DL (ref 2.6–4.7)
PHOSPHATE SERPL-MCNC: 4.9 MG/DL (ref 2.6–4.7)
PHOSPHATE SERPL-MCNC: 5.1 MG/DL (ref 2.6–4.7)
PHOSPHATE SERPL-MCNC: 5.2 MG/DL (ref 2.6–4.7)
PHOSPHATE SERPL-MCNC: 5.6 MG/DL (ref 2.6–4.7)
PHOSPHATE SERPL-MCNC: 5.6 MG/DL (ref 2.6–4.7)
PHOSPHATE SERPL-MCNC: 5.7 MG/DL (ref 2.6–4.7)
PHOSPHATE SERPL-MCNC: 6 MG/DL (ref 2.6–4.7)
PHOSPHATE SERPL-MCNC: 6.2 MG/DL (ref 2.6–4.7)
PHOSPHATE SERPL-MCNC: 8.7 MG/DL (ref 2.6–4.7)
PLATELET # BLD AUTO: 123 K/UL (ref 150–400)
PLATELET # BLD AUTO: 125 K/UL (ref 150–400)
PLATELET # BLD AUTO: 126 K/UL (ref 150–400)
PLATELET # BLD AUTO: 127 K/UL (ref 150–400)
PLATELET # BLD AUTO: 133 K/UL (ref 150–400)
PLATELET # BLD AUTO: 137 K/UL (ref 150–400)
PLATELET # BLD AUTO: 139 K/UL (ref 150–400)
PLATELET # BLD AUTO: 147 K/UL (ref 150–400)
PLATELET # BLD AUTO: 147 K/UL (ref 150–400)
PLATELET # BLD AUTO: 148 K/UL (ref 150–400)
PLATELET # BLD AUTO: 149 K/UL (ref 150–400)
PLATELET # BLD AUTO: 153 K/UL (ref 150–400)
PLATELET # BLD AUTO: 161 K/UL (ref 150–400)
PLATELET # BLD AUTO: 175 K/UL (ref 150–400)
PLATELET # BLD AUTO: 193 K/UL (ref 150–400)
PLATELET # BLD AUTO: 201 K/UL (ref 150–400)
PLATELET # BLD AUTO: 209 K/UL (ref 150–400)
PLATELET # BLD AUTO: 235 K/UL (ref 150–400)
PLATELET # BLD AUTO: 275 K/UL (ref 150–400)
PLATELET # BLD AUTO: 325 K/UL (ref 150–400)
PLATELET # BLD AUTO: 371 K/UL (ref 150–400)
PLATELET COMMENTS,PCOM: ABNORMAL
PMV BLD AUTO: 11.2 FL (ref 8.9–12.9)
PMV BLD AUTO: 11.9 FL (ref 8.9–12.9)
PMV BLD AUTO: 12.2 FL (ref 8.9–12.9)
PMV BLD AUTO: 12.6 FL (ref 8.9–12.9)
PMV BLD AUTO: 12.6 FL (ref 8.9–12.9)
PMV BLD AUTO: ABNORMAL FL (ref 8.9–12.9)
PO2 BLDA: 105 MMHG (ref 80–100)
PO2 BLDA: 76 MMHG (ref 80–100)
PO2 BLDA: 78 MMHG (ref 80–100)
PO2 BLDA: 99 MMHG (ref 80–100)
POTASSIUM SERPL-SCNC: 2.9 MMOL/L (ref 3.5–5.1)
POTASSIUM SERPL-SCNC: 3.6 MMOL/L (ref 3.5–5.1)
POTASSIUM SERPL-SCNC: 3.7 MMOL/L (ref 3.5–5.1)
POTASSIUM SERPL-SCNC: 3.7 MMOL/L (ref 3.5–5.1)
POTASSIUM SERPL-SCNC: 3.9 MMOL/L (ref 3.5–5.1)
POTASSIUM SERPL-SCNC: 4.2 MMOL/L (ref 3.5–5.1)
POTASSIUM SERPL-SCNC: 4.3 MMOL/L (ref 3.5–5.1)
POTASSIUM SERPL-SCNC: 4.3 MMOL/L (ref 3.5–5.1)
POTASSIUM SERPL-SCNC: 4.4 MMOL/L (ref 3.5–5.1)
POTASSIUM SERPL-SCNC: 4.4 MMOL/L (ref 3.5–5.1)
POTASSIUM SERPL-SCNC: 4.7 MMOL/L (ref 3.5–5.1)
POTASSIUM SERPL-SCNC: 4.8 MMOL/L (ref 3.5–5.1)
POTASSIUM SERPL-SCNC: 4.8 MMOL/L (ref 3.5–5.1)
POTASSIUM SERPL-SCNC: 4.9 MMOL/L (ref 3.5–5.1)
POTASSIUM SERPL-SCNC: 5 MMOL/L (ref 3.5–5.1)
POTASSIUM SERPL-SCNC: 5.1 MMOL/L (ref 3.5–5.1)
POTASSIUM SERPL-SCNC: 5.1 MMOL/L (ref 3.5–5.1)
PROT SERPL-MCNC: 4.8 G/DL (ref 6.4–8.2)
PROT SERPL-MCNC: 5.1 G/DL (ref 6.4–8.2)
PROT SERPL-MCNC: 5.2 G/DL (ref 6.4–8.2)
PROT SERPL-MCNC: 5.3 G/DL (ref 6.4–8.2)
PROT SERPL-MCNC: 5.4 G/DL (ref 6.4–8.2)
PROT SERPL-MCNC: 5.4 G/DL (ref 6.4–8.2)
PROT SERPL-MCNC: 5.5 G/DL (ref 6.4–8.2)
PROT SERPL-MCNC: 5.5 G/DL (ref 6.4–8.2)
PROT SERPL-MCNC: 5.6 G/DL (ref 6.4–8.2)
PROT SERPL-MCNC: 5.8 G/DL (ref 6.4–8.2)
PROT SERPL-MCNC: 5.8 G/DL (ref 6.4–8.2)
PROT SERPL-MCNC: 6.1 G/DL (ref 6.4–8.2)
PROT UR STRIP-MCNC: 100 MG/DL
PROT UR STRIP-MCNC: ABNORMAL MG/DL
PROTHROMBIN TIME: 12.1 SEC (ref 9–11.1)
PROTHROMBIN TIME: 13.1 SEC (ref 9–11.1)
PROTHROMBIN TIME: 16.6 SEC (ref 9–11.1)
PROTHROMBIN TIME: 16.7 SEC (ref 9–11.1)
PROTHROMBIN TIME: 19.9 SEC (ref 9–11.1)
PROTHROMBIN TIME: 22.9 SEC (ref 9–11.1)
PROTHROMBIN TIME: 23.2 SEC (ref 9–11.1)
PROTHROMBIN TIME: 25.9 SEC (ref 9–11.1)
PROTHROMBIN TIME: 27 SEC (ref 9–11.1)
PROTHROMBIN TIME: 27.4 SEC (ref 9–11.1)
PROTHROMBIN TIME: 28.4 SEC (ref 9–11.1)
PROTHROMBIN TIME: 28.7 SEC (ref 9–11.1)
PROTHROMBIN TIME: 30.2 SEC (ref 9–11.1)
PROTHROMBIN TIME: 32.1 SEC (ref 9–11.1)
PROTHROMBIN TIME: 33.7 SEC (ref 9–11.1)
PROTHROMBIN TIME: 46.4 SEC (ref 9–11.1)
PROTHROMBIN TIME: 54.8 SEC (ref 9–11.1)
PROTHROMBIN TIME: 55.1 SEC (ref 9–11.1)
PROTHROMBIN TIME: 56 SEC (ref 9–11.1)
Q-T INTERVAL, ECG07: 310 MS
Q-T INTERVAL, ECG07: 374 MS
QRS DURATION, ECG06: 72 MS
QRS DURATION, ECG06: 76 MS
QTC CALCULATION (BEZET), ECG08: 448 MS
QTC CALCULATION (BEZET), ECG08: 457 MS
RBC # BLD AUTO: 2.26 M/UL (ref 3.8–5.2)
RBC # BLD AUTO: 2.34 M/UL (ref 3.8–5.2)
RBC # BLD AUTO: 2.38 M/UL (ref 3.8–5.2)
RBC # BLD AUTO: 2.41 M/UL (ref 3.8–5.2)
RBC # BLD AUTO: 2.41 M/UL (ref 3.8–5.2)
RBC # BLD AUTO: 2.46 M/UL (ref 3.8–5.2)
RBC # BLD AUTO: 2.48 M/UL (ref 3.8–5.2)
RBC # BLD AUTO: 2.54 M/UL (ref 3.8–5.2)
RBC # BLD AUTO: 2.58 M/UL (ref 3.8–5.2)
RBC # BLD AUTO: 2.63 M/UL (ref 3.8–5.2)
RBC # BLD AUTO: 2.65 M/UL (ref 3.8–5.2)
RBC # BLD AUTO: 2.66 M/UL (ref 3.8–5.2)
RBC # BLD AUTO: 2.67 M/UL (ref 3.8–5.2)
RBC # BLD AUTO: 2.71 M/UL (ref 3.8–5.2)
RBC # BLD AUTO: 2.72 M/UL (ref 3.8–5.2)
RBC # BLD AUTO: 2.75 M/UL (ref 3.8–5.2)
RBC # BLD AUTO: 2.76 M/UL (ref 3.8–5.2)
RBC # BLD AUTO: 2.77 M/UL (ref 3.8–5.2)
RBC # BLD AUTO: 2.83 M/UL (ref 3.8–5.2)
RBC # BLD AUTO: 2.87 M/UL (ref 3.8–5.2)
RBC # BLD AUTO: 2.88 M/UL (ref 3.8–5.2)
RBC #/AREA URNS HPF: ABNORMAL /HPF (ref 0–5)
RBC #/AREA URNS HPF: ABNORMAL /HPF (ref 0–5)
RBC MORPH BLD: ABNORMAL
RSV RNA SPEC QL NAA+PROBE: NOT DETECTED
RV+EV RNA SPEC QL NAA+PROBE: NOT DETECTED
SAMPLES BEING HELD,HOLD: NORMAL
SAMPLES BEING HELD,HOLD: NORMAL
SAO2 % BLD: 94 % (ref 92–97)
SAO2 % BLD: 94 % (ref 92–97)
SAO2 % BLD: 98 % (ref 92–97)
SAO2 % BLD: 98 % (ref 92–97)
SAO2% DEVICE SAO2% SENSOR NAME: ABNORMAL
SERVICE CMNT-IMP: ABNORMAL
SERVICE CMNT-IMP: NORMAL
SODIUM SERPL-SCNC: 132 MMOL/L (ref 136–145)
SODIUM SERPL-SCNC: 134 MMOL/L (ref 136–145)
SODIUM SERPL-SCNC: 136 MMOL/L (ref 136–145)
SODIUM SERPL-SCNC: 139 MMOL/L (ref 136–145)
SODIUM SERPL-SCNC: 139 MMOL/L (ref 136–145)
SODIUM SERPL-SCNC: 140 MMOL/L (ref 136–145)
SODIUM SERPL-SCNC: 141 MMOL/L (ref 136–145)
SODIUM SERPL-SCNC: 142 MMOL/L (ref 136–145)
SODIUM SERPL-SCNC: 143 MMOL/L (ref 136–145)
SODIUM SERPL-SCNC: 151 MMOL/L (ref 136–145)
SODIUM UR-SCNC: 105 MMOL/L
SP GR UR REFRACTOMETRY: 1.02 (ref 1–1.03)
SP GR UR REFRACTOMETRY: 1.02 (ref 1–1.03)
SP1: NORMAL
SP2: NORMAL
SP3: NORMAL
SPECIMEN EXP DATE BLD: NORMAL
SPECIMEN SITE: ABNORMAL
STATUS OF UNIT,%ST: NORMAL
T3FREE SERPL-MCNC: 1.6 PG/ML (ref 2.2–4)
T4 FREE SERPL-MCNC: 0.7 NG/DL (ref 0.8–1.5)
T4 SERPL-MCNC: 3.3 UG/DL (ref 4.8–13.9)
THERAPEUTIC RANGE,PTTT: ABNORMAL SECS (ref 58–77)
THERAPEUTIC RANGE,PTTT: NORMAL SECS (ref 58–77)
TROPONIN I BLD-MCNC: 0.1 NG/ML (ref 0–0.08)
TROPONIN I SERPL-MCNC: 0.05 NG/ML
TROPONIN I SERPL-MCNC: 0.06 NG/ML
TROPONIN I SERPL-MCNC: 0.08 NG/ML
TROPONIN I SERPL-MCNC: 0.11 NG/ML
TROPONIN I SERPL-MCNC: 0.12 NG/ML
TROPONIN I SERPL-MCNC: 0.13 NG/ML
TROPONIN I SERPL-MCNC: 0.14 NG/ML
TROPONIN I SERPL-MCNC: <0.05 NG/ML
TSH SERPL DL<=0.05 MIU/L-ACNC: 0.18 UIU/ML (ref 0.36–3.74)
UA: UC IF INDICATED,UAUC: ABNORMAL
UNIT DIVISION, %UDIV: 0
UROBILINOGEN UR QL STRIP.AUTO: 1 EU/DL (ref 0.2–1)
UROBILINOGEN UR QL STRIP.AUTO: 1 EU/DL (ref 0.2–1)
VENTRICULAR RATE, ECG03: 126 BPM
VENTRICULAR RATE, ECG03: 90 BPM
WBC # BLD AUTO: 15.5 K/UL (ref 3.6–11)
WBC # BLD AUTO: 15.8 K/UL (ref 3.6–11)
WBC # BLD AUTO: 16.1 K/UL (ref 3.6–11)
WBC # BLD AUTO: 16.2 K/UL (ref 3.6–11)
WBC # BLD AUTO: 16.5 K/UL (ref 3.6–11)
WBC # BLD AUTO: 17.4 K/UL (ref 3.6–11)
WBC # BLD AUTO: 17.4 K/UL (ref 3.6–11)
WBC # BLD AUTO: 18.2 K/UL (ref 3.6–11)
WBC # BLD AUTO: 18.9 K/UL (ref 3.6–11)
WBC # BLD AUTO: 19.5 K/UL (ref 3.6–11)
WBC # BLD AUTO: 20 K/UL (ref 3.6–11)
WBC # BLD AUTO: 20.3 K/UL (ref 3.6–11)
WBC # BLD AUTO: 20.4 K/UL (ref 3.6–11)
WBC # BLD AUTO: 20.7 K/UL (ref 3.6–11)
WBC # BLD AUTO: 22.6 K/UL (ref 3.6–11)
WBC # BLD AUTO: 24.2 K/UL (ref 3.6–11)
WBC # BLD AUTO: 29.7 K/UL (ref 3.6–11)
WBC # BLD AUTO: 31.1 K/UL (ref 3.6–11)
WBC # BLD AUTO: 32 K/UL (ref 3.6–11)
WBC # BLD AUTO: 32.5 K/UL (ref 3.6–11)
WBC # BLD AUTO: 38.6 K/UL (ref 3.6–11)
WBC MORPH BLD: ABNORMAL
WBC URNS QL MICRO: ABNORMAL /HPF (ref 0–4)
WBC URNS QL MICRO: ABNORMAL /HPF (ref 0–4)

## 2019-01-01 PROCEDURE — 74011250637 HC RX REV CODE- 250/637: Performed by: NURSE PRACTITIONER

## 2019-01-01 PROCEDURE — 65660000000 HC RM CCU STEPDOWN

## 2019-01-01 PROCEDURE — 36415 COLL VENOUS BLD VENIPUNCTURE: CPT

## 2019-01-01 PROCEDURE — 74011250636 HC RX REV CODE- 250/636: Performed by: INTERNAL MEDICINE

## 2019-01-01 PROCEDURE — 83735 ASSAY OF MAGNESIUM: CPT

## 2019-01-01 PROCEDURE — 82803 BLOOD GASES ANY COMBINATION: CPT

## 2019-01-01 PROCEDURE — 85730 THROMBOPLASTIN TIME PARTIAL: CPT

## 2019-01-01 PROCEDURE — 74011000250 HC RX REV CODE- 250: Performed by: FAMILY MEDICINE

## 2019-01-01 PROCEDURE — 74011250637 HC RX REV CODE- 250/637: Performed by: FAMILY MEDICINE

## 2019-01-01 PROCEDURE — P9016 RBC LEUKOCYTES REDUCED: HCPCS

## 2019-01-01 PROCEDURE — 80053 COMPREHEN METABOLIC PANEL: CPT

## 2019-01-01 PROCEDURE — 3331090001 HH PPS REVENUE CREDIT

## 2019-01-01 PROCEDURE — 74011250637 HC RX REV CODE- 250/637: Performed by: STUDENT IN AN ORGANIZED HEALTH CARE EDUCATION/TRAINING PROGRAM

## 2019-01-01 PROCEDURE — C9113 INJ PANTOPRAZOLE SODIUM, VIA: HCPCS | Performed by: STUDENT IN AN ORGANIZED HEALTH CARE EDUCATION/TRAINING PROGRAM

## 2019-01-01 PROCEDURE — 85025 COMPLETE CBC W/AUTO DIFF WBC: CPT

## 2019-01-01 PROCEDURE — 82962 GLUCOSE BLOOD TEST: CPT

## 2019-01-01 PROCEDURE — 74011636637 HC RX REV CODE- 636/637: Performed by: FAMILY MEDICINE

## 2019-01-01 PROCEDURE — 3331090002 HH PPS REVENUE DEBIT

## 2019-01-01 PROCEDURE — 74011636637 HC RX REV CODE- 636/637: Performed by: STUDENT IN AN ORGANIZED HEALTH CARE EDUCATION/TRAINING PROGRAM

## 2019-01-01 PROCEDURE — 85027 COMPLETE CBC AUTOMATED: CPT

## 2019-01-01 PROCEDURE — 74011250636 HC RX REV CODE- 250/636: Performed by: FAMILY MEDICINE

## 2019-01-01 PROCEDURE — 83036 HEMOGLOBIN GLYCOSYLATED A1C: CPT

## 2019-01-01 PROCEDURE — 74011000250 HC RX REV CODE- 250: Performed by: NURSE PRACTITIONER

## 2019-01-01 PROCEDURE — 77030038269 HC DRN EXT URIN PURWCK BARD -A

## 2019-01-01 PROCEDURE — 97530 THERAPEUTIC ACTIVITIES: CPT

## 2019-01-01 PROCEDURE — 3331090003 HH PPS REVENUE ADJ

## 2019-01-01 PROCEDURE — 77010033678 HC OXYGEN DAILY

## 2019-01-01 PROCEDURE — 83605 ASSAY OF LACTIC ACID: CPT

## 2019-01-01 PROCEDURE — 30233N1 TRANSFUSION OF NONAUTOLOGOUS RED BLOOD CELLS INTO PERIPHERAL VEIN, PERCUTANEOUS APPROACH: ICD-10-PCS | Performed by: FAMILY MEDICINE

## 2019-01-01 PROCEDURE — 93306 TTE W/DOPPLER COMPLETE: CPT

## 2019-01-01 PROCEDURE — 5A09557 ASSISTANCE WITH RESPIRATORY VENTILATION, GREATER THAN 96 CONSECUTIVE HOURS, CONTINUOUS POSITIVE AIRWAY PRESSURE: ICD-10-PCS | Performed by: FAMILY MEDICINE

## 2019-01-01 PROCEDURE — 87070 CULTURE OTHR SPECIMN AEROBIC: CPT

## 2019-01-01 PROCEDURE — 76040000019: Performed by: INTERNAL MEDICINE

## 2019-01-01 PROCEDURE — 74011000258 HC RX REV CODE- 258: Performed by: FAMILY MEDICINE

## 2019-01-01 PROCEDURE — 74011250636 HC RX REV CODE- 250/636: Performed by: STUDENT IN AN ORGANIZED HEALTH CARE EDUCATION/TRAINING PROGRAM

## 2019-01-01 PROCEDURE — 82570 ASSAY OF URINE CREATININE: CPT

## 2019-01-01 PROCEDURE — 85018 HEMOGLOBIN: CPT

## 2019-01-01 PROCEDURE — 76700 US EXAM ABDOM COMPLETE: CPT

## 2019-01-01 PROCEDURE — 36600 WITHDRAWAL OF ARTERIAL BLOOD: CPT

## 2019-01-01 PROCEDURE — 84100 ASSAY OF PHOSPHORUS: CPT

## 2019-01-01 PROCEDURE — 71046 X-RAY EXAM CHEST 2 VIEWS: CPT

## 2019-01-01 PROCEDURE — 77030012058: Performed by: INTERNAL MEDICINE

## 2019-01-01 PROCEDURE — 400014 HH F/U

## 2019-01-01 PROCEDURE — 86923 COMPATIBILITY TEST ELECTRIC: CPT

## 2019-01-01 PROCEDURE — 93005 ELECTROCARDIOGRAM TRACING: CPT

## 2019-01-01 PROCEDURE — 94660 CPAP INITIATION&MGMT: CPT

## 2019-01-01 PROCEDURE — G0300 HHS/HOSPICE OF LPN EA 15 MIN: HCPCS

## 2019-01-01 PROCEDURE — 84484 ASSAY OF TROPONIN QUANT: CPT

## 2019-01-01 PROCEDURE — 76937 US GUIDE VASCULAR ACCESS: CPT

## 2019-01-01 PROCEDURE — 71045 X-RAY EXAM CHEST 1 VIEW: CPT

## 2019-01-01 PROCEDURE — 97535 SELF CARE MNGMENT TRAINING: CPT

## 2019-01-01 PROCEDURE — 97165 OT EVAL LOW COMPLEX 30 MIN: CPT

## 2019-01-01 PROCEDURE — 74011000258 HC RX REV CODE- 258: Performed by: STUDENT IN AN ORGANIZED HEALTH CARE EDUCATION/TRAINING PROGRAM

## 2019-01-01 PROCEDURE — 83880 ASSAY OF NATRIURETIC PEPTIDE: CPT

## 2019-01-01 PROCEDURE — C1751 CATH, INF, PER/CENT/MIDLINE: HCPCS

## 2019-01-01 PROCEDURE — 81001 URINALYSIS AUTO W/SCOPE: CPT

## 2019-01-01 PROCEDURE — 86900 BLOOD TYPING SEROLOGIC ABO: CPT

## 2019-01-01 PROCEDURE — 85610 PROTHROMBIN TIME: CPT

## 2019-01-01 PROCEDURE — 87040 BLOOD CULTURE FOR BACTERIA: CPT

## 2019-01-01 PROCEDURE — 36592 COLLECT BLOOD FROM PICC: CPT

## 2019-01-01 PROCEDURE — 94640 AIRWAY INHALATION TREATMENT: CPT

## 2019-01-01 PROCEDURE — 74011250636 HC RX REV CODE- 250/636: Performed by: NURSE PRACTITIONER

## 2019-01-01 PROCEDURE — 76450000000

## 2019-01-01 PROCEDURE — 77030018719 HC DRSG PTCH ANTIMIC J&J -A

## 2019-01-01 PROCEDURE — 74011000250 HC RX REV CODE- 250: Performed by: STUDENT IN AN ORGANIZED HEALTH CARE EDUCATION/TRAINING PROGRAM

## 2019-01-01 PROCEDURE — 83935 ASSAY OF URINE OSMOLALITY: CPT

## 2019-01-01 PROCEDURE — 80162 ASSAY OF DIGOXIN TOTAL: CPT

## 2019-01-01 PROCEDURE — 97116 GAIT TRAINING THERAPY: CPT

## 2019-01-01 PROCEDURE — 77030013140 HC MSK NEB VYRM -A

## 2019-01-01 PROCEDURE — 74011250637 HC RX REV CODE- 250/637: Performed by: EMERGENCY MEDICINE

## 2019-01-01 PROCEDURE — 74011000258 HC RX REV CODE- 258: Performed by: NURSE PRACTITIONER

## 2019-01-01 PROCEDURE — 84300 ASSAY OF URINE SODIUM: CPT

## 2019-01-01 PROCEDURE — 77030018766 HC KT ENDOSC IMAG GVIM -F: Performed by: INTERNAL MEDICINE

## 2019-01-01 PROCEDURE — 74011000250 HC RX REV CODE- 250: Performed by: INTERNAL MEDICINE

## 2019-01-01 PROCEDURE — 87086 URINE CULTURE/COLONY COUNT: CPT

## 2019-01-01 PROCEDURE — 74011250637 HC RX REV CODE- 250/637: Performed by: INTERNAL MEDICINE

## 2019-01-01 PROCEDURE — 97162 PT EVAL MOD COMPLEX 30 MIN: CPT

## 2019-01-01 PROCEDURE — 74011636320 HC RX REV CODE- 636/320: Performed by: RADIOLOGY

## 2019-01-01 PROCEDURE — 74011636637 HC RX REV CODE- 636/637: Performed by: NURSE PRACTITIONER

## 2019-01-01 PROCEDURE — 36430 TRANSFUSION BLD/BLD COMPNT: CPT

## 2019-01-01 PROCEDURE — 65270000029 HC RM PRIVATE

## 2019-01-01 PROCEDURE — 74018 RADEX ABDOMEN 1 VIEW: CPT

## 2019-01-01 PROCEDURE — 77030029684 HC NEB SM VOL KT MONA -A

## 2019-01-01 PROCEDURE — 82272 OCCULT BLD FECES 1-3 TESTS: CPT

## 2019-01-01 PROCEDURE — 71275 CT ANGIOGRAPHY CHEST: CPT

## 2019-01-01 PROCEDURE — 77030029065 HC DRSG HEMO QCLOT ZMED -B

## 2019-01-01 PROCEDURE — 99285 EMERGENCY DEPT VISIT HI MDM: CPT

## 2019-01-01 PROCEDURE — 87633 RESP VIRUS 12-25 TARGETS: CPT

## 2019-01-01 PROCEDURE — 02HV33Z INSERTION OF INFUSION DEVICE INTO SUPERIOR VENA CAVA, PERCUTANEOUS APPROACH: ICD-10-PCS | Performed by: FAMILY MEDICINE

## 2019-01-01 PROCEDURE — 80074 ACUTE HEPATITIS PANEL: CPT

## 2019-01-01 PROCEDURE — 96375 TX/PRO/DX INJ NEW DRUG ADDON: CPT

## 2019-01-01 PROCEDURE — 96365 THER/PROPH/DIAG IV INF INIT: CPT

## 2019-01-01 PROCEDURE — 0DJ07ZZ INSPECTION OF UPPER INTESTINAL TRACT, VIA NATURAL OR ARTIFICIAL OPENING: ICD-10-PCS | Performed by: INTERNAL MEDICINE

## 2019-01-01 PROCEDURE — 36569 INSJ PICC 5 YR+ W/O IMAGING: CPT | Performed by: STUDENT IN AN ORGANIZED HEALTH CARE EDUCATION/TRAINING PROGRAM

## 2019-01-01 PROCEDURE — 94760 N-INVAS EAR/PLS OXIMETRY 1: CPT

## 2019-01-01 PROCEDURE — 84481 FREE ASSAY (FT-3): CPT

## 2019-01-01 PROCEDURE — 77030018786 HC NDL GD F/USND BARD -B

## 2019-01-01 PROCEDURE — 51798 US URINE CAPACITY MEASURE: CPT

## 2019-01-01 PROCEDURE — 84439 ASSAY OF FREE THYROXINE: CPT

## 2019-01-01 PROCEDURE — 77030010306 HC SYS DEL CAP STRC -C: Performed by: INTERNAL MEDICINE

## 2019-01-01 PROCEDURE — 94668 MNPJ CHEST WALL SBSQ: CPT

## 2019-01-01 PROCEDURE — 80048 BASIC METABOLIC PNL TOTAL CA: CPT

## 2019-01-01 PROCEDURE — 77030012879 HC MSK CPAP FLL FAC PHIL -B

## 2019-01-01 PROCEDURE — 84436 ASSAY OF TOTAL THYROXINE: CPT

## 2019-01-01 PROCEDURE — 87205 SMEAR GRAM STAIN: CPT

## 2019-01-01 PROCEDURE — 84443 ASSAY THYROID STIM HORMONE: CPT

## 2019-01-01 PROCEDURE — 74011250636 HC RX REV CODE- 250/636: Performed by: EMERGENCY MEDICINE

## 2019-01-01 RX ORDER — HYDROXYZINE HYDROCHLORIDE 10 MG/1
10 TABLET, FILM COATED ORAL
Status: COMPLETED | OUTPATIENT
Start: 2019-01-01 | End: 2019-01-01

## 2019-01-01 RX ORDER — HYDROXYZINE 25 MG/1
25 TABLET, FILM COATED ORAL
Status: DISCONTINUED | OUTPATIENT
Start: 2019-01-01 | End: 2019-01-01

## 2019-01-01 RX ORDER — POTASSIUM CHLORIDE 750 MG/1
40 TABLET, FILM COATED, EXTENDED RELEASE ORAL EVERY 4 HOURS
Status: COMPLETED | OUTPATIENT
Start: 2019-01-01 | End: 2019-01-01

## 2019-01-01 RX ORDER — MAGNESIUM SULFATE 100 %
4 CRYSTALS MISCELLANEOUS AS NEEDED
Status: DISCONTINUED | OUTPATIENT
Start: 2019-01-01 | End: 2019-01-01

## 2019-01-01 RX ORDER — PREDNISONE 10 MG/1
TABLET ORAL SEE ADMIN INSTRUCTIONS
COMMUNITY
Start: 2019-01-01 | End: 2019-01-01

## 2019-01-01 RX ORDER — BUMETANIDE 1 MG/1
1 TABLET ORAL ONCE
Status: DISCONTINUED | OUTPATIENT
Start: 2019-01-01 | End: 2019-01-01

## 2019-01-01 RX ORDER — INSULIN LISPRO 100 [IU]/ML
INJECTION, SOLUTION INTRAVENOUS; SUBCUTANEOUS
Status: DISCONTINUED | OUTPATIENT
Start: 2019-01-01 | End: 2019-01-01

## 2019-01-01 RX ORDER — PANTOPRAZOLE SODIUM 40 MG/1
40 TABLET, DELAYED RELEASE ORAL
Status: DISCONTINUED | OUTPATIENT
Start: 2019-01-01 | End: 2019-01-01

## 2019-01-01 RX ORDER — INSULIN LISPRO 100 [IU]/ML
5 INJECTION, SOLUTION INTRAVENOUS; SUBCUTANEOUS
Status: DISCONTINUED | OUTPATIENT
Start: 2019-01-01 | End: 2019-01-01

## 2019-01-01 RX ORDER — AZITHROMYCIN 250 MG/1
TABLET, FILM COATED ORAL
Qty: 6 TAB | Refills: 0 | Status: SHIPPED | OUTPATIENT
Start: 2019-01-01 | End: 2019-01-01 | Stop reason: CLARIF

## 2019-01-01 RX ORDER — HEPARIN SODIUM 5000 [USP'U]/ML
60 INJECTION, SOLUTION INTRAVENOUS; SUBCUTANEOUS ONCE
Status: COMPLETED | OUTPATIENT
Start: 2019-01-01 | End: 2019-01-01

## 2019-01-01 RX ORDER — GLYCOPYRROLATE 0.2 MG/ML
0.2 INJECTION INTRAMUSCULAR; INTRAVENOUS
Status: DISCONTINUED | OUTPATIENT
Start: 2019-01-01 | End: 2019-01-23 | Stop reason: HOSPADM

## 2019-01-01 RX ORDER — SODIUM CHLORIDE 0.9 % (FLUSH) 0.9 %
10-30 SYRINGE (ML) INJECTION AS NEEDED
Status: DISCONTINUED | OUTPATIENT
Start: 2019-01-01 | End: 2019-01-23 | Stop reason: HOSPADM

## 2019-01-01 RX ORDER — ALPRAZOLAM 0.5 MG/1
0.5 TABLET ORAL
Qty: 60 TAB | Refills: 0 | Status: SHIPPED | OUTPATIENT
Start: 2019-01-01

## 2019-01-01 RX ORDER — INSULIN GLARGINE 100 [IU]/ML
25 INJECTION, SOLUTION SUBCUTANEOUS
Status: DISCONTINUED | OUTPATIENT
Start: 2019-01-01 | End: 2019-01-01

## 2019-01-01 RX ORDER — DILTIAZEM HYDROCHLORIDE 240 MG/1
240 CAPSULE, COATED, EXTENDED RELEASE ORAL DAILY
Qty: 30 CAP | Refills: 0 | Status: SHIPPED | OUTPATIENT
Start: 2019-01-01

## 2019-01-01 RX ORDER — PREDNISONE 20 MG/1
40 TABLET ORAL
Status: COMPLETED | OUTPATIENT
Start: 2019-01-01 | End: 2019-01-01

## 2019-01-01 RX ORDER — PREDNISONE 10 MG/1
10 TABLET ORAL
Qty: 30 TAB | Refills: 0 | Status: SHIPPED
Start: 2019-01-01

## 2019-01-01 RX ORDER — MAGNESIUM SULFATE HEPTAHYDRATE 40 MG/ML
2 INJECTION, SOLUTION INTRAVENOUS ONCE
Status: COMPLETED | OUTPATIENT
Start: 2019-01-01 | End: 2019-01-01

## 2019-01-01 RX ORDER — LEVALBUTEROL INHALATION SOLUTION 0.63 MG/3ML
0.63 SOLUTION RESPIRATORY (INHALATION)
Status: DISCONTINUED | OUTPATIENT
Start: 2019-01-01 | End: 2019-01-01

## 2019-01-01 RX ORDER — INSULIN LISPRO 100 [IU]/ML
8 INJECTION, SOLUTION INTRAVENOUS; SUBCUTANEOUS ONCE
Status: COMPLETED | OUTPATIENT
Start: 2019-01-01 | End: 2019-01-01

## 2019-01-01 RX ORDER — BUMETANIDE 0.25 MG/ML
1 INJECTION INTRAMUSCULAR; INTRAVENOUS ONCE
Status: DISCONTINUED | OUTPATIENT
Start: 2019-01-01 | End: 2019-01-01 | Stop reason: SDUPTHER

## 2019-01-01 RX ORDER — BUMETANIDE 1 MG/1
0.5 TABLET ORAL EVERY OTHER DAY
Status: DISCONTINUED | OUTPATIENT
Start: 2019-01-01 | End: 2019-01-01

## 2019-01-01 RX ORDER — LORAZEPAM 2 MG/ML
1 CONCENTRATE ORAL
Status: DISCONTINUED | OUTPATIENT
Start: 2019-01-01 | End: 2019-01-23 | Stop reason: HOSPADM

## 2019-01-01 RX ORDER — DOCUSATE SODIUM 100 MG/1
100 CAPSULE, LIQUID FILLED ORAL 2 TIMES DAILY
Status: DISCONTINUED | OUTPATIENT
Start: 2019-01-01 | End: 2019-01-01

## 2019-01-01 RX ORDER — ALBUTEROL SULFATE 90 UG/1
2 AEROSOL, METERED RESPIRATORY (INHALATION)
Qty: 3 INHALER | Refills: 3 | Status: SHIPPED | OUTPATIENT
Start: 2019-01-01

## 2019-01-01 RX ORDER — MAGNESIUM SULFATE 1 G/100ML
1 INJECTION INTRAVENOUS ONCE
Status: COMPLETED | OUTPATIENT
Start: 2019-01-01 | End: 2019-01-01

## 2019-01-01 RX ORDER — ALPRAZOLAM 0.5 MG/1
0.5 TABLET ORAL
Status: COMPLETED | OUTPATIENT
Start: 2019-01-01 | End: 2019-01-01

## 2019-01-01 RX ORDER — INSULIN GLARGINE 100 [IU]/ML
8 INJECTION, SOLUTION SUBCUTANEOUS
Status: DISCONTINUED | OUTPATIENT
Start: 2019-01-01 | End: 2019-01-01

## 2019-01-01 RX ORDER — PROCHLORPERAZINE EDISYLATE 5 MG/ML
10 INJECTION INTRAMUSCULAR; INTRAVENOUS
Status: DISCONTINUED | OUTPATIENT
Start: 2019-01-01 | End: 2019-01-23 | Stop reason: HOSPADM

## 2019-01-01 RX ORDER — PREDNISONE 5 MG/1
10 TABLET ORAL
Status: DISCONTINUED | OUTPATIENT
Start: 2019-01-01 | End: 2019-01-01

## 2019-01-01 RX ORDER — DILTIAZEM HYDROCHLORIDE 30 MG/1
60 TABLET, FILM COATED ORAL
Status: DISCONTINUED | OUTPATIENT
Start: 2019-01-01 | End: 2019-01-01

## 2019-01-01 RX ORDER — IBUPROFEN 200 MG
1 TABLET ORAL EVERY 24 HOURS
Status: DISCONTINUED | OUTPATIENT
Start: 2019-01-01 | End: 2019-01-23 | Stop reason: HOSPADM

## 2019-01-01 RX ORDER — ALBUTEROL SULFATE 1.25 MG/3ML
1.25 SOLUTION RESPIRATORY (INHALATION)
Status: DISCONTINUED | OUTPATIENT
Start: 2019-01-01 | End: 2019-01-01

## 2019-01-01 RX ORDER — SODIUM CHLORIDE 9 MG/ML
50 INJECTION, SOLUTION INTRAVENOUS AS NEEDED
Status: DISCONTINUED | OUTPATIENT
Start: 2019-01-01 | End: 2019-01-01

## 2019-01-01 RX ORDER — ARFORMOTEROL TARTRATE 15 UG/2ML
15 SOLUTION RESPIRATORY (INHALATION)
Status: DISCONTINUED | OUTPATIENT
Start: 2019-01-01 | End: 2019-01-01

## 2019-01-01 RX ORDER — POLYETHYLENE GLYCOL 3350 17 G/17G
17 POWDER, FOR SOLUTION ORAL DAILY
Status: DISCONTINUED | OUTPATIENT
Start: 2019-01-01 | End: 2019-01-01

## 2019-01-01 RX ORDER — SODIUM CHLORIDE 0.9 % (FLUSH) 0.9 %
20 SYRINGE (ML) INJECTION EVERY 24 HOURS
Status: DISCONTINUED | OUTPATIENT
Start: 2019-01-01 | End: 2019-01-23 | Stop reason: HOSPADM

## 2019-01-01 RX ORDER — BUMETANIDE 1 MG/1
1 TABLET ORAL EVERY EVENING
Status: DISCONTINUED | OUTPATIENT
Start: 2019-01-01 | End: 2019-01-01

## 2019-01-01 RX ORDER — SODIUM CHLORIDE 0.9 % (FLUSH) 0.9 %
10 SYRINGE (ML) INJECTION AS NEEDED
Status: DISCONTINUED | OUTPATIENT
Start: 2019-01-01 | End: 2019-01-23 | Stop reason: HOSPADM

## 2019-01-01 RX ORDER — SODIUM CHLORIDE 9 MG/ML
250 INJECTION, SOLUTION INTRAVENOUS AS NEEDED
Status: DISCONTINUED | OUTPATIENT
Start: 2019-01-01 | End: 2019-01-01

## 2019-01-01 RX ORDER — DILTIAZEM HYDROCHLORIDE 30 MG/1
60 TABLET, FILM COATED ORAL
Status: COMPLETED | OUTPATIENT
Start: 2019-01-01 | End: 2019-01-01

## 2019-01-01 RX ORDER — LORAZEPAM 2 MG/ML
0.5 INJECTION INTRAMUSCULAR
Status: COMPLETED | OUTPATIENT
Start: 2019-01-01 | End: 2019-01-01

## 2019-01-01 RX ORDER — BUMETANIDE 1 MG/1
0.5 TABLET ORAL
Status: COMPLETED | OUTPATIENT
Start: 2019-01-01 | End: 2019-01-01

## 2019-01-01 RX ORDER — LOVASTATIN 20 MG/1
10 TABLET ORAL
Status: DISCONTINUED | OUTPATIENT
Start: 2019-01-01 | End: 2019-01-01

## 2019-01-01 RX ORDER — SODIUM CHLORIDE 0.9 % (FLUSH) 0.9 %
10-40 SYRINGE (ML) INJECTION EVERY 8 HOURS
Status: DISCONTINUED | OUTPATIENT
Start: 2019-01-01 | End: 2019-01-23 | Stop reason: HOSPADM

## 2019-01-01 RX ORDER — WARFARIN SODIUM 5 MG/1
5 TABLET ORAL ONCE
Status: COMPLETED | OUTPATIENT
Start: 2019-01-01 | End: 2019-01-01

## 2019-01-01 RX ORDER — HYDROXYZINE 25 MG/1
25 TABLET, FILM COATED ORAL
Status: COMPLETED | OUTPATIENT
Start: 2019-01-01 | End: 2019-01-01

## 2019-01-01 RX ORDER — INSULIN LISPRO 100 [IU]/ML
INJECTION, SOLUTION INTRAVENOUS; SUBCUTANEOUS
Status: DISCONTINUED | OUTPATIENT
Start: 2019-01-01 | End: 2019-01-01 | Stop reason: DRUGHIGH

## 2019-01-01 RX ORDER — INSULIN GLARGINE 100 [IU]/ML
5 INJECTION, SOLUTION SUBCUTANEOUS
Status: DISCONTINUED | OUTPATIENT
Start: 2019-01-01 | End: 2019-01-01

## 2019-01-01 RX ORDER — HYDROMORPHONE HYDROCHLORIDE 2 MG/ML
0.5 INJECTION, SOLUTION INTRAMUSCULAR; INTRAVENOUS; SUBCUTANEOUS
Status: DISCONTINUED | OUTPATIENT
Start: 2019-01-01 | End: 2019-01-23 | Stop reason: HOSPADM

## 2019-01-01 RX ORDER — WARFARIN 2.5 MG/1
2.5 TABLET ORAL ONCE
Status: COMPLETED | OUTPATIENT
Start: 2019-01-01 | End: 2019-01-01

## 2019-01-01 RX ORDER — FUROSEMIDE 10 MG/ML
10 INJECTION INTRAMUSCULAR; INTRAVENOUS ONCE
Status: DISCONTINUED | OUTPATIENT
Start: 2019-01-01 | End: 2019-01-01

## 2019-01-01 RX ORDER — POTASSIUM CHLORIDE 1.5 G/1.77G
40 POWDER, FOR SOLUTION ORAL
Status: DISCONTINUED | OUTPATIENT
Start: 2019-01-01 | End: 2019-01-01

## 2019-01-01 RX ORDER — OXYCODONE AND ACETAMINOPHEN 5; 325 MG/1; MG/1
1 TABLET ORAL
Status: DISCONTINUED | OUTPATIENT
Start: 2019-01-01 | End: 2019-01-01

## 2019-01-01 RX ORDER — LORAZEPAM 2 MG/ML
1 INJECTION INTRAMUSCULAR
Status: DISCONTINUED | OUTPATIENT
Start: 2019-01-01 | End: 2019-01-23 | Stop reason: HOSPADM

## 2019-01-01 RX ORDER — SODIUM CHLORIDE 9 MG/ML
250 INJECTION, SOLUTION INTRAVENOUS AS NEEDED
Status: DISCONTINUED | OUTPATIENT
Start: 2019-01-01 | End: 2019-01-01 | Stop reason: SDUPTHER

## 2019-01-01 RX ORDER — GUAIFENESIN 600 MG/1
600 TABLET, EXTENDED RELEASE ORAL EVERY 12 HOURS
Qty: 60 TAB | Refills: 0 | Status: SHIPPED
Start: 2019-01-01

## 2019-01-01 RX ORDER — ONDANSETRON 2 MG/ML
4 INJECTION INTRAMUSCULAR; INTRAVENOUS
Status: DISCONTINUED | OUTPATIENT
Start: 2019-01-01 | End: 2019-01-23 | Stop reason: HOSPADM

## 2019-01-01 RX ORDER — DIGOXIN 0.25 MG/ML
125 INJECTION INTRAMUSCULAR; INTRAVENOUS ONCE
Status: COMPLETED | OUTPATIENT
Start: 2019-01-01 | End: 2019-01-01

## 2019-01-01 RX ORDER — ESCITALOPRAM OXALATE 10 MG/1
10 TABLET ORAL EVERY EVENING
Qty: 30 TAB | Refills: 0 | Status: SHIPPED
Start: 2019-01-01

## 2019-01-01 RX ORDER — POTASSIUM CHLORIDE 750 MG/1
40 TABLET, FILM COATED, EXTENDED RELEASE ORAL
Status: COMPLETED | OUTPATIENT
Start: 2019-01-01 | End: 2019-01-01

## 2019-01-01 RX ORDER — HYDROXYZINE 25 MG/1
50 TABLET, FILM COATED ORAL
Status: DISCONTINUED | OUTPATIENT
Start: 2019-01-01 | End: 2019-01-01

## 2019-01-01 RX ORDER — WARFARIN 1 MG/1
TABLET ORAL
COMMUNITY

## 2019-01-01 RX ORDER — BUMETANIDE 0.25 MG/ML
1 INJECTION INTRAMUSCULAR; INTRAVENOUS ONCE
Status: COMPLETED | OUTPATIENT
Start: 2019-01-01 | End: 2019-01-01

## 2019-01-01 RX ORDER — SODIUM CHLORIDE 0.9 % (FLUSH) 0.9 %
5-10 SYRINGE (ML) INJECTION EVERY 8 HOURS
Status: DISCONTINUED | OUTPATIENT
Start: 2019-01-01 | End: 2019-01-23 | Stop reason: HOSPADM

## 2019-01-01 RX ORDER — OXYCODONE HYDROCHLORIDE 5 MG/1
5 TABLET ORAL
Status: DISCONTINUED | OUTPATIENT
Start: 2019-01-01 | End: 2019-01-01

## 2019-01-01 RX ORDER — INSULIN GLARGINE 100 [IU]/ML
10 INJECTION, SOLUTION SUBCUTANEOUS
Status: DISCONTINUED | OUTPATIENT
Start: 2019-01-01 | End: 2019-01-01

## 2019-01-01 RX ORDER — POLYETHYLENE GLYCOL 3350 17 G/17G
17 POWDER, FOR SOLUTION ORAL DAILY PRN
Status: DISCONTINUED | OUTPATIENT
Start: 2019-01-01 | End: 2019-01-01

## 2019-01-01 RX ORDER — INSULIN LISPRO 100 [IU]/ML
6 INJECTION, SOLUTION INTRAVENOUS; SUBCUTANEOUS ONCE
Status: COMPLETED | OUTPATIENT
Start: 2019-01-01 | End: 2019-01-01

## 2019-01-01 RX ORDER — LISINOPRIL 5 MG/1
5 TABLET ORAL DAILY
Status: DISCONTINUED | OUTPATIENT
Start: 2019-01-01 | End: 2019-01-01

## 2019-01-01 RX ORDER — DIGOXIN 125 MCG
0.12 TABLET ORAL DAILY
Qty: 30 TAB | Refills: 0 | Status: SHIPPED
Start: 2019-01-01

## 2019-01-01 RX ORDER — BUMETANIDE 1 MG/1
1 TABLET ORAL EVERY OTHER DAY
Status: DISCONTINUED | OUTPATIENT
Start: 2019-01-01 | End: 2019-01-01

## 2019-01-01 RX ORDER — DOCUSATE SODIUM 100 MG/1
100 CAPSULE, LIQUID FILLED ORAL
Status: DISCONTINUED | OUTPATIENT
Start: 2019-01-01 | End: 2019-01-01

## 2019-01-01 RX ORDER — BUDESONIDE 0.5 MG/2ML
500 INHALANT ORAL
Status: DISCONTINUED | OUTPATIENT
Start: 2019-01-01 | End: 2019-01-01

## 2019-01-01 RX ORDER — LEVOFLOXACIN 5 MG/ML
500 INJECTION, SOLUTION INTRAVENOUS
Status: COMPLETED | OUTPATIENT
Start: 2019-01-01 | End: 2019-01-01

## 2019-01-01 RX ORDER — ALPRAZOLAM 0.5 MG/1
0.5 TABLET ORAL
Status: DISCONTINUED | OUTPATIENT
Start: 2019-01-01 | End: 2019-01-01

## 2019-01-01 RX ORDER — PANTOPRAZOLE SODIUM 40 MG/1
40 TABLET, DELAYED RELEASE ORAL
Status: DISCONTINUED | OUTPATIENT
Start: 2019-01-01 | End: 2019-01-01 | Stop reason: ALTCHOICE

## 2019-01-01 RX ORDER — INSULIN GLARGINE 100 [IU]/ML
20 INJECTION, SOLUTION SUBCUTANEOUS
Status: DISCONTINUED | OUTPATIENT
Start: 2019-01-01 | End: 2019-01-01

## 2019-01-01 RX ORDER — INSULIN GLARGINE 100 [IU]/ML
12 INJECTION, SOLUTION SUBCUTANEOUS
Status: DISCONTINUED | OUTPATIENT
Start: 2019-01-01 | End: 2019-01-01

## 2019-01-01 RX ORDER — WARFARIN SODIUM 5 MG/1
5 TABLET ORAL ONCE
Status: ACTIVE | OUTPATIENT
Start: 2019-01-01 | End: 2019-01-01

## 2019-01-01 RX ORDER — DILTIAZEM HYDROCHLORIDE 5 MG/ML
5 INJECTION INTRAVENOUS ONCE
Status: COMPLETED | OUTPATIENT
Start: 2019-01-01 | End: 2019-01-01

## 2019-01-01 RX ORDER — SODIUM CHLORIDE 0.9 % (FLUSH) 0.9 %
10 SYRINGE (ML) INJECTION EVERY 24 HOURS
Status: DISCONTINUED | OUTPATIENT
Start: 2019-01-01 | End: 2019-01-23 | Stop reason: HOSPADM

## 2019-01-01 RX ORDER — PREDNISONE 20 MG/1
60 TABLET ORAL
Status: COMPLETED | OUTPATIENT
Start: 2019-01-01 | End: 2019-01-01

## 2019-01-01 RX ORDER — ESCITALOPRAM OXALATE 10 MG/1
10 TABLET ORAL EVERY EVENING
Status: DISCONTINUED | OUTPATIENT
Start: 2019-01-01 | End: 2019-01-01

## 2019-01-01 RX ORDER — GUAIFENESIN 600 MG/1
600 TABLET, EXTENDED RELEASE ORAL EVERY 12 HOURS
Status: DISCONTINUED | OUTPATIENT
Start: 2019-01-01 | End: 2019-01-01

## 2019-01-01 RX ORDER — SODIUM CHLORIDE 9 MG/ML
100 INJECTION, SOLUTION INTRAVENOUS CONTINUOUS
Status: DISCONTINUED | OUTPATIENT
Start: 2019-01-01 | End: 2019-01-01

## 2019-01-01 RX ORDER — NALOXONE HYDROCHLORIDE 4 MG/.1ML
SPRAY NASAL
Qty: 1 EACH | Refills: 0 | Status: SHIPPED
Start: 2019-01-01

## 2019-01-01 RX ORDER — DILTIAZEM HYDROCHLORIDE 60 MG/1
60 CAPSULE, EXTENDED RELEASE ORAL
Status: COMPLETED | OUTPATIENT
Start: 2019-01-01 | End: 2019-01-01

## 2019-01-01 RX ORDER — BUMETANIDE 1 MG/1
0.5 TABLET ORAL EVERY OTHER DAY
Qty: 90 TAB | Refills: 3 | Status: SHIPPED
Start: 2019-01-01 | End: 2019-01-01

## 2019-01-01 RX ORDER — POTASSIUM CHLORIDE 750 MG/1
30 TABLET, FILM COATED, EXTENDED RELEASE ORAL
Status: COMPLETED | OUTPATIENT
Start: 2019-01-01 | End: 2019-01-01

## 2019-01-01 RX ORDER — MAGNESIUM SULFATE HEPTAHYDRATE 40 MG/ML
2 INJECTION, SOLUTION INTRAVENOUS ONCE
Status: DISCONTINUED | OUTPATIENT
Start: 2019-01-01 | End: 2019-01-01

## 2019-01-01 RX ORDER — HYDROMORPHONE HYDROCHLORIDE 2 MG/ML
0.5 INJECTION, SOLUTION INTRAMUSCULAR; INTRAVENOUS; SUBCUTANEOUS EVERY 6 HOURS
Status: DISCONTINUED | OUTPATIENT
Start: 2019-01-01 | End: 2019-01-23 | Stop reason: HOSPADM

## 2019-01-01 RX ORDER — DILTIAZEM HYDROCHLORIDE 240 MG/1
240 CAPSULE, COATED, EXTENDED RELEASE ORAL DAILY
Status: DISCONTINUED | OUTPATIENT
Start: 2019-01-01 | End: 2019-01-01

## 2019-01-01 RX ORDER — SODIUM CHLORIDE 0.9 % (FLUSH) 0.9 %
5-10 SYRINGE (ML) INJECTION AS NEEDED
Status: DISCONTINUED | OUTPATIENT
Start: 2019-01-01 | End: 2019-01-01

## 2019-01-01 RX ORDER — LANOLIN ALCOHOL/MO/W.PET/CERES
3 CREAM (GRAM) TOPICAL ONCE
Status: COMPLETED | OUTPATIENT
Start: 2019-01-01 | End: 2019-01-01

## 2019-01-01 RX ORDER — GUAIFENESIN 100 MG/5ML
200 SOLUTION ORAL EVERY 6 HOURS
Status: DISCONTINUED | OUTPATIENT
Start: 2019-01-01 | End: 2019-01-01

## 2019-01-01 RX ORDER — PREDNISONE 20 MG/1
20 TABLET ORAL
Status: COMPLETED | OUTPATIENT
Start: 2019-01-01 | End: 2019-01-01

## 2019-01-01 RX ORDER — PROCHLORPERAZINE EDISYLATE 5 MG/ML
5 INJECTION INTRAMUSCULAR; INTRAVENOUS
Status: COMPLETED | OUTPATIENT
Start: 2019-01-01 | End: 2019-01-01

## 2019-01-01 RX ORDER — ALPRAZOLAM 0.5 MG/1
0.5 TABLET ORAL ONCE
Status: COMPLETED | OUTPATIENT
Start: 2019-01-01 | End: 2019-01-01

## 2019-01-01 RX ORDER — WARFARIN 1 MG/1
1 TABLET ORAL ONCE
Status: COMPLETED | OUTPATIENT
Start: 2019-01-01 | End: 2019-01-01

## 2019-01-01 RX ORDER — HEPARIN SODIUM 10000 [USP'U]/100ML
12-25 INJECTION, SOLUTION INTRAVENOUS
Status: DISCONTINUED | OUTPATIENT
Start: 2019-01-01 | End: 2019-01-01

## 2019-01-01 RX ORDER — NYSTATIN 100000 [USP'U]/ML
500000 SUSPENSION ORAL 4 TIMES DAILY
Status: DISCONTINUED | OUTPATIENT
Start: 2019-01-01 | End: 2019-01-01

## 2019-01-01 RX ORDER — BACITRACIN 500 UNIT/G
1 PACKET (EA) TOPICAL AS NEEDED
Status: DISCONTINUED | OUTPATIENT
Start: 2019-01-01 | End: 2019-01-01

## 2019-01-01 RX ORDER — POTASSIUM CHLORIDE 750 MG/1
10 TABLET, FILM COATED, EXTENDED RELEASE ORAL
Status: COMPLETED | OUTPATIENT
Start: 2019-01-01 | End: 2019-01-01

## 2019-01-01 RX ORDER — METOPROLOL TARTRATE 5 MG/5ML
2.5 INJECTION INTRAVENOUS ONCE
Status: DISCONTINUED | OUTPATIENT
Start: 2019-01-01 | End: 2019-01-01

## 2019-01-01 RX ORDER — WARFARIN 2 MG/1
2 TABLET ORAL ONCE
Status: COMPLETED | OUTPATIENT
Start: 2019-01-01 | End: 2019-01-01

## 2019-01-01 RX ORDER — DEXTROSE 50 % IN WATER (D50W) INTRAVENOUS SYRINGE
12.5-25 AS NEEDED
Status: DISCONTINUED | OUTPATIENT
Start: 2019-01-01 | End: 2019-01-01

## 2019-01-01 RX ORDER — INSULIN LISPRO 100 [IU]/ML
10 INJECTION, SOLUTION INTRAVENOUS; SUBCUTANEOUS ONCE
Status: COMPLETED | OUTPATIENT
Start: 2019-01-01 | End: 2019-01-01

## 2019-01-01 RX ORDER — ONDANSETRON 2 MG/ML
4 INJECTION INTRAMUSCULAR; INTRAVENOUS
Status: COMPLETED | OUTPATIENT
Start: 2019-01-01 | End: 2019-01-01

## 2019-01-01 RX ORDER — ALBUTEROL SULFATE 90 UG/1
2 AEROSOL, METERED RESPIRATORY (INHALATION)
Qty: 1 INHALER | Refills: 11 | Status: ON HOLD | OUTPATIENT
Start: 2019-01-01 | End: 2019-01-01 | Stop reason: SDUPTHER

## 2019-01-01 RX ORDER — PREDNISONE 10 MG/1
TABLET ORAL
Qty: 21 TAB | Refills: 0 | Status: SHIPPED | OUTPATIENT
Start: 2019-01-01 | End: 2019-01-01 | Stop reason: CLARIF

## 2019-01-01 RX ORDER — BUMETANIDE 0.5 MG/1
0.5 TABLET ORAL EVERY OTHER DAY
Qty: 15 TAB | Refills: 0 | Status: SHIPPED
Start: 2019-01-01

## 2019-01-01 RX ORDER — INSULIN GLARGINE 100 [IU]/ML
15 INJECTION, SOLUTION SUBCUTANEOUS
Status: DISCONTINUED | OUTPATIENT
Start: 2019-01-01 | End: 2019-01-01

## 2019-01-01 RX ORDER — INSULIN GLARGINE 100 [IU]/ML
30 INJECTION, SOLUTION SUBCUTANEOUS
Status: DISCONTINUED | OUTPATIENT
Start: 2019-01-01 | End: 2019-01-01

## 2019-01-01 RX ORDER — BUMETANIDE 0.25 MG/ML
1 INJECTION INTRAMUSCULAR; INTRAVENOUS 2 TIMES DAILY
Status: DISCONTINUED | OUTPATIENT
Start: 2019-01-01 | End: 2019-01-01

## 2019-01-01 RX ORDER — DIGOXIN 125 MCG
0.12 TABLET ORAL DAILY
Status: DISCONTINUED | OUTPATIENT
Start: 2019-01-01 | End: 2019-01-01

## 2019-01-01 RX ORDER — WARFARIN 1 MG/1
1 TABLET ORAL EVERY EVENING
Status: DISCONTINUED | OUTPATIENT
Start: 2019-01-01 | End: 2019-01-01

## 2019-01-01 RX ADMIN — DILTIAZEM HYDROCHLORIDE 60 MG: 30 TABLET, FILM COATED ORAL at 21:04

## 2019-01-01 RX ADMIN — ALPRAZOLAM 0.5 MG: 0.5 TABLET ORAL at 10:13

## 2019-01-01 RX ADMIN — GUAIFENESIN 600 MG: 600 TABLET, EXTENDED RELEASE ORAL at 21:14

## 2019-01-01 RX ADMIN — Medication 20 ML: at 10:24

## 2019-01-01 RX ADMIN — ESCITALOPRAM OXALATE 10 MG: 10 TABLET ORAL at 19:52

## 2019-01-01 RX ADMIN — Medication 20 ML: at 08:13

## 2019-01-01 RX ADMIN — GLYCOPYRROLATE 0.2 MG: 0.2 INJECTION, SOLUTION INTRAMUSCULAR; INTRAVENOUS at 18:03

## 2019-01-01 RX ADMIN — NYSTATIN 500000 UNITS: 500000 SUSPENSION ORAL at 12:27

## 2019-01-01 RX ADMIN — OXYCODONE AND ACETAMINOPHEN 1 TABLET: 5; 325 TABLET ORAL at 09:16

## 2019-01-01 RX ADMIN — DILTIAZEM HYDROCHLORIDE 60 MG: 30 TABLET, FILM COATED ORAL at 07:40

## 2019-01-01 RX ADMIN — PREDNISONE 10 MG: 5 TABLET ORAL at 08:13

## 2019-01-01 RX ADMIN — DOCUSATE SODIUM 100 MG: 100 CAPSULE, LIQUID FILLED ORAL at 08:12

## 2019-01-01 RX ADMIN — INSULIN LISPRO 3 UNITS: 100 INJECTION, SOLUTION INTRAVENOUS; SUBCUTANEOUS at 11:35

## 2019-01-01 RX ADMIN — ALBUTEROL SULFATE 1.25 MG: 1.25 SOLUTION RESPIRATORY (INHALATION) at 07:23

## 2019-01-01 RX ADMIN — INSULIN LISPRO 7 UNITS: 100 INJECTION, SOLUTION INTRAVENOUS; SUBCUTANEOUS at 13:24

## 2019-01-01 RX ADMIN — ONDANSETRON 4 MG: 2 INJECTION INTRAMUSCULAR; INTRAVENOUS at 10:21

## 2019-01-01 RX ADMIN — PANTOPRAZOLE SODIUM 80 MG: 40 INJECTION, POWDER, FOR SOLUTION INTRAVENOUS at 21:14

## 2019-01-01 RX ADMIN — DIGOXIN 0.12 MG: 125 TABLET ORAL at 09:34

## 2019-01-01 RX ADMIN — SODIUM CHLORIDE 8 MG/HR: 900 INJECTION, SOLUTION INTRAVENOUS at 04:57

## 2019-01-01 RX ADMIN — ALBUTEROL SULFATE 1.25 MG: 1.25 SOLUTION RESPIRATORY (INHALATION) at 13:18

## 2019-01-01 RX ADMIN — MAGNESIUM SULFATE HEPTAHYDRATE 2 G: 40 INJECTION, SOLUTION INTRAVENOUS at 05:36

## 2019-01-01 RX ADMIN — METHYLPREDNISOLONE SODIUM SUCCINATE 80 MG: 40 INJECTION, POWDER, FOR SOLUTION INTRAMUSCULAR; INTRAVENOUS at 11:54

## 2019-01-01 RX ADMIN — HYDROXYZINE HYDROCHLORIDE 25 MG: 25 TABLET, FILM COATED ORAL at 22:32

## 2019-01-01 RX ADMIN — HYDROXYZINE HYDROCHLORIDE 25 MG: 25 TABLET, FILM COATED ORAL at 06:40

## 2019-01-01 RX ADMIN — Medication 10 ML: at 14:04

## 2019-01-01 RX ADMIN — HEPARIN SODIUM AND DEXTROSE 21 UNITS/KG/HR: 10000; 5 INJECTION INTRAVENOUS at 15:50

## 2019-01-01 RX ADMIN — WARFARIN SODIUM 3 MG: 2 TABLET ORAL at 19:58

## 2019-01-01 RX ADMIN — POTASSIUM CHLORIDE 10 MEQ: 750 TABLET, EXTENDED RELEASE ORAL at 06:55

## 2019-01-01 RX ADMIN — DILTIAZEM HYDROCHLORIDE 240 MG: 240 CAPSULE, COATED, EXTENDED RELEASE ORAL at 08:09

## 2019-01-01 RX ADMIN — GUAIFENESIN 600 MG: 600 TABLET, EXTENDED RELEASE ORAL at 14:02

## 2019-01-01 RX ADMIN — ALBUTEROL SULFATE 1.25 MG: 1.25 SOLUTION RESPIRATORY (INHALATION) at 19:49

## 2019-01-01 RX ADMIN — Medication 10 ML: at 06:37

## 2019-01-01 RX ADMIN — DOXYCYCLINE 100 MG: 100 INJECTION, POWDER, LYOPHILIZED, FOR SOLUTION INTRAVENOUS at 15:57

## 2019-01-01 RX ADMIN — ALBUTEROL SULFATE 1.25 MG: 1.25 SOLUTION RESPIRATORY (INHALATION) at 13:35

## 2019-01-01 RX ADMIN — OXYCODONE HYDROCHLORIDE 5 MG: 5 TABLET ORAL at 05:45

## 2019-01-01 RX ADMIN — ALBUTEROL SULFATE 1.25 MG: 1.25 SOLUTION RESPIRATORY (INHALATION) at 02:47

## 2019-01-01 RX ADMIN — DILTIAZEM HYDROCHLORIDE 5 MG: 5 INJECTION INTRAVENOUS at 15:00

## 2019-01-01 RX ADMIN — INSULIN GLARGINE 30 UNITS: 100 INJECTION, SOLUTION SUBCUTANEOUS at 21:11

## 2019-01-01 RX ADMIN — NYSTATIN 500000 UNITS: 500000 SUSPENSION ORAL at 09:29

## 2019-01-01 RX ADMIN — INSULIN GLARGINE 30 UNITS: 100 INJECTION, SOLUTION SUBCUTANEOUS at 21:44

## 2019-01-01 RX ADMIN — INSULIN LISPRO 10 UNITS: 100 INJECTION, SOLUTION INTRAVENOUS; SUBCUTANEOUS at 21:30

## 2019-01-01 RX ADMIN — DILTIAZEM HYDROCHLORIDE 240 MG: 240 CAPSULE, COATED, EXTENDED RELEASE ORAL at 08:50

## 2019-01-01 RX ADMIN — BUMETANIDE 0.5 MG: 1 TABLET ORAL at 09:38

## 2019-01-01 RX ADMIN — METHYLPREDNISOLONE SODIUM SUCCINATE 80 MG: 40 INJECTION, POWDER, FOR SOLUTION INTRAMUSCULAR; INTRAVENOUS at 19:36

## 2019-01-01 RX ADMIN — HYDROXYZINE HYDROCHLORIDE 25 MG: 25 TABLET, FILM COATED ORAL at 10:34

## 2019-01-01 RX ADMIN — SODIUM CHLORIDE: 900 INJECTION, SOLUTION INTRAVENOUS at 09:25

## 2019-01-01 RX ADMIN — ONDANSETRON 4 MG: 2 INJECTION INTRAMUSCULAR; INTRAVENOUS at 02:24

## 2019-01-01 RX ADMIN — Medication 10 ML: at 15:23

## 2019-01-01 RX ADMIN — ESCITALOPRAM OXALATE 10 MG: 10 TABLET ORAL at 20:43

## 2019-01-01 RX ADMIN — ALBUTEROL SULFATE 1.25 MG: 1.25 SOLUTION RESPIRATORY (INHALATION) at 01:33

## 2019-01-01 RX ADMIN — ALPRAZOLAM 0.5 MG: 0.5 TABLET ORAL at 10:03

## 2019-01-01 RX ADMIN — WARFARIN SODIUM 1 MG: 1 TABLET ORAL at 17:58

## 2019-01-01 RX ADMIN — DILTIAZEM HYDROCHLORIDE 60 MG: 30 TABLET, FILM COATED ORAL at 22:47

## 2019-01-01 RX ADMIN — POTASSIUM CHLORIDE 40 MEQ: 750 TABLET, EXTENDED RELEASE ORAL at 06:49

## 2019-01-01 RX ADMIN — GUAIFENESIN 600 MG: 600 TABLET, EXTENDED RELEASE ORAL at 20:42

## 2019-01-01 RX ADMIN — GUAIFENESIN 600 MG: 600 TABLET, EXTENDED RELEASE ORAL at 08:50

## 2019-01-01 RX ADMIN — HEPARIN SODIUM 3400 UNITS: 5000 INJECTION INTRAVENOUS; SUBCUTANEOUS at 02:11

## 2019-01-01 RX ADMIN — BUMETANIDE 0.5 MG: 1 TABLET ORAL at 08:19

## 2019-01-01 RX ADMIN — METHYLPREDNISOLONE SODIUM SUCCINATE 80 MG: 40 INJECTION, POWDER, FOR SOLUTION INTRAMUSCULAR; INTRAVENOUS at 19:53

## 2019-01-01 RX ADMIN — ALBUTEROL SULFATE 1.25 MG: 1.25 SOLUTION RESPIRATORY (INHALATION) at 07:28

## 2019-01-01 RX ADMIN — INSULIN LISPRO 3 UNITS: 100 INJECTION, SOLUTION INTRAVENOUS; SUBCUTANEOUS at 07:46

## 2019-01-01 RX ADMIN — SODIUM CHLORIDE 1000 ML: 900 INJECTION, SOLUTION INTRAVENOUS at 17:55

## 2019-01-01 RX ADMIN — POTASSIUM CHLORIDE 40 MEQ: 750 TABLET, EXTENDED RELEASE ORAL at 09:32

## 2019-01-01 RX ADMIN — ESCITALOPRAM OXALATE 10 MG: 10 TABLET ORAL at 21:16

## 2019-01-01 RX ADMIN — MELATONIN TAB 3 MG 3 MG: 3 TAB at 05:44

## 2019-01-01 RX ADMIN — NYSTATIN 500000 UNITS: 500000 SUSPENSION ORAL at 14:25

## 2019-01-01 RX ADMIN — OXYCODONE AND ACETAMINOPHEN 1 TABLET: 5; 325 TABLET ORAL at 20:21

## 2019-01-01 RX ADMIN — ALPRAZOLAM 0.5 MG: 0.5 TABLET ORAL at 00:09

## 2019-01-01 RX ADMIN — OXYCODONE HYDROCHLORIDE 5 MG: 5 TABLET ORAL at 18:33

## 2019-01-01 RX ADMIN — PIPERACILLIN SODIUM,TAZOBACTAM SODIUM 3.38 G: 3; .375 INJECTION, POWDER, FOR SOLUTION INTRAVENOUS at 13:03

## 2019-01-01 RX ADMIN — ROFLUMILAST 500 MCG: 500 TABLET ORAL at 10:22

## 2019-01-01 RX ADMIN — ONDANSETRON 4 MG: 2 INJECTION INTRAMUSCULAR; INTRAVENOUS at 10:15

## 2019-01-01 RX ADMIN — PANTOPRAZOLE SODIUM 40 MG: 40 TABLET, DELAYED RELEASE ORAL at 08:17

## 2019-01-01 RX ADMIN — Medication 10 ML: at 05:00

## 2019-01-01 RX ADMIN — ALBUTEROL SULFATE 1.25 MG: 1.25 SOLUTION RESPIRATORY (INHALATION) at 14:04

## 2019-01-01 RX ADMIN — WARFARIN SODIUM 3 MG: 2 TABLET ORAL at 17:56

## 2019-01-01 RX ADMIN — DIGOXIN 0.12 MG: 125 TABLET ORAL at 10:24

## 2019-01-01 RX ADMIN — Medication 10 ML: at 05:02

## 2019-01-01 RX ADMIN — MAGNESIUM SULFATE HEPTAHYDRATE 2 G: 40 INJECTION, SOLUTION INTRAVENOUS at 05:42

## 2019-01-01 RX ADMIN — Medication 10 ML: at 08:13

## 2019-01-01 RX ADMIN — NYSTATIN 500000 UNITS: 500000 SUSPENSION ORAL at 08:17

## 2019-01-01 RX ADMIN — DOXYCYCLINE 100 MG: 100 INJECTION, POWDER, LYOPHILIZED, FOR SOLUTION INTRAVENOUS at 01:44

## 2019-01-01 RX ADMIN — ONDANSETRON 4 MG: 2 INJECTION INTRAMUSCULAR; INTRAVENOUS at 16:35

## 2019-01-01 RX ADMIN — Medication 20 ML: at 08:55

## 2019-01-01 RX ADMIN — PREDNISONE 10 MG: 5 TABLET ORAL at 09:34

## 2019-01-01 RX ADMIN — INSULIN LISPRO 3 UNITS: 100 INJECTION, SOLUTION INTRAVENOUS; SUBCUTANEOUS at 12:00

## 2019-01-01 RX ADMIN — Medication 10 ML: at 04:50

## 2019-01-01 RX ADMIN — Medication 10 ML: at 08:53

## 2019-01-01 RX ADMIN — ONDANSETRON 4 MG: 2 INJECTION INTRAMUSCULAR; INTRAVENOUS at 21:01

## 2019-01-01 RX ADMIN — Medication 10 ML: at 13:37

## 2019-01-01 RX ADMIN — NYSTATIN 500000 UNITS: 500000 SUSPENSION ORAL at 18:29

## 2019-01-01 RX ADMIN — INSULIN GLARGINE 30 UNITS: 100 INJECTION, SOLUTION SUBCUTANEOUS at 21:34

## 2019-01-01 RX ADMIN — HEPARIN SODIUM AND DEXTROSE 12 UNITS/KG/HR: 10000; 5 INJECTION INTRAVENOUS at 17:29

## 2019-01-01 RX ADMIN — WARFARIN SODIUM 2.5 MG: 2.5 TABLET ORAL at 18:02

## 2019-01-01 RX ADMIN — OXYCODONE HYDROCHLORIDE 5 MG: 5 TABLET ORAL at 04:25

## 2019-01-01 RX ADMIN — Medication 10 ML: at 08:15

## 2019-01-01 RX ADMIN — DOXYCYCLINE 100 MG: 100 INJECTION, POWDER, LYOPHILIZED, FOR SOLUTION INTRAVENOUS at 14:02

## 2019-01-01 RX ADMIN — DILTIAZEM HYDROCHLORIDE 60 MG: 30 TABLET, FILM COATED ORAL at 11:07

## 2019-01-01 RX ADMIN — INSULIN LISPRO 2 UNITS: 100 INJECTION, SOLUTION INTRAVENOUS; SUBCUTANEOUS at 16:48

## 2019-01-01 RX ADMIN — HYDROXYZINE HYDROCHLORIDE 25 MG: 25 TABLET, FILM COATED ORAL at 23:48

## 2019-01-01 RX ADMIN — Medication 10 ML: at 06:00

## 2019-01-01 RX ADMIN — METHYLPREDNISOLONE SODIUM SUCCINATE 80 MG: 40 INJECTION, POWDER, FOR SOLUTION INTRAMUSCULAR; INTRAVENOUS at 04:28

## 2019-01-01 RX ADMIN — ALBUTEROL SULFATE 1.25 MG: 1.25 SOLUTION RESPIRATORY (INHALATION) at 07:58

## 2019-01-01 RX ADMIN — Medication 20 ML: at 10:55

## 2019-01-01 RX ADMIN — INSULIN LISPRO 7 UNITS: 100 INJECTION, SOLUTION INTRAVENOUS; SUBCUTANEOUS at 16:51

## 2019-01-01 RX ADMIN — Medication 1 SPRAY: at 14:22

## 2019-01-01 RX ADMIN — SODIUM CHLORIDE 40 MG: 9 INJECTION INTRAMUSCULAR; INTRAVENOUS; SUBCUTANEOUS at 19:36

## 2019-01-01 RX ADMIN — ONDANSETRON 4 MG: 2 INJECTION INTRAMUSCULAR; INTRAVENOUS at 13:28

## 2019-01-01 RX ADMIN — ROFLUMILAST 500 MCG: 500 TABLET ORAL at 08:56

## 2019-01-01 RX ADMIN — ALBUTEROL SULFATE 1.25 MG: 1.25 SOLUTION RESPIRATORY (INHALATION) at 19:34

## 2019-01-01 RX ADMIN — INSULIN LISPRO 2 UNITS: 100 INJECTION, SOLUTION INTRAVENOUS; SUBCUTANEOUS at 12:26

## 2019-01-01 RX ADMIN — HYDROXYZINE HYDROCHLORIDE 25 MG: 25 TABLET, FILM COATED ORAL at 09:37

## 2019-01-01 RX ADMIN — Medication 10 ML: at 14:00

## 2019-01-01 RX ADMIN — INSULIN LISPRO 5 UNITS: 100 INJECTION, SOLUTION INTRAVENOUS; SUBCUTANEOUS at 16:23

## 2019-01-01 RX ADMIN — ROFLUMILAST 500 MCG: 500 TABLET ORAL at 08:13

## 2019-01-01 RX ADMIN — INSULIN LISPRO 5 UNITS: 100 INJECTION, SOLUTION INTRAVENOUS; SUBCUTANEOUS at 17:03

## 2019-01-01 RX ADMIN — Medication 10 ML: at 21:32

## 2019-01-01 RX ADMIN — NYSTATIN 500000 UNITS: 500000 SUSPENSION ORAL at 21:48

## 2019-01-01 RX ADMIN — ALBUTEROL SULFATE 1.25 MG: 1.25 SOLUTION RESPIRATORY (INHALATION) at 15:14

## 2019-01-01 RX ADMIN — Medication 10 ML: at 05:42

## 2019-01-01 RX ADMIN — INSULIN GLARGINE 5 UNITS: 100 INJECTION, SOLUTION SUBCUTANEOUS at 23:06

## 2019-01-01 RX ADMIN — HYDROXYZINE HYDROCHLORIDE 25 MG: 25 TABLET, FILM COATED ORAL at 13:25

## 2019-01-01 RX ADMIN — INSULIN GLARGINE 10 UNITS: 100 INJECTION, SOLUTION SUBCUTANEOUS at 21:48

## 2019-01-01 RX ADMIN — ALPRAZOLAM 0.5 MG: 0.5 TABLET ORAL at 10:29

## 2019-01-01 RX ADMIN — SODIUM CHLORIDE 75 ML/HR: 900 INJECTION, SOLUTION INTRAVENOUS at 16:18

## 2019-01-01 RX ADMIN — ROFLUMILAST 500 MCG: 500 TABLET ORAL at 08:17

## 2019-01-01 RX ADMIN — POTASSIUM CHLORIDE 40 MEQ: 750 TABLET, EXTENDED RELEASE ORAL at 06:12

## 2019-01-01 RX ADMIN — Medication 10 ML: at 06:50

## 2019-01-01 RX ADMIN — HYDROXYZINE HYDROCHLORIDE 25 MG: 25 TABLET, FILM COATED ORAL at 01:08

## 2019-01-01 RX ADMIN — OXYCODONE AND ACETAMINOPHEN 1 TABLET: 5; 325 TABLET ORAL at 12:26

## 2019-01-01 RX ADMIN — NYSTATIN 500000 UNITS: 500000 SUSPENSION ORAL at 21:49

## 2019-01-01 RX ADMIN — LOVASTATIN 10 MG: 20 TABLET ORAL at 00:14

## 2019-01-01 RX ADMIN — NYSTATIN 500000 UNITS: 500000 SUSPENSION ORAL at 21:34

## 2019-01-01 RX ADMIN — OXYCODONE HYDROCHLORIDE 5 MG: 5 TABLET ORAL at 08:17

## 2019-01-01 RX ADMIN — Medication 10 ML: at 09:36

## 2019-01-01 RX ADMIN — DOCUSATE SODIUM 100 MG: 100 CAPSULE, LIQUID FILLED ORAL at 17:27

## 2019-01-01 RX ADMIN — DOCUSATE SODIUM 100 MG: 100 CAPSULE, LIQUID FILLED ORAL at 08:54

## 2019-01-01 RX ADMIN — Medication 10 ML: at 14:45

## 2019-01-01 RX ADMIN — ALPRAZOLAM 0.5 MG: 0.5 TABLET ORAL at 14:29

## 2019-01-01 RX ADMIN — NYSTATIN 500000 UNITS: 500000 SUSPENSION ORAL at 18:33

## 2019-01-01 RX ADMIN — NYSTATIN 500000 UNITS: 500000 SUSPENSION ORAL at 18:02

## 2019-01-01 RX ADMIN — NYSTATIN 500000 UNITS: 500000 SUSPENSION ORAL at 21:44

## 2019-01-01 RX ADMIN — DOCUSATE SODIUM 100 MG: 100 CAPSULE, LIQUID FILLED ORAL at 20:08

## 2019-01-01 RX ADMIN — MAGNESIUM SULFATE HEPTAHYDRATE 2 G: 40 INJECTION, SOLUTION INTRAVENOUS at 10:04

## 2019-01-01 RX ADMIN — PREDNISONE 10 MG: 5 TABLET ORAL at 10:23

## 2019-01-01 RX ADMIN — DILTIAZEM HYDROCHLORIDE 60 MG: 30 TABLET, FILM COATED ORAL at 15:31

## 2019-01-01 RX ADMIN — ALPRAZOLAM 0.5 MG: 0.5 TABLET ORAL at 05:41

## 2019-01-01 RX ADMIN — Medication 10 ML: at 13:08

## 2019-01-01 RX ADMIN — GUAIFENESIN 600 MG: 600 TABLET, EXTENDED RELEASE ORAL at 21:30

## 2019-01-01 RX ADMIN — Medication 10 ML: at 13:35

## 2019-01-01 RX ADMIN — PREDNISONE 10 MG: 5 TABLET ORAL at 08:56

## 2019-01-01 RX ADMIN — NYSTATIN 500000 UNITS: 500000 SUSPENSION ORAL at 21:42

## 2019-01-01 RX ADMIN — Medication 20 ML: at 09:00

## 2019-01-01 RX ADMIN — NYSTATIN 500000 UNITS: 500000 SUSPENSION ORAL at 10:24

## 2019-01-01 RX ADMIN — HYDROMORPHONE HYDROCHLORIDE 0.5 MG: 2 INJECTION, SOLUTION INTRAMUSCULAR; INTRAVENOUS; SUBCUTANEOUS at 18:00

## 2019-01-01 RX ADMIN — Medication 10 ML: at 10:55

## 2019-01-01 RX ADMIN — Medication 10 ML: at 16:35

## 2019-01-01 RX ADMIN — ONDANSETRON 4 MG: 2 INJECTION INTRAMUSCULAR; INTRAVENOUS at 14:25

## 2019-01-01 RX ADMIN — Medication 10 ML: at 21:42

## 2019-01-01 RX ADMIN — ALBUTEROL SULFATE 1.25 MG: 1.25 SOLUTION RESPIRATORY (INHALATION) at 07:17

## 2019-01-01 RX ADMIN — PREDNISONE 60 MG: 20 TABLET ORAL at 08:04

## 2019-01-01 RX ADMIN — PREDNISONE 40 MG: 20 TABLET ORAL at 08:29

## 2019-01-01 RX ADMIN — ALPRAZOLAM 0.5 MG: 0.5 TABLET ORAL at 21:30

## 2019-01-01 RX ADMIN — Medication 10 ML: at 05:36

## 2019-01-01 RX ADMIN — DILTIAZEM HYDROCHLORIDE 60 MG: 60 CAPSULE, EXTENDED RELEASE ORAL at 20:05

## 2019-01-01 RX ADMIN — HYDROXYZINE HYDROCHLORIDE 25 MG: 25 TABLET, FILM COATED ORAL at 05:35

## 2019-01-01 RX ADMIN — DILTIAZEM HYDROCHLORIDE 60 MG: 30 TABLET, FILM COATED ORAL at 11:49

## 2019-01-01 RX ADMIN — INSULIN LISPRO 2 UNITS: 100 INJECTION, SOLUTION INTRAVENOUS; SUBCUTANEOUS at 22:00

## 2019-01-01 RX ADMIN — Medication 10 ML: at 21:02

## 2019-01-01 RX ADMIN — HYDROXYZINE HYDROCHLORIDE 25 MG: 25 TABLET, FILM COATED ORAL at 09:16

## 2019-01-01 RX ADMIN — ROFLUMILAST 500 MCG: 500 TABLET ORAL at 08:54

## 2019-01-01 RX ADMIN — GUAIFENESIN 600 MG: 600 TABLET, EXTENDED RELEASE ORAL at 21:34

## 2019-01-01 RX ADMIN — POLYETHYLENE GLYCOL 3350 17 G: 17 POWDER, FOR SOLUTION ORAL at 09:03

## 2019-01-01 RX ADMIN — METHYLPREDNISOLONE SODIUM SUCCINATE 80 MG: 40 INJECTION, POWDER, FOR SOLUTION INTRAMUSCULAR; INTRAVENOUS at 11:10

## 2019-01-01 RX ADMIN — Medication 10 ML: at 21:49

## 2019-01-01 RX ADMIN — LORAZEPAM 1 MG: 2 SOLUTION, CONCENTRATE ORAL at 17:35

## 2019-01-01 RX ADMIN — DILTIAZEM HYDROCHLORIDE 240 MG: 240 CAPSULE, COATED, EXTENDED RELEASE ORAL at 10:24

## 2019-01-01 RX ADMIN — OXYCODONE HYDROCHLORIDE 5 MG: 5 TABLET ORAL at 20:04

## 2019-01-01 RX ADMIN — ALBUTEROL SULFATE 1.25 MG: 1.25 SOLUTION RESPIRATORY (INHALATION) at 19:40

## 2019-01-01 RX ADMIN — GUAIFENESIN 600 MG: 600 TABLET, EXTENDED RELEASE ORAL at 22:34

## 2019-01-01 RX ADMIN — DIGOXIN 0.12 MG: 125 TABLET ORAL at 10:23

## 2019-01-01 RX ADMIN — BUMETANIDE 1 MG: 1 TABLET ORAL at 18:21

## 2019-01-01 RX ADMIN — MAGNESIUM SULFATE HEPTAHYDRATE 2 G: 40 INJECTION, SOLUTION INTRAVENOUS at 06:55

## 2019-01-01 RX ADMIN — Medication 10 ML: at 08:10

## 2019-01-01 RX ADMIN — Medication 10 ML: at 13:28

## 2019-01-01 RX ADMIN — SODIUM CHLORIDE 40 MG: 9 INJECTION INTRAMUSCULAR; INTRAVENOUS; SUBCUTANEOUS at 08:59

## 2019-01-01 RX ADMIN — SODIUM CHLORIDE 500 ML: 900 INJECTION, SOLUTION INTRAVENOUS at 15:00

## 2019-01-01 RX ADMIN — Medication 10 ML: at 14:22

## 2019-01-01 RX ADMIN — ONDANSETRON 4 MG: 2 INJECTION INTRAMUSCULAR; INTRAVENOUS at 11:00

## 2019-01-01 RX ADMIN — ALBUTEROL SULFATE 1.25 MG: 1.25 SOLUTION RESPIRATORY (INHALATION) at 13:21

## 2019-01-01 RX ADMIN — NYSTATIN 500000 UNITS: 500000 SUSPENSION ORAL at 17:39

## 2019-01-01 RX ADMIN — NYSTATIN 500000 UNITS: 500000 SUSPENSION ORAL at 18:00

## 2019-01-01 RX ADMIN — DOXYCYCLINE 100 MG: 100 INJECTION, POWDER, LYOPHILIZED, FOR SOLUTION INTRAVENOUS at 14:44

## 2019-01-01 RX ADMIN — Medication 10 ML: at 10:15

## 2019-01-01 RX ADMIN — HYDROXYZINE HYDROCHLORIDE 25 MG: 25 TABLET, FILM COATED ORAL at 23:59

## 2019-01-01 RX ADMIN — ONDANSETRON 4 MG: 2 INJECTION INTRAMUSCULAR; INTRAVENOUS at 08:21

## 2019-01-01 RX ADMIN — GUAIFENESIN 600 MG: 600 TABLET, EXTENDED RELEASE ORAL at 09:34

## 2019-01-01 RX ADMIN — SODIUM CHLORIDE 40 MG: 9 INJECTION INTRAMUSCULAR; INTRAVENOUS; SUBCUTANEOUS at 20:09

## 2019-01-01 RX ADMIN — GUAIFENESIN 600 MG: 600 TABLET, EXTENDED RELEASE ORAL at 08:29

## 2019-01-01 RX ADMIN — INSULIN LISPRO 4 UNITS: 100 INJECTION, SOLUTION INTRAVENOUS; SUBCUTANEOUS at 21:41

## 2019-01-01 RX ADMIN — HEPARIN SODIUM AND DEXTROSE 18 UNITS/KG/HR: 10000; 5 INJECTION INTRAVENOUS at 14:40

## 2019-01-01 RX ADMIN — NYSTATIN 500000 UNITS: 500000 SUSPENSION ORAL at 21:19

## 2019-01-01 RX ADMIN — INSULIN LISPRO 5 UNITS: 100 INJECTION, SOLUTION INTRAVENOUS; SUBCUTANEOUS at 21:29

## 2019-01-01 RX ADMIN — Medication 10 ML: at 21:34

## 2019-01-01 RX ADMIN — METHYLPREDNISOLONE SODIUM SUCCINATE 80 MG: 40 INJECTION, POWDER, FOR SOLUTION INTRAMUSCULAR; INTRAVENOUS at 04:50

## 2019-01-01 RX ADMIN — ALPRAZOLAM 0.5 MG: 0.5 TABLET ORAL at 21:44

## 2019-01-01 RX ADMIN — GUAIFENESIN 600 MG: 600 TABLET, EXTENDED RELEASE ORAL at 21:48

## 2019-01-01 RX ADMIN — WARFARIN SODIUM 2.5 MG: 2.5 TABLET ORAL at 17:39

## 2019-01-01 RX ADMIN — NYSTATIN 500000 UNITS: 500000 SUSPENSION ORAL at 14:40

## 2019-01-01 RX ADMIN — GUAIFENESIN 600 MG: 600 TABLET, EXTENDED RELEASE ORAL at 08:54

## 2019-01-01 RX ADMIN — PANTOPRAZOLE SODIUM 40 MG: 40 TABLET, DELAYED RELEASE ORAL at 08:43

## 2019-01-01 RX ADMIN — INSULIN LISPRO 1 UNITS: 100 INJECTION, SOLUTION INTRAVENOUS; SUBCUTANEOUS at 23:47

## 2019-01-01 RX ADMIN — INSULIN LISPRO 5 UNITS: 100 INJECTION, SOLUTION INTRAVENOUS; SUBCUTANEOUS at 16:36

## 2019-01-01 RX ADMIN — Medication 10 ML: at 10:24

## 2019-01-01 RX ADMIN — OXYCODONE AND ACETAMINOPHEN 1 TABLET: 5; 325 TABLET ORAL at 00:46

## 2019-01-01 RX ADMIN — SODIUM CHLORIDE 1000 ML: 900 INJECTION, SOLUTION INTRAVENOUS at 16:35

## 2019-01-01 RX ADMIN — GUAIFENESIN 600 MG: 600 TABLET, EXTENDED RELEASE ORAL at 20:33

## 2019-01-01 RX ADMIN — OXYCODONE AND ACETAMINOPHEN 1 TABLET: 5; 325 TABLET ORAL at 11:49

## 2019-01-01 RX ADMIN — METHYLPREDNISOLONE SODIUM SUCCINATE 40 MG: 40 INJECTION, POWDER, FOR SOLUTION INTRAMUSCULAR; INTRAVENOUS at 12:27

## 2019-01-01 RX ADMIN — Medication 20 ML: at 09:38

## 2019-01-01 RX ADMIN — NYSTATIN 500000 UNITS: 500000 SUSPENSION ORAL at 08:51

## 2019-01-01 RX ADMIN — ROFLUMILAST 500 MCG: 500 TABLET ORAL at 08:04

## 2019-01-01 RX ADMIN — Medication 10 ML: at 17:56

## 2019-01-01 RX ADMIN — LEVALBUTEROL HYDROCHLORIDE 0.63 MG: 0.63 SOLUTION RESPIRATORY (INHALATION) at 19:25

## 2019-01-01 RX ADMIN — ALBUTEROL SULFATE 1.25 MG: 1.25 SOLUTION RESPIRATORY (INHALATION) at 08:32

## 2019-01-01 RX ADMIN — GUAIFENESIN 600 MG: 600 TABLET, EXTENDED RELEASE ORAL at 10:23

## 2019-01-01 RX ADMIN — POTASSIUM CHLORIDE 40 MEQ: 1.5 POWDER, FOR SOLUTION ORAL at 04:06

## 2019-01-01 RX ADMIN — ALPRAZOLAM 0.5 MG: 0.5 TABLET ORAL at 21:15

## 2019-01-01 RX ADMIN — DIGOXIN 125 MCG: 0.25 INJECTION INTRAMUSCULAR; INTRAVENOUS at 11:50

## 2019-01-01 RX ADMIN — ESCITALOPRAM OXALATE 10 MG: 10 TABLET ORAL at 21:39

## 2019-01-01 RX ADMIN — PANTOPRAZOLE SODIUM 40 MG: 40 TABLET, DELAYED RELEASE ORAL at 05:45

## 2019-01-01 RX ADMIN — PANTOPRAZOLE SODIUM 40 MG: 40 TABLET, DELAYED RELEASE ORAL at 06:54

## 2019-01-01 RX ADMIN — DILTIAZEM HYDROCHLORIDE 240 MG: 240 CAPSULE, COATED, EXTENDED RELEASE ORAL at 08:12

## 2019-01-01 RX ADMIN — ROFLUMILAST 500 MCG: 500 TABLET ORAL at 09:29

## 2019-01-01 RX ADMIN — INSULIN LISPRO 7 UNITS: 100 INJECTION, SOLUTION INTRAVENOUS; SUBCUTANEOUS at 17:39

## 2019-01-01 RX ADMIN — Medication 10 ML: at 21:16

## 2019-01-01 RX ADMIN — NYSTATIN 500000 UNITS: 500000 SUSPENSION ORAL at 08:29

## 2019-01-01 RX ADMIN — WARFARIN SODIUM 5 MG: 5 TABLET ORAL at 17:27

## 2019-01-01 RX ADMIN — GUAIFENESIN 600 MG: 600 TABLET, EXTENDED RELEASE ORAL at 08:13

## 2019-01-01 RX ADMIN — ALBUTEROL SULFATE 1.25 MG: 1.25 SOLUTION RESPIRATORY (INHALATION) at 19:44

## 2019-01-01 RX ADMIN — OXYCODONE HYDROCHLORIDE 5 MG: 5 TABLET ORAL at 21:51

## 2019-01-01 RX ADMIN — OXYCODONE HYDROCHLORIDE 5 MG: 5 TABLET ORAL at 23:48

## 2019-01-01 RX ADMIN — DOXYCYCLINE 100 MG: 100 INJECTION, POWDER, LYOPHILIZED, FOR SOLUTION INTRAVENOUS at 15:20

## 2019-01-01 RX ADMIN — BUMETANIDE 0.5 MG: 1 TABLET ORAL at 09:34

## 2019-01-01 RX ADMIN — Medication 10 ML: at 13:29

## 2019-01-01 RX ADMIN — ALPRAZOLAM 0.5 MG: 0.5 TABLET ORAL at 11:04

## 2019-01-01 RX ADMIN — Medication 20 ML: at 09:30

## 2019-01-01 RX ADMIN — INSULIN LISPRO 2 UNITS: 100 INJECTION, SOLUTION INTRAVENOUS; SUBCUTANEOUS at 09:28

## 2019-01-01 RX ADMIN — GUAIFENESIN 600 MG: 600 TABLET, EXTENDED RELEASE ORAL at 21:16

## 2019-01-01 RX ADMIN — Medication 10 ML: at 05:03

## 2019-01-01 RX ADMIN — LOVASTATIN 10 MG: 20 TABLET ORAL at 22:47

## 2019-01-01 RX ADMIN — WARFARIN SODIUM 3 MG: 2 TABLET ORAL at 18:21

## 2019-01-01 RX ADMIN — INSULIN GLARGINE 25 UNITS: 100 INJECTION, SOLUTION SUBCUTANEOUS at 21:16

## 2019-01-01 RX ADMIN — INSULIN LISPRO 2 UNITS: 100 INJECTION, SOLUTION INTRAVENOUS; SUBCUTANEOUS at 11:54

## 2019-01-01 RX ADMIN — ROFLUMILAST 500 MCG: 500 TABLET ORAL at 08:30

## 2019-01-01 RX ADMIN — ALBUTEROL SULFATE 1.25 MG: 1.25 SOLUTION RESPIRATORY (INHALATION) at 02:00

## 2019-01-01 RX ADMIN — ALBUTEROL SULFATE 1.25 MG: 1.25 SOLUTION RESPIRATORY (INHALATION) at 20:04

## 2019-01-01 RX ADMIN — NYSTATIN 500000 UNITS: 500000 SUSPENSION ORAL at 09:04

## 2019-01-01 RX ADMIN — METHYLPREDNISOLONE SODIUM SUCCINATE 40 MG: 40 INJECTION, POWDER, FOR SOLUTION INTRAMUSCULAR; INTRAVENOUS at 03:54

## 2019-01-01 RX ADMIN — OXYCODONE HYDROCHLORIDE 5 MG: 5 TABLET ORAL at 11:04

## 2019-01-01 RX ADMIN — SODIUM CHLORIDE 40 MG: 9 INJECTION INTRAMUSCULAR; INTRAVENOUS; SUBCUTANEOUS at 20:32

## 2019-01-01 RX ADMIN — INSULIN GLARGINE 25 UNITS: 100 INJECTION, SOLUTION SUBCUTANEOUS at 21:48

## 2019-01-01 RX ADMIN — GUAIFENESIN 600 MG: 600 TABLET, EXTENDED RELEASE ORAL at 20:14

## 2019-01-01 RX ADMIN — Medication 10 ML: at 15:06

## 2019-01-01 RX ADMIN — ALPRAZOLAM 0.5 MG: 0.5 TABLET ORAL at 08:29

## 2019-01-01 RX ADMIN — INSULIN GLARGINE 25 UNITS: 100 INJECTION, SOLUTION SUBCUTANEOUS at 21:01

## 2019-01-01 RX ADMIN — NYSTATIN 500000 UNITS: 500000 SUSPENSION ORAL at 13:24

## 2019-01-01 RX ADMIN — OXYCODONE HYDROCHLORIDE 5 MG: 5 TABLET ORAL at 10:22

## 2019-01-01 RX ADMIN — Medication 10 ML: at 13:26

## 2019-01-01 RX ADMIN — HYDROXYZINE HYDROCHLORIDE 10 MG: 10 TABLET, FILM COATED ORAL at 13:35

## 2019-01-01 RX ADMIN — Medication 10 ML: at 22:10

## 2019-01-01 RX ADMIN — GUAIFENESIN 600 MG: 600 TABLET, EXTENDED RELEASE ORAL at 21:15

## 2019-01-01 RX ADMIN — INSULIN GLARGINE 30 UNITS: 100 INJECTION, SOLUTION SUBCUTANEOUS at 21:30

## 2019-01-01 RX ADMIN — Medication 10 ML: at 21:44

## 2019-01-01 RX ADMIN — HYDROXYZINE HYDROCHLORIDE 25 MG: 25 TABLET, FILM COATED ORAL at 12:22

## 2019-01-01 RX ADMIN — OXYCODONE HYDROCHLORIDE 5 MG: 5 TABLET ORAL at 18:08

## 2019-01-01 RX ADMIN — Medication 10 ML: at 00:02

## 2019-01-01 RX ADMIN — ALBUTEROL SULFATE 1.25 MG: 1.25 SOLUTION RESPIRATORY (INHALATION) at 19:50

## 2019-01-01 RX ADMIN — DILTIAZEM HYDROCHLORIDE 240 MG: 240 CAPSULE, COATED, EXTENDED RELEASE ORAL at 08:17

## 2019-01-01 RX ADMIN — BUMETANIDE 0.5 MG: 1 TABLET ORAL at 11:07

## 2019-01-01 RX ADMIN — Medication 10 ML: at 21:53

## 2019-01-01 RX ADMIN — HYDROXYZINE HYDROCHLORIDE 25 MG: 25 TABLET, FILM COATED ORAL at 00:11

## 2019-01-01 RX ADMIN — PANTOPRAZOLE SODIUM 40 MG: 40 TABLET, DELAYED RELEASE ORAL at 06:18

## 2019-01-01 RX ADMIN — INSULIN GLARGINE 20 UNITS: 100 INJECTION, SOLUTION SUBCUTANEOUS at 21:19

## 2019-01-01 RX ADMIN — Medication 10 ML: at 05:04

## 2019-01-01 RX ADMIN — Medication 10 ML: at 23:40

## 2019-01-01 RX ADMIN — INSULIN GLARGINE 5 UNITS: 100 INJECTION, SOLUTION SUBCUTANEOUS at 23:47

## 2019-01-01 RX ADMIN — Medication 10 ML: at 23:25

## 2019-01-01 RX ADMIN — LOVASTATIN 10 MG: 20 TABLET ORAL at 21:49

## 2019-01-01 RX ADMIN — POTASSIUM CHLORIDE 30 MEQ: 750 TABLET, EXTENDED RELEASE ORAL at 05:42

## 2019-01-01 RX ADMIN — ONDANSETRON 4 MG: 2 INJECTION INTRAMUSCULAR; INTRAVENOUS at 01:58

## 2019-01-01 RX ADMIN — DOCUSATE SODIUM 100 MG: 100 CAPSULE, LIQUID FILLED ORAL at 09:16

## 2019-01-01 RX ADMIN — ESCITALOPRAM OXALATE 10 MG: 10 TABLET ORAL at 21:30

## 2019-01-01 RX ADMIN — DILTIAZEM HYDROCHLORIDE 240 MG: 240 CAPSULE, COATED, EXTENDED RELEASE ORAL at 10:23

## 2019-01-01 RX ADMIN — Medication 10 ML: at 09:20

## 2019-01-01 RX ADMIN — NYSTATIN 500000 UNITS: 500000 SUSPENSION ORAL at 21:16

## 2019-01-01 RX ADMIN — Medication 20 ML: at 08:10

## 2019-01-01 RX ADMIN — METHYLPREDNISOLONE SODIUM SUCCINATE 60 MG: 40 INJECTION, POWDER, FOR SOLUTION INTRAMUSCULAR; INTRAVENOUS at 12:07

## 2019-01-01 RX ADMIN — PREDNISONE 60 MG: 20 TABLET ORAL at 07:47

## 2019-01-01 RX ADMIN — ALPRAZOLAM 0.5 MG: 0.5 TABLET ORAL at 07:10

## 2019-01-01 RX ADMIN — Medication 10 ML: at 06:44

## 2019-01-01 RX ADMIN — NYSTATIN 500000 UNITS: 500000 SUSPENSION ORAL at 21:30

## 2019-01-01 RX ADMIN — DILTIAZEM HYDROCHLORIDE 240 MG: 240 CAPSULE, COATED, EXTENDED RELEASE ORAL at 08:51

## 2019-01-01 RX ADMIN — PANTOPRAZOLE SODIUM 40 MG: 40 TABLET, DELAYED RELEASE ORAL at 16:25

## 2019-01-01 RX ADMIN — NYSTATIN 500000 UNITS: 500000 SUSPENSION ORAL at 08:05

## 2019-01-01 RX ADMIN — OXYCODONE HYDROCHLORIDE 5 MG: 5 TABLET ORAL at 08:02

## 2019-01-01 RX ADMIN — LORAZEPAM 0.5 MG: 2 INJECTION INTRAMUSCULAR; INTRAVENOUS at 16:29

## 2019-01-01 RX ADMIN — Medication 10 ML: at 08:18

## 2019-01-01 RX ADMIN — ROFLUMILAST 500 MCG: 500 TABLET ORAL at 09:33

## 2019-01-01 RX ADMIN — HYDROXYZINE HYDROCHLORIDE 25 MG: 25 TABLET, FILM COATED ORAL at 15:31

## 2019-01-01 RX ADMIN — ALPRAZOLAM 0.5 MG: 0.5 TABLET ORAL at 19:12

## 2019-01-01 RX ADMIN — DILTIAZEM HYDROCHLORIDE 5 MG: 5 INJECTION INTRAVENOUS at 11:26

## 2019-01-01 RX ADMIN — NYSTATIN 500000 UNITS: 500000 SUSPENSION ORAL at 08:13

## 2019-01-01 RX ADMIN — PANTOPRAZOLE SODIUM 80 MG: 40 INJECTION, POWDER, FOR SOLUTION INTRAVENOUS at 22:13

## 2019-01-01 RX ADMIN — INSULIN LISPRO 3 UNITS: 100 INJECTION, SOLUTION INTRAVENOUS; SUBCUTANEOUS at 11:47

## 2019-01-01 RX ADMIN — ALPRAZOLAM 0.5 MG: 0.5 TABLET ORAL at 22:32

## 2019-01-01 RX ADMIN — INSULIN LISPRO 5 UNITS: 100 INJECTION, SOLUTION INTRAVENOUS; SUBCUTANEOUS at 14:03

## 2019-01-01 RX ADMIN — METHYLPREDNISOLONE SODIUM SUCCINATE 80 MG: 40 INJECTION, POWDER, FOR SOLUTION INTRAMUSCULAR; INTRAVENOUS at 11:49

## 2019-01-01 RX ADMIN — PIPERACILLIN SODIUM,TAZOBACTAM SODIUM 3.38 G: 3; .375 INJECTION, POWDER, FOR SOLUTION INTRAVENOUS at 00:07

## 2019-01-01 RX ADMIN — Medication 16 G: at 06:49

## 2019-01-01 RX ADMIN — SODIUM CHLORIDE 500 ML: 900 INJECTION, SOLUTION INTRAVENOUS at 09:32

## 2019-01-01 RX ADMIN — NYSTATIN 500000 UNITS: 500000 SUSPENSION ORAL at 21:04

## 2019-01-01 RX ADMIN — DILTIAZEM HYDROCHLORIDE 240 MG: 240 CAPSULE, COATED, EXTENDED RELEASE ORAL at 09:33

## 2019-01-01 RX ADMIN — NYSTATIN 500000 UNITS: 500000 SUSPENSION ORAL at 17:58

## 2019-01-01 RX ADMIN — INSULIN LISPRO 6 UNITS: 100 INJECTION, SOLUTION INTRAVENOUS; SUBCUTANEOUS at 21:42

## 2019-01-01 RX ADMIN — Medication 20 ML: at 21:01

## 2019-01-01 RX ADMIN — ROFLUMILAST 500 MCG: 500 TABLET ORAL at 08:50

## 2019-01-01 RX ADMIN — ROFLUMILAST 500 MCG: 500 TABLET ORAL at 11:49

## 2019-01-01 RX ADMIN — SODIUM CHLORIDE 40 MG: 9 INJECTION INTRAMUSCULAR; INTRAVENOUS; SUBCUTANEOUS at 08:14

## 2019-01-01 RX ADMIN — Medication 10 ML: at 16:37

## 2019-01-01 RX ADMIN — LEVALBUTEROL HYDROCHLORIDE 0.63 MG: 0.63 SOLUTION RESPIRATORY (INHALATION) at 13:31

## 2019-01-01 RX ADMIN — GUAIFENESIN 600 MG: 600 TABLET, EXTENDED RELEASE ORAL at 20:43

## 2019-01-01 RX ADMIN — INSULIN LISPRO 4 UNITS: 100 INJECTION, SOLUTION INTRAVENOUS; SUBCUTANEOUS at 21:47

## 2019-01-01 RX ADMIN — HEPARIN SODIUM 3600 UNITS: 5000 INJECTION INTRAVENOUS; SUBCUTANEOUS at 19:50

## 2019-01-01 RX ADMIN — Medication 10 ML: at 21:48

## 2019-01-01 RX ADMIN — PANTOPRAZOLE SODIUM 40 MG: 40 TABLET, DELAYED RELEASE ORAL at 18:10

## 2019-01-01 RX ADMIN — INSULIN GLARGINE 15 UNITS: 100 INJECTION, SOLUTION SUBCUTANEOUS at 21:40

## 2019-01-01 RX ADMIN — BUMETANIDE 1 MG: 0.25 INJECTION INTRAMUSCULAR; INTRAVENOUS at 05:33

## 2019-01-01 RX ADMIN — IOPAMIDOL 100 ML: 755 INJECTION, SOLUTION INTRAVENOUS at 20:03

## 2019-01-01 RX ADMIN — Medication 10 ML: at 09:00

## 2019-01-01 RX ADMIN — ALBUTEROL SULFATE 1.25 MG: 1.25 SOLUTION RESPIRATORY (INHALATION) at 04:50

## 2019-01-01 RX ADMIN — ALPRAZOLAM 0.5 MG: 0.5 TABLET ORAL at 21:17

## 2019-01-01 RX ADMIN — SODIUM CHLORIDE 8 MG/HR: 900 INJECTION, SOLUTION INTRAVENOUS at 22:23

## 2019-01-01 RX ADMIN — Medication 10 ML: at 06:47

## 2019-01-01 RX ADMIN — INSULIN LISPRO 3 UNITS: 100 INJECTION, SOLUTION INTRAVENOUS; SUBCUTANEOUS at 12:05

## 2019-01-01 RX ADMIN — GUAIFENESIN 600 MG: 600 TABLET, EXTENDED RELEASE ORAL at 08:51

## 2019-01-01 RX ADMIN — ALPRAZOLAM 0.5 MG: 0.5 TABLET ORAL at 05:19

## 2019-01-01 RX ADMIN — ESCITALOPRAM OXALATE 10 MG: 10 TABLET ORAL at 22:35

## 2019-01-01 RX ADMIN — Medication 10 ML: at 21:17

## 2019-01-01 RX ADMIN — PROCHLORPERAZINE EDISYLATE 5 MG: 5 INJECTION INTRAMUSCULAR; INTRAVENOUS at 11:49

## 2019-01-01 RX ADMIN — ALBUTEROL SULFATE 1.25 MG: 1.25 SOLUTION RESPIRATORY (INHALATION) at 02:14

## 2019-01-01 RX ADMIN — DIGOXIN 0.12 MG: 125 TABLET ORAL at 08:30

## 2019-01-01 RX ADMIN — DILTIAZEM HYDROCHLORIDE 240 MG: 240 CAPSULE, COATED, EXTENDED RELEASE ORAL at 08:53

## 2019-01-01 RX ADMIN — ALPRAZOLAM 0.5 MG: 0.5 TABLET ORAL at 06:26

## 2019-01-01 RX ADMIN — Medication 10 ML: at 11:56

## 2019-01-01 RX ADMIN — DOXYCYCLINE 100 MG: 100 INJECTION, POWDER, LYOPHILIZED, FOR SOLUTION INTRAVENOUS at 01:09

## 2019-01-01 RX ADMIN — BUMETANIDE 1 MG: 0.25 INJECTION INTRAMUSCULAR; INTRAVENOUS at 18:23

## 2019-01-01 RX ADMIN — DOXYCYCLINE 100 MG: 100 INJECTION, POWDER, LYOPHILIZED, FOR SOLUTION INTRAVENOUS at 01:28

## 2019-01-01 RX ADMIN — Medication 10 ML: at 07:15

## 2019-01-01 RX ADMIN — DILTIAZEM HYDROCHLORIDE 60 MG: 30 TABLET, FILM COATED ORAL at 16:44

## 2019-01-01 RX ADMIN — GUAIFENESIN 600 MG: 600 TABLET, EXTENDED RELEASE ORAL at 21:39

## 2019-01-01 RX ADMIN — HYDROXYZINE HYDROCHLORIDE 25 MG: 25 TABLET, FILM COATED ORAL at 07:40

## 2019-01-01 RX ADMIN — NYSTATIN 500000 UNITS: 500000 SUSPENSION ORAL at 21:01

## 2019-01-01 RX ADMIN — ALBUTEROL SULFATE 1.25 MG: 1.25 SOLUTION RESPIRATORY (INHALATION) at 14:09

## 2019-01-01 RX ADMIN — SODIUM CHLORIDE 40 MG: 9 INJECTION INTRAMUSCULAR; INTRAVENOUS; SUBCUTANEOUS at 09:15

## 2019-01-01 RX ADMIN — OXYCODONE HYDROCHLORIDE 5 MG: 5 TABLET ORAL at 21:44

## 2019-01-01 RX ADMIN — DOXYCYCLINE 100 MG: 100 INJECTION, POWDER, LYOPHILIZED, FOR SOLUTION INTRAVENOUS at 15:05

## 2019-01-01 RX ADMIN — WARFARIN SODIUM 3 MG: 2 TABLET ORAL at 18:29

## 2019-01-01 RX ADMIN — OXYCODONE HYDROCHLORIDE 5 MG: 5 TABLET ORAL at 21:15

## 2019-01-01 RX ADMIN — NYSTATIN 500000 UNITS: 500000 SUSPENSION ORAL at 17:02

## 2019-01-01 RX ADMIN — NYSTATIN 500000 UNITS: 500000 SUSPENSION ORAL at 20:09

## 2019-01-01 RX ADMIN — HYDROXYZINE HYDROCHLORIDE 25 MG: 25 TABLET, FILM COATED ORAL at 07:47

## 2019-01-01 RX ADMIN — GUAIFENESIN 600 MG: 600 TABLET, EXTENDED RELEASE ORAL at 11:49

## 2019-01-01 RX ADMIN — LOVASTATIN 10 MG: 20 TABLET ORAL at 21:04

## 2019-01-01 RX ADMIN — DOCUSATE SODIUM 100 MG: 100 CAPSULE, LIQUID FILLED ORAL at 18:26

## 2019-01-01 RX ADMIN — Medication 20 ML: at 10:31

## 2019-01-01 RX ADMIN — PANTOPRAZOLE SODIUM 40 MG: 40 TABLET, DELAYED RELEASE ORAL at 05:40

## 2019-01-01 RX ADMIN — INSULIN GLARGINE 25 UNITS: 100 INJECTION, SOLUTION SUBCUTANEOUS at 22:11

## 2019-01-01 RX ADMIN — ALBUTEROL SULFATE 1.25 MG: 1.25 SOLUTION RESPIRATORY (INHALATION) at 11:18

## 2019-01-01 RX ADMIN — PIPERACILLIN SODIUM,TAZOBACTAM SODIUM 3.38 G: 3; .375 INJECTION, POWDER, FOR SOLUTION INTRAVENOUS at 06:04

## 2019-01-01 RX ADMIN — PREDNISONE 10 MG: 5 TABLET ORAL at 14:03

## 2019-01-01 RX ADMIN — PIPERACILLIN SODIUM,TAZOBACTAM SODIUM 3.38 G: 3; .375 INJECTION, POWDER, FOR SOLUTION INTRAVENOUS at 22:08

## 2019-01-01 RX ADMIN — DIGOXIN 0.12 MG: 125 TABLET ORAL at 08:51

## 2019-01-01 RX ADMIN — PANTOPRAZOLE SODIUM 40 MG: 40 TABLET, DELAYED RELEASE ORAL at 06:49

## 2019-01-01 RX ADMIN — POLYETHYLENE GLYCOL 3350 17 G: 17 POWDER, FOR SOLUTION ORAL at 21:24

## 2019-01-01 RX ADMIN — DOXYCYCLINE 100 MG: 100 INJECTION, POWDER, LYOPHILIZED, FOR SOLUTION INTRAVENOUS at 14:18

## 2019-01-01 RX ADMIN — METHYLPREDNISOLONE SODIUM SUCCINATE 60 MG: 40 INJECTION, POWDER, FOR SOLUTION INTRAMUSCULAR; INTRAVENOUS at 20:09

## 2019-01-01 RX ADMIN — PANTOPRAZOLE SODIUM 40 MG: 40 TABLET, DELAYED RELEASE ORAL at 08:29

## 2019-01-01 RX ADMIN — NYSTATIN 500000 UNITS: 500000 SUSPENSION ORAL at 14:19

## 2019-01-01 RX ADMIN — NYSTATIN 500000 UNITS: 500000 SUSPENSION ORAL at 09:34

## 2019-01-01 RX ADMIN — ALPRAZOLAM 0.5 MG: 0.5 TABLET ORAL at 08:15

## 2019-01-01 RX ADMIN — ROFLUMILAST 500 MCG: 500 TABLET ORAL at 14:04

## 2019-01-01 RX ADMIN — NYSTATIN 500000 UNITS: 500000 SUSPENSION ORAL at 18:26

## 2019-01-01 RX ADMIN — INSULIN LISPRO 10 UNITS: 100 INJECTION, SOLUTION INTRAVENOUS; SUBCUTANEOUS at 16:51

## 2019-01-01 RX ADMIN — DIGOXIN 0.12 MG: 125 TABLET ORAL at 08:12

## 2019-01-01 RX ADMIN — DOXYCYCLINE 100 MG: 100 INJECTION, POWDER, LYOPHILIZED, FOR SOLUTION INTRAVENOUS at 01:01

## 2019-01-01 RX ADMIN — GUAIFENESIN 600 MG: 600 TABLET, EXTENDED RELEASE ORAL at 08:04

## 2019-01-01 RX ADMIN — INSULIN LISPRO 2 UNITS: 100 INJECTION, SOLUTION INTRAVENOUS; SUBCUTANEOUS at 11:50

## 2019-01-01 RX ADMIN — INSULIN LISPRO 7 UNITS: 100 INJECTION, SOLUTION INTRAVENOUS; SUBCUTANEOUS at 11:52

## 2019-01-01 RX ADMIN — PIPERACILLIN SODIUM,TAZOBACTAM SODIUM 3.38 G: 3; .375 INJECTION, POWDER, FOR SOLUTION INTRAVENOUS at 05:00

## 2019-01-01 RX ADMIN — INSULIN LISPRO 4 UNITS: 100 INJECTION, SOLUTION INTRAVENOUS; SUBCUTANEOUS at 21:13

## 2019-01-01 RX ADMIN — HEPARIN SODIUM 3400 UNITS: 5000 INJECTION INTRAVENOUS; SUBCUTANEOUS at 19:52

## 2019-01-01 RX ADMIN — ONDANSETRON 4 MG: 2 INJECTION INTRAMUSCULAR; INTRAVENOUS at 12:53

## 2019-01-01 RX ADMIN — PANTOPRAZOLE SODIUM 40 MG: 40 TABLET, DELAYED RELEASE ORAL at 06:10

## 2019-01-01 RX ADMIN — ALBUTEROL SULFATE 1.25 MG: 1.25 SOLUTION RESPIRATORY (INHALATION) at 08:16

## 2019-01-01 RX ADMIN — DILTIAZEM HYDROCHLORIDE 240 MG: 240 CAPSULE, COATED, EXTENDED RELEASE ORAL at 08:30

## 2019-01-01 RX ADMIN — BUMETANIDE 1 MG: 0.25 INJECTION INTRAMUSCULAR; INTRAVENOUS at 12:30

## 2019-01-01 RX ADMIN — OXYCODONE HYDROCHLORIDE 5 MG: 5 TABLET ORAL at 13:49

## 2019-01-01 RX ADMIN — PANTOPRAZOLE SODIUM 40 MG: 40 TABLET, DELAYED RELEASE ORAL at 06:40

## 2019-01-01 RX ADMIN — Medication 20 ML: at 11:56

## 2019-01-01 RX ADMIN — ALBUTEROL SULFATE 1.25 MG: 1.25 SOLUTION RESPIRATORY (INHALATION) at 13:27

## 2019-01-01 RX ADMIN — DILTIAZEM HYDROCHLORIDE 240 MG: 240 CAPSULE, COATED, EXTENDED RELEASE ORAL at 09:29

## 2019-01-01 RX ADMIN — GUAIFENESIN 600 MG: 600 TABLET, EXTENDED RELEASE ORAL at 09:29

## 2019-01-01 RX ADMIN — PREDNISONE 20 MG: 20 TABLET ORAL at 08:51

## 2019-01-01 RX ADMIN — PIPERACILLIN SODIUM,TAZOBACTAM SODIUM 3.38 G: 3; .375 INJECTION, POWDER, FOR SOLUTION INTRAVENOUS at 09:15

## 2019-01-01 RX ADMIN — METHYLPREDNISOLONE SODIUM SUCCINATE 40 MG: 40 INJECTION, POWDER, FOR SOLUTION INTRAMUSCULAR; INTRAVENOUS at 20:32

## 2019-01-01 RX ADMIN — HYDROXYZINE HYDROCHLORIDE 25 MG: 25 TABLET, FILM COATED ORAL at 04:25

## 2019-01-01 RX ADMIN — DILTIAZEM HYDROCHLORIDE 240 MG: 240 CAPSULE, COATED, EXTENDED RELEASE ORAL at 08:57

## 2019-01-01 RX ADMIN — ALBUTEROL SULFATE 1.25 MG: 1.25 SOLUTION RESPIRATORY (INHALATION) at 08:00

## 2019-01-01 RX ADMIN — INSULIN LISPRO 5 UNITS: 100 INJECTION, SOLUTION INTRAVENOUS; SUBCUTANEOUS at 08:56

## 2019-01-01 RX ADMIN — SODIUM CHLORIDE 40 MG: 9 INJECTION INTRAMUSCULAR; INTRAVENOUS; SUBCUTANEOUS at 11:55

## 2019-01-01 RX ADMIN — ALPRAZOLAM 0.5 MG: 0.5 TABLET ORAL at 18:33

## 2019-01-01 RX ADMIN — ALBUTEROL SULFATE 1.25 MG: 1.25 SOLUTION RESPIRATORY (INHALATION) at 02:36

## 2019-01-01 RX ADMIN — POLYETHYLENE GLYCOL 3350 17 G: 17 POWDER, FOR SOLUTION ORAL at 08:14

## 2019-01-01 RX ADMIN — ALPRAZOLAM 0.5 MG: 0.5 TABLET ORAL at 09:33

## 2019-01-01 RX ADMIN — HEPARIN SODIUM AND DEXTROSE 18 UNITS/KG/HR: 10000; 5 INJECTION INTRAVENOUS at 06:56

## 2019-01-01 RX ADMIN — WARFARIN SODIUM 3 MG: 2 TABLET ORAL at 17:16

## 2019-01-01 RX ADMIN — HYDROXYZINE HYDROCHLORIDE 25 MG: 25 TABLET, FILM COATED ORAL at 15:49

## 2019-01-01 RX ADMIN — MAGNESIUM SULFATE HEPTAHYDRATE 2 G: 40 INJECTION, SOLUTION INTRAVENOUS at 07:57

## 2019-01-01 RX ADMIN — BUDESONIDE 500 MCG: 0.5 INHALANT RESPIRATORY (INHALATION) at 08:00

## 2019-01-01 RX ADMIN — ROFLUMILAST 500 MCG: 500 TABLET ORAL at 08:51

## 2019-01-01 RX ADMIN — DILTIAZEM HYDROCHLORIDE 60 MG: 30 TABLET, FILM COATED ORAL at 07:01

## 2019-01-01 RX ADMIN — PREDNISONE 40 MG: 20 TABLET ORAL at 07:57

## 2019-01-01 RX ADMIN — BUMETANIDE 0.5 MG: 1 TABLET ORAL at 16:36

## 2019-01-01 RX ADMIN — NYSTATIN 500000 UNITS: 500000 SUSPENSION ORAL at 18:21

## 2019-01-01 RX ADMIN — Medication 10 ML: at 23:24

## 2019-01-01 RX ADMIN — INSULIN LISPRO 8 UNITS: 100 INJECTION, SOLUTION INTRAVENOUS; SUBCUTANEOUS at 18:02

## 2019-01-01 RX ADMIN — ALPRAZOLAM 0.5 MG: 0.5 TABLET ORAL at 05:45

## 2019-01-01 RX ADMIN — Medication 10 ML: at 21:43

## 2019-01-01 RX ADMIN — Medication 10 ML: at 09:38

## 2019-01-01 RX ADMIN — NYSTATIN 500000 UNITS: 500000 SUSPENSION ORAL at 12:52

## 2019-01-01 RX ADMIN — NYSTATIN 500000 UNITS: 500000 SUSPENSION ORAL at 17:56

## 2019-01-01 RX ADMIN — DIGOXIN 0.12 MG: 125 TABLET ORAL at 08:17

## 2019-01-01 RX ADMIN — ALBUTEROL SULFATE 1.25 MG: 1.25 SOLUTION RESPIRATORY (INHALATION) at 00:15

## 2019-01-01 RX ADMIN — DOCUSATE SODIUM 100 MG: 100 CAPSULE, LIQUID FILLED ORAL at 17:57

## 2019-01-01 RX ADMIN — ALBUTEROL SULFATE 1.25 MG: 1.25 SOLUTION RESPIRATORY (INHALATION) at 20:10

## 2019-01-01 RX ADMIN — ROFLUMILAST 500 MCG: 500 TABLET ORAL at 08:14

## 2019-01-01 RX ADMIN — ALBUTEROL SULFATE 1.25 MG: 1.25 SOLUTION RESPIRATORY (INHALATION) at 14:29

## 2019-01-01 RX ADMIN — INSULIN LISPRO 5 UNITS: 100 INJECTION, SOLUTION INTRAVENOUS; SUBCUTANEOUS at 08:13

## 2019-01-01 RX ADMIN — NYSTATIN 500000 UNITS: 500000 SUSPENSION ORAL at 13:28

## 2019-01-01 RX ADMIN — ESCITALOPRAM OXALATE 10 MG: 10 TABLET ORAL at 20:42

## 2019-01-01 RX ADMIN — DILTIAZEM HYDROCHLORIDE 60 MG: 30 TABLET, FILM COATED ORAL at 23:38

## 2019-01-01 RX ADMIN — GUAIFENESIN 200 MG: 200 SOLUTION ORAL at 10:22

## 2019-01-01 RX ADMIN — ROFLUMILAST 500 MCG: 500 TABLET ORAL at 09:16

## 2019-01-01 RX ADMIN — INSULIN LISPRO 6 UNITS: 100 INJECTION, SOLUTION INTRAVENOUS; SUBCUTANEOUS at 21:35

## 2019-01-01 RX ADMIN — Medication 10 ML: at 23:07

## 2019-01-01 RX ADMIN — INSULIN LISPRO 2 UNITS: 100 INJECTION, SOLUTION INTRAVENOUS; SUBCUTANEOUS at 17:15

## 2019-01-01 RX ADMIN — ALPRAZOLAM 0.5 MG: 0.5 TABLET ORAL at 07:57

## 2019-01-01 RX ADMIN — GUAIFENESIN 600 MG: 600 TABLET, EXTENDED RELEASE ORAL at 21:04

## 2019-01-01 RX ADMIN — MAGNESIUM SULFATE HEPTAHYDRATE 1 G: 1 INJECTION, SOLUTION INTRAVENOUS at 08:29

## 2019-01-01 RX ADMIN — ALPRAZOLAM 0.5 MG: 0.5 TABLET ORAL at 09:34

## 2019-01-01 RX ADMIN — PREDNISONE 20 MG: 20 TABLET ORAL at 08:17

## 2019-01-01 RX ADMIN — ALBUTEROL SULFATE 1.25 MG: 1.25 SOLUTION RESPIRATORY (INHALATION) at 02:08

## 2019-01-01 RX ADMIN — INSULIN LISPRO 5 UNITS: 100 INJECTION, SOLUTION INTRAVENOUS; SUBCUTANEOUS at 16:29

## 2019-01-01 RX ADMIN — Medication 10 ML: at 21:50

## 2019-01-01 RX ADMIN — ALBUTEROL SULFATE 1.25 MG: 1.25 SOLUTION RESPIRATORY (INHALATION) at 13:11

## 2019-01-01 RX ADMIN — WARFARIN SODIUM 2 MG: 2 TABLET ORAL at 18:26

## 2019-01-01 RX ADMIN — NYSTATIN 500000 UNITS: 500000 SUSPENSION ORAL at 13:34

## 2019-01-01 RX ADMIN — SODIUM CHLORIDE 100 ML/HR: 900 INJECTION, SOLUTION INTRAVENOUS at 08:57

## 2019-01-01 RX ADMIN — GUAIFENESIN 600 MG: 600 TABLET, EXTENDED RELEASE ORAL at 21:01

## 2019-01-01 RX ADMIN — METHYLPREDNISOLONE SODIUM SUCCINATE 60 MG: 40 INJECTION, POWDER, FOR SOLUTION INTRAMUSCULAR; INTRAVENOUS at 04:01

## 2019-01-01 RX ADMIN — ALBUTEROL SULFATE 1.25 MG: 1.25 SOLUTION RESPIRATORY (INHALATION) at 15:59

## 2019-01-01 RX ADMIN — LEVOFLOXACIN 500 MG: 5 INJECTION, SOLUTION INTRAVENOUS at 16:37

## 2019-01-01 RX ADMIN — ALBUTEROL SULFATE 1.25 MG: 1.25 SOLUTION RESPIRATORY (INHALATION) at 19:48

## 2019-01-01 RX ADMIN — GUAIFENESIN 600 MG: 600 TABLET, EXTENDED RELEASE ORAL at 08:17

## 2019-01-01 RX ADMIN — OXYCODONE AND ACETAMINOPHEN 1 TABLET: 5; 325 TABLET ORAL at 17:41

## 2019-01-01 RX ADMIN — INSULIN LISPRO 6 UNITS: 100 INJECTION, SOLUTION INTRAVENOUS; SUBCUTANEOUS at 21:19

## 2019-01-01 RX ADMIN — INSULIN LISPRO 2 UNITS: 100 INJECTION, SOLUTION INTRAVENOUS; SUBCUTANEOUS at 11:45

## 2019-01-01 RX ADMIN — LEVALBUTEROL HYDROCHLORIDE 0.63 MG: 0.63 SOLUTION RESPIRATORY (INHALATION) at 01:00

## 2019-01-01 RX ADMIN — HEPARIN SODIUM AND DEXTROSE 12 UNITS/KG/HR: 10000; 5 INJECTION INTRAVENOUS at 12:08

## 2019-01-01 RX ADMIN — POTASSIUM CHLORIDE 40 MEQ: 750 TABLET, EXTENDED RELEASE ORAL at 12:26

## 2019-01-01 RX ADMIN — ONDANSETRON 4 MG: 2 INJECTION INTRAMUSCULAR; INTRAVENOUS at 06:21

## 2019-01-01 RX ADMIN — INSULIN LISPRO 2 UNITS: 100 INJECTION, SOLUTION INTRAVENOUS; SUBCUTANEOUS at 23:06

## 2019-01-01 RX ADMIN — ALPRAZOLAM 0.5 MG: 0.5 TABLET ORAL at 08:04

## 2019-01-01 RX ADMIN — ALPRAZOLAM 0.5 MG: 0.5 TABLET ORAL at 10:55

## 2019-01-01 RX ADMIN — ONDANSETRON 4 MG: 2 INJECTION INTRAMUSCULAR; INTRAVENOUS at 08:07

## 2019-01-01 RX ADMIN — Medication 20 ML: at 10:15

## 2019-01-01 RX ADMIN — ALBUTEROL SULFATE 1.25 MG: 1.25 SOLUTION RESPIRATORY (INHALATION) at 09:32

## 2019-01-01 RX ADMIN — WARFARIN SODIUM 3 MG: 2 TABLET ORAL at 18:33

## 2019-01-01 RX ADMIN — PIPERACILLIN SODIUM,TAZOBACTAM SODIUM 3.38 G: 3; .375 INJECTION, POWDER, FOR SOLUTION INTRAVENOUS at 21:40

## 2019-01-04 NOTE — TELEPHONE ENCOUNTER
Anai from [address] calling reporting pt has SOB, productive cough with gray sputum, and poor appetite. Also states pt is sweating and tremor has become worse. Pts heart rate is 115 and O2 stat is 98%. Pt refusing to go to ED for evaluation. Per , ABX and steroid being sent to pharmacy. Advised Anai if pt has no improvement after taking medications, she needs to go to ED or urgent care. [facility] nurse verbalized understanding.

## 2019-01-06 PROBLEM — J44.1 COPD WITH ACUTE EXACERBATION (HCC): Status: ACTIVE | Noted: 2019-01-01

## 2019-01-06 NOTE — ED NOTES
Provider notified of pt meeting SIRS critieria. However, pt has had multiple neb tx PTA. Pt is afebrile and only feels warm to the touch.

## 2019-01-06 NOTE — H&P
[id number] [facility] PROGRAM  
Admission H&P Date of admission: 1/6/2019 Patient name: Korina Martinez MRN: [id number] YOB: 1955 Age: 66 y.o. Primary care provider:  Zaki Al DO Source of Information: patient, medical records Chief complaint:  \"I haven't been feeling right\" History of Present Illness Korina Martinez is a 66 y.o. female with hx of CAD, s/p MVR/AVR and a fib with RVR on Coumadin, CHF, COPD who presents to the ED via EMS complaining of worsening shortness of breath over the past 2-3 days. Patient states that her home health nurse was worried about her breathing status earlier in the week and called her PCP Dr Floyd Alaniz. PCP suggested evaluation in ED but pt refused so called in azithromycin and prednisone dose pack. Patient started prednisone but pharmacy would not fill the z-pack because of concern for interaction with warfarin. She has been using albuterol 2 puffs 2-3 times a day for 2 days. Denies sick contact at home. Denies cough but endorses sputum production white in color. Pt endorses anxiety, SOB, decreased appetite. Pt denies fever, chills, sinus congestion, CP, dysuria, constipation, diarrhea, bloody stool or urine, N/V, pain. In the ED: - Vital signs: T97.4, , /69, 100% on 6L N/C 
- Labs: WBC 32.0, Hgb 6.7, , phos 6.0, proBNP 982, LA 2.8, FOBT neg; blood gases pH 7.31, pCO2 48, pO2 78,  HCO3 24, pO2 
- CXR: small L PE, chronic; mild pulm vascular congestion, no evidence of PNA Patient was treated with 2 NS boluses, Levaquin, Xanax, Ativan Code purple called on pt in ED --> BCx drawn, fluid boluses initiated Home Medications Prior to Admission medications Medication Sig Start Date End Date Taking? Authorizing Provider  
roflumilast (DALIRESP) tab tablet Take 500 mcg by mouth daily.    Yes Provider, Historical  
albuterol (VENTOLIN HFA) 90 mcg/actuation inhaler Take 2 Puffs by inhalation every four (4) hours as needed for Wheezing or Shortness of Breath. 1/4/19   Debbi Garrett DO  
azithromycin (ZITHROMAX) 250 mg tablet Take 2 tablets today, then take 1 tablet daily 1/4/19   Juan French DO  
predniSONE (STERAPRED DS) 10 mg dose pack See administration instruction per 10mg dose pack 1/4/19   Dian French DO  
oxyCODONE-acetaminophen (PERCOCET) 5-325 mg per tablet Take 1 Tab by mouth every eight (8) hours as needed for Pain. Max Daily Amount: 3 Tabs. Must last 30 days fill 12/27/18 12/21/18   Debbi Garrett DO  
warfarin (COUMADIN) 1 mg tablet starting 12-21-18 take 3mg everyday except for Friday 2mg 12/21/18   Dian French DO  
lisinopril (PRINIVIL, ZESTRIL) 5 mg tablet Take 1 Tab by mouth daily. 11/8/18   Dian French DO  
bumetanide (BUMEX) 1 mg tablet Take 1 Tab by mouth every evening. 11/8/18   Jillian French DO  
lovastatin (MEVACOR) 10 mg tablet Take 10 mg by mouth nightly. Elisha, MD Andre  
warfarin (COUMADIN) 1 mg tablet Take 3 mg by mouth five (5) days a week. Take 2 mg on Thursday & Friday Take 3 mg all other days    Provider, Historical  
fluticasone-umeclidin-vilanter (TRELEGY ELLIPTA) 100-62.5-25 mcg dsdv Take 1 Puff by inhalation daily. 10/5/18   Debbi Garrett DO  
warfarin (COUMADIN) 1 mg tablet 3mg every day except Tuesday Thursday take 1mg f/u 1 week 9/27/18   Jillian French DO  
budesonide-formoterol (SYMBICORT) 160-4.5 mcg/actuation HFAA INHALE 2 PUFFS TWICE DAILY 9/21/18   Dian French DO  
guaiFENesin-dextromethorphan SR (MUCINEX DM) 600-30 mg per tablet Take 1 Tab by mouth two (2) times a day. 8/6/18   Chun Travis MD  
docusate sodium (COLACE) 100 mg capsule Take 100 mg by mouth every evening. Provider, Historical  
potassium chloride SR (KLOR-CON 10) 10 mEq tablet Take 10 mEq by mouth daily.     Provider, Historical  
dilTIAZem (CARDIZEM) 60 mg tablet TAKE 1 TABLET BEFORE BREAKFAST, LUNCH, DINNER AND AT BEDTIME 8/22/17   Zaki Al, DO Allergies Allergies Allergen Reactions  Spiriva Respimat [Tiotropium Bromide] Anaphylaxis and Other (comments) Adverse effects; Per RN - pt states that Spiriva caused throat swelling and could not breathe well.  Spiriva Respimat [Tiotropium Bromide] Shortness of Breath  Tomato Shortness of Breath Only occurs with raw tomatoes, tolerates ketchup without issue  Celebrex [Celecoxib] Rash  Celebrex [Celecoxib] Rash  Rocephin [Ceftriaxone] Shortness of Breath  Tomato Rash Past Medical History:  
Diagnosis Date  A-fib ([facility]  Anticoagulant long-term use  CAD (coronary artery disease), native coronary artery   
 mild to moderate by cath  CHF (congestive heart failure) ([facility]  Chronic obstructive pulmonary disease ([facility] [facility]  Dyslipidemia  History of vascular access device 10/26/2018 [facility] VAT - 4 FR Midline, 9 cm, R basilic, for DIVA  Ill-defined condition   
 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  
Procedure Laterality Date  COLONOSCOPY N/A 11/28/2016 COL[facility] performed by [facility]  HX HEART CATHETERIZATION    
 cabg  HX HYSTERECTOMY    
 BSO  
 HX MITRAL VALVE REPLACEMENT    
 and redo MVR and AVR Family History Problem Relation Age of Onset  Cancer Brother Social History Patient resides Independently X  With family care Assisted living SNF Ambulates X  Independently With cane Assisted walker Alcohol history X  None Social  
  Chronic Smoking history None Former smoker X  Current smoker, 1ppd Social History Tobacco Use Smoking Status Current Every Day Smoker  Packs/day: 0.50  Types: Cigarettes  Last attempt to quit: 11/1/2015  Years since quitting: 3.1 Smokeless Tobacco Never Used Tobacco Comment 11/1/2014 Drug history X  None Former drug user Current drug user Code status Full code X  DNR/DNI Partial   
Code status discussed with the patient/caregivers. Review of Systems See HPI Physical Exam 
Visit Vitals BP 97/50 Pulse (!) 113 Temp 97.4 °F (36.3 °C) Resp 26 SpO2 100% General: No acute distress. Alert. Cooperative. Head: Normocephalic. Atraumatic. Eyes:  Conjunctiva pink. Sclera white. PERRL. Ears:  Ear canals patent. TM non-erythematous. Nose:  Septum midline. Mucosa pink. No drainage. Throat: Mucosa pink. Moist mucous membranes. No tonsillar exudates or erythema. Small white patches ~.5mm in diameter on posterior pharynx and sides of mouth. Palate movement equal bilaterally. Neck: Supple. Normal ROM. No stiffness. Respiratory: No increased work of breathing. Decreased air movement. scattered wheezes. No ronchi  
Cardiovascular: Tachycardic to ~ 110bpm. Irregularly irregular rhythm. Normal S1,S2. No m/r/g. Pulses 2+ throughout. GI: + bowel sounds. Nontender. No rebound tenderness or guarding. Nondistended. Extremities: No edema. No palpable cord. No tenderness. Musculoskeletal: Full ROM in all extremities. Neuro: CN II-XII grossly intact. Strength 5/5 in all extremities. Skin: Clear. No rashes. No ulcers. : Deferred Rectal: Deferred Laboratory Data Recent Results (from the past 24 hour(s)) EKG, 12 LEAD, INITIAL Collection Time: 01/06/19  1:59 PM  
Result Value Ref Range Ventricular Rate 126 BPM  
 Atrial Rate 131 BPM  
 QRS Duration 72 ms Q-T Interval 310 ms QTC Calculation (Bezet) 448 ms Calculated R Axis 79 degrees Calculated T Axis 114 degrees Diagnosis Atrial fibrillation with rapid ventricular response Nonspecific ST and T wave abnormality Abnormal ECG When compared with ECG of 25-OCT-2018 18:23, 
Nonspecific T wave abnormality now evident in Inferior leads Nonspecific T wave abnormality, worse in Anterolateral leads BLOOD GAS, ARTERIAL Collection Time: 01/06/19  2:05 PM  
Result Value Ref Range pH 7.31 (L) 7.35 - 7.45    
 PCO2 48 (H) 35.0 - 45.0 mmHg PO2 78 (L) 80 - 100 mmHg O2 SAT 94 92 - 97 % BICARBONATE 24 22 - 26 mmol/L  
 BASE DEFICIT 2.9 mmol/L  
 O2 METHOD NASAL O2    
 O2 FLOW RATE 6.00 L/min Sample source ARTERIAL    
 SITE LEFT RADIAL TEE'S TEST YES    
SAMPLES BEING HELD Collection Time: 01/06/19  2:39 PM  
Result Value Ref Range SAMPLES BEING HELD SST,GLENDA   
 COMMENT Add-on orders for these samples will be processed based on acceptable specimen integrity and analyte stability, which may vary by analyte. CBC WITH AUTOMATED DIFF Collection Time: 01/06/19  2:39 PM  
Result Value Ref Range WBC 32.0 (H) 3.6 - 11.0 K/uL  
 RBC 2.41 (L) 3.80 - 5.20 M/uL HGB 6.7 (L) 11.5 - 16.0 g/dL HCT 22.5 (L) 35.0 - 47.0 % MCV 93.4 80.0 - 99.0 FL  
 MCH 27.8 26.0 - 34.0 PG  
 MCHC 29.8 (L) 30.0 - 36.5 g/dL  
 RDW 18.2 (H) 11.5 - 14.5 % PLATELET 811 783 - 637 K/uL MPV 12.6 8.9 - 12.9 FL  
 NRBC 0.1 (H) 0  WBC ABSOLUTE NRBC 0.03 (H) 0.00 - 0.01 K/uL NEUTROPHILS 91 (H) 32 - 75 % BAND NEUTROPHILS 1 0 - 6 % LYMPHOCYTES 1 (L) 12 - 49 % MONOCYTES 5 5 - 13 % EOSINOPHILS 0 0 - 7 % BASOPHILS 0 0 - 1 % METAMYELOCYTES 2 (H) 0 % IMMATURE GRANULOCYTES 0 %  
 ABS. NEUTROPHILS 29.4 (H) 1.8 - 8.0 K/UL  
 ABS. LYMPHOCYTES 0.3 (L) 0.8 - 3.5 K/UL  
 ABS. MONOCYTES 1.6 (H) 0.0 - 1.0 K/UL  
 ABS. EOSINOPHILS 0.0 0.0 - 0.4 K/UL  
 ABS. BASOPHILS 0.0 0.0 - 0.1 K/UL  
 ABS. IMM.  GRANS. 0.0 K/UL  
 DF MANUAL    
 RBC COMMENTS ANISOCYTOSIS 
1+ 
 RBC COMMENTS MICROCYTOSIS 1+ 
    
 RBC COMMENTS POIKILOCYTOSIS 1+ 
    
 RBC COMMENTS SCHISTOCYTES 1+ RBC COMMENTS POLYCHROMASIA 1+ METABOLIC PANEL, BASIC Collection Time: 01/06/19  2:39 PM  
Result Value Ref Range Sodium 132 (L) 136 - 145 mmol/L Potassium 4.8 3.5 - 5.1 mmol/L Chloride 96 (L) 97 - 108 mmol/L  
 CO2 26 21 - 32 mmol/L Anion gap 10 5 - 15 mmol/L Glucose 335 (H) 65 - 100 mg/dL BUN 26 (H) 6 - 20 MG/DL Creatinine 1.29 (H) 0.55 - 1.02 MG/DL  
 BUN/Creatinine ratio 20 12 - 20 GFR est AA 51 (L) >60 ml/min/1.73m2 GFR est non-AA 42 (L) >60 ml/min/1.73m2 Calcium 8.7 8.5 - 10.1 MG/DL  
PTT Collection Time: 01/06/19  2:39 PM  
Result Value Ref Range aPTT <20.0 (L) 22.1 - 32.0 sec  
 aPTT, therapeutic range     58.0 - 77.0 SECS  
MAGNESIUM Collection Time: 01/06/19  2:39 PM  
Result Value Ref Range Magnesium 2.1 1.6 - 2.4 mg/dL PHOSPHORUS Collection Time: 01/06/19  2:39 PM  
Result Value Ref Range Phosphorus 6.0 (H) 2.6 - 4.7 MG/DL  
NT-PRO BNP Collection Time: 01/06/19  2:39 PM  
Result Value Ref Range NT pro- (H) 0 - 125 PG/ML  
PROTHROMBIN TIME + INR Collection Time: 01/06/19  2:39 PM  
Result Value Ref Range INR 1.2 (H) 0.9 - 1.1 Prothrombin time 12.1 (H) 9.0 - 11.1 sec OCCULT BLOOD, STOOL Collection Time: 01/06/19  4:14 PM  
Result Value Ref Range Occult blood, stool NEGATIVE  NEG    
TYPE + CROSSMATCH Collection Time: 01/06/19  4:14 PM  
Result Value Ref Range Crossmatch Expiration 01/09/2019 ABO/Rh(D) O POSITIVE Antibody screen NEG Unit number [id number] Blood component type RC LR,1 Unit division 00 Status of unit ALLOCATED Crossmatch result Compatible POC TROPONIN-I Collection Time: 01/06/19  4:16 PM  
Result Value Ref Range Troponin-I (POC) 0.10 (H) 0.00 - 0.08 ng/mL POC LACTIC ACID Collection Time: 01/06/19  4:26 PM  
Result Value Ref Range Lactic Acid (POC) 2.80 (HH) 0.40 - 2.00 mmol/L Imaging CXR Results  (Last 48 hours) 01/06/19 1422  XR CHEST PORT Final result Impression:  IMPRESSION:   
Small left pleural effusion, chronic. Mild pulmonary vascular congestion. No evidence of pneumonia. Narrative:  EXAM:  XR CHEST PORT INDICATION:  Shortness of breath COMPARISON: October 31, 2018 TECHNIQUE: AP portable upright chest view FINDINGS: Sternal wires are intact. Mitral and aortic valve replacement are  
noted. Small left pleural effusion. Cardiomediastinal silhouette is normal. Mild  
pulmonary vascular congestion. No significant interstitial edema. No  
pneumothorax. Bones are intact. EKG:  A fib with RVR to 126bpm, QTc 448 Assessment and Plan Oralee Cough is a 66 y.o. female with a PMH of Atrial Fibrillation, CHF, COPD, mechanical valve replacement (s/p AVR and MVR) on Coumadin, Dyslipidemia, Migraine who is admitted for COPD Exacerbation and meeting SIRS Criteria. Severe Sepsis: Likely 2.2 COPD Exacerbation. SIRS (3/4)  Presented with elevated WBC (though has chronic elevation 2/2 chronic steroid use), RR 32,  and LA 2.8. Received 2 NS Boluses in ED. One dose of Levaquin in the ED 
- Started Zosyn 3.375mg TID 
- Trend LA q4hr 
- f/u BCx, UCx 
- 50mL/hr IVFs ( because of CHF) - For MAPs below 60, consider a line SOB likely COPD Exacerbation: Less likely CHF exacerbation as proBNP is 982 and chrnically elevated. CXR without evidence of PNA and showing mild pulmonary vascular congestion. Small pleural effusion on CXR is chronic and stable. Patient with increased O2 requirement over past few days unrelieved by albuterol and other chronic medications. Took 2 days of prednisone dose pack but was unable to fill azithromycin Rx by PCP for COPD exacerbation.  
- Duonebs q4hr 
- home Trelegy 
- solumedrol 80mg q8hr - Rapid Flu, RVP pending, Sputum culture with gram stain 
- Daily CBC, CMP with Mg & Phos - NC O2 for goal of 88-92% sats - BiPaP as needed 
- pulm consult R/O GI Bleed: FOBT negative. HgB 6.7 POA 
- Protonix 80mg now, transition to 40mg BID tomorrow 
- transfuse 1U with post transfusion H&H 
- GI Consult Diastolic CHF: Euvolemic on admission 
- hold home Bumex for now - Strict I&O 
- trend trops - Cardiology consult Atrial Fibrillation s/p MVR on Coumadin (INR POA 1.2) Pt has been seen OP by Dr Michael Flores but has not followed up with him since 2016. Goal INR 2.5 - 3.5. EKG POA: Afib with RVR . Coumadin home dose is 3mg everyday but Friday 2mg. Patient has only taken one dose of Diltiazem today, additional dose given in ED. 
- Home Warfarin continued- to be dosed by pharmacy for a INR goal of 2.5-3.5 
- Home Diltiazem 60 mg four times daily - Cardiology consult 
- Daily Mg & Phos Leukocytosis: Pt with BL elevation in her WBC (BL ~22-29) likely due to daily prednisone use. However WBC is 32.0  Today which is likely due to infectious response above elevated baseline. Other ddx include COPD exacerbation, other infection or stress reaction - Monitor with daily CBC Hyperglycemia: likely due to chronic steroid dose. - Start Lantus half weight based daily  
- POC glucose ACHS 
- hypoglycemia protocol 
  
Hyperphosphatemia: Phos 6.0 POA 
- Monitor with daily BMP, Mg, Phos 
  
Chronic Back Pain  
- Home Percocet 5-325 mg resumed, q6h PRN  
  
Hyperlipidemia (: TC: 196, T, HDL 88, VLDL 30 & LDL 78) - Home Lovastatin 10 mg  
  
FEN/GI - Cardiac Diet Activity - Ambulate as tolerated DVT prophylaxis - coumadin GI prophylaxis - Not indicated at this time Fall prophylaxis - Not indicated at this time. Disposition - Admit to Stepdown. Plan to d/c to Home.   
Code Status - DNR discussed with patient / caregivers. 
  
Patient Chaparro Pillai discussed Dr. Ninoska Arenas, attending physician. Gregorio Wright MD 
Family Medicine Resident Hospital Problems Hospital Problems  Date Reviewed: 11/29/2018 Codes Class Noted POA  
 [facility] ICD-10-CM: J44.1 ICD-9-CM: 491.21  1/6/2019 Unknown

## 2019-01-06 NOTE — ED NOTES
BP improved after IVF. Pt remains tachypneic. Refusing BiPAP at this time. Consent for blood signed and placed at 's desk.

## 2019-01-06 NOTE — ED TRIAGE NOTES
Pt arrived via EMS due to shortness of breath that started today. EMS reported the pt refused NRB during transport. O2 sats at 100% during triage, but pt is anxious, tachypneic and tachycardic.

## 2019-01-06 NOTE — ED NOTES
Pt has elevated WBC and low HGB, provider notified. Orders for [facility] and lactic received.

## 2019-01-06 NOTE — PROGRESS NOTES
[facility]: MED RECONCILIATION Comments/Recommendations:  
? Patient short of breath during interview. ? Patient was not able to list her medications, but recognized the name of her medications when listed by pharmacist. 
? Patient says she was recently prescribed antibiotics and prednisone dose pack, but was not able to get the antibiotics. She says she has been taking the prednisone. ? Trelegy: patient has her Trelegy inhaler with her. She would prefer to use her own Trelegy. She does not want to use [employer] approved nebulizer substitution. Information obtained from: patient Allergies: Spiriva respimat [tiotropium bromide]; Spiriva respimat [tiotropium bromide]; Tomato; Celebrex [celecoxib]; Celebrex [celecoxib]; Rocephin [ceftriaxone]; and Tomato Prior to Admission Medications Prescriptions Last Dose Informant Patient Reported? Taking? albuterol (VENTOLIN HFA) 90 mcg/actuation inhaler 1/6/2019 at Unknown time Self No Yes Sig: Take 2 Puffs by inhalation every four (4) hours as needed for Wheezing or Shortness of Breath. bumetanide (BUMEX) 1 mg tablet 1/5/2019 at PM Self No Yes Sig: Take 1 Tab by mouth every evening. dilTIAZem (CARDIZEM) 60 mg tablet 1/6/2019 at 1700 Self No Yes Sig: TAKE 1 TABLET BEFORE BREAKFAST, LUNCH, DINNER AND AT BEDTIME  
docusate sodium (COLACE) 100 mg capsule 1/5/2019 at Unknown time Self Yes Yes Sig: Take 100 mg by mouth every evening. fluticasone-umeclidin-vilanter (TRELEGY ELLIPTA) 100-62.5-25 mcg dsdv 1/6/2019 at 1600 Self No Yes Sig: Take 1 Puff by inhalation daily. lisinopril (PRINIVIL, ZESTRIL) 5 mg tablet 1/5/2019 at Unknown time Self No Yes Sig: Take 1 Tab by mouth daily. lovastatin (MEVACOR) 10 mg tablet 1/5/2019 at 1600 Self Yes Yes Sig: Take 10 mg by mouth nightly. oxyCODONE-acetaminophen (PERCOCET) 5-325 mg per tablet 1/6/2019 at Unknown time Self No Yes Sig: Take 1 Tab by mouth every eight (8) hours as needed for Pain.  Max Daily Amount: 3 Tabs. Must last 30 days fill 12/27/18  
potassium chloride SR (KLOR-CON 10) 10 mEq tablet 1/6/2019 at Unknown time Self Yes Yes Sig: Take 10 mEq by mouth daily. predniSONE (STERAPRED DS) 10 mg dose pack  Self Yes Yes Sig: Take  by mouth See Admin Instructions. See administration instruction per 10mg dose pack  
roflumilast (DALIRESP) tab tablet 1/6/2019 at Unknown time Self Yes Yes Sig: Take 500 mcg by mouth daily. warfarin (COUMADIN) 1 mg tablet 1/5/2019 at 16 Self Yes Yes Sig: Take  by mouth six (6) days a week.  Warfarin 3 mg on Sa/Su/Mo/Tu/We/Th and Warfarin 2 mg on Fr Reinaldo Cuevas, Pharmacist

## 2019-01-06 NOTE — ED NOTES
Delay in care due to pt being a very difficult stick. IV access now obtained via [address].

## 2019-01-06 NOTE — ED PROVIDER NOTES
66 y.o. female with past medical history significant for COPD, CAD, and afib who presents from home via EMS with chief complaint of shortness of breath. Per EMS, pt was laying in bed on 2L of O2 via NC and her SpO2 was 88%-90%. EMS states they started the pt on 6L of O2 via NC and her SpO2 increased to 100%. EMS reports the pt has had a cough, and shortness of breath. EMS reports the patient has taken 3-4 albuterol inhaler treatments at home. Of note, EMS reports the pt is very anxious and is likely a contributing factor of today's symptoms. Furthermore, EMS reports the pt's room smell a lot like smoke and pt admitted that she smoked yesterday. Of note, EMS reports the pt has afib at baseline. In the ED, pt states her cough is not productive. However, pt complains of chest pain when coughing or taking deep breath. Pt denies any history of MI or pulmonary emboli. Pt denies denies any fever, chills, rhinorrhea, or myalgias. There are no other acute medical concerns at this time. Social hx - Tobacco use: current smoker, Alcohol Use: none PCP: Darshana Vegas DO Note written by Nadia Roberts, as dictated by Kailee Harrison MD 1:55 PM. The history is provided by the patient and the EMS personnel. No  was used. Past Medical History:  
Diagnosis Date  A-fib ([facility] [address]  Anticoagulant long-term use  CAD (coronary artery disease), native coronary artery   
 mild to moderate by cath  CHF (congestive heart failure) ([facility] [address]  Chronic obstructive pulmonary disease ([facility] [address]  Dyslipidemia  History of vascular access device 10/26/2018 [facility] VAT - 4 FR Midline, 9 cm, R basilic, for DIVA  Ill-defined condition   
 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  
Procedure Laterality Date  COLONOSCOPY N/A 11/28/2016 COLONOSCOPY performed by Brian [facility]ame [facility]  HX HEART CATHETERIZATION    
 cabg  HX HYSTERECTOMY    
 BSO  
 HX MITRAL VALVE REPLACEMENT    
 and redo MVR and AVR Family History:  
Problem Relation Age of Onset  Cancer Brother Social History Socioeconomic History  Marital status:  Spouse name: Not on file  Number of children: Not on file  Years of education: Not on file  Highest education level: Not on file Social Needs  Financial resource strain: Not on file  Food insecurity - worry: Not on file  Food insecurity - inability: Not on file  Transportation needs - medical: Not on file  Transportation needs - non-medical: Not on file Occupational History  Not on file Tobacco Use  Smoking status: Current Every Day Smoker Packs/day: 0.50 Types: Cigarettes Last attempt to quit: 11/1/2015 Years since quitting: 3.1  Smokeless tobacco: Never Used  Tobacco comment: 11/1/2014 Substance and Sexual Activity  Alcohol use: No  
  Alcohol/week: 0.0 oz  Drug use: No  
 Sexual activity: Not on file Other Topics Concern [address] Service Not Asked  Blood Transfusions Not Asked  Caffeine Concern Not Asked  Occupational Exposure Not Asked Marjorie Balbuena Hazards Not Asked  Sleep Concern Not Asked  Stress Concern Not Asked  Weight Concern Not Asked  Special Diet Not Asked  Back Care Not Asked  Exercise Not Asked  Bike Helmet Not Asked  Seat Belt Not Asked  Self-Exams Not Asked Social History Narrative ** Merged History Encounter ** ALLERGIES: Spiriva respimat [tiotropium bromide];  Spiriva respimat [tiotropium bromide]; Tomato; Celebrex [celecoxib]; Celebrex [celecoxib]; Rocephin [ceftriaxone]; and Tomato Review of Systems Constitutional: Negative for chills and fever. HENT: Negative for rhinorrhea. Respiratory: Positive for cough and shortness of breath. Musculoskeletal: Negative for myalgias. Psychiatric/Behavioral: The patient is nervous/anxious. All other systems reviewed and are negative. Vitals:  
 01/06/19 1353 BP: 139/69 Pulse: (!) 132 Resp: (!) 32 Temp: 97.4 °F (36.3 °C) SpO2: 100% Physical Exam  
Constitutional: She is oriented to person, place, and time. Cardiovascular: An irregularly irregular rhythm present. Tachycardia present. Pulmonary/Chest:  
Diminished lung sounds in right lower lobe. Some increased work of breathing. Abdominal: Soft. Bowel sounds are normal. She exhibits no distension and no fluid wave. No gross blood on rectal exam.   
Musculoskeletal: Normal range of motion. She exhibits edema. She exhibits no tenderness. Left pedal edema. No other peripheral edema noted. Neurological: She is alert and oriented to person, place, and time. Skin: Skin is warm and dry. Psychiatric: Her speech is normal and behavior is normal. Judgment and thought content normal. Her mood appears anxious. Cognition and memory are normal.  
Nursing note and vitals reviewed. Note written by Nadia Mcintosh, as dictated by Mikala Reilly MD 1:55 PM. MDM Number of Diagnoses or Management Options Amount and/or Complexity of Data Reviewed Clinical lab tests: reviewed Tests in the radiology section of CPT®: reviewed Discuss the patient with other providers: yes Procedures ED EKG interpretation: 
Rhythm: atrial fib with RVR; and irregular. Rate (approx.): 126 bpm; ST/T wave: non-specific ST and T wave abnormality, no stemi Note written by Nadia Mcintosh, as dictated by Mikala Reilly MD 2:13 PM. COPD exacerbation with pneumonia suspected as cause. Pt was very anxious upon arrival and also had taken several inhaler puffs PTA that could contribute to her tachycardia. Thus no code purple called upon initial arrival despite tachypnea. Respiratory called upon arrial since pt had retractions and BIPAP attempted. However, pt had panic attack and refused BIPAP at that time. xanax given to help pt calm down and ABG ordered while waiting to try BIPAP if needed once pt calmed down. after elevated WBC returned, code purple called. However pt also on steroids and seems to have elevated WBC the last few visits as well. However, with symptoms, leukocytosis, tachypnea, code purple called. However, all orders except IVF and antibiotics had already been initiated. Antibiotics added and pt notified anemia as well. She was consented for blood. Advised for need of admission. Family medicine team consulted for admission and agreed to see and admit patient after discussing patients complaint, clinical findings and diagnostic testing.   
  
Stephenie Velez MD

## 2019-01-06 NOTE — PROGRESS NOTES
[facility]: MED RECONCILIATION Unable to interview patient at this time. Patient on BiPAP. Will attempt to update home medication list later.  
 
Courtney Larsen, Pharmacist

## 2019-01-06 NOTE — ED NOTES
Delay in blood work due to pt being a difficult stick. Pt refusing blood work until Xanax administration due to anxiety. Pt agreeable to consenting to blood work once Xanax is administered.

## 2019-01-07 NOTE — PROGRESS NOTES
[facility] Dosing Services: 1/7/2019 Consult for Warfarin Dosing by Pharmacy by Dr. Rojas Going Consult provided for this 66 y.o.  female , for indication of  afib and Hx AVR Day of Therapy: continue from home (home dose: Warfarin 3 mg on Sa/Su/Mo/Tu/We/Th and Warfarin 2 mg on Fr). Dose to achieve an INR goal of 2.5- 3.5 (Per Dr. Sid Hubbard note on 11/8/2018: \"Has afib and artifical valve should be 2.5-3.5. \") INR subtherapeutic this AM (1.3). Patient admitted w/ INR of 1.2 and goal 2.5-3.5. Order entered for Warfarin 5 mg to be given today at 18:00. Significant drug interactions: Zosyn, Solu-medrol Previous dose given Warfarin 3 mg on 1/6/2019 PT/INR Lab Results Component Value Date/Time INR 1.3 (H) 01/07/2019 01:25 AM  
  
Platelets Lab Results Component Value Date/Time PLATELET 692 50/80/2771 01:25 AM  
  
H/H Lab Results Component Value Date/Time HGB 7.9 (L) 01/07/2019 01:25 AM  
  
Pharmacist will follow daily and will provide subsequent Warfarin dosing based on clinical status.  
Ema Kuo, PHARMD, Pharmacist

## 2019-01-07 NOTE — ED NOTES
Bedside and Verbal shift change report given to Danielle (oncoming nurse) by Libia Escobar (offgoing nurse). Report included the following information SBAR, ED Summary, MAR, Recent Results and Cardiac Rhythm afib.

## 2019-01-07 NOTE — PROGRESS NOTES
[facility] Dosing Services: 1/6/2019 Consult for Warfarin Dosing by Pharmacy by Dr. Wes Lowe Consult provided for this 66 y.o.  female , for indication of  afib and aortic artifical valve Day of Therapy: continue from home (home dose: Warfarin 3 mg on Sa/Su/Mo/Tu/We/Th and Warfarin 2 mg on Fr). Dose to achieve an INR goal of 2.5- 3.5 (Per Dr. Lana Camara note on 11/8/2018: \"Has afib and artifical valve should be 2.5-3.5. \") Order entered for Warfarin 3 mg to be given today at 18:00. Significant drug interactions: Levofloxacin x 1 dose in ED on 1/6/2019 Previous dose given Warfarin 3 mg on 1/5/2019 (at home per patient interview) PT/INR Lab Results Component Value Date/Time INR 1.2 (H) 01/06/2019 02:39 PM  
  
Platelets Lab Results Component Value Date/Time PLATELET 706 70/19/6811 02:39 PM  
  
H/H Lab Results Component Value Date/Time HGB 6.7 (L) 01/06/2019 02:39 PM  
  
Pharmacist will follow daily and will provide subsequent Warfarin dosing based on clinical status.  
Curt Thurman, Pharmacist

## 2019-01-07 NOTE — ROUTINE PROCESS
TRANSFER - OUT REPORT: 
 
Verbal report given to April RN(name) on Ledy Klein  being transferred to [facility](unit) for routine progression of care Report consisted of patients Situation, Background, Assessment and  
Recommendations(SBAR). Information from the following report(s) SBAR, Kardex, ED Summary and Procedure Summary was reviewed with the receiving nurse. Lines:  
Peripheral IV 01/06/19 Right;Upper Arm (Active) Site Assessment Clean, dry, & intact 1/7/2019  9:00 AM  
Phlebitis Assessment 0 1/7/2019  9:00 AM  
Infiltration Assessment 0 1/7/2019  9:00 AM  
Dressing Status Clean, dry, & intact 1/7/2019  9:00 AM  
Dressing Type Transparent;Tape 1/7/2019  9:00 AM  
Hub Color/Line Status Pink; Infusing 1/7/2019  9:00 AM  
Action Taken Open ports on tubing capped 1/7/2019  9:00 AM  
Alcohol Cap Used Yes 1/7/2019  9:00 AM  
  
 
Opportunity for questions and clarification was provided. Patient transported with: 
 Monitor O2 @ 3 liters Registered Nurse Tech

## 2019-01-07 NOTE — PROGRESS NOTES
Pt requesting something for anxiety. States she feels like her \"heart is racing\" HR  90s-low 100s; scheduled cardizem given. Dr. Nicho Sousa notified; MD to review chart.

## 2019-01-07 NOTE — PROGRESS NOTES
[facility] FAMILY MEDICINE RESIDENCY PROGRAM  
Daily Progress Note Date: 1/7/2019 Assessment/Plan:  
Sheila Catsro is a 66 y.o. female who is hospitalized for COPD Exacerbation, initially meeting Severe Sepsis Criteria. Severe Sepsis: Likely 2.2 COPD Exacerbation. SIRS (3/4)  Presented with elevated WBC (though has chronic elevation 2/2 chronic steroid use), RR 32,  and LA 2.8 --> .81. Received 2 NS Boluses in ED. One dose of Levaquin in the ED. Elevated HR 2/2 A fib with RVR, increased RR due to COPD Exacerbation, Elevated WBC superimposed on chronic Leukocytosis. - Zosyn 3.375mg TID (Day 1) - f/u BCx, UCx 
- 50mL/hr IVFs ( because of CHF) - For MAPs below 60, consider a line  
  
COPD Exacerbation: IMPROVED. SOB improved with Albuterol nebs, steroids, BiPAP/ O2 N/C. Patient able to wean down on O2 to 4L N/C. RVP neg. - Albuterol q4hr, stating allergyto Duonebs 
- home Trelegy 
- solumedrol 80mg q8hr 
- Daily CBC, CMP with Mg & Phos - NC O2 for goal of 88-92% sats - BiPaP as needed 
- pulm consult 
  
R/O GI Bleed: FOBT negative. HgB 6.7 POA. S/p 1U pRBCs. HgB today 7.9 
- Protonix 80mg now, transition to 40mg BID tomorrow - Transfuse for HgB <7 
- NPO pending GI consult - GI Consulted 
  
Diastolic CHF: Euvolemic on admission. Trop POA . 10 --> .06  
- hold home Bumex for now - Strict I&O 
- trend trops - Cardiology consult 
  
Atrial Fibrillation s/p MVR on Coumadin (INR POA 1.2) Pt has been seen OP by Dr Neo Mccray but has not followed up with him since 2016. Goal INR 2.5 - 3.5. EKG POA: Afib with RVR . Coumadin home dose is 3mg everyday but Friday 2mg. Patient missed doses of Diltiazem but got additional doses in ED for total of 3 doses yesterday. - Home Warfarin continued- to be dosed by pharmacy for a INR goal of 2.5-3.5 
- Home Diltiazem 60 mg four times daily - Cardiology consult 
- Daily Mg & Phos  
  
 Leukocytosis: Chronic with BL ~22-29 likely due to daily prednisone use. WBC POA 32.0. Today 32.5 However WBC is 32.0   
- Monitor with daily CBC 
  
Hyperglycemia: likely due to chronic steroid dose. BG last night 234 then 183 on BMP this morning. 1 U Lispo for night BG. Will adjust insulin based on trending BGs 
- Start Lantus half weight based daily (5U QHS) - POC glucose ACHS 
- hypoglycemia protocol 
  
Hyperphosphatemia: Phos 6.0 POA 
- Monitor with daily BMP, Mg, Phos 
  
Chronic Back Pain  
- Home Percocet 5-325 mg resumed, q6h PRN  
  
Hyperlipidemia (: TC: 196, T, HDL 88, VLDL 30 & LDL 78) - Home Lovastatin 10 mg  
  
FEN/GI - Cardiac Diet Activity - Ambulate as tolerated DVT prophylaxis - coumadin GI prophylaxis - Not indicated at this time Fall prophylaxis - Not indicated at this time. Disposition - Admit to Stepdown. Plan to d/c to Home. Code Status - DNR, discussed with patient / caregivers. Patient will be discussed with Dr. Tim Hu, supervising physician. Eloy Lewis MD 
Family Medicine Resident CC: \"I'm feeling ok\" Subjective No acute events overnight. Patient reports having difficulty slepeing and being a bit anxious this morning. State breathing is improved overall. Denies CP, SOB, N/V, tolerated food overnight. Inpatient Medications Current Facility-Administered Medications Medication Dose Route Frequency  oxyCODONE-acetaminophen (PERCOCET) 5-325 mg per tablet 1 Tab  1 Tab Oral Q8H PRN  
 warfarin (COUMADIN) tablet 5 mg  5 mg Oral ONCE  hydrOXYzine HCl (ATARAX) tablet 25 mg  25 mg Oral TID PRN  
 doxycycline (VIBRAMYCIN) 100 mg in 0.9% sodium chloride (MBP/ADV) 100 mL  100 mg IntraVENous Q12H  
 sodium chloride (NS) flush 5-10 mL  5-10 mL IntraVENous Q8H  
 sodium chloride (NS) flush 5-10 mL  5-10 mL IntraVENous PRN  
 docusate sodium (COLACE) capsule 100 mg  100 mg Oral BID  
  nicotine (NICODERM CQ) 21 mg/24 hr patch 1 Patch  1 Patch TransDERmal Q24H  
 fluticasone-umeclidin-vilanter 100-62.5-25 mcg dsdv 1 Puff  (Patient Supplied)  1 Puff Inhalation DAILY  lovastatin (MEVACOR) tablet 10 mg  10 mg Oral QHS  roflumilast (DALIRESP) tablet 500 mcg  500 mcg Oral DAILY  0.9% sodium chloride infusion  50 mL/hr IntraVENous PRN  
 Warfarin - Pharmacist dosing   Other PRN  
 methylPREDNISolone (PF) (SOLU-MEDROL) injection 80 mg  80 mg IntraVENous Q8H  
 albuterol (ACCUNEB) nebulizer solution 1.25 mg  1.25 mg Nebulization Q4H RT  
 pantoprazole (PROTONIX) 40 mg in sodium chloride 0.9% 10 mL injection  40 mg IntraVENous Q12H  
 insulin glargine (LANTUS) injection 5 Units  5 Units SubCUTAneous QHS  insulin lispro (HUMALOG) injection   SubCUTAneous QID WITH MEALS  glucose chewable tablet 16 g  4 Tab Oral PRN  
 dextrose (D50W) injection syrg 12.5-25 g  12.5-25 g IntraVENous PRN  
 glucagon (GLUCAGEN) injection 1 mg  1 mg IntraMUSCular PRN  
 dilTIAZem (CARDIZEM) IR tablet 60 mg  60 mg Oral AC&HS Allergies Allergies Allergen Reactions  Spiriva Respimat [Tiotropium Bromide] Anaphylaxis and Other (comments) Adverse effects; Per RN - pt states that Spiriva caused throat swelling and could not breathe well.  Spiriva Respimat [Tiotropium Bromide] Shortness of Breath  Tomato Shortness of Breath Only occurs with raw tomatoes, tolerates ketchup without issue  Celebrex [Celecoxib] Rash  Celebrex [Celecoxib] Rash  Rocephin [Ceftriaxone] Shortness of Breath  Tomato Rash Objective Vitals: 
Patient Vitals for the past 8 hrs: 
 Temp Pulse Resp BP SpO2  
01/07/19 1300  84  103/56   
01/07/19 1200 97.8 °F (36.6 °C) 87  119/74 100 % 01/07/19 1100  98 27 110/71 99 % 01/07/19 1000  81 29 101/65 99 % 01/07/19 0900 98.4 °F (36.9 °C) 92 29 111/62 98 % 01/07/19 0832     96 % 01/07/19 0800  90 23 103/63 96 % 01/07/19 0701  100  111/71   
01/07/19 0700  99 30 111/71 95 % I/O: 
 
Intake/Output Summary (Last 24 hours) at 1/7/2019 1408 Last data filed at 1/7/2019 1200 Gross per 24 hour Intake 2820 ml Output 700 ml Net 2120 ml Last shift: 
  01/07 0701 - 01/07 1900 In: 240 [P.O.:240] Out: 450 [Urine:450] Last 3 shifts: 
  01/05 1901 - 01/07 0700 In: 5995 [P.O.:480; I.V.:2100] Out: 250 [Urine:250] Physical Exam:General: No acute distress. Alert. Cooperative. Head: Normocephalic. Atraumatic. Eyes:  Conjunctiva pink. Sclera white. PERRL. Nose:  Septum midline. Mucosa pink. No drainage. Throat: Mucosa pink. Moist mucous membranes. Respiratory: CTAB with decreased air movement at bases bilaterally. No wheezes, crackles appreciated Cardiovascular: Irregularly irregular rhythm at ~90bpm. Normal S1,S2. No m/r/g. Pulses 2+ throughout. GI: + bowel sounds. Nontender. No rebound tenderness or guarding. Nondistended Extremities: Trace edema of LE slightly more on L vs R No palpable cord. No tenderness. Laboratory Data Recent Results (from the past 12 hour(s)) URINALYSIS W/MICROSCOPIC Collection Time: 01/07/19  4:02 AM  
Result Value Ref Range Color YELLOW/STRAW Appearance CLOUDY (A) CLEAR Specific gravity 1.016 1.003 - 1.030    
 pH (UA) 5.0 5.0 - 8.0 Protein TRACE (A) NEG mg/dL Glucose 250 (A) NEG mg/dL Ketone NEGATIVE  NEG mg/dL Bilirubin NEGATIVE  NEG Blood SMALL (A) NEG Urobilinogen 1.0 0.2 - 1.0 EU/dL Nitrites NEGATIVE  NEG Leukocyte Esterase NEGATIVE  NEG    
 WBC 0-4 0 - 4 /hpf  
 RBC 5-10 0 - 5 /hpf Epithelial cells FEW FEW /lpf Bacteria NEGATIVE  NEG /hpf Hyaline cast 0-2 0 - 5 /lpf SAMPLES BEING HELD Collection Time: 01/07/19  4:02 AM  
Result Value Ref Range SAMPLES BEING HELD UC HOLD COMMENT   Add-on orders for these samples will be processed based on acceptable specimen integrity and analyte stability, which may vary by analyte. GLUCOSE, POC Collection Time: 01/07/19  7:06 AM  
Result Value Ref Range Glucose (POC) 214 (H) 65 - 100 mg/dL Performed by Deboraha Simmonds   
TROPONIN I Collection Time: 01/07/19  9:36 AM  
Result Value Ref Range Troponin-I, Qt. 0.05 (H) <0.05 ng/mL GLUCOSE, POC Collection Time: 01/07/19 11:10 AM  
Result Value Ref Range Glucose (POC) 170 (H) 65 - 100 mg/dL Performed by [facility] - No New Imaging Hospital Problems: 
Hospital Problems  Date Reviewed: 11/29/2018 Codes Class Noted POA * (Principal) COPD with acute exacerbation ([facility] [address] ICD-10-CM: J44.1 ICD-9-CM: 491.21  1/6/2019 Unknown Anemia ICD-10-CM: D64.9 ICD-9-CM: 285.9  10/28/2018 Yes Hyperlipidemia ICD-10-CM: E78.5 ICD-9-CM: 272.4  10/26/2018 Yes Chronic back pain (Chronic) ICD-10-CM: M54.9, G89.29 ICD-9-CM: 724.5, 338.29  10/26/2018 Yes History of GI bleed ICD-10-CM: Z87.19 ICD-9-CM: V12.79 Present on Admission 11/25/2016 Yes Diastolic CHF, chronic (HCC) ICD-10-CM: I50.32 
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  
  Tissue MVR 1989 following failed balloon valvuloplasty for MS Redo MVR 2004 due to severe MR, AVR due to AR ([facility]) CAD (coronary artery disease), native coronary artery ICD-10-CM: I25.10 ICD-9-CM: 414.01  1/8/2015 Yes S/P MVR (mitral valve replacement) ICD-10-CM: O31.9 ICD-9-CM: V43.3  1/8/2015 Yes

## 2019-01-07 NOTE — PROGRESS NOTES
Call to the ICU to request assistance with ultrasound guided IV access due to the fact that the right upper arm PIV was causing the patient extreme pain. IV easily flushed and blood return noted but patient yelling out and stating that the IV is burning. IV removed. Call to the [facility] residents to discuss the fact that the patient does not have IV access and if they would like to attempt to obtain a CVL access with IR, Dr. Danna Baca agreed. Call to IR and they stated that the IR physician has left for the day. Call to anaesthesia to request assistance with CVL access insertion and spoke with Dr. Danna Baca, he stated that there wasn't an anaesthesiologist available at this time due to other emergent needs in the hospital. Second call out to the ICU an spoke to [place] who stated that she would again pass the need on to the charge nurse. Call back to [facility] residents to notify them of the delay in care with the patients heparin gtt. Dr. Tory Rojas notified and instructed us to continue to attempt to obtain IV access. [id number] Nurse from the OR/PACU came up to the unit to assist with IV Insertion and was successful in insertion of 20g to right wrist. 
 
1830 Received call From Jessica Gan NP for GI and received new orders for NPO after midnight for a capsule study. [address] [place],9D residents notified that patient has a 20g IV access and labs drawn and sent. Patient for PICC line in AM.

## 2019-01-07 NOTE — CONSULTS
[facility]. [facility] Ute Bermudez NP 
[phone] GASTROENTEROLOGY CONSULTATION NOTE   
 
 
 
 
NAME:  Lilo Delgado :   1955 MRN:   [id number] Referring Physician:  
[id number] Medicine. Consult Date:  
2019 2:35 PM 
 
Chief Complaint:   
Anemia, Odynophagia History of Present Illness:   
Patient is a 66 y.o. who we were asked to see in consultation for the above complaint. The patient presented to the ER complaining of 2 days of worsening shortness of breath. ER work up was significant for hemoglobin of 6.7. She denies abdominal pain, bloody and black stools, and heartburn. She had been having odynophagia which has been persisting since her last hospitalization in . PMH: 
Past Medical History:  
Diagnosis Date  A-fib ([address]  Anticoagulant long-term use  CAD (coronary artery disease), native coronary artery   
 mild to moderate by cath  CHF (congestive heart failure) ([facility] [address]  Chronic obstructive pulmonary disease ([facility] [address]  Dyslipidemia  History of vascular access device 10/26/2018 [facility] VAT - 4 FR Midline, 9 cm, R basilic, for DIVA  Ill-defined condition   
 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 
Past Surgical History:  
Procedure Laterality Date  COLONOSCOPY N/A 2016 COLONOSCOPY performed by Sharif Mcdermott [facility]  HX HEART CATHETERIZATION    
 cabg  HX HYSTERECTOMY    
 BSO  
 HX MITRAL VALVE REPLACEMENT    
 and redo MVR and AVR Allergies: Allergies Allergen Reactions  Spiriva Respimat [Tiotropium Bromide] Anaphylaxis and Other (comments) Adverse effects; Per RN - pt states that Spiriva caused throat swelling and could not breathe well.  Spiriva Respimat [Tiotropium Bromide] Shortness of Breath  Tomato Shortness of Breath Only occurs with raw tomatoes, tolerates ketchup without issue  Celebrex [Celecoxib] Rash  Celebrex [Celecoxib] Rash  Rocephin [Ceftriaxone] Shortness of Breath  Tomato Rash Home Medications: 
Prior to Admission Medications Prescriptions Last Dose Informant Patient Reported? Taking? albuterol (VENTOLIN HFA) 90 mcg/actuation inhaler 1/6/2019 at Unknown time Self No Yes Sig: Take 2 Puffs by inhalation every four (4) hours as needed for Wheezing or Shortness of Breath. bumetanide (BUMEX) 1 mg tablet 1/5/2019 at PM Self No Yes Sig: Take 1 Tab by mouth every evening. dilTIAZem (CARDIZEM) 60 mg tablet 1/6/2019 at 1700 Self No Yes Sig: TAKE 1 TABLET BEFORE BREAKFAST, LUNCH, DINNER AND AT BEDTIME  
docusate sodium (COLACE) 100 mg capsule 1/5/2019 at Unknown time Self Yes Yes Sig: Take 100 mg by mouth every evening. fluticasone-umeclidin-vilanter (TRELEGY ELLIPTA) 100-62.5-25 mcg dsdv 1/6/2019 at 1600 Self No Yes Sig: Take 1 Puff by inhalation daily. lisinopril (PRINIVIL, ZESTRIL) 5 mg tablet 1/5/2019 at Unknown time Self No Yes Sig: Take 1 Tab by mouth daily. lovastatin (MEVACOR) 10 mg tablet 1/5/2019 at 1600 Self Yes Yes Sig: Take 10 mg by mouth nightly. oxyCODONE-acetaminophen (PERCOCET) 5-325 mg per tablet 1/6/2019 at Unknown time Self No Yes Sig: Take 1 Tab by mouth every eight (8) hours as needed for Pain. Max Daily Amount: 3 Tabs. Must last 30 days fill 12/27/18  
potassium chloride SR (KLOR-CON 10) 10 mEq tablet 1/6/2019 at Unknown time Self Yes Yes Sig: Take 10 mEq by mouth daily. predniSONE (STERAPRED DS) 10 mg dose pack  Self Yes Yes Sig: Take  by mouth See Admin Instructions. See administration instruction per 10mg dose pack roflumilast (DALIRESP) tab tablet 1/6/2019 at Unknown time Self Yes Yes Sig: Take 500 mcg by mouth daily. warfarin (COUMADIN) 1 mg tablet 1/5/2019 at 16 Self Yes Yes Sig: Take  by mouth six (6) days a week. Warfarin 3 mg on Sa/Su/Mo/Tu/We/Th and Warfarin 2 mg on Fr Facility-Administered Medications: None Hospital Medications: 
Current Facility-Administered Medications Medication Dose Route Frequency  oxyCODONE-acetaminophen (PERCOCET) 5-325 mg per tablet 1 Tab  1 Tab Oral Q8H PRN  
 warfarin (COUMADIN) tablet 5 mg  5 mg Oral ONCE  hydrOXYzine HCl (ATARAX) tablet 25 mg  25 mg Oral TID PRN  
 doxycycline (VIBRAMYCIN) 100 mg in 0.9% sodium chloride (MBP/ADV) 100 mL  100 mg IntraVENous Q12H  
 sodium chloride (NS) flush 5-10 mL  5-10 mL IntraVENous Q8H  
 sodium chloride (NS) flush 5-10 mL  5-10 mL IntraVENous PRN  
 docusate sodium (COLACE) capsule 100 mg  100 mg Oral BID  nicotine (NICODERM CQ) 21 mg/24 hr patch 1 Patch  1 Patch TransDERmal Q24H  
 fluticasone-umeclidin-vilanter 100-62.5-25 mcg dsdv 1 Puff  (Patient Supplied)  1 Puff Inhalation DAILY  lovastatin (MEVACOR) tablet 10 mg  10 mg Oral QHS  roflumilast (DALIRESP) tablet 500 mcg  500 mcg Oral DAILY  0.9% sodium chloride infusion  50 mL/hr IntraVENous PRN  
 Warfarin - Pharmacist dosing   Other PRN  
 methylPREDNISolone (PF) (SOLU-MEDROL) injection 80 mg  80 mg IntraVENous Q8H  
 albuterol (ACCUNEB) nebulizer solution 1.25 mg  1.25 mg Nebulization Q4H RT  
 pantoprazole (PROTONIX) 40 mg in sodium chloride 0.9% 10 mL injection  40 mg IntraVENous Q12H  
 insulin glargine (LANTUS) injection 5 Units  5 Units SubCUTAneous QHS  insulin lispro (HUMALOG) injection   SubCUTAneous QID WITH MEALS  glucose chewable tablet 16 g  4 Tab Oral PRN  
 dextrose (D50W) injection syrg 12.5-25 g  12.5-25 g IntraVENous PRN  
 glucagon (GLUCAGEN) injection 1 mg  1 mg IntraMUSCular PRN  
  dilTIAZem (CARDIZEM) IR tablet 60 mg  60 mg Oral AC&HS Social History: 
Social History Tobacco Use  Smoking status: Current Every Day Smoker Packs/day: 0.50 Types: Cigarettes Last attempt to quit: 11/1/2015 Years since quitting: 3.1  Smokeless tobacco: Never Used  Tobacco comment: 11/1/2014 Substance Use Topics  Alcohol use: No  
  Alcohol/week: 0.0 oz Family History: 
Family History Problem Relation Age of Onset  Cancer Brother Review of Systems: 
Constitutional: negative fever, negative chills, negative weight loss Eyes:   negative visual changes ENT:   negative sore throat, tongue or lip swelling Respiratory:  negative cough, negative dyspnea Cards:  negative for chest pain, palpitations, lower extremity edema GI:   See HPI 
:  negative for frequency, dysuria Integument:  negative for rash and pruritus Heme:  negative for easy bruising and gum/nose bleeding Musculoskel: negative for myalgias,  back pain and muscle weakness Neuro: negative for headaches, dizziness, vertigo Psych:  negative for feelings of anxiety, depression Objective:  
 
Patient Vitals for the past 8 hrs: 
 BP Temp Pulse Resp SpO2  
01/07/19 1425 98/65 98.1 °F (36.7 °C) 90 28 99 % 01/07/19 1300 103/56  84    
01/07/19 1200 119/74 97.8 °F (36.6 °C) 87  100 % 01/07/19 1100 110/71  98 27 99 % 01/07/19 1000 101/65  81 29 99 % 01/07/19 0900 111/62 98.4 °F (36.9 °C) 92 29 98 % 01/07/19 0832     96 % 01/07/19 0800 103/63  90 23 96 % 01/07/19 0701 111/71  100    
01/07/19 0700 111/71  99 30 95 % 01/07 0701 - 01/07 1900 In: 240 [P.O.:240] Out: 850 [Urine:850] 01/05 1901 - 01/07 0700 In: 1778 [P.O.:480; I.V.:2100] Out: 250 [Urine:250] EXAM:   
 NEURO-alert, oriented x3, affect appropriate HEENT-Head: Normocephalic, no lesions, without obvious abnormality. LUNGS-Coarse breath sounds bilaterally. COR-regular rate and rhythym ABD- soft, non-tender. Bowel sounds normal. No masses,  no organomegaly EXT-no edema Skin - No rash Data Review Recent Labs 01/07/19 
0125 01/06/19 
1439 WBC 32.5* 32.0* HGB 7.9* 6.7* HCT 25.6* 22.5*  
 371 Recent Labs 01/07/19 
0125 01/06/19 
1439 * 132* K 4.9 4.8  
CL 99 96* CO2 22 26 BUN 29* 26* CREA 1.12* 1.29* * 335* PHOS 4.9* 6.0*  
CA 8.0* 8.7 Recent Labs 01/07/19 
0125 SGOT 75* AP 72  
TP 6.1* ALB 3.2*  
GLOB 2.9 Recent Labs 01/07/19 
0125 01/06/19 
1439 INR 1.3* 1.2* PTP 13.1* 12.1* APTT  --  <20.0* Patient Active Problem List  
Diagnosis Code  Chest pain R07.9  COPD exacerbation ([facility] J44.1  Hypokalemia E87.6  JAZZMINE (acute kidney injury) ([facility] [address] N17.9  Hyperglycemia R73.9  A-fib ([facility] I48.91  
 Chronic anticoagulation Z79.01  
 Hyperlipidemia E78.5  Chronic back pain M54.9, G89.29  
 Anemia D64.9  
 GI bleed K92.2  
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  
 S/P MVR (mitral valve replacement) Z95.2  COPD (chronic obstructive pulmonary disease) ([facility]) J44.9  Tobacco abuse Z72.0  Diastolic CHF, chronic ([facility]) I50.32  Chronic atrial fibrillation ([facility]) I48.2  History of GI bleed Z87.19  
 H/O total hysterectomy Z90.710  
 Hypokalemia E87.6  Thrush of mouth and esophagus ([facility]) B37.81, B37.0  
 SOB (shortness of breath) R06.02  
 COPD with acute exacerbation ([facility] [address] J44.1 Assessment and Plan: Anemia:  FOBT negative. Hemoglobin 7.9 after one unit of PRBC. - Trend Hemoglobin and transfuse to goal of 7. 
- No plans for EGD / Colonoscopy at this time as she is in acute respiratory distress. Odynophagia:  Suspect she has esophageal candidiasis from her steroid inhaler.   Would be hesitant to give her fluconazole as it may interfere with her warfarin and prolong her QT 
- Nystatin swish and swallow. - Consider barium esophagram and ID consult if symptoms worsen. Will continue to follow. Thanks for allowing me to participate in the care of this patient.  
Signed By: Fernanda Canales NP   
 1/7/2019  2:35 PM

## 2019-01-07 NOTE — TELEPHONE ENCOUNTER
----- Message from Nella Dorman sent at 1/5/2019  8:21 AM EST -----  Regarding: Dr. Yuki Mercado  Pt is requesting a callback from Dr. Cici Lloyd or nurse in reference to antibiotic (unknown reason of usage per pt). Stated the pharmacy will not fill due to medication will make pt bleed out. Requesting for different medication.     Best contact [phone]

## 2019-01-07 NOTE — CONSULTS
Ryan Boyd MD. [facility] Patient: Guadalupe Reynolds : 1955 Today's Date: 2019 HISTORY OF PRESENT ILLNESS:  
 
History of Present Illness: 
Reason for consult: MVR on Warfarin with a fib RVR Guadalupe Reynolds is a 66 y.o. female with hx of CAD, s/p MVR/AVR and a fib with RVR on Coumadin, CHF, COPD who presents to the ED via EMS complaining of worsening shortness of breath over the past 2-3 days. Patient says she just felt \"yucky\". She comes in with SOB, decreased appetite, anxiety. No chest pain or fevers. In the ED she was treated for sepsis with 2 NS boluses, Levaquin and COPD exacerbation. Cardiology asked to see for rapid Afib HR. By the time I see her this morning, HR is controlled on her PO cardizem. PAST MEDICAL HISTORY:  
 
Past Medical History:  
Diagnosis Date  A-fib ([facility]  Anticoagulant long-term use  CAD (coronary artery disease), native coronary artery   
 mild to moderate by cath  CHF (congestive heart failure) ([address]  Chronic obstructive pulmonary disease ([facility]  Dyslipidemia  History of vascular access device 10/26/2018 [facility] VAT - 4 FR Midline, 9 cm, R basilic, for DIVA  Ill-defined condition   
 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  
Procedure Laterality Date  COLONOSCOPY N/A 2016 COLONOSCOPY performed by Mika Duncan MD at [facility]  HX HEART CATHETERIZATION    
 cabg  HX HYSTERECTOMY    
 BSO  
 HX MITRAL VALVE REPLACEMENT    
 and redo MVR and AVR MEDICATIONS:  
 
 
Prior to Admission Medications Prescriptions Last Dose Informant Patient Reported? Taking? albuterol (VENTOLIN HFA) 90 mcg/actuation inhaler 1/6/2019 at Unknown time Self No Yes Sig: Take 2 Puffs by inhalation every four (4) hours as needed for Wheezing or Shortness of Breath. bumetanide (BUMEX) 1 mg tablet 1/5/2019 at PM Self No Yes Sig: Take 1 Tab by mouth every evening. dilTIAZem (CARDIZEM) 60 mg tablet 1/6/2019 at 1700 Self No Yes Sig: TAKE 1 TABLET BEFORE BREAKFAST, LUNCH, DINNER AND AT BEDTIME  
docusate sodium (COLACE) 100 mg capsule 1/5/2019 at Unknown time Self Yes Yes Sig: Take 100 mg by mouth every evening. fluticasone-umeclidin-vilanter (TRELEGY ELLIPTA) 100-62.5-25 mcg dsdv 1/6/2019 at 1600 Self No Yes Sig: Take 1 Puff by inhalation daily. lisinopril (PRINIVIL, ZESTRIL) 5 mg tablet 1/5/2019 at Unknown time Self No Yes Sig: Take 1 Tab by mouth daily. lovastatin (MEVACOR) 10 mg tablet 1/5/2019 at 1600 Self Yes Yes Sig: Take 10 mg by mouth nightly. oxyCODONE-acetaminophen (PERCOCET) 5-325 mg per tablet 1/6/2019 at Unknown time Self No Yes Sig: Take 1 Tab by mouth every eight (8) hours as needed for Pain. Max Daily Amount: 3 Tabs. Must last 30 days fill 12/27/18  
potassium chloride SR (KLOR-CON 10) 10 mEq tablet 1/6/2019 at Unknown time Self Yes Yes Sig: Take 10 mEq by mouth daily. predniSONE (STERAPRED DS) 10 mg dose pack   Self Yes Yes Sig: Take  by mouth See Admin Instructions. See administration instruction per 10mg dose pack  
roflumilast (DALIRESP) tab tablet 1/6/2019 at Unknown time Self Yes Yes Sig: Take 500 mcg by mouth daily. warfarin (COUMADIN) 1 mg tablet 1/5/2019 at 16 Self Yes Yes Sig: Take  by mouth six (6) days a week. Warfarin 3 mg on Sa/Alfonso/Mo/Tu/We/Th and Warfarin 2 mg on Fr Allergies Allergen Reactions  Spiriva Respimat [Tiotropium Bromide] Anaphylaxis and Other (comments) Adverse effects; Per RN - pt states that Spiriva caused throat swelling and could not breathe well.  Spiriva Respimat [Tiotropium Bromide] Shortness of Breath  Tomato Shortness of Breath Only occurs with raw tomatoes, tolerates ketchup without issue  Celebrex [Celecoxib] Rash  Celebrex [Celecoxib] Rash  Rocephin [Ceftriaxone] Shortness of Breath  Tomato Rash SOCIAL HISTORY:  
 
Social History Tobacco Use  Smoking status: Current Every Day Smoker Packs/day: 0.50 Types: Cigarettes Last attempt to quit: 11/1/2015 Years since quitting: 3.1  Smokeless tobacco: Never Used  Tobacco comment: 11/1/2014 Substance Use Topics  Alcohol use: No  
  Alcohol/week: 0.0 oz  Drug use: No  
 
 
 
FAMILY HISTORY:  
 
Family History Problem Relation Age of Onset  Cancer Brother REVIEW OF SYMPTOMS:  
 
Review of Symptoms: 
Constitutional: Negative for fever HEENT: Negative for nosebleeds, tinnitus Respiratory:+ SOB, wheezes Cardiovascular: Negative for  Syncope Gastrointestinal: Negative for   diarrhea, melena. Genitourinary: Negative for dysuria Musculoskeletal: Negative for myalgias. Skin: Negative for rash Heme: No problems bleeding. Neurological: Negative for speech change and focal weakness. PHYSICAL EXAM:  
 
Physical Exam: 
Visit Vitals /71 Pulse 98 Temp 98.4 °F (36.9 °C) Resp 27 SpO2 99% Patient in mild to moderate distress - sitting up to breath HEENT:  Hearing intact, non-icteric, normocephalic, atraumatic. Neck Exam: Supple Lung Exam: Clear with decreased air movement, no crackles Cardiac Exam: irregularly irregular with normal valve clicks. Abdomen: Soft, non-tender Extremities: Moves all ext well. No lower extremity edema. Psych: Appropriate affect Neuro - Grossly intact LABS / OTHER STUDIES:  
 
 
Recent Results (from the past 24 hour(s)) EKG, 12 LEAD, INITIAL Collection Time: 01/06/19  1:59 PM  
Result Value Ref Range  Ventricular Rate 126 BPM  
 Atrial Rate 131 BPM  
 QRS Duration 72 ms Q-T Interval 310 ms QTC Calculation (Bezet) 448 ms Calculated R Axis 79 degrees Calculated T Axis 114 degrees Diagnosis Atrial fibrillation with rapid ventricular response Nonspecific ST and T wave abnormality Abnormal ECG When compared with ECG of 25-OCT-2018 18:23, 
Nonspecific T wave abnormality now evident in Inferior leads Nonspecific T wave abnormality, worse in Anterolateral leads Confirmed by Charity Paulino MD. ([id number]) on 1/7/2019 8:23:03 AM 
  
BLOOD GAS, ARTERIAL Collection Time: 01/06/19  2:05 PM  
Result Value Ref Range pH 7.31 (L) 7.35 - 7.45    
 PCO2 48 (H) 35.0 - 45.0 mmHg PO2 78 (L) 80 - 100 mmHg O2 SAT 94 92 - 97 % BICARBONATE 24 22 - 26 mmol/L  
 BASE DEFICIT 2.9 mmol/L  
 O2 METHOD NASAL O2    
 O2 FLOW RATE 6.00 L/min Sample source ARTERIAL    
 SITE LEFT RADIAL TEE'S TEST YES    
SAMPLES BEING HELD Collection Time: 01/06/19  2:39 PM  
Result Value Ref Range SAMPLES BEING HELD SST,GLENDA   
 COMMENT Add-on orders for these samples will be processed based on acceptable specimen integrity and analyte stability, which may vary by analyte. CBC WITH AUTOMATED DIFF Collection Time: 01/06/19  2:39 PM  
Result Value Ref Range WBC 32.0 (H) 3.6 - 11.0 K/uL  
 RBC 2.41 (L) 3.80 - 5.20 M/uL HGB 6.7 (L) 11.5 - 16.0 g/dL HCT 22.5 (L) 35.0 - 47.0 % MCV 93.4 80.0 - 99.0 FL  
 MCH 27.8 26.0 - 34.0 PG  
 MCHC 29.8 (L) 30.0 - 36.5 g/dL  
 RDW 18.2 (H) 11.5 - 14.5 % PLATELET 243 446 - 514 K/uL MPV 12.6 8.9 - 12.9 FL  
 NRBC 0.1 (H) 0  WBC ABSOLUTE NRBC 0.03 (H) 0.00 - 0.01 K/uL NEUTROPHILS 91 (H) 32 - 75 % BAND NEUTROPHILS 1 0 - 6 % LYMPHOCYTES 1 (L) 12 - 49 % MONOCYTES 5 5 - 13 % EOSINOPHILS 0 0 - 7 % BASOPHILS 0 0 - 1 % METAMYELOCYTES 2 (H) 0 % IMMATURE GRANULOCYTES 0 %  
 ABS. NEUTROPHILS 29.4 (H) 1.8 - 8.0 K/UL  
 ABS. LYMPHOCYTES 0.3 (L) 0.8 - 3.5 K/UL ABS. MONOCYTES 1.6 (H) 0.0 - 1.0 K/UL  
 ABS. EOSINOPHILS 0.0 0.0 - 0.4 K/UL  
 ABS. BASOPHILS 0.0 0.0 - 0.1 K/UL  
 ABS. IMM. GRANS. 0.0 K/UL  
 DF MANUAL    
 RBC COMMENTS ANISOCYTOSIS 1+ 
    
 RBC COMMENTS MICROCYTOSIS 1+ 
    
 RBC COMMENTS POIKILOCYTOSIS 1+ 
    
 RBC COMMENTS SCHISTOCYTES 1+ RBC COMMENTS POLYCHROMASIA 1+ METABOLIC PANEL, BASIC Collection Time: 01/06/19  2:39 PM  
Result Value Ref Range Sodium 132 (L) 136 - 145 mmol/L Potassium 4.8 3.5 - 5.1 mmol/L Chloride 96 (L) 97 - 108 mmol/L  
 CO2 26 21 - 32 mmol/L Anion gap 10 5 - 15 mmol/L Glucose 335 (H) 65 - 100 mg/dL BUN 26 (H) 6 - 20 MG/DL Creatinine 1.29 (H) 0.55 - 1.02 MG/DL  
 BUN/Creatinine ratio 20 12 - 20 GFR est AA 51 (L) >60 ml/min/1.73m2 GFR est non-AA 42 (L) >60 ml/min/1.73m2 Calcium 8.7 8.5 - 10.1 MG/DL  
PTT Collection Time: 01/06/19  2:39 PM  
Result Value Ref Range aPTT <20.0 (L) 22.1 - 32.0 sec  
 aPTT, therapeutic range     58.0 - 77.0 SECS  
MAGNESIUM Collection Time: 01/06/19  2:39 PM  
Result Value Ref Range Magnesium 2.1 1.6 - 2.4 mg/dL PHOSPHORUS Collection Time: 01/06/19  2:39 PM  
Result Value Ref Range Phosphorus 6.0 (H) 2.6 - 4.7 MG/DL  
NT-PRO BNP Collection Time: 01/06/19  2:39 PM  
Result Value Ref Range NT pro- (H) 0 - 125 PG/ML  
PROTHROMBIN TIME + INR Collection Time: 01/06/19  2:39 PM  
Result Value Ref Range INR 1.2 (H) 0.9 - 1.1 Prothrombin time 12.1 (H) 9.0 - 11.1 sec HEMOGLOBIN A1C WITH EAG Collection Time: 01/06/19  2:39 PM  
Result Value Ref Range Hemoglobin A1c <3.5 (L) 4.2 - 6.3 % Est. average glucose Cannot be calculated mg/dL CULTURE, BLOOD, PAIRED Collection Time: 01/06/19  4:14 PM  
Result Value Ref Range Special Requests: NO SPECIAL REQUESTS Culture result: NO GROWTH AFTER 14 HOURS OCCULT BLOOD, STOOL Collection Time: 01/06/19  4:14 PM  
Result Value Ref Range Occult blood, stool NEGATIVE  NEG    
TYPE + CROSSMATCH Collection Time: 01/06/19  4:14 PM  
Result Value Ref Range Crossmatch Expiration 01/09/2019 ABO/Rh(D) O POSITIVE Antibody screen NEG Unit number [id number] Blood component type RC LR,1 Unit division 00 Status of unit TRANSFUSED Crossmatch result Compatible Unit number [id number] Blood component type RC LR Unit division 00 Status of unit ALLOCATED Crossmatch result Compatible POC TROPONIN-I Collection Time: 01/06/19  4:16 PM  
Result Value Ref Range Troponin-I (POC) 0.10 (H) 0.00 - 0.08 ng/mL POC LACTIC ACID Collection Time: 01/06/19  4:26 PM  
Result Value Ref Range Lactic Acid (POC) 2.80 (HH) 0.40 - 2.00 mmol/L  
RESPIRATORY PANEL,PCR,NASOPHARYNGEAL Collection Time: 01/06/19  5:53 PM  
Result Value Ref Range Adenovirus NOT DETECTED NOTD Coronavirus 229E NOT DETECTED NOTD Coronavirus HKU1 NOT DETECTED NOTD Coronavirus CVNL63 NOT DETECTED NOTD Coronavirus OC43 NOT DETECTED NOTD Metapneumovirus NOT DETECTED NOTD Rhinovirus and Enterovirus NOT DETECTED NOTD Influenza A NOT DETECTED NOTD Influenza A, subtype H1 NOT DETECTED NOTD Influenza A, subtype H3 NOT DETECTED NOTD INFLUENZA A H1N1 PCR NOT DETECTED NOTD Influenza B NOT DETECTED NOTD Parainfluenza 1 NOT DETECTED NOTD Parainfluenza 2 NOT DETECTED NOTD Parainfluenza 3 NOT DETECTED NOTD Parainfluenza virus 4 NOT DETECTED NOTD    
 RSV by PCR NOT DETECTED NOTD Bordetella pertussis - PCR NOT DETECTED NOTD Chlamydophila pneumoniae DNA, QL, PCR NOT DETECTED NOTD Mycoplasma pneumoniae DNA, QL, PCR NOT DETECTED NOTD POC LACTIC ACID Collection Time: 01/06/19  7:29 PM  
Result Value Ref Range Lactic Acid (POC) 0.81 0.40 - 2.00 mmol/L  
GLUCOSE, POC Collection Time: 01/06/19 11:41 PM  
Result Value Ref Range Glucose (POC) 234 (H) 65 - 100 mg/dL Performed by [place] METABOLIC PANEL, COMPREHENSIVE Collection Time: 01/07/19  1:25 AM  
Result Value Ref Range Sodium 134 (L) 136 - 145 mmol/L Potassium 4.9 3.5 - 5.1 mmol/L Chloride 99 97 - 108 mmol/L  
 CO2 22 21 - 32 mmol/L Anion gap 13 5 - 15 mmol/L Glucose 183 (H) 65 - 100 mg/dL BUN 29 (H) 6 - 20 MG/DL Creatinine 1.12 (H) 0.55 - 1.02 MG/DL  
 BUN/Creatinine ratio 26 (H) 12 - 20 GFR est AA 60 (L) >60 ml/min/1.73m2 GFR est non-AA 49 (L) >60 ml/min/1.73m2 Calcium 8.0 (L) 8.5 - 10.1 MG/DL Bilirubin, total 1.7 (H) 0.2 - 1.0 MG/DL  
 ALT (SGPT) 47 12 - 78 U/L  
 AST (SGOT) 75 (H) 15 - 37 U/L Alk. phosphatase 72 45 - 117 U/L Protein, total 6.1 (L) 6.4 - 8.2 g/dL Albumin 3.2 (L) 3.5 - 5.0 g/dL Globulin 2.9 2.0 - 4.0 g/dL A-G Ratio 1.1 1.1 - 2.2    
CBC WITH AUTOMATED DIFF Collection Time: 01/07/19  1:25 AM  
Result Value Ref Range WBC 32.5 (H) 3.6 - 11.0 K/uL  
 RBC 2.77 (L) 3.80 - 5.20 M/uL HGB 7.9 (L) 11.5 - 16.0 g/dL HCT 25.6 (L) 35.0 - 47.0 % MCV 92.4 80.0 - 99.0 FL  
 MCH 28.5 26.0 - 34.0 PG  
 MCHC 30.9 30.0 - 36.5 g/dL  
 RDW 16.4 (H) 11.5 - 14.5 % PLATELET 116 008 - 622 K/uL MPV 12.6 8.9 - 12.9 FL  
 NRBC 0.1 (H) 0  WBC ABSOLUTE NRBC 0.03 (H) 0.00 - 0.01 K/uL NEUTROPHILS 92 (H) 32 - 75 % LYMPHOCYTES 1 (L) 12 - 49 % MONOCYTES 7 5 - 13 % EOSINOPHILS 0 0 - 7 % BASOPHILS 0 0 - 1 % IMMATURE GRANULOCYTES 0 %  
 ABS. NEUTROPHILS 29.9 (H) 1.8 - 8.0 K/UL  
 ABS. LYMPHOCYTES 0.3 (L) 0.8 - 3.5 K/UL  
 ABS. MONOCYTES 2.3 (H) 0.0 - 1.0 K/UL  
 ABS. EOSINOPHILS 0.0 0.0 - 0.4 K/UL  
 ABS. BASOPHILS 0.0 0.0 - 0.1 K/UL  
 ABS. IMM. GRANS. 0.0 K/UL  
 DF MANUAL    
 RBC COMMENTS POLYCHROMASIA PRESENT 
    
 RBC COMMENTS ANISOCYTOSIS 
2+ PROTHROMBIN TIME + INR Collection Time: 01/07/19  1:25 AM  
Result Value Ref Range INR 1.3 (H) 0.9 - 1.1 Prothrombin time 13.1 (H) 9.0 - 11.1 sec TROPONIN I Collection Time: 01/07/19  1:25 AM  
Result Value Ref Range Troponin-I, Qt. 0.06 (H) <0.05 ng/mL MAGNESIUM Collection Time: 01/07/19  1:25 AM  
Result Value Ref Range Magnesium 1.8 1.6 - 2.4 mg/dL PHOSPHORUS Collection Time: 01/07/19  1:25 AM  
Result Value Ref Range Phosphorus 4.9 (H) 2.6 - 4.7 MG/DL URINALYSIS W/MICROSCOPIC Collection Time: 01/07/19  4:02 AM  
Result Value Ref Range Color YELLOW/STRAW Appearance CLOUDY (A) CLEAR Specific gravity 1.016 1.003 - 1.030    
 pH (UA) 5.0 5.0 - 8.0 Protein TRACE (A) NEG mg/dL Glucose 250 (A) NEG mg/dL Ketone NEGATIVE  NEG mg/dL Bilirubin NEGATIVE  NEG Blood SMALL (A) NEG Urobilinogen 1.0 0.2 - 1.0 EU/dL Nitrites NEGATIVE  NEG Leukocyte Esterase NEGATIVE  NEG    
 WBC 0-4 0 - 4 /hpf  
 RBC 5-10 0 - 5 /hpf Epithelial cells FEW FEW /lpf Bacteria NEGATIVE  NEG /hpf Hyaline cast 0-2 0 - 5 /lpf SAMPLES BEING HELD Collection Time: 01/07/19  4:02 AM  
Result Value Ref Range SAMPLES BEING HELD UC HOLD COMMENT Add-on orders for these samples will be processed based on acceptable specimen integrity and analyte stability, which may vary by analyte. GLUCOSE, POC Collection Time: 01/07/19  7:06 AM  
Result Value Ref Range Glucose (POC) 214 (H) 65 - 100 mg/dL Performed by Cass Andres   
TROPONIN I Collection Time: 01/07/19  9:36 AM  
Result Value Ref Range Troponin-I, Qt. 0.05 (H) <0.05 ng/mL GLUCOSE, POC Collection Time: 01/07/19 11:10 AM  
Result Value Ref Range Glucose (POC) 170 (H) 65 - 100 mg/dL Performed by Bibiana MOSS   
 
 
 
 
CARDIAC DIAGNOSTICS:  
 
Cardiac Evaluation Includes: 
 
ECHO 11/2004 - normal [facility] AVR/MVR, EF 50%, PA pressures nl ECHO 1/16/15-EF 55 % to 60 %, [facility], AVR/MVR, PA pressures normal 
STRESS: Dobutamine cardiolite 2/5/15 - normal perfusion ECHO 11/14/2015: EF 65%, no WMA, LAE, normal [facility] MVR/AVR AoV gradient 25 mmHg mean gradient 13 mmHg. ECHO 11/28/15: EF 65-70%, LAE, mechanical MVR- normal fn,mehanical AOV- normal fn, mild-mod TR 
ECHO 7/26/18: LVEF 55% No WMA, RV dilated, LA dilated. [facility] MV nml function, [facility] [place] prosthesis stable, physiologic rerurg. 
  
 
EKG 10/25/18 - Afib, NSST changes EKG 1/6/19 - Afib with RVR, NSST changes ASSESSMENT AND PLAN:  
 
Assessment and Plan: 
1) Afib with RVR 
- HR was fast in setting of acute illness (sepsis, COPD exacerbation)  
- HR is better now --> cont PO cardizem  
- cont OAC 2) S/P mechanical MVR/AVR:  
- cont warfarin  
- given mechanical valves INR goal 2.5-3.5. - Needs bridging anticoagulation (IV heparin) if INR below goal (as long as no contraindication) - Nml valve function by ECHO 10/28. 3) Chronic diastolic HF 
- She actually received IVF today for sepsis / JAZZMINE 
- consider resuming PO Bumex tomorrow if volume status is stable 4) COPD exacerbation / sepsis - per medicine team  
 
5) Dr. Tigre Cortes to follow. Evaristo Morgan MD, [facility] 9 [facility] [address] [facility], [address]  [place] [address] [address] Ph: [phone]   Ph [phone]

## 2019-01-07 NOTE — PROGRESS NOTES
Bedside and Verbal shift change report given to Jackie (oncoming nurse) by Dillon Carballo (offgoing nurse). Report included the following information SBAR, Kardex, MAR and Recent Results. Patient tachypneic, refusing bipap at this time, tripod posturing present. To be moved to hold bed ED16 shortly. RR 20-30's, all other vs stable. Patient asked if she smoked, she states \"I used to\". When asked when she stopped, \"last night\". HR up to 120's-130 upon ambulating, trends lower when at rest. 
 
0324  Oxygen weaned down to 3 liters. 4333  Patient mildly tremorous, complaining that she hasn't slept. Spoke with Dr. Zeenat Farmer of [facility], orders to follow. 12  Spoke with family practice about patient's request to get pain medication for her back. Bedside and Verbal shift change report given to Irinoe Farah (oncoming nurse) by Michael Cabrera (offgoing nurse). Report included the following information SBAR, Kardex, MAR and Recent Results.

## 2019-01-07 NOTE — ED NOTES
Verbal report given to Danielle(name) on Arnetta January being transferred to ED(unit) for routine progression of care Report consisted of patient's Situation, Background, Assessment and Recommendations (SBAR) Information from the following report(s)  SBAR, ED Summary, MAR, Recent Results and Cardiac Rhythm afib was reviewed with the receiving nurse. Opportunity for questions and clarification was provided. Last Filed Values: 
Temp: 97.6 °F (36.4 °C) (01/06/19 2010) Pulse (Heart Rate): (!) 112 (01/06/19 2010) Resp Rate: 21 (01/06/19 2010) O2 Sat (%): 95 % (01/06/19 2010) BP: 101/61 (01/06/19 2010) MAP (Monitor): 72 (01/06/19 2010) MAP (Calculated): 74 (01/06/19 2010) Level of Consciousness: Alert (01/06/19 2010) Lab Results Component Value Date/Time WBC 32.0 (H) 01/06/2019 02:39 PM  
 
 
Repeat LA:  Time Due n/a (second lactic is negative) Blood Cultures Drawn:  yes Fluid Resuscitation:  Total needed 2000 ml, Status completed, amount 2000 All Antibiotics Started:  yes, Dose Due see MAR 
 
VS x 2 post-fluid resuscitation:   yes Vasopressor Infusion:  no please re-assess, pt may need vasopressors if remains hypotensive Provider Reassessment needed and notified:  yes , Due (FP just re-evaluated pt) Additional Interventions/Comments:  Second lactic 0.8

## 2019-01-07 NOTE — DIABETES MGMT
DTC Progress Note Recommendations/ Comments: Chart reviewed due to hyperglycemia likely due to steroids. Pt is on methylprednisolone 80 mg every 8 hour. If appropriate, please consider: 1. Changing correction scale to normal sensitivity while pt is on steroids 2. Increasing Lantus to 10 units Current hospital DM medication: correction scale Humalog with high sensitivity and Lantus 5 units. Chart reviewed on Kimmie Orozco. Patient is a 66 y.o. female with no history of diabetes. A1c:  
Lab Results Component Value Date/Time Hemoglobin A1c <3.5 (L) 01/06/2019 02:39 PM  
 Hemoglobin A1c <3.5 (L) 10/25/2018 07:40 AM  
 
 
Recent Glucose Results:  
Lab Results Component Value Date/Time  (H) 01/07/2019 01:25 AM  
  (H) 01/06/2019 02:39 PM  
 GLUCPOC 170 (H) 01/07/2019 11:10 AM  
 GLUCPOC 214 (H) 01/07/2019 07:06 AM  
 GLUCPOC 234 (H) 01/06/2019 11:41 PM  
  
 
Lab Results Component Value Date/Time Creatinine 1.12 (H) 01/07/2019 01:25 AM  
 
CrCl cannot be calculated (Unknown ideal weight. ). Active Orders Diet DIET NPO  
  
 
PO intake: No data found. Will continue to follow as needed. Thank you Jerry Bonilla RD, CDE Time spent: 4 minutes

## 2019-01-07 NOTE — ROUTINE PROCESS
TRANSFER - OUT REPORT: 
 
Verbal report given to Debra on Arnetta January  being transferred to ED bed for routine progression of care Report consisted of patients Situation, Background, Assessment and  
Recommendations(SBAR). Information from the following report(s) SBAR, ED Summary and MAR was reviewed with the receiving nurse. Lines:  
Peripheral IV 01/06/19 Right; Inner Wrist (Active) Site Assessment Clean, dry, & intact 1/6/2019  2:09 PM  
Phlebitis Assessment 0 1/6/2019  2:09 PM  
Infiltration Assessment 0 1/6/2019  2:09 PM  
Dressing Status Clean, dry, & intact 1/6/2019  2:09 PM  
Dressing Type Tape;Transparent 1/6/2019  2:09 PM  
Hub Color/Line Status Blue;Flushed;Patent 1/6/2019  2:09 PM  
Alcohol Cap Used Yes 1/6/2019  2:09 PM  
   
Peripheral IV 01/06/19 Right;Upper Arm (Active) Site Assessment Clean, dry, & intact 1/6/2019  4:13 PM  
Phlebitis Assessment 0 1/6/2019  4:13 PM  
Infiltration Assessment 0 1/6/2019  4:13 PM  
Dressing Status Clean, dry, & intact 1/6/2019  4:13 PM  
Dressing Type Transparent 1/6/2019  4:13 PM  
Hub Color/Line Status Pink 1/6/2019  4:13 PM  
  
 
Opportunity for questions and clarification was provided. Patient transported with: 
 Registered Nurse

## 2019-01-07 NOTE — PROGRESS NOTES
1/7/2019 
9:58 AM 
Case Management note Met with patient to discuss discharge planning. Confirmed patient demographics. Patient lives in [address] with a total of 16 steps. Patient lives with . Her daughter and family also live in home. Patient has a walker but does not use often. Lately patient has not been performing some of ADL's due to fear of falling. Patient uses oxygen 24/7 with Alessia Escoto. [facility] on [address] for RX needs. No advance directive Dr. Ray Woody for medical management. Sending referral to [facility] for pulmonary rehab Patient has been receiving HH with iain moise Reason for Admission:   COPD 
               
RRAT Score:     18 Do you (patient/family) have any concerns for transition/discharge? Unable to care for self Plan for utilizing home health:     hh vs snf vs IPR Likelihood of readmission? Moderate/yellow Transition of Care Plan:     hh vs snf vs IPR Care Management Interventions PCP Verified by CM: Yes(dr. Sherrie larios) Mode of Transport at Discharge: Self Transition of Care Consult (CM Consult): Discharge Planning Current Support Network: Lives with Spouse Confirm Follow Up Transport: Family Plan discussed with Pt/Family/Caregiver: Yes Discharge Location Discharge Placement: Rehab hospital/unit acute Maryanne Luu, Sia N Derian Greco

## 2019-01-07 NOTE — ED NOTES
Talked to [facility] about pt's continuing hypotension.  FP will be moved down to evaluate pt and will consult intensivist.

## 2019-01-08 NOTE — PROGRESS NOTES
Cardiology Progress Note [address] [facility], [address]Nickie KangVian Ronnie Nw Phone [phone]; Fax [phone] 
 
 
 
2019 11:46 AM  
 
Admit Date:           2019 Admit Diagnosis:  COPD with acute exacerbation ([facility] [address] :          1955 MRN:          [id number] ASSESSMENT/RECOMMENDATION:  
Afib with RVR: goal HR <110, HR driven by anemia/sepsis/COPD 
- HR is better now --> cont PO cardizem - switch to CD tomorrow  
- on heparin gtt, while having GI work up  
  
S/P mechanical MVR/AVR:  
- holding coumadin until GI work up in complete - heparin gtt in the interim, can restart coumadin when safe per GI  
- given mechanical valves INR goal 2.5-3.5. - Nml valve function by ECHO 10/28.  
  
Chronic diastolic HF 
- She actually received IVF today for sepsis / JAZZMINE 
- will resume PTA bumex when needed 
  
COPD exacerbation / sepsis - per medicine team  
  
Anemia/GI bleed? H/H 6.. Capsule study tomorrow morning .  
-occult blood negative No intake/output data recorded. Last 3 Recorded Weights in this Encounter 19 1505 19 0309 Weight: 129 lb (58.5 kg) 133 lb 3.2 oz (60.4 kg)  1901 -  0700 In: 720 [P.O.:720] Out: 1100 [Urine:1100] SUBJECTIVE Guadalupe Phlegm denies  beat, SOB, chest pain or LE edema. 
+fatigue No lightheadedness or dizziness 
+palpitations, irregular heart at times Current Facility-Administered Medications Medication Dose Route Frequency  albuterol (ACCUNEB) nebulizer solution 1.25 mg  1.25 mg Nebulization Q6H RT  
 budesonide (PULMICORT) 500 mcg/2 ml nebulizer suspension  500 mcg Nebulization BID RT  
 arformoterol (BROVANA) neb solution 15 mcg  15 mcg Nebulization BID RT  
  insulin glargine (LANTUS) injection 10 Units  10 Units SubCUTAneous QHS  alteplase (CATHFLO) 1 mg in sterile water (preservative free) 1 mL injection  1 mg InterCATHeter PRN  
 sodium chloride (NS) flush 10-30 mL  10-30 mL InterCATHeter PRN  
 sodium chloride (NS) flush 10 mL  10 mL InterCATHeter Q24H  
 sodium chloride (NS) flush 10 mL  10 mL InterCATHeter PRN  
 sodium chloride (NS) flush 10-40 mL  10-40 mL InterCATHeter Q8H  
 sodium chloride (NS) flush 20 mL  20 mL InterCATHeter Q24H  
 bacitracin 500 unit/gram packet 1 Packet  1 Packet Topical PRN  
 warfarin (COUMADIN) tablet 5 mg  5 mg Oral ONCE  
 guaiFENesin ER (MUCINEX) tablet 600 mg  600 mg Oral Q12H  
 oxyCODONE-acetaminophen (PERCOCET) 5-325 mg per tablet 1 Tab  1 Tab Oral Q8H PRN  
 hydrOXYzine HCl (ATARAX) tablet 25 mg  25 mg Oral TID PRN  
 doxycycline (VIBRAMYCIN) 100 mg in 0.9% sodium chloride (MBP/ADV) 100 mL  100 mg IntraVENous Q12H  
 heparin 25,000 units in D5W 250 ml infusion  12-25 Units/kg/hr IntraVENous TITRATE  
 0.9% sodium chloride infusion 250 mL  250 mL IntraVENous PRN  
 sodium chloride (NS) flush 5-10 mL  5-10 mL IntraVENous Q8H  
 sodium chloride (NS) flush 5-10 mL  5-10 mL IntraVENous PRN  
 docusate sodium (COLACE) capsule 100 mg  100 mg Oral BID  nicotine (NICODERM CQ) 21 mg/24 hr patch 1 Patch  1 Patch TransDERmal Q24H  
 fluticasone-umeclidin-vilanter 100-62.5-25 mcg dsdv 1 Puff  (Patient Supplied)  1 Puff Inhalation DAILY  lovastatin (MEVACOR) tablet 10 mg  10 mg Oral QHS  roflumilast (DALIRESP) tablet 500 mcg  500 mcg Oral DAILY  0.9% sodium chloride infusion  50 mL/hr IntraVENous PRN  
 Warfarin - Pharmacist dosing   Other PRN  
 methylPREDNISolone (PF) (SOLU-MEDROL) injection 80 mg  80 mg IntraVENous Q8H  
 pantoprazole (PROTONIX) 40 mg in sodium chloride 0.9% 10 mL injection  40 mg IntraVENous Q12H  
 insulin lispro (HUMALOG) injection   SubCUTAneous QID WITH MEALS  
  glucose chewable tablet 16 g  4 Tab Oral PRN  
 dextrose (D50W) injection syrg 12.5-25 g  12.5-25 g IntraVENous PRN  
 glucagon (GLUCAGEN) injection 1 mg  1 mg IntraMUSCular PRN  
 dilTIAZem (CARDIZEM) IR tablet 60 mg  60 mg Oral AC&HS OBJECTIVE Intake/Output Summary (Last 24 hours) at 1/8/2019 1146 Last data filed at 1/7/2019 1427 Gross per 24 hour Intake  Output 550 ml Net -550 ml Review of Systems - History obtained from the patient AS PER  HPI Telemetry afib v rate 90-100s PHYSICAL EXAM  
  
 
Visit Vitals /79 Pulse (!) 102 Temp 97.9 °F (36.6 °C) Resp 24 Ht 5' 4.02\" (1.626 m) Wt 133 lb 3.2 oz (60.4 kg) SpO2 100% BMI 22.85 kg/m² Gen: Well-developed, thin/ill appearing, in no acute distress  alert and oriented x 3 HEENT:  Pink conjunctivae, Hearing grossly normal.No scleral icterus or conjunctival, moist mucous membranes Neck: Supple,No JVD Resp: No accessory muscle use, diminished bases Card: irregular/tachycardic Rate,Rythm, 2/6  murmur crisp click, no rubs or gallop. No thrills. GI:          soft, non-tender MSK: No cyanosis or clubbing, good capillary refill Skin: No rashes or ulcers, + petechia to BUE/chest. Midline sternal scar unremarkable Neuro:  Cranial nerves are grossly intact, moving all four extremities, no focal deficit, follows commands appropriately Psych:  Good insight, oriented to person, place and time, alert, Nml Affect LE: No edema DATA REVIEW Laboratory and Imaging have been reviewed by me and are notable for Recent Labs 01/07/19 
1453 01/07/19 
2017 01/07/19 
0125 TROIQ <0.05 0.05* 0.06* Recent Labs 01/07/19 
1859 01/07/19 
1453 01/07/19 
0125 01/06/19 
1439 NA  --   --  134* 132* K  --   --  4.9 4.8  
CO2  --   --  22 26 BUN  --   --  29* 26* CREA  --   --  1.12* 1.29* GLU  --   --  183* 335* PHOS  --   --  4.9* 6.0*  
MG  --   --  1.8 2.1 WBC  --  17.4* 32.5* 32.0* HGB 6.9* 6.9* 7.9* 6.7* HCT 22.9* 22.2* 25.6* 22.5* PLT  --  235 325 371 Charo Gibbons NP

## 2019-01-08 NOTE — PROGRESS NOTES
PICC Placement Note PRE-PROCEDURE VERIFICATION Correct Procedure: yes Correct Site:  yes Temperature: Temp: 97.9 °F (36.6 °C), Temperature Source: Temp Source: Oral 
Recent Labs 01/07/19 
1453 01/07/19 
0125 BUN  --  29* CREA  --  1.12*  325 INR  --  1.3* WBC 17.4* 32.5* Allergies: Spiriva respimat [tiotropium bromide]; Spiriva respimat [tiotropium bromide]; Tomato; Celebrex [celecoxib]; Celebrex [celecoxib]; Rocephin [ceftriaxone]; and Tomato Education materials for PICC Care given: yes. See Patient Education activity for further details. PICC Booklet placed at bedside: yes Closed Ended PICC Catheters: 
Flush Lumens as Follows: 
Intermittent Medication:   Flush before and after each medication with 10 ml NS. Unused Ports:  Flush every 8 hours with 10 ml NS. 
TPN Ports:  Flush every 24 hours with 20 ml NS prior to hanging new bag. Blood Draws: Stop infusion, draw off and waste 10 ml of blood. Draw sample with 10cc syringe or greater. DO NOT USE VACUTAINER . Transfer with appropriate device to lab  tubes. Flush with 20 ml NS. Dressing Change:  Every 7 days, and PRN using sterile technique if integrity of dressing is compromised. Initial dressing change for central line 24-48 hours post insertion if gauze is used. Apply new dressing per policy. PROCEDURE DETAIL Consent was obtained and all questions were answered related to risks and benefits. A double lumen PICC line was inserted, as a sterile procedure using ultrasound and modified Seldinger technique for Home IV Therapy. The following documentation is in addition to the PICC properties in the lines/airways flowsheet : 
Lot #: [id number] Lidocaine 1% administered intradermally :yes Internal Catheter Total Length: 36 (cm) Vein Selection for PICC:right basilic Central Line Bundle followed yes Complication Related to Insertion:yes X-ray: yes.  The x-ray results state the tip location is on the right side and the tip overlies the lower superior vena cava. Line is okay to use: {yes no:[id number]} Vana Siemens, RN

## 2019-01-08 NOTE — TELEPHONE ENCOUNTER
Pt has tolerated zithromax without issue in the past.  Currently in patient so irrelevant at this point.

## 2019-01-08 NOTE — PERIOP NOTES
Patient had some saltine crackers within the last 10 minutes. Capsule study not performed. Per Dr. Markus Martinez, capsule study will be done tomorrow. Patient has been educated on being NPO after midnight. The patient's nurse, Christohper Mar, also reiterated this.

## 2019-01-08 NOTE — PROGRESS NOTES
[facility] Dosing Services: 1/8/2019 Consult for Warfarin Dosing by Pharmacy by Dr. Kaz Quiros Consult provided for this 66 y.o.  female , for indication of  afib and Hx AVR 
PTA Dose: Warfarin 3 mg on Sa/Su/Mo/Tu/We/Th and Warfarin 2 mg on Fr 
Dose to achieve an INR goal of 2.5- 3.5 Order entered for Warfarin 5 mg to be given today at 18:00. Significant drug interactions: Hep bridge, steroid Previous dose given 5mg PT/INR Lab Results Component Value Date/Time INR 1.3 (H) 01/07/2019 01:25 AM  
  
Platelets Lab Results Component Value Date/Time PLATELET 319 38/18/2817 02:53 PM  
  
H/H Lab Results Component Value Date/Time HGB 6.9 (L) 01/07/2019 06:59 PM  
  
Pharmacist will follow daily and will provide subsequent Warfarin dosing based on clinical status. Raghav Gaxiola, Pharmacist

## 2019-01-08 NOTE — PROGRESS NOTES
0715 - Bedside and Verbal shift change report given to Bryan Velasco (oncoming nurse) by Diana Snow (offgoing nurse). Report included the following information SBAR, Kardex, Intake/Output, MAR, Accordion and Recent Results. Cardiac rhythm Afib. 
 
0749 - Pt off floor for PICC placement. 1000 - Pt back in room with PICC in place. PICC team in room as well to adjust line. Notified endo for the pill cam study. 1030 - Waiting for XRAY to re-confirm placement of PICC line. Spoke with cardio re: re-strating heparin gtt, per Jerry Barrera NP, pt needs to resume. Verified with pharmacy re: rate for heparin when restarting. Pharmacy instructed this RN to restart at previous rate (12units/kg/hr) and re-check PTT in 6 hrs. 1035 - Pill cam study cancelled by endo as pt has eaten crackers on her way back up from PICC line placement. Pt to be NPO after MN for early morning pill cam study. Notified family practice of this and obtained diet order. 1208 - Heparin gtt restarted. Next PTT due 1808. 
 
1800 - Coumadin held. Awaiting clearance from GI to resume. Pt currently on heparin gtt.

## 2019-01-08 NOTE — PROGRESS NOTES
Occupational Therapy Note: 
Orders acknowledged, chart reviewed, and spoke with nursing. Patient declining attempts x2 despite max encouragement today d/t fatigue. Will continue to follow.  
[place], OTR/L

## 2019-01-08 NOTE — PROGRESS NOTES
Physical Therapy: Attempted 2nd time: patient again declining due to fatigue, was encouraged to be out of bed and to continue to attempt transfers and she continued to decline. She was agreeable to attempt tomorrow. Orders received and chart reviewed. Patient received sitting up in chair. Patient currently declining therapy, stating she is waiting on pain medication and wants to eat first.  Offered patient modified activity of chair activity, and she still declined. We will return later as able. Thank you.  
[facility] PT,DPT,NCS

## 2019-01-08 NOTE — PROGRESS NOTES
01/08/19 0100 Chart and Patient  Assessment Pulmonary History 1 Surgical History 1 Chest X-ray 2 Respiratory Pattern 1 Mental Status 0 Breath Sounds 1 Cough 0 Level of Activity/Mobility 1 Respiratory Assessment Total Score 7

## 2019-01-08 NOTE — PROGRESS NOTES
Called to achieve IV access. Patient seen and evaluated. Patient refuses any wrist or antecubital IVs.  One attempt made with superficial site, but could not access blood. Patient mentioned that she has previously needed central venous access in addition to also having a PICC line for poor access. When the procedure was reviewed, she refused anything over her face. She was offered to continue with nasal canula and possible tenting of material, but she is claustrophobic and refuses. Case referred to [facility] practice resident to  patient.

## 2019-01-08 NOTE — DIABETES MGMT
DTC Progress Note Recommendations/ Comments: Chart reviewed due to hyperglycemia likely due to steroids. Pt is on methylprednisolone 80 mg every 8 hour. Noted pt refused steroid at 2000 yesterday and 0400 this AM; in spite of this BG's remain > 200 mg/dL today Will be NPO after midnight tonight for capsule study If appropriate, please consider: 1. Changing correction scale to normal sensitivity while pt is on steroids 2. Increasing Lantus to 10 units Will continue to follow and observe BG's once off of steroids. Current hospital DM medication: correction scale Humalog with high sensitivity and Lantus 5 units. Chart reviewed on Alyssia Soliz. Patient is a 66 y.o. female with no history of diabetes. A1c:  
Lab Results Component Value Date/Time Hemoglobin A1c <3.5 (L) 01/06/2019 02:39 PM  
 Hemoglobin A1c <3.5 (L) 10/25/2018 07:40 AM  
 
 
Recent Glucose Results:  
Lab Results Component Value Date/Time  (H) 01/08/2019 11:25 AM  
 GLUCPOC 206 (H) 01/08/2019 11:15 AM  
 GLUCPOC 212 (H) 01/08/2019 06:21 AM  
 GLUCPOC 275 (H) 01/07/2019 08:41 PM  
  
 
Lab Results Component Value Date/Time Creatinine 1.01 01/08/2019 11:25 AM  
 
Estimated Creatinine Clearance: 50.5 mL/min (based on SCr of 1.01 mg/dL). Active Orders Diet DIET CARDIAC Regular DIET NPO Past Midnight, With [employer] PO intake: No data found. Will continue to follow as needed. Thank you Mary Harrison RN, CDE Time spent: 7 minutes

## 2019-01-08 NOTE — PROGRESS NOTES
Referral sent to [facility] in [facility].  Thanks Luma Stanley MSW

## 2019-01-08 NOTE — PROGRESS NOTES
[facility] PROGRESS NOTEPulmonary, Critical Care, and Sleep Medicine Name: Omari Apodaca MRN: [id number] : 1955 Hospital: [address] [facility] Date: 2019  Admission Date: 2019 Chart and notes reviewed. Data reviewed. I review the patient's current medications in the medical record at each encounter.  I have evaluated and examined the patient. .  
 
Overnight events reviewed: 
 
Afebrile BP stable O2 sats 100% on 2L NC 
WBC 24.2 Hgb 7.5 Sputum culture positive for few yeast 
RVP negative ROS:   Feels better today. Breathing improved overall. Had a coughing fit this morning that caused her desaturate to the 80s, but now feels better. Denies fever/chills. Cough now dry. Vital Signs: 
Visit Vitals /87 (BP 1 Location: Left arm, BP Patient Position: At rest;Sitting) Pulse (!) 120 Temp 97.5 °F (36.4 °C) Resp 24 Ht 5' 4.02\" (1.626 m) Wt 60.4 kg (133 lb 3.2 oz) SpO2 100% BMI 22.85 kg/m² O2 Device: Nasal cannula O2 Flow Rate (L/min): 2 l/min Temp (24hrs), Av.1 °F (36.7 °C), Min:97.5 °F (36.4 °C), Max:98.8 °F (37.1 °C) Intake/Output:  
Last shift:      No intake/output data recorded. Last 3 shifts:  1901 -  0700 In: 720 [P.O.:720] Out: 1100 [Urine:1100] Intake/Output Summary (Last 24 hours) at 2019 1333 Last data filed at 2019 1427 Gross per 24 hour Intake  Output 400 ml Net -400 ml Telemetry:  
 
Physical Exam:  
General:  Alert, cooperative, no distress, appears stated age. Head:  Normocephalic, without obvious abnormality, atraumatic. Eyes:  Conjunctivae/corneas clear. Nose: Nares normal. Septum midline. Mucosa normal.   
Throat: Lips, mucosa, and tongue normal. Teeth and gums normal.  
Neck: Supple, symmetrical, trachea midline, no adenopathy Back:   Symmetric, no curvature. ROM normal.  
Lungs:   Diminished bilaterally, no wheezing today Chest wall:  No tenderness or deformity. Heart:  Regular rate and rhythm, S1, S2 normal, no murmur, click, rub or gallop. Abdomen:   Soft, non-tender. Bowel sounds normal.  
Extremities: Extremities normal, atraumatic, no cyanosis or edema. Pulses: 2+ and symmetric all extremities. Skin: Skin color, texture, turgor normal. No rashes or lesions Lymph nodes: Cervical, supraclavicular, and axillary nodes normal.  
Neurologic: Grossly nonfocal  
 
DATA: 
MAR reviewed and pertinent medications noted or modified as needed Labs: 
Recent Labs 01/08/19 
1125 01/07/19 
1859 01/07/19 
1453 01/07/19 
0125 WBC 24.2*  --  17.4* 32.5* HGB 7.5* 6.9* 6.9* 7.9*  
HCT 24.1* 22.9* 22.2* 25.6*   --  235 325 Recent Labs 01/08/19 
1125 01/07/19 
0125 01/06/19 
1439  134* 132* K 4.3 4.9 4.8  
 99 96* CO2 28 22 26 * 183* 335* BUN 31* 29* 26* CREA 1.01 1.12* 1.29* CA 8.6 8.0* 8.7 MG  --  1.8 2.1 PHOS  --  4.9* 6.0* ALB 3.3* 3.2*  --   
TBILI 1.3* 1.7*  --   
SGOT 55* 75*  --   
ALT 42 47  --   
INR  --  1.3* 1.2* Imaging:  I have personally reviewed the patients radiographs and reports. IMPRESSION 
· Acute on Chronic Hypoxic Respiratory Failure; improving · Acute COPD Exacerbation · Anemia · Leukocytosis · History of Diastolic Heart Failure · S/p Mechanical MVR/AVR · Current Smoker · Hx of Atrial Fibrillation; on [facility] ·  
  PLAN 
· Supplemental O2 to keep sats >90% · Continue scheduled nebs · IVCS; will keep at current dose today · F/U sputum culture · Home Trelegy · Doxycycline · Encouraged smoking cessation, Nicotine patch in place · GI consulted, for capsule study tomororw · Start home Bumex tomorrow per Cardiology · Heparin gtt · Protonix · DNR Ulice Click, STEVE

## 2019-01-08 NOTE — PROGRESS NOTES
[facility]  
Daily Progress Note Date: 1/8/2019 Assessment/Plan:  
Alyssia Soliz is a 66 y.o. female who is hospitalized for COPD Exacerbation, initially meeting Severe Sepsis Criteria. COPD Exacerbation Initially Meeting SEPSIS Criteria: IMPROVED. SOB improved with Albuterol nebs, steroids, BiPAP/ O2 N/C. Patient able to wean down on O2 to #L N/C. RVP neg. SpCx + gram stain, GPC clusters, rare yeast  
- Pulmicort/Brovana nebs BID with RT 
- Albuterol Nebs q6hr RT 
- home Trelegy 
- Continue Doxycycline - f/u BCx, SpCx 
- solumedrol 80mg q8hr 
- Daily CBC, CMP with Mg & Phos - NC O2 for goal of 88-92% sats - BiPaP as needed 
- pulm consulted, appreciate recs 
  Severe Sepsis: RESOLVED Likely 2.2 COPD Exacerbation. SIRS (3/4)  Presented with elevated WBC (though has chronic elevation 2/2 chronic steroid use), RR 32,  and LA 2.8 --> .81. Received 2 NS Boluses in ED. One dose of Levaquin in the ED. Elevated HR 2/2 A fib with RVR, increased RR due to COPD Exacerbation, Elevated WBC superimposed on chronic Leukocytosis. 2 days osyn --> Doxycycline 1/7. 1/6 BCx - NGTD, SpCx +gram stain, GPC clusters, rare yeast 
- Continue Doxycycline - f/u BCx, SpCx 
- PICC line today 
  
R/O GI Bleed: FOBT negative. HgB 6.7 POA. S/p1U pRBCs. HgB pending since patient refuses sticks and has no access - Protonix 40mg BID 
- Transfuse for HgB <7 
- NPO pending, capsule study today - GI Consulted, appreciate recs - Capsule study today 
  
Diastolic CHF: Euvolemic on admission. Trop POA . 10 --> .06 --> .5 
- hold home Bumex for now - Strict I&O 
- Cardiology consult, appreciate recs 
  
Atrial Fibrillation s/p MVR on Coumadin (INR POA 1.2) Pt has been seen OP by Dr Yudelka Reyna but has not followed up with him since 2016. Goal INR 2.5 - 3.5. EKG POA: Afib with RVR . Coumadin home dose is 3mg everyday but Friday 2mg.  Patient missed doses of Diltiazem but got additional doses in ED for total of 3 doses yesterday. - Home Warfarin continued- to be dosed by pharmacy for a INR goal of 2.5-3.5 
- Heparin bridge given subtherapeutic INR, no access currently. PICC today. - Home Diltiazem 60 mg four times daily - Cardiology consult 
- Daily Mg & Phos  
  
Leukocytosis: Chronic with BL ~22-29 likely due to daily prednisone use. WBC POA 32.0 --> 32.5 
- Monitor with daily CBC 
  
Hyperglycemia: likely due to chronic steroid dose. . Will adjust insulin based on trending BGs 
- Increased Lantus to 10U QHS for tonight 
- Insulin SS high sensitivity 
- POC glucose ACHS 
- hypoglycemia protocol 
  
Hyperphosphatemia: Phos 6.0 POA 
- Monitor with daily BMP, Mg, Phos 
  
Chronic Back Pain  
- Home Percocet 5-325 mg resumed, q6h PRN  
  
Hyperlipidemia (: TC: 196, T, HDL 88, VLDL 30 & LDL 78) - Home Lovastatin 10 mg  
  
FEN/GI - NPO for capsule study today. Resume Cardiac Diet PRN. Activity - Ambulate as tolerated DVT prophylaxis - coumadin with heparin bridge once has access GI prophylaxis - Protonix Fall prophylaxis - Not indicated at this time. Disposition - Admit to Stepdown. Plan to d/c to TBD. PT/OT consulted. Ivone Schwartz Code Status - DNR, discussed with patient / caregivers. Patient will be discussed with Dr. Heidy Matthew, supervising physician. Jonn Shook MD 
Family Medicine Resident CC: \"I'm feeling better\" Subjective No acute events overnight. Patient reports feeling shaky thinks because of anxiety but with better breathing. Denies chest pain, SOB, no chills. Inpatient Medications Current Facility-Administered Medications Medication Dose Route Frequency  albuterol (ACCUNEB) nebulizer solution 1.25 mg  1.25 mg Nebulization Q6H RT  
 budesonide (PULMICORT) 500 mcg/2 ml nebulizer suspension  500 mcg Nebulization BID RT  
 arformoterol (BROVANA) neb solution 15 mcg  15 mcg Nebulization BID RT  
  insulin glargine (LANTUS) injection 10 Units  10 Units SubCUTAneous QHS  oxyCODONE-acetaminophen (PERCOCET) 5-325 mg per tablet 1 Tab  1 Tab Oral Q8H PRN  
 hydrOXYzine HCl (ATARAX) tablet 25 mg  25 mg Oral TID PRN  
 doxycycline (VIBRAMYCIN) 100 mg in 0.9% sodium chloride (MBP/ADV) 100 mL  100 mg IntraVENous Q12H  
 heparin 25,000 units in D5W 250 ml infusion  12-25 Units/kg/hr IntraVENous TITRATE  
 0.9% sodium chloride infusion 250 mL  250 mL IntraVENous PRN  
 sodium chloride (NS) flush 5-10 mL  5-10 mL IntraVENous Q8H  
 sodium chloride (NS) flush 5-10 mL  5-10 mL IntraVENous PRN  
 docusate sodium (COLACE) capsule 100 mg  100 mg Oral BID  nicotine (NICODERM CQ) 21 mg/24 hr patch 1 Patch  1 Patch TransDERmal Q24H  
 fluticasone-umeclidin-vilanter 100-62.5-25 mcg dsdv 1 Puff  (Patient Supplied)  1 Puff Inhalation DAILY  lovastatin (MEVACOR) tablet 10 mg  10 mg Oral QHS  roflumilast (DALIRESP) tablet 500 mcg  500 mcg Oral DAILY  0.9% sodium chloride infusion  50 mL/hr IntraVENous PRN  
 Warfarin - Pharmacist dosing   Other PRN  
 methylPREDNISolone (PF) (SOLU-MEDROL) injection 80 mg  80 mg IntraVENous Q8H  
 pantoprazole (PROTONIX) 40 mg in sodium chloride 0.9% 10 mL injection  40 mg IntraVENous Q12H  
 insulin lispro (HUMALOG) injection   SubCUTAneous QID WITH MEALS  glucose chewable tablet 16 g  4 Tab Oral PRN  
 dextrose (D50W) injection syrg 12.5-25 g  12.5-25 g IntraVENous PRN  
 glucagon (GLUCAGEN) injection 1 mg  1 mg IntraMUSCular PRN  
 dilTIAZem (CARDIZEM) IR tablet 60 mg  60 mg Oral AC&HS Allergies Allergies Allergen Reactions  Spiriva Respimat [Tiotropium Bromide] Anaphylaxis and Other (comments) Adverse effects; Per RN - pt states that Spiriva caused throat swelling and could not breathe well.  Spiriva Respimat [Tiotropium Bromide] Shortness of Breath  Tomato Shortness of Breath Only occurs with raw tomatoes, tolerates ketchup without issue  Celebrex [Celecoxib] Rash  Celebrex [Celecoxib] Rash  Rocephin [Ceftriaxone] Shortness of Breath  Tomato Rash Objective Vitals: 
Patient Vitals for the past 8 hrs: 
 Temp Pulse Resp BP SpO2  
01/08/19 0309 98.1 °F (36.7 °C) 84 18 123/73 95 % 01/08/19 0005  93    I/O: 
 
Intake/Output Summary (Last 24 hours) at 1/8/2019 0460 Last data filed at 1/7/2019 1427 Gross per 24 hour Intake 240 ml Output 850 ml Net -610 ml Last shift: 
  No intake/output data recorded. Last 3 shifts: 
  01/06 0701 - 01/07 1900 In: 0704 [P.O.:720; I.V.:2100] Out: 1100 [Urine:1100] Physical Exam:General: No acute distress. Alert. Cooperative. Head: Normocephalic. Atraumatic. Eyes:  Conjunctiva pink. Sclera white. PERRL. Nose:  Septum midline. Mucosa pink. No drainage. Throat: Mucosa pink. Moist mucous membranes. Respiratory: CTAB with improved air movement throughout posterior lung fields. No wheezes, rales, ronchi. Cardiovascular: Irregularly irregular rhythm at ~100bpm. Normal S1,S2. No m/r/g. Pulses 2+ throughout. GI: + bowel sounds. Nontender. No rebound tenderness or guarding. Nondistended Extremities: Trace edema of LE slightly more on L vs R No palpable cord. No tenderness. Laboratory Data Recent Results (from the past 12 hour(s)) HGB & HCT Collection Time: 01/07/19  6:59 PM  
Result Value Ref Range HGB 6.9 (L) 11.5 - 16.0 g/dL HCT 22.9 (L) 35.0 - 47.0 % GLUCOSE, POC Collection Time: 01/07/19  8:41 PM  
Result Value Ref Range Glucose (POC) 275 (H) 65 - 100 mg/dL Performed by Jez Bethea (PCT) GLUCOSE, POC Collection Time: 01/08/19  6:21 AM  
Result Value Ref Range Glucose (POC) 212 (H) 65 - 100 mg/dL Performed by Jez Bethea (PCT) Imaging - No New Imaging Hospital Problems: 
Hospital Problems  Date Reviewed: 11/29/2018 Codes Class Noted POA * (Principal) COPD with acute exacerbation ([facility] [address] ICD-10-CM: J44.1 ICD-9-CM: 491.21  1/6/2019 Unknown Anemia ICD-10-CM: D64.9 ICD-9-CM: 285.9  10/28/2018 Yes Hyperlipidemia ICD-10-CM: E78.5 ICD-9-CM: 272.4  10/26/2018 Yes Chronic back pain (Chronic) ICD-10-CM: M54.9, G89.29 ICD-9-CM: 724.5, 338.29  10/26/2018 Yes History of GI bleed ICD-10-CM: Z87.19 ICD-9-CM: V12.79 Present on Admission 11/25/2016 Yes Diastolic CHF, chronic (HCC) ICD-10-CM: I50.32 
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  
  Tissue MVR 1989 following failed balloon valvuloplasty for MS Redo MVR 2004 due to severe MR, AVR due to AR ([facility] [employer]) CAD (coronary artery disease), native coronary artery ICD-10-CM: I25.10 ICD-9-CM: 414.01  1/8/2015 Yes S/P MVR (mitral valve replacement) ICD-10-CM: H10.0 ICD-9-CM: V43.3  1/8/2015 Yes

## 2019-01-08 NOTE — PROGRESS NOTES
[address] [facility] NP 
[phone] GI PROGRESS NOTE 
 
 
NAME: Guadalupe Reynolds :  1955 MRN:  [id number] Subjective: \"I feel ok\"  Denies black or bloody stools. Objective: In NAD 
 
 
VITALS:  
Last 24hrs VS reviewed since prior progress note. Most recent are: 
Visit Vitals /87 (BP 1 Location: Left arm, BP Patient Position: At rest;Sitting) Pulse (!) 120 Temp 97.5 °F (36.4 °C) Resp 24 Ht 5' 4.02\" (1.626 m) Wt 60.4 kg (133 lb 3.2 oz) SpO2 100% BMI 22.85 kg/m² Intake/Output Summary (Last 24 hours) at 2019 1426 Last data filed at 2019 1427 Gross per 24 hour Intake  Output 400 ml Net -400 ml PHYSICAL EXAM: 
General: Alert, in no acute distress HEENT: Anicteric sclerae. Lungs:            Diminished breath sounds. Heart:  Regular  rhythm, Abdomen: Soft, Non distended, TTP in LUQ.  (+)Bowel sounds, no HSM Extremities: No c/c/e Neurologic:  CN 2-12 gi, Alert and oriented X 3. No acute neurological distress Psych:   Good insight. Not anxious nor agitated. Lab Data Reviewed:  
Recent Labs 19 
11219 
1859 19 
1453 WBC 24.2*  --  17.4* HGB 7.5* 6.9* 6.9*  
HCT 24.1* 22.9* 22.2*  
  --  235 Recent Labs 19 
1125 19 
0125 19 
1439  134* 132* K 4.3 4.9 4.8  
 99 96* CO2 28 22 26 BUN 31* 29* 26* CREA 1.01 1.12* 1.29* * 183* 335* PHOS  --  4.9* 6.0*  
CA 8.6 8.0* 8.7 Recent Labs 19 
1125 19 
0125 SGOT 55* 75* AP 67 72  
TP 5.6* 6.1* ALB 3.3* 3.2*  
GLOB 2.3 2.9  
 
 
________________________________________________________________________ Patient Active Problem List  
Diagnosis Code  Chest pain R07.9  COPD exacerbation ([address] J44.1  Hypokalemia E87.6  JAZZMINE (acute kidney injury) ([address] N17.9  Hyperglycemia R73.9  A-fib (HCC) I48.91  
 Chronic anticoagulation Z79.01  
  Hyperlipidemia E78.5  Chronic back pain M54.9, G89.29  
 Anemia D64.9  
 GI bleed K92.2  
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  
 S/P MVR (mitral valve replacement) Z95.2  COPD (chronic obstructive pulmonary disease) ([facility]) J44.9  Tobacco abuse Z72.0  Diastolic CHF, chronic ([facility]) I50.32  Chronic atrial fibrillation ([facility]) I48.2  History of GI bleed Z87.19  
 H/O total hysterectomy Z90.710  
 Hypokalemia E87.6  Thrush of mouth and esophagus ([facility]) B37.81, B37.0  
 SOB (shortness of breath) R06.02  
 COPD with acute exacerbation ([facility] [address] J44.1 Assessment and Plan: Anemia:  Hemoglobin stable at 7.5. Heme negative on admission.   
- NPO at midnight for capsule study tomorrow. - Trend hemoglobin and transfuse to goal of 7. 
- No plans for EGD or colonoscopy while she has acute on chronic respiratory illness. Odynophagia - Diet as tolerated. - Nystatin Swish and Swallow. - Consider MBS or ID consult if not improving with nystatin. LUQ abdominal pain:  Tenderness to palpation noted on exam today. Pt states she has had this for a while. Denies constipation. 
- KUB if pain worsens. 
- Daily Miralax for bowel regimen. Will continue to follow.  
 
 
  
Signed By: Marisela Eller NP   
 1/8/2019  2:26 PM

## 2019-01-08 NOTE — ROUTINE PROCESS
Bedside and Verbal shift change report given to Camden Hernandez RN (oncoming nurse) by Erin Bhatt RN (offgoing nurse). Report included the following information SBAR, Kardex, Intake/Output, MAR, Accordion and Recent Results. 2103- Pt complains of pain to IV site, stopped Heparin drip, notified by day shift that pt was to receive PICC and was unable to be see by anesthesia or PICC team. ICU/ED states they are unavailable to place IV's at this time. Will notify residents of the need for IV access. 2158- Notified resident regarding lack of IV access, pt states she no longer wants to be stuck until a PICC can be accessed at this time. 2218- Resident at nurse station, states Anesthesia has been called to place IV access. 2228- Anesthesia at bedside. 2235- Informed Anesthesiologist that two dedicated lines is required for continuous heparin and PRBC. Anesthesia recommends central line placement at this time. Pt states that she does not want any procedure which will cover her face due to clausterphobia. States that she is is refusing central line placement at this time. States that she wants to wait tomorrow for a PICC line. 2240- Resident at bedside. Bedside and Verbal shift change report given to Macey Hartley RN (oncoming nurse) by Camden Hernandez RN (offgoing nurse). Report included the following information SBAR, Kardex, Intake/Output, MAR, Accordion and Recent Results.

## 2019-01-09 NOTE — PROGRESS NOTES
Pt was seen once again.  in room. Pt comfortable. No SOB, CP, abdominal pain. Discussed hospital course with pt and . All questions answered.   
 
Lambert Dennis, DO

## 2019-01-09 NOTE — PROGRESS NOTES
[address] [facility] NP 
[phone] GI PROGRESS NOTE 
 
 
NAME: Omaira Dang :  1955 MRN:  [id number] Subjective:  
Had severe abdominal pain during capsule study. Has resolved this afternoon. No other complaints today. Objective:  
Using accessory muscles to breathe. VITALS:  
Last 24hrs VS reviewed since prior progress note. Most recent are: 
Visit Vitals /81 (BP 1 Location: Left arm, BP Patient Position: At rest) Pulse (!) 158 Temp 98 °F (36.7 °C) Resp 19 Ht 5' 4\" (1.626 m) Wt 58.1 kg (128 lb) SpO2 97% BMI 21.97 kg/m² Intake/Output Summary (Last 24 hours) at 2019 1354 Last data filed at 2019 8013 Gross per 24 hour Intake 1087.83 ml Output  Net 1087.83 ml PHYSICAL EXAM: 
General: Alert, Using accessory muscles to breathe HEENT: Anicteric sclerae. Lungs:            Minimal air movement. Heart:  Regular  rhythm, Abdomen: Soft, Non distended, Non tender.  (+)Bowel sounds, no HSM Extremities: No c/c/e Neurologic:  CN 2-12 gi, Alert and oriented X 3. No acute neurological distress Psych:   Good insight. Not anxious nor agitated. Lab Data Reviewed:  
Recent Labs 19 
1231 19 
01319 
1125 WBC  --  16.1* 24.2* HGB 9.1* 6.4* 7.5* HCT 28.9* 20.5* 24.1*  
PLT  --  201 275 Recent Labs 19 
0137 19 
1125  139  
K 4.2 4.3  103 CO2 27 28 BUN 32* 31* CREA 0.95 1.01  
* 171* PHOS 3.3 3.6 CA 8.2* 8.6 Recent Labs 19 
01319 
1125 SGOT 54* 55* AP 63 67  
TP 5.3* 5.6* ALB 2.9* 3.3*  
GLOB 2.4 2.3  
 
 
________________________________________________________________________ Patient Active Problem List  
Diagnosis Code  Chest pain R07.9  [facility] J44.1  Hypokalemia E87.6  JAZZMINE (acute kidney injury) ([facility] N17.9  Hyperglycemia R73.9  A-fib ([facility] I48.91  
  Chronic anticoagulation Z79.01  
 Hyperlipidemia E78.5  Chronic back pain M54.9, G89.29  
 Anemia D64.9  
 GI bleed K92.2  
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  
 S/P MVR (mitral valve replacement) Z95.2  COPD (chronic obstructive pulmonary disease) ([facility]) J44.9  Tobacco abuse Z72.0  Diastolic CHF, chronic ([facility]) I50.32  Chronic atrial fibrillation ([facility]) I48.2  History of GI bleed Z87.19  
 H/O total hysterectomy Z90.710  
 Hypokalemia E87.6  Thrush of mouth and esophagus ([facility]) B37.81, B37.0  
 SOB (shortness of breath) R06.02  
 COPD with acute exacerbation ([facility] [address] J44.1 Assessment and Plan: Anemia:  Hemoglobin 9.1 after transfusion today. - Trend hemoglobin and transfuse to goal of 7. 
- Capsule study results pending Abdominal Pain:  Noted on exam yesterday and became severe this morning during capsule study. It has now resolved. She denies constipation. Statu KUB done today is unremarkable. Will continue to follow.  
 
  
Signed By: John Carter NP   
 1/9/2019  1:54 PM

## 2019-01-09 NOTE — PROGRESS NOTES
Called to assess pt. Pt seating at bedside complaining of abdominal pain. Denies anxiety, chest pain, SOB, N/V. Pt has completed her 1 unit transfusion and undergoing the capsule study. Pt shares that her abdominal pain is generalized and started after capsule was swallowed. She was nauseous and treated with zofran but helped slightly. Pt has refused all medications today including bumex. Visit Vitals /81 (BP 1 Location: Left arm, BP Patient Position: At rest) Pulse (!) 109 Temp 98 °F (36.7 °C) Resp 19 Ht 5' 4\" (1.626 m) Wt 128 lb (58.1 kg) SpO2 97% BMI 21.97 kg/m² Gen: pt is anxious. Heart. Irregularly irregular rhythm at ~90bpm. Normal S1,S2. No m/r/g. Pulses 2+ throughout. Lung: mild crackles at bilateral bases. GI: + bowel sounds. Mild tender throughout. Soft. No rebound tenderness or guarding. A/P: 
-Called GI to update them that pt started having pain after caspule was swallowed. Per GI, ok to get KUB. -Discussed HR of 110-130s with cardiology. Will order a CXR. If has pleural edema then will IV bumex. 
-Pt denies anxiety 
-H/H is due will add INR. Unk Smoke, DO 
 
1200: KUB and chest xray completed. Ordered STAT. Labs drawn. 1 pm: KUB nonspecific bowel gas pattern. Chest portable showed very small b/l pleural effusion. Hgb improved to 9.1. INR 3, hep drip stopped. 1:49: Evaluated by GI and pt's pain had resolved. Will follow-up closely.   
 
Unk Smoke, DO

## 2019-01-09 NOTE — PROGRESS NOTES
Occupational Therapy Note: 
Chart reviewed and spoke with nursing. Patient lying half on/off the bed and refusing to reposition or engage in OT evaluation. RN reporting that pt has been extremely anxious and uncooperative this morning and was not appropriate for therapy intervention at this time. Will continue to follow as appropriate. Thank you.  
Lauren Newton, OTR/L

## 2019-01-09 NOTE — CDMP QUERY
2. Please clarify if this patient is (was) being treated/managed for:  
 
=> Severe Sepsis ruled out 
=> Severe Sepsis ruled in; please note infectious source 
=> Other explanation of clinical findings  
=> Clinically Undetermined (no explanation for clinical findings) The medical record reflects the following clinical findings, treatment, and risk factors. Risk Factors:  COPD exac. Clinical Indicators:  Severe Sepsis: RESOLVED Likely 2.2 COPD Exacerbation. SIRS (3/4)  Presented with elevated WBC (though has chronic elevation 2/2 chronic steroid use), RR 32,  and LA 2.8 --> .81. Received 2 NS Boluses in ED. One dose of Levaquin in the ED. Elevated HR 2/2 A fib with RVR, increased RR due to COPD Exacerbation, Elevated WBC superimposed on chronic Leukocytosis. 2 days osyn --> Doxycycline 1/7. 1/6 BCx - NGTD, SpCx +gram stain, GPC clusters, rare yeast 
 
Treatment: 2 L IVB in ED, IVFs, Doxy thru 1/12 Please clarify and document your clinical opinion in the progress notes and discharge summary including the definitive and/or presumptive diagnosis, (suspected or probable), related to the above clinical findings. Please include clinical findings supporting your diagnosis. Thank you, 
Holly Campbell, MSN, [facility], [address] [facility]

## 2019-01-09 NOTE — PROGRESS NOTES
Patient very anxious also c/o abdominal pain V rate 110-130's Patient denies SOB/difficulty breathing. Crackles LLL- repeat chest Xray S/p 1 unit of PRBCs this AM 
 
D/w FP- KUB for abdominal pain. Can give 1 mg of bumex post transfusion shows any pulm edema. INR 3, heparin gtt d/c for now. Repeat INR May need further evaluation of stomach, CT scan? Not sure if she can have this with capsule. Recheck H/H now

## 2019-01-09 NOTE — PROGRESS NOTES
[facility] PROGRESS NOTEPulmonary, Critical Care, and Sleep Medicine Name: Edilia Contreras MRN: [id number] : 1955 Hospital: [facility] Date: 2019  Admission Date: 2019 Chart and notes reviewed. Data reviewed. I review the patient's current medications in the medical record at each encounter.  I have evaluated and examined the patient. .  
 
Overnight events reviewed: 
 
Afebrile BP stable -140s while I was in room today Had severe abdominal pain during capsule study, now resolved O2 sats 97% on 2L NC 
WBC 16.1 Hgb 9.1 s/p 1 unit pRBCs Sputum culture positive for few yeast 
RVP negative ROS:   Feels a little better right now. Abdominal pain improved. Breathing feels better overall. Denies CP. Vital Signs: 
Visit Vitals /81 (BP 1 Location: Left arm, BP Patient Position: At rest) Pulse (!) 158 Temp 98 °F (36.7 °C) Resp 19 Ht 5' 4\" (1.626 m) Wt 58.1 kg (128 lb) SpO2 97% BMI 21.97 kg/m² O2 Device: Nasal cannula O2 Flow Rate (L/min): 2 l/min Temp (24hrs), Av.9 °F (36.6 °C), Min:97.6 °F (36.4 °C), Max:98.8 °F (37.1 °C) Intake/Output:  
Last shift:      701 - 1900 In: 310 Out: - Last 3 shifts: 1901 -  07 In: 802.8 [P.O.:420; I.V.:382.8] Out: - Intake/Output Summary (Last 24 hours) at 2019 1429 Last data filed at 2019 8391 Gross per 24 hour Intake 1087.83 ml Output  Net 1087.83 ml Telemetry:  
 
Physical Exam:  
General:  Alert, cooperative, no distress, appears stated age. Head:  Normocephalic, without obvious abnormality, atraumatic. Eyes:  Conjunctivae/corneas clear. Nose: Nares normal. Septum midline. Mucosa normal.   
Throat: Lips, mucosa, and tongue normal. Teeth and gums normal.  
Neck: Supple, symmetrical, trachea midline, no adenopathy Back:   Symmetric, no curvature.  ROM normal.  
 Lungs:   Diminished left base, scattered wheezes Chest wall:  No tenderness or deformity. Heart:  Irregularly irregular S1, S2 normal, no murmur, click, rub or gallop. Abdomen:   Soft, non-tender. Bowel sounds normal.  
Extremities: Extremities normal, atraumatic, no cyanosis or edema. Pulses: 2+ and symmetric all extremities. Skin: Skin color, texture, turgor normal. No rashes or lesions Lymph nodes: Cervical, supraclavicular, and axillary nodes normal.  
Neurologic: Grossly nonfocal  
 
DATA: 
MAR reviewed and pertinent medications noted or modified as needed Labs: 
Recent Labs 01/09/19 
1231 01/09/19 
0137 01/08/19 
1125  01/07/19 
1453 WBC  --  16.1* 24.2*  --  17.4* HGB 9.1* 6.4* 7.5*   < > 6.9*  
HCT 28.9* 20.5* 24.1*   < > 22.2*  
PLT  --  201 275  --  235  
 < > = values in this interval not displayed. Recent Labs 01/09/19 
1042 01/09/19 
0137 01/08/19 
1125 01/07/19 
0125 NA  --  136 139 134* K  --  4.2 4.3 4.9  
CL  --  102 103 99 CO2  --  27 28 22 GLU  --  185* 171* 183* BUN  --  32* 31* 29* CREA  --  0.95 1.01 1.12* CA  --  8.2* 8.6 8.0*  
MG  --  2.1 2.1 1.8 PHOS  --  3.3 3.6 4.9* ALB  --  2.9* 3.3* 3.2* TBILI  --  1.2* 1.3* 1.7* SGOT  --  54* 55* 75* ALT  --  42 42 47 INR 2.3* 3.0*  --  1.3* Imaging:  I have personally reviewed the patients radiographs and reports. IMPRESSION 
· Acute on Chronic Hypoxic Respiratory Failure; improving · Acute COPD Exacerbation · Anemia · Leukocytosis; improving · History of Diastolic Heart Failure · S/p Mechanical MVR/AVR · Current Smoker · Hx of Atrial Fibrillation; on [facility] ·  
  PLAN 
· Supplemental O2 to keep sats >90% · Continue scheduled nebs · IVCS; will wean today · Discussed with Cardiology; receiving 5mg IV Dilt now · Home Trelegy · Doxycycline x 5 days (today is day 3) · Encouraged smoking cessation, Nicotine patch in place · Start home Bumex tomorrow per Cardiology · Heparin gtt off currently: INR 3 this AM 
· Protonix · DNR Edmond Dickerson, NP

## 2019-01-09 NOTE — CDMP QUERY
1. Please clarify if this patient is (was) being treated/managed for:  
 
=> Acute/chronic Hypoxic Respiratory Failure POA (now resolved)  in the setting of COPD exacerbation; treatment includes IV steroids, nebs, inhalers, mucinex 
=> Other explanation of clinical findings  
=> Clinically Undetermined (no explanation for clinical findings) The medical record reflects the following clinical findings, treatment, and risk factors. Risk Factors:  COPD exac. Clinical Indicators:  10/6 ED notes:presents to the ED via EMS complaining of worsening shortness of breath over the past 2-3 days ; 6 ABG on 6 L 7.31/48/78 RR 20-30s , tachypnic ; Uses home O2 2 L Treatment Thank you, 
Mundo Garrett, MSN, RN, MP 
[phone] 
: Pulmonary consult; bipap ordered (patient refused) ; IV steroids, nebs, inhalers, mucinex; O2 6 L, now on 2 L Please clarify and document your clinical opinion in the progress notes and discharge summary including the definitive and/or presumptive diagnosis, (suspected or probable), related to the above clinical findings. Please include clinical findings supporting your diagnosis.

## 2019-01-09 NOTE — PROGRESS NOTES
[facility]  
Daily Progress Note Date: 1/9/2019 Assessment/Plan:  
Shahida Miranda is a 66 y.o. female who is hospitalized for COPD Exacerbation, initially meeting Severe Sepsis Criteria. COPD Exacerbation Initially Meeting SEPSIS Criteria: IMPROVED. Initially with 6L N/C requirement and intermittently on BiPAP, now on 2LN/C. Home O2 2L. RVP neg. SpC + gram stain, GPC clusters, rare yeast. 1/6 BCx NGTD 
- Albuterol Nebs q6hr RT 
- home Trelegy 
- Continue Doxycycline thru 1/12 
- f/u BCx, SpCx 
- solumedrol 80mg q8hr, wean today per pulm - NC O2 for goal of 88-92% sats - BiPaP as needed 
- pulm consulted, appreciate recs 
  Severe Sepsis: RESOLVED Likely 2.2 COPD Exacerbation. SIRS (3/4)  Presented with elevated WBC (though has chronic elevation 2/2 chronic steroid use), RR 32,  and LA 2.8 --> .81. Received 2 NS Boluses in ED. One dose of Levaquin in the ED. Elevated HR 2/2 A fib with RVR, increased RR due to COPD Exacerbation, Elevated WBC superimposed on chronic Leukocytosis. 2 days osyn --> Doxycycline 1/7. 1/6 BCx - NGTD, SpCx +gram stain, GPC clusters, rare yeast 
- Continue Doxycycline thru 1/12 
- f/u BCx, SpCx 
- PRN IVFs as pt with CHF 
 
R/O GI Bleed: FOBT negative. HgB 6.7 POA. S/p1U pRBCs. HgB this morning 6.4 
- transfusion for this morning  
- 1mg bumex today with transfusion - Protonix 40mg BID 
- Transfuse for HgB <7 
- NPO pending, capsule study today - GI Consulted, appreciate recs 
  
Diastolic CHF: Euvolemic on admission. Trop POA . 10 --> .06 --> .5 - 1mgIV bumex daily - Strict I&O 
- Cardiology consult, appreciate recs 
  
Atrial Fibrillation s/p MVR on Coumadin (INR POA 1.2) Pt has been seen OP by Dr Gianni Botello but has not followed up with him since 2016. Goal INR 2.5 - 3.5. EKG POA: Afib with RVR . Coumadin home dose is 3mg everyday but Friday 2mg.  
- Home Warfarin continued- to be dosed by pharmacy for a INR goal of 2.5-3.5 - Heparin bridge given subtherapeutic INR 
- Home Diltiazem 60 mg four times daily - Cardiology consult 
- Daily Mg & Phos  
  
Leukocytosis: Chronic with BL ~22-29 likely due to daily prednisone use. WBC POA 32.0 --> 32.5-->16.1 
- Monitor with daily CBC 
  
Hyperglycemia: likely due to chronic steroid dose. . Will adjust insulin based on trending BGs. BGs 206 - 275 yestrday. First dose of Lantus at 10U yesterday evening - Lantus to 10U QHS for tonight, will adjust as BGs today trend 
- SSI, regular sensitivity 
- POC glucose ACHS 
- hypoglycemia protocol 
  
Hyperphosphatemia: Phos 6.0 POA. Phos 3.3 today - Monitor with daily BMP, Mg, Phos 
  
Chronic Back Pain  
- Home Percocet 5-325 mg resumed, q6h PRN  
  
Hyperlipidemia (: TC: 196, T, HDL 88, VLDL 30 & LDL 78) - Home Lovastatin 10 mg  
 
Anxiety: not well controlled. Patient refusing meds 2/2 anxiety 
- attarax 25mg TID prn 
- Xanax . 5mgorder x 1 today 
  
FEN/GI - NPO for capsule study today. Resume Cardiac Diet PRN. Activity - Ambulate as tolerated DVT prophylaxis - coumadin with heparin bridge once has access GI prophylaxis - Protonix Fall prophylaxis - Not indicated at this time. Disposition - Admit to Stepdown. Plan to d/c to TBD. PT/OT consulted. Ted Pond Code Status - DNR, discussed with patient / caregivers. Patient will be discussed with Dr. Edvin Street, supervising physician. Patsy Lynne MD 
Family Medicine Resident CC: \"I'm feeling pretty good\" Subjective No acute events overnight. Patient reports feeling shaky thinks because of anxiety but with better breathing. States anxiety pill \"really works\". Denies chest pain, SOB, no chills. Inpatient Medications Current Facility-Administered Medications Medication Dose Route Frequency  0.9% sodium chloride infusion 250 mL  250 mL IntraVENous PRN  
 0.9% sodium chloride infusion 250 mL  250 mL IntraVENous PRN  
  bumetanide (BUMEX) tablet 1 mg  1 mg Oral ONCE  
 albuterol (ACCUNEB) nebulizer solution 1.25 mg  1.25 mg Nebulization Q6H RT  
 insulin glargine (LANTUS) injection 10 Units  10 Units SubCUTAneous QHS  alteplase (CATHFLO) 1 mg in sterile water (preservative free) 1 mL injection  1 mg InterCATHeter PRN  
 sodium chloride (NS) flush 10-30 mL  10-30 mL InterCATHeter PRN  
 sodium chloride (NS) flush 10 mL  10 mL InterCATHeter Q24H  
 sodium chloride (NS) flush 10 mL  10 mL InterCATHeter PRN  
 sodium chloride (NS) flush 10-40 mL  10-40 mL InterCATHeter Q8H  
 sodium chloride (NS) flush 20 mL  20 mL InterCATHeter Q24H  
 bacitracin 500 unit/gram packet 1 Packet  1 Packet Topical PRN  
 guaiFENesin ER (MUCINEX) tablet 600 mg  600 mg Oral Q12H  
 dilTIAZem CD (CARDIZEM CD) capsule 240 mg  240 mg Oral DAILY  nystatin (MYCOSTATIN) 100,000 unit/mL oral suspension 500,000 Units  500,000 Units Oral QID  polyethylene glycol (MIRALAX) packet 17 g  17 g Oral DAILY  insulin lispro (HUMALOG) injection   SubCUTAneous AC&HS  
 oxyCODONE-acetaminophen (PERCOCET) 5-325 mg per tablet 1 Tab  1 Tab Oral Q8H PRN  
 hydrOXYzine HCl (ATARAX) tablet 25 mg  25 mg Oral TID PRN  
 doxycycline (VIBRAMYCIN) 100 mg in 0.9% sodium chloride (MBP/ADV) 100 mL  100 mg IntraVENous Q12H  
 heparin 25,000 units in D5W 250 ml infusion  12-25 Units/kg/hr IntraVENous TITRATE  
 0.9% sodium chloride infusion 250 mL  250 mL IntraVENous PRN  
 sodium chloride (NS) flush 5-10 mL  5-10 mL IntraVENous Q8H  
 sodium chloride (NS) flush 5-10 mL  5-10 mL IntraVENous PRN  
 docusate sodium (COLACE) capsule 100 mg  100 mg Oral BID  nicotine (NICODERM CQ) 21 mg/24 hr patch 1 Patch  1 Patch TransDERmal Q24H  
 fluticasone-umeclidin-vilanter 100-62.5-25 mcg dsdv 1 Puff  (Patient Supplied)  1 Puff Inhalation DAILY  lovastatin (MEVACOR) tablet 10 mg  10 mg Oral QHS  roflumilast (DALIRESP) tablet 500 mcg  500 mcg Oral DAILY  0.9% sodium chloride infusion  50 mL/hr IntraVENous PRN  
 Warfarin - Pharmacist dosing   Other PRN  
 methylPREDNISolone (PF) (SOLU-MEDROL) injection 80 mg  80 mg IntraVENous Q8H  
 pantoprazole (PROTONIX) 40 mg in sodium chloride 0.9% 10 mL injection  40 mg IntraVENous Q12H  
 glucose chewable tablet 16 g  4 Tab Oral PRN  
 dextrose (D50W) injection syrg 12.5-25 g  12.5-25 g IntraVENous PRN  
 glucagon (GLUCAGEN) injection 1 mg  1 mg IntraMUSCular PRN Allergies Allergies Allergen Reactions  Spiriva Respimat [Tiotropium Bromide] Anaphylaxis and Other (comments) Adverse effects; Per RN - pt states that Spiriva caused throat swelling and could not breathe well.  Spiriva Respimat [Tiotropium Bromide] Shortness of Breath  Tomato Shortness of Breath Only occurs with raw tomatoes, tolerates ketchup without issue  Celebrex [Celecoxib] Rash  Celebrex [Celecoxib] Rash  Rocephin [Ceftriaxone] Shortness of Breath  Tomato Rash Objective Vitals: 
Patient Vitals for the past 8 hrs: 
 Temp Pulse Resp BP SpO2  
01/09/19 0701 97.7 °F (36.5 °C) 93 18 137/69 99 % 01/09/19 0643 97.7 °F (36.5 °C) (!) 107 18 146/79 100 % 01/09/19 0640 97.7 °F (36.5 °C) (!) 106 22 146/79 100 % 01/09/19 0003 97.7 °F (36.5 °C) 97 24 123/54 100 % I/O: 
 
Intake/Output Summary (Last 24 hours) at 1/9/2019 0747 Last data filed at 1/9/2019 7736 Gross per 24 hour Intake 609.52 ml Output  Net 609.52 ml Last shift: 
  No intake/output data recorded. Last 3 shifts: 
  01/07 1901 - 01/09 0700 In: 634.5 [P.O.:420; I.V.:214.5] Out: - Physical Exam:General: No acute distress. Alert. Cooperative. Head: Normocephalic. Atraumatic. Eyes:  Conjunctiva pink. Sclera white. PERRL. Nose:  Septum midline. Mucosa pink. No drainage. Throat: Mucosa pink. Moist mucous membranes.    
Respiratory: CTAB with improved air movement throughout posterior lung fields. No wheezes, rales, ronchi. Mild crackles at bilateral bases Cardiovascular: Irregularly irregular rhythm at ~90bpm. Normal S1,S2. No m/r/g. Pulses 2+ throughout. GI: + bowel sounds. Nontender. No rebound tenderness or guarding. Nondistended Extremities: 1+ edema of LE slightly more on L vs R No palpable cord. No tenderness. Laboratory Data Recent Results (from the past 12 hour(s)) GLUCOSE, POC Collection Time: 01/08/19  9:08 PM  
Result Value Ref Range Glucose (POC) 531 (H) 65 - 100 mg/dL Performed by Jose Fair (PCT) GLUCOSE, POC Collection Time: 01/08/19  9:09 PM  
Result Value Ref Range Glucose (POC) 240 (H) 65 - 100 mg/dL Performed by Jose Fair (PCT) PTT Collection Time: 01/09/19  1:37 AM  
Result Value Ref Range aPTT 64.0 (H) 22.1 - 32.0 sec  
 aPTT, therapeutic range     58.0 - 77.0 SECS  
CBC WITH AUTOMATED DIFF Collection Time: 01/09/19  1:37 AM  
Result Value Ref Range WBC 16.1 (H) 3.6 - 11.0 K/uL  
 RBC 2.26 (L) 3.80 - 5.20 M/uL HGB 6.4 (L) 11.5 - 16.0 g/dL HCT 20.5 (L) 35.0 - 47.0 % MCV 90.7 80.0 - 99.0 FL  
 MCH 28.3 26.0 - 34.0 PG  
 MCHC 31.2 30.0 - 36.5 g/dL  
 RDW 17.2 (H) 11.5 - 14.5 % PLATELET 989 477 - 521 K/uL MPV 11.2 8.9 - 12.9 FL  
 NRBC 0.1 (H) 0  WBC ABSOLUTE NRBC 0.02 (H) 0.00 - 0.01 K/uL NEUTROPHILS 98 (H) 32 - 75 % LYMPHOCYTES 0 (L) 12 - 49 % MONOCYTES 2 (L) 5 - 13 % EOSINOPHILS 0 0 - 7 % BASOPHILS 0 0 - 1 % IMMATURE GRANULOCYTES 0 0.0 - 0.5 % ABS. NEUTROPHILS 15.8 (H) 1.8 - 8.0 K/UL  
 ABS. LYMPHOCYTES 0.0 (L) 0.8 - 3.5 K/UL  
 ABS. MONOCYTES 0.3 0.0 - 1.0 K/UL  
 ABS. EOSINOPHILS 0.0 0.0 - 0.4 K/UL  
 ABS. BASOPHILS 0.0 0.0 - 0.1 K/UL  
 ABS. IMM. GRANS. 0.0 0.00 - 0.04 K/UL  
 DF MANUAL    
 RBC COMMENTS SCHISTOCYTES PRESENT 
    
 RBC COMMENTS ANISOCYTOSIS 
2+ METABOLIC PANEL, COMPREHENSIVE Collection Time: 01/09/19  1:37 AM  
Result Value Ref Range Sodium 136 136 - 145 mmol/L Potassium 4.2 3.5 - 5.1 mmol/L Chloride 102 97 - 108 mmol/L  
 CO2 27 21 - 32 mmol/L Anion gap 7 5 - 15 mmol/L Glucose 185 (H) 65 - 100 mg/dL BUN 32 (H) 6 - 20 MG/DL Creatinine 0.95 0.55 - 1.02 MG/DL  
 BUN/Creatinine ratio 34 (H) 12 - 20 GFR est AA >60 >60 ml/min/1.73m2 GFR est non-AA 60 (L) >60 ml/min/1.73m2 Calcium 8.2 (L) 8.5 - 10.1 MG/DL Bilirubin, total 1.2 (H) 0.2 - 1.0 MG/DL  
 ALT (SGPT) 42 12 - 78 U/L  
 AST (SGOT) 54 (H) 15 - 37 U/L Alk. phosphatase 63 45 - 117 U/L Protein, total 5.3 (L) 6.4 - 8.2 g/dL Albumin 2.9 (L) 3.5 - 5.0 g/dL Globulin 2.4 2.0 - 4.0 g/dL A-G Ratio 1.2 1.1 - 2.2 PROTHROMBIN TIME + INR Collection Time: 01/09/19  1:37 AM  
Result Value Ref Range INR 3.0 (H) 0.9 - 1.1 Prothrombin time 28.4 (H) 9.0 - 11.1 sec MAGNESIUM Collection Time: 01/09/19  1:37 AM  
Result Value Ref Range Magnesium 2.1 1.6 - 2.4 mg/dL PHOSPHORUS Collection Time: 01/09/19  1:37 AM  
Result Value Ref Range Phosphorus 3.3 2.6 - 4.7 MG/DL  
GLUCOSE, POC Collection Time: 01/09/19  6:23 AM  
Result Value Ref Range Glucose (POC) 210 (H) 65 - 100 mg/dL Performed by Giovanny Ortiz (PCT) Imaging - No New Imaging Hospital Problems: 
Hospital Problems  Date Reviewed: 11/29/2018 Codes Class Noted POA * (Principal) COPD with acute exacerbation ([facility] [place] [facility] ICD-10-CM: J44.1 ICD-9-CM: 491.21  1/6/2019 Unknown Anemia ICD-10-CM: D64.9 ICD-9-CM: 285.9  10/28/2018 Yes Hyperlipidemia ICD-10-CM: E78.5 ICD-9-CM: 272.4  10/26/2018 Yes Chronic back pain (Chronic) ICD-10-CM: M54.9, G89.29 ICD-9-CM: 724.5, 338.29  10/26/2018 Yes History of GI bleed ICD-10-CM: Z87.19 ICD-9-CM: V12.79 Present on Admission 11/25/2016 Yes Diastolic CHF, chronic (HCC) ICD-10-CM: I50.32 
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  
  Tissue MVR 1989 following failed balloon valvuloplasty for MS Redo MVR 2004 due to severe MR, AVR due to AR ([employer]) CAD (coronary artery disease), native coronary artery ICD-10-CM: I25.10 ICD-9-CM: 414.01  1/8/2015 Yes S/P MVR (mitral valve replacement) ICD-10-CM: G29.0 ICD-9-CM: V43.3  1/8/2015 Yes

## 2019-01-09 NOTE — PROGRESS NOTES
Bedside and Verbal shift change report given to Ashley Bauer RN  (oncoming nurse) by Nando Zacarias RN (offgoing nurse). Report included the following information SBAR, Kardex and Recent Results. Problem: Falls - Risk of 
Goal: *Absence of Falls Document Oriana Bell Fall Risk and appropriate interventions in the flowsheet. Outcome: Progressing Towards Goal 
Fall Risk Interventions: 
Mobility Interventions: Patient to call before getting OOB, Bed/chair exit alarm, PT Consult for mobility concerns, OT consult for ADLs 
  
  
Medication Interventions: Patient to call before getting OOB, Evaluate medications/consider consulting pharmacy 
  
Elimination Interventions: Call light in reach, Urinal in reach. . 
  
0730- Bedside and Verbal shift change report given to Ashley Bauer RN  (oncoming nurse) by Merline Halim RN (offgoing nurse). Report included the following information SBAR, Kardex and Recent Results. ..  
  
Pt is very very anxious. . Anxious. Medicated. . 
  
C/O N/V. Hunter Adair Order received. . Medicated. 
  
Pt refused all PO Morning meds, including sq insulin. . 
  
Pt is on GI capsule Study and monitor on. . Pt does not wants to continue it . . SO notify ENDO DEPT. .  
  
1200- - Notify to MD Shelley. . NO new order received. . Inj. novolog  7units  given. . 
  
Md @ [facility]. . NOW PT IS COMPLAING ABD PAIN. .  
  
1200 KUB is being process @ bedside. 
  
1205- Heparin drip is stopped, as her INR is 3.. INR add on  lab. 
  
1230- HH to lab. Hunter Adair 1430- PT/INR to lab. HR is 145- Inj. Diltazem 5mg iv given. Hunter Adair 1600- Family @ bedside. . Pt is more happy. . Less anxious. Eating. 1800- IV Bumex given. . 
 
1900- Bedside and Verbal shift change report given to HANNA AVILA RN  (oncoming nurse) by Ashley Bauer RN (offgoing nurse). Report included the following information SBAR, Kardex and Recent Results. Hunter Adair

## 2019-01-09 NOTE — PROGRESS NOTES
Physical Therapy Note Chart reviewed. Found pt's RN in room with patient lying half on and half off of the bed with pt refusing to reposition. RN reporting that pt has been extremely anxious and uncooperative this morning and was no appropriate for therapy intervention at this time. Will follow up when able and appropriate.  
 
Carlyn Alberts PT, DPT

## 2019-01-09 NOTE — ADVANCED PRACTICE NURSE
Repeat INR now 2.3, will chetan again 's Will give cardizem 5 mg IV now.
<<-----Click here for Discharge Medication Review

## 2019-01-09 NOTE — PROGRESS NOTES
2330 Bedside and Verbal shift change report given to [address] [place] [address] (oncoming nurse) by Cyndy Johnson (offgoing nurse). Report included the following information SBAR, Kardex and [facility]  
 
 
[id number] Patient requested medicine for anxiety. Received prn dose of Atarax po. 
 
0451 blood transfusion initiated. Capsule study was started by endo department. Received order to hold telemetry from physician because it will interfere with test.  
 
9160 unable to re-draw PTT. Patient has blood infusing at this time. Heparin drip stopped per heparin protocol. Bedside and Verbal shift change report given to Gutierrez Cline RN (oncoming nurse) by Skye Tafoya RN (offgoing nurse). Report included the following information SBAR, Kardex and Recent Results.

## 2019-01-09 NOTE — DIABETES MGMT
DTC Progress Note Recommendations/ Comments: Chart reviewed due to hyperglycemia likely due to steroids. Pt is on methylprednisolone 80 mg every 8 hour. Noted per RN note pt refused insulin and all po medications today. Also c/o N&V 
NPO after midnight tonight If appropriate, please consider: 
Increase Lantus to 15 units Will continue to follow and observe BG's once off of steroids. Current hospital DM medication: correction scale Humalog normal sensitivity and Lantus 10 units. Chart reviewed on Edgar Due. Patient is a 66 y.o. female with no history of diabetes. A1c:  
Lab Results Component Value Date/Time Hemoglobin A1c <3.5 (L) 01/06/2019 02:39 PM  
 Hemoglobin A1c <3.5 (L) 10/25/2018 07:40 AM  
 
 
Recent Glucose Results:  
Lab Results Component Value Date/Time  (H) 01/09/2019 01:37 AM  
 GLUCPOC 361 (H) 01/09/2019 10:59 AM  
 GLUCPOC 210 (H) 01/09/2019 06:23 AM  
 GLUCPOC 240 (H) 01/08/2019 09:09 PM  
  
 
Lab Results Component Value Date/Time Creatinine 0.95 01/09/2019 01:37 AM  
 
Estimated Creatinine Clearance: 53.7 mL/min (based on SCr of 0.95 mg/dL). Active Orders Diet DIET NPO Past Midnight, With [employer] PO intake:  
Patient Vitals for the past 72 hrs: 
 % Diet Eaten 01/08/19 1703 50 % 01/08/19 1503 50 % Will continue to follow as needed. Thank you Akbar Camara RN, CDE Time spent: 5 minutes

## 2019-01-09 NOTE — PROGRESS NOTES
Cardiology Progress Note [address], [place], [address] [facility] Phone [phone]; Fax [phone] 
 
 
 
2019 11:46 AM  
 
Admit Date:           2019 Admit Diagnosis:  COPD with acute exacerbation ([facility] [address] :          1955 MRN:          [id number] ASSESSMENT/RECOMMENDATION:  
Afib with RVR: goal HR <110, HR driven by anemia/sepsis/COPD 
- HR is better now --> cont PO cardizem - switched to CD today  
- on heparin gtt, while having GI work up  
  
S/P mechanical MVR/AVR:  
- holding coumadin until GI work up in complete - heparin gtt in the interim, can restart coumadin when safe per GI  
- given mechanical valves INR goal 2.5-3.5. - Nml valve function by ECHO 10/28.  
  
Chronic diastolic HF 
- She actually received IVF today for sepsis / JAZZMINE 
- will resume PTA bumex when needed 
  
COPD exacerbation / sepsis - per medicine team  
  
Anemia/GI bleed? H/H slightly down 6.. Watch closely with transfusions - may need IV diuretics post tx 
-Capsule study started this AM. Will resume coumadin when safe per GI.  
-occult blood negative No intake/output data recorded. Last 3 Recorded Weights in this Encounter 19 0309 19 0244 19 4112 Weight: 133 lb 3.2 oz (60.4 kg) 128 lb 1.6 oz (58.1 kg) 128 lb (58.1 kg)  1901 -  0700 In: 802.8 [P.O.:420; I.V.:382.8] Out: - SUBJECTIVE Earlyne Latoya denies  beat, SOB, chest pain or LE edema. 
+fatigue Extremely anxious this morning. No lightheadedness or dizziness 
+palpitations, irregular heart at times Current Facility-Administered Medications Medication Dose Route Frequency  0.9% sodium chloride infusion 250 mL  250 mL IntraVENous PRN  
 0.9% sodium chloride infusion 250 mL  250 mL IntraVENous PRN  
  bumetanide (BUMEX) injection 1 mg  1 mg IntraVENous BID  ondansetron (ZOFRAN) injection 4 mg  4 mg IntraVENous NOW  albuterol (ACCUNEB) nebulizer solution 1.25 mg  1.25 mg Nebulization Q6H RT  
 insulin glargine (LANTUS) injection 10 Units  10 Units SubCUTAneous QHS  alteplase (CATHFLO) 1 mg in sterile water (preservative free) 1 mL injection  1 mg InterCATHeter PRN  
 sodium chloride (NS) flush 10-30 mL  10-30 mL InterCATHeter PRN  
 sodium chloride (NS) flush 10 mL  10 mL InterCATHeter Q24H  
 sodium chloride (NS) flush 10 mL  10 mL InterCATHeter PRN  
 sodium chloride (NS) flush 10-40 mL  10-40 mL InterCATHeter Q8H  
 sodium chloride (NS) flush 20 mL  20 mL InterCATHeter Q24H  
 bacitracin 500 unit/gram packet 1 Packet  1 Packet Topical PRN  
 guaiFENesin ER (MUCINEX) tablet 600 mg  600 mg Oral Q12H  
 dilTIAZem CD (CARDIZEM CD) capsule 240 mg  240 mg Oral DAILY  nystatin (MYCOSTATIN) 100,000 unit/mL oral suspension 500,000 Units  500,000 Units Oral QID  polyethylene glycol (MIRALAX) packet 17 g  17 g Oral DAILY  insulin lispro (HUMALOG) injection   SubCUTAneous AC&HS  
 oxyCODONE-acetaminophen (PERCOCET) 5-325 mg per tablet 1 Tab  1 Tab Oral Q8H PRN  
 hydrOXYzine HCl (ATARAX) tablet 25 mg  25 mg Oral TID PRN  
 doxycycline (VIBRAMYCIN) 100 mg in 0.9% sodium chloride (MBP/ADV) 100 mL  100 mg IntraVENous Q12H  
 heparin 25,000 units in D5W 250 ml infusion  12-25 Units/kg/hr IntraVENous TITRATE  
 0.9% sodium chloride infusion 250 mL  250 mL IntraVENous PRN  
 sodium chloride (NS) flush 5-10 mL  5-10 mL IntraVENous Q8H  
 sodium chloride (NS) flush 5-10 mL  5-10 mL IntraVENous PRN  
 docusate sodium (COLACE) capsule 100 mg  100 mg Oral BID  nicotine (NICODERM CQ) 21 mg/24 hr patch 1 Patch  1 Patch TransDERmal Q24H  
 fluticasone-umeclidin-vilanter 100-62.5-25 mcg dsdv 1 Puff  (Patient Supplied)  1 Puff Inhalation DAILY  lovastatin (MEVACOR) tablet 10 mg  10 mg Oral QHS  roflumilast (DALIRESP) tablet 500 mcg  500 mcg Oral DAILY  0.9% sodium chloride infusion  50 mL/hr IntraVENous PRN  
 Warfarin - Pharmacist dosing   Other PRN  
 methylPREDNISolone (PF) (SOLU-MEDROL) injection 80 mg  80 mg IntraVENous Q8H  
 pantoprazole (PROTONIX) 40 mg in sodium chloride 0.9% 10 mL injection  40 mg IntraVENous Q12H  
 glucose chewable tablet 16 g  4 Tab Oral PRN  
 dextrose (D50W) injection syrg 12.5-25 g  12.5-25 g IntraVENous PRN  
 glucagon (GLUCAGEN) injection 1 mg  1 mg IntraMUSCular PRN OBJECTIVE Intake/Output Summary (Last 24 hours) at 1/9/2019 1039 Last data filed at 1/9/2019 7421 Gross per 24 hour Intake 777.83 ml Output  Net 777.83 ml Review of Systems - History obtained from the patient AS PER  HPI Telemetry afib v rate 80-100s PHYSICAL EXAM  
  
 
Visit Vitals BP (!) 150/99 (BP 1 Location: Left arm, BP Patient Position: At rest) Pulse (!) 110 Temp 98 °F (36.7 °C) Resp 19 Ht 5' 4\" (1.626 m) Wt 128 lb (58.1 kg) SpO2 99% BMI 21.97 kg/m² Gen: Well-developed, thin/ill appearing, in mild distress  alert and oriented x 3/shaking HEENT:  Pink conjunctivae, Hearing grossly normal.No scleral icterus or conjunctival, moist mucous membranes Neck: Supple,No JVD Resp: No accessory muscle use, diminished bases Card: irregular/tachycardic Rate,Rythm, 2/6  murmur crisp click, no rubs or gallop. No thrills. GI:          soft, non-tender MSK: No cyanosis or clubbing, good capillary refill Skin: No rashes or ulcers, + petechia to BUE/chest. Midline sternal scar unremarkable Neuro:  Cranial nerves are grossly intact, moving all four extremities, no focal deficit, follows commands appropriately Psych:  Good insight, oriented to person, place and time, alert, Nml Affect. Very anxious/nervous LE: No edema DATA REVIEW Laboratory and Imaging have been reviewed by me and are notable for Recent Labs 01/07/19 [phone] 01/07/19 
0468 01/07/19 
0125 TROIQ <0.05 0.05* 0.06* Recent Labs 01/09/19 
0137 01/08/19 
1125 01/07/19 
1859 01/07/19 
1453 01/07/19 0125  139  --   --  134* K 4.2 4.3  --   --  4.9  
CO2 27 28  --   --  22 BUN 32* 31*  --   --  29* CREA 0.95 1.01  --   --  1.12* * 171*  --   --  183* PHOS 3.3 3.6  --   --  4.9*  
MG 2.1 2.1  --   --  1.8 WBC 16.1* 24.2*  --  17.4* 32.5* HGB 6.4* 7.5* 6.9* 6.9* 7.9*  
HCT 20.5* 24.1* 22.9* 22.2* 25.6*  275  --  235 325 Sharonda Carver, NP

## 2019-01-10 NOTE — DIABETES MGMT
DTC Progress Note Recommendations/ Comments: Chart reviewed due to hyperglycemia likely due to steroids. BG's > 200 mg/dL, received 13 units of lispro correction yesterday. Methylprednisolone tapered to 60 mg every 8 hour. Was NPO for Endoscopy today Noted Lantus increased to 15 units starting this evening Will continue to follow and observe BG's once off steroids to evaluate possiblity of diabetes. A1c likely inaccurate due to low Hgb and Hct. Current hospital DM medication: correction scale Humalog normal sensitivity and Lantus 10 units, increasing to 15 units this evening. Chart reviewed on Ilane Due. Patient is a 66 y.o. female with no history of diabetes. A1c:  
Lab Results Component Value Date/Time Hemoglobin A1c <3.5 (L) 01/06/2019 02:39 PM  
 Hemoglobin A1c <3.5 (L) 10/25/2018 07:40 AM  
 
 
Recent Glucose Results:  
Lab Results Component Value Date/Time  (H) 01/10/2019 02:31 AM  
 GLUCPOC 209 (H) 01/10/2019 11:54 AM  
 GLUCPOC 294 (H) 01/10/2019 06:16 AM  
 GLUCPOC 328 (H) 01/09/2019 08:33 PM  
  
 
Lab Results Component Value Date/Time Creatinine 1.11 (H) 01/10/2019 02:31 AM  
 
Estimated Creatinine Clearance: 46 mL/min (A) (based on SCr of 1.11 mg/dL (H)). Active Orders Diet DIET DIABETIC CONSISTENT CARB Regular PO intake:  
Patient Vitals for the past 72 hrs: 
 % Diet Eaten 01/10/19 0915 90 % 01/08/19 1703 50 % 01/08/19 1503 50 % Will continue to follow as needed. Thank you Akbar Camara RN, CDE Time spent: 5 minutes

## 2019-01-10 NOTE — PROGRESS NOTES
5:51 PM 
Bridgett stated that she has submitted the referral to [facility]. I will continue to follow. JENNIFER Sharpe I called Bridgett with [facility]. Patient definitely wants to go to inpatient rehab. Patel Salomon will be over shortly to meet with the patient. I will continue to follow.  Thanks JENNIFER Sharpe

## 2019-01-10 NOTE — ROUTINE PROCESS
Bedside and Verbal shift change report given to [facility], RN/FAUSTINA Lopez (oncoming nurse) by Paula Burns RN (offgoing nurse). Report included the following information SBAR, Kardex, Intake/Output, MAR, Accordion, Recent Results, Cardiac Rhythm Afib and Alarm Parameters .

## 2019-01-10 NOTE — PROGRESS NOTES
Problem: Falls - Risk of 
Goal: *Absence of Falls Document Cy [place] Fall Risk and appropriate interventions in the flowsheet. Outcome: Progressing Towards Goal 
Fall Risk Interventions: 
Mobility Interventions: Assess mobility with egress test, Bed/chair exit alarm, Communicate number of staff needed for ambulation/transfer, OT consult for ADLs, Patient to call before getting OOB, PT Consult for mobility concerns, PT Consult for assist device competence Medication Interventions: Assess postural VS orthostatic hypotension, Bed/chair exit alarm, Evaluate medications/consider consulting pharmacy, Patient to call before getting OOB, Teach patient to arise slowly Elimination Interventions: Bed/chair exit alarm, Call light in reach, Elevated toilet seat, Patient to call for help with toileting needs, Toilet paper/wipes in reach, Toileting schedule/hourly rounds Problem: Pressure Injury - Risk of 
Goal: *Prevention of pressure injury Document Jose Scale and appropriate interventions in the flowsheet. Outcome: Progressing Towards Goal 
Pressure Injury Interventions: 
Sensory Interventions: Assess changes in LOC, Avoid rigorous massage over bony prominences, Chair cushion, Check visual cues for pain, Discuss PT/OT consult with provider, Float heels, Keep linens dry and wrinkle-free, Minimize linen layers, Monitor skin under medical devices, Pad between skin to skin, Turn and reposition approx. every two hours (pillows and wedges if needed) Moisture Interventions: Absorbent underpads, Apply protective barrier, creams and emollients, Assess need for specialty bed, Check for incontinence Q2 hours and as needed, Internal/External urinary devices, Maintain skin hydration (lotion/cream), Minimize layers, Moisture barrier, Offer toileting Q_hr Activity Interventions: Assess need for specialty bed, Chair cushion, Increase time out of bed, Pressure redistribution bed/mattress(bed type), PT/OT evaluation Mobility Interventions: Assess need for specialty bed, Chair cushion, Float heels, HOB 30 degrees or less, PT/OT evaluation, Turn and reposition approx. every two hours(pillow and wedges) Nutrition Interventions: Document food/fluid/supplement intake, Discuss nutritional consult with provider, Offer support with meals,snacks and hydration Friction and Shear Interventions: Apply protective barrier, creams and emollients, Feet elevated on foot rest, Foam dressings/transparent film/skin sealants, Lift sheet, Minimize layers

## 2019-01-10 NOTE — PROGRESS NOTES
Problem: Mobility Impaired (Adult and Pediatric) Goal: *Acute Goals and Plan of Care (Insert Text) Physical Therapy Goals Initiated 1/10/2019 1. Patient will move from supine to sit and sit to supine  in bed with supervision/set-up within 7 day(s). 2.  Patient will transfer from bed to chair and chair to bed with minimal assistance/contact guard assist using the least restrictive device within 7 day(s). 3.  Patient will perform sit to stand with minimal assistance/contact guard assist within 7 day(s). 4.  Patient will ambulate with minimal assistance/contact guard assist for 150 feet with the least restrictive device within 7 day(s). physical Therapy EVALUATION Patient: Korina Martinez (23 y.o. female) Date: 1/10/2019 Primary Diagnosis: COPD with acute exacerbation ([address] Procedure(s) (LRB): 
CAPSULE     Complete:  1445 (N/A) 1 Day Post-Op Precautions:   Fall, WBAT 
 
ASSESSMENT : 
Based on the objective data described below, the patient presents with decreased endurance and strength following admission for COPD exacerbation. Patient with multiple medical complications which impact her overall mobility- see below. Patient prior to admission did not require an assistive device, she does require 2L supplemental O2 at baseline. Patient the last few days has been declining activity. Today she is very aggreable and motivated to participate. Patient received supine, overall Supervision with bed mobility, and CGA for transfers and ambulation. Patient used a RW and CGA overall. Her O2 sats on 2L were 96-99% throughout. Patient did have tachycardia throughout activity, at rest 111 bpm, with sitting edge of bed 122-130 bpm, with ambulation between 130-152 bpm= nursing aware. Returned to sitting with heart rate slowly decreasing at 126 bpm.  Patient would benefit from pulmonary rehab at discharge. Ted Pond Patient will benefit from skilled intervention to address the above impairments. Patients rehabilitation potential is considered to be Fair Factors which may influence rehabilitation potential include:  
[]         None noted 
[]         Mental ability/status [x]         Medical condition 
[]         Home/family situation and support systems 
[]         Safety awareness 
[]         Pain tolerance/management 
[]         Other: PLAN : 
Recommendations and Planned Interventions: 
[x]           Bed Mobility Training             [x]    Neuromuscular Re-Education 
[x]           Transfer Training                   []    Orthotic/Prosthetic Training 
[x]           Gait Training                         []    Modalities [x]           Therapeutic Exercises           []    Edema Management/Control 
[x]           Therapeutic Activities            [x]    Patient and Family Training/Education 
[]           Other (comment): Frequency/Duration: Patient will be followed by physical therapy  5 times a week to address goals. Discharge Recommendations: Pulmonary Rehab Further Equipment Recommendations for Discharge: SUBJECTIVE:  
Patient stated Rise [place] want to go to rehab so I can get back to my normal and going home so I can cook dinners for my .  OBJECTIVE DATA SUMMARY:  
HISTORY:   
Past Medical History:  
Diagnosis Date  A-fib ([facility]  Anticoagulant long-term use  CAD (coronary artery disease), native coronary artery   
 mild to moderate by cath  CHF (congestive heart failure) ([facility] [address]  Chronic obstructive pulmonary disease ([facility]  Dyslipidemia  History of vascular access device 10/26/2018 [facility] VAT - 4 FR Midline, 9 cm, R basilic, for difficult iv access  History of vascular access device 01/08/2019 [facility] VAT - 5 FR PICC, right basilic, 36 cm , for limited vessels/heparin  Ill-defined condition   
 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  
Procedure Laterality Date  COLONOSCOPY N/A 11/28/2016 COLONOSCOPY performed by Murray Hare MD at [facility] HEART CATHETERIZATION    
 cabg  HX HYSTERECTOMY    
 BSO  
 HX MITRAL VALVE REPLACEMENT    
 and redo MVR and AVR Prior Level of Function/Home Situation: see above Personal factors and/or comorbidities impacting plan of care: see below Home Situation Home Environment: Private residence # Steps to Enter: 8 Rails to Enter: Yes Hand Rails : Right Wheelchair Ramp: No 
One/Two Story Residence: Split level # of Interior Steps: 8 Interior Rails: Left Lift Chair Available: No 
Living Alone: No 
Support Systems: Spouse/Significant Other/Partner, Family member(s) Patient Expects to be Discharged to[de-identified] Rehabilitation facility Current DME Used/Available at Home: Walker, rolling, Cane, straight, Oxygen, portable EXAMINATION/PRESENTATION/DECISION MAKING: Critical Behavior: 
Neurologic State: Alert Orientation Level: Oriented X4 Cognition: Appropriate decision making, Appropriate for age attention/concentration, Appropriate safety awareness, Follows commands Hearing: Auditory Auditory Impairment: NoneSkin:  All exposed intact Edema: none noted Range Of Motion: 
AROM: Within functional limits PROM: Within functional limits Strength:   
Strength: Generally decreased, functional 
  
  
  
  
  
  
Tone & Sensation:  
Tone: Normal 
  
  
  
  
Sensation: Intact Coordination: 
Coordination: Generally decreased, functional 
Vision:  
  
Functional Mobility: 
Bed Mobility: 
Rolling: Supervision Supine to Sit: Supervision Transfers: 
Sit to Stand: Contact guard assistance Stand to Sit: Contact guard assistance Bed to Chair: Contact guard assistance Balance:  
Sitting: Intact Standing: Impaired Standing - Static: Constant supportAmbulation/Gait Training:Distance (ft): 60 Feet (ft) Assistive Device: Walker, rolling;Gait belt Ambulation - Level of Assistance: Contact guard assistance Gait Description (WDL): Exceptions to [facility] Therapeutic Exercises:  
 
 
Functional Measure: 
Barthel Index: 
 
Bathin Bladder: 5 Bowels: 10 
Groomin Dressing: 10 Feeding: 10 Mobility: 10 Stairs: 5 Toilet Use: 5 Transfer (Bed to Chair and Back): 10 Total: 70 The Barthel ADL Index: Guidelines 1. The index should be used as a record of what a patient does, not as a record of what a patient could do. 2. The main aim is to establish degree of independence from any help, physical or verbal, however minor and for whatever reason. 3. The need for supervision renders the patient not independent. 4. A patient's performance should be established using the best available evidence. Asking the patient, friends/relatives and nurses are the usual sources, but direct observation and common sense are also important. However direct testing is not needed. 5. Usually the patient's performance over the preceding 24-48 hours is important, but occasionally longer periods will be relevant. 6. Middle categories imply that the patient supplies over 50 per cent of the effort. 7. Use of aids to be independent is allowed. Debbi Hobbs., Barthel, D.W. (3830). Functional evaluation: the Barthel Index. [facility] (14)2. FAIZAN Tony Allene Guardian., Cheryl Mcnally.Daren, [address] [place] [address] (). Measuring the change indisability after inpatient rehabilitation; comparison of the responsiveness of the Barthel Index and Functional Ruso Measure. Journal of Neurology, Neurosurgery, and Psychiatry, 66(4), 391-027.  
RONALD Platt, FE Manriquez, Cuco Summers, M.A. (2004.) Assessment of post-stroke quality of life in cost-effectiveness studies: The usefulness of the Barthel Index and the EuroQoL-5D. [facility], 59, 474-17 Physical Therapy Evaluation Charge Determination History Examination Presentation Decision-Making HIGH Complexity :3+ comorbidities / personal factors will impact the outcome/ POC  HIGH Complexity : 4+ Standardized tests and measures addressing body structure, function, activity limitation and / or participation in recreation  MEDIUM Complexity : Evolving with changing characteristics  Other outcome measures Barthel Index  MEDIUM Based on the above components, the patient evaluation is determined to be of the following complexity level: MEDIUM Pain: 
Pain Scale 1: Numeric (0 - 10) Pain Intensity 1: 0 Activity Tolerance:  
 
Please refer to the flowsheet for vital signs taken during this treatment. After treatment:  
[x]         Patient left in no apparent distress sitting up at edge of bed 
[]         Patient left in no apparent distress in bed 
[x]         Call bell left within reach [x]         Nursing notified 
[]         Caregiver present 
[]         Bed alarm activated COMMUNICATION/EDUCATION:  
The patients plan of care was discussed with: Registered Nurse. [x]         Fall prevention education was provided and the patient/caregiver indicated understanding. [x]         Patient/family have participated as able in goal setting and plan of care. [x]         Patient/family agree to work toward stated goals and plan of care. []         Patient understands intent and goals of therapy, but is neutral about his/her participation. []         Patient is unable to participate in goal setting and plan of care. Thank you for this referral. 
Paco Smith, PT, DPT Time Calculation: 22 mins

## 2019-01-10 NOTE — PROGRESS NOTES
Bedside and Verbal shift change report given to nikhil (oncoming nurse) by Lord Renae (offgoing nurse). Report included the following information SBAR, Kardex, Intake/Output, MAR, Recent Results and Cardiac Rhythm a fib. 
 
0900 Shanda at bedside. 1000 Pt requested medication for anxiety prior PT 
 
1443 Pt went to Radiology for ultra sound. Telemetry notified [address] [facility] [address] notified of recent potassium level (5.1).

## 2019-01-10 NOTE — PROGRESS NOTES
[facility] Medicine Residency Resident Note in Brief 
---------------------------------------- 
 
S: 
Notified by nursing of BPs 100/74 and home PO bumex 1mg ordered to start this evening. Went to assess, patient sitting up resting comfortably in bed, states she feels much better. Denies any SOB, CP, HA, dizziness. States she is not quite back to normal but feels improved. Reports LE edema is much improved compared to her baseline LE edema. O: 
Visit Vitals /74 Pulse (!) 112 Temp 97.7 °F (36.5 °C) Resp 20 Ht 5' 4\" (1.626 m) Wt 131 lb 1.6 oz (59.5 kg) SpO2 100% BMI 22.50 kg/m² Gen: NAD, resting comfortably Resp: good air movement, with minimal inspiratory crackles at bilateral lung bases, likely atelectasis. No wheezes/rhonchi 
Cardio: irregularly irregular rhythm Extremities: No edema present. Wrinkles present which is likely due to improved edema. A/P 
 
67 yo F hospitalized for COPD exacerbation and severe sepsis - Will hold evening dose of bumex 1mg daily given no signs of volume overload and low BPs.  
- Continue to monitor blood pressures - If signs of volume overload overnight can consider giving PO bumex at that time. Will pass on to evening team.  
 
Please see full daily progress note for complete plan.  
Severiano Pippin, MD 
5:56 PM

## 2019-01-10 NOTE — PROGRESS NOTES
[facility] Dosing Services: 1/10/2019 Consult for Warfarin Dosing by Pharmacy by Irma Russell Consult provided for this 66 y.o.  female , for indication of  afib and Hx AVR (mechanical valve) PTA Dose: Warfarin 3 mg on Sa/Su/Mo/Tu/We/Th and Warfarin 2 mg on Fr 
 
Dose to achieve an INR goal of 2.5- 3.5 Order entered for Warfarin 1 mg to be given today at 18:00. Significant drug interactions: concerning CMP (elevated transaminases, Tbili), lower GI AVM Previous dose given Held x2 days (last dose 5mg, 1/7/19) PT/INR Lab Results Component Value Date/Time INR 3.4 (H) 01/10/2019 02:31 AM  
  
Platelets Lab Results Component Value Date/Time PLATELET 975 99/54/4891 02:31 AM  
  
H/H Lab Results Component Value Date/Time HGB 7.8 (L) 01/10/2019 02:31 AM  
  
Pharmacist will follow daily and will provide subsequent Warfarin dosing based on clinical status. Raghav Duff, Pharmacist

## 2019-01-10 NOTE — PROGRESS NOTES
[facility]  
Daily Progress Note Date: 1/10/2019 Assessment/Plan:  
Alex Escamilla is a 66 y.o. female who is hospitalized for COPD Exacerbation, initially meeting Severe Sepsis Criteria. Acute on Chronic Respiratory Failure POA in the Setting of COPD Exacerbation: Initially with 6L N/C requirement and intermittently on BiPAP, now on 2LN/C for 48hours. Home O2 2L. RVP neg. SpCx + yeast, 1/6 BCx NGT 
- Albuterol Nebs q6hr RT 
- home Trelegy 
- Continue Doxycycline thru 1/12 
- f/u BCx, SpCx 
- solumedrol 60mg q8h 
- NC O2 for goal of 88-92% sats - BiPaP as needed 
- pulm consulted, appreciate recs 
  Severe Sepsis Rule Out: RESOLVED. Patient's initial SIRS Criteria - elevated WBC, RR< HR explainable by combination of chronic problems and no source of infection present. WBC elevated chronically 2/2 daily steroid use, RR elevated due to COPD exacerbation and HR elevated due to COPD + hx of A fib with RVR. Sepsis workup completed and treatment with fluids initiated. However upon treatment of COPD Excerbation RR, HR improved so unlikley true sepsis as there is no source  1/6 BCx - NGTD, SpCx +. 
- f/u BCx, SpCx 
 
R/O GI Bleed: FOBT negative. HgB 6.7 POA. S/p2 U pRBCs. HgB this morning  7.8.  
- f/u results capsule study - Protonix 40mg BID 
- Transfuse for HgB <7 
- GI Consulted, appreciate recs 
  
Diastolic CHF: Euvolemic on admission. Trop POA . 10 --> .06 --> .5 - Resume home 1mg bumex daily - Strict I&O 
- Cardiology consult, appreciate recs 
  
Atrial Fibrillation s/p MVR on Coumadin (INR POA 1.2) Pt has been seen OP by Dr Padmini Sánchez but has not followed up with him since 2016. Goal INR 2.5 - 3.5. EKG POA: Afib with RVR . Coumadin home dose is 3mg everyday but Friday 2mg. Refused Dilt yesterday 2/2 anxiety and HR was elevated 110-130s.  Discussed importance of this medication with patient who understands and can tolerate medications but not while anxious. INR this AM 3.4. Heparin stopped yesterday - Home Warfarin continued- to be dosed by pharmacy for a INR goal of 2.5-3.5 
- Diltiazem 240 CD 
- Cardiology consult, appreciate recs  
- Daily Mg & Phos  
  
Leukocytosis: Chronic with BL ~22-29 likely due to daily prednisone use. WBC POA 32.0 --> 32.5-->16.1-->15.8 
- Monitor with daily CBC 
  
Hyperglycemia: likely due to chronic steroid dose. . Will adjust insulin based on trending BGs. BGs 182 - 361 yestrday, with 13U Lispro. First dose of Lantus at 10U yesterday eveni 
- Increased Lantus to 15U QHS for tonight, will adjust as BGs today trend 
- SSI, regular sensitivity 
- POC glucose ACHS 
- hypoglycemia protocol 
  
Hyperphosphatemia: Phos 6.0 POA. Phos 3.9 today - Monitor with daily BMP, Mg, Phos 
  
Chronic Back Pain  
- Home Percocet 5-325 mg resumed, q6h PRN  
  
Hyperlipidemia (: TC: 196, T, HDL 88, VLDL 30 & LDL 78) - Home Lovastatin 10 mg  
 
Anxiety: not well controlled. Patient refusing meds 2/2 anxiety  
- attarax 25mg TID prn 
- Xanax . 5mg  
  
FEN/GI - Cardiac Diet Activity - Ambulate as tolerated DVT prophylaxis - appropriately AC following hep, no Coumadin per pharm GI prophylaxis - Protonix Fall prophylaxis - Not indicated at this time. Disposition - Admit to Stepdown. Plan to d/c to TBD. PT/OT consulted. Chase French Code Status - DNR, discussed with patient / caregivers. Patient will be discussed with Dr. Quoc Pace, supervising physician. Bela Rollins MD 
Family Medicine Resident CC: \"I'm feeling pretty good\" Subjective No acute events overnight. Patient breathing comfortably and reports actually taking N/C off for several hours overnight without increased SOB. Reports eating and drinking well overnight. States she is hungry this morning. Denies CP, palpitations, SOB, anxiety.  Discussed importance of taking medications because although the anxiety makes it difficult when her heart rate is elevated it exacerbates anxiety and SOB in a vicious cycle. Patient amenable to PT/OT and taking all scheduled medications today. She is aware of her PRN medications for anxiety. Inpatient Medications Current Facility-Administered Medications Medication Dose Route Frequency  potassium chloride (KLOR-CON) packet for solution 40 mEq  40 mEq Oral TID WITH MEALS  insulin glargine (LANTUS) injection 15 Units  15 Units SubCUTAneous QHS  bumetanide (BUMEX) tablet 1 mg  1 mg Oral QPM  
 ALPRAZolam (XANAX) tablet 0.5 mg  0.5 mg Oral BID PRN  
 0.9% sodium chloride infusion 250 mL  250 mL IntraVENous PRN  
 methylPREDNISolone (PF) (SOLU-MEDROL) injection 60 mg  60 mg IntraVENous Q8H  
 albuterol (ACCUNEB) nebulizer solution 1.25 mg  1.25 mg Nebulization Q6H RT  
 alteplase (CATHFLO) 1 mg in sterile water (preservative free) 1 mL injection  1 mg InterCATHeter PRN  
 sodium chloride (NS) flush 10-30 mL  10-30 mL InterCATHeter PRN  
 sodium chloride (NS) flush 10 mL  10 mL InterCATHeter Q24H  
 sodium chloride (NS) flush 10 mL  10 mL InterCATHeter PRN  
 sodium chloride (NS) flush 10-40 mL  10-40 mL InterCATHeter Q8H  
 sodium chloride (NS) flush 20 mL  20 mL InterCATHeter Q24H  
 bacitracin 500 unit/gram packet 1 Packet  1 Packet Topical PRN  
 guaiFENesin ER (MUCINEX) tablet 600 mg  600 mg Oral Q12H  
 dilTIAZem CD (CARDIZEM CD) capsule 240 mg  240 mg Oral DAILY  nystatin (MYCOSTATIN) 100,000 unit/mL oral suspension 500,000 Units  500,000 Units Oral QID  polyethylene glycol (MIRALAX) packet 17 g  17 g Oral DAILY  insulin lispro (HUMALOG) injection   SubCUTAneous AC&HS  
 oxyCODONE-acetaminophen (PERCOCET) 5-325 mg per tablet 1 Tab  1 Tab Oral Q8H PRN  
 hydrOXYzine HCl (ATARAX) tablet 25 mg  25 mg Oral TID PRN  
 doxycycline (VIBRAMYCIN) 100 mg in 0.9% sodium chloride (MBP/ADV) 100 mL 100 mg IntraVENous Q12H  
 sodium chloride (NS) flush 5-10 mL  5-10 mL IntraVENous Q8H  
 sodium chloride (NS) flush 5-10 mL  5-10 mL IntraVENous PRN  
 docusate sodium (COLACE) capsule 100 mg  100 mg Oral BID  nicotine (NICODERM CQ) 21 mg/24 hr patch 1 Patch  1 Patch TransDERmal Q24H  
 fluticasone-umeclidin-vilanter 100-62.5-25 mcg dsdv 1 Puff  (Patient Supplied)  1 Puff Inhalation DAILY  lovastatin (MEVACOR) tablet 10 mg  10 mg Oral QHS  roflumilast (DALIRESP) tablet 500 mcg  500 mcg Oral DAILY  0.9% sodium chloride infusion  50 mL/hr IntraVENous PRN  pantoprazole (PROTONIX) 40 mg in sodium chloride 0.9% 10 mL injection  40 mg IntraVENous Q12H  
 glucose chewable tablet 16 g  4 Tab Oral PRN  
 dextrose (D50W) injection syrg 12.5-25 g  12.5-25 g IntraVENous PRN  
 glucagon (GLUCAGEN) injection 1 mg  1 mg IntraMUSCular PRN Allergies Allergies Allergen Reactions  Spiriva Respimat [Tiotropium Bromide] Anaphylaxis and Other (comments) Adverse effects; Per RN - pt states that Spiriva caused throat swelling and could not breathe well.  Spiriva Respimat [Tiotropium Bromide] Shortness of Breath  Tomato Shortness of Breath Only occurs with raw tomatoes, tolerates ketchup without issue  Celebrex [Celecoxib] Rash  Celebrex [Celecoxib] Rash  Rocephin [Ceftriaxone] Shortness of Breath  Tomato Rash Objective Vitals: 
Patient Vitals for the past 8 hrs: 
 Temp Pulse Resp BP SpO2  
01/10/19 0326 97.5 °F (36.4 °C) (!) 107 22 113/78 98 % 01/10/19 0236     100 % I/O: 
 
Intake/Output Summary (Last 24 hours) at 1/10/2019 0561 Last data filed at 1/10/2019 5143 Gross per 24 hour Intake 1420 ml Output 2450 ml Net -1030 ml Last shift: 
  No intake/output data recorded. Last 3 shifts: 
  01/08 1901 - 01/10 0700 In: 1677.8 [P.O.:1000; I.V.:367.8] Out: 2450 [Urine:2450] Physical Exam:General: No acute distress. Alert. Cooperative. Head: Normocephalic. Atraumatic. Eyes:  Conjunctiva pink. Sclera white. PERRL. Nose:  Septum midline. Mucosa pink. No drainage. Throat: Mucosa pink. Moist mucous membranes. Respiratory: Pursed lips breathing. No increased work of breathing, no tripoding on my exam. CTAB with improved air movement throughout posterior lung fields. No wheezes, rales, ronchi. Do not appreciate crackles on exam this morning. Cardiovascular: Irregularly irregular rhythm at ~90bpm. Normal S1,S2. No m/r/g. Pulses 2+ throughout. GI: + bowel sounds. Nontender. No rebound tenderness or guarding. Nondistended Extremities: 1+ edema of LE slightly more on L vs R but improved from yesterday. No palpable cord. No tenderness. Laboratory Data Recent Results (from the past 12 hour(s)) GLUCOSE, POC Collection Time: 01/09/19  8:33 PM  
Result Value Ref Range Glucose (POC) 328 (H) 65 - 100 mg/dL Performed by Barbara Levy (PCT) PTT Collection Time: 01/10/19  2:31 AM  
Result Value Ref Range aPTT 27.9 22.1 - 32.0 sec  
 aPTT, therapeutic range     58.0 - 77.0 SECS  
CBC WITH AUTOMATED DIFF Collection Time: 01/10/19  2:31 AM  
Result Value Ref Range WBC 15.8 (H) 3.6 - 11.0 K/uL  
 RBC 2.76 (L) 3.80 - 5.20 M/uL HGB 7.8 (L) 11.5 - 16.0 g/dL HCT 24.5 (L) 35.0 - 47.0 % MCV 88.8 80.0 - 99.0 FL  
 MCH 28.3 26.0 - 34.0 PG  
 MCHC 31.8 30.0 - 36.5 g/dL  
 RDW 16.9 (H) 11.5 - 14.5 % PLATELET 116 451 - 381 K/uL NRBC 0.3 (H) 0  WBC ABSOLUTE NRBC 0.04 (H) 0.00 - 0.01 K/uL NEUTROPHILS 95 (H) 32 - 75 % BAND NEUTROPHILS 1 % LYMPHOCYTES 1 (L) 12 - 49 % MONOCYTES 2 (L) 5 - 13 % EOSINOPHILS 0 0 - 7 % BASOPHILS 0 0 - 1 % MYELOCYTES 1 % IMMATURE GRANULOCYTES 0 0.0 - 0.5 % ABS. NEUTROPHILS 15.2 (H) 1.8 - 8.0 K/UL  
 ABS. LYMPHOCYTES 0.2 (L) 0.8 - 3.5 K/UL  
 ABS. MONOCYTES 0.3 0.0 - 1.0 K/UL  
 ABS. EOSINOPHILS 0.0 0.0 - 0.4 K/UL  
 ABS. BASOPHILS 0.0 0.0 - 0.1 K/UL ABS. IMM. GRANS. 0.0 0.00 - 0.04 K/UL  
 DF MANUAL    
 RBC COMMENTS SCHISTOCYTES PRESENT 
    
 RBC COMMENTS ANISOCYTOSIS 
1+ METABOLIC PANEL, COMPREHENSIVE Collection Time: 01/10/19  2:31 AM  
Result Value Ref Range Sodium 141 136 - 145 mmol/L Potassium 2.9 (L) 3.5 - 5.1 mmol/L Chloride 101 97 - 108 mmol/L  
 CO2 31 21 - 32 mmol/L Anion gap 9 5 - 15 mmol/L Glucose 190 (H) 65 - 100 mg/dL BUN 40 (H) 6 - 20 MG/DL Creatinine 1.11 (H) 0.55 - 1.02 MG/DL  
 BUN/Creatinine ratio 36 (H) 12 - 20 GFR est AA >60 >60 ml/min/1.73m2 GFR est non-AA 50 (L) >60 ml/min/1.73m2 Calcium 7.8 (L) 8.5 - 10.1 MG/DL Bilirubin, total 1.9 (H) 0.2 - 1.0 MG/DL  
 ALT (SGPT) 414 (H) 12 - 78 U/L  
 AST (SGOT) 363 (H) 15 - 37 U/L Alk. phosphatase 76 45 - 117 U/L Protein, total 5.1 (L) 6.4 - 8.2 g/dL Albumin 2.9 (L) 3.5 - 5.0 g/dL Globulin 2.2 2.0 - 4.0 g/dL A-G Ratio 1.3 1.1 - 2.2 MAGNESIUM Collection Time: 01/10/19  2:31 AM  
Result Value Ref Range Magnesium 1.7 1.6 - 2.4 mg/dL PHOSPHORUS Collection Time: 01/10/19  2:31 AM  
Result Value Ref Range Phosphorus 3.9 2.6 - 4.7 MG/DL PROTHROMBIN TIME + INR Collection Time: 01/10/19  2:31 AM  
Result Value Ref Range INR 3.4 (H) 0.9 - 1.1 Prothrombin time 32.1 (H) 9.0 - 11.1 sec GLUCOSE, POC Collection Time: 01/10/19  6:16 AM  
Result Value Ref Range Glucose (POC) 294 (H) 65 - 100 mg/dL Performed by Red Guevara (PCT) Imaging - No New Imaging Hospital Problems: 
Hospital Problems  Date Reviewed: 11/29/2018 Codes Class Noted POA * (Principal) [facility] ICD-10-CM: J44.1 ICD-9-CM: 491.21  1/6/2019 Unknown Anemia ICD-10-CM: D64.9 ICD-9-CM: 285.9  10/28/2018 Yes Hyperlipidemia ICD-10-CM: E78.5 ICD-9-CM: 272.4  10/26/2018 Yes Chronic back pain (Chronic) ICD-10-CM: M54.9, G89.29 ICD-9-CM: 724.5, 338.29  10/26/2018 Yes History of GI bleed ICD-10-CM: Z87.19 ICD-9-CM: V12.79 Present on Admission 11/25/2016 Yes Diastolic CHF, chronic (HCC) ICD-10-CM: I50.32 
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  
  Tissue MVR 1989 following failed balloon valvuloplasty for MS Redo MVR 2004 due to severe MR, AVR due to AR ([facility]) CAD (coronary artery disease), native coronary artery ICD-10-CM: I25.10 ICD-9-CM: 414.01  1/8/2015 Yes S/P MVR (mitral valve replacement) ICD-10-CM: W14.2 ICD-9-CM: V43.3  1/8/2015 Yes

## 2019-01-10 NOTE — PROGRESS NOTES
[facility] PROGRESS NOTEPulmonary, Critical Care, and Sleep Medicine Name: Sheila Castro MRN: [id number] : 1955 Hospital: [facility] Date: 1/10/2019  Admission Date: 2019 Chart and notes reviewed. Data reviewed. I review the patient's current medications in the medical record at each encounter.  I have evaluated and examined the patient. .  
 
Overnight events reviewed: 
 
Afebrile BP stable HR improved: low 100s O2 sats 98% on 2L NC 
WBC 15.8 Hgb 7.8 (9.1 yesterday) Capsule study with gastritis and several AVMs in the small bowel Sputum culture positive for few yeast, final  
RVP negative K 2.9 Creat 1.11 Fluid balance: -1030 ROS:   Feels better and looks more comfortable today. Feels that her breathing is at baseline. Denies complaints. Wants to go to rehab. Vital Signs: 
Visit Vitals /68 (BP 1 Location: Left arm, BP Patient Position: At rest) Pulse (!) 119 Temp 98.2 °F (36.8 °C) Resp 20 Ht 5' 4\" (1.626 m) Wt 59.5 kg (131 lb 1.6 oz) SpO2 95% BMI 22.50 kg/m² O2 Device: Nasal cannula O2 Flow Rate (L/min): 2 l/min Temp (24hrs), Av.9 °F (36.6 °C), Min:97.5 °F (36.4 °C), Max:98.2 °F (36.8 °C) Intake/Output:  
Last shift:      01/10 07 - 01/10 1900 In: 240 [P.O.:240] Out: 1100 [Urine:1100] Last 3 shifts:  190 - 01/10 0700 In: 1677.8 [P.O.:1000; I.V.:367.8] Out: 2450 [Urine:2450] Intake/Output Summary (Last 24 hours) at 1/10/2019 1422 Last data filed at 1/10/2019 4758 Gross per 24 hour Intake 1300 ml Output 3250 ml Net -1950 ml Telemetry:  
 
Physical Exam:  
General:  Alert, cooperative, no distress, appears stated age. Head:  Normocephalic, without obvious abnormality, atraumatic. Eyes:  Conjunctivae/corneas clear. Nose: Nares normal. Septum midline.  Mucosa normal.   
Throat: Lips, mucosa, and tongue normal. Teeth and gums normal.  
 Neck: Supple, symmetrical, trachea midline, no adenopathy Back:   Symmetric, no curvature. ROM normal.  
Lungs:   Fair air movement, no wheezing noted today Chest wall:  No tenderness or deformity. Heart:  Irregularly irregular S1, S2 normal, no murmur, click, rub or gallop. Abdomen:   Soft, non-tender. Bowel sounds normal.  
Extremities: Extremities normal, atraumatic, no cyanosis or edema. Pulses: 2+ and symmetric all extremities. Skin: Skin color, texture, turgor normal. No rashes or lesions Lymph nodes: Cervical, supraclavicular, and axillary nodes normal.  
Neurologic: Grossly nonfocal  
 
DATA: 
MAR reviewed and pertinent medications noted or modified as needed Labs: 
Recent Labs  
  01/10/19 
0231 01/09/19 
1231 01/09/19 
0137 01/08/19 
1125 WBC 15.8*  --  16.1* 24.2* HGB 7.8* 9.1* 6.4* 7.5* HCT 24.5* 28.9* 20.5* 24.1*  
  --  201 275 Recent Labs  
  01/10/19 
0231 01/09/19 470 [id number] 01/09/19 
1042 01/09/19 
0137  01/08/19 
1125   --   --  136  --  139  
K 2.9*  --   --  4.2  --  4.3   --   --  102  --  103 CO2 31  --   --  27  --  28  
*  --   --  185*  --  171* BUN 40*  --   --  32*  --  31* CREA 1.11*  --   --  0.95  --  1.01  
CA 7.8*  --   --  8.2*  --  8.6 MG 1.7  --   --  2.1  --  2.1 PHOS 3.9  --   --  3.3  --  3.6 ALB 2.9*  --   --  2.9*  --  3.3* TBILI 1.9*  --   --  1.2*  --  1.3*  
SGOT 363*  --   --  54*  --  55* *  --   --  42  --  42 INR 3.4* 3.0* 2.3* 3.0*   < >  --   
 < > = values in this interval not displayed. Imaging:  I have personally reviewed the patients radiographs and reports. IMPRESSION 
· Acute on Chronic Hypoxic Respiratory Failure; improving · Acute COPD Exacerbation · Anemia · Leukocytosis; improving · History of Diastolic Heart Failure · S/p Mechanical MVR/AVR · Current Smoker · Hx of Atrial Fibrillation; on [facility] ·  
  PLAN 
· Supplemental O2 to keep sats >90% · Continue scheduled nebs · IVCS; will wean again today: maybe able to switch to prednisone tomorrow · Rate control per Cardiology · Home Trelegy · Doxycycline x 5 days (today is day 4) · Encouraged smoking cessation, Nicotine patch in place · C/W home Bumex · Pharmacy to dose Coumadin · Nicotine patch; encouraged to quit smoking · Protonix · DNR Katie Bronw, NP

## 2019-01-10 NOTE — PROCEDURES
[address] Kendall Burdick M.D. 
[phone] M2A Capsule Endoscopy Procedure NAME:  Sheila Castro :   1955 MRN:   [id number] Date/Time:  1/10/2019 Procedure Type: M2A Capsule Indications:  Iron deficiency anemia Pre-operative Diagnosis: see indication above Post-operative Diagnosis:  See findings below : Kendall Burdick MD 
 
Referring Provider: Cheyenne Rodriguez DO Anethesia/Sedation: none Procedure Details After obtaining informed consent, the [facility] wireless capsule endoscopy exam of the small intestine commenced per our usual protocol. I reviewed the 8 hour video and documented the standard anatomic landmarks. I then carefully examined the small intestinal images from the duodenal bulb to the cecum. The quality of the small intestinal preparation was good. The small intestinal mucosa appeared normal without any clear signs of active bleeding. Impression: - Gastritis was noted on imaging - Several diminutive AVM's were noted in the small bowel. No stigmata of bleeding was noted. - There was loss of signal noted at the latter part of the exam coinciding when the patient took off her recording device. Recommendations - 
1) Monitor labs closely and transfuse to keep Hgb > 7gms/dl 2) Once respiratory status improves consider proceeding with an upper and lower endoscopy for evaluation of GI source of blood loss 3) Continue iron supplementation for now Kendall Burdick MD 
1/10/2019  11:47 AM

## 2019-01-10 NOTE — PROGRESS NOTES
Dr Frank Reynoso notified that pt does not want any po K powder \"It made me nauseous\". Telephone order to dc order and place po tablet order for 40 mEq now & 40 mEq noon. [phone] pt off floor to [address], tele notified [phone] pt back on floor, tele notified [phone] [facility] [phone] Winston Lee notified of /74, verbal order to hold bumex

## 2019-01-10 NOTE — PROGRESS NOTES
Nutrition Assessment: 
 
RECOMMENDATIONS/INTERVENTION(S):  
1. Liberalize diet order to Consistent CHO with no salt packets to promote increased PO intakes. 2. Monitor PO intakes, weight, labs, GI. ASSESSMENT:  
1/10:  65 yo female admitted for COPD exacerbation. RD assessment for LOS. PMhx: CAD, CHF, COPD. Weight WNL per BMI per age. Pt states it remains stable at home, no significant changes. 2gm Na+/Consistent CHO diet ordered. Pt feels the diet order is too restrictive and limiting her ability to eat as much as she does at home. States she does not add salt to any of her foods at home. Intakes documented as 50% meals. Pt states she eats more than that/meal at home. Does not follow a diet at home, states she eats what she wants. Labs: K+ 2.9, , POC -328-294, , . Meds: Bumex, Lantus, Humalog, Solumedrol, MgSu, statin, miralax, Kcl.  S/P capsule study 1/9 secondary to c/o odynophagia - results pending - GI following. On 2L NC O2. Diet Order: Cardiac, Consistent carb 
% Eaten:   
Patient Vitals for the past 72 hrs: 
 % Diet Eaten 01/08/19 1703 50 % 01/08/19 1503 50 % Pertinent Medications: [x] Reviewed Labs: [x] Reviewed Anthropometrics: Height: 5' 4\" (162.6 cm) Weight: 59.5 kg (131 lb 1.6 oz) IBW (%IBW):   ( ) UBW (%UBW):   (  %) BMI: Body mass index is 22.5 kg/m². This BMI is indicative of: 
 [] Underweight    [x] Normal    [] Overweight    []  Obesity    []  Extreme Obesity (BMI>40) Estimated Nutrition Needs (Based on): 4451 Kcals/day(BMR 1144 x AF 1.3) , 48 g(48-59gm (0.8-1gm/kg/d)) Protein Carbohydrate: At Least 130 g/day  Fluids: 1487 mL/day (1 ml/kcal) Last BM: 1/9   [x]Active     []Hyperactive  []Hypoactive       [] Absent   BS Skin:    [] Intact   [] Incision  [] Breakdown   [] DTI   [] Tears/Excoriation/Abrasion  [x]Edema- 1+ pitting BLE [] Other: Wt Readings from Last 30 Encounters:  
01/10/19 59.5 kg (131 lb 1.6 oz) 11/30/18 57.2 kg (126 lb)  
11/29/18 57.2 kg (126 lb)  
11/27/18 61.7 kg (136 lb)  
11/20/18 61.2 kg (135 lb)  
11/16/18 61.2 kg (135 lb)  
11/15/18 61.7 kg (136 lb)  
11/14/18 61.2 kg (135 lb)  
11/12/18 61.2 kg (135 lb)  
11/10/18 61.2 kg (135 lb) 11/08/18 61.7 kg (136 lb) 11/06/18 61.7 kg (136 lb) 10/31/18 62.9 kg (138 lb 10.7 oz) 10/11/18 55.3 kg (122 lb) 10/05/18 56.7 kg (125 lb)  
09/27/18 57.2 kg (126 lb)  
09/20/18 60.8 kg (134 lb)  
09/13/18 57.2 kg (126 lb)  
09/13/18 56.1 kg (123 lb 9.6 oz) 09/06/18 59 kg (130 lb) 08/29/18 56.2 kg (124 lb) 08/24/18 56.2 kg (124 lb) 08/22/18 56.2 kg (124 lb) 08/17/18 56.2 kg (124 lb) 08/15/18 56.2 kg (124 lb) 08/13/18 56.2 kg (124 lb) 08/10/18 56.2 kg (124 lb) 08/09/18 57.8 kg (127 lb 6 oz) 08/08/18 56.2 kg (124 lb) 08/06/18 56.2 kg (124 lb) NUTRITION DIAGNOSES:  
Problem:  Altered nutrition-related lab values Etiology: related to endocrine dysfunction Signs/Symptoms: as evidenced by POC  -328mg/dL NUTRITION INTERVENTIONS: 
               
  
GOAL:  
Consume > 50% all meals with BG < 160mg/dL in next 4-6 days Cultural, Pentecostal, or Ethnic Dietary Needs: None EDUCATION & DISCHARGE NEEDS:  
 [x] None Identified 
 [] Identified and Education Provided/Documented 
 [] Identified and Pt declined/was not appropriate [x] Interdisciplinary Care Plan Reviewed/Documented  
 [x] Discharge Needs:    Continue to follow Consistent CHO diet at home 
 [] No Nutrition Related Discharge Needs NUTRITION RISK:  
Pt Is At Nutrition Risk  [x] No Nutrition Risk Identified  [] PT SEEN FOR:  
 []  MD Consult: []Calorie Count []Diabetic Diet Education []Diet Education []Electrolyte Management []General Nutrition Management and Supplements []Management of Tube Feeding []TPN Recommendations []  RN Referral:  []MST score >=2 
   []Enteral/Parenteral Nutrition PTA []Pregnant: Gestational DM or Multigestation  
              [] Pressure Ulcer 
 
[]  Low BMI      [x]  Length of Stay       [] Dysphagia Diet         [] Ventilator 
[]  Follow-up Previous Recommendations: 
 [] Implemented          [] Not Implemented          [x] Not Applicable Previous Goal: 
 [] Met              [] Progressing Towards Goal              [] Not Progressing Towards Goal   [x] Not Applicable Hailey Treadwell, JONAS Pager [phone] Phone [phone]

## 2019-01-10 NOTE — PROGRESS NOTES
[address] [facility] NP 
[phone] GI PROGRESS NOTE 
 
 
NAME: Akash Austin :  1955 MRN:  [id number] Subjective:  
Complaints of generalized abdominal pain. Denies any further black or bloody stools. Objective: In NAD 
 
 
VITALS:  
Last 24hrs VS reviewed since prior progress note. Most recent are: 
Visit Vitals /52 (BP 1 Location: Left arm, BP Patient Position: At rest) Pulse (!) 111 Temp 97.8 °F (36.6 °C) Resp 19 Ht 5' 4\" (1.626 m) Wt 59.5 kg (131 lb 1.6 oz) SpO2 98% BMI 22.50 kg/m² Intake/Output Summary (Last 24 hours) at 1/10/2019 1571 Last data filed at 1/10/2019 1937 Gross per 24 hour Intake 1370 ml Output 3250 ml Net -1880 ml PHYSICAL EXAM: 
General: Alert, in no acute distress HEENT: Anicteric sclerae. Lungs:            Diminished Breath Sounds Bilaterally. Heart:  Regular  rhythm, Abdomen: Soft, Non distended, TTP in RUQ and LUQ tender.  (+)Bowel sounds, no HSM Extremities: No c/c/e Neurologic:  CN 2-12 gi, Alert and oriented X 3. No acute neurological distress Psych:   Good insight. Not anxious nor agitated. Lab Data Reviewed:  
Recent Labs  
  01/10/19 
0231 19 
1231 19 
1854 WBC 15.8*  --  16.1* HGB 7.8* 9.1* 6.4* HCT 24.5* 28.9* 20.5*   --  201 Recent Labs  
  01/10/19 
0231 19 
0833  136  
K 2.9* 4.2  102 CO2 31 27 BUN 40* 32* CREA 1.11* 0.95 * 185* PHOS 3.9 3.3 CA 7.8* 8.2* Recent Labs  
  01/10/19 
0231 19 
8689 SGOT 363* 54* AP 76 63  
TP 5.1* 5.3* ALB 2.9* 2.9*  
GLOB 2.2 2.4  
 
 
________________________________________________________________________ Patient Active Problem List  
Diagnosis Code  Chest pain R07.9  COPD exacerbation ([facility] J44.1  Hypokalemia E87.6  [facility]acute kidney [facility]) [facility] N17.9  Hyperglycemia R73.9  A-fib ([facility] I48.91  
  Chronic anticoagulation Z79.01  
 Hyperlipidemia E78.5  Chronic back pain M54.9, G89.29  
 Anemia D64.9  
 GI bleed K92.2  
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  
 S/P MVR (mitral valve replacement) Z95.2  COPD (chronic obstructive pulmonary disease) (HCC) J44.9  Tobacco abuse Z72.0  Diastolic CHF, chronic (HCC) I50.32  Chronic atrial fibrillation (HCC) I48.2  History of GI bleed Z87.19  
 H/O total hysterectomy Z90.710  
 Hypokalemia E87.6  Thrush of mouth and esophagus ([facility]) B37.81, B37.0  
 SOB (shortness of breath) R06.02  
 COPD with acute exacerbation ([facility] J44.1 Assessment and Plan: Anemia:  Dipped to 7.8 this morning. 
- Capsule study results pending. 
- Monitor hemoglobin and transfuse to goal of 7. Abdominal Pain and Elevated LFTs. Acute Elevation in LFTs noted on labs this morning. TTP on RUQ and RLQ today on exam. 
- US to further evaluate. - Trend CMP. - Avoid hepatotoxic medications. Will continue to follow.  
 
  
Signed By: Kevin Payne NP   
 1/10/2019  9:41 AM

## 2019-01-10 NOTE — PROGRESS NOTES
Problem: Self Care Deficits Care Plan (Adult) Goal: *Acute Goals and Plan of Care (Insert Text) Occupational Therapy Goals Initiated 1/10/2019 1. Patient will perform grooming standing at sink with VSS with supervision/set-up within 7 day(s). 2.  Patient will perform lower body dressing with AD PRN with supervision/set-up within 7 day(s). 3.  Patient will perform bathing with supervision/set-up within 7 day(s). 4.  Patient will perform toilet transfers with supervision/set-up within 7 day(s). 5.  Patient will perform all aspects of toileting with supervision/set-up within 7 day(s). 6.  Patient will participate in upper extremity therapeutic exercise/activities with VSS with supervision/set-up for 8 minutes within 7 day(s). 7.  Patient will utilize energy conservation techniques during functional activities with verbal cues within 7 day(s). Occupational Therapy EVALUATION Patient: Korina Martinez (14 y.o. female) Date: 1/10/2019 Primary Diagnosis: COPD with acute exacerbation ([address] Procedure(s) (LRB): 
CAPSULE     Complete:  1445 (N/A) 1 Day Post-Op Precautions:   Fall, WBAT 
 
ASSESSMENT : 
Based on the objective data described below, the patient presents with mild impairments in functional mobility, activity tolerance, balance and overall strength impacting her ability to complete ADL tasks with admission for COPD exacerbation. Nursing cleared for therapy. Received up in chair, pleasant and agreeable to therapy. She lives with her  and adult child and grandchild. PTA mod indep ADL tasks without AD- limited tolerance for functional ambulation secondary to HA.  2 L O2 at baseline. Received on 2L. Completed toileting task at [facility] level with CGA for amb and min A for hygiene. Limited ambulation secondary to elevated HR to upper 130s with light activity, transfer to [facility] approx 15 ft.   Patient is limited in act tolerance with elevated HR with light activity. At this time requiring freq rest breaks and seated vs standing aspects of tasks with CGA-mod A for ADL tasks. Demo good rehab potential with medical intervention and skilled therapy services to return to PLOF. Recommend inpatient rehab. Patient in agreement. Patient will benefit from skilled intervention to address the above impairments. Patients rehabilitation potential is considered to be Fair Factors which may influence rehabilitation potential include:  
[x]             None noted []             Mental ability/status []             Medical condition []             Home/family situation and support systems []             Safety awareness []             Pain tolerance/management 
[]             Other: PLAN : 
Recommendations and Planned Interventions: 
[x]               Self Care Training                  [x]        Therapeutic Activities [x]               Functional Mobility Training    []        Cognitive Retraining 
[x]               Therapeutic Exercises           [x]        Endurance Activities [x]               Balance Training                   []        Neuromuscular Re-Education []               Visual/Perceptual Training     [x]   Home Safety Training 
[x]               Patient Education                 [x]        Family Training/Education []               Other (comment): Frequency/Duration: Patient will be followed by occupational therapy 5 times a week to address goals. Discharge Recommendations: Inpatient Rehab Further Equipment Recommendations for Discharge: TBD rehab SUBJECTIVE:  
Patient stated I am feeling so much better, I was not myself.  OBJECTIVE DATA SUMMARY:  
HISTORY:  
Past Medical History:  
Diagnosis Date  [facility]  Anticoagulant long-term use  CAD (coronary artery disease), native coronary artery   
 mild to moderate by cath  CHF (congestive heart failure) ([facility]  Chronic obstructive pulmonary disease ([facility]  Dyslipidemia  History of vascular access device 10/26/2018 [facility] VAT - 4 FR Midline, 9 cm, R basilic, for difficult iv access  History of vascular access device 01/08/2019 [facility] VAT - 5 FR PICC, right basilic, 36 cm , for limited vessels/heparin  Ill-defined condition   
 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  
Procedure Laterality Date  COLONOSCOPY N/A 11/28/2016 COLONOSCOPY performed by Maira Narvaez MD at [facility]  HX HEART CATHETERIZATION    
 cabg  HX HYSTERECTOMY    
 BSO  
 HX MITRAL VALVE REPLACEMENT    
 and redo MVR and AVR Prior Level of Function/Environment/Context: Home with family. Reports mod indep ADL tasks without AD-use of walls/furniture for support. Typically walks barefooted. Family assists with IADL tasks. Stays on main and upper floor of split level. Approx 8 stairs to get in and to get to upper level. Occupations in which the patient is/was successful, what are the barriers preventing that success:  
Performance Patterns (routines, roles, habits, and rituals):  
Personal Interests and/or values:  
Expanded or extensive additional review of patient history:  
 
Home Situation Home Environment: Private residence # Steps to Enter: 8 Rails to Enter: Yes Hand Rails : Right Wheelchair Ramp: No 
One/Two Story Residence: (Tri level) # of Interior Steps: 8 Interior Rails: Left Lift Chair Available: No 
Living Alone: No 
Support Systems: Spouse/Significant Other/Partner, Child(lauryn), Family member(s) Patient Expects to be Discharged to[de-identified] Rehabilitation facility Current DME Used/Available at Home: Walker, rolling Tub or Shower Type: Tub/Shower combination Hand dominance: RightEXAMINATION OF PERFORMANCE DEFICITS: 
Cognitive/Behavioral Status: 
Neurologic State: Alert Orientation Level: Oriented X4 Hearing: Auditory Auditory Impairment: None Vision/Perceptual:   
    
    
Range of Motion: 
AROM: Within functional limits PROM: Within functional limits Strength: 
Strength: Generally decreased, functional 
  
Coordination: 
Coordination: Generally decreased, functional 
Fine Motor Skills-Upper: Left Intact; Right Intact Gross Motor Skills-Upper: Left Intact; Right Intact Tone & Sensation: 
Tone: Normal 
Sensation: Intact Balance: 
Sitting: Intact Standing: Impaired Standing - Static: Constant support Functional Mobility and Transfers for ADLs:Bed Mobility: 
Rolling: Supervision Supine to Sit: Supervision Transfers: 
Sit to Stand: Contact guard assistance Stand to Sit: Contact guard assistance Bed to Chair: Contact guard assistance Toilet Transfer : Contact guard assistance ADL Assessment: 
Feeding: Independent Oral Facial Hygiene/Grooming: Supervision(seated secondary to elevated HR) Bathing: Minimum assistance Upper Body Dressing: Supervision Lower Body Dressing: Moderate assistance Toileting: Minimum assistance(to BSC) ADL Intervention and task modifications: 
  
Patient instructed and indicated understanding the benefits of maintaining activity tolerance, functional mobility, and independence with self care tasks during acute stay  to ensure safe return home and to baseline. Encouraged patient to increase frequency and duration OOB, be out of bed for all meals, perform daily ADLs (as approved by RN/MD regarding bathing etc), and performing functional mobility to/from bathroom. Received on 2L. Completed toileting task at [facility] level with CGA for amb and min A for hygiene. Provided verbal cues for pacing to decrease pace. Safe use of RW with toileting and ambulation. Amb toward bathroom- with elevated HR to 138 on monitor with light activity. Approx 15 ft to [facility] instead of full distance to bathroom. Seated rest on commode HR decrease to 113 and returned to chair. Toileting Bladder Hygiene: Minimum assistance Functional Measure: 
Barthel Index: 
 
Bathin Bladder: 5 Bowels: 10 
Groomin Dressin Feeding: 10 Mobility: 5 Stairs: 5 Toilet Use: 5 Transfer (Bed to Chair and Back): 10 Total: 55 The Barthel ADL Index: Guidelines 1. The index should be used as a record of what a patient does, not as a record of what a patient could do. 2. The main aim is to establish degree of independence from any help, physical or verbal, however minor and for whatever reason. 3. The need for supervision renders the patient not independent. 4. A patient's performance should be established using the best available evidence. Asking the patient, friends/relatives and nurses are the usual sources, but direct observation and common sense are also important. However direct testing is not needed. 5. Usually the patient's performance over the preceding 24-48 hours is important, but occasionally longer periods will be relevant. 6. Middle categories imply that the patient supplies over 50 per cent of the effort. 7. Use of aids to be independent is allowed. Al Sumner., Barthel, D.W. (8214). Functional evaluation: the Barthel Index. [address] [facility] (14)2. FAIZAN Haley, Katty Juan., Marlin De Leon.[facility], [address] [place] [address] (). Measuring the change indisability after inpatient rehabilitation; comparison of the responsiveness of the Barthel Index and Functional [place] Measure. Journal of Neurology, Neurosurgery, and Psychiatry, 66(4), 078-827.  
Akua Renee, N.J.A, FE Manriquez, & Mohsen Brennan MNettieA. (2004.) Assessment of post-stroke quality of life in cost-effectiveness studies: The usefulness of the Barthel Index and the EuroQoL-5D. [facility], 13, [phone] Occupational Therapy Evaluation Charge Determination History Examination Decision-Making LOW Complexity : Brief history review  LOW Complexity : 1-3 performance deficits relating to physical, cognitive , or psychosocial skils that result in activity limitations and / or participation restrictions  LOW Complexity : No comorbidities that affect functional and no verbal or physical assistance needed to complete eval tasks Based on the above components, the patient evaluation is determined to be of the following complexity level: LOW Pain: 
Pain Scale 1: Numeric (0 - 10) Pain Intensity 1: 7 Activity Tolerance:  
Mild HA noted. SpO2 stable. Elevated HR with light activity. Denies dizziness After treatment:  
[x] Patient left in no apparent distress sitting up in chair 
[] Patient left in no apparent distress in bed 
[x] Call bell left within reach [x] Nursing notified 
[] Caregiver present 
[] Bed alarm activated COMMUNICATION/EDUCATION:  
The patients plan of care was discussed with: Registered Nurse and . [x] Home safety education was provided and the patient/caregiver indicated understanding. [] Patient/family have participated as able in goal setting and plan of care. [x] Patient/family agree to work toward stated goals and plan of care. [] Patient understands intent and goals of therapy, but is neutral about his/her participation. [] Patient is unable to participate in goal setting and plan of care. This patients plan of care is appropriate for delegation to [facility]. Thank you for this referral. 
Mariam Funes OT Time Calculation: 13 mins

## 2019-01-10 NOTE — PROGRESS NOTES
Noted capsule study results D/w DR Evelyn Mckeon, no plans for further work up at this time Okay to resume coumadin -goal INR 2.5-3.5 Will consult pharmacy Follow up with Dr Shavon Eubanks on AVS

## 2019-01-11 NOTE — DIABETES MGMT
DTC Progress Note Recommendations/ Comments: Chart reviewed due to hyperglycemia likely due to steroids. BG's > 250 mg/dL, received 17 units of lispro correction yesterday. Methylprednisolone tapered to 40 mg every 8 hour. PO intake varied 30 - 90% yesterday Noted Lantus increased to 20 units starting this evening If appropriate please consider 
-continue to titrate Lantus to achieve FBG < 140 
- once eating, if post prandial BG's are elevated consider addition of Amaryl 2 mg daily Will continue to follow and observe BG's once off steroids to evaluate possiblity of diabetes. A1c likely inaccurate due to low Hgb and Hct. Current hospital DM medication: correction scale Humalog normal sensitivity and Lantus 10 units, increasing to 15 units this evening. Chart reviewed on [facility]. Patient is a 66 y.o. female with no history of diabetes. A1c:  
Lab Results Component Value Date/Time Hemoglobin A1c <3.5 (L) 01/06/2019 02:39 PM  
 Hemoglobin A1c <3.5 (L) 10/25/2018 07:40 AM  
 
 
Recent Glucose Results:  
Lab Results Component Value Date/Time  (H) 01/11/2019 03:29 AM  
  (H) 01/10/2019 02:30 PM  
 GLUCPOC 258 (H) 01/11/2019 06:34 AM  
 GLUCPOC 328 (H) 01/10/2019 09:16 PM  
 GLUCPOC 327 (H) 01/10/2019 09:14 PM  
  
 
Lab Results Component Value Date/Time Creatinine 0.91 01/11/2019 03:29 AM  
 
Estimated Creatinine Clearance: 56.1 mL/min (based on SCr of 0.91 mg/dL). Active Orders Diet DIET REGULAR 1.5 GM NA  
  
 
PO intake:  
Patient Vitals for the past 72 hrs: 
 % Diet Eaten 01/10/19 1700 30 % 01/10/19 0915 90 % 01/08/19 1703 50 % 01/08/19 1503 50 % Will continue to follow as needed. Thank you Alexander Reeves RN, CDE Time spent: 5 minutes

## 2019-01-11 NOTE — PROGRESS NOTES
[facility] PROGRESS NOTEPulmonary, Critical Care, and Sleep Medicine Name: Omari Apodaca MRN: [id number] : 1955 Hospital: [facility] Date: 2019  Admission Date: 2019 Chart and notes reviewed. Data reviewed. I review the patient's current medications in the medical record at each encounter.  I have evaluated and examined the patient. .  
 
Overnight events reviewed: 
 
Afebrile BP stable HR improved: low 100s O2 sats 100% on 2L NC 
WBC 16.2 Hgb 7.8 Platelet 404 Capsule study with gastritis and several AVMs in the small bowel Sputum culture positive for few yeast, final  
RVP negative K 4.7 Creat . 80 Fluid balance: -885 ROS:   Feels fine from a breathing perspective. Having some back pain. Anxious to go to rehab. Vital Signs: 
Visit Vitals /77 (BP 1 Location: Left arm, BP Patient Position: At rest) Pulse (!) 111 Temp 97.5 °F (36.4 °C) Resp 22 Ht 5' 4\" (1.626 m) Wt 54.8 kg (120 lb 14.4 oz) SpO2 100% BMI 20.75 kg/m² O2 Device: Nasal cannula O2 Flow Rate (L/min): 2 l/min Temp (24hrs), Av.1 °F (36.7 °C), Min:97.5 °F (36.4 °C), Max:98.9 °F (37.2 °C) Intake/Output:  
Last shift:      701 - 1900 In: 480 [P.O.:480] Out: - Last 3 shifts: 1901 -  07 In: 1925 [P.O.:1530; I.V.:395] Out: 3200 [[id number]] Intake/Output Summary (Last 24 hours) at 2019 1355 Last data filed at 2019 1223 Gross per 24 hour Intake 1405 ml Output 950 ml Net 455 ml Telemetry:  
 
Physical Exam:  
General:  Alert, cooperative, no distress, appears older than stated age. Head:  Normocephalic, without obvious abnormality, atraumatic. Eyes:  Conjunctivae/corneas clear. Nose: Nares normal. Septum midline. Mucosa normal.   
Throat: Lips, mucosa, and tongue normal. Teeth and gums normal.  
Neck: Supple, symmetrical, trachea midline, no adenopathy Back:   Symmetric, no curvature. ROM normal.  
Lungs:   Fair air movement, diminished in bases Chest wall:  No tenderness or deformity. Heart:  Irregularly irregular S1, S2 normal, no murmur, click, rub or gallop. Abdomen:   Soft, non-tender. Bowel sounds normal.  
Extremities: Extremities normal, atraumatic, no cyanosis or edema. Pulses: 2+ and symmetric all extremities. Skin: Skin color, texture, turgor normal. No rashes or lesions Lymph nodes: Cervical, supraclavicular, and axillary nodes normal.  
Neurologic: Grossly nonfocal  
 
DATA: 
MAR reviewed and pertinent medications noted or modified as needed Labs: 
Recent Labs  
  01/11/19 
0329 01/10/19 
0231 01/09/19 
1231 01/09/19 
2677 WBC 16.2* 15.8*  --  16.1* HGB 7.8* 7.8* 9.1* 6.4* HCT 24.8* 24.5* 28.9* 20.5* * 153  --  201 Recent Labs  
  01/11/19 
0347 01/11/19 
0329 01/10/19 
1430 01/10/19 
0231 01/09/19 470 [id number]  01/09/19 
1409 NA  --  141 141 141  --   --  136 K  --  4.7 5.1 2.9*  --   --  4.2 CL  --  103 101 101  --   --  102 CO2  --  32 33* 31  --   --  27  
GLU  --  243* 191* 190*  --   --  185* BUN  --  34* 38* 40*  --   --  32* CREA  --  0.91 1.08* 1.11*  --   --  0.95 CA  --  7.9* 8.2* 7.8*  --   --  8.2* MG  --  1.9  --  1.7  --   --  2.1 PHOS  --  2.4*  --  3.9  --   --  3.3 ALB  --  2.9* 3.3* 2.9*  --   --  2.9* TBILI  --  1.4* 2.2* 1.9*  --   --  1.2* SGOT  --  85* 223* 363*  --   --  54* ALT  --  301* 410* 414*  --   --  42 INR 2.0*  --   --  3.4* 3.0*   < > 3.0*  
 < > = values in this interval not displayed. Imaging:  I have personally reviewed the patients radiographs and reports. IMPRESSION 
· Acute on Chronic Hypoxic Respiratory Failure; improving · Acute COPD Exacerbation · Anemia · Leukocytosis; improving · History of Diastolic Heart Failure · S/p Mechanical MVR/AVR · Current Smoker · Hx of Atrial Fibrillation; on [facility] ·  
  PLAN 
 · Supplemental O2 to keep sats >90% · Continue scheduled nebs · Switch from IVCS to prednisone taper today · Rate control per Cardiology · Home Trelegy · Doxycycline x 5 days (today is day 4) · Encouraged smoking cessation, Nicotine patch in place · C/W home Bumex · Pharmacy to dose Coumadin · Nicotine patch; encouraged to quit smoking · Protonix · DNR · Dispo: to pulmonary rehab Ms. Pippa Whaley is stable from a pulmonary perspective. We will sign off and arrange for outpatient follow-up in 2-3 weeks. Thank you for allowing us to participate in Ms. Johns's care.  
 
 
 
   
Octavio Cerda NP

## 2019-01-11 NOTE — PROGRESS NOTES
Bedside and Verbal shift change report given to [facility] (oncoming nurse) by Amy Ruiz (offgoing nurse). Report included the following information SBAR, Kardex, ED Summary, Procedure Summary, Intake/Output, MAR, Recent Results and Cardiac Rhythm afib.

## 2019-01-11 NOTE — PROGRESS NOTES
Bedside and Verbal shift change report given to Madeleine (oncoming nurse) by Nixon Stoner (offgoing nurse). Report included the following information SBAR, Kardex, Intake/Output, Recent Results and Cardiac Rhythm A fib. Bedside and Verbal shift change report given to Molly (oncoming nurse) by Kath Santana (offgoing nurse). Report included the following information SBAR, Kardex, Intake/Output, Recent Results, Med Rec Status and Cardiac Rhythm A fib.

## 2019-01-11 NOTE — PROGRESS NOTES
Problem: Mobility Impaired (Adult and Pediatric) Goal: *Acute Goals and Plan of Care (Insert Text) Physical Therapy Goals Initiated 1/10/2019 1. Patient will move from supine to sit and sit to supine  in bed with supervision/set-up within 7 day(s). 2.  Patient will transfer from bed to chair and chair to bed with minimal assistance/contact guard assist using the least restrictive device within 7 day(s). 3.  Patient will perform sit to stand with minimal assistance/contact guard assist within 7 day(s). 4.  Patient will ambulate with minimal assistance/contact guard assist for 150 feet with the least restrictive device within 7 day(s). physical Therapy TREATMENT Patient: Korina Martinez (49 y.o. female) Date: 1/11/2019 Diagnosis: COPD with acute exacerbation ([address] COPD with acute exacerbation ([address] Procedure(s) (LRB): 
CAPSULE     Complete:  1445 (N/A) 2 Days Post-Op Precautions: Fall, WBAT Chart, physical therapy assessment, plan of care and goals were reviewed. ASSESSMENT: 
Patient continues to be most limited by decreased endurance, and irregular and elevated heart rate. Patient today with gait training, with heart rate 130-162 (non-sustained) and irregular. Nurse and Cardiology NP both aware. Patient is asymptomatic (chest pain, light headed, etc) except for shortness of breath with is still complicated by her co-morbid pulmonary conditions. Patient still requires a RW for stability, and required 2-3 standing rest breaks with ambulation for pacing. Still recommend inpatient pulmonary rehab at discharge. Progression toward goals: 
[]    Improving appropriately and progressing toward goals [x]    Improving slowly and progressing toward goals 
[]    Not making progress toward goals and plan of care will be adjusted PLAN: 
Patient continues to benefit from skilled intervention to address the above impairments. Continue treatment per established plan of care. Discharge Recommendations:  Inpatient Pulmonary Rehab Further Equipment Recommendations for Discharge: SUBJECTIVE:  
Patient stated . OBJECTIVE DATA SUMMARY:  
Critical Behavior: 
Neurologic State: Appropriate for age Orientation Level: Oriented X4 Cognition: Appropriate decision making, Appropriate for age attention/concentration Functional Mobility Training: 
Bed Mobility: 
Rolling: Contact guard assistance Supine to Sit: Contact guard assistance Transfers: 
Sit to Stand: Contact guard assistance Stand to Sit: Contact guard assistance Bed to Chair: Contact guard assistance Balance: 
Sitting: Intact Standing: Impaired Standing - Static: Constant supportAmbulation/Gait Training: 
Distance (ft): 65 Feet (ft) Assistive Device: Walker, rolling;Gait belt Ambulation - Level of Assistance: Contact guard assistance Neuro Re-Education: 
 
Therapeutic Exercises:  
 
Pain: 
Pain Scale 1: Numeric (0 - 10) Pain Intensity 1: 4 Activity Tolerance: No complications with upright activity Please refer to the flowsheet for vital signs taken during this treatment. After treatment:  
[x]    Patient left in no apparent distress sitting up in chair 
[]    Patient left in no apparent distress in bed 
[x]    Call bell left within reach [x]    Nursing notified 
[]    Caregiver present 
[]    Bed alarm activated COMMUNICATION/COLLABORATION:  
The patients plan of care was discussed with: Registered Nurse Edmundo Sheldon, PT, DPT Time Calculation: 17 mins

## 2019-01-11 NOTE — PROGRESS NOTES
[facility] has accepted the patient. They will need an [employer] from [place].  Zully Kaufman MSW

## 2019-01-11 NOTE — PROGRESS NOTES
[facility] FAMILY MEDICINE RESIDENCY PROGRAM  
Daily Progress Note Date: 1/11/2019 Assessment/Plan:  
Korina Martinez is a 66 y.o. female who is hospitalized for COPD Exacerbation, initially meeting Severe Sepsis Criteria. Acute on Chronic Respiratory Failure POA in the Setting of COPD Exacerbation: Initially with 6L N/C requirement and intermittently on BiPAP, now on 2LN/C(her home requirement) for 3 days hours. RVP neg. SpCx + candida, 1/6 BCx NGTD 
- Albuterol Nebs q6hr RT 
- home Trelegy 
- Continue Doxycycline thru 1/12 
- f/u BCx, SpCx 
- solumedrol 40mg q8h, transition to PO today - NC O2 for goal of 88-92% sats - BiPaP as needed 
- pulm consulted, appreciate recs 
  Severe Sepsis Rule Out: RESOLVED. Patient's initial SIRS Criteria - elevated WBC, RR< HR explainable by combination of chronic problems and no source of infection present. WBC elevated chronically 2/2 daily steroid use, RR elevated due to COPD exacerbation and HR elevated due to COPD + hx of A fib with RVR. Sepsis workup completed and treatment with fluids initiated. However upon treatment of COPD Excerbation RR, HR improved so unlikley true sepsis as there is no source  1/6 BCx - NGTD, SpCx +candida 
- f/u BCx, SpCx 
 
R/O GI Bleed: FOBT negative. HgB 6.7 POA. S/p2 U pRBCs. HgB this morning  7.8 - stable. Capsule study without any signs of bleeding only diminutive AVMs and gastritis. Recs for upper and lower scopes once respiratory status allows. - Protonix 40mg BID 
- Transfuse for HgB <7 
- GI Consulted, appreciate recs 
  
Diastolic CHF: Euvolemic on admission. Trop POA . 10 --> .06 --> .5. Low BPs yesterday so holding home bumex prn. 
- Daily home bumex 1mg prn for soft BPs, may need to adjust regimen to every other day or prn for discharge - Strict I&O 
- Cardiology consult, appreciate recs 
  
Atrial Fibrillation s/p MVR on Coumadin (INR POA 1.2) Pt has been seen OP by Dr Daly Sue but has not followed up with him since 2016. Goal INR 2.5 - 3.5. EKG POA: Afib with RVR . Coumadin home dose is 3mg everyday but Friday 2mg. Refused Dilt yesterday 2/2 anxiety and HR was elevated 110-130s. Discussed importance of this medication with patient who understands and can tolerate medications but not while anxious. INR this AM 2.0. Coumadin 1mg last evening 
- Home Warfarin continued- to be dosed by pharmacy for a INR goal of 2.5-3.5 
- Diltiazem 240 CD 
- Cardiology consult, appreciate recs  
- Daily Mg & Phos  
  
Leukocytosis: Chronic with BL ~22-29 likely due to daily prednisone use. WBC POA 32.0 --> 32.5-->16.1-->15.8 -> 16.2 
- Monitor with daily CBC 
  
Hyperglycemia: likely due to chronic steroid dose. . Will adjust insulin based on trending BGs. BGs 209-328 yestrday, with 17U Lispro. First dose of Lantus at 10U yesterday evening - Increased Lantus to 20U QHS for tonight, will adjust as BGs today trend. 
- SSI, regular sensitivity 
- POC glucose ACHS 
- hypoglycemia protocol 
  
Hyperphosphatemia: Phos 6.0 POA. Phos 3.9 today - Monitor with daily BMP, Mg, Phos 
  
Chronic Back Pain  
- Home Percocet 5-325 mg resumed, q6h PRN  
  
Hyperlipidemia (: TC: 196, T, HDL 88, VLDL 30 & LDL 78) - Holding home Lovastatin 10 mg because of transminitis Transaminitis: LFTs elevated yesterday, downtrending today. Likely combination of cholelithiasis and capsule study. 
- Holding home Lovastatin Anxiety: not well controlled. Patient refusing meds 2/2 anxiety  
- attarax 25mg TID prn 
- Xanax . 5mg  
  
FEN/GI - Low Na Doet Activity - Ambulate as tolerated DVT prophylaxis - Coumadin, pharm to dose GI prophylaxis - Protonix Fall prophylaxis - Not indicated at this time. Disposition - Admit to Stepdown. Plan to d/c to TBD. PT/OT consulted. Thelma Hernandez Code Status - DNR, discussed with patient / caregivers. Patient will be discussed with Dr. Benji Bowie, supervising physician. Wilda Gonzalez MD 
Family Medicine Resident CC: \"I'm feeling pretty good\" Subjective No acute events overnight. Patient breathing comfortably and reports actually taking N/C off for several hours overnight without increased SOB. Reports eating and drinking well overnight. States she is hungry this morning. She had a BM overnight Denies CP, palpitations, SOB, anxiety. Did well with PT/OT yesterday. Excited about prospect of pulm rehab She is aware of her PRN medications for anxiety. Inpatient Medications Current Facility-Administered Medications Medication Dose Route Frequency  insulin glargine (LANTUS) injection 20 Units  20 Units SubCUTAneous QHS  potassium phosphate 15 mmol in 0.9% sodium chloride 250 mL infusion   IntraVENous ONCE  
 magnesium sulfate 1 g/100 ml IVPB (premix or compounded)  1 g IntraVENous ONCE  
 bumetanide (BUMEX) tablet 1 mg  1 mg Oral QPM  
 ALPRAZolam (XANAX) tablet 0.5 mg  0.5 mg Oral BID PRN  
 methylPREDNISolone (PF) (SOLU-MEDROL) injection 40 mg  40 mg IntraVENous Q8H  Warfarin- Pharmacist Dosing   Other Rx Dosing/Monitoring  oxyCODONE IR (ROXICODONE) tablet 5 mg  5 mg Oral Q8H PRN  
 0.9% sodium chloride infusion 250 mL  250 mL IntraVENous PRN  
 albuterol (ACCUNEB) nebulizer solution 1.25 mg  1.25 mg Nebulization Q6H RT  
 alteplase (CATHFLO) 1 mg in sterile water (preservative free) 1 mL injection  1 mg InterCATHeter PRN  
 sodium chloride (NS) flush 10-30 mL  10-30 mL InterCATHeter PRN  
 sodium chloride (NS) flush 10 mL  10 mL InterCATHeter Q24H  
 sodium chloride (NS) flush 10 mL  10 mL InterCATHeter PRN  
 sodium chloride (NS) flush 10-40 mL  10-40 mL InterCATHeter Q8H  
 sodium chloride (NS) flush 20 mL  20 mL InterCATHeter Q24H  
 bacitracin 500 unit/gram packet 1 Packet  1 Packet Topical PRN  
 guaiFENesin ER (MUCINEX) tablet 600 mg  600 mg Oral Q12H  
 dilTIAZem CD (CARDIZEM CD) capsule 240 mg  240 mg Oral DAILY  nystatin (MYCOSTATIN) 100,000 unit/mL oral suspension 500,000 Units  500,000 Units Oral QID  polyethylene glycol (MIRALAX) packet 17 g  17 g Oral DAILY  insulin lispro (HUMALOG) injection   SubCUTAneous AC&HS  hydrOXYzine HCl (ATARAX) tablet 25 mg  25 mg Oral TID PRN  
 doxycycline (VIBRAMYCIN) 100 mg in 0.9% sodium chloride (MBP/ADV) 100 mL  100 mg IntraVENous Q12H  
 sodium chloride (NS) flush 5-10 mL  5-10 mL IntraVENous Q8H  
 sodium chloride (NS) flush 5-10 mL  5-10 mL IntraVENous PRN  
 docusate sodium (COLACE) capsule 100 mg  100 mg Oral BID  nicotine (NICODERM CQ) 21 mg/24 hr patch 1 Patch  1 Patch TransDERmal Q24H  
 fluticasone-umeclidin-vilanter 100-62.5-25 mcg dsdv 1 Puff  (Patient Supplied)  1 Puff Inhalation DAILY  roflumilast (DALIRESP) tablet 500 mcg  500 mcg Oral DAILY  0.9% sodium chloride infusion  50 mL/hr IntraVENous PRN  pantoprazole (PROTONIX) 40 mg in sodium chloride 0.9% 10 mL injection  40 mg IntraVENous Q12H  
 glucose chewable tablet 16 g  4 Tab Oral PRN  
 dextrose (D50W) injection syrg 12.5-25 g  12.5-25 g IntraVENous PRN  
 glucagon (GLUCAGEN) injection 1 mg  1 mg IntraMUSCular PRN Allergies Allergies Allergen Reactions  Spiriva Respimat [Tiotropium Bromide] Anaphylaxis and Other (comments) Adverse effects; Per RN - pt states that Spiriva caused throat swelling and could not breathe well.  Spiriva Respimat [Tiotropium Bromide] Shortness of Breath  Tomato Shortness of Breath Only occurs with raw tomatoes, tolerates ketchup without issue  Celebrex [Celecoxib] Rash  Celebrex [Celecoxib] Rash  Rocephin [Ceftriaxone] Shortness of Breath  Tomato Rash Objective Vitals: 
Patient Vitals for the past 8 hrs: 
 Temp Pulse Resp BP SpO2  
01/11/19 0759     100 % 01/11/19 0336 98.2 °F (36.8 °C) 91 21 103/57 100 % I/O: 
 
Intake/Output Summary (Last 24 hours) at 1/11/2019 8480 Last data filed at 1/11/2019 2497 Gross per 24 hour Intake 1165 ml Output 2050 ml Net -885 ml Last shift: 
  No intake/output data recorded. Last 3 shifts: 
  01/09 1901 - 01/11 0700 In: 1925 [P.O.:1530; I.V.:395] Out: 3200 [[id number]:[id number]] Physical Exam:General: No acute distress. Alert. Cooperative. Head: Normocephalic. Atraumatic. Eyes:  Conjunctiva pink. Sclera white. PERRL. Nose:  Septum midline. Mucosa pink. No drainage. Throat: Mucosa pink. Moist mucous membranes. Respiratory: No increased work of breathing, no tripoding on my exam. CTAB with improved air movement throughout posterior lung fields. No wheezes, rales, ronchi. Do not appreciate crackles on exam this morning. Cardiovascular: Irregularly irregular rhythm at ~90bpm. Normal S1,S2. No m/r/g. Pulses 2+ throughout. GI: + bowel sounds. Nontender. No rebound tenderness or guarding. Nondistended Extremities: Trace edema of LE equal bilaterally but improved from yesterday. No palpable cord. No tenderness. Laboratory Data Recent Results (from the past 12 hour(s)) GLUCOSE, POC Collection Time: 01/10/19  9:14 PM  
Result Value Ref Range Glucose (POC) 327 (H) 65 - 100 mg/dL Performed by Chata Mosley GLUCOSE, POC Collection Time: 01/10/19  9:16 PM  
Result Value Ref Range Glucose (POC) 328 (H) 65 - 100 mg/dL Performed by Chata Mosley CBC WITH AUTOMATED DIFF Collection Time: 01/11/19  3:29 AM  
Result Value Ref Range WBC 16.2 (H) 3.6 - 11.0 K/uL  
 RBC 2.71 (L) 3.80 - 5.20 M/uL HGB 7.8 (L) 11.5 - 16.0 g/dL HCT 24.8 (L) 35.0 - 47.0 % MCV 91.5 80.0 - 99.0 FL  
 MCH 28.8 26.0 - 34.0 PG  
 MCHC 31.5 30.0 - 36.5 g/dL  
 RDW 17.4 (H) 11.5 - 14.5 % PLATELET 980 (L) 168 - 400 K/uL NRBC 0.2 (H) 0  WBC ABSOLUTE NRBC 0.03 (H) 0.00 - 0.01 K/uL NEUTROPHILS 91 (H) 32 - 75 % LYMPHOCYTES 3 (L) 12 - 49 % MONOCYTES 5 5 - 13 % EOSINOPHILS 0 0 - 7 % BASOPHILS 0 0 - 1 % MYELOCYTES 1 (H) 0 % IMMATURE GRANULOCYTES 0 %  
 ABS. NEUTROPHILS 14.7 (H) 1.8 - 8.0 K/UL  
 ABS. LYMPHOCYTES 0.5 (L) 0.8 - 3.5 K/UL  
 ABS. MONOCYTES 0.8 0.0 - 1.0 K/UL  
 ABS. EOSINOPHILS 0.0 0.0 - 0.4 K/UL  
 ABS. BASOPHILS 0.0 0.0 - 0.1 K/UL  
 ABS. IMM. GRANS. 0.0 K/UL  
 DF MANUAL    
 RBC COMMENTS ANISOCYTOSIS 1+ 
    
 RBC COMMENTS SCHISTOCYTES PRESENT 
    
METABOLIC PANEL, COMPREHENSIVE Collection Time: 01/11/19  3:29 AM  
Result Value Ref Range Sodium 141 136 - 145 mmol/L Potassium 4.7 3.5 - 5.1 mmol/L Chloride 103 97 - 108 mmol/L  
 CO2 32 21 - 32 mmol/L Anion gap 6 5 - 15 mmol/L Glucose 243 (H) 65 - 100 mg/dL BUN 34 (H) 6 - 20 MG/DL Creatinine 0.91 0.55 - 1.02 MG/DL  
 BUN/Creatinine ratio 37 (H) 12 - 20 GFR est AA >60 >60 ml/min/1.73m2 GFR est non-AA >60 >60 ml/min/1.73m2 Calcium 7.9 (L) 8.5 - 10.1 MG/DL Bilirubin, total 1.4 (H) 0.2 - 1.0 MG/DL  
 ALT (SGPT) 301 (H) 12 - 78 U/L  
 AST (SGOT) 85 (H) 15 - 37 U/L Alk. phosphatase 83 45 - 117 U/L Protein, total 5.1 (L) 6.4 - 8.2 g/dL Albumin 2.9 (L) 3.5 - 5.0 g/dL Globulin 2.2 2.0 - 4.0 g/dL A-G Ratio 1.3 1.1 - 2.2 MAGNESIUM Collection Time: 01/11/19  3:29 AM  
Result Value Ref Range Magnesium 1.9 1.6 - 2.4 mg/dL PHOSPHORUS Collection Time: 01/11/19  3:29 AM  
Result Value Ref Range Phosphorus 2.4 (L) 2.6 - 4.7 MG/DL PROTHROMBIN TIME + INR Collection Time: 01/11/19  3:47 AM  
Result Value Ref Range INR 2.0 (H) 0.9 - 1.1 Prothrombin time 19.9 (H) 9.0 - 11.1 sec GLUCOSE, POC Collection Time: 01/11/19  6:34 AM  
Result Value Ref Range Glucose (POC) 258 (H) 65 - 100 mg/dL Performed by [facility] - No New Imaging Abdominal US: + stones, no cholecystitis Hospital Problems: 
Hospital Problems  Date Reviewed: 11/29/2018 Codes Class Noted POA * (Principal) COPD with acute exacerbation ([facility] [address] ICD-10-CM: J44.1 ICD-9-CM: 491.21  1/6/2019 Unknown Anemia ICD-10-CM: D64.9 ICD-9-CM: 285.9  10/28/2018 Yes Hyperlipidemia ICD-10-CM: E78.5 ICD-9-CM: 272.4  10/26/2018 Yes Chronic back pain (Chronic) ICD-10-CM: M54.9, G89.29 ICD-9-CM: 724.5, 338.29  10/26/2018 Yes History of GI bleed ICD-10-CM: Z87.19 ICD-9-CM: V12.79 Present on Admission 11/25/2016 Yes Diastolic CHF, chronic (HCC) ICD-10-CM: I50.32 
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  
  Tissue MVR 1989 following failed balloon valvuloplasty for MS Redo MVR 2004 due to severe MR, AVR due to AR ([employer]) CAD (coronary artery disease), native coronary artery ICD-10-CM: I25.10 ICD-9-CM: 414.01  1/8/2015 Yes S/P MVR (mitral valve replacement) ICD-10-CM: V55.2 ICD-9-CM: V43.3  1/8/2015 Yes

## 2019-01-11 NOTE — PROGRESS NOTES
Problem: Self Care Deficits Care Plan (Adult) Goal: *Acute Goals and Plan of Care (Insert Text) Occupational Therapy Goals Initiated 1/10/2019 1. Patient will perform grooming standing at sink with VSS with supervision/set-up within 7 day(s). 2.  Patient will perform lower body dressing with AD PRN with supervision/set-up within 7 day(s). 3.  Patient will perform bathing with supervision/set-up within 7 day(s). 4.  Patient will perform toilet transfers with supervision/set-up within 7 day(s). 5.  Patient will perform all aspects of toileting with supervision/set-up within 7 day(s). 6.  Patient will participate in upper extremity therapeutic exercise/activities with VSS with supervision/set-up for 8 minutes within 7 day(s). 7.  Patient will utilize energy conservation techniques during functional activities with verbal cues within 7 day(s). Occupational Therapy TREATMENT Patient: Daniel Mcgrath (74 y.o. female) Date: 1/11/2019 Diagnosis: COPD with acute exacerbation ([address] COPD with acute exacerbation ([address] Procedure(s) (LRB): 
CAPSULE     Complete:  1445 (N/A) 2 Days Post-Op Precautions: Fall, WBAT Chart, occupational therapy assessment, plan of care, and goals were reviewed. ASSESSMENT: 
Received clearance from nursing to work with pt. Pt received supine in bed with HOB elevated,  present. Resting HR . Pt ambulated to bathroom using RW and performed commode transfer (BSC over commode) with stand-by assist. Pt demonstrated good pacing and demonstrated rest breaks when needed. HR remained low 120s. Ambulated back to bed and HR still remained in 120s. Will con't to follow. Progression toward goals: 
[x]       Improving appropriately and progressing toward goals 
[]       Improving slowly and progressing toward goals 
[]       Not making progress toward goals and plan of care will be adjusted PLAN: 
 Patient continues to benefit from skilled intervention to address the above impairments. Continue treatment per established plan of care. Discharge Recommendations:  Pulmonary Rehab Further Equipment Recommendations for Discharge:  TBD SUBJECTIVE:  
Patient stated I get anxiety attacks.  OBJECTIVE DATA SUMMARY:  
Cognitive/Behavioral Status: 
Neurologic State: Appropriate for age Orientation Level: Oriented X4 Cognition: Appropriate decision making Functional Mobility and Transfers for ADLs:Bed Mobility: 
Rolling: Contact guard assistance Supine to Sit: Stand-by assistance Sit to Supine: Stand-by assistance Transfers: 
Sit to Stand: Contact guard assistance Functional Transfers Bathroom Mobility: Stand-by assistance Bed to Chair: Contact guard assistance Balance: 
Sitting: Intact Standing: Intact; With support Standing - Static: Constant support ADL Intervention: Toileting Toileting Assistance: Stand-by assistance Bladder Hygiene: Stand-by assistance Clothing Management: Stand-by assistance Adaptive Equipment: Walker;Elevated seat Pain: 
Pain Scale 1: Numeric (0 - 10) Pain Intensity 1: 6 Activity Tolerance:  
Fair Please refer to the flowsheet for vital signs taken during this treatment. After treatment:  
[] Patient left in no apparent distress sitting up in chair 
[x] Patient left in no apparent distress in bed 
[x] Call bell left within reach 
[] Nursing notified 
[x] Caregiver present 
[] Bed alarm activated COMMUNICATION/COLLABORATION:  
The patients plan of care was discussed with: Registered Nurse PUJA Downey/L Time Calculation: 11 mins

## 2019-01-11 NOTE — PROGRESS NOTES
[address]  NP 
[phone] GI PROGRESS NOTE 
 
 
NAME: Dennis Wilburn :  1955 MRN:  [id number] Subjective:  
Feels better. Abdominal pain better after a bowel movement. Did not notice if it was black. Objective: In NAD 
 
 
VITALS:  
Last 24hrs VS reviewed since prior progress note. Most recent are: 
Visit Vitals /77 (BP 1 Location: Left arm, BP Patient Position: At rest) Pulse (!) 111 Temp 97.5 °F (36.4 °C) Resp 22 Ht 5' 4\" (1.626 m) Wt 54.8 kg (120 lb 14.4 oz) SpO2 100% BMI 20.75 kg/m² Intake/Output Summary (Last 24 hours) at 2019 1431 Last data filed at 2019 1223 Gross per 24 hour Intake 1405 ml Output 950 ml Net 455 ml PHYSICAL EXAM: 
General: Alert, in no acute distress HEENT: Anicteric sclerae. Lungs:            CTA Bilaterally. Heart:  Regular  rhythm, Abdomen: Soft, Mildly distended, Non tender.  (+)Bowel sounds, no HSM Extremities: No c/c/e Neurologic:  CN 2-12 gi, Alert and oriented X 3. No acute neurological distress Psych:   Good insight. Not anxious nor agitated. Lab Data Reviewed:  
Recent Labs  
  01/11/19 
0329 01/10/19 
0231 WBC 16.2* 15.8* HGB 7.8* 7.8* HCT 24.8* 24.5*  
* 153 Recent Labs  
  01/11/19 
0329 01/10/19 
1430 01/10/19 
0231  141 141  
K 4.7 5.1 2.9*  
 101 101 CO2 32 33* 31 BUN 34* 38* 40* CREA 0.91 1.08* 1.11* * 191* 190* PHOS 2.4*  --  3.9 CA 7.9* 8.2* 7.8* Recent Labs  
  01/11/19 
0329 01/10/19 
1430 SGOT 85* 223* AP 83 88  
TP 5.1* 5.8* ALB 2.9* 3.3*  
GLOB 2.2 2.5  
 
 
________________________________________________________________________ Patient Active Problem List  
Diagnosis Code  Chest pain R07.9  COPD exacerbation ([address] J44.1  Hypokalemia E87.6  JAZZMINE (acute kidney injury) ([facility] [address] N17.9  Hyperglycemia R73.9  A-fib ([facility] [address] I48.91  
  Chronic anticoagulation Z79.01  
 Hyperlipidemia E78.5  Chronic back pain M54.9, G89.29  
 Anemia D64.9  
 GI bleed K92.2  
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  
 S/P MVR (mitral valve replacement) Z95.2  COPD (chronic obstructive pulmonary disease) ([facility]) J44.9  Tobacco abuse Z72.0  Diastolic CHF, chronic ([facility]) I50.32  Chronic atrial fibrillation ([facility]) I48.2  History of GI bleed Z87.19  
 H/O total hysterectomy Z90.710  
 Hypokalemia E87.6  Thrush of mouth and esophagus ([facility]) B37.81, B37.0  
 SOB (shortness of breath) R06.02  
 COPD with acute exacerbation ([facility] [address] J44.1 Assessment and Plan: Anemia: Hemoglobin stable at 7.8. Capsule study shows no stigmata of bleeding. 
- Changed PPI to PO daily. 
- Trend Hgb and transfuse to goal of 7. Abdominal Pain:  Resolved - Continue bowel regimen. Elevated LFT:  Improving US unremarkable. - Monitor CMP Will sign off. Please have her follow up in the office as outpt. Please call with questions or concerns.  
  
Signed By: Slade Lam NP   
 1/11/2019  2:31 PM

## 2019-01-11 NOTE — PROGRESS NOTES
[facility] Dosing Services: 1/11/2019 Consult for Warfarin Dosing by Pharmacy by Lord Rivas Consult provided for this 66 y.o.  female , for indication of  afib and Hx AVR (mechanical valve) PTA Dose: Warfarin 3 mg on Sa/Su/Mo/Tu/We/Th and Warfarin 2 mg on Fr 
 
Dose to achieve an INR goal of 2.5- 3.5 (Per Dr. Jeyson Mendoza note on 11/8/2018: \"Has afib and artifical valve should be 2.5-3.5. \") Order entered for Warfarin 2 mg to be given today at 18:00. Significant drug interactions: concerning CMP (elevated transaminases, Tbili), lower GI AVM Previous dose given Warfarin 1 mg on 1/10/2019 (Held on 1/8/2019 and 1/9/2019) PT/INR Lab Results Component Value Date/Time INR 2.0 (H) 01/11/2019 03:47 AM  
  
Platelets Lab Results Component Value Date/Time PLATELET 871 (L) 68/59/8154 03:29 AM  
  
H/H Lab Results Component Value Date/Time HGB 7.8 (L) 01/11/2019 03:29 AM  
  
Pharmacist will follow daily and will provide subsequent Warfarin dosing based on clinical status.  
Ana Fatima, Pharmacist

## 2019-01-11 NOTE — PROGRESS NOTES
Documented 1/11/2019. Patient visited by [facility] Volunteer in Progressive Care Unit on 1/10/2019. 
  
Rev. Bess Jaime, [facility] paging service: [phone])

## 2019-01-11 NOTE — PROGRESS NOTES
Problem: Pressure Injury - Risk of 
Goal: *Prevention of pressure injury Document Jose Scale and appropriate interventions in the flowsheet. Outcome: Progressing Towards Goal 
Pressure Injury Interventions: 
Sensory Interventions: Assess changes in LOC, Avoid rigorous massage over bony prominences, Chair cushion, Check visual cues for pain, Discuss PT/OT consult with provider, Keep linens dry and wrinkle-free, Maintain/enhance activity level, Minimize linen layers, Monitor skin under medical devices, Pad between skin to skin, Turn and reposition approx. every two hours (pillows and wedges if needed) Moisture Interventions: Absorbent underpads, Apply protective barrier, creams and emollients, Assess need for specialty bed, Check for incontinence Q2 hours and as needed, Internal/External urinary devices Activity Interventions: Assess need for specialty bed, Chair cushion, Increase time out of bed, Pressure redistribution bed/mattress(bed type), PT/OT evaluation Mobility Interventions: Assess need for specialty bed, Chair cushion, Float heels, HOB 30 degrees or less, Pressure redistribution bed/mattress (bed type), PT/OT evaluation, Turn and reposition approx. every two hours(pillow and wedges) Nutrition Interventions: Document food/fluid/supplement intake, Discuss nutritional consult with provider, Offer support with meals,snacks and hydration Friction and Shear Interventions: Apply protective barrier, creams and emollients, Feet elevated on foot rest, Foam dressings/transparent film/skin sealants, HOB 30 degrees or less, Lift sheet, Minimize layers

## 2019-01-11 NOTE — PROGRESS NOTES
SHIFT REPORT: 
1900- Bedside shift change report given to Khris Uriostegui RN (oncoming nurse) by Nina Christie RN (offgoing nurse). Report included the following information SBAR, Kardex, Procedure Summary, Intake/Output, Recent Results and Cardiac Rhythm. SHIFT SUMMARY: 
2115-FP MD notified of Glucose 470; 20 units Lispro Insulin given as ordered 2345-Atarax and Roxicodone given for anxiety and pain 0110-venous blood drawn via PICC line for AM labs. 0130-up to [facility] for lg soft brown BM,; purewick changed after getting back to bed; HA 
0225-FP MD notified of pt nausea, Zofran given IV 
0545-stood for weight, po meds (xanax and protonix ) given 0615-FP order given for additional doses of Doxicycline before pt is \"discharged\" END OF SHIFT REPORT: 
0700-Bedside shift change report given to Erin MARTINEZ RN (oncoming nurse) by Khris Uriostegui RN (offgoing nurse). Report included the following information SBAR, Kardex, Procedure Summary, Intake/Output, Recent Results and Cardiac Rhythm .

## 2019-01-11 NOTE — PROGRESS NOTES
Spiritual Care Assessment/Progress Note [address] 
 
 
NAME: Deepa Leung      MRN: [id number] AGE: 66 y.o. SEX: female Voodoo Affiliation: No preference Language: [place] 1/11/2019     Total Time (in minutes): 11 Spiritual Assessment begun in [facility] 3 PRO CARE TELE 2 through conversation with: 
  
    [x]Patient        [] Family    [] Friend(s) Reason for Consult: Initial/Spiritual assessment, patient floor Spiritual beliefs: (Please include comment if needed) 
   [] Identifies with a henok tradition:     
   [] Supported by a henok community:        
   [x] Claims no spiritual orientation: No preference. [] Seeking spiritual identity:            
   [] Adheres to an individual form of spirituality:       
   [] Not able to assess:                   
 
    
Identified resources for coping:  
   [] Prayer                           
   [] Music                  [] Guided Imagery [x] Family/friends                 [] Pet visits [] Devotional reading                         [] Unknown 
   [] Other:                                          
 
 
Interventions offered during this visit: (See comments for more details) Patient Interventions: Coping skills reviewed/reinforced, Affirmation of emotions/emotional suffering, Iconic (affirming the presence of God/Higher Power) Plan of Care: 
 
 [x] Support spiritual and/or cultural needs  
 [] Support AMD and/or advance care planning process    
 [] Support grieving process 
 [] Coordinate Rites and/or Rituals  
 [] Coordination with community clergy [] No spiritual needs identified at this time 
 [] Detailed Plan of Care below (See Comments)  [] Make referral to Music Therapy 
[] Make referral to Pet Therapy    
[] Make referral to Addiction services 
[] Make referral to [facility] 
[] Make referral to Spiritual Care Partner 
[] No future visits requested       
[] Follow up visits as needed Comments: Patient sitting up on side of bed, watching television. Good eye contact, good natured. Says she is feeling better than she has been. Spoke about current thoughts, feelings, and concerns. Says she is awaiting entry into rehab eventually and hopes to return home after that. Describes good family support. Appeared encouraged as a result of this visit and expressed gratitude for this visit. Visited by Rev. Zainab Euceda, [facility]  paging service: [phone])

## 2019-01-12 NOTE — PROGRESS NOTES
[facility]  
Daily Progress Note Date: 1/12/2019 Assessment/Plan:  
Guadalupe Reynolds is a 66 y.o. female who is hospitalized for COPD Exacerbation, initially meeting Severe Sepsis Criteria. Acute on Chronic Respiratory Failure POA in the Setting of COPD Exacerbation: Initially with 6L N/C requirement and intermittently on BiPAP, now on 2LN/C(her home requirement) for 3 days hours. RVP neg. SpCx + candida, 1/6 BCx NGTD 
- Albuterol Nebs q6hr RT 
- home Trelegy 
- Continue Doxycycline thru 1/12 
- f/u BCx, SpCx 
- Start prednisone taper 60mg (1/12) - NC O2 for goal of 88-92% sats - BiPaP as needed 
- pulm consulted, appreciate recs 
  Severe Sepsis Rule Out: RESOLVED. Patient's initial SIRS Criteria - elevated WBC, RR< HR explainable by combination of chronic problems and no source of infection present. WBC elevated chronically 2/2 daily steroid use, RR elevated due to COPD exacerbation and HR elevated due to COPD + hx of A fib with RVR. Sepsis workup completed and treatment with fluids initiated. However upon treatment of COPD Excerbation RR, HR improved so unlikley true sepsis as there is no source  1/6 BCx - NGTD, SpCx +candida 
- f/u BCx, SpCx 
 
R/O GI Bleed: FOBT negative. HgB 6.7 POA. S/p2 U pRBCs. HgB this morning  7.8 - stable. Capsule study without any signs of bleeding only diminutive AVMs and gastritis. Recs for upper and lower scopes once respiratory status allows. - Protonix 40mg BID 
- Transfuse for HgB <7 
- GI Consulted, appreciate recs 
  
Diastolic CHF: Euvolemic on admission. Trop POA . 10 --> .06 --> .5. Low BPs yesterday so holding home bumex prn. 
- 1mg Bumex every other day, starting today (1/12) - Strict I&O 
- Cardiology consult, appreciate recs 
  
Atrial Fibrillation s/p MVR on Coumadin (INR POA 1.2) Pt has been seen OP by Dr Gee Yung but has not followed up with him since 2016.  Goal INR 2.5 - 3.5. EKG POA: Afib with RVR . Coumadin home dose is 3mg everyday but Friday 2mg. Refused Dilt yesterday 2/2 anxiety and HR was elevated 110-130s. Discussed importance of this medication with patient who understands and can tolerate medications but not while anxious. INR this AM 2.0. Coumadin 1mg last evening 
- Home Warfarin continued- to be dosed by pharmacy for a INR goal of 2.5-3.5 
- Heparain bridge until INR therapeutic 
- Diltiazem 240 CD 
- Cardiology consult, appreciate recs  
- Daily Mg & Phos  
  
Leukocytosis: Chronic with BL ~22-29 likely due to daily prednisone use. WBC POA 32.0 --> 32.5-->16.1-->15.8 -> 16.2 --> 17.4 
- Monitor with daily CBC 
  
Hyperglycemia: likely due to chronic steroid dose. . Will adjust insulin based on trending BGs. BGs 194-470 yestrday, with 20U Lispro. First dose of Lantus at 20U yesterday evening - Lantus 20U for tonight, will increase as trend BGs today if requiring significant SSI 
- SSI, regular sensitivity 
- POC glucose ACHS 
- hypoglycemia protocol 
  
Hyperphosphatemia: Phos 6.0 POA. Phos 3.9 today - Monitor with daily BMP, Mg, Phos 
  
Chronic Back Pain  
- Home Percocet 5-325 mg resumed, q6h PRN  
  
Hyperlipidemia (: TC: 196, T, HDL 88, VLDL 30 & LDL 78) - Holding home Lovastatin 10 mg because of transminitis Transaminitis: LFTs elevated yesterday, downtrending today. Likely combination of cholelithiasis and capsule study. 
- Holding home Lovastatin Anxiety: IMPROVED. Patient was refusing meds 2/2 anxiety but now taking medications. HR and RR increase with anxiety bouts. - Will discuss daily anxiolytic with patient 
- attarax 25mg TID prn 
- Xanax . 5mg  
  
FEN/GI - Low Na Doet Activity - Ambulate as tolerated DVT prophylaxis - Heparin -->Coumadin, pharm to dose GI prophylaxis - Protonix Disposition - Admit to Stepdown. Plan to d/c to [facility], pending PA. PT/OT following Code Status - DNR, discussed with patient / caregivers. Patient will be discussed with Dr. Elian Plata, supervising physician. Eloy Lewis MD 
Family Medicine Resident CC: \"I'm feeling pretty good\" Subjective One episode of anxiety overnight with elevated HR, RR. Patient breathing comfortably and reports actually taking N/C off for several hours overnight without increased SOB. Reports eating and drinking well overnight. States she is hungry this morning. She had a BM overnight Denies CP, palpitations, SOB, anxiety. Inpatient Medications Current Facility-Administered Medications Medication Dose Route Frequency  ondansetron (ZOFRAN) injection 4 mg  4 mg IntraVENous Q8H PRN  
 insulin glargine (LANTUS) injection 20 Units  20 Units SubCUTAneous QHS  bumetanide (BUMEX) tablet 1 mg  1 mg Oral EVERY OTHER DAY  predniSONE (DELTASONE) tablet 60 mg  60 mg Oral DAILY WITH BREAKFAST  [START ON 1/14/2019] predniSONE (DELTASONE) tablet 40 mg  40 mg Oral DAILY WITH BREAKFAST  [START ON 1/16/2019] predniSONE (DELTASONE) tablet 20 mg  20 mg Oral DAILY WITH BREAKFAST  pantoprazole (PROTONIX) tablet 40 mg  40 mg Oral ACB  ALPRAZolam (XANAX) tablet 0.5 mg  0.5 mg Oral BID PRN  
 Warfarin- Pharmacist Dosing   Other Rx Dosing/Monitoring  oxyCODONE IR (ROXICODONE) tablet 5 mg  5 mg Oral Q8H PRN  
 0.9% sodium chloride infusion 250 mL  250 mL IntraVENous PRN  
 albuterol (ACCUNEB) nebulizer solution 1.25 mg  1.25 mg Nebulization Q6H RT  
 alteplase (CATHFLO) 1 mg in sterile water (preservative free) 1 mL injection  1 mg InterCATHeter PRN  
 sodium chloride (NS) flush 10-30 mL  10-30 mL InterCATHeter PRN  
 sodium chloride (NS) flush 10 mL  10 mL InterCATHeter Q24H  
 sodium chloride (NS) flush 10 mL  10 mL InterCATHeter PRN  
 sodium chloride (NS) flush 10-40 mL  10-40 mL InterCATHeter Q8H  
 sodium chloride (NS) flush 20 mL  20 mL InterCATHeter Q24H  
 bacitracin 500 unit/gram packet 1 Packet  1 Packet Topical PRN  
  guaiFENesin ER (MUCINEX) tablet 600 mg  600 mg Oral Q12H  
 dilTIAZem CD (CARDIZEM CD) capsule 240 mg  240 mg Oral DAILY  nystatin (MYCOSTATIN) 100,000 unit/mL oral suspension 500,000 Units  500,000 Units Oral QID  polyethylene glycol (MIRALAX) packet 17 g  17 g Oral DAILY  insulin lispro (HUMALOG) injection   SubCUTAneous AC&HS  hydrOXYzine HCl (ATARAX) tablet 25 mg  25 mg Oral TID PRN  
 doxycycline (VIBRAMYCIN) 100 mg in 0.9% sodium chloride (MBP/ADV) 100 mL  100 mg IntraVENous Q12H  
 sodium chloride (NS) flush 5-10 mL  5-10 mL IntraVENous Q8H  
 sodium chloride (NS) flush 5-10 mL  5-10 mL IntraVENous PRN  
 docusate sodium (COLACE) capsule 100 mg  100 mg Oral BID  nicotine (NICODERM CQ) 21 mg/24 hr patch 1 Patch  1 Patch TransDERmal Q24H  
 fluticasone-umeclidin-vilanter 100-62.5-25 mcg dsdv 1 Puff  (Patient Supplied)  1 Puff Inhalation DAILY  roflumilast (DALIRESP) tablet 500 mcg  500 mcg Oral DAILY  0.9% sodium chloride infusion  50 mL/hr IntraVENous PRN  
 glucose chewable tablet 16 g  4 Tab Oral PRN  
 dextrose (D50W) injection syrg 12.5-25 g  12.5-25 g IntraVENous PRN  
 glucagon (GLUCAGEN) injection 1 mg  1 mg IntraMUSCular PRN Allergies Allergies Allergen Reactions  Spiriva Respimat [Tiotropium Bromide] Anaphylaxis and Other (comments) Adverse effects; Per RN - pt states that Spiriva caused throat swelling and could not breathe well.  Spiriva Respimat [Tiotropium Bromide] Shortness of Breath  Tomato Shortness of Breath Only occurs with raw tomatoes, tolerates ketchup without issue  Celebrex [Celecoxib] Rash  Celebrex [Celecoxib] Rash  Rocephin [Ceftriaxone] Shortness of Breath  Tomato Rash Objective Vitals: 
Patient Vitals for the past 8 hrs: 
 Temp Pulse Resp BP SpO2  
01/11/19 2344 97.6 °F (36.4 °C) (!) 104 22 116/65 99 % 01/11/19 2230  (!) 111    I/O: 
 
 Intake/Output Summary (Last 24 hours) at 1/12/2019 0535 Last data filed at 1/12/2019 7168 Gross per 24 hour Intake 2105 ml Output 351 ml Net 1754 ml Last shift: 
  01/11 1901 - 01/12 0700 In: 0 [P.O.:100; I.V.:600] Out: 151 [Urine:150] Last 3 shifts: 
  01/10 0701 - 01/11 1900 In: Daria Gauthier [P.O.:1600; I.V.:285] Out: 2050 [Urine:2050] Physical Exam:General: No acute distress. Alert. Cooperative. Head: Normocephalic. Atraumatic. Eyes:  Conjunctiva pink. Sclera white. PERRL. Nose:  Septum midline. Mucosa pink. No drainage. Throat: Mucosa pink. Moist mucous membranes. Respiratory: No increased work of breathing, no tripoding on my exam. CTAB with improved air movement throughout posterior lung fields. No wheezes, rales, ronchi. Mild crackles on exam this morning. Cardiovascular: Irregularly irregular rhythm at ~90bpm. Normal S1,S2. No m/r/g. Pulses 2+ throughout. GI: + bowel sounds. Nontender. No rebound tenderness or guarding. Nondistended Extremities: Trace edema of LE equal bilaterally but improved from yesterday. No palpable cord. No tenderness. Laboratory Data Recent Results (from the past 12 hour(s)) GLUCOSE, POC Collection Time: 01/11/19  9:06 PM  
Result Value Ref Range Glucose (POC) 470 (H) 65 - 100 mg/dL Performed by Mary Bartholomew CBC WITH AUTOMATED DIFF Collection Time: 01/12/19  1:06 AM  
Result Value Ref Range WBC 17.4 (H) 3.6 - 11.0 K/uL  
 RBC 2.66 (L) 3.80 - 5.20 M/uL HGB 7.5 (L) 11.5 - 16.0 g/dL HCT 25.0 (L) 35.0 - 47.0 % MCV 94.0 80.0 - 99.0 FL  
 MCH 28.2 26.0 - 34.0 PG  
 MCHC 30.0 30.0 - 36.5 g/dL  
 RDW 17.4 (H) 11.5 - 14.5 % PLATELET 990 529 - 705 K/uL NRBC 0.2 (H) 0  WBC ABSOLUTE NRBC 0.04 (H) 0.00 - 0.01 K/uL NEUTROPHILS 93 (H) 32 - 75 % LYMPHOCYTES 0 (L) 12 - 49 % MONOCYTES 3 (L) 5 - 13 % EOSINOPHILS 1 0 - 7 % BASOPHILS 0 0 - 1 % METAMYELOCYTES 1 (H) 0 % MYELOCYTES 2 (H) 0 % IMMATURE GRANULOCYTES 0 %  
 ABS. NEUTROPHILS 16.2 (H) 1.8 - 8.0 K/UL  
 ABS. LYMPHOCYTES 0.0 (L) 0.8 - 3.5 K/UL  
 ABS. MONOCYTES 0.5 0.0 - 1.0 K/UL  
 ABS. EOSINOPHILS 0.2 0.0 - 0.4 K/UL  
 ABS. BASOPHILS 0.0 0.0 - 0.1 K/UL  
 ABS. IMM. GRANS. 0.0 K/UL  
 DF MANUAL    
 RBC COMMENTS ANISOCYTOSIS 1+ 
    
 RBC COMMENTS SCHISTOCYTES PRESENT 
    
METABOLIC PANEL, COMPREHENSIVE Collection Time: 01/12/19  1:06 AM  
Result Value Ref Range Sodium 142 136 - 145 mmol/L Potassium 4.4 3.5 - 5.1 mmol/L Chloride 104 97 - 108 mmol/L  
 CO2 32 21 - 32 mmol/L Anion gap 6 5 - 15 mmol/L Glucose 258 (H) 65 - 100 mg/dL BUN 32 (H) 6 - 20 MG/DL Creatinine 0.95 0.55 - 1.02 MG/DL  
 BUN/Creatinine ratio 34 (H) 12 - 20 GFR est AA >60 >60 ml/min/1.73m2 GFR est non-AA 60 (L) >60 ml/min/1.73m2 Calcium 8.3 (L) 8.5 - 10.1 MG/DL Bilirubin, total 1.3 (H) 0.2 - 1.0 MG/DL  
 ALT (SGPT) 248 (H) 12 - 78 U/L  
 AST (SGOT) 69 (H) 15 - 37 U/L Alk. phosphatase 93 45 - 117 U/L Protein, total 5.3 (L) 6.4 - 8.2 g/dL Albumin 2.9 (L) 3.5 - 5.0 g/dL Globulin 2.4 2.0 - 4.0 g/dL A-G Ratio 1.2 1.1 - 2.2 MAGNESIUM Collection Time: 01/12/19  1:06 AM  
Result Value Ref Range Magnesium 2.1 1.6 - 2.4 mg/dL PHOSPHORUS Collection Time: 01/12/19  1:06 AM  
Result Value Ref Range Phosphorus 3.5 2.6 - 4.7 MG/DL PROTHROMBIN TIME + INR Collection Time: 01/12/19  1:07 AM  
Result Value Ref Range INR 1.7 (H) 0.9 - 1.1 Prothrombin time 16.6 (H) 9.0 - 11.1 sec Imaging - No New Imaging Abdominal US: + stones, no cholecystitis Hospital Problems: 
Hospital Problems  Date Reviewed: 11/29/2018 Codes Class Noted POA * (Principal) COPD with acute exacerbation ([facility] ICD-10-CM: J44.1 ICD-9-CM: 491.21  1/6/2019 Unknown Anemia ICD-10-CM: D64.9 ICD-9-CM: 285.9  10/28/2018 Yes Hyperlipidemia ICD-10-CM: E78.5 ICD-9-CM: 272.4  10/26/2018 Yes Chronic back pain (Chronic) ICD-10-CM: M54.9, G89.29 ICD-9-CM: 724.5, 338.29  10/26/2018 Yes History of GI bleed ICD-10-CM: Z87.19 ICD-9-CM: V12.79 Present on Admission 11/25/2016 Yes Diastolic CHF, chronic (HCC) ICD-10-CM: I50.32 
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  
  Tissue MVR 1989 following failed balloon valvuloplasty for MS Redo MVR 2004 due to severe MR, AVR due to AR ([facility] [employer]) CAD (coronary artery disease), native coronary artery ICD-10-CM: I25.10 ICD-9-CM: 414.01  1/8/2015 Yes S/P MVR (mitral valve replacement) ICD-10-CM: C75.8 ICD-9-CM: V43.3  1/8/2015 Yes

## 2019-01-12 NOTE — PROGRESS NOTES
Problem: Pressure Injury - Risk of 
Goal: *Prevention of pressure injury Document Jose Scale and appropriate interventions in the flowsheet. Outcome: Progressing Towards Goal 
Pressure Injury Interventions: 
Sensory Interventions: Assess changes in LOC Moisture Interventions: Absorbent underpads Activity Interventions: Assess need for specialty bed Mobility Interventions: HOB 30 degrees or less Nutrition Interventions: Document food/fluid/supplement intake, Discuss nutritional consult with provider, Offer support with meals,snacks and hydration Friction and Shear Interventions: Apply protective barrier, creams and emollients, Feet elevated on foot rest, Foam dressings/transparent film/skin sealants, HOB 30 degrees or less, Lift sheet, Minimize layers

## 2019-01-12 NOTE — PROGRESS NOTES
Problem: Patient Education: Go to Patient Education Activity Goal: Patient/Family Education 
physical Therapy TREATMENT Patient: Jez Carrion (43 y.o. female) Date: 1/12/2019 Diagnosis: COPD with acute exacerbation ([facility] COPD with acute exacerbation ([facility] Procedure(s) (LRB): 
CAPSULE     Complete:  1445 (N/A) 3 Days Post-Op Precautions: Fall, WBAT Chart, physical therapy assessment, plan of care and goals were reviewed. ASSESSMENT: 
Pt sit to stand with CGA. Pt ambulated 90ft with RW CGA. on 2L of O2. Pt took one brief standing rest during ambulation. Pt left sitting in chair with SPO2 93% on 2L. Pt progressing slowly. Continue goals. Progression toward goals: 
[]    Improving appropriately and progressing toward goals [x]    Improving slowly and progressing toward goals 
[]    Not making progress toward goals and plan of care will be adjusted PLAN: 
Patient continues to benefit from skilled intervention to address the above impairments. Continue treatment per established plan of care. Discharge Recommendations: In patient Pulmonary  Rehab Further Equipment Recommendations for Discharge:  rolling walker SUBJECTIVE:  
 
 
OBJECTIVE DATA SUMMARY:  
Critical Behavior: 
Neurologic State: Alert Orientation Level: Oriented X4 Cognition: Appropriate decision making, Appropriate for age attention/concentration, Appropriate safety awareness, Follows commands Functional Mobility Training: 
Bed Mobility: 
  
  
  
  
  
  
Transfers: 
Sit to Stand: Contact guard assistance Stand to Sit: Contact guard assistance Balance: 
Sitting: Intact Standing: Intact; With supportAmbulation/Gait Training: 
Distance (ft): 90 Feet (ft) Assistive Device: Walker, rolling;Gait belt Ambulation - Level of Assistance: Contact guard assistance Pain: 
Pain Scale 1: Numeric (0 - 10) Pain Intensity 1: 0 Activity Tolerance:  
Pt tolerated treatment fairly well. Please refer to the flowsheet for vital signs taken during this treatment. After treatment:  
[x]    Patient left in no apparent distress sitting up in chair 
[]    Patient left in no apparent distress in bed 
[]    Call bell left within reach 
[]    Nursing notified 
[]    Caregiver present 
[]    Bed alarm activated COMMUNICATION/COLLABORATION:  
The patients plan of care was discussed with: Physical Therapist 
 
Paige Meckel, PTA Time Calculation: 23 mins

## 2019-01-12 NOTE — PROGRESS NOTES
SHIFT REPORT: 
1900--Bedside shift change report given to Twan Castellanos RN (oncoming nurse) by Erin MARTINEZ RN (offgoing nurse). Report included the following information SBAR, Kardex, Procedure Summary, Intake/Output, Recent Results and Cardiac Rhythm. SHIFT SUMMARY: 
1920-Heparin inc to 16u/k/hr for PTT 29.3sec; Bolus as indicated. 2130-Lispro insulin 10 units given for glucose 402 per order of FP MD; Roxicodone and xanax given for anxiety and pain 0115-venous blood drawn for AM labs and STAT PTT 
0205-PTT back at 38.2sec; Heparin inc to 20u/kg/hr and bolus 3400units given as noted (verified by Pharmacist); next PTT due 0800 
0640-req and rec'd Atarax; does not want pain pill at this time. END OF SHIFT REPORT: 
0700-Bedside shift change report given to Erin MARTINEZ RN (oncoming nurse) by Twan Castellanos RN (offgoing nurse). Report included the following information SBAR, Kardex, Procedure Summary, Intake/Output, Recent Results and Cardiac Rhythm .

## 2019-01-12 NOTE — PROGRESS NOTES
Problem: Falls - Risk of 
Goal: *Absence of Falls Document Jose Dust Fall Risk and appropriate interventions in the flowsheet. Outcome: Progressing Towards Goal 
Fall Risk Interventions: 
Mobility Interventions: Utilize walker, cane, or other assistive device Medication Interventions: Evaluate medications/consider consulting pharmacy Elimination Interventions: Call light in reach

## 2019-01-12 NOTE — PROGRESS NOTES
[facility] Dosing Services: 1/12/19 Consult for Warfarin Dosing by Pharmacy by Brad Mccain Consult provided for this 66 y.o.  female , for indication of  afib and Hx AVR (mechanical valve) PTA Dose: Warfarin 3 mg on Sa/Alfonso/Mo/Tu/We/Th and Warfarin 2 mg on Fr 
  
Dose to achieve an INR goal of 2.5- 3.5 Order entered for  Warfarin  3 (mg) ordered to be given today at 18:00. Heparin bridge Significant drug interactions: doxycycline Previous dose given 2 mg PT/INR Lab Results Component Value Date/Time INR 1.7 (H) 01/12/2019 01:07 AM  
  
Platelets Lab Results Component Value Date/Time PLATELET 896 99/84/5985 01:06 AM  
  
H/H Lab Results Component Value Date/Time HGB 7.5 (L) 01/12/2019 01:06 AM  
  
 
Pharmacy to follow daily and will provide subsequent Warfarin dosing based on clinical status. Serge Garibay)  Contact information [phone]

## 2019-01-13 NOTE — PROGRESS NOTES
[facility], [address] with [address] [facility] Resident Progress Note in Brief S: Patient seen and examined at bedside w/ Dr. Ankit Farr. Patient is awake and alert, in no acute distress. Breathing comfortably, denies anxiety/palpitations. No chest pain/SOB. O: 
Visit Vitals /62 (BP 1 Location: Left arm, BP Patient Position: At rest;Supine) Pulse (!) 110 Temp 98.2 °F (36.8 °C) Resp 29 Ht 5' 4\" (1.626 m) Wt 122 lb 14.4 oz (55.7 kg) SpO2 98% BMI 21.10 kg/m² Physical Examination:  
General appearance - alert, well appearing, and in no distress Chest - symmetric chest expansion, equal air entry b/l. Mildly reduced breath sounds in R lung base Heart - Irregularly irregular rhythm at ~100bpm. Normal S1,S2 Neurological - alert, oriented, normal speech, no focal findings Extremities - no pedal edema A/P:  
Christine Acevedo is a 66 y.o. admitted for COPD exacerbation Currently no signs of fluid overload and HR imrpoved s/p 500cc bolus NS + cardizem bolus this AM 
- Continue to monitor pt - Bumex 0.5mg tomorrow Please see daily progress note for detailed plan. Matthew Barney MD 
Family Medicine Resident

## 2019-01-13 NOTE — PROGRESS NOTES
Problem: Falls - Risk of 
Goal: *Absence of Falls Document [facility] Fall Risk and appropriate interventions in the flowsheet. Outcome: Progressing Towards Goal 
Fall Risk Interventions: 
Mobility Interventions: Patient to call before getting OOB Medication Interventions: Patient to call before getting OOB Elimination Interventions: Call light in reach

## 2019-01-13 NOTE — PROGRESS NOTES
Problem: Pressure Injury - Risk of 
Goal: *Prevention of pressure injury Document Jose Scale and appropriate interventions in the flowsheet. Outcome: Progressing Towards Goal 
Pressure Injury Interventions: 
Sensory Interventions: Pressure redistribution bed/mattress (bed type) Moisture Interventions: Internal/External urinary devices Activity Interventions: Pressure redistribution bed/mattress(bed type) Mobility Interventions: Pressure redistribution bed/mattress (bed type) Nutrition Interventions: Document food/fluid/supplement intake Friction and Shear Interventions: Apply protective barrier, creams and emollients

## 2019-01-13 NOTE — PROGRESS NOTES
[facility] Dosing Services: 1/13/19 Consult for Warfarin Dosing by Pharmacy by Clark Livingston Consult provided for this 66 y.o.  female , for indication of  afib and Hx AVR (mechanical valve) PTA Dose: Warfarin 3 mg on Sa/Alfonso/Mo/Tu/We/Th and Warfarin 2 mg on Fr 
  
Dose to achieve an INR goal of 2.5- 3.5 Order entered for  Warfarin  3 (mg) ordered to be given today at 18:00. Heparin bridge Previous dose given 3 mg PT/INR Lab Results Component Value Date/Time INR 1.7 (H) 01/13/2019 01:14 AM  
  
Platelets Lab Results Component Value Date/Time PLATELET 396 53/15/7253 01:14 AM  
  
H/H Lab Results Component Value Date/Time HGB 8.0 (L) 01/13/2019 01:14 AM  
  
 
Pharmacy to follow daily and will provide subsequent Warfarin dosing based on clinical status. Serge Garibay)  Contact information [phone]

## 2019-01-13 NOTE — PROGRESS NOTES
[facility]  
Daily Progress Note Date: 1/13/2019 Assessment/Plan:  
Sheila Castro is a 66 y.o. female who is hospitalized for COPD Exacerbation, initially meeting Severe Sepsis Criteria. Acute on Chronic Respiratory Failure POA in the Setting of COPD Exacerbation:  
Initially with 6L N/C requirement and intermittently on BiPAP, now on 2LN/C(her home requirement) for 3 days hours. RVP neg. SpCx + candida, 1/6 BCx NGTD. Completed doxy treatment (1/12). Pulm signed off. - Albuterol Nebs q6hr RT 
- home Trelegy 
- f/u BCx, SpCx 
- Start prednisone taper 60mg (1/12) - NC O2 for goal of 88-92% sats - BiPaP as needed 
  
Severe Sepsis Rule Out: RESOLVED. Patient's initial SIRS Criteria - elevated WBC, RR< HR explainable by combination of chronic problems and no source of infection present. WBC elevated chronically 2/2 daily steroid use, RR elevated due to COPD exacerbation and HR elevated due to COPD + hx of A fib with RVR. Sepsis workup completed and treatment with fluids initiated. However upon treatment of COPD Excerbation RR, HR improved so unlikley true sepsis as there is no source  1/6 BCx - NGTD, SpCx +candida 
- f/u BCx, SpCx 
 
R/O GI Bleed: FOBT negative. HgB 6.7 POA. S/p2 U pRBCs. HgB this morning  7.8 - stable. Capsule study without any signs of bleeding only diminutive AVMs and gastritis. Recs for upper and lower scopes once respiratory status allows. - Protonix 40mg BID 
- Transfuse for HgB <7 
- GI Consulted, appreciate recs 
  
Diastolic CHF:  
Euvolemic on admission. Trop POA . 10 --> .06 --> .5. Low BPs yesterday so Pt on home bumex PRN, received 1mg dose yesterday (plan dose every other day) but was tachycardic today, dose reduced to 0.5mg. 
- 0.5mg Bumex every other day 
- 500cc bolus - Strict I&O 
- Cardiology consult, appreciate recs 
  
Atrial Fibrillation s/p MVR on Coumadin (INR POA 1.2)  
 Pt has been seen OP by Dr Michael Flores but has not followed up with him since 2016. Goal INR 2.5 - 3.5. EKG POA: Afib with RVR . Coumadin home dose is 3mg everyday but Friday 2mg. Refused Dilt yesterday 2/2 anxiety and HR was elevated 110-130s. Discussed importance of this medication with patient who understands and can tolerate medications but not while anxious. INR this AM 2.0. Coumadin 1mg last evening 
- Home Warfarin continued- to be dosed by pharmacy for a INR goal of 2.5-3.5 
- Heparain bridge until INR therapeutic 
- Diltiazem 240 CD 
- Cardiology consult, appreciate recs  
- Daily Mg & Phos  
  
Leukocytosis:  
Chronic with BL ~22-29 likely due to daily prednisone use. WBC POA 32.0 --> 32.5-->16.1-->15.8 -> 16.2 --> 17.4--> 20.3 
- Monitor with daily CBC 
  
Hyperglycemia:  
likely due to chronic steroid dose. . Will adjust insulin based on trending BGs. BGs 194-470 yestrday, with 20U Lispro. First dose of Lantus at 20U yesterday evening - Lantus 20U for tonight, will increase as trend BGs today if requiring significant SSI 
- SSI, regular sensitivity 
- POC glucose ACHS 
- hypoglycemia protocol 
  
Hyperphosphatemia: RESOLVED Phos 6.0 POA. Phos 3.1 today - Monitor with daily BMP, Mg, Phos 
  
Chronic Back Pain  
- Home Percocet 5-325 mg resumed, q6h PRN  
  
Hyperlipidemia  
(: TC: 196, T, HDL 88, VLDL 30 & LDL 78) - Holding home Lovastatin 10 mg because of transminitis Transaminitis: LFTs downtrending. Likely combination of cholelithiasis and capsule study. 
- Holding home Lovastatin Anxiety:  
IMPROVED. Patient was refusing meds 2/2 anxiety but now taking medications. HR and RR increase with anxiety bouts. - Will discuss daily anxiolytic with patient 
- attarax 25mg TID prn 
- Xanax . 5mg  
  
FEN/GI - Low Na Doet Activity - Ambulate as tolerated DVT prophylaxis - Heparin -->Coumadin, pharm to dose GI prophylaxis - Protonix Disposition - Admit to Stepdown. Plan to d/c to [facility] pending PA. PT/OT following Code Status - DNR, discussed with patient / caregivers. Patient will be discussed with Dr. Esau Schmidt, supervising physician. Peggy Sheikh MD 
Family Medicine Resident CC: \"I'm feeling pretty good\" Subjective No acute events overnight. This morning, she said she was feeling better, but later in the morning was feeling anxious. Found to be tachycardic in rvr. Denies CP/SOB/Abdominal pain/N/V. Inpatient Medications Current Facility-Administered Medications Medication Dose Route Frequency  ondansetron (ZOFRAN) injection 4 mg  4 mg IntraVENous Q8H PRN  
 heparin 25,000 units in D5W 250 ml infusion  12-25 Units/kg/hr IntraVENous TITRATE  insulin glargine (LANTUS) injection 25 Units  25 Units SubCUTAneous QHS  bumetanide (BUMEX) tablet 1 mg  1 mg Oral EVERY OTHER DAY  predniSONE (DELTASONE) tablet 60 mg  60 mg Oral DAILY WITH BREAKFAST  [START ON 1/14/2019] predniSONE (DELTASONE) tablet 40 mg  40 mg Oral DAILY WITH BREAKFAST  [START ON 1/16/2019] predniSONE (DELTASONE) tablet 20 mg  20 mg Oral DAILY WITH BREAKFAST  pantoprazole (PROTONIX) tablet 40 mg  40 mg Oral ACB  ALPRAZolam (XANAX) tablet 0.5 mg  0.5 mg Oral BID PRN  
 Warfarin- Pharmacist Dosing   Other Rx Dosing/Monitoring  oxyCODONE IR (ROXICODONE) tablet 5 mg  5 mg Oral Q8H PRN  
 0.9% sodium chloride infusion 250 mL  250 mL IntraVENous PRN  
 albuterol (ACCUNEB) nebulizer solution 1.25 mg  1.25 mg Nebulization Q6H RT  
 alteplase (CATHFLO) 1 mg in sterile water (preservative free) 1 mL injection  1 mg InterCATHeter PRN  
 sodium chloride (NS) flush 10-30 mL  10-30 mL InterCATHeter PRN  
 sodium chloride (NS) flush 10 mL  10 mL InterCATHeter Q24H  
 sodium chloride (NS) flush 10 mL  10 mL InterCATHeter PRN  
 sodium chloride (NS) flush 10-40 mL  10-40 mL InterCATHeter Q8H  
  sodium chloride (NS) flush 20 mL  20 mL InterCATHeter Q24H  
 bacitracin 500 unit/gram packet 1 Packet  1 Packet Topical PRN  
 guaiFENesin ER (MUCINEX) tablet 600 mg  600 mg Oral Q12H  
 dilTIAZem CD (CARDIZEM CD) capsule 240 mg  240 mg Oral DAILY  nystatin (MYCOSTATIN) 100,000 unit/mL oral suspension 500,000 Units  500,000 Units Oral QID  polyethylene glycol (MIRALAX) packet 17 g  17 g Oral DAILY  insulin lispro (HUMALOG) injection   SubCUTAneous AC&HS  hydrOXYzine HCl (ATARAX) tablet 25 mg  25 mg Oral TID PRN  
 sodium chloride (NS) flush 5-10 mL  5-10 mL IntraVENous Q8H  
 sodium chloride (NS) flush 5-10 mL  5-10 mL IntraVENous PRN  
 docusate sodium (COLACE) capsule 100 mg  100 mg Oral BID  nicotine (NICODERM CQ) 21 mg/24 hr patch 1 Patch  1 Patch TransDERmal Q24H  
 fluticasone-umeclidin-vilanter 100-62.5-25 mcg dsdv 1 Puff  (Patient Supplied)  1 Puff Inhalation DAILY  roflumilast (DALIRESP) tablet 500 mcg  500 mcg Oral DAILY  0.9% sodium chloride infusion  50 mL/hr IntraVENous PRN  
 glucose chewable tablet 16 g  4 Tab Oral PRN  
 dextrose (D50W) injection syrg 12.5-25 g  12.5-25 g IntraVENous PRN  
 glucagon (GLUCAGEN) injection 1 mg  1 mg IntraMUSCular PRN Allergies Allergies Allergen Reactions  Spiriva Respimat [Tiotropium Bromide] Anaphylaxis and Other (comments) Adverse effects; Per RN - pt states that Spiriva caused throat swelling and could not breathe well.  Spiriva Respimat [Tiotropium Bromide] Shortness of Breath  Tomato Shortness of Breath Only occurs with raw tomatoes, tolerates ketchup without issue  Celebrex [Celecoxib] Rash  Celebrex [Celecoxib] Rash  Rocephin [Ceftriaxone] Shortness of Breath  Tomato Rash Objective Vitals: 
Patient Vitals for the past 8 hrs: 
 Temp Pulse Resp BP SpO2  
01/13/19 0636 98 °F (36.7 °C) (!) 117 26 135/68 96 % 01/13/19 0214     100 % 01/13/19 0120 98 °F (36.7 °C) (!) 109 25 (!) 117/95 97 % I/O: 
 
Intake/Output Summary (Last 24 hours) at 1/13/2019 0700 Last data filed at 1/13/2019 2863 Gross per 24 hour Intake 1534.14 ml Output 2201 ml Net -666.86 ml Last shift: 
  01/12 1901 - 01/13 0700 In: 334.1 [P.O.:60; I.V.:274.1] Out: 1701 [Urine:1700] Last 3 shifts: 
  01/11 0701 - 01/12 1900 In: 7001 [P.O.:2290; I.V.:600] Out: 1126 [JLDSZ:9011] Physical Exam:General: No acute distress. Alert. Cooperative. Head: Normocephalic. Atraumatic. Respiratory: No increased work of breathing, no tripoding on my exam. CTAB with improved air movement throughout posterior lung fields. No wheezes, rales, ronchi. Mild crackles on exam this morning. Cardiovascular: Irregularly irregular rhythm. Normal S1,S2. No m/r/g. Pulses 2+ throughout. GI: + bowel sounds. Nontender. No rebound tenderness or guarding. Nondistended Extremities: No edema, no tenderness. Laboratory Data Recent Results (from the past 12 hour(s)) GLUCOSE, POC Collection Time: 01/12/19  9:15 PM  
Result Value Ref Range Glucose (POC) 402 (H) 65 - 100 mg/dL Performed by Hunter Fernandez PTT Collection Time: 01/13/19  1:14 AM  
Result Value Ref Range aPTT 38.2 (H) 22.1 - 32.0 sec  
 aPTT, therapeutic range     58.0 - 77.0 SECS  
CBC WITH AUTOMATED DIFF Collection Time: 01/13/19  1:14 AM  
Result Value Ref Range WBC 20.3 (H) 3.6 - 11.0 K/uL  
 RBC 2.72 (L) 3.80 - 5.20 M/uL HGB 8.0 (L) 11.5 - 16.0 g/dL HCT 25.6 (L) 35.0 - 47.0 % MCV 94.1 80.0 - 99.0 FL  
 MCH 29.4 26.0 - 34.0 PG  
 MCHC 31.3 30.0 - 36.5 g/dL  
 RDW 17.9 (H) 11.5 - 14.5 % PLATELET 076 390 - 821 K/uL MPV ABNORMAL 8.9 - 12.9 FL  
 NRBC 0.2 (H) 0  WBC ABSOLUTE NRBC 0.04 (H) 0.00 - 0.01 K/uL NEUTROPHILS 92 (H) 32 - 75 % BAND NEUTROPHILS 1 0 - 6 % LYMPHOCYTES 2 (L) 12 - 49 % MONOCYTES 2 (L) 5 - 13 % EOSINOPHILS 0 0 - 7 % BASOPHILS 0 0 - 1 % METAMYELOCYTES 3 (H) 0 % IMMATURE GRANULOCYTES 0 %  
 ABS. NEUTROPHILS 18.9 (H) 1.8 - 8.0 K/UL  
 ABS. LYMPHOCYTES 0.4 (L) 0.8 - 3.5 K/UL  
 ABS. MONOCYTES 0.4 0.0 - 1.0 K/UL  
 ABS. EOSINOPHILS 0.0 0.0 - 0.4 K/UL  
 ABS. BASOPHILS 0.0 0.0 - 0.1 K/UL  
 ABS. IMM. GRANS. 0.0 K/UL  
 DF MANUAL    
 RBC COMMENTS ANISOCYTOSIS 1+ 
    
 RBC COMMENTS RBC FRAGMENTS    
METABOLIC PANEL, COMPREHENSIVE Collection Time: 01/13/19  1:14 AM  
Result Value Ref Range Sodium 143 136 - 145 mmol/L Potassium 3.6 3.5 - 5.1 mmol/L Chloride 102 97 - 108 mmol/L  
 CO2 37 (H) 21 - 32 mmol/L Anion gap 4 (L) 5 - 15 mmol/L Glucose 167 (H) 65 - 100 mg/dL BUN 22 (H) 6 - 20 MG/DL Creatinine 0.73 0.55 - 1.02 MG/DL  
 BUN/Creatinine ratio 30 (H) 12 - 20 GFR est AA >60 >60 ml/min/1.73m2 GFR est non-AA >60 >60 ml/min/1.73m2 Calcium 8.7 8.5 - 10.1 MG/DL Bilirubin, total 1.6 (H) 0.2 - 1.0 MG/DL  
 ALT (SGPT) 200 (H) 12 - 78 U/L  
 AST (SGOT) 64 (H) 15 - 37 U/L Alk. phosphatase 102 45 - 117 U/L Protein, total 5.6 (L) 6.4 - 8.2 g/dL Albumin 3.1 (L) 3.5 - 5.0 g/dL Globulin 2.5 2.0 - 4.0 g/dL A-G Ratio 1.2 1.1 - 2.2 PROTHROMBIN TIME + INR Collection Time: 01/13/19  1:14 AM  
Result Value Ref Range INR 1.7 (H) 0.9 - 1.1 Prothrombin time 16.7 (H) 9.0 - 11.1 sec MAGNESIUM Collection Time: 01/13/19  1:14 AM  
Result Value Ref Range Magnesium 1.6 1.6 - 2.4 mg/dL PHOSPHORUS Collection Time: 01/13/19  1:14 AM  
Result Value Ref Range Phosphorus 3.1 2.6 - 4.7 MG/DL  
GLUCOSE, POC Collection Time: 01/13/19  6:41 AM  
Result Value Ref Range Glucose (POC) 129 (H) 65 - 100 mg/dL Performed by Flora Sandoval - No New Imaging Abdominal US: + stones, no cholecystitis Hospital Problems: 
Hospital Problems  Date Reviewed: 11/29/2018 Codes Class Noted POA * (Principal) COPD with acute exacerbation ([facility] ICD-10-CM: J44.1 ICD-9-CM: 491.21  1/6/2019 Unknown Anemia ICD-10-CM: D64.9 ICD-9-CM: 285.9  10/28/2018 Yes Hyperlipidemia ICD-10-CM: E78.5 ICD-9-CM: 272.4  10/26/2018 Yes Chronic back pain (Chronic) ICD-10-CM: M54.9, G89.29 ICD-9-CM: 724.5, 338.29  10/26/2018 Yes History of GI bleed ICD-10-CM: Z87.19 ICD-9-CM: V12.79 Present on Admission 11/25/2016 Yes Diastolic CHF, chronic (HCC) ICD-10-CM: I50.32 
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  
  Tissue MVR 1989 following failed balloon valvuloplasty for MS Redo MVR 2004 due to severe MR, AVR due to AR ([employer]) CAD (coronary artery disease), native coronary artery ICD-10-CM: I25.10 ICD-9-CM: 414.01  1/8/2015 Yes S/P MVR (mitral valve replacement) ICD-10-CM: G76.8 ICD-9-CM: V43.3  1/8/2015 Yes

## 2019-01-13 NOTE — PROGRESS NOTES
Discussed patient with Dr. Jose Delgado while in on rounds due to increased HR, increased WBC and a respiratory rate of 22. Patient evaluated for SIRS and code purple criteria but is also chronically ill with COPD, afib,  With diuretic and  steriod use. New orders received and carried out. 1050 call out to [facility] residents to discuss continued elevated HR. New orders received and carried out. 
 
1320 Patient complaining of nausea. Noted with increased respirations of 28 with oxygen saturations 98-99 on O2 2 L via NC. No change in lung sounds since this AM. RT in to administer nebulizer treatment. Patient eating and drinking well today and abdomen in soft and non tender with active and positive bowel sounds. Had a soft formed BM this morning and is voiding deana urine without any complaints. Discussed all of the above with the family practice resident and plan developed to administer atarax PO for anxiety and then administer Zofran in 4 hours if still necessary for nausea.

## 2019-01-14 NOTE — PROGRESS NOTES
Physical Therapy Chart reviewed,spoke with RN. Pt tachycardic throughout morning. PT will defer today and continue to follow. Asha Ortega

## 2019-01-14 NOTE — PROGRESS NOTES
Bedside and Verbal shift change report given to Estee Rodgers RN (oncoming nurse) by FAUSTINA Khan (offgoing nurse). Report included the following information SBAR, Kardex, Intake/Output, MAR, Accordion, Recent Results, Med Rec Status and Cardiac Rhythm A Fib.  
 
1957  Introduced self as primary RN. Initial assessment completed.  VSS.  Pt denies additional complaints at this time. Plan for the evening discussed with pt and she verbalized understanding. Bed locked and in low position with call bell within reach.  Instructed pt to press call cruz when needed. White board updated with this RN's name. 2102  Pt given evening medication. Meds well tolerated. 2230  Lab obtained from PICC line for a PTT. 
 
2330  PTT at therapeutic range, Heparin drip rate unchanged and documented. 2673  Blood obtained from PICC line for CBC, BMP, PT/INR, and PTT. Pt stated she felt very anxious and asked if it was time for \"the green pill\" which she says has been given to her for anxiety. 0425  Hydroxyzine and pain med given. 8609  Received results from lab for a critical PTT of 90.5. Per heparin protocol, heparin to be held for 1 hour and then restarted back up at 3 U/kg/hr less than previous dose. Bedside and Verbal shift change report given to FAUSTINA Khan (oncoming nurse) by Estee Rodgers RN (offgoing nurse). Report included the following information SBAR, Kardex, Intake/Output, MAR, Accordion, Recent Results, Med Rec Status and Cardiac Rhythm A Fib.

## 2019-01-14 NOTE — PROGRESS NOTES
[facility]  
Daily Progress Note Date: 1/14/2019 Assessment/Plan:  
Omari Apodaca is a 66 y.o. female who is hospitalized for COPD Exacerbation, initially meeting Severe Sepsis Criteria. Acute on Chronic Respiratory Failure POA in the Setting of COPD Exacerbation:  
Initially with 6L N/C requirement and intermittently on BiPAP, now on 2LN/C(her home requirement) for 3 days hours. RVP neg. SpCx + candida, 1/6 BCx NGTD. Completed doxy treatment (1/12). Pulm signed off. - Albuterol Nebs q6hr RT 
- home Trelegy 
- f/u BCx, SpCx 
- ContinuePrednisone taper 40mg starting today - NC O2 for goal of 88-92% sats - BiPaP as needed 
  
Severe Sepsis Rule Out: RESOLVED. Patient's initial SIRS Criteria - elevated WBC, RR< HR explainable by combination of chronic problems and no source of infection present. WBC elevated chronically 2/2 daily steroid use, RR elevated due to COPD exacerbation and HR elevated due to COPD + hx of A fib with RVR. Sepsis workup completed and treatment with fluids initiated. However upon treatment of COPD Excerbation RR, HR improved so unlikley true sepsis as there is no source  1/6 BCx No Growth, Final, SpCx +candida - Continue daily CBC 
- VS per unit routine R/O GI Bleed: FOBT negative. HgB 6.7 POA. S/p2 U pRBCs. HgB this morning  7.5 - stable. Capsule study without any signs of bleeding only diminutive AVMs and gastritis. Recs for upper and lower scopes once respiratory status allows. - Protonix 40mg BID 
- Transfuse for HgB <7 
- GI Consulted, appreciate recs 
  
Diastolic CHF:  
Euvolemic on admission. Trop POA . 10 --> .06 --> .5. Low BPs yesterday so Pt on home bumex PRN, received 1mg dose 1/12 but was tachycardic today, dose reduced to 0.5mg. Volume status easily upset so caution with diuresis. 
- 0.5mg Bumex every other day - Strict I&O 
- Cardiology consult, appreciate recs 
  
Atrial Fibrillation s/p MVR on Coumadin (INR POA 1.2)  
 Pt has been seen OP by Dr Mayra Mendosa but has not followed up with him since 2016. Goal INR 2.5 - 3.5. EKG POA: Afib with RVR . Coumadin home dose is 3mg everyday but Friday 2mg. INR this AM 2.4. Coumadin 3mg last two days, still on Heparin drip 
- Home Warfarin continued- to be dosed by pharmacy for a INR goal of 2.5-3.5 
- Heparain bridge until INR therapeutic 
- Diltiazem 240 CD 
- Cardiology consult, appreciate recs  
- Daily Mg & Phos  
  
Leukocytosis:  
Chronic with BL ~22-29 likely due to daily prednisone use. WBC POA 32.0 --> 32.5-->16.1-->15.8 -> 16.2 --> 17.4--> 20.3-->19.5 
- Monitor with daily CBC 
  
Hyperglycemia:  
likely due to chronic steroid dose. . Will adjust insulin based on trending BGs. BGs 129-437 yestrday, requiring 14U Lispro per corrective scale. - Increase Lantus to 30U for tonight, will increase as trend BGs today if requiring significant SSI 
- SSI, regular sensitivity 
- POC glucose ACHS 
- hypoglycemia protocol 
  
Hyperphosphatemia: RESOLVED Phos 6.0 POA. Phos 3.7 today - Monitor with daily BMP, Mg, Phos 
  
Chronic Back Pain  
- Home Percocet 5-325 mg resumed, q6h PRN  
  
Hyperlipidemia  
(: TC: 196, T, HDL 88, VLDL 30 & LDL 78) - Holding home Lovastatin 10 mg because of transminitis Transaminitis: LFTs downtrending. Likely combination of cholelithiasis and capsule study. 
- Holding home Lovastatin Anxiety:  
IMPROVED. Patient was refusing meds 2/2 anxiety but now taking medications. HR and RR increase with anxiety bouts. - Will discuss daily anxiolytic with patient 
- attarax 25mg TID prn 
- Xanax . 5mg BID PRN 
  
FEN/GI - Low Na Diet Activity - Ambulate as tolerated DVT prophylaxis - Heparin -->Coumadin, pharm to dose GI prophylaxis - Protonix Disposition - Admit to Stepdown. Plan to d/c to [facility], pending PA. PT/OT following Code Status - DNR, discussed with patient / caregivers. Patient will be discussed with Dr. Cyrus Cruz, supervising physician. Harpal Adkins MD 
Family Medicine Resident CC: \"I'm not so good - feeling anxious\" Subjective No acute events overnight. Pet patient her breathing is improved but she has been anxious this morning. She denies SOB, CP, N/V. She is having bowel movements and urinating without difficulty. Inpatient Medications Current Facility-Administered Medications Medication Dose Route Frequency  bumetanide (BUMEX) tablet 0.5 mg  0.5 mg Oral EVERY OTHER DAY  docusate sodium (COLACE) capsule 100 mg  100 mg Oral DAILY PRN  polyethylene glycol (MIRALAX) packet 17 g  17 g Oral DAILY PRN  
 magnesium sulfate 2 g/50 ml IVPB (premix or compounded)  2 g IntraVENous ONCE  
 ondansetron (ZOFRAN) injection 4 mg  4 mg IntraVENous Q8H PRN  
 heparin 25,000 units in D5W 250 ml infusion  12-25 Units/kg/hr IntraVENous TITRATE  insulin glargine (LANTUS) injection 25 Units  25 Units SubCUTAneous QHS  predniSONE (DELTASONE) tablet 40 mg  40 mg Oral DAILY WITH BREAKFAST  [START ON 1/16/2019] predniSONE (DELTASONE) tablet 20 mg  20 mg Oral DAILY WITH BREAKFAST  pantoprazole (PROTONIX) tablet 40 mg  40 mg Oral ACB  ALPRAZolam (XANAX) tablet 0.5 mg  0.5 mg Oral BID PRN  
 Warfarin- Pharmacist Dosing   Other Rx Dosing/Monitoring  oxyCODONE IR (ROXICODONE) tablet 5 mg  5 mg Oral Q8H PRN  
 0.9% sodium chloride infusion 250 mL  250 mL IntraVENous PRN  
 albuterol (ACCUNEB) nebulizer solution 1.25 mg  1.25 mg Nebulization Q6H RT  
 alteplase (CATHFLO) 1 mg in sterile water (preservative free) 1 mL injection  1 mg InterCATHeter PRN  
 sodium chloride (NS) flush 10-30 mL  10-30 mL InterCATHeter PRN  
 sodium chloride (NS) flush 10 mL  10 mL InterCATHeter Q24H  
 sodium chloride (NS) flush 10 mL  10 mL InterCATHeter PRN  
 sodium chloride (NS) flush 10-40 mL  10-40 mL InterCATHeter Q8H  
  sodium chloride (NS) flush 20 mL  20 mL InterCATHeter Q24H  
 bacitracin 500 unit/gram packet 1 Packet  1 Packet Topical PRN  
 guaiFENesin ER (MUCINEX) tablet 600 mg  600 mg Oral Q12H  
 dilTIAZem CD (CARDIZEM CD) capsule 240 mg  240 mg Oral DAILY  nystatin (MYCOSTATIN) 100,000 unit/mL oral suspension 500,000 Units  500,000 Units Oral QID  insulin lispro (HUMALOG) injection   SubCUTAneous AC&HS  hydrOXYzine HCl (ATARAX) tablet 25 mg  25 mg Oral TID PRN  
 sodium chloride (NS) flush 5-10 mL  5-10 mL IntraVENous Q8H  
 sodium chloride (NS) flush 5-10 mL  5-10 mL IntraVENous PRN  
 nicotine (NICODERM CQ) 21 mg/24 hr patch 1 Patch  1 Patch TransDERmal Q24H  
 fluticasone-umeclidin-vilanter 100-62.5-25 mcg dsdv 1 Puff  (Patient Supplied)  1 Puff Inhalation DAILY  roflumilast (DALIRESP) tablet 500 mcg  500 mcg Oral DAILY  0.9% sodium chloride infusion  50 mL/hr IntraVENous PRN  
 glucose chewable tablet 16 g  4 Tab Oral PRN  
 dextrose (D50W) injection syrg 12.5-25 g  12.5-25 g IntraVENous PRN  
 glucagon (GLUCAGEN) injection 1 mg  1 mg IntraMUSCular PRN Allergies Allergies Allergen Reactions  Spiriva Respimat [Tiotropium Bromide] Anaphylaxis and Other (comments) Adverse effects; Per RN - pt states that Spiriva caused throat swelling and could not breathe well.  Spiriva Respimat [Tiotropium Bromide] Shortness of Breath  Tomato Shortness of Breath Only occurs with raw tomatoes, tolerates ketchup without issue  Celebrex [Celecoxib] Rash  Celebrex [Celecoxib] Rash  Rocephin [Ceftriaxone] Shortness of Breath  Tomato Rash Objective Vitals: 
Patient Vitals for the past 8 hrs: 
 Temp Pulse Resp BP SpO2  
01/14/19 0804 98.2 °F (36.8 °C) (!) 115 24 134/87 99 % 01/14/19 0701  (!) 112     
01/14/19 0420 98.3 °F (36.8 °C) (!) 104 25 109/76 99 % 01/14/19 0248     100 % I/O: 
 
Intake/Output Summary (Last 24 hours) at 1/14/2019 4687 Last data filed at 1/14/2019 3089 Gross per 24 hour Intake 828.74 ml Output 700 ml Net 128.74 ml Last shift: 
  No intake/output data recorded. Last 3 shifts: 
  01/12 1901 - 01/14 0700 In: 1642.9 [P.O.:1100; I.V.:542.9] Out: 2401 [Urine:2400] Physical Exam:General: No acute distress. Alert. Cooperative. Head: Normocephalic. Atraumatic. Respiratory: No increased work of breathing, no tripoding on my exam. CTAB with improved air movement throughout posterior lung fields. No wheezes, rales, ronchi. Mild crackles on exam this morning. Cardiovascular: Irregularly irregular rhythm. Mechanical click from valve replacement with mild flow murmur. Pulses 2+ throughout. GI: + bowel sounds. Nontender. No rebound tenderness or guarding. Nondistended Extremities: 1+ pitting edema to just above ankle on L > R, no tenderness. Laboratory Data Recent Results (from the past 12 hour(s)) GLUCOSE, POC Collection Time: 01/13/19  9:07 PM  
Result Value Ref Range Glucose (POC) 437 (H) 65 - 100 mg/dL Performed by [facility] GLUCOSE, POC Collection Time: 01/13/19 10:17 PM  
Result Value Ref Range Glucose (POC) 298 (H) 65 - 100 mg/dL Performed by [facility] PTT Collection Time: 01/13/19 10:26 PM  
Result Value Ref Range aPTT 74.9 (H) 22.1 - 32.0 sec  
 aPTT, therapeutic range     58.0 - 77.0 SECS  
CBC WITH AUTOMATED DIFF Collection Time: 01/14/19  4:12 AM  
Result Value Ref Range WBC 19.5 (H) 3.6 - 11.0 K/uL  
 RBC 2.58 (L) 3.80 - 5.20 M/uL HGB 7.5 (L) 11.5 - 16.0 g/dL HCT 24.5 (L) 35.0 - 47.0 % MCV 95.0 80.0 - 99.0 FL  
 MCH 29.1 26.0 - 34.0 PG  
 MCHC 30.6 30.0 - 36.5 g/dL  
 RDW 18.6 (H) 11.5 - 14.5 % PLATELET 437 (L) 577 - 400 K/uL NRBC 0.2 (H) 0  WBC ABSOLUTE NRBC 0.03 (H) 0.00 - 0.01 K/uL NEUTROPHILS 90 (H) 32 - 75 % LYMPHOCYTES 3 (L) 12 - 49 % MONOCYTES 5 5 - 13 % EOSINOPHILS 0 0 - 7 % BASOPHILS 0 0 - 1 % MYELOCYTES 2 (H) 0 % IMMATURE GRANULOCYTES 0 %  
 ABS. NEUTROPHILS 17.6 (H) 1.8 - 8.0 K/UL  
 ABS. LYMPHOCYTES 0.6 (L) 0.8 - 3.5 K/UL  
 ABS. MONOCYTES 1.0 0.0 - 1.0 K/UL  
 ABS. EOSINOPHILS 0.0 0.0 - 0.4 K/UL  
 ABS. BASOPHILS 0.0 0.0 - 0.1 K/UL  
 ABS. IMM. GRANS. 0.0 K/UL  
 DF MANUAL    
 RBC COMMENTS ANISOCYTOSIS 1+ 
    
 RBC COMMENTS HYPOCHROMIA 1+ 
    
 RBC COMMENTS SCHISTOCYTES PRESENT 
    
METABOLIC PANEL, COMPREHENSIVE Collection Time: 01/14/19  4:12 AM  
Result Value Ref Range Sodium 141 136 - 145 mmol/L Potassium 4.2 3.5 - 5.1 mmol/L Chloride 102 97 - 108 mmol/L  
 CO2 35 (H) 21 - 32 mmol/L Anion gap 4 (L) 5 - 15 mmol/L Glucose 146 (H) 65 - 100 mg/dL BUN 21 (H) 6 - 20 MG/DL Creatinine 0.68 0.55 - 1.02 MG/DL  
 BUN/Creatinine ratio 31 (H) 12 - 20 GFR est AA >60 >60 ml/min/1.73m2 GFR est non-AA >60 >60 ml/min/1.73m2 Calcium 8.5 8.5 - 10.1 MG/DL Bilirubin, total 1.1 (H) 0.2 - 1.0 MG/DL  
 ALT (SGPT) 135 (H) 12 - 78 U/L  
 AST (SGOT) 48 (H) 15 - 37 U/L Alk. phosphatase 98 45 - 117 U/L Protein, total 5.2 (L) 6.4 - 8.2 g/dL Albumin 2.7 (L) 3.5 - 5.0 g/dL Globulin 2.5 2.0 - 4.0 g/dL A-G Ratio 1.1 1.1 - 2.2 PROTHROMBIN TIME + INR Collection Time: 01/14/19  4:12 AM  
Result Value Ref Range INR 2.4 (H) 0.9 - 1.1 Prothrombin time 23.2 (H) 9.0 - 11.1 sec MAGNESIUM Collection Time: 01/14/19  4:12 AM  
Result Value Ref Range Magnesium 1.6 1.6 - 2.4 mg/dL PHOSPHORUS Collection Time: 01/14/19  4:12 AM  
Result Value Ref Range Phosphorus 3.7 2.6 - 4.7 MG/DL  
PTT Collection Time: 01/14/19  4:12 AM  
Result Value Ref Range aPTT 90.5 (HH) 22.1 - 32.0 sec  
 aPTT, therapeutic range     58.0 - 77.0 SECS  
GLUCOSE, POC Collection Time: 01/14/19  6:24 AM  
Result Value Ref Range Glucose (POC) 128 (H) 65 - 100 mg/dL Performed by [facility] - No New Imaging Abdominal US: + stones, no cholecystitis Hospital Problems: 
Hospital Problems  Date Reviewed: 11/29/2018 Codes Class Noted POA * (Principal) COPD with acute exacerbation ([facility] [address] ICD-10-CM: J44.1 ICD-9-CM: 491.21  1/6/2019 Unknown Anemia ICD-10-CM: D64.9 ICD-9-CM: 285.9  10/28/2018 Yes Hyperlipidemia ICD-10-CM: E78.5 ICD-9-CM: 272.4  10/26/2018 Yes Chronic back pain (Chronic) ICD-10-CM: M54.9, G89.29 ICD-9-CM: 724.5, 338.29  10/26/2018 Yes History of GI bleed ICD-10-CM: Z87.19 ICD-9-CM: V12.79 Present on Admission 11/25/2016 Yes Diastolic CHF, chronic (HCC) ICD-10-CM: I50.32 
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  
  Tissue MVR 1989 following failed balloon valvuloplasty for MS Redo MVR 2004 due to severe MR, AVR due to AR ([employer]) CAD (coronary artery disease), native coronary artery ICD-10-CM: I25.10 ICD-9-CM: 414.01  1/8/2015 Yes S/P MVR (mitral valve replacement) ICD-10-CM: L04.5 ICD-9-CM: V43.3  1/8/2015 Yes

## 2019-01-14 NOTE — PROGRESS NOTES
Pt assess given her . Pt states she anxious. No CP. Nurse states she has given medication 5 min ago: cardizem 240 mg, xanax 0.5 mg (0.5 mg xanax given 2 hours ago), atarax 25 mg.  
 
-130 Pt anxious Crackles at bilateral bases. Fair air movement. Irregular rhythm, tachycardic rate. 2/6 murmur crisp click. No rubs or gallops. P: 
-Will obtain chest xray 
-Will give bumex  
-Discussed with cardiology, states monitor effect of cardizem and add dig if not improved. -Nurse made aware Dannie Pizano, DO

## 2019-01-14 NOTE — PROGRESS NOTES
[facility] Dosing Services: 
 
Consult for Warfarin Dosing by Pharmacy by Primus Goodpasture Consult provided for this 66 y.o.  female , for indication of  afib, MVR, AVR  
PTA Dose: Warfarin 3 mg on Sa/Su/Mo/Tu/We/Th and Warfarin 2 mg on Fr 
  
Dose to achieve an INR goal of 2.5- 3.5 Order entered for  Warfarin  3 (mg) ordered to be given today at 18:00. Drug interactions: Heparin bridge Previous dose given 3 mg PT/INR Lab Results Component Value Date/Time INR 2.4 (H) 01/14/2019 04:12 AM  
  
Platelets Lab Results Component Value Date/Time PLATELET 889 (L) 74/76/5882 04:12 AM  
  
H/H Lab Results Component Value Date/Time HGB 7.5 (L) 01/14/2019 04:12 AM  
  
 
Pharmacy to follow daily and will provide subsequent Warfarin dosing based on clinical status. ANDI Smith  Contact information [phone]

## 2019-01-14 NOTE — PROGRESS NOTES
[address] [facility] [address], [address] with [address] [facility] Resident Progress Note in Brief S: Patient seen and examined at bedside. Patient was sitting on the edge of the bed. Patient was Complaining of being nauseous because her stomach feels uneasy. Denies any chest pain, SOB, vomiting, or urinary issues. Does not complain of diarrhea or constipation. O: 
Visit Vitals BP (!) 89/50 (BP 1 Location: Left arm, BP Patient Position: At rest;Sitting) Pulse (!) 102 Temp 98.1 °F (36.7 °C) Resp 24 Ht 5' 4\" (1.626 m) Wt 122 lb 14.4 oz (55.7 kg) SpO2 98% BMI 21.10 kg/m² BP 100s/60s , HR 90s in room. Currently on 2L NC. Physical Examination:  
General appearance -Elderly female in hospital gown Chest - Normal work of breathing with symmetric air entry. Mild crackles at lung bases. Heart - irregular irregular. Abdomen - Obese abdomen, soft, nontender Neurological - alert, oriented, normal speech, no focal findings A/P: Ms. Manuel Nuñez is a 62yoF admitted for COPD exacerbation. This is hospital day 7. 
 
- COPD exacerbation:Continue albuterol q6H neb, steroid taper, mucinez and Trelegy 
- dCHF: Bumex 1mg every other day (next one 1/14) - Afib w/RVR: dilt CD 240mg daily. Monitor HR.  
- atarax and xanax PRN for anxiety - Zofran PRN for nausea Please see daily progress note for full details. Jossue Red MD 
Family Medicine Resident

## 2019-01-14 NOTE — PROGRESS NOTES
Cardiology Progress Note [address], [place], [address] [facility] Phone [phone]; Fax [phone] 
 
 
 
2019 11:46 AM  
 
Admit Date:           2019 Admit Diagnosis:  COPD with acute exacerbation ([facility] [address] :          1955 MRN:          [id number] ASSESSMENT/RECOMMENDATION:  
Afib with RVR: V rate persistently 130-140's despite 240 mg of cardizem in the setting of severe anemia/COPD/anxiety/sepsis. BP has been liable 's. Will add digoxin IV now and start PO tomorrow. Creatinine/ K WNL. Mag repleted - I added on TSH this morning - 0.18- check T3/4 now. Will need to treat if T3/4 abnormal as well. - on heparin gtt, INR 2.4- goal INR 2.5-3.5, could d/c heparin gtt 
  
S/P mechanical MVR/AVR:  
- bridging coumadin/heparin gtt- can d/c 
- given mechanical valves INR goal 2.5-3.5. - Nml valve function by ECHO 10/28.  
  
Chronic diastolic HF 
- on low dose Po bumex  
  
COPD exacerbation / sepsis - per medicine team  
  
Anemia/GI bleed? H/H slightly down 7.5/24 . Watch closely with transfusions - may need IV diuretics post tx 
-Capsule study showing several diminutive AVM's were noted in the small bowel, no stigmata of bleeding, no further GI procedures planned at this time. -occult blood negative No intake/output data recorded. Last 3 Recorded Weights in this Encounter 19 6085 19 0240 19 5391 Weight: 125 lb 4.8 oz (56.8 kg) 122 lb 14.4 oz (55.7 kg) 126 lb 1.6 oz (57.2 kg)  1901 -  0700 In: 1642.9 [P.O.:1100; I.V.:542.9] Out: 2401 [Urine:2400] SUBJECTIVE [place] denies  SOB, chest pain or LE edema. 
+fatigue + chronic back pain She is  Anxious/shaking this morning. No lightheadedness or dizziness +palpitations, irregular heart at times Current Facility-Administered Medications Medication Dose Route Frequency  docusate sodium (COLACE) capsule 100 mg  100 mg Oral DAILY PRN  polyethylene glycol (MIRALAX) packet 17 g  17 g Oral DAILY PRN  
 insulin glargine (LANTUS) injection 30 Units  30 Units SubCUTAneous QHS  bumetanide (BUMEX) tablet 0.5 mg  0.5 mg Oral EVERY OTHER DAY  digoxin (LANOXIN) injection 125 mcg  125 mcg IntraVENous ONCE  
 ondansetron (ZOFRAN) injection 4 mg  4 mg IntraVENous Q8H PRN  
 heparin 25,000 units in D5W 250 ml infusion  12-25 Units/kg/hr IntraVENous TITRATE  predniSONE (DELTASONE) tablet 40 mg  40 mg Oral DAILY WITH BREAKFAST  [START ON 1/16/2019] predniSONE (DELTASONE) tablet 20 mg  20 mg Oral DAILY WITH BREAKFAST  pantoprazole (PROTONIX) tablet 40 mg  40 mg Oral ACB  ALPRAZolam (XANAX) tablet 0.5 mg  0.5 mg Oral BID PRN  
 Warfarin- Pharmacist Dosing   Other Rx Dosing/Monitoring  oxyCODONE IR (ROXICODONE) tablet 5 mg  5 mg Oral Q8H PRN  
 0.9% sodium chloride infusion 250 mL  250 mL IntraVENous PRN  
 albuterol (ACCUNEB) nebulizer solution 1.25 mg  1.25 mg Nebulization Q6H RT  
 alteplase (CATHFLO) 1 mg in sterile water (preservative free) 1 mL injection  1 mg InterCATHeter PRN  
 sodium chloride (NS) flush 10-30 mL  10-30 mL InterCATHeter PRN  
 sodium chloride (NS) flush 10 mL  10 mL InterCATHeter Q24H  
 sodium chloride (NS) flush 10 mL  10 mL InterCATHeter PRN  
 sodium chloride (NS) flush 10-40 mL  10-40 mL InterCATHeter Q8H  
 sodium chloride (NS) flush 20 mL  20 mL InterCATHeter Q24H  
 bacitracin 500 unit/gram packet 1 Packet  1 Packet Topical PRN  
 guaiFENesin ER (MUCINEX) tablet 600 mg  600 mg Oral Q12H  
 dilTIAZem CD (CARDIZEM CD) capsule 240 mg  240 mg Oral DAILY  nystatin (MYCOSTATIN) 100,000 unit/mL oral suspension 500,000 Units  500,000 Units Oral QID  insulin lispro (HUMALOG) injection   SubCUTAneous AC&HS  
  hydrOXYzine HCl (ATARAX) tablet 25 mg  25 mg Oral TID PRN  
 sodium chloride (NS) flush 5-10 mL  5-10 mL IntraVENous Q8H  
 sodium chloride (NS) flush 5-10 mL  5-10 mL IntraVENous PRN  
 nicotine (NICODERM CQ) 21 mg/24 hr patch 1 Patch  1 Patch TransDERmal Q24H  
 fluticasone-umeclidin-vilanter 100-62.5-25 mcg dsdv 1 Puff  (Patient Supplied)  1 Puff Inhalation DAILY  roflumilast (DALIRESP) tablet 500 mcg  500 mcg Oral DAILY  0.9% sodium chloride infusion  50 mL/hr IntraVENous PRN  
 glucose chewable tablet 16 g  4 Tab Oral PRN  
 dextrose (D50W) injection syrg 12.5-25 g  12.5-25 g IntraVENous PRN  
 glucagon (GLUCAGEN) injection 1 mg  1 mg IntraMUSCular PRN OBJECTIVE Intake/Output Summary (Last 24 hours) at 1/14/2019 1138 Last data filed at 1/14/2019 8069 Gross per 24 hour Intake 828.74 ml Output 500 ml Net 328.74 ml Review of Systems - History obtained from the patient AS PER  HPI Telemetry afib v rate 120-140's PHYSICAL EXAM  
  
 
Visit Vitals /87 (BP 1 Location: Left arm, BP Patient Position: At rest;Supine) Pulse (!) 115 Temp 98.2 °F (36.8 °C) Resp 24 Ht 5' 4\" (1.626 m) Wt 126 lb 1.6 oz (57.2 kg) SpO2 99% BMI 21.65 kg/m² Gen: Well-developed, thin/ill appearing, in mild distress  alert and oriented x 3/shaking HEENT:  Pink conjunctivae, Hearing grossly normal.No scleral icterus or conjunctival, moist mucous membranes Neck: Supple,No JVD Resp: No accessory muscle use, crackles robina bases/poor air movement Card: Irregular/acclerated Rate,Rythm, 2/6  murmur crisp click, no rubs or gallop. No thrills. GI:          soft, non-tender MSK: No cyanosis or clubbing, good capillary refill Skin: No rashes or ulcers, + petechia to BUE/chest. Midline sternal scar unremarkable Neuro:  Cranial nerves are grossly intact, moving all four extremities, no focal deficit, follows commands appropriately Psych:  Good insight, oriented to person, place and time, alert, Nml Affect. Very anxious/nervous LE: No edema DATA REVIEW Laboratory and Imaging have been reviewed by me and are notable for No results for input(s): CPK, CKMB, TROIQ in the last 72 hours. Recent Labs  
  01/14/19 
0412 01/13/19 
0114 01/12/19 
0106  143 142  
K 4.2 3.6 4.4  
CO2 35* 37* 32 BUN 21* 22* 32* CREA 0.68 0.73 0.95 * 167* 258* PHOS 3.7 3.1 3.5 MG 1.6 1.6 2.1 WBC 19.5* 20.3* 17.4* HGB 7.5* 8.0* 7.5* HCT 24.5* 25.6* 25.0*  
* [phone] Wisam Sparks, NP

## 2019-01-14 NOTE — PROGRESS NOTES
I called Madelin Headley with [facility] rehab to follow up. She stated that she does not have a determination at this time from [facility]. I will continue to follow.  JENNIFER Kebede

## 2019-01-14 NOTE — PROGRESS NOTES
Occupational Therapy Note:  
  
Chart reviewed, spoke with RN. Pt tachycardic throughout morning. OT will defer today and continue to follow.  
Bailey SEBASTIAN

## 2019-01-15 NOTE — PROGRESS NOTES
SHIFT REPORT: 
1900- Bedside shift change report given to Jose A Loera RN (oncoming nurse) by Wellington Weldon (offgoing nurse). Report included the following information SBAR, Kardex, Procedure Summary, Intake/Output, Recent Results and Cardiac Rhythm. SHIFT SUMMARY: 
2000-Roxicodone 5mg po for gen pain 2230-Xanax given pt req , ready to go to sleep; VS taken END OF SHIFT REPORT: 
2330-Bedside shift change report given to Jeannie Mir RN (oncoming nurse) by Jose A Loera RN (offgoing nurse). Report included the following information SBAR, Kardex, Procedure Summary, Intake/Output, Recent Results and Cardiac Rhythm .

## 2019-01-15 NOTE — PROGRESS NOTES
[facility] has denied inpatient Rehab with [facility]. I spoke to physician. Peer to Peer will be started l , ext. [phone]. I will continue to follow.  JENNIFER Sevilla

## 2019-01-15 NOTE — PROGRESS NOTES
Problem: Pressure Injury - Risk of 
Goal: *Prevention of pressure injury Document Jose Scale and appropriate interventions in the flowsheet. Outcome: Progressing Towards Goal 
Pressure Injury Interventions: 
Sensory Interventions: Assess changes in LOC, Assess need for specialty bed, Avoid rigorous massage over bony prominences, Chair cushion, Check visual cues for pain, Discuss PT/OT consult with provider, Float heels, Keep linens dry and wrinkle-free, Minimize linen layers, Monitor skin under medical devices, Pad between skin to skin, Turn and reposition approx. every two hours (pillows and wedges if needed) Moisture Interventions: Absorbent underpads, Apply protective barrier, creams and emollients, Check for incontinence Q2 hours and as needed, Internal/External urinary devices, Maintain skin hydration (lotion/cream), Minimize layers, Moisture barrier, Offer toileting Q_hr Activity Interventions: Assess need for specialty bed, Chair cushion, Increase time out of bed, Pressure redistribution bed/mattress(bed type), PT/OT evaluation Mobility Interventions: Assess need for specialty bed, Chair cushion, Float heels, HOB 30 degrees or less, Pressure redistribution bed/mattress (bed type), PT/OT evaluation, Turn and reposition approx. every two hours(pillow and wedges) Nutrition Interventions: Document food/fluid/supplement intake, Discuss nutritional consult with provider, Offer support with meals,snacks and hydration Friction and Shear Interventions: Apply protective barrier, creams and emollients, Foam dressings/transparent film/skin sealants, Minimize layers

## 2019-01-15 NOTE — PROGRESS NOTES
Bedside and Verbal shift change report given to Johnathon (oncoming nurse) by Hamlet Melendez (offgoing nurse). Report included the following information SBAR, Kardex, Procedure Summary, Intake/Output, MAR, Recent Results and Med Rec Status. 1503: Bedside and Verbal shift change report given to Cherrie Joy (oncoming nurse) by Johnathon (offgoing nurse). Report included the following information SBAR, Kardex, Intake/Output, MAR, Recent Results and Med Rec Status.

## 2019-01-15 NOTE — ROUTINE PROCESS
Bedside and Verbal shift change report given to Luiz Weir RN (oncoming nurse) by FAUSTINA Khan (offgoing nurse). Report included the following information SBAR, Kardex, Intake/Output, MAR, Accordion, Recent Results, Cardiac Rhythm Afib and Alarm Parameters .

## 2019-01-15 NOTE — PROGRESS NOTES
Problem: Mobility Impaired (Adult and Pediatric) Goal: *Acute Goals and Plan of Care (Insert Text) Physical Therapy Goals Initiated 1/10/2019 1. Patient will move from supine to sit and sit to supine  in bed with supervision/set-up within 7 day(s). 2.  Patient will transfer from bed to chair and chair to bed with minimal assistance/contact guard assist using the least restrictive device within 7 day(s). 3.  Patient will perform sit to stand with minimal assistance/contact guard assist within 7 day(s). 4.  Patient will ambulate with minimal assistance/contact guard assist for 150 feet with the least restrictive device within 7 day(s). physical Therapy TREATMENT Patient: Ledy Klein (86 y.o. female) Date: 1/15/2019 Diagnosis: COPD with acute exacerbation ([address] COPD with acute exacerbation ([address] Procedure(s) (LRB): 
CAPSULE     Complete:  1445 (N/A) 6 Days Post-Op Precautions: Fall, WBAT Chart, physical therapy assessment, plan of care and goals were reviewed. ASSESSMENT: 
Pt received in bed using 2L O2 sat 100%  HR 85 at rest. Agreeable to participate with physical therapy. CGA for functional tranfers and gait training x 40' using RW using 2L. Pt fatigued quickly with increased anxiety. Pt returned to sitting EOB O2 sat 97% RR28, educated in PLB Pt responded well -85 Pt reports no use of AD at home, but holds to furniture, open to pulmonary rehab to improve functional mobility and activity tolerance. Progression toward goals: 
[]    Improving appropriately and progressing toward goals [x]    Improving slowly and progressing toward goals 
[]    Not making progress toward goals and plan of care will be adjusted PLAN: 
Patient continues to benefit from skilled intervention to address the above impairments. Continue treatment per established plan of care. Discharge Recommendations:  Pulmonary Rehab Further Equipment Recommendations for Discharge:  none SUBJECTIVE:  
 Patient stated I will do what I can.  OBJECTIVE DATA SUMMARY:  
Critical Behavior: 
Neurologic State: Alert, Appropriate for age Orientation Level: Oriented X4 Cognition: Appropriate decision making, Appropriate for age attention/concentration, Appropriate safety awareness, Follows commands Functional Mobility Training: 
Bed Mobility: 
  
Supine to Sit: Contact guard assistance; Additional time Transfers: 
Sit to Stand: Contact guard assistance Stand to Sit: Contact guard assistance Balance: 
Sitting: Intact Standing: Impaired; Without support Standing - Static: Constant support Standing - Dynamic : FairAmbulation/Gait Training: 
Distance (ft): 40 Feet (ft) Assistive Device: Walker, rolling;Gait belt Ambulation - Level of Assistance: Contact guard assistance Stairs: 
  
  
   
 
Neuro Re-Education: 
 
Therapeutic Exercises:  
 
Pain: 
Pain Scale 1: Numeric (0 - 10) Pain Intensity 1: 5 Pain Location 1: Back Pain Orientation 1: Medial 
Pain Description 1: Sore Pain Intervention(s) 1: Declines Activity Tolerance:  
fair Please refer to the flowsheet for vital signs taken during this treatment. After treatment:  
[]    Patient left in no apparent distress sitting up in chair 
[]    Patient left in no apparent distress in bed 
[]    Call bell left within reach 
[]    Nursing notified 
[]    Caregiver present 
[]    Bed alarm activated COMMUNICATION/COLLABORATION:  
The patients plan of care was discussed with: Registered Nurse Frankie Collier Time Calculation: 23 mins

## 2019-01-15 NOTE — PROGRESS NOTES
[facility] Dosing Services: 
 
Consult for Warfarin Dosing by Pharmacy by Luzma Villatoro Consult provided for this 66 y.o.  female , for indication of  afib, MVR, AVR  
PTA Dose: Warfarin 3 mg on Sa/Su/Mo/Tu/We/Th and Warfarin 2 mg on Fr 
  
Dose to achieve an INR goal of 2.5- 3.5 Order entered for  Warfarin  2.5 (mg) ordered to be given today at 18:00. Slightly lower dose today due to consistent uptrend of INR. Drug interactions: Heparin bridge Previous dose given 3 mg PT/INR Lab Results Component Value Date/Time INR 2.9 (H) 01/15/2019 03:13 AM  
  
Platelets Lab Results Component Value Date/Time PLATELET 735 (L) 51/40/8297 03:13 AM  
  
H/H Lab Results Component Value Date/Time HGB 7.3 (L) 01/15/2019 03:13 AM  
  
 
Pharmacy to follow daily and will provide subsequent Warfarin dosing based on clinical status. MARIYA Gar)  Contact information [phone]

## 2019-01-15 NOTE — DIABETES MGMT
DTC Progress Note Recommendations/ Comments:Review for hyperglycemia. IV steroids d/c and on oral meds with taper. Noted fasting BS within target and POC glucose climbs > 300 mg/dl over the course of the day. Next taper tomorrow. Pt required 14 units of correction insulin in past 24 hours. If supper and bedtime readings remain elevated may benefit from a dose of meal time insulin at lunch to help correct hyperglycemia from steroids later in day Current hospital DM medication: Lantus 30 units daily; lispro insulin correction scale Chart reviewed on Korina Martinez. Patient is a 66 y.o. female with no history of diabetes. A1c likely inaccurate due to low Hgb and Hct. Last blood transfusion 10/27/2018 A1c:  
Lab Results Component Value Date/Time Hemoglobin A1c <3.5 (L) 01/06/2019 02:39 PM  
 Hemoglobin A1c <3.5 (L) 10/25/2018 07:40 AM  
 
 
Recent Glucose Results:  
Lab Results Component Value Date/Time GLU 55 (L) 01/15/2019 03:13 AM  
 GLUCPOC 109 (H) 01/15/2019 07:42 AM  
 GLUCPOC 106 (H) 01/15/2019 04:45 AM  
 GLUCPOC 362 (H) 01/14/2019 09:09 PM  
  
 
Lab Results Component Value Date/Time Creatinine 0.60 01/15/2019 03:13 AM  
 
Estimated Creatinine Clearance: 85 mL/min (based on SCr of 0.6 mg/dL). Active Orders Diet DIET REGULAR 1.5 GM NA  
  
 
PO intake:  
Patient Vitals for the past 72 hrs: 
 % Diet Eaten 01/14/19 1839 50 % 01/14/19 1311 50 % 01/14/19 0804 0 % 01/13/19 1318 50 % 01/13/19 0819 50 % 01/12/19 1633 100 % 01/12/19 1332 25 % Will continue to follow as needed. Thank you Jered Tejada RD, CDE Time spent: 6 min

## 2019-01-15 NOTE — PROGRESS NOTES
Patient converted to NSR Cont low dose PO dig Check Dig level on Thursday Monitor renal function INR 2.9 We will sign off. Please call if there are any new developments that require cardiology input.

## 2019-01-15 NOTE — PROGRESS NOTES
[facility]  
Daily Progress Note Date: 1/15/2019 Assessment/Plan:  
Chris Guerra is a 66 y.o. female who is hospitalized for COPD Exacerbation, initially meeting Severe Sepsis Criteria. Acute on Chronic Respiratory Failure POA in the Setting of COPD Exacerbation:  
Initially with 6L N/C requirement and intermittently on BiPAP, now on 2LN/C(her home requirement). RVP neg. SpCx + candida, 1/6 BCx NG(Final). Sputum Cx + candida. Completed doxy treatment (1/12). Pulm signed off, appreciate recs - Albuterol Nebs q6hr RT 
- home Trelegy 
- ContinuePrednisone taper, 40mg PO today - NC O2 for goal of 88-92% sats - BiPaP as needed 
  
Severe Sepsis Rule Out: RESOLVED. Patient's initial SIRS Criteria - elevated WBC, RR< HR explainable by combination of chronic problems and no source of infection present. WBC elevated chronically 2/2 daily steroid use, RR elevated due to COPD exacerbation and HR elevated due to COPD + hx of A fib with RVR. Sepsis workup completed and treatment with fluids initiated. However upon treatment of COPD Excerbation RR, HR improved so unlikley true sepsis as there is no source  1/6 BCx No Growth, Final, SpCx +candida - Continue daily CBC 
- VS per unit routine R/O GI Bleed: FOBT negative. HgB 6.7 POA. S/p2 U pRBCs. HgB stable. Capsule study without any signs of bleeding only diminutive AVMs and gastritis. Recs for upper and lower scopes once respiratory status allows. - Protonix 40mg BID 
- Transfuse for HgB <7 
- GI Consulted, appreciate recs 
  
Diastolic CHF:  
Euvolemic on admission. Trop POA . 10 --> .06 --> .5.  Volume status easily upset so caution with diuresis. 
- 0.5mg Bumex every other day, due 1/16 
- Strict I&O 
- Cardiology consult, appreciate recs 
  
Atrial Fibrillation s/p MVR on Coumadin (INR POA 1.2)  
Pt has been seen OP by Dr Lamar Broussard but has not followed up with him since . Goal INR 2.5 - 3.5. EKG POA: Afib with RVR . Coumadin home dose is 3mg everyday but Friday 2mg. INR this AM 2.4. Coumadin 3mg last three days. Off Heparin drip last evening. 
- Home Warfarin continued, pharmacy to dose, INR goal of 2.5-3.5 
- Diltiazem 240 CD 
- START Digoxin . 125mg PO daily - Cardiology consult, appreciate recs  
- Daily Mg & Phos with repletions PRN 
  
Leukocytosis: Chronic and IMPROVED from presentation Chronic with BL ~22-29 likely due to daily prednisone use. WBC POA 32.0 --> 32.5-->16.1-->15.8 -> 16.2 --> 17.4--> 20.3-->19.5-->16.5 
- Monitor with daily CBC 
  
Hyperglycemia:  
likely due to chronic steroid dose. Questionable DM at baseline however Alc <3.5% due to chronic anemia. Will adjust insulin based on trending BGs. BGs 635-580 yestrday, requiring 13U Lispro per corrective scale. - Lantus 30U QHS, will increase as trend BGs today if requiring significant SSI 
- SSI, regular sensitivity 
- POC glucose ACHS 
- hypoglycemia protocol 
  
Hyperphosphatemia: RESOLVED Phos 6.0 POA. Phos 4.1 today - Monitor with daily BMP, Mg, Phos 
  
Chronic Back Pain  
- Home Percocet 5-325 mg resumed, q6h PRN  
  
Hyperlipidemia  
(: TC: 196, T, HDL 88, VLDL 30 & LDL 78) - Holding home Lovastatin 10 mg because of transminitis Transaminitis: RESOLVED 
LFTs downtrending. Likely combination of cholelithiasis and capsule study. 
- Holding home Lovastatin Anxiety:  
IMPROVED. Patient was refusing meds 2/2 anxiety but now taking medications. HR and RR increase with anxiety bouts. QTc 443ms - Lexapro 10mg QHS 
- Xanax . 5mg BID PRN 
- Discontinued attarax PRN to avoid Qtc prolongation with Lexapro 
  
FEN/GI - Low Na Diet Activity - Ambulate as tolerated DVT prophylaxis - Coumadin, pharm to dose GI prophylaxis - Protonix Disposition - Admit to Stepdown. Plan to d/c to [facility] pending PA. PT/OT following Code Status - DNR, discussed with patient / caregivers. Patient will be discussed with Dr. Luis E Hickey, supervising physician. Negra Menjivar MD 
Family Medicine Resident CC: \"I'm doing good\" Subjective No acute events overnight. Per patient her breathing is improved. Denies anxiety currently. Slept well overnight. She denies SOB, CP, N/V. She is having bowel movements and urinating without difficulty. Inpatient Medications Current Facility-Administered Medications Medication Dose Route Frequency  docusate sodium (COLACE) capsule 100 mg  100 mg Oral DAILY PRN  polyethylene glycol (MIRALAX) packet 17 g  17 g Oral DAILY PRN  
 insulin glargine (LANTUS) injection 30 Units  30 Units SubCUTAneous QHS  bumetanide (BUMEX) tablet 0.5 mg  0.5 mg Oral EVERY OTHER DAY  digoxin (LANOXIN) tablet 0.125 mg  0.125 mg Oral DAILY  hydrOXYzine HCl (ATARAX) tablet 50 mg  50 mg Oral TID PRN  
 ondansetron (ZOFRAN) injection 4 mg  4 mg IntraVENous Q8H PRN  predniSONE (DELTASONE) tablet 40 mg  40 mg Oral DAILY WITH BREAKFAST  [START ON 1/16/2019] predniSONE (DELTASONE) tablet 20 mg  20 mg Oral DAILY WITH BREAKFAST  pantoprazole (PROTONIX) tablet 40 mg  40 mg Oral ACB  ALPRAZolam (XANAX) tablet 0.5 mg  0.5 mg Oral BID PRN  
 Warfarin- Pharmacist Dosing   Other Rx Dosing/Monitoring  oxyCODONE IR (ROXICODONE) tablet 5 mg  5 mg Oral Q8H PRN  
 0.9% sodium chloride infusion 250 mL  250 mL IntraVENous PRN  
 albuterol (ACCUNEB) nebulizer solution 1.25 mg  1.25 mg Nebulization Q6H RT  
 alteplase (CATHFLO) 1 mg in sterile water (preservative free) 1 mL injection  1 mg InterCATHeter PRN  
 sodium chloride (NS) flush 10-30 mL  10-30 mL InterCATHeter PRN  
 sodium chloride (NS) flush 10 mL  10 mL InterCATHeter Q24H  
 sodium chloride (NS) flush 10 mL  10 mL InterCATHeter PRN  
 sodium chloride (NS) flush 10-40 mL  10-40 mL InterCATHeter Q8H  
 sodium chloride (NS) flush 20 mL  20 mL InterCATHeter Q24H  bacitracin 500 unit/gram packet 1 Packet  1 Packet Topical PRN  
 guaiFENesin ER (MUCINEX) tablet 600 mg  600 mg Oral Q12H  
 dilTIAZem CD (CARDIZEM CD) capsule 240 mg  240 mg Oral DAILY  nystatin (MYCOSTATIN) 100,000 unit/mL oral suspension 500,000 Units  500,000 Units Oral QID  insulin lispro (HUMALOG) injection   SubCUTAneous AC&HS  sodium chloride (NS) flush 5-10 mL  5-10 mL IntraVENous Q8H  
 sodium chloride (NS) flush 5-10 mL  5-10 mL IntraVENous PRN  
 nicotine (NICODERM CQ) 21 mg/24 hr patch 1 Patch  1 Patch TransDERmal Q24H  
 fluticasone-umeclidin-vilanter 100-62.5-25 mcg dsdv 1 Puff  (Patient Supplied)  1 Puff Inhalation DAILY  roflumilast (DALIRESP) tablet 500 mcg  500 mcg Oral DAILY  0.9% sodium chloride infusion  50 mL/hr IntraVENous PRN  
 glucose chewable tablet 16 g  4 Tab Oral PRN  
 dextrose (D50W) injection syrg 12.5-25 g  12.5-25 g IntraVENous PRN  
 glucagon (GLUCAGEN) injection 1 mg  1 mg IntraMUSCular PRN Allergies Allergies Allergen Reactions  Spiriva Respimat [Tiotropium Bromide] Anaphylaxis and Other (comments) Adverse effects; Per RN - pt states that Spiriva caused throat swelling and could not breathe well.  Spiriva Respimat [Tiotropium Bromide] Shortness of Breath  Tomato Shortness of Breath Only occurs with raw tomatoes, tolerates ketchup without issue  Celebrex [Celecoxib] Rash  Celebrex [Celecoxib] Rash  Rocephin [Ceftriaxone] Shortness of Breath  Tomato Rash Objective Vitals: 
Patient Vitals for the past 8 hrs: 
 Temp Pulse Resp BP SpO2  
01/15/19 0312 98 °F (36.7 °C) 87 23 109/56 98 % 01/15/19 0134     96 % 01/14/19 2315 98.4 °F (36.9 °C) 87 26 102/55 97 % 01/14/19 2200  3364 [facility] I/O: 
 
Intake/Output Summary (Last 24 hours) at 1/15/2019 0516 Last data filed at 1/14/2019 1839 Gross per 24 hour Intake 1599.86 ml Output 1400 ml Net 199.86 ml Last shift: 
 No intake/output data recorded. Last 3 shifts: 
  01/13 0701 - 01/14 1900 In: 2748.7 [P.O.:2480; I.V.:268.7] Out: 2100 [Urine:2100] Physical Exam:General: No acute distress. Alert. Cooperative. Head: Normocephalic. Atraumatic. Respiratory: No increased work of breathing, no tripoding. CTAB with improved air movement throughout posterior lung fields. No wheezes, rales, ronchi. Mild crackles on exam this morning. Cardiovascular: Irregularly irregular rhythm. Mechanical click from valve replacement with mild flow murmur. Pulses 2+ throughout. GI: + bowel sounds. Nontender. No rebound tenderness or guarding. Nondistended Extremities: 1+ pitting edema to just above ankle on R>L, no tenderness. Laboratory Data Recent Results (from the past 12 hour(s)) GLUCOSE, POC Collection Time: 01/14/19  9:09 PM  
Result Value Ref Range Glucose (POC) 362 (H) 65 - 100 mg/dL Performed by Raz Alonzo CBC WITH AUTOMATED DIFF Collection Time: 01/15/19  3:13 AM  
Result Value Ref Range WBC 16.5 (H) 3.6 - 11.0 K/uL  
 RBC 2.54 (L) 3.80 - 5.20 M/uL HGB 7.3 (L) 11.5 - 16.0 g/dL HCT 24.3 (L) 35.0 - 47.0 % MCV 95.7 80.0 - 99.0 FL  
 MCH 28.7 26.0 - 34.0 PG  
 MCHC 30.0 30.0 - 36.5 g/dL  
 RDW 18.8 (H) 11.5 - 14.5 % PLATELET 559 (L) 431 - 400 K/uL MPV ABNORMAL 8.9 - 12.9 FL  
 NRBC 0.3 (H) 0  WBC ABSOLUTE NRBC 0.05 (H) 0.00 - 0.01 K/uL NEUTROPHILS 88 (H) 32 - 75 % BAND NEUTROPHILS 1 0 - 6 % LYMPHOCYTES 5 (L) 12 - 49 % MONOCYTES 4 (L) 5 - 13 % EOSINOPHILS 0 0 - 7 % BASOPHILS 0 0 - 1 % METAMYELOCYTES 1 (H) 0 % MYELOCYTES 1 (H) 0 % IMMATURE GRANULOCYTES 0 %  
 ABS. NEUTROPHILS 14.7 (H) 1.8 - 8.0 K/UL  
 ABS. LYMPHOCYTES 0.8 0.8 - 3.5 K/UL  
 ABS. MONOCYTES 0.7 0.0 - 1.0 K/UL  
 ABS. EOSINOPHILS 0.0 0.0 - 0.4 K/UL  
 ABS. BASOPHILS 0.0 0.0 - 0.1 K/UL  
 ABS. IMM.  GRANS. 0.0 K/UL  
 DF MANUAL    
 RBC COMMENTS ANISOCYTOSIS 
1+ 
    
 RBC COMMENTS RBC FRAGMENTS    
METABOLIC PANEL, COMPREHENSIVE Collection Time: 01/15/19  3:13 AM  
Result Value Ref Range Sodium 151 (H) 136 - 145 mmol/L Potassium 3.7 3.5 - 5.1 mmol/L Chloride 100 97 - 108 mmol/L  
 CO2 36 (H) 21 - 32 mmol/L Anion gap 15 5 - 15 mmol/L Glucose 55 (L) 65 - 100 mg/dL BUN 17 6 - 20 MG/DL Creatinine 0.60 0.55 - 1.02 MG/DL  
 BUN/Creatinine ratio 28 (H) 12 - 20 GFR est AA >60 >60 ml/min/1.73m2 GFR est non-AA >60 >60 ml/min/1.73m2 Calcium 8.1 (L) 8.5 - 10.1 MG/DL Bilirubin, total 1.1 (H) 0.2 - 1.0 MG/DL  
 ALT (SGPT) 92 (H) 12 - 78 U/L  
 AST (SGOT) 51 (H) 15 - 37 U/L Alk. phosphatase 74 45 - 117 U/L Protein, total 4.8 (L) 6.4 - 8.2 g/dL Albumin 2.3 (L) 3.5 - 5.0 g/dL Globulin 2.5 2.0 - 4.0 g/dL A-G Ratio 0.9 (L) 1.1 - 2.2 PTT Collection Time: 01/15/19  3:13 AM  
Result Value Ref Range aPTT 36.5 (H) 22.1 - 32.0 sec  
 aPTT, therapeutic range     58.0 - 77.0 SECS  
MAGNESIUM Collection Time: 01/15/19  3:13 AM  
Result Value Ref Range Magnesium 1.9 1.6 - 2.4 mg/dL PHOSPHORUS Collection Time: 01/15/19  3:13 AM  
Result Value Ref Range Phosphorus 4.1 2.6 - 4.7 MG/DL PROTHROMBIN TIME + INR Collection Time: 01/15/19  3:13 AM  
Result Value Ref Range INR 2.9 (H) 0.9 - 1.1 Prothrombin time 27.4 (H) 9.0 - 11.1 sec GLUCOSE, POC Collection Time: 01/15/19  4:45 AM  
Result Value Ref Range Glucose (POC) 106 (H) 65 - 100 mg/dL Performed by [facility] - No New Imaging Abdominal US: + stones, no cholecystitis Capsule Study: gastritis, diminutive AVMs Hospital Problems: 
Hospital Problems  Date Reviewed: 11/29/2018 Codes Class Noted POA * (Principal) COPD with acute exacerbation ([facility] ICD-10-CM: J44.1 ICD-9-CM: 491.21  1/6/2019 Unknown Anemia ICD-10-CM: D64.9 ICD-9-CM: 285.9  10/28/2018 Yes Hyperlipidemia ICD-10-CM: E78.5 ICD-9-CM: 272.4  10/26/2018 Yes Chronic back pain (Chronic) ICD-10-CM: M54.9, G89.29 ICD-9-CM: 724.5, 338.29  10/26/2018 Yes History of GI bleed ICD-10-CM: Z87.19 ICD-9-CM: V12.79 Present on Admission 11/25/2016 Yes Diastolic CHF, chronic (HCC) ICD-10-CM: I50.32 
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  
  Tissue MVR 1989 following failed balloon valvuloplasty for MS Redo MVR 2004 due to severe MR, AVR due to AR ([employer]) CAD (coronary artery disease), native coronary artery ICD-10-CM: I25.10 ICD-9-CM: 414.01  1/8/2015 Yes S/P MVR (mitral valve replacement) ICD-10-CM: O23.1 ICD-9-CM: V43.3  1/8/2015 Yes

## 2019-01-16 PROBLEM — R94.6 THYROID FUNCTION TEST ABNORMAL: Status: ACTIVE | Noted: 2019-01-01

## 2019-01-16 NOTE — DIABETES MGMT
DTC Progress Note Recommendations/ Comments: Review for hyperglycemia. Noted fasting BS within target and POC glucose climbs > 300 mg/dl over the course of the day. Prednisone tapered to 20 mg daily today (was 40 mg daily). Pt required 13 units of correction insulin in past 24 hours. If supper and bedtime readings remain elevated may benefit from a dose of meal time insulin at lunch to help correct hyperglycemia from steroids later in day Current hospital DM medication: Lantus 30 units daily; lispro insulin correction scale, normal sensitivity Chart reviewed on Agustina Montesinos. Patient is a 66 y.o. female with no history of diabetes. A1c likely inaccurate due to low Hgb and Hct. Last blood transfusion 10/27/2018 A1c:  
Lab Results Component Value Date/Time Hemoglobin A1c <3.5 (L) 01/06/2019 02:39 PM  
 Hemoglobin A1c <3.5 (L) 10/25/2018 07:40 AM  
 
 
Recent Glucose Results:  
Lab Results Component Value Date/Time GLU 86 01/16/2019 05:17 AM  
 GLUCPOC 94 01/16/2019 06:32 AM  
 GLUCPOC 312 (H) 01/15/2019 09:00 PM  
 GLUCPOC 337 (H) 01/15/2019 04:03 PM  
  
 
Lab Results Component Value Date/Time Creatinine 0.76 01/16/2019 05:17 AM  
 
Estimated Creatinine Clearance: 66.9 mL/min (based on SCr of 0.76 mg/dL). Active Orders Diet DIET REGULAR 1.5 GM NA  
  
 
PO intake:  
Patient Vitals for the past 72 hrs: 
 % Diet Eaten 01/15/19 1943 50 % 01/15/19 1131 75 % 01/14/19 1839 50 % 01/14/19 1311 50 % 01/14/19 0804 0 % 01/13/19 1318 50 % Will continue to follow as needed. Thank you Aleks Coleman RN, CDE Time spent: 3 min

## 2019-01-16 NOTE — PROGRESS NOTES
[facility] FAMILY MEDICINE RESIDENCY PROGRAM  
Daily Progress Note Date: 1/16/2019 Assessment/Plan:  
Christine Acevedo is a 66 y.o. female who is hospitalized for COPD Exacerbation, initially meeting Severe Sepsis Criteria. Acute on Chronic Respiratory Failure POA in the Setting of COPD Exacerbation:  
Initially with 6L N/C requirement and intermittently on BiPAP, now on 2LN/C(her home requirement). RVP neg. SpCx + candida, 1/6 BCx NG(Final). Sputum Cx + candida. Completed doxy treatment (1/12). Pulm signed off, appreciate recs - Albuterol Nebs q6hr RT 
- home Trelegy 
- Continue Prednisone taper, Start 20mg PO today - NC O2 for goal of 88-92% sats  
  
Severe Sepsis Rule Out: RESOLVED. Patient's initial SIRS Criteria - elevated WBC, RR< HR explainable by combination of chronic problems and no source of infection present. WBC elevated chronically 2/2 daily steroid use, RR elevated due to COPD exacerbation and HR elevated due to COPD + hx of A fib with RVR. Sepsis workup completed and treatment with fluids initiated. However upon treatment of COPD Excerbation RR, HR improved so unlikley true sepsis as there is no source  1/6 BCx No Growth, Final, SpCx +candida - Continue daily CBC 
- VS per unit routine R/O GI Bleed:  
HgB 6.7 this morning. FOBT negative. HgB 6.7 POA. S/p2 U pRBCs. HgB stable. Capsule study without any signs of bleeding only diminutive AVMs and gastritis. Recs for upper and lower scopes once respiratory status allows. - 1 U pRBCs ordered for this morning with post-transfusion H&H 
- Protonix 40mg BID 
- Transfuse for HgB <7 
- GI Consulted, appreciate recs 
  
Diastolic CHF:  
Euvolemic on admission. Trop POA . 10 --> .06 --> .5.  Volume status easily upset so caution with diuresis. 
- 0.5mg Bumex every other day, due 1/16 
- Strict I&O 
- Cardiology consult, appreciate recs 
  
Atrial Fibrillation s/p MVR on Coumadin (INR POA 1.2)  
 Pt has been seen OP by Dr Lavon Cruz but has not followed up with him since 2016. Goal INR 2.5 - 3.5. EKG POA: Afib with RVR . Coumadin home dose is 3mg everyday but Friday 2mg. INR this AM 2.4. Coumadin 3mg last three days. Off Heparin drip last evening. 
- Home Warfarin continued, pharmacy to dose, INR goal of 2.5-3.5 
- Diltiazem 240 CD 
- Digoxin . 125mg PO daily; Dig level  
- Cardiology consult, appreciate recs  
- Daily Mg & Phos with repletions PRN 
  
Leukocytosis: Chronic and IMPROVED from presentation Chronic with BL ~22-29 likely due to daily prednisone use. WBC POA 32.0 --> 32.5-->16.1-->15.8 -> 16.2 --> 17.4--> 20.3-->19.5-->16.5 --> 20.0 
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 However, tests are not reliable in inpt setting in pt with multiple chronic disease in exacerbation. Additionally steroids and heparin and possible underlying DM may cause abnormalities in testing 
- repeat thyroid studies with PCP and rec for endo referral is persistently abnl 
  
Hyperglycemia:  
likely due to chronic steroid dose. Questionable DM at baseline however Alc <3.5% due to chronic anemia. Will adjust insulin based on trending BGs. BGs 746-583 yestrday, requiring 13U Lispro per corrective scale. - Lantus 30U QHS, will increase as trend BGs today if requiring significant SSI 
- SSI, regular sensitivity 
- POC glucose ACHS 
- hypoglycemia protocol 
  
Hyperphosphatemia: RESOLVED Phos 6.0 POA. Phos 4.4 today - Monitor with daily BMP, Mg, Phos 
  
Chronic Back Pain  
- Home Percocet 5-325 mg resumed, q6h PRN  
  
Hyperlipidemia  
(: TC: 196, T, HDL 88, VLDL 30 & LDL 78) - Holding home Lovastatin 10 mg because of transminitis Transaminitis: RESOLVED 
LFTs downtrending. Likely combination of cholelithiasis and capsule study. 
- Holding home Lovastatin Anxiety:  
IMPROVED.  Patient was refusing meds 2/2 anxiety but now taking medications. HR and RR increase with anxiety bouts. QTc 443ms - Lexapro 10mg QHS 
- Xanax . 5mg BID PRN 
- Discontinued attarax PRN to avoid Qtc prolongation with Lexapro 
  
FEN/GI - Low Na Diet Activity - Ambulate as tolerated DVT prophylaxis - Coumadin, pharm to dose GI prophylaxis - Protonix Disposition - Admit to Stepdown. Plan to d/c to [facility], pending PA. PT/OT following Code Status - DNR, discussed with patient / caregivers. Patient will be discussed with Dr. Danita Soriano, supervising physician. Toya Navarro MD 
Family Medicine Resident CC: \"I'm doing good\" Subjective No acute events overnight. Per patient her breathing is improved. Denies anxiety currently and has overall felt less anxious since yesterday morning. Slept well overnight. She denies SOB, CP, N/V. She is having bowel movements and urinating without difficulty. Inpatient Medications Current Facility-Administered Medications Medication Dose Route Frequency  0.9% sodium chloride infusion 250 mL  250 mL IntraVENous PRN  
 magnesium sulfate 2 g/50 ml IVPB (premix or compounded)  2 g IntraVENous ONCE  
 escitalopram oxalate (LEXAPRO) tablet 10 mg  10 mg Oral QPM  
 docusate sodium (COLACE) capsule 100 mg  100 mg Oral DAILY PRN  polyethylene glycol (MIRALAX) packet 17 g  17 g Oral DAILY PRN  
 insulin glargine (LANTUS) injection 30 Units  30 Units SubCUTAneous QHS  bumetanide (BUMEX) tablet 0.5 mg  0.5 mg Oral EVERY OTHER DAY  digoxin (LANOXIN) tablet 0.125 mg  0.125 mg Oral DAILY  ondansetron (ZOFRAN) injection 4 mg  4 mg IntraVENous Q8H PRN  predniSONE (DELTASONE) tablet 20 mg  20 mg Oral DAILY WITH BREAKFAST  pantoprazole (PROTONIX) tablet 40 mg  40 mg Oral ACB  ALPRAZolam (XANAX) tablet 0.5 mg  0.5 mg Oral BID PRN  
 Warfarin- Pharmacist Dosing   Other Rx Dosing/Monitoring  oxyCODONE IR (ROXICODONE) tablet 5 mg  5 mg Oral Q8H PRN  
  0.9% sodium chloride infusion 250 mL  250 mL IntraVENous PRN  
 albuterol (ACCUNEB) nebulizer solution 1.25 mg  1.25 mg Nebulization Q6H RT  
 alteplase (CATHFLO) 1 mg in sterile water (preservative free) 1 mL injection  1 mg InterCATHeter PRN  
 sodium chloride (NS) flush 10-30 mL  10-30 mL InterCATHeter PRN  
 sodium chloride (NS) flush 10 mL  10 mL InterCATHeter Q24H  
 sodium chloride (NS) flush 10 mL  10 mL InterCATHeter PRN  
 sodium chloride (NS) flush 10-40 mL  10-40 mL InterCATHeter Q8H  
 sodium chloride (NS) flush 20 mL  20 mL InterCATHeter Q24H  
 bacitracin 500 unit/gram packet 1 Packet  1 Packet Topical PRN  
 guaiFENesin ER (MUCINEX) tablet 600 mg  600 mg Oral Q12H  
 dilTIAZem CD (CARDIZEM CD) capsule 240 mg  240 mg Oral DAILY  nystatin (MYCOSTATIN) 100,000 unit/mL oral suspension 500,000 Units  500,000 Units Oral QID  insulin lispro (HUMALOG) injection   SubCUTAneous AC&HS  sodium chloride (NS) flush 5-10 mL  5-10 mL IntraVENous Q8H  
 sodium chloride (NS) flush 5-10 mL  5-10 mL IntraVENous PRN  
 nicotine (NICODERM CQ) 21 mg/24 hr patch 1 Patch  1 Patch TransDERmal Q24H  
 fluticasone-umeclidin-vilanter 100-62.5-25 mcg dsdv 1 Puff  (Patient Supplied)  1 Puff Inhalation DAILY  roflumilast (DALIRESP) tablet 500 mcg  500 mcg Oral DAILY  0.9% sodium chloride infusion  50 mL/hr IntraVENous PRN  
 glucose chewable tablet 16 g  4 Tab Oral PRN  
 dextrose (D50W) injection syrg 12.5-25 g  12.5-25 g IntraVENous PRN  
 glucagon (GLUCAGEN) injection 1 mg  1 mg IntraMUSCular PRN Allergies Allergies Allergen Reactions  Spiriva Respimat [Tiotropium Bromide] Anaphylaxis and Other (comments) Adverse effects; Per RN - pt states that Spiriva caused throat swelling and could not breathe well.  Spiriva Respimat [Tiotropium Bromide] Shortness of Breath  Tomato Shortness of Breath Only occurs with raw tomatoes, tolerates ketchup without issue  Celebrex [Celecoxib] Rash  Celebrex [Celecoxib] Rash  Rocephin [Ceftriaxone] Shortness of Breath  Tomato Rash Objective Vitals: 
Patient Vitals for the past 8 hrs: 
 Temp Pulse Resp BP SpO2  
01/16/19 0751 97.5 °F (36.4 °C) 89 23 112/60 99 % 01/16/19 0706  88     
01/16/19 0500 97.7 °F (36.5 °C) 87 22 114/57 98 % I/O: 
 
Intake/Output Summary (Last 24 hours) at 1/16/2019 7031 Last data filed at 1/16/2019 6118 Gross per 24 hour Intake 490 ml Output 1000 ml Net -510 ml Last shift: 
  No intake/output data recorded. Last 3 shifts: 
  01/14 1901 - 01/16 0700 In: 2267 [P.O.:1090] Out: 1500 [Urine:1500] Physical Exam:General: No acute distress. Alert. Cooperative. Head: Normocephalic. Atraumatic. Respiratory: No increased work of breathing, no tripoding. CTAB with improved air movement throughout posterior lung fields. No wheezes, rales, ronchi. Mild crackles on exam this morning. Cardiovascular: Irregularly irregular rhythm. Mechanical click from valve replacement with mild flow murmur. Pulses 2+ throughout. GI: + bowel sounds. Nontender. No rebound tenderness or guarding. Nondistended Extremities: 1+ pitting edema to just above ankle on R>L, no tenderness. Laboratory Data Recent Results (from the past 12 hour(s)) GLUCOSE, POC Collection Time: 01/15/19  9:00 PM  
Result Value Ref Range Glucose (POC) 312 (H) 65 - 100 mg/dL Performed by Keara Starr CBC WITH AUTOMATED DIFF Collection Time: 01/16/19  5:17 AM  
Result Value Ref Range WBC 20.0 (H) 3.6 - 11.0 K/uL  
 RBC 2.34 (L) 3.80 - 5.20 M/uL HGB 6.7 (L) 11.5 - 16.0 g/dL HCT 22.4 (L) 35.0 - 47.0 % MCV 95.7 80.0 - 99.0 FL  
 MCH 28.6 26.0 - 34.0 PG  
 MCHC 29.9 (L) 30.0 - 36.5 g/dL  
 RDW 19.4 (H) 11.5 - 14.5 % PLATELET 784 (L) 251 - 400 K/uL NRBC 0.3 (H) 0  WBC ABSOLUTE NRBC 0.05 (H) 0.00 - 0.01 K/uL NEUTROPHILS 86 (H) 32 - 75 % LYMPHOCYTES 4 (L) 12 - 49 % MONOCYTES 7 5 - 13 % EOSINOPHILS 0 0 - 7 % BASOPHILS 0 0 - 1 % METAMYELOCYTES 1 (H) 0 % MYELOCYTES 2 (H) 0 % IMMATURE GRANULOCYTES 0 %  
 ABS. NEUTROPHILS 17.2 (H) 1.8 - 8.0 K/UL  
 ABS. LYMPHOCYTES 0.8 0.8 - 3.5 K/UL  
 ABS. MONOCYTES 1.4 (H) 0.0 - 1.0 K/UL  
 ABS. EOSINOPHILS 0.0 0.0 - 0.4 K/UL  
 ABS. BASOPHILS 0.0 0.0 - 0.1 K/UL  
 ABS. IMM. GRANS. 0.0 K/UL  
 DF MANUAL    
 RBC COMMENTS ANISOCYTOSIS 2+ 
    
 RBC COMMENTS HYPOCHROMIA 1+ 
    
 RBC COMMENTS RBC FRAGMENTS    
METABOLIC PANEL, COMPREHENSIVE Collection Time: 01/16/19  5:17 AM  
Result Value Ref Range Sodium 140 136 - 145 mmol/L Potassium 4.4 3.5 - 5.1 mmol/L Chloride 100 97 - 108 mmol/L  
 CO2 38 (H) 21 - 32 mmol/L Anion gap 2 (L) 5 - 15 mmol/L Glucose 86 65 - 100 mg/dL BUN 18 6 - 20 MG/DL Creatinine 0.76 0.55 - 1.02 MG/DL  
 BUN/Creatinine ratio 24 (H) 12 - 20 GFR est AA >60 >60 ml/min/1.73m2 GFR est non-AA >60 >60 ml/min/1.73m2 Calcium 8.2 (L) 8.5 - 10.1 MG/DL Bilirubin, total 1.4 (H) 0.2 - 1.0 MG/DL  
 ALT (SGPT) 87 (H) 12 - 78 U/L  
 AST (SGOT) 66 (H) 15 - 37 U/L Alk. phosphatase 75 45 - 117 U/L Protein, total 5.1 (L) 6.4 - 8.2 g/dL Albumin 2.5 (L) 3.5 - 5.0 g/dL Globulin 2.6 2.0 - 4.0 g/dL A-G Ratio 1.0 (L) 1.1 - 2.2 MAGNESIUM Collection Time: 01/16/19  5:17 AM  
Result Value Ref Range Magnesium 1.9 1.6 - 2.4 mg/dL PHOSPHORUS Collection Time: 01/16/19  5:17 AM  
Result Value Ref Range Phosphorus 4.4 2.6 - 4.7 MG/DL  
GLUCOSE, POC Collection Time: 01/16/19  6:32 AM  
Result Value Ref Range Glucose (POC) 94 65 - 100 mg/dL Performed by Ray JACOBO + CROSSMATCH Collection Time: 01/16/19  7:13 AM  
Result Value Ref Range Crossmatch Expiration 01/19/2019 ABO/Rh(D) O POSITIVE Antibody screen NEG Unit number [id number] Blood component type [facility] Unit division 00 Status of unit ALLOCATED Crossmatch result Compatible PROTHROMBIN TIME + INR Collection Time: 01/16/19  7:15 AM  
Result Value Ref Range INR 3.1 (H) 0.9 - 1.1 Prothrombin time 30.2 (H) 9.0 - 11.1 sec Imaging - No New Imaging Abdominal US: + stones, no cholecystitis Capsule Study: gastritis, diminutive AVMs Hospital Problems: 
Hospital Problems  Date Reviewed: 11/29/2018 Codes Class Noted POA Thyroid function test abnormal ICD-10-CM: R94.6 ICD-9-CM: 794.5  1/16/2019 Unknown * (Principal) COPD with acute exacerbation ([facility] [address] ICD-10-CM: J44.1 ICD-9-CM: 491.21  1/6/2019 Unknown Anemia ICD-10-CM: D64.9 ICD-9-CM: 285.9  10/28/2018 Yes Hyperlipidemia ICD-10-CM: E78.5 ICD-9-CM: 272.4  10/26/2018 Yes Chronic back pain (Chronic) ICD-10-CM: M54.9, G89.29 ICD-9-CM: 724.5, 338.29  10/26/2018 Yes History of GI bleed ICD-10-CM: Z87.19 ICD-9-CM: V12.79 Present on Admission 11/25/2016 Yes Diastolic CHF, chronic (HCC) ICD-10-CM: I50.32 
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  
  Tissue MVR 1989 following failed balloon valvuloplasty for MS Redo MVR 2004 due to severe MR, AVR due to AR ([employer]) CAD (coronary artery disease), native coronary artery ICD-10-CM: I25.10 ICD-9-CM: 414.01  1/8/2015 Yes S/P MVR (mitral valve replacement) ICD-10-CM: P04.5 ICD-9-CM: V43.3  1/8/2015 Yes

## 2019-01-16 NOTE — PROGRESS NOTES
12:53 PM 
Auth is being obtained by [facility]. JENNIFER Dorsey I spoke with the patient. Consult has [facility] to [facility]. [employer] still denied after Peer to Peer. Auth will have to be obtained before the patient can go to a SNF.  JENNIFER Dorsey

## 2019-01-16 NOTE — PROGRESS NOTES
Nutrition Assessment: 
 
RECOMMENDATIONS/INTERVENTION(S):  
1. Continue with current diet order. Will add no concentrated sweets to help with BG control. 2. Monitor PO intakes, weight, labs, GI. ASSESSMENT:  
1/16: Follow up. Pt now on 1.5gm Na+ diet. No complaints about diet at this time. States intakes are still fair.  sometimes brings her food from outside which she eats well. Is requesting to receive diet drinks, will add no concentrated sweets to her diet order. Labs: POC -424-. Meds: Bumex, Lantus, Humalog, MgSU, Prednisone. Capsule study 1/9 showed pt with gastritis and no active bleeding. 1/10:  65 yo female admitted for COPD exacerbation. RD assessment for LOS. PMhx: CAD, CHF, COPD. Weight WNL per BMI per age. Pt states it remains stable at home, no significant changes. 2gm Na+/Consistent CHO diet ordered. Pt feels the diet order is too restrictive and limiting her ability to eat as much as she does at home. States she does not add salt to any of her foods at home. Intakes documented as 50% meals. Pt states she eats more than that/meal at home. Does not follow a diet at home, states she eats what she wants. Labs: K+ 2.9, , POC -328-294, , . Meds: Bumex, Lantus, Humalog, Solumedrol, MgSu, statin, miralax, Kcl.  S/P capsule study 1/9 secondary to c/o odynophagia - results pending - GI following. On 2L NC O2. Diet Order: Cardiac 
% Eaten:   
Patient Vitals for the past 72 hrs: 
 % Diet Eaten 01/16/19 0820 50 % 01/15/19 1943 50 % 01/15/19 1131 75 % 01/14/19 1839 50 % 01/14/19 1311 50 % 01/14/19 0804 0 % Pertinent Medications: [x] Reviewed Labs: [x] Reviewed Anthropometrics: Height: 5' 4\" (162.6 cm) Weight: 54.5 kg (120 lb 2.4 oz) IBW (%IBW):   ( ) UBW (%UBW):   (  %) BMI: Body mass index is 20.62 kg/m².     This BMI is indicative of: 
 [] Underweight    [x] Normal    [] Overweight    []  Obesity    [] Extreme Obesity (BMI>40) Estimated Nutrition Needs (Based on): 1549 Kcals/day(BMR 1144 x AF 1.3) , 48 g(48-59gm (0.8-1gm/kg/d)) Protein Carbohydrate: At Least 130 g/day  Fluids: 1487 mL/day (1 ml/kcal) Last BM: 1/16   [x]Active     []Hyperactive  []Hypoactive       [] Absent   BS Skin:    [] Intact   [] Incision  [] Breakdown   [] DTI   [] Tears/Excoriation/Abrasion  [x]Edema- 1+ BLE [] Other: Wt Readings from Last 30 Encounters:  
01/16/19 54.5 kg (120 lb 2.4 oz)  
11/30/18 57.2 kg (126 lb)  
11/29/18 57.2 kg (126 lb)  
11/27/18 61.7 kg (136 lb)  
11/20/18 61.2 kg (135 lb)  
11/16/18 61.2 kg (135 lb)  
11/15/18 61.7 kg (136 lb)  
11/14/18 61.2 kg (135 lb)  
11/12/18 61.2 kg (135 lb)  
11/10/18 61.2 kg (135 lb) 11/08/18 61.7 kg (136 lb) 11/06/18 61.7 kg (136 lb) 10/31/18 62.9 kg (138 lb 10.7 oz) 10/11/18 55.3 kg (122 lb) 10/05/18 56.7 kg (125 lb)  
09/27/18 57.2 kg (126 lb)  
09/20/18 60.8 kg (134 lb)  
09/13/18 57.2 kg (126 lb)  
09/13/18 56.1 kg (123 lb 9.6 oz) 09/06/18 59 kg (130 lb) 08/29/18 56.2 kg (124 lb) 08/24/18 56.2 kg (124 lb) 08/22/18 56.2 kg (124 lb) 08/17/18 56.2 kg (124 lb) 08/15/18 56.2 kg (124 lb) 08/13/18 56.2 kg (124 lb) 08/10/18 56.2 kg (124 lb) 08/09/18 57.8 kg (127 lb 6 oz) 08/08/18 56.2 kg (124 lb) 08/06/18 56.2 kg (124 lb) NUTRITION DIAGNOSES:  
Problem:  Altered nutrition-related lab values Etiology: related to endocrine dysfunction Signs/Symptoms: as evidenced by POC BG 94-337mg/dL 1/16: Nutrition dx continues NUTRITION INTERVENTIONS: 
 General healthful diet GOAL:  
Consume > 50% all meals with BG < 160mg/dL in next 4-6 days Cultural, Jew, or Ethnic Dietary Needs: None EDUCATION & DISCHARGE NEEDS:  
 [x] None Identified 
 [] Identified and Education Provided/Documented 
 [] Identified and Pt declined/was not appropriate [x] Interdisciplinary Care Plan Reviewed/Documented [x] Discharge Needs:    Continue to follow Low Na+/Consistent CHO diet at home 
 [] No Nutrition Related Discharge Needs NUTRITION RISK:  
Pt Is At Nutrition Risk  [x] No Nutrition Risk Identified  [] PT SEEN FOR:  
 []  MD Consult: []Calorie Count []Diabetic Diet Education []Diet Education []Electrolyte Management []General Nutrition Management and Supplements []Management of Tube Feeding []TPN Recommendations []  RN Referral:  []MST score >=2 
   []Enteral/Parenteral Nutrition PTA []Pregnant: Gestational DM or Multigestation  
              [] Pressure Ulcer 
 
[]  Low BMI      []  Length of Stay       [] Dysphagia Diet         [] Ventilator [x]  Follow-up Previous Recommendations: 
 [] Implemented          [x] Not Implemented          [] Not Applicable Previous Goal: 
 [] Met              [] Progressing Towards Goal              [x] Not Progressing Towards Goal   [] Not Applicable Savanna Moore RD Pager [phone] Phone [phone]

## 2019-01-16 NOTE — PROGRESS NOTES
Bedside and Verbal shift change report given to Kyle (oncoming nurse) by Nicolás Rodriguez (offgoing nurse). Report included the following information SBAR, Kardex and Procedure Summary. Patient 1 unit of blood, tolerating well. Had more tremors, anixety today. Had 7 beat run of non VT, Family Practice notified no new orders.

## 2019-01-16 NOTE — PROGRESS NOTES
Problem: Falls - Risk of 
Goal: *Absence of Falls Document Ivelisse Martinez Fall Risk and appropriate interventions in the flowsheet. Outcome: Progressing Towards Goal 
Fall Risk Interventions: 
Mobility Interventions: Patient to call before getting OOB Medication Interventions: Patient to call before getting OOB Elimination Interventions: Call light in reach

## 2019-01-16 NOTE — PROGRESS NOTES
Occupational Therapy Note: Pt receiving transfusion currently. Will attempt OT later as able.  
Bailey MEJIA/SVITLANA

## 2019-01-16 NOTE — PROGRESS NOTES
Problem: Pressure Injury - Risk of 
Goal: *Prevention of pressure injury Document Jose Scale and appropriate interventions in the flowsheet. Outcome: Progressing Towards Goal 
Pressure Injury Interventions: 
Sensory Interventions: Assess changes in LOC Moisture Interventions: Absorbent underpads Activity Interventions: PT/OT evaluation, Pressure redistribution bed/mattress(bed type) Mobility Interventions: Pressure redistribution bed/mattress (bed type), PT/OT evaluation Nutrition Interventions: Document food/fluid/supplement intake Friction and Shear Interventions: Apply protective barrier, creams and emollients, Foam dressings/transparent film/skin sealants, Minimize layers

## 2019-01-16 NOTE — PROGRESS NOTES
[facility] Dosing Services: 
 
Consult for Warfarin Dosing by Pharmacy by Eliana Monsivais Consult provided for this 66 y.o.  female , for indication of  afib, MVR, AVR  
PTA Dose: Warfarin 3 mg on Sa/Su/Mo/Tu/We/Th and Warfarin 2 mg on Fr 
  
Dose to achieve an INR goal of 2.5- 3.5 Order entered for  Warfarin  2.5 (mg) ordered to be given today at 18:00. Hgb 6.7 today. Monitor closely. Drug interactions: 
Previous dose given 2.5 mg  
PT/INR Lab Results Component Value Date/Time INR 3.1 (H) 01/16/2019 07:15 AM  
  
Platelets Lab Results Component Value Date/Time PLATELET 311 (L) 10/05/4340 05:17 AM  
  
H/H Lab Results Component Value Date/Time HGB 6.7 (L) 01/16/2019 05:17 AM  
  
 
Pharmacy to follow daily and will provide subsequent Warfarin dosing based on clinical status. ANDI Carr  Contact information [phone]

## 2019-01-16 NOTE — PROGRESS NOTES
Responded to Spiritual Care Consult order for a  visit on [facility]. Miss Merlene Abarca shared that she finds strength from her  of 15 years. He is like her own [employer]. She shared she has belief in God an prays. She is hopeful for staff to be able to find a place where she can work to regain her strength so she can go home. She requested prayer. Provided spiritual presence and prayer. Therapy dog came in as we ended our visit. Great timing! Advised of  Availability Visited by: Eloy Keating., MS., [address] ([id number])

## 2019-01-16 NOTE — ROUTINE PROCESS
Bedside shift change report given to Kyle (oncoming nurse) by Gamal Li (offgoing nurse). Report included the following information SBAR, Kardex, Intake/Output, MAR, Accordion and Recent Results.

## 2019-01-16 NOTE — PROGRESS NOTES
Physical Therapy 1241 chart reviewed, spoke with RN. ZEYNEP 6.7 pt currently receiving one unit PRBC's. PT will follow up later today, time permitting Alexus Raza

## 2019-01-16 NOTE — PROGRESS NOTES
Problem: Falls - Risk of 
Goal: *Absence of Falls Document Ruth Montoya Fall Risk and appropriate interventions in the flowsheet. Outcome: Progressing Towards Goal 
Fall Risk Interventions: 
Mobility Interventions: Bed/chair exit alarm, Patient to call before getting OOB Medication Interventions: Bed/chair exit alarm, Patient to call before getting OOB, Teach patient to arise slowly Elimination Interventions: Bed/chair exit alarm, Call light in reach, Toileting schedule/hourly rounds

## 2019-01-17 NOTE — PROGRESS NOTES
[address] Medicine Residency Resident Note in Brief 
---------------------------------------- Was notified by CM this morning that a prior auth was required for [facility] before approval for SNF would be provided. Dr. Stephen Hines called at 12:15 pm, they stated that the deadline was at 12:00pm Noon and since the deadline had passed it was an automatic denial from [facility]. Discussed that the deadline was not conveyed to our team and that we were notified less than an hour and a half ago. Were told that there was nothing else that could be done and that it was an automatic denial. Requested manager at [facility] call [facility] Phone for furher discussion [employer] (Clinical  of Pre-auth department at [facility]) called, I spoke with him. He stated once a prior auth has been denied it cannot be reopened, and our only option at this point is to wait for the official denial to be completed so that we can submit a Health Plan Appeal on the patient's behalf. We were provided with a phone number ([phone], [facility] Expedited Appeals) and instructed to call to try to expedite the process. We were told there were no other options. Miguel Ángel De Leon called the number provided and spoke with Gregoria from [employer]. Was told that another [id number] could be done to appeal the initial denial. Stated it could take up to 72 hours to schedule this [id number], but they would call the [facility] phone directly to discuss this, hopefully today. Patient is medically stable for discharge and is waiting on placement. If denied, will need to go home with home health. Instructed us to have case management fax documentation to fax number [phone], including reference number #[id number]. Updates discussed with Case Management.   
 
Eda Rinne, MD 
2:04 PM

## 2019-01-17 NOTE — PROGRESS NOTES
Sent recent PT and OT notes to [facility] [employer].  JENNIFER Tejada

## 2019-01-17 NOTE — ROUTINE PROCESS
0002 
Patient requesting something additional for anxiety. Already received xanax prn. Spoke with Dr. Neyda Mckeon and advised patient felt like she was going to have a \"big one. \"  New order received. 7622 Notified Dr. Neyda Mckeon of patient requesting something for anxiety for the third time tonight.   
 
9017 Bedside and Verbal shift change report given to Lincoln (oncoming nurse) by Rebeca Muñoz (offgoing nurse). Report included the following information SBAR, Kardex, ED Summary, Procedure Summary, Intake/Output, MAR, Recent Results and Cardiac Rhythm A Flutter.

## 2019-01-17 NOTE — PROGRESS NOTES
Bedside and Verbal shift change report given to Enzo Stiles (oncoming nurse) by Deonte Ventura (offgoing nurse). Report included the following information SBAR, Kardex, Intake/Output, Accordion and Recent Results. Bedside and Verbal shift change report given to BRENNON (oncoming nurse) by Enzo Stiles (offgoing nurse). Report included the following information SBAR, Kardex, Intake/Output, Accordion and Recent Results.

## 2019-01-17 NOTE — PROGRESS NOTES
[facility] Dosing Services: 
 
Consult for Warfarin Dosing by Pharmacy by Robel Martinez Consult provided for this 66 y.o.  female , for indication of  afib, MVR, AVR  
PTA Dose: Warfarin 3 mg on Sa/Su/Mo/Tu/We/Th and Warfarin 2 mg on Fr 
  
Dose to achieve an INR goal of 2.5- 3.5 Order entered for  Warfarin  3 (mg) ordered to be given today at 18:00. Drug interactions: 
Previous dose given 2.5 mg  
PT/INR Lab Results Component Value Date/Time INR 2.7 (H) 01/17/2019 05:02 AM  
  
Platelets Lab Results Component Value Date/Time PLATELET 030 (L) 01/76/8188 05:02 AM  
  
H/H Lab Results Component Value Date/Time HGB 8.2 (L) 01/17/2019 05:02 AM  
  
 
Pharmacy to follow daily and will provide subsequent Warfarin dosing based on clinical status. Elsa Contreras, [address])  Contact information [phone]

## 2019-01-17 NOTE — PROGRESS NOTES
8166 
Report received. Pt lying in bed complained of back pain 5/10 unable to medicate at this time. Pt aware voiced no needs call bell within reach  
 
6039 Pt given meds without difficulty [address] Pt sitting in chair voiced no needs call bell within reach  
 
1200 Pt given med without difficulty voiced no needs at this time Deep Martins made pt sitting in bed receiving breathing treatment. Pt removed treatment stated \"I don't want anymore of that. i'm claustrophobic this is makiing it worse. \"voiced no needs at this time [facility] Pt watching television call bell within reach [address] Received call from Dr Shanda Brantley requesting pt be weaned from O2. Pt walking with PT at this time will adjust O2 once back in room [address] Bedside and Verbal shift change report given to Danitza Mitchell (oncoming nurse) by Katelyn Wilson (offgoing nurse). Report included the following information SBAR, Kardex, Intake/Output and Cardiac Rhythm Aflutter. 2815 [facility] Pt's O2 decreased to 1.5L informed nurse taking over

## 2019-01-17 NOTE — PROGRESS NOTES
Problem: Mobility Impaired (Adult and Pediatric) Goal: *Acute Goals and Plan of Care (Insert Text) Physical Therapy Goals Initiated 1/10/2019 Weekly Reassessment (1/ 17) Goals updated 1. Patient will move from supine to sit and sit to supine  in bed with supervision/set-up within 7 day(s). (ongoing 1/17) 2. Patient will transfer from bed to chair and chair to bed with standby assist using the least restrictive device within 7 day(s). (updated 1/17) 3. Patient will perform sit to stand with supervision assist within 7 day(s). (updated 1/17) 4. Patient will ambulate with standby assist for 75 feet with the least restrictive device within 7 day(s). (updated 1 /17 ) physical Therapy TREATMENT: WEEKLY REASSESSMENT Patient: Sheila Castro (24 y.o. female) Date: 1/17/2019 Diagnosis: COPD with acute exacerbation ([address] COPD with acute exacerbation ([address] Procedure(s) (LRB): 
CAPSULE     Complete:  1445 (N/A) 8 Days Post-Op Precautions: Fall, WBAT Chart, physical therapy assessment, plan of care and goals were reviewed. ASSESSMENT: 
Patient seen for weekly reassessment. Initially seen by PT on 1/10/17 and PT sessions sporadic since then due to increased HR, low Hgb and need for blood and anxiety. Patient cleared for PT. Still on [facility] unit with telemetry monitoring and O2 at 2 liters. Hgb currently 8.2, O2 at 97% and HR 90. She was received in bed a bit drowsy but willing to work with PT. She ambulated with rolling walker 42 feet +1 CGA, on 2 liters at all times. O2 sats 100% and HR 89 after ambulation. Patient moves slowly and is  a bit shaky toward the end of her walk with  reported anxiety. States she has been having panic attacks and that nursing has given her medicine to help control. At edge of bed PT reviewed bilateral LE exercises including LAQ and hip flexion in sitting. Patient remains generally weak and needs assistance at all times with gait. Recommend rehab. CM working on placement. Patient's progression toward goals since last assessment: Goals updated. Slow progress overall. PLAN: 
Goals have been updated based on progression since last assessment. Patient continues to benefit from skilled intervention to address the above impairments. Continue to follow the patient 5 times a week to address goals. Planned Interventions: 
[x]              Bed Mobility Training             []       Neuromuscular Re-Education [x]              Transfer Training                   []       Orthotic/Prosthetic Training 
[x]              Gait Training                         []       Modalities [x]              Therapeutic Exercises           []       Edema Management/Control []              Therapeutic Activities            []       Patient and Family Training/Education []              Other (comment): 
Discharge Recommendations: Rehab Further Equipment Recommendations for Discharge: none SUBJECTIVE:  
Patient stated I get panic attacks.  OBJECTIVE DATA SUMMARY:  
Critical Behavior: 
Neurologic State: Alert Orientation Level: Oriented X4 Cognition: Appropriate decision making, Appropriate for age attention/concentration, Appropriate safety awareness, Follows commands Strength:  
  
 Generally decreased but functional to BLE's. Functional Mobility Training: 
Bed Mobility: 
  
Supine to Sit: Minimum assistance; Additional time Transfers: 
Sit to Stand: Contact guard assistance Stand to Sit: Contact guard assistance Balance: 
Sitting: Intact Standing: Intact; With supportAmbulation/Gait Training: 
Distance (ft): 42 Feet (ft) Assistive Device: Gait belt;Walker, rolling(and supplemental O2 on 2 liters at all times) Ambulation - Level of Assistance: Contact guard assistance Therapeutic Exercises: LAQ and hip flexion in sitting Pain: 
Pain Scale 1: Numeric (0 - 10) Pain Intensity 1: 6 Pain Location 1: Back Pain Orientation 1: Posterior Activity Tolerance:  
Limited due to fatigue and anxiety Please refer to the flowsheet for vital signs taken during this treatment. After treatment:  
[]  Patient left in no apparent distress sitting up in chair 
[x]  Patient left in no apparent distress in bed 
[x]  Call bell left within reach [x]  Nursing notified 
[]  Caregiver present 
[]  Bed alarm activated COMMUNICATION/COLLABORATION:  
The patients plan of care was discussed with: Registered Nurse Gene Montanez PT Time Calculation: 24 mins

## 2019-01-17 NOTE — PROGRESS NOTES
[facility]  
Daily Progress Note Date: 1/17/2019 Assessment/Plan:  
Lilo Delgado is a 66 y.o. female who is hospitalized for COPD Exacerbation, initially meeting Severe Sepsis Criteria. Acute on Chronic Respiratory Failure POA in the Setting of COPD Exacerbation:  
Initially with 6L N/C requirement and intermittently on BiPAP, now on 2LN/C(her home requirement). RVP neg. SpCx + candida, 1/6 BCx NG(Final). Sputum Cx + candida. Completed doxy treatment (1/12). Pulm signed off, appreciate recs - Albuterol Nebs q6hr RT 
- home Trelegy 
- Finish Prednisone taper today - Will resume maintenance Prednisone 10mg daily for tomorrow(1/18) - Wean O2 for goal of 88-92% sats  
  
Severe Sepsis Rule Out: RESOLVED. Patient's initial SIRS Criteria - elevated WBC, RR< HR explainable by combination of chronic problems and no source of infection present. WBC elevated chronically 2/2 daily steroid use, RR elevated due to COPD exacerbation and HR elevated due to COPD + hx of A fib with RVR. Sepsis workup completed and treatment with fluids initiated. However upon treatment of COPD Excerbation RR, HR improved so unlikley true sepsis as there is no source  1/6 BCx No Growth, Final, SpCx +candida - Continue daily CBC 
- VS per unit routine R/O GI Bleed:  
HgB 8.2 this morning. FOBT negative. HgB 6.7 POA. S/p3 U pRBCs. HgB stable. Capsule study without any signs of bleeding only diminutive AVMs and gastritis. Recs for upper and lower scopes once respiratory status allows. - Protonix 40mg BID 
- Transfuse for HgB <7 
- GI Consulted, appreciate recs 
  
Diastolic CHF:  
Euvolemic on admission. Trop POA . 10 --> .06 --> .5.  Volume status easily upset so caution with diuresis. 
- 0.5mg Bumex every other day, due 1/16 
- Strict I&O 
- Cardiology consult, appreciate recs 
  
Atrial Fibrillation s/p MVR on Coumadin (INR POA 1.2)  
 Pt has been seen OP by Dr Luis Armando Delgado but has not followed up with him since 2016. Goal INR 2.5 - 3.5. EKG POA: Afib with RVR . Coumadin home dose is 3mg everyday but Friday 2mg. INR this AM 2.4. Coumadin 3mg x3 days then 2.5mg x 2 days. Dig level . 5 - Home Warfarin continued, pharmacy to dose, INR goal of 2.5-3.5 
- Diltiazem 240 CD 
- Digoxin . 125mg PO daily - Cardiology consulted, appreciate recs  
- Daily Mg & Phos with repletions PRN 
  
Leukocytosis: Chronic and IMPROVED from presentation Chronic with BL ~22-29 likely due to daily prednisone use. WBC POA 32.0 --> 32.5-->16.1-->15.8 -> 16.2 --> 17.4--> 20.3-->19.5-->16.5 --> 20.0-->22.6 
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 However, tests are not reliable in inpt setting in pt with multiple chronic disease in exacerbation. Additionally steroids and heparin and possible underlying DM may cause abnormalities in testing 
- repeat thyroid studies with PCP and rec for endo referral is persistently abnl 
  
Hyperglycemia:  
likely due to chronic steroid dose. Questionable DM at baseline however Alc <3.5% due to chronic anemia. Will adjust insulin based on trending BGs. BGs  yestrday, requiring 20U Lispro per corrective scale. - Lantus 30U QHS, will increase as trend BGs today if requiring significant SSI 
- Add Insulin with lunch and dinner 
- SSI, regular sensitivity 
- POC glucose ACHS 
- hypoglycemia protocol 
  
Hyperphosphatemia: STABLE. Phos 6.0 POA. Phos 5.2 today - Monitor with daily BMP, Mg, Phos 
  
Chronic Back Pain  
- Home Percocet 5-325 mg resumed, q6h PRN  
  
Hyperlipidemia  
(: TC: 196, T, HDL 88, VLDL 30 & LDL 78) - Holding home Lovastatin 10 mg because of transminitis Transaminitis: RESOLVED 
LFTs downtrending. Likely combination of cholelithiasis and capsule study. 
- Holding home Lovastatin Anxiety: IMPROVED. Likely baseline anxiety exacerbated by severe COPD 
- Will attempt to wean O2 some and monitor for anxiety - Lexapro 10mg QHS 
- Xanax . 5mg BID PRN 
- Discontinued attarax PRN to avoid Qtc prolongation with Lexapro but ok as PRN for anxiety resistant to xanax 
  
FEN/GI - Low Na Diet Activity - Ambulate as tolerated DVT prophylaxis - Coumadin, pharm to dose GI prophylaxis - Protonix Disposition - Admit to Stepdown. Plan to d/c to [facility], pending PA. PT/OT following Code Status - DNR, discussed with patient / caregivers. Patient will be discussed with Dr. Joseph Lesches, supervising physician. Aylin Farris MD 
Family Medicine Resident CC: \"I'm alright Subjective No acute events overnight. Per patient her breathing is improved. Denies current anxiety but had xanax just prior to speaking with patient. DEscribes panic attacks as starting with shaking then feeling SOB and nervous. Cannot pinpoint situational triggers. Seem to occur in evening and morning but not during the day. Per patient no hx of anxiety medications and no panic attacks previously. She denies SOB, CP, N/V. She is having bowel movements and urinating without difficulty. Inpatient Medications Current Facility-Administered Medications Medication Dose Route Frequency  0.9% sodium chloride infusion 250 mL  250 mL IntraVENous PRN  
 insulin lispro (HUMALOG) injection   SubCUTAneous AC&HS  escitalopram oxalate (LEXAPRO) tablet 10 mg  10 mg Oral QPM  
 docusate sodium (COLACE) capsule 100 mg  100 mg Oral DAILY PRN  polyethylene glycol (MIRALAX) packet 17 g  17 g Oral DAILY PRN  
 insulin glargine (LANTUS) injection 30 Units  30 Units SubCUTAneous QHS  bumetanide (BUMEX) tablet 0.5 mg  0.5 mg Oral EVERY OTHER DAY  digoxin (LANOXIN) tablet 0.125 mg  0.125 mg Oral DAILY  ondansetron (ZOFRAN) injection 4 mg  4 mg IntraVENous Q8H PRN  
  predniSONE (DELTASONE) tablet 20 mg  20 mg Oral DAILY WITH BREAKFAST  pantoprazole (PROTONIX) tablet 40 mg  40 mg Oral ACB  ALPRAZolam (XANAX) tablet 0.5 mg  0.5 mg Oral BID PRN  
 Warfarin- Pharmacist Dosing   Other Rx Dosing/Monitoring  oxyCODONE IR (ROXICODONE) tablet 5 mg  5 mg Oral Q8H PRN  
 0.9% sodium chloride infusion 250 mL  250 mL IntraVENous PRN  
 albuterol (ACCUNEB) nebulizer solution 1.25 mg  1.25 mg Nebulization Q6H RT  
 alteplase (CATHFLO) 1 mg in sterile water (preservative free) 1 mL injection  1 mg InterCATHeter PRN  
 sodium chloride (NS) flush 10-30 mL  10-30 mL InterCATHeter PRN  
 sodium chloride (NS) flush 10 mL  10 mL InterCATHeter Q24H  
 sodium chloride (NS) flush 10 mL  10 mL InterCATHeter PRN  
 sodium chloride (NS) flush 10-40 mL  10-40 mL InterCATHeter Q8H  
 sodium chloride (NS) flush 20 mL  20 mL InterCATHeter Q24H  
 bacitracin 500 unit/gram packet 1 Packet  1 Packet Topical PRN  
 guaiFENesin ER (MUCINEX) tablet 600 mg  600 mg Oral Q12H  
 dilTIAZem CD (CARDIZEM CD) capsule 240 mg  240 mg Oral DAILY  nystatin (MYCOSTATIN) 100,000 unit/mL oral suspension 500,000 Units  500,000 Units Oral QID  sodium chloride (NS) flush 5-10 mL  5-10 mL IntraVENous Q8H  
 sodium chloride (NS) flush 5-10 mL  5-10 mL IntraVENous PRN  
 nicotine (NICODERM CQ) 21 mg/24 hr patch 1 Patch  1 Patch TransDERmal Q24H  
 fluticasone-umeclidin-vilanter 100-62.5-25 mcg dsdv 1 Puff  (Patient Supplied)  1 Puff Inhalation DAILY  roflumilast (DALIRESP) tablet 500 mcg  500 mcg Oral DAILY  0.9% sodium chloride infusion  50 mL/hr IntraVENous PRN  
 glucose chewable tablet 16 g  4 Tab Oral PRN  
 dextrose (D50W) injection syrg 12.5-25 g  12.5-25 g IntraVENous PRN  
 glucagon (GLUCAGEN) injection 1 mg  1 mg IntraMUSCular PRN Allergies Allergies Allergen Reactions  Spiriva Respimat [Tiotropium Bromide] Anaphylaxis and Other (comments) Adverse effects; Per RN - pt states that Spiriva caused throat swelling and could not breathe well.  Spiriva Respimat [Tiotropium Bromide] Shortness of Breath  Tomato Shortness of Breath Only occurs with raw tomatoes, tolerates ketchup without issue  Celebrex [Celecoxib] Rash  Celebrex [Celecoxib] Rash  Rocephin [Ceftriaxone] Shortness of Breath  Tomato Rash Objective Vitals: 
Patient Vitals for the past 8 hrs: 
 Temp Pulse Resp BP SpO2  
01/17/19 0739 98.1 °F (36.7 °C) 89 26 123/55 98 % 01/17/19 0724     100 % 01/17/19 0701  89     
01/17/19 0453 98.3 °F (36.8 °C) 87 22 129/57 100 % I/O: 
 
Intake/Output Summary (Last 24 hours) at 1/17/2019 8372 Last data filed at 1/17/2019 2735 Gross per 24 hour Intake 840 ml Output 800 ml Net 40 ml Last shift: 
  No intake/output data recorded. Last 3 shifts: 
  01/15 1901 - 01/17 0700 In: 4065 [P.O.:1090] Out: 1800 [Urine:1800] Physical Exam:General: No acute distress. Alert. Cooperative. Head: Normocephalic. Atraumatic. Respiratory: No increased work of breathing, no tripoding. CTAB with improved air movement throughout posterior lung fields. No wheezes, rales, ronchi. Mild crackles on exam this morning. Cardiovascular: Irregularly irregular rhythm. Mechanical click from valve replacement with mild flow murmur. Pulses 2+ throughout. GI: + bowel sounds. Nontender. No rebound tenderness or guarding. Nondistended Extremities: 1+ pitting edema to mid calf on R to just above ankleL, minimally tender bilaterally Laboratory Data Recent Results (from the past 12 hour(s)) GLUCOSE, POC Collection Time: 01/16/19  8:57 PM  
Result Value Ref Range Glucose (POC) 315 (H) 65 - 100 mg/dL Performed by Martita Talbert   
CBC WITH AUTOMATED DIFF Collection Time: 01/17/19  5:02 AM  
Result Value Ref Range WBC 22.6 (H) 3.6 - 11.0 K/uL  
 RBC 2.88 (L) 3.80 - 5.20 M/uL HGB 8.2 (L) 11.5 - 16.0 g/dL HCT 26.8 (L) 35.0 - 47.0 % MCV 93.1 80.0 - 99.0 FL  
 MCH 28.5 26.0 - 34.0 PG  
 MCHC 30.6 30.0 - 36.5 g/dL  
 RDW 19.3 (H) 11.5 - 14.5 % PLATELET 780 (L) 612 - 400 K/uL NRBC 0.8 (H) 0  WBC ABSOLUTE NRBC 0.17 (H) 0.00 - 0.01 K/uL NEUTROPHILS 86 (H) 32 - 75 % LYMPHOCYTES 5 (L) 12 - 49 % MONOCYTES 6 5 - 13 % EOSINOPHILS 0 0 - 7 % BASOPHILS 0 0 - 1 % METAMYELOCYTES 2 (H) 0 % MYELOCYTES 1 (H) 0 % IMMATURE GRANULOCYTES 0 %  
 ABS. NEUTROPHILS 19.4 (H) 1.8 - 8.0 K/UL  
 ABS. LYMPHOCYTES 1.1 0.8 - 3.5 K/UL  
 ABS. MONOCYTES 1.4 (H) 0.0 - 1.0 K/UL  
 ABS. EOSINOPHILS 0.0 0.0 - 0.4 K/UL  
 ABS. BASOPHILS 0.0 0.0 - 0.1 K/UL  
 ABS. IMM. GRANS. 0.0 K/UL  
 DF MANUAL    
 RBC COMMENTS ANISOCYTOSIS 1+ 
    
 RBC COMMENTS RBC FRAGMENTS    
METABOLIC PANEL, COMPREHENSIVE Collection Time: 01/17/19  5:02 AM  
Result Value Ref Range Sodium 143 136 - 145 mmol/L Potassium 4.2 3.5 - 5.1 mmol/L Chloride 100 97 - 108 mmol/L  
 CO2 39 (H) 21 - 32 mmol/L Anion gap 4 (L) 5 - 15 mmol/L Glucose 66 65 - 100 mg/dL BUN 17 6 - 20 MG/DL Creatinine 0.61 0.55 - 1.02 MG/DL  
 BUN/Creatinine ratio 28 (H) 12 - 20 GFR est AA >60 >60 ml/min/1.73m2 GFR est non-AA >60 >60 ml/min/1.73m2 Calcium 8.6 8.5 - 10.1 MG/DL Bilirubin, total 1.7 (H) 0.2 - 1.0 MG/DL  
 ALT (SGPT) 64 12 - 78 U/L  
 AST (SGOT) 84 (H) 15 - 37 U/L Alk. phosphatase 76 45 - 117 U/L Protein, total 5.3 (L) 6.4 - 8.2 g/dL Albumin 2.6 (L) 3.5 - 5.0 g/dL Globulin 2.7 2.0 - 4.0 g/dL A-G Ratio 1.0 (L) 1.1 - 2.2 PROTHROMBIN TIME + INR Collection Time: 01/17/19  5:02 AM  
Result Value Ref Range INR 2.7 (H) 0.9 - 1.1 Prothrombin time 25.9 (H) 9.0 - 11.1 sec MAGNESIUM Collection Time: 01/17/19  5:02 AM  
Result Value Ref Range Magnesium 2.0 1.6 - 2.4 mg/dL PHOSPHORUS Collection Time: 01/17/19  5:02 AM  
Result Value Ref Range Phosphorus 5.2 (H) 2.6 - 4.7 MG/DL  
DIGOXIN Collection Time: 01/17/19  5:02 AM  
Result Value Ref Range Digoxin level 0.5 (L) 0.90 - 2.00 NG/ML  
GLUCOSE, POC Collection Time: 01/17/19  6:25 AM  
Result Value Ref Range Glucose (POC) 108 (H) 65 - 100 mg/dL Performed by Fili Bloodgood Imaging - No New Imaging Abdominal US: + stones, no cholecystitis Capsule Study: gastritis, diminutive [facility] Hospital Problems: 
Hospital Problems  Date Reviewed: 11/29/2018 Codes Class Noted POA Thyroid function test abnormal ICD-10-CM: R94.6 ICD-9-CM: 794.5  1/16/2019 Unknown * (Principal) COPD with acute exacerbation ([facility] [address] ICD-10-CM: J44.1 ICD-9-CM: 491.21  1/6/2019 Unknown Anemia ICD-10-CM: D64.9 ICD-9-CM: 285.9  10/28/2018 Yes Hyperlipidemia ICD-10-CM: E78.5 ICD-9-CM: 272.4  10/26/2018 Yes Chronic back pain (Chronic) ICD-10-CM: M54.9, G89.29 ICD-9-CM: 724.5, 338.29  10/26/2018 Yes History of GI bleed ICD-10-CM: Z87.19 ICD-9-CM: V12.79 Present on Admission 11/25/2016 Yes Diastolic CHF, chronic (HCC) ICD-10-CM: I50.32 
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  
  Tissue MVR 1989 following failed balloon valvuloplasty for MS Redo MVR 2004 due to severe MR, AVR due to AR ([employer]) CAD (coronary artery disease), native coronary artery ICD-10-CM: I25.10 ICD-9-CM: 414.01  1/8/2015 Yes S/P MVR (mitral valve replacement) ICD-10-CM: X77.9 ICD-9-CM: V43.3  1/8/2015 Yes

## 2019-01-17 NOTE — PROGRESS NOTES
Responded to [place] for Daily  visit for [facility] submitted by physician. Miss Nice  of 15 years was present. He uses humor for coping.  [facility] shared she is doing ok and has not needs at this time. Her dinner arrived, offered to provide blessing which she accepted. Verified that consult is indeed for daily  visits. Chaplains will continue to follow. Visited by: Imelda Kamara., MS., [address] ([id number])

## 2019-01-17 NOTE — PROGRESS NOTES
Problem: Self Care Deficits Care Plan (Adult) Goal: *Acute Goals and Plan of Care (Insert Text) Occupational Therapy Goals Continue goals 1/17/19 Initiated 1/10/2019 1. Patient will perform grooming standing at sink with VSS with supervision/set-up within 7 day(s). 2.  Patient will perform lower body dressing with AD PRN with supervision/set-up within 7 day(s). 3.  Patient will perform bathing with supervision/set-up within 7 day(s). 4.  Patient will perform toilet transfers with supervision/set-up within 7 day(s). 5.  Patient will perform all aspects of toileting with supervision/set-up within 7 day(s). 6.  Patient will participate in upper extremity therapeutic exercise/activities with VSS with supervision/set-up for 8 minutes within 7 day(s). 7.  Patient will utilize energy conservation techniques during functional activities with verbal cues within 7 day(s). Occupational Therapy TREATMENT: WEEKLY REASSESSMENT Patient: Kimmie Orozco (59 y.o. female) Date: 1/17/2019 Diagnosis: COPD with acute exacerbation ([facility] COPD with acute exacerbation ([facility] [facility] Procedure(s) (LRB): 
CAPSULE     Complete:  1445 (N/A) 8 Days Post-Op Precautions: Fall, WBAT Chart, occupational therapy assessment, plan of care, and goals were reviewed. ASSESSMENT: 
Patient received sitting in bedside chair with request to return to bed. Patient O2 sats stable at start of activity and patient calm. With initiation of transfer patient O2 sats remain stable reading 92% on 2L. Patient sat EOB for ~5 minutes to recover from transfer. Verbal cues for PLB technique and slow breathing. Patient with verbalizations to self for calming. Patient noted shakiness in sitting and reports increased anxiety. Patient requesting transfer to [facility] for toiling which therapist set up next to bed. However upon sitting, patient reports \"false alarm\" and without need to void.   She returned to EOB, now performing 2 transfers fairly quickly and sats reading 88-89%. Patient sat EOB ~2 minutes prior to elevating LEs into bed. Patient progressing with OT goals but continues with increased anxiety and need for assist.  Recommend rehab at discharge. Progression toward goals: 
[]            Improving appropriately and progressing toward goals [x]            Improving slowly and progressing toward goals []            Not making progress toward goals and plan of care will be adjusted PLAN: 
Goals have been updated based on progression since last assessment. Patient continues to benefit from skilled intervention to address the above impairments. Continue to follow patient 5 times a week to address goals. Planned Interventions: 
[x]                    Self Care Training                  [x]             Therapeutic Activities [x]                    Functional Mobility Training    []             Cognitive Retraining 
[x]                    Therapeutic Exercises           [x]             Endurance Activities [x]                    Balance Training                   []             Neuromuscular Re-Education []                    Visual/Perceptual Training     [x]        Home Safety Training 
[x]                    Patient Education                 [x]             Family Training/Education []                    Other (comment): 
Discharge Recommendations: Rehab Further Equipment Recommendations for Discharge: TBD SUBJECTIVE:  
Patient stated I start shaking and then I have a panic attack.  OBJECTIVE DATA SUMMARY:  
Cognitive/Behavioral Status: 
Neurologic State: Alert Orientation Level: Oriented X4 Cognition: Appropriate decision making; Appropriate for age attention/concentration; Appropriate safety awareness; Follows commands Functional Mobility and Transfers for ADLs:Bed Mobility: 
Supine to Sit: Minimum assistance; Additional time Sit to Supine: Stand-by assistance; Additional time Scooting: Stand-by assistance Transfers: 
Sit to Stand: Contact guard assistance Functional Transfers Toilet Transfer : Contact guard assistance Bed to Chair: Contact guard assistance Balance: 
Sitting: Intact Standing: Intact; With support ADL Intervention: Toileting Toileting Assistance: Stand-by assistance Neuro Re-Education: 
  
  
  
Therapeutic Exercises:  
Pain: 
Pain Scale 1: Numeric (0 - 10) Pain Intensity 1: 6 Pain Location 1: Back Pain Orientation 1: Posterior Pain Description 1: Aching Pain Intervention(s) 1: Medication (see MAR) Activity Tolerance: VSS Please refer to the flowsheet for vital signs taken during this treatment. After treatment:  
[] Patient left in no apparent distress sitting up in chair 
[x] Patient left in no apparent distress in bed 
[x] Call bell left within reach [x] Nursing notified 
[] Caregiver present 
[] Bed alarm activated COMMUNICATION/COLLABORATION:  
The patients plan of care was discussed with: Physical Therapist and Registered Nurse PUJA Rodríguez/SVITLANA Time Calculation: 25 mins

## 2019-01-18 NOTE — PROGRESS NOTES
Physical therapy 1330 chart reviewed,spoke with RN. Pt received in bed  at bedside. Pt using 2L O2sat 97%. Pt declining OOB activity, reporting too fatigued after transferring bed<>chair a few time earlier today. Encouraged bed level exercise and importance of short but frequent activity to improved functional mobility and activity tolerance, pt continued to decline. PT will continue to follow. Dulce Bridges

## 2019-01-18 NOTE — PROGRESS NOTES
Problem: Falls - Risk of 
Goal: *Absence of Falls Document Kasey End Fall Risk and appropriate interventions in the flowsheet. Outcome: Progressing Towards Goal 
Fall Risk Interventions: 
Mobility Interventions: Bed/chair exit alarm, Patient to call before getting OOB, PT Consult for mobility concerns, Utilize walker, cane, or other assistive device Medication Interventions: Bed/chair exit alarm, Patient to call before getting OOB, Teach patient to arise slowly Elimination Interventions: Call light in reach, Bed/chair exit alarm, Patient to call for help with toileting needs, Toilet paper/wipes in reach

## 2019-01-18 NOTE — PROGRESS NOTES
3:38 PM 
I called [employer], still no determination. If weekend  gets an Dinah Guile tomorrow, [facility] will have a bed on Sunday. JENNIFER Dorsey I spoke to [place] with [employer]. The expedited appeal is still pending. [employer] is open tomorrow Saturday from 8am-8pm. If we do not hear a determination today, [employer] can be reached tomorrow. Weekend CM staff will call Customer Service - [phone] and ask to have a representative to \"check status of expedited appeal\". I will continue to follow and assist with dc planning.  JENNIFER Dorsey

## 2019-01-18 NOTE — CDMP QUERY
There is noted documentation of Diastolic CHF for this patient, could you further document the acuity of HF for this hospital admission Please clarify if this patient is (was) being treated/managed for:  
 
=> Chronic Diastolic HF 
=> Acute on chronic Diastolic HF 
=> Other explanation of clinical findings  
=> Clinically Undetermined (no explanation for clinical findings) The medical record reflects the following clinical findings, treatment, and risk factors. Risk Factors:  PMH HFpEF Clinical Indicators:  Euvolemic on admission; 10/18/18 Echo:  Left ventricle: Size was normal. Systolic function was mildly reduced. Ejection fraction was estimated in the range of 45 % to 50 %. There was mild diffuse hypokinesis Treatment: - 0.5mg Bumex every other day, due 1/16; - Strict I&O; Cardiology consult, appreciate recs Please clarify and document your clinical opinion in the progress notes and discharge summary including the definitive and/or presumptive diagnosis, (suspected or probable), related to the above clinical findings. Please include clinical findings supporting your diagnosis. Thank you, 
Ziggy Schneider, MSN, [facility], [address] [facility]

## 2019-01-18 NOTE — PROGRESS NOTES
Bedside and Verbal shift change report given to Mulugeta Sen (oncoming nurse) by Maria eDl Carmen Canada (offgoing nurse). Report included the following information SBAR, Kardex, Intake/Output, Accordion and Recent Results. 1400: declined to work with PT/OT \"I just can't\" Bedside and Verbal shift change report given to Jesus Alberto Salvador. (oncoming nurse) by Mulugeta Sen (offgoing nurse). Report included the following information SBAR and Kardex

## 2019-01-18 NOTE — PROGRESS NOTES
[address] [facility] NP 
[phone] GI PROGRESS NOTE 
 
 
NAME: Chris Guerra :  1955 MRN:  [id number] Subjective:  
Pt states breathing is better than when she came to hospital.  Does not know if her stools have been black or bloody. Objective: VITALS:  
Last 24hrs VS reviewed since prior progress note. Most recent are: 
Visit Vitals /67 Pulse 88 Temp 97.8 °F (36.6 °C) Resp 20 Ht 5' 4\" (1.626 m) Wt 55 kg (121 lb 4.1 oz) SpO2 99% BMI 20.81 kg/m² Intake/Output Summary (Last 24 hours) at 2019 1343 Last data filed at 2019 2421 Gross per 24 hour Intake 120 ml Output 350 ml Net -230 ml PHYSICAL EXAM: 
General: Alert, in no acute distress HEENT: Anicteric sclerae. Lungs:            Course breath sounds Bilaterally. Heart:  Regular  rhythm, Abdomen: Soft, Non distended.  (+)Bowel sounds, no HSM Extremities: No c/c/e Neurologic:  CN 2-12 gi, Alert and oriented X 3. No acute neurological distress Psych:   Good insight. Not anxious nor agitated. Lab Data Reviewed:  
Recent Labs  
  19 
0555 19 
0502 WBC 20.4* 22.6* HGB 8.3* 8.2* HCT 27.1* 26.8*  
* 139* Recent Labs  
  19 
0555 19 
0502  143  
K 3.9 4.2  100 CO2 38* 39* BUN 21* 17  
CREA 0.69 0.61 GLU 53* 66  
PHOS 5.1* 5.2*  
CA 8.6 8.6 Recent Labs  
  19 
0555 19 
0502 SGOT 113* 84* AP 67 76  
TP 5.5* 5.3* ALB 2.7* 2.6*  
GLOB 2.8 2.7  
 
 
________________________________________________________________________ Patient Active Problem List  
Diagnosis Code  Chest pain R07.9  COPD exacerbation ([facility] [address] J44.1  Hypokalemia E87.6  JAZZMINE (acute kidney injury) ([facility] N17.9  Hyperglycemia R73.9  A-fib (HCC) I48.91  
 Chronic anticoagulation Z79.01  
 Hyperlipidemia E78.5  Chronic back pain M54.9, G89.29  
 Anemia D64.9  
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  
 S/P MVR (mitral valve replacement) Z95.2  COPD (chronic obstructive pulmonary disease) ([facility]) J44.9  Tobacco abuse Z72.0  Diastolic CHF, chronic ([facility]) I50.32  Chronic atrial fibrillation ([facility]) I48.2  History of GI bleed Z87.19  
 H/O total hysterectomy Z90.710  
 Hypokalemia E87.6  Thrush of mouth and esophagus ([facility]) B37.81, B37.0  
 SOB (shortness of breath) R06.02  
 COPD with acute exacerbation ([facility] [address] J44.1  Thyroid function test abnormal R94.6 Assessment and Plan: Anemia: We have been asked to review her to see if EGD and Colonoscopy are appropriate while she is pending placement in Rehab. Discussed case with Dr. Marshall Betts with Pulmonary Team.  She is at moderate to high risk for being unable to extubate after procedure. Her hemoglobin has been stable over the past three days and was heme negative on admission. Recommend continuing to monitor hemoglobin and transfuse to goal of 7. Please have her follow up in the office after her discharge. Please call with questions or concerns.  
  
Signed By: Kevin Payne NP   
 1/18/2019  1:43 PM

## 2019-01-18 NOTE — PROGRESS NOTES
Bedside and Verbal shift change report given to Veda Reddy RN (oncoming nurse) by Senthil Ochoa RN (offgoing nurse). Report included the following information SBAR, Kardex, Intake/Output, MAR and Recent Results.

## 2019-01-18 NOTE — PROGRESS NOTES
[facility] Dosing Services: 
 
Consult for Warfarin Dosing by Pharmacy by Mahesh Munroe Consult provided for this 66 y.o.  female , for indication of  afib, MVR, AVR  
PTA Dose: Warfarin 3 mg on Sa/Su/Mo/Tu/We/Th and Warfarin 2 mg on Fr 
  
Dose to achieve an INR goal of 2.5- 3.5 Order entered for  Warfarin  3 (mg) ordered to be given today at 18:00. Drug interactions: 
Previous dose given 3 mg PT/INR Lab Results Component Value Date/Time INR 2.8 (H) 01/18/2019 05:55 AM  
  
Platelets Lab Results Component Value Date/Time PLATELET 714 (L) 98/68/2400 05:55 AM  
  
H/H Lab Results Component Value Date/Time HGB 8.3 (L) 01/18/2019 05:55 AM  
  
 
Pharmacy to follow daily and will provide subsequent Warfarin dosing based on clinical status. MARIYA Starr)  Contact information [phone]

## 2019-01-18 NOTE — PROGRESS NOTES
[facility]  
Daily Progress Note Date: 1/18/2019 Assessment/Plan:  
Lilo Delgado is a 66 y.o. female who is hospitalized for COPD Exacerbation, initially meeting Severe Sepsis Criteria. Acute on Chronic Respiratory Failure POA in the Setting of COPD Exacerbation: Acute Exacerbation resolved, overall respiratory status STABLE. Initially with 6L N/C requirement and intermittently on BiPAP, now on 2LN/C(her home requirement). RVP neg. SpCx + candida, 1/6 BCx NG(Final). Sputum Cx + candida. Completed doxy treatment (1/12). Pulm signed off, appreciate recs. IV steroids -->pred taper(finished 1/17) - Albuterol Nebs q6hr RT 
- home Trelegy - Resume home Prednisone 10mg daily - Wean O2 for goal of 88-92% sats  
  
Severe Sepsis Rule Out: RESOLVED. Patient's initial SIRS Criteria - elevated WBC, RR< HR explainable by combination of chronic problems and no source of infection present. WBC elevated chronically 2/2 daily steroid use, RR elevated due to COPD exacerbation and HR elevated due to COPD + hx of A fib with RVR. Sepsis workup completed and treatment with fluids initiated. However upon treatment of COPD Excerbation RR, HR improved so unlikley true sepsis as there is no source  1/6 BCx No Growth, Final, SpCx +candida - Continue daily CBC 
- VS per unit routine R/O GI Bleed:  
HgB 8.2 this morning. FOBT negative. HgB 6.7 POA. S/p3 U pRBCs. HgB stable. Capsule study without any signs of bleeding only diminutive AVMs and gastritis. Recs for upper and lower scopes once respiratory status allows. - Protonix 40mg BID 
- Transfuse for HgB <7 
- GI Consulted, appreciate recs 
  
Diastolic CHF:  
Euvolemic on admission. Trop POA . 10 --> .06 --> .5. Volume status easily upset so caution with diuresis. 
- 0.5mg Bumex every other day, due 1/18 - Strict I&O 
- Cardiology consult, appreciate recs 
  
Atrial Fibrillation s/p MVR on Coumadin (INR POA 1.2)  
 Pt has been seen OP by Dr Judith Dorantes but has not followed up with him since 2016. Goal INR 2.5 - 3.5. EKG POA: Afib with RVR . Coumadin home dose is 3mg everyday but Friday 2mg. INR this AM 2.4. Coumadin 3mg x3 days then 2.5mg x 2 days. Dig level . 5 - Home Warfarin continued, pharmacy to dose, INR goal of 2.5-3.5 
- Diltiazem 240 CD 
- Digoxin . 125mg PO daily - Cardiology consulted, appreciate recs  
- Daily Mg & Phos with repletions PRN 
  
Leukocytosis: Chronic and IMPROVED from presentation Chronic with BL ~22-29 likely due to daily prednisone use. WBC POA 32.0 --> 32.5-->16.1-->15.8 -> 16.2 --> 17.4--> 20.3-->19.5-->16.5 --> 20.0-->22.6 
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 However, tests are not reliable in inpt setting in pt with multiple chronic disease in exacerbation. Additionally steroids and heparin and possible underlying DM may cause abnormalities in testing 
- repeat thyroid studies with PCP and rec for endo referral is persistently abnl 
  
Hyperglycemia:  
likely due to chronic steroid dose. Questionable DM at baseline however Alc <3.5% due to chronic anemia. Will adjust insulin based on trending BGs. BGs  yestrday, requiring 20U Lispro per corrective scale. - Lantus 30U QHS, will increase as trend BGs today if requiring significant SSI 
- Add Insulin with lunch and dinner 
- SSI, regular sensitivity 
- POC glucose ACHS 
- hypoglycemia protocol 
  
Hyperphosphatemia: STABLE. Phos 6.0 POA. Phos 5.2 today - Monitor with daily BMP, Mg, Phos 
  
Chronic Back Pain  
- Home Percocet 5-325 mg resumed, q6h PRN  
  
Hyperlipidemia  
(: TC: 196, T, HDL 88, VLDL 30 & LDL 78) - Holding home Lovastatin 10 mg because of transminitis Transaminitis: RESOLVED 
LFTs downtrending. Likely combination of cholelithiasis and capsule study. 
- Holding home Lovastatin Anxiety: IMPROVED. Likely baseline anxiety exacerbated by severe COPD. E[isode this morning when discussing her care and insurance denial but was redirectable and able to be reassured - Will attempt to wean O2 some and monitor for anxiety - Lexapro 10mg QHS 
- Xanax . 5mg BID PRN 
- Discontinued attarax PRN to avoid Qtc prolongation with Lexapro but ok as PRN for anxiety resistant to xanax 
  
FEN/GI - Low Na Diet Activity - Ambulate as tolerated DVT prophylaxis - Coumadin, pharm to dose GI prophylaxis - Protonix Disposition - Admit to Stepdown. Plan to d/c to [facility], pending PA. PT/OT following Code Status - DNR, discussed with patient / caregivers. Patient will be discussed with Dr. Slaome Norris, supervising physician. Rogers Kendall MD 
Family Medicine Resident CC: \"I'm alright Subjective No acute events overnight. Per patient her breathing is improved but that she feels fluid overloaded today - she has bumex due today. Patient starts t oshake and get anxious when discussing insurance denial for rehab. Patient able to be calmed with reassurance instead of medication. She denies SOB, CP, N/V. She is having bowel movements and urinating without difficulty. Inpatient Medications Current Facility-Administered Medications Medication Dose Route Frequency  predniSONE (DELTASONE) tablet 10 mg  10 mg Oral DAILY WITH BREAKFAST  insulin lispro (HUMALOG) injection 5 Units  5 Units SubCUTAneous ACL  insulin lispro (HUMALOG) injection 5 Units  5 Units SubCUTAneous DAILY WITH DINNER  
 0.9% sodium chloride infusion 250 mL  250 mL IntraVENous PRN  
 insulin lispro (HUMALOG) injection   SubCUTAneous AC&HS  escitalopram oxalate (LEXAPRO) tablet 10 mg  10 mg Oral QPM  
 docusate sodium (COLACE) capsule 100 mg  100 mg Oral DAILY PRN  polyethylene glycol (MIRALAX) packet 17 g  17 g Oral DAILY PRN  
 insulin glargine (LANTUS) injection 30 Units  30 Units SubCUTAneous QHS  bumetanide (BUMEX) tablet 0.5 mg  0.5 mg Oral EVERY OTHER DAY  digoxin (LANOXIN) tablet 0.125 mg  0.125 mg Oral DAILY  ondansetron (ZOFRAN) injection 4 mg  4 mg IntraVENous Q8H PRN  pantoprazole (PROTONIX) tablet 40 mg  40 mg Oral ACB  ALPRAZolam (XANAX) tablet 0.5 mg  0.5 mg Oral BID PRN  
 Warfarin- Pharmacist Dosing   Other Rx Dosing/Monitoring  oxyCODONE IR (ROXICODONE) tablet 5 mg  5 mg Oral Q8H PRN  
 0.9% sodium chloride infusion 250 mL  250 mL IntraVENous PRN  
 albuterol (ACCUNEB) nebulizer solution 1.25 mg  1.25 mg Nebulization Q6H RT  
 alteplase (CATHFLO) 1 mg in sterile water (preservative free) 1 mL injection  1 mg InterCATHeter PRN  
 sodium chloride (NS) flush 10-30 mL  10-30 mL InterCATHeter PRN  
 sodium chloride (NS) flush 10 mL  10 mL InterCATHeter Q24H  
 sodium chloride (NS) flush 10 mL  10 mL InterCATHeter PRN  
 sodium chloride (NS) flush 10-40 mL  10-40 mL InterCATHeter Q8H  
 sodium chloride (NS) flush 20 mL  20 mL InterCATHeter Q24H  
 bacitracin 500 unit/gram packet 1 Packet  1 Packet Topical PRN  
 guaiFENesin ER (MUCINEX) tablet 600 mg  600 mg Oral Q12H  
 dilTIAZem CD (CARDIZEM CD) capsule 240 mg  240 mg Oral DAILY  nystatin (MYCOSTATIN) 100,000 unit/mL oral suspension 500,000 Units  500,000 Units Oral QID  sodium chloride (NS) flush 5-10 mL  5-10 mL IntraVENous Q8H  
 sodium chloride (NS) flush 5-10 mL  5-10 mL IntraVENous PRN  
 nicotine (NICODERM CQ) 21 mg/24 hr patch 1 Patch  1 Patch TransDERmal Q24H  
 fluticasone-umeclidin-vilanter 100-62.5-25 mcg dsdv 1 Puff  (Patient Supplied)  1 Puff Inhalation DAILY  roflumilast (DALIRESP) tablet 500 mcg  500 mcg Oral DAILY  0.9% sodium chloride infusion  50 mL/hr IntraVENous PRN  
 glucose chewable tablet 16 g  4 Tab Oral PRN  
 dextrose (D50W) injection syrg 12.5-25 g  12.5-25 g IntraVENous PRN  
 glucagon (GLUCAGEN) injection 1 mg  1 mg IntraMUSCular PRN Allergies Allergies Allergen Reactions  Spiriva Respimat [Tiotropium Bromide] Anaphylaxis and Other (comments) Adverse effects; Per RN - pt states that Spiriva caused throat swelling and could not breathe well.  Spiriva Respimat [Tiotropium Bromide] Shortness of Breath  Tomato Shortness of Breath Only occurs with raw tomatoes, tolerates ketchup without issue  Celebrex [Celecoxib] Rash  Celebrex [Celecoxib] Rash  Rocephin [Ceftriaxone] Shortness of Breath  Tomato Rash Objective Vitals: 
Patient Vitals for the past 8 hrs: 
 Temp Pulse Resp BP SpO2  
01/18/19 0314 98.1 °F (36.7 °C) 87 20 144/55 97 % 01/18/19 0024 96.7 °F (35.9 °C) 87 20 117/68 100 % I/O: 
 
Intake/Output Summary (Last 24 hours) at 1/18/2019 2910 Last data filed at 1/18/2019 2766 Gross per 24 hour Intake 120 ml Output 350 ml Net -230 ml Last shift: 
  01/17 1901 - 01/18 0700 In: 120 [P.O.:120] Out: 350 [Urine:350] Last 3 shifts: 
  01/16 0701 - 01/17 1900 In: 840 [P.O.:840] Out: 800 [Urine:800] Physical Exam:General: No acute distress. Alert. Cooperative. Head: Normocephalic. Atraumatic. Respiratory: No increased work of breathing, no tripoding. CTAB with improved air movement throughout posterior lung fields. No wheezes, rales, ronchi. Mild crackles on exam this morning. Cardiovascular: Irregularly irregular rhythm. Mechanical click from valve replacement with mild flow murmur. Pulses 2+ throughout. GI: + bowel sounds. Nontender. No rebound tenderness or guarding. Nondistended Extremities: 1+ pitting edema to mid calf on R to just above ankle, minimally tender bilaterally Laboratory Data Recent Results (from the past 12 hour(s)) GLUCOSE, POC Collection Time: 01/17/19  9:12 PM  
Result Value Ref Range Glucose (POC) 116 (H) 65 - 100 mg/dL Performed by Germán Tejeda Imaging - No New Imaging Abdominal US: + stones, no cholecystitis Capsule Study: gastritis, diminutive [facility] Hospital Problems: 
Hospital Problems  Date Reviewed: 11/29/2018 Codes Class Noted POA Thyroid function test abnormal ICD-10-CM: R94.6 ICD-9-CM: 794.5  1/16/2019 Unknown * (Principal) COPD with acute exacerbation ([facility] [address] ICD-10-CM: J44.1 ICD-9-CM: 491.21  1/6/2019 Unknown Anemia ICD-10-CM: D64.9 ICD-9-CM: 285.9  10/28/2018 Yes Hyperlipidemia ICD-10-CM: E78.5 ICD-9-CM: 272.4  10/26/2018 Yes Chronic back pain (Chronic) ICD-10-CM: M54.9, G89.29 ICD-9-CM: 724.5, 338.29  10/26/2018 Yes History of GI bleed ICD-10-CM: Z87.19 ICD-9-CM: V12.79 Present on Admission 11/25/2016 Yes Diastolic CHF, chronic (HCC) ICD-10-CM: I50.32 
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  
  Tissue MVR 1989 following failed balloon valvuloplasty for MS Redo MVR 2004 due to severe MR, AVR due to AR ([employer]) CAD (coronary artery disease), native coronary artery ICD-10-CM: I25.10 ICD-9-CM: 414.01  1/8/2015 Yes S/P MVR (mitral valve replacement) ICD-10-CM: P58.6 ICD-9-CM: V43.3  1/8/2015 Yes

## 2019-01-18 NOTE — PROGRESS NOTES
Occupational therapy note: 
Chart reviewed. Attempted to see patient for OT treatment. Patient received in bed, spouse at bedside. Patient declining activity at this time secondary to fatigue. She reports OOB earlier today and transferring to chair multiple times. She reports feeling weak at this time and not able to participate at this time. Patient educated on short frequent activity vs all at once. Patient verbalized understanding but continued to decline. Idalia Gibbons MS OTR/L

## 2019-01-18 NOTE — PROGRESS NOTES
Problem: Pressure Injury - Risk of 
Goal: *Prevention of pressure injury Document Jose Scale and appropriate interventions in the flowsheet. Outcome: Progressing Towards Goal 
Pressure Injury Interventions: 
Sensory Interventions: Float heels, Keep linens dry and wrinkle-free, Minimize linen layers Moisture Interventions: Absorbent underpads Activity Interventions: Pressure redistribution bed/mattress(bed type), Increase time out of bed Mobility Interventions: HOB 30 degrees or less, Pressure redistribution bed/mattress (bed type), PT/OT evaluation Nutrition Interventions: Document food/fluid/supplement intake Friction and Shear Interventions: HOB 30 degrees or less

## 2019-01-19 NOTE — PROGRESS NOTES
1900 
Bedside and Verbal shift change report given to Janay Adrian RN (oncoming nurse) by Shavon Bravo (offgoing nurse). Report included the following information SBAR, Kardex, Procedure Summary, Intake/Output, MAR, Accordion and Recent Results. Patient on bed, watching TV. Initial assessment done. With 5/10 pain on the foot but wanted to take pain medication at bedtime. Carlitos Flores 6 Patient complaining of nausea. PRN medication given. Visit Vitals /60 (BP 1 Location: Left arm, BP Patient Position: At rest) Pulse 86 Temp 97.3 °F (36.3 °C) Resp 25 Ht 5' 4\" (1.626 m) Wt 54.6 kg (120 lb 4.8 oz) SpO2 99% BMI 20.65 kg/m² 5688 HYPOGLYCEMIC EPISODE DOCUMENTATION Patient with hypoglycemic episode(s) at  (time) on 1/19/2018 (date). BG value(s) pre-treatment 72/68 Was patient symptomatic? [] yes, [x] no Patient was treated with the following rescue medications/treatments: [] D50 [x] Glucose tablets 
              [] Glucagon 
              [] 4oz juice 
              [] 6oz reg soda 
              [] 8oz low fat milk BG value post-treatment: 122 Once BG treated and value greater than 80mg/dl, pt was provided with the following: 
[] snack [x] meal 
 
0700 Bedside and Verbal shift change report given to Petar Kaur (oncoming nurse) by Janay Adrian RN (offgoing nurse). Report included the following information SBAR, Kardex, Procedure Summary, Intake/Output, MAR, Accordion and Recent Results.

## 2019-01-19 NOTE — PROGRESS NOTES
HYPOGLYCEMIC EPISODE DOCUMENTATION Patient with hypoglycemic episode(s) at 0640(time) on 1/19(date). BG value(s) pre-treatment 68 Was patient symptomatic? [] yes, [x] no Patient was treated with the following rescue medications/treatments: [] D50 [x] Glucose tablets 
              [] Glucagon 
              [] 4oz juice 
              [] 6oz reg soda 
              [] 8oz low fat milk BG value post-treatment: 122 Once BG treated and value greater than 80mg/dl, pt was provided with the following: 
[] snack [x] meal 
Name of MD notified:Pavel The following orders were received: none

## 2019-01-19 NOTE — PROGRESS NOTES
Patient sitting up in bed. Some eye contact, calm. Says she is not feeling so well today. Provided spiritual presence and listening as she spoke briefly about her present thoughts, feelings, and concerns, ex[pressing feelings of discouragement due to her health and her desire to begin feeling better. Spoke words of comfort and hope. She appeared comforted as a result of this visit and expressed gratitude for this visit. Visited by Rev. Karl Cervantes, [facility]  paging service: [phone])

## 2019-01-19 NOTE — PROGRESS NOTES
Bedside and Verbal shift change report given to [place] (oncoming nurse) by Montez Hsu (offgoing nurse). Report included the following information SBAR, Kardex, ED Summary, Procedure Summary, Intake/Output, MAR, Recent Results and Cardiac Rhythm afib. 0830 prn zofran given for nausea, dry heaving. Dr Joanie Yanez notified. MD notified of dark urine, order for urinalysis w/culture. MD also notified of hypoglycemic episode, no new orders. 1045 pt still nauseas, refusing all meds. Dr Romeo Mei notified, will place additional prn orders if needed. 1120 Dr Romeo Mei will round on pt, still refusing am meds. 1154 one time dose compazine given. Pt satting 100% on 2L, weaned to 1L satting 94% will monitor 1205 pt took cardizem, digoxin, xanax, refused all others. MD aware. 1258 pt c/o persistent nausea, still refusing all food and remaining meds from am. O2 sats 88, O2 increased to 3L, RR increased. Dr Nick Edwards notified, will round on pt. MD aware that pt is still refusing po meds. Md aware pt c/o lower abdomen tenderness. [address] Dr Nick Edwards at bedside for eval, aware of RR 40 &  
 
1325 accuneb dcd, start xopenex q4 (RT notified), one time dose atarax. Order for cxr & KUB. 1342 one time dose atarax given, pt took remaining am meds. RR still elevated. Bladder scan results 12 mL.  
 
1356 pt ambulated to bedside commode, urine has reddish tinge, reddish tinge on cornel pad & purewick. Contaminated with stool so unable to use for urinalysis. Pt refusing to lie supine for straight cath. Dr Fabiana Stephens notified, will round on pt.  
 
1440 pt off floor to xray with tech & portable O2 sensor, tele notified 1458 pt back on floor, tele notified. Pt O2 sats did not drop during transport. 1 Dr Fabiana Stephens notified that pt refused to lay flat for KUB, unable to perform. Pt unable at this time to lay flat for straight cath.  Pt had another reddish urine contaminated with stool, MD notified, will round on pt. MD aware of elevated HR & RR 
 
1609 Dr Pratima Friend at bedside, shown red urine, made aware of MEWS score 5, HR & RR. Order for [address] abd to be performed at bedside. Order for one time dose bumex. Per MD hold coumadin this pm. MD will clarify US orders. [phone] per MD keep at telemetry level of care for now, notify MD of any acute changes. MD declines H&H at this time. PRN zofran given for nausea 1630 per MD hold SSI since pt is refusing food 1705 order for ABG, RT notified. Order for EKG, CBC, trop, BNP, OCB. 1815 EKG performed, Dr Karen Arce shown results. Order for prn BIPAP, pt agrees to use at this time. Pt had 2 more urine/BM occurrences, unable to send samples due to contamination, MD aware. 1826 pt refusing BIPAP at this time, Dr Karen Arce aware. Md aware of CBC results (WBC went from 15.5 this am, to 38.6 now, HGB 8.6), abdominal tenderness, persistent nausea 1835 verbal order for STAT lactic acid & Anthony Ace Dr Shiva Skinner notified of elevated trop. Order for q6 trop. Blood sent to lab. Per MD transfer pt to [facility]. Pt now NPO. Per MD give prn xanax for CT abdomen & CTA chest. Pt spouse given phone update, would prefer not to be called after 2000 unless it's an emergency Bedside and Verbal shift change report given to [facility] (oncoming nurse) by Jennifer Watson (offgoing nurse). Report included the following information SBAR, Kardex, Procedure Summary, Intake/Output, MAR, Recent Results and Cardiac Rhythm afib. Night RN aware to transport pt to CT with Dr Karen Arce. MD aware of lactic 1.4. Night RN aware of need for urinalysis & OCB stool test 
 
1942 per lab CMP hemolyzed, MD notified, order placed for redraw, Rn aware

## 2019-01-19 NOTE — PROGRESS NOTES
[address] Medicine Residency Resident Note in Brief 
---------------------------------------- 
 
S: 
Patient with increased work of breathing requiring 3L to maintain spO2 at 90%. Urine with dark appearance but contaminated with feces so cannot send sample for UCx. Patient declined KUB and straight cath 2/2 inability to lie flat. O: 
Visit Vitals /88 (BP 1 Location: Left arm, BP Patient Position: At rest) Pulse (!) 109 Temp 97.8 °F (36.6 °C) Resp (!) 36 Ht 5' 4\" (1.626 m) Wt 120 lb 4.8 oz (54.6 kg) SpO2 90% BMI 20.65 kg/m² Physical Exam 
Appears anxious RR ~30. Increased work of breathing with use of accessory muscles and diaphragm. Crackles on auscultation of lungs Heart irregularly irregularat ~85bpm 
Abdomen mildly tender to palpation of lower quadrants Edema to mid shin bilaterally A/P Patient with increased work of breathing and crackles on lung exam concerning for fluid overload. Urine with bloody appearance but having difficulty getting sample 2/2 patient condition.  
- 0.5mg bumex x 1 
- Hold Coumadin tonight 2/2 concern for bleeding - Abdominal US 
- ABG, Trop, BMP, CBC, FOBT 
- EKG Please see full daily progress note for complete plan.  
Oleg Dey MD 
4:08 PM

## 2019-01-19 NOTE — PROGRESS NOTES
[facility]  
Daily Progress Note Date: 1/19/2019 Assessment/Plan:  
Ledy Klein is a 66 y.o. female who is hospitalized for COPD Exacerbation, initially meeting Severe Sepsis Criteria. Acute on Chronic Respiratory Failure POA in the Setting of COPD Exacerbation: Acute Exacerbation resolved, overall respiratory status STABLE. Initially with 6L N/C requirement and intermittently on BiPAP, now on 2LN/C(her home requirement). RVP neg. SpCx + candida, 1/6 BCx NG(Final). Sputum Cx + candida. Completed doxy treatment (1/12). Pulm signed off, appreciate recs. IV steroids -->pred taper(finished 1/17) - Albuterol Nebs q6hr RT 
- home Trelegy - Prednisone 10mg daily (home medication) - Wean O2 for goal of 88-92% sats  
  
Severe Sepsis Rule Out: RESOLVED. Patient's initial SIRS Criteria - elevated WBC, RR< HR explainable by combination of chronic problems and no source of infection present. WBC elevated chronically 2/2 daily steroid use, RR elevated due to COPD exacerbation and HR elevated due to COPD + hx of A fib with RVR. Sepsis workup completed and treatment with fluids initiated. However upon treatment of COPD Excerbation RR, HR improved so unlikley true sepsis as there is no source  1/6 BCx No Growth, Final, SpCx +candida - Continue daily CBC 
- VS per unit routine R/O GI Bleed:  
HgB 8.0 this morning. FOBT negative. HgB 6.7 POA. S/p3 U pRBCs. HgB stable. Capsule study without any signs of bleeding only diminutive AVMs and gastritis. Recs for upper and lower scopes once respiratory status allows. - Protonix 40mg BID 
- Transfuse for HgB <7 
- GI Consulted, appreciate recs 
  
Chronic Diastolic CHF:  
Euvolemic on admission. Trop POA . 10 --> .06 --> .5. Volume status easily upset so caution with diuresis. 
- 0.5mg Bumex every other day, due 1/18 - Strict I&O 
- Cardiology consult, appreciate recs 
  
Atrial Fibrillation s/p MVR on Coumadin (INR POA 1.2)  
 Pt has been seen OP by Dr Josue Richey but has not followed up with him since 2016. Goal INR 2.5 - 3.5. EKG POA: Afib with RVR . Coumadin home dose is 3mg everyday but Friday 2mg. INR this AM 2.4. Coumadin 3mg x3 days then 2.5mg x 2 days. Dig level . 5 - Home Warfarin continued, pharmacy to dose, INR goal of 2.5-3.5 
- Diltiazem 240 CD 
- Digoxin . 125mg PO daily - Cardiology consulted, appreciate recs  
- Daily Mg & Phos with repletions PRN 
  
Leukocytosis: Chronic and IMPROVED from presentation Chronic with BL ~22-29 likely due to daily prednisone use. 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 However, tests are not reliable in inpt setting in pt with multiple chronic disease in exacerbation. Additionally steroids and heparin and possible underlying DM may cause abnormalities in testing 
- repeat thyroid studies with PCP and rec for endo referral is persistently abnl 
  
Hyperglycemia:  
likely due to chronic steroid dose. Questionable DM at baseline however Alc <3.5% due to chronic anemia. Will adjust insulin based on trending BGs. BGs  yestrday, requiring 20U Lispro per corrective scale. - Lantus 30U QHS, will increase as trend BGs today if requiring significant SSI 
- Add Insulin with lunch and dinner 
- SSI, regular sensitivity 
- POC glucose ACHS 
- hypoglycemia protocol 
  
Hyperphosphatemia: STABLE. Phos 6.0 POA. Phos 5.2 today - Monitor with daily BMP, Mg, Phos 
  
Chronic Back Pain  
- Home Percocet 5-325 mg resumed, q6h PRN  
  
Hyperlipidemia  
(: TC: 196, T, HDL 88, VLDL 30 & LDL 78) - Holding home Lovastatin 10 mg because of transminitis Transaminitis: RESOLVED 
LFTs downtrending. Likely combination of cholelithiasis and capsule study. 
- Holding home Lovastatin Anxiety: IMPROVED. Likely baseline anxiety exacerbated by severe COPD. E[isode this morning when discussing her care and insurance denial but was redirectable and able to be reassured - Will attempt to wean O2 some and monitor for anxiety - Lexapro 10mg QHS 
- Xanax . 5mg BID PRN 
  
FEN/GI - Low Na Diabetic Diet Activity - Ambulate as tolerated DVT prophylaxis - Coumadin, pharm to dose GI prophylaxis - Protonix Disposition - Admit to Stepdown. Plan to d/c to Pulm Rehab vs SNF, pending PA. PT/OT following Code Status - DNR, discussed with patient / caregivers. Patient will be discussed with Dr. Justine Willoughby, supervising physician. Tamica Collins MD 
Family Medicine Resident CC: \"I'm alright Subjective No acute events overnight. Patient sitting in chair at bedside. Denies anxiety and statess breathing is \"ok\". Denies chest pain, dysuria, constipation. Some nausea overnight. Inpatient Medications Current Facility-Administered Medications Medication Dose Route Frequency  potassium chloride SR (KLOR-CON 10) tablet 40 mEq  40 mEq Oral NOW  insulin lispro (HUMALOG) injection   SubCUTAneous AC&HS  insulin glargine (LANTUS) injection 25 Units  25 Units SubCUTAneous QHS  artificial saliva (MOUTH KOTE) 1 Spray  1 Spray Oral PRN  predniSONE (DELTASONE) tablet 10 mg  10 mg Oral DAILY WITH BREAKFAST  0.9% sodium chloride infusion 250 mL  250 mL IntraVENous PRN  
 escitalopram oxalate (LEXAPRO) tablet 10 mg  10 mg Oral QPM  
 docusate sodium (COLACE) capsule 100 mg  100 mg Oral DAILY PRN  polyethylene glycol (MIRALAX) packet 17 g  17 g Oral DAILY PRN  
 bumetanide (BUMEX) tablet 0.5 mg  0.5 mg Oral EVERY OTHER DAY  digoxin (LANOXIN) tablet 0.125 mg  0.125 mg Oral DAILY  ondansetron (ZOFRAN) injection 4 mg  4 mg IntraVENous Q8H PRN  pantoprazole (PROTONIX) tablet 40 mg  40 mg Oral ACB  ALPRAZolam (XANAX) tablet 0.5 mg  0.5 mg Oral BID PRN  
 Warfarin- Pharmacist Dosing   Other Rx Dosing/Monitoring  oxyCODONE IR (ROXICODONE) tablet 5 mg  5 mg Oral Q8H PRN  
 0.9% sodium chloride infusion 250 mL  250 mL IntraVENous PRN  
 albuterol (ACCUNEB) nebulizer solution 1.25 mg  1.25 mg Nebulization Q6H RT  
 alteplase (CATHFLO) 1 mg in sterile water (preservative free) 1 mL injection  1 mg InterCATHeter PRN  
 sodium chloride (NS) flush 10-30 mL  10-30 mL InterCATHeter PRN  
 sodium chloride (NS) flush 10 mL  10 mL InterCATHeter Q24H  
 sodium chloride (NS) flush 10 mL  10 mL InterCATHeter PRN  
 sodium chloride (NS) flush 10-40 mL  10-40 mL InterCATHeter Q8H  
 sodium chloride (NS) flush 20 mL  20 mL InterCATHeter Q24H  
 bacitracin 500 unit/gram packet 1 Packet  1 Packet Topical PRN  
 guaiFENesin ER (MUCINEX) tablet 600 mg  600 mg Oral Q12H  
 dilTIAZem CD (CARDIZEM CD) capsule 240 mg  240 mg Oral DAILY  nystatin (MYCOSTATIN) 100,000 unit/mL oral suspension 500,000 Units  500,000 Units Oral QID  sodium chloride (NS) flush 5-10 mL  5-10 mL IntraVENous Q8H  
 sodium chloride (NS) flush 5-10 mL  5-10 mL IntraVENous PRN  
 nicotine (NICODERM CQ) 21 mg/24 hr patch 1 Patch  1 Patch TransDERmal Q24H  
 fluticasone-umeclidin-vilanter 100-62.5-25 mcg dsdv 1 Puff  (Patient Supplied)  1 Puff Inhalation DAILY  roflumilast (DALIRESP) tablet 500 mcg  500 mcg Oral DAILY  0.9% sodium chloride infusion  50 mL/hr IntraVENous PRN  
 glucose chewable tablet 16 g  4 Tab Oral PRN  
 dextrose (D50W) injection syrg 12.5-25 g  12.5-25 g IntraVENous PRN  
 glucagon (GLUCAGEN) injection 1 mg  1 mg IntraMUSCular PRN Allergies Allergies Allergen Reactions  Spiriva Respimat [Tiotropium Bromide] Anaphylaxis and Other (comments) Adverse effects; Per RN - pt states that Spiriva caused throat swelling and could not breathe well.  Spiriva Respimat [Tiotropium Bromide] Shortness of Breath  Tomato Shortness of Breath Only occurs with raw tomatoes, tolerates ketchup without issue  Celebrex [Celecoxib] Rash  Celebrex [Celecoxib] Rash  Rocephin [Ceftriaxone] Shortness of Breath  Tomato Rash Objective Vitals: 
Patient Vitals for the past 8 hrs: 
 Temp Pulse Resp BP SpO2  
01/19/19 0336 97.3 °F (36.3 °C) 86 25 128/60 99 % 01/18/19 2308 98.4 °F (36.9 °C) 85 26 123/70 100 % 01/18/19 2208  216 [facility] I/O: 
 
Intake/Output Summary (Last 24 hours) at 1/19/2019 0536 Last data filed at 1/18/2019 2182 Gross per 24 hour Intake 120 ml Output  Net 120 ml Last shift: 
  No intake/output data recorded. Last 3 shifts: 
  01/17 0701 - 01/18 1900 In: 120 [P.O.:120] Out: 350 [Urine:350] Physical Exam:General: No acute distress. Alert. Cooperative. Head: Normocephalic. Atraumatic. Respiratory: No increased work of breathing, no tripoding. CTAB with improved air movement throughout posterior lung fields. No wheezes, rales, ronchi. Mild crackles on exam this morning. Cardiovascular: Irregularly irregular rhythm. Mechanical click from valve replacement with mild flow murmur. Pulses 2+ throughout. GI: + bowel sounds. Nontender. No rebound tenderness or guarding. Nondistended Extremities: 1+ pitting edema to mid shin on R to just above ankle, minimally tender bilaterally Laboratory Data Recent Results (from the past 12 hour(s)) GLUCOSE, POC Collection Time: 01/18/19  8:59 PM  
Result Value Ref Range Glucose (POC) 129 (H) 65 - 100 mg/dL Performed by Suki Setting ([facility]) CBC WITH AUTOMATED DIFF Collection Time: 01/19/19 12:11 AM  
Result Value Ref Range WBC 15.5 (H) 3.6 - 11.0 K/uL  
 RBC 2.67 (L) 3.80 - 5.20 M/uL HGB 8.0 (L) 11.5 - 16.0 g/dL HCT 25.5 (L) 35.0 - 47.0 % MCV 95.5 80.0 - 99.0 FL  
 MCH 30.0 26.0 - 34.0 PG  
 MCHC 31.4 30.0 - 36.5 g/dL  
 RDW 19.8 (H) 11.5 - 14.5 % PLATELET 623 (L) 182 - 400 K/uL NRBC 0.4 (H) 0  WBC ABSOLUTE NRBC 0.06 (H) 0.00 - 0.01 K/uL NEUTROPHILS 85 (H) 32 - 75 % LYMPHOCYTES 9 (L) 12 - 49 % MONOCYTES 5 5 - 13 % EOSINOPHILS 0 0 - 7 % BASOPHILS 1 0 - 1 % IMMATURE GRANULOCYTES 0 %  
 ABS. NEUTROPHILS 13.1 (H) 1.8 - 8.0 K/UL  
 ABS. LYMPHOCYTES 1.4 0.8 - 3.5 K/UL  
 ABS. MONOCYTES 0.8 0.0 - 1.0 K/UL  
 ABS. EOSINOPHILS 0.0 0.0 - 0.4 K/UL  
 ABS. BASOPHILS 0.2 (H) 0.0 - 0.1 K/UL  
 ABS. IMM. GRANS. 0.0 K/UL  
 DF MANUAL    
 RBC COMMENTS ANISOCYTOSIS 1+ 
    
 RBC COMMENTS RBC FRAGMENTS    
METABOLIC PANEL, COMPREHENSIVE Collection Time: 01/19/19 12:11 AM  
Result Value Ref Range Sodium 141 136 - 145 mmol/L Potassium 3.6 3.5 - 5.1 mmol/L Chloride 99 97 - 108 mmol/L  
 CO2 37 (H) 21 - 32 mmol/L Anion gap 5 5 - 15 mmol/L Glucose 73 65 - 100 mg/dL BUN 21 (H) 6 - 20 MG/DL Creatinine 0.80 0.55 - 1.02 MG/DL  
 BUN/Creatinine ratio 26 (H) 12 - 20 GFR est AA >60 >60 ml/min/1.73m2 GFR est non-AA >60 >60 ml/min/1.73m2 Calcium 8.4 (L) 8.5 - 10.1 MG/DL Bilirubin, total 2.1 (H) 0.2 - 1.0 MG/DL  
 ALT (SGPT) 56 12 - 78 U/L  
 AST (SGOT) 119 (H) 15 - 37 U/L Alk. phosphatase 71 45 - 117 U/L Protein, total 5.6 (L) 6.4 - 8.2 g/dL Albumin 2.7 (L) 3.5 - 5.0 g/dL Globulin 2.9 2.0 - 4.0 g/dL A-G Ratio 0.9 (L) 1.1 - 2.2 PROTHROMBIN TIME + INR Collection Time: 01/19/19 12:11 AM  
Result Value Ref Range INR 3.5 (H) 0.9 - 1.1 Prothrombin time 33.7 (H) 9.0 - 11.1 sec MAGNESIUM Collection Time: 01/19/19 12:11 AM  
Result Value Ref Range Magnesium 2.0 1.6 - 2.4 mg/dL PHOSPHORUS Collection Time: 01/19/19 12:11 AM  
Result Value Ref Range Phosphorus 5.6 (H) 2.6 - 4.7 MG/DL Imaging - No New Imaging Abdominal US: + stones, no cholecystitis Capsule Study: gastritis, diminutive AVMs Hospital Problems: 
Hospital Problems  Date Reviewed: 11/29/2018 Codes Class Noted POA Thyroid function test abnormal ICD-10-CM: R94.6 ICD-9-CM: 794.5  1/16/2019 Unknown * (Principal) COPD with acute exacerbation ([facility] [address] ICD-10-CM: J44.1 ICD-9-CM: 491.21  1/6/2019 Unknown Anemia ICD-10-CM: D64.9 ICD-9-CM: 285.9  10/28/2018 Yes Hyperlipidemia ICD-10-CM: E78.5 ICD-9-CM: 272.4  10/26/2018 Yes Chronic back pain (Chronic) ICD-10-CM: M54.9, G89.29 ICD-9-CM: 724.5, 338.29  10/26/2018 Yes History of GI bleed ICD-10-CM: Z87.19 ICD-9-CM: V12.79 Present on Admission 11/25/2016 Yes Diastolic CHF, chronic (HCC) ICD-10-CM: I50.32 
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  
  Tissue MVR 1989 following failed balloon valvuloplasty for MS Redo MVR 2004 due to severe MR, AVR due to AR ([employer]) CAD (coronary artery disease), native coronary artery ICD-10-CM: I25.10 ICD-9-CM: 414.01  1/8/2015 Yes S/P MVR (mitral valve replacement) ICD-10-CM: U77.5 ICD-9-CM: V43.3  1/8/2015 Yes

## 2019-01-19 NOTE — PROGRESS NOTES
1/19/2019 1:19 PM 
 
Went to evaluate patient. Per RN patient has become more tachycardic and tachypneic in the last few hours and now requring 3L NC (up from 1L NC). States she did not get all of her medications this morning including her Daliresp. RN concerned for anxiety as well. Patient states she has been nauseated all morning, last episode of emesis at 7 AM. States she is having some abdominal pain. When asked where she points to all over her stomach. States her last breathing treatment was last night as it makes her nauseated. ROS: Denies chest pain, endorses slight shortness of breath, endorses nausea, endorses abdominal pain, denies dysuria/ incontinence Visit Vitals /61 (BP 1 Location: Left arm, BP Patient Position: At rest) Pulse (!) 113 Temp 98.3 °F (36.8 °C) Resp (!) 40 Ht 5' 4\" (1.626 m) Wt 120 lb 4.8 oz (54.6 kg) SpO2 97% BMI 20.65 kg/m² Gen: Thin cachetic female in mild distress Cards: Tachy, no murmur Lungs: Tight throughout, poor air movement, minimal wheezes Abd: Mildly tender to palpation throughout abdomen, no guarding/ rebound, active bowel sounds 67 YO F with tachypnea, tachycardia, new O2 requirement and abdominal pain. 
- PA/ lateral CXR, KUB 
- If patient remains persistently tachypnea, tachycardiac with new O2 requirement consider CTA Chest 
- Switch albuterol to Xopenox - Atarax 10mg x1 now Veto Null DO

## 2019-01-19 NOTE — PROGRESS NOTES
1/19/2019 6:27 PM 
 
Went to evaluate patient. Patient appears on general assessment more comfortably, breathing easier however states she still feels terrible. Per RN has been dry heaving and has not eaten anything today. Visit Vitals /88 (BP 1 Location: Left arm, BP Patient Position: At rest) Pulse 92 Temp 97.8 °F (36.6 °C) Resp (!) 39 Ht 5' 4\" (1.626 m) Wt 120 lb 4.8 oz (54.6 kg) SpO2 96% BMI 20.65 kg/m² Gen: Lying in bed on 3L NC, tachypneic Abd: belly breathing,  
 
A/P 
1. Tachypnea, Tachycardic, new O2 requirement 
- EKG shows A Fib, HR now in 90s, improved compared to EKG from 1/6/19 
- WBC increased to 38.6 from 15.5 
- Hg stable at 8.6 from 8 
- Have contacted US who will be up to perform STAT imaging - Transfer to stepdown - Have contacted Radiology - ordering STAT CTA Chest/ Abd due to concern for possible PE or perforation of abd. Have discussed with patient the importance of this imaging to rule out life threatening diagnoses and our concern given her history, exam and labs. - Patient has refused imaging and BIPAP up until thus far, will give her scheduled Xanax 0.5mg and accompany her to CT Patient has been discussed and attending updated Dianna Whatley DO

## 2019-01-19 NOTE — PROGRESS NOTES
Occupational Therapy Note: Pt declined OT today shaking her head \"no. \" Family member and RN stating \"this is not a good day for her. \" Pt with dry heaving and nausea earlier today. Will attempt OT next treatment date.

## 2019-01-19 NOTE — PROGRESS NOTES
1/19/2019 
9:51 AM 
 
CM called [employer] to obtain update on expedited appeal. Called [phone] and rep stated their system was currently down and did not know when it would come back up. Also called [phone], and office is also closed. Unable to obtain update on Auth. Alea Brooke Care Management

## 2019-01-19 NOTE — PROGRESS NOTES
Problem: Falls - Risk of 
Goal: *Absence of Falls Document Maame Morse Fall Risk and appropriate interventions in the flowsheet. Outcome: Progressing Towards Goal 
Fall Risk Interventions: 
Mobility Interventions: Assess mobility with egress test, Bed/chair exit alarm, Patient to call before getting OOB, PT Consult for mobility concerns Medication Interventions: Assess postural VS orthostatic hypotension, Bed/chair exit alarm, Patient to call before getting OOB, Teach patient to arise slowly Elimination Interventions: Bed/chair exit alarm, Call light in reach, Patient to call for help with toileting needs Problem: Pressure Injury - Risk of 
Goal: *Prevention of pressure injury Document Jose Scale and appropriate interventions in the flowsheet. Outcome: Progressing Towards Goal 
Pressure Injury Interventions: 
Sensory Interventions: Float heels, Keep linens dry and wrinkle-free, Minimize linen layers Moisture Interventions: Absorbent underpads Activity Interventions: Assess need for specialty bed, Increase time out of bed, PT/OT evaluation Mobility Interventions: Assess need for specialty bed, HOB 30 degrees or less, Pressure redistribution bed/mattress (bed type), PT/OT evaluation Nutrition Interventions: Document food/fluid/supplement intake, Discuss nutritional consult with provider Friction and Shear Interventions: Apply protective barrier, creams and emollients, Feet elevated on foot rest, Transferring/repositioning devices, Foam dressings/transparent film/skin sealants

## 2019-01-19 NOTE — PROGRESS NOTES
[address] Medicine Residency Resident Note in Brief 
---------------------------------------- 
 
S: 
Patient with ongoing nausea and small emesis. States she is worried about taking medications because she may thrown them up. Has low BG this morning and got glucose tabs which exacerbated the nausea. Sipping on diet soda since. She is refusing PO medications because of nausea/vomiting. O: 
Visit Vitals /60 (BP 1 Location: Left arm, BP Patient Position: At rest) Pulse 86 Temp 98.2 °F (36.8 °C) Resp 27 Ht 5' 4\" (1.626 m) Wt 120 lb 4.8 oz (54.6 kg) SpO2 97% BMI 20.65 kg/m² Patient appears anxious No increased work of breathing A/P 
- 5mg Compazine for nausea and attempt PO admin of Cardizem and Digoxin 
- remainder of plan per daily progress note Please see full daily progress note for complete plan.  
Ninfa Harrell MD 
11:34 AM

## 2019-01-19 NOTE — PROGRESS NOTES
[facility] Dosing Services: 
Consult for Warfarin Dosing by Pharmacy by Melissa Dejesus Consult provided for this 66 y.o female for indication of  afib, MVR, AVR Day of therapy: resumed from home. (PTA: Warfarin 3 mg on Sat/Sun/Mon/Tue/Wed/Thur and 2 mg on Fri) Order entered for  Warfarin  1 (mg) ordered to be given today at 18:00. Significant drug interactions: None Previous dose given 3 mg PT/INR Lab Results Component Value Date/Time INR 3.5 (H) 01/19/2019 12:11 AM  
  
Platelets Lab Results Component Value Date/Time PLATELET 568 (L) 49/33/0532 12:11 AM  
  
H/H Lab Results Component Value Date/Time HGB 8.0 (L) 01/19/2019 12:11 AM  
  
 
Pharmacy to follow daily and will provide subsequent Warfarin dosing based on clinical status. Rogelio Caro)  Contact information [phone]

## 2019-01-20 NOTE — PROGRESS NOTES
[facility] Pharmacy Dosing Services: 01/20/19Consult for Warfarin Dosing by Pharmacy by Macey Richard Consult provided for this 66 y.o female for indication of  afib, MVR, AVR Day of therapy: resumed from home. (PTA: Warfarin 3 mg on Sat/Sun/Mon/Tue/Wed/Thur and 2 mg on Fri) Warfarin: Hold tonight. INR = 6 Significant drug interactions: Zosyn (abx), Previous dose given held PT/INR Lab Results Component Value Date/Time INR 6.0 (HH) 01/20/2019 04:48 AM  
  
Platelets Lab Results Component Value Date/Time PLATELET 847 (L) 44/26/8986 01:56 AM  
  
H/H Lab Results Component Value Date/Time HGB 6.9 (L) 01/20/2019 01:33 PM  
  
 
Pharmacy to follow daily and will provide subsequent Warfarin dosing based on clinical status. Hamlet Tran  Contact information [phone]

## 2019-01-20 NOTE — PROGRESS NOTES
1/20/2019 12:54 PM Received call from [facility], they received phone call pt was approved for SNF but is not medically stable for discharge today, possibly tomorrow. CM called and lvm with [facility] admissions to notify of approval and tentative discharge on 1/21. MYRA OsmanW

## 2019-01-20 NOTE — CONSULTS
Cardiology Consultation Note Lester Mariscal MD, [address], [place], [id number] [place] [address] Phone [phone]; Fax [phone] 
 
 
     
2019  1:46 PM 
Zain Morgan DO 
:  1955 MRN:  [id number] CC: feeling poorly Reason for consult: afib Admission Diagnosis: COPD with acute exacerbation ([facility] [address] ASSESSMENT/RECOMMENDATIONS:  
1)atrial fibrillaiton 
 chronic on coumadin Her HR is up but this may be related to her anemia and underlying pulmonary issues. If related to infection would favor HR close to 100 and not attempt to lower to [facility] this may be a natural physiological response to her infection. 2) Mechanical AV/MV replacement 
-she has only one option and that is to stay anticoagulated with her mechanical valves. No alternative treatment. 3)SOB 
-pBNP only 982 and small pleural effusion 4) anemia 5) Dyslipidemia Lab Results Component Value Date/Time Cholesterol, total 196 2018 10:43 AM  
 HDL Cholesterol 88 2018 10:43 AM  
 LDL, calculated 78 2018 10:43 AM  
 VLDL, calculated 30 2018 10:43 AM  
 Triglyceride 148 2018 10:43 AM  
 
 
6)Tobacco abuse WBC  31K, Hgb 7.3, K 5.1, Creatinine 1.44, Troponin 0.14(flat) Celeste Frazier is a 66 y.o. female I am seeing for atrial fibrillation and question of heart failure. Patient's been followed by Dr. Everett Daly in the past and was last seen in 2017. 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 She is on Coumadin for her atrial fibrillation. Noted in the past she has had chronic dyspnea on exertion to ongoing COPD she continues to smoke. She is now admitted not feeling well and worsening shortness of breath over the past 2-3 days. She was started on azithromycin and prednisone after she refused to go to the hospital for her upper respiratory symptoms. She was in atrial fibrillation with rapid ventricular rate she was admitted with elevated white count of 32,000 and a hemoglobin of 6.7. Her troponin has gone up slightly and was currently 0.14 but is relatively flat. Allergies Allergen Reactions  Spiriva Respimat [Tiotropium Bromide] Anaphylaxis and Other (comments) Adverse effects; Per RN - pt states that Spiriva caused throat swelling and could not breathe well.  Spiriva Respimat [Tiotropium Bromide] Shortness of Breath  Tomato Shortness of Breath Only occurs with raw tomatoes, tolerates ketchup without issue  Celebrex [Celecoxib] Rash  Celebrex [Celecoxib] Rash  Rocephin [Ceftriaxone] Shortness of Breath  Tomato Rash Past Medical History:  
Diagnosis Date  A-fib ([facility] [address]  Anticoagulant long-term use  CAD (coronary artery disease), native coronary artery   
 mild to moderate by cath  CHF (congestive heart failure) ([facility] [address]  Chronic obstructive pulmonary disease ([facility] [address]  Dyslipidemia  History of vascular access device 10/26/2018 [facility] VAT - 4 FR Midline, 9 cm, R basilic, for difficult iv access  History of vascular access device 01/08/2019 [facility] VAT - 5 FR PICC, right basilic, 36 cm , for limited vessels/heparin  Ill-defined condition   
 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  
Procedure Laterality Date  COLONOSCOPY N/A 11/28/2016 COLONOSCOPY performed by Estela Mena MD at [facility]  HX HEART CATHETERIZATION    
 cabg  HX HYSTERECTOMY    
 BSO  
 HX MITRAL VALVE REPLACEMENT    
 and redo MVR and AVR Heddie Woodmere Home Medications: 
Prior to Admission Medications Prescriptions Last Dose Informant Patient Reported? Taking? albuterol (VENTOLIN HFA) 90 mcg/actuation inhaler   No No  
Sig: Take 2 Puffs by inhalation every four (4) hours as needed for Wheezing or Shortness of Breath. bumetanide (BUMEX) 1 mg tablet 1/5/2019 at PM Self No No  
Sig: Take 1 Tab by mouth every evening. dilTIAZem (CARDIZEM) 60 mg tablet 1/6/2019 at 1700 Self No Yes Sig: TAKE 1 TABLET BEFORE BREAKFAST, LUNCH, DINNER AND AT BEDTIME  
docusate sodium (COLACE) 100 mg capsule 1/5/2019 at Unknown time Self Yes Yes Sig: Take 100 mg by mouth every evening. fluticasone-umeclidin-vilanter (TRELEGY ELLIPTA) 100-62.5-25 mcg dsdv 1/6/2019 at 1600 Self No Yes Sig: Take 1 Puff by inhalation daily. lisinopril (PRINIVIL, ZESTRIL) 5 mg tablet 1/5/2019 at Unknown time Self No Yes Sig: Take 1 Tab by mouth daily. lovastatin (MEVACOR) 10 mg tablet 1/5/2019 at 1600 Self Yes Yes Sig: Take 10 mg by mouth nightly. oxyCODONE-acetaminophen (PERCOCET) 5-325 mg per tablet 1/6/2019 at Unknown time Self No Yes Sig: Take 1 Tab by mouth every eight (8) hours as needed for Pain. Max Daily Amount: 3 Tabs. Must last 30 days fill 12/27/18  
potassium chloride SR (KLOR-CON 10) 10 mEq tablet 1/6/2019 at Unknown time Self Yes Yes Sig: Take 10 mEq by mouth daily. predniSONE (STERAPRED DS) 10 mg dose pack  Self Yes Yes Sig: Take  by mouth See Admin Instructions. See administration instruction per 10mg dose pack  
roflumilast (DALIRESP) tab tablet 1/6/2019 at Unknown time Self Yes Yes Sig: Take 500 mcg by mouth daily. warfarin (COUMADIN) 1 mg tablet 1/5/2019 at 16 Self Yes Yes Sig: Take  by mouth six (6) days a week.  Warfarin 3 mg on Sa/Alfonso/Mo/Tu/We/Th and Warfarin 2 mg on Fr Facility-Administered Medications: None Hospital Medications: 
Current Facility-Administered Medications Medication Dose Route Frequency  0.9% sodium chloride infusion  100 mL/hr IntraVENous CONTINUOUS  
 [START ON 1/21/2019] bumetanide (BUMEX) tablet 0.5 mg  0.5 mg Oral EVERY OTHER DAY  insulin glargine (LANTUS) injection 12 Units  12 Units SubCUTAneous QHS  warfarin (COUMADIN) tablet 1 mg  1 mg Oral QPM  
 levalbuterol (XOPENEX) nebulizer soln 0.63 mg/3 mL  0.63 mg Nebulization Q6H RT  
 pantoprazole (PROTONIX) 40 mg in 0.9% sodium chloride (MBP/ADV) 50 mL  8 mg/hr IntraVENous CONTINUOUS  piperacillin-tazobactam (ZOSYN) 3.375 g in 0.9% sodium chloride (MBP/ADV) 100 mL  3.375 g IntraVENous Q8H  
 insulin lispro (HUMALOG) injection   SubCUTAneous AC&HS  
 artificial saliva (MOUTH KOTE) 1 Spray  1 Spray Oral PRN  predniSONE (DELTASONE) tablet 10 mg  10 mg Oral DAILY WITH BREAKFAST  0.9% sodium chloride infusion 250 mL  250 mL IntraVENous PRN  
 escitalopram oxalate (LEXAPRO) tablet 10 mg  10 mg Oral QPM  
 docusate sodium (COLACE) capsule 100 mg  100 mg Oral DAILY PRN  polyethylene glycol (MIRALAX) packet 17 g  17 g Oral DAILY PRN  
 digoxin (LANOXIN) tablet 0.125 mg  0.125 mg Oral DAILY  ondansetron (ZOFRAN) injection 4 mg  4 mg IntraVENous Q8H PRN  
 ALPRAZolam (XANAX) tablet 0.5 mg  0.5 mg Oral BID PRN  
 Warfarin- Pharmacist Dosing   Other Rx Dosing/Monitoring  oxyCODONE IR (ROXICODONE) tablet 5 mg  5 mg Oral Q8H PRN  
 0.9% sodium chloride infusion 250 mL  250 mL IntraVENous PRN  
 alteplase (CATHFLO) 1 mg in sterile water (preservative free) 1 mL injection  1 mg InterCATHeter PRN  
 sodium chloride (NS) flush 10-30 mL  10-30 mL InterCATHeter PRN  
 sodium chloride (NS) flush 10 mL  10 mL InterCATHeter Q24H  
 sodium chloride (NS) flush 10 mL  10 mL InterCATHeter PRN  
  sodium chloride (NS) flush 10-40 mL  10-40 mL InterCATHeter Q8H  
 sodium chloride (NS) flush 20 mL  20 mL InterCATHeter Q24H  
 bacitracin 500 unit/gram packet 1 Packet  1 Packet Topical PRN  
 guaiFENesin ER (MUCINEX) tablet 600 mg  600 mg Oral Q12H  
 dilTIAZem CD (CARDIZEM CD) capsule 240 mg  240 mg Oral DAILY  nystatin (MYCOSTATIN) 100,000 unit/mL oral suspension 500,000 Units  500,000 Units Oral QID  sodium chloride (NS) flush 5-10 mL  5-10 mL IntraVENous Q8H  
 sodium chloride (NS) flush 5-10 mL  5-10 mL IntraVENous PRN  
 nicotine (NICODERM CQ) 21 mg/24 hr patch 1 Patch  1 Patch TransDERmal Q24H  
 fluticasone-umeclidin-vilanter 100-62.5-25 mcg dsdv 1 Puff  (Patient Supplied)  1 Puff Inhalation DAILY  roflumilast (DALIRESP) tablet 500 mcg  500 mcg Oral DAILY  0.9% sodium chloride infusion  50 mL/hr IntraVENous PRN  
 glucose chewable tablet 16 g  4 Tab Oral PRN  
 dextrose (D50W) injection syrg 12.5-25 g  12.5-25 g IntraVENous PRN  
 glucagon (GLUCAGEN) injection 1 mg  1 mg IntraMUSCular PRN OBJECTIVE Laboratory and Imaging have been reviewed and are [facility] Diagnostic Tests:  
 
Recent Labs  
  01/20/19 
0405 TROIQ 0.14* Recent Labs  
  01/20/19 
0448 01/20/19 
0156 01/19/19 
2033 01/19/19 
1800 01/19/19 
0011 01/18/19 
2244 NA  --  142 139  --  141 142 K  --  5.1 5.0  --  3.6 3.9 CO2  --  33* 35*  --  37* 38* BUN  --  36* 32*  --  21* 21* CREA  --  1.44* 1.23*  --  0.80 0.69 GLU  --  110* 100  --  73 53* PHOS  --  8.7*  --   --  5.6* 5.1*  
MG  --  2.3  --   --  2.0 1.9 WBC  --  31.1*  --  38.6* 15.5* 20.4* HGB 7.3* 7.2*  --  8.6* 8.0* 8.3* HCT 23.8* 23.4*  --  27.3* 25.5* 27.1*  
PLT  --  147*  --  209 147* 149* Cardiac work up to date: 
 
Cardiac testing ECHO 11/2004 - normal [facility] AVR/MVR, EF 50%, PA pressures nl ECHO 1/16/15-EF 55 % to 60 %, [facility], AVR/MVR, PA pressures normal 
 STRESS: Dobutamine cardiolite 2/5/15 - normal perfusion ECHO 11/14/2015: EF 65%, no WMA, LAE, normal [facility] MVR/AVR AoV gradient 25 mmHg mean gradient 13 mmHg. ECHO 11/28/15: EF 65-70%, LAE, mechanical MVR- normal fn,mehanical AOV- normal fn, mild-mod TR Social History: 
Social History Tobacco Use  Smoking status: Current Every Day Smoker Packs/day: 0.50 Types: Cigarettes Last attempt to quit: 11/1/2015 Years since quitting: 3.2  Smokeless tobacco: Never Used  Tobacco comment: 11/1/2014 Substance Use Topics  Alcohol use: No  
  Alcohol/week: 0.0 oz Family History: 
Family History Problem Relation Age of Onset  Cancer Brother Review of Symptoms: A comprehensive review of systems was negative except for that written in the HPI. Physical Exam:   
 
Visit Vitals /46 (BP 1 Location: Left arm, BP Patient Position: At rest) Pulse 85 Temp 97.8 °F (36.6 °C) Resp 23 Ht 5' 4\" (1.626 m) Wt 122 lb 6.4 oz (55.5 kg) SpO2 97% BMI 21.01 kg/m² General Appearance:  Chronically ill apearing deconditioned. Sleepy and kept eyes closed but followed commands. Ears/Nose/Mouth/Throat:   Hearing grossly normal.Normal oral mucosa,no scleral icterus Neck: Supple no JVD or bruits,no cervical lymphadenopathy Chest:   Lungs clear to auscultation bilaterally but poor effort Cardiovascular:  Regular rate and rhythm, click auscultated and hyperactive precordium Abdomen:   Soft, non-tender, bowel sounds are active. No abdominal bruits Extremities: No edema bilaterally. Pulses detected, no varicosities Skin: Warm and dry. No bruising Neuro  Moves all extermities and neurologically intact I have discussed the diagnosis with the patient and the intended plan as seen in the above orders.   Questions were answered concerning future plans.  I have discussed medication side effects and warnings with the patient as well. Chris Guerra is in agreement to the plan listed above and wishes to proceed. she  was instructed not to smoke, eat heart healthy diet  and to exercise. Thank you for this consult.  
 
 
Mesfin Gonzalez MD

## 2019-01-20 NOTE — PROGRESS NOTES
Shift Summary 1930: Bedside and Verbal shift change report given to Heide Mccormick RN (oncoming nurse) by Meme Abreu RN (offgoing nurse). Report included the following information SBAR, Kardex, Procedure Summary, Intake/Output, MAR, Accordion, Recent Results and Cardiac Rhythm Afib.  
 
1950: Patient taken down for CT abd. 
 
2030: Perineum cleaned, pure with, tubing, and canister changed to collect clean urine sample. 2040: Received verbal orders to place patient on BIPAP for decreased O2 saturation, increased WOB. Patient refused immediately after trying to place BIPAP mask on, stating that she was claustrophobic and could not tolerate it. Increased O2 to 4L NC.  
 
2105: Received call from [facility] resident with order to straight cath. Informed MD that I would attempt but patient had refused earlier. Notified that ziggy refused BIPAP mask and was currently on 4L NC.  
 
2300: Patient voided. Urine sent down to lab

## 2019-01-20 NOTE — PROGRESS NOTES
1/19/2019 10:57 PM 
 
Went to evaluate patient. She is sleeping comfortably. Visit Vitals /88 (BP 1 Location: Left arm, BP Patient Position: At rest) Pulse 94 Temp 97.8 °F (36.6 °C) Resp (!) 34 Ht 5' 4\" (1.626 m) Wt 120 lb 4.8 oz (54.6 kg) SpO2 94% BMI 20.65 kg/m² RR 27 when counted.   
 
Leona Rodriguez, DO

## 2019-01-20 NOTE — PROGRESS NOTES
[facility] [address] 
[phone] GI PROGRESS NOTE 
 
 
NAME: Lilo Delgado :  1955 MRN:  [id number] Subjective:  
Called back to assess Hgb has dropped again No active bleeding noted Respiratory status is worse today Objective: VITALS:  
Last 24hrs VS reviewed since prior progress note. Most recent are: 
Visit Vitals /46 Pulse 86 Temp 97.8 °F (36.6 °C) Resp 29 Ht 5' 4\" (1.626 m) Wt 55.5 kg (122 lb 6.4 oz) SpO2 94% BMI 21.01 kg/m² Intake/Output Summary (Last 24 hours) at 2019 1237 Last data filed at 2019 2886 Gross per 24 hour Intake 100 ml Output 1190 ml Net -1090 ml PHYSICAL EXAM: 
General: Alert, in no acute distress HEENT: Anicteric sclerae. Lungs:            Course breath sounds Bilaterally with crackles. Heart:  Regular  rhythm, Abdomen: Soft, Non distended.  (+)Bowel sounds, no HSM Extremities: No c/c/e Neurologic:  CN 2-12 gi, Alert and oriented X 3. No acute neurological distress Psych:   Good insight. Not anxious nor agitated. Lab Data Reviewed:  
Recent Labs  
  19 
0448 19 
1800 WBC  --  31.1* 38.6* HGB 7.3* 7.2* 8.6* HCT 23.8* 23.4* 27.3*  
PLT  --  147* 209 Recent Labs  
  19 
0156 19 
0011  139 141  
K 5.1 5.0 3.6 CL 98 97 99 CO2 33* 35* 37* BUN 36* 32* 21* CREA 1.44* 1.23* 0.80 * 100 73 PHOS 8.7*  --  5.6*  
CA 8.4* 8.0* 8.4* Recent Labs  
  19 
0156 19 SGOT 342* 335* AP 73 72  
TP 5.6* 5.4* ALB 2.6* 2.5*  
GLOB 3.0 2.9  
 
 
________________________________________________________________________ Patient Active Problem List  
Diagnosis Code  Chest pain R07.9  COPD exacerbation ([address] J44.1  Hypokalemia E87.6  JAZZMINE (acute kidney injury) ([address] N17.9  Hyperglycemia R73.9  A-fib ([facility]) I48.91  
 Chronic anticoagulation Z79.01  
  Hyperlipidemia E78.5  Chronic back pain M54.9, G89.29  
 Anemia D64.9  
 GI bleed K92.2  
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  
 S/P MVR (mitral valve replacement) Z95.2  COPD (chronic obstructive pulmonary disease) ([facility]) J44.9  Tobacco abuse Z72.0  Diastolic CHF, chronic ([facility]) I50.32  Chronic atrial fibrillation ([facility]) I48.2  History of GI bleed Z87.19  
 H/O total hysterectomy Z90.710  
 Hypokalemia E87.6  Thrush of mouth and esophagus ([facility]) B37.81, B37.0  
 SOB (shortness of breath) R06.02  
 COPD with acute exacerbation ([facility] [address] J44.1  Thyroid function test abnormal R94.6 Assessment and Plan: Anemia: Continue supportive care at this time as planned. Her respiratory status is the limiting factor for any procedures. Anemia is multifactorial. She could be loosing blood from her GI tract via AVM's slowly especially that she is on anti-coagulation. Continue to support her as best as possible. No interventions planned at this time. Please call back with any questions.   
  
Signed By: Heidy Gold MD   
 1/20/2019  1:43 PM

## 2019-01-20 NOTE — PROGRESS NOTES
[facility]  
Daily Progress Note Date: 1/19/2019 Assessment/Plan:  
Delta Fraire is a 66 y.o. female who is hospitalized for COPD Exacerbation, initially meeting Severe Sepsis Criteria. 24 Events: Pt become increasingly tachycardic and tachypneic, w/ increased O2 requirement. Also complained of abdominal pain. Acute on Chronic Respiratory Failure POA in the Setting of COPD Exacerbation: Acute Exacerbation resolved, overall respiratory status STABLE. Initially with 6L N/C requirement and intermittently on BiPAP, now on 2LN/C(her home requirement). RVP neg. SpCx + candida, 1/6 BCx NG(Final). Sputum Cx + candida. Completed doxy treatment (1/12). Pulm signed off, appreciate recs. IV steroids -->pred taper(finished 1/17) - Albuterol Nebs q6hr RT 
- home Trelegy - Prednisone 10mg daily (home medication) - Wean O2 for goal of 88-92% sats  
  
Severe Sepsis Rule Out: RESOLVED. Patient's initial SIRS Criteria - elevated WBC, RR< HR explainable by combination of chronic problems and no source of infection present. WBC elevated chronically 2/2 daily steroid use, RR elevated due to COPD exacerbation and HR elevated due to COPD + hx of A fib with RVR. Sepsis workup completed and treatment with fluids initiated. However upon treatment of COPD Excerbation RR, HR improved so unlikley true sepsis as there is no source  1/6 BCx No Growth, Final, SpCx +candida - Continue daily CBC 
- VS per unit routine UTI Pt complained of abdominal pain yesterday. UA: large blood, small LE, neg bacteria (prev UA 1/7/19 neg LE). US abdomen: cholelithiasis w/o CBD dilatation.  
- Zosyn day 2 R/O GI Bleed:  
HgB 8.0 this morning. FOBT negative. HgB 6.7 POA. S/p3 U pRBCs. HgB stable. Capsule study without any signs of bleeding only diminutive AVMs and gastritis. Recs for upper and lower scopes once respiratory status allows.  Hgb today 7.3 
- CBC at 15:00  
- Protonix 40mg BID 
 - Transfuse for HgB <7 
- GI Consulted, appreciate recs 
  
Chronic Diastolic CHF:  
Euvolemic on admission. Trop POA . 10 --> .06 --> .5. Volume status easily upset so caution with diuresis. Trop yesterday 0.108-->0.12--0.14 today. - Trend trops q6hrs 
- Hold today's 0.5mg Bumex (pt received yesterday) - Strict I&O 
- Cardiology consult, appreciate recs 
  
Atrial Fibrillation s/p MVR on Coumadin (INR POA 1.2)  
Pt has been seen OP by Dr Radha Welch but has not followed up with him since 2016. Goal INR 2.5 - 3.5. EKG POA: Afib with RVR . Coumadin home dose is 3mg everyday but Friday 2mg. INR this AM 2.4. Coumadin 3mg x3 days then 2.5mg x 2 days. Dig level . 5 - Home Warfarin continued, pharmacy to dose, INR goal of 2.5-3.5. INR today 6. 
- Diltiazem 240 CD 
- Digoxin . 125mg PO daily - Cardiology consulted, appreciate recs  
- Daily Mg & Phos with repletions PRN 
  
Leukocytosis: Chronic and IMPROVED from presentation Chronic with BL ~22-29 likely due to daily prednisone use. 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 However, tests are not reliable in inpt setting in pt with multiple chronic disease in exacerbation. Additionally steroids and heparin and possible underlying DM may cause abnormalities in testing 
- repeat thyroid studies with PCP and rec for endo referral is persistently abnl 
  
Hyperglycemia:  
likely due to chronic steroid dose. Questionable DM at baseline however Alc <3.5% due to chronic anemia. Will adjust insulin based on trending BGs. BGs  yestrday, requiring 20U Lispro per corrective scale. - Lantus 25U QHS 
- SSI, regular sensitivity 
- POC glucose ACHS 
- hypoglycemia protocol 
  
Hyperphosphatemia: 
Phos 6.0 POA. Phos 8.7 today - Monitor with daily BMP, Mg, Phos 
  
Chronic Back Pain  
- Home Percocet 5-325 mg resumed, q6h PRN  
  
Hyperlipidemia  
 (: TC: 196, T, HDL 88, VLDL 30 & LDL 78) - Holding home Lovastatin 10 mg because of transminitis Transaminitis: RESOLVED 
LFTs downtrending. Likely combination of cholelithiasis and capsule study. 
- Holding home Lovastatin Anxiety: IMPROVED. Likely baseline anxiety exacerbated by severe COPD. E[isode this morning when discussing her care and insurance denial but was redirectable and able to be reassured - Will attempt to wean O2 some and monitor for anxiety - Lexapro 10mg QHS 
- Xanax . 5mg BID PRN 
  
FEN/GI - Low Na Diabetic Diet Activity - Ambulate as tolerated DVT prophylaxis - Coumadin, pharm to dose GI prophylaxis - Protonix Disposition - Admit to Stepdown. Plan to d/c to Pulm Rehab vs SNF, pending PA. PT/OT following Code Status - DNR, discussed with patient / caregivers. Patient will be discussed with Dr. Kiara Stoner, supervising physician. Nora Urban MD 
Family Medicine Resident CC: \"I'm alright Subjective No acute events overnight. Sitting comfortably in ed. Denies anxiety and statess breathing is \"ok\". Denies chest pain, dysuria, constipation. No abdominal pain. Inpatient Medications Current Facility-Administered Medications Medication Dose Route Frequency  warfarin (COUMADIN) tablet 1 mg  1 mg Oral QPM  
 levalbuterol (XOPENEX) nebulizer soln 0.63 mg/3 mL  0.63 mg Nebulization Q6H RT  
 pantoprazole (PROTONIX) 40 mg in 0.9% sodium chloride (MBP/ADV) 50 mL  8 mg/hr IntraVENous CONTINUOUS  
 pantoprazole (PROTONIX) 80 mg in sodium chloride 0.9% 20 mL injection  80 mg IntraVENous ONCE  
 insulin lispro (HUMALOG) injection   SubCUTAneous AC&HS  insulin glargine (LANTUS) injection 25 Units  25 Units SubCUTAneous QHS  artificial saliva (MOUTH KOTE) 1 Spray  1 Spray Oral PRN  predniSONE (DELTASONE) tablet 10 mg  10 mg Oral DAILY WITH BREAKFAST  0.9% sodium chloride infusion 250 mL  250 mL IntraVENous PRN  
  escitalopram oxalate (LEXAPRO) tablet 10 mg  10 mg Oral QPM  
 docusate sodium (COLACE) capsule 100 mg  100 mg Oral DAILY PRN  polyethylene glycol (MIRALAX) packet 17 g  17 g Oral DAILY PRN  
 bumetanide (BUMEX) tablet 0.5 mg  0.5 mg Oral EVERY OTHER DAY  digoxin (LANOXIN) tablet 0.125 mg  0.125 mg Oral DAILY  ondansetron (ZOFRAN) injection 4 mg  4 mg IntraVENous Q8H PRN  
 ALPRAZolam (XANAX) tablet 0.5 mg  0.5 mg Oral BID PRN  
 Warfarin- Pharmacist Dosing   Other Rx Dosing/Monitoring  oxyCODONE IR (ROXICODONE) tablet 5 mg  5 mg Oral Q8H PRN  
 0.9% sodium chloride infusion 250 mL  250 mL IntraVENous PRN  
 alteplase (CATHFLO) 1 mg in sterile water (preservative free) 1 mL injection  1 mg InterCATHeter PRN  
 sodium chloride (NS) flush 10-30 mL  10-30 mL InterCATHeter PRN  
 sodium chloride (NS) flush 10 mL  10 mL InterCATHeter Q24H  
 sodium chloride (NS) flush 10 mL  10 mL InterCATHeter PRN  
 sodium chloride (NS) flush 10-40 mL  10-40 mL InterCATHeter Q8H  
 sodium chloride (NS) flush 20 mL  20 mL InterCATHeter Q24H  
 bacitracin 500 unit/gram packet 1 Packet  1 Packet Topical PRN  
 guaiFENesin ER (MUCINEX) tablet 600 mg  600 mg Oral Q12H  
 dilTIAZem CD (CARDIZEM CD) capsule 240 mg  240 mg Oral DAILY  nystatin (MYCOSTATIN) 100,000 unit/mL oral suspension 500,000 Units  500,000 Units Oral QID  sodium chloride (NS) flush 5-10 mL  5-10 mL IntraVENous Q8H  
 sodium chloride (NS) flush 5-10 mL  5-10 mL IntraVENous PRN  
 nicotine (NICODERM CQ) 21 mg/24 hr patch 1 Patch  1 Patch TransDERmal Q24H  
 fluticasone-umeclidin-vilanter 100-62.5-25 mcg dsdv 1 Puff  (Patient Supplied)  1 Puff Inhalation DAILY  roflumilast (DALIRESP) tablet 500 mcg  500 mcg Oral DAILY  0.9% sodium chloride infusion  50 mL/hr IntraVENous PRN  
 glucose chewable tablet 16 g  4 Tab Oral PRN  
 dextrose (D50W) injection syrg 12.5-25 g  12.5-25 g IntraVENous PRN  
  glucagon (GLUCAGEN) injection 1 mg  1 mg IntraMUSCular PRN Allergies Allergies Allergen Reactions  Spiriva Respimat [Tiotropium Bromide] Anaphylaxis and Other (comments) Adverse effects; Per RN - pt states that Spiriva caused throat swelling and could not breathe well.  Spiriva Respimat [Tiotropium Bromide] Shortness of Breath  Tomato Shortness of Breath Only occurs with raw tomatoes, tolerates ketchup without issue  Celebrex [Celecoxib] Rash  Celebrex [Celecoxib] Rash  Rocephin [Ceftriaxone] Shortness of Breath  Tomato Rash Objective Vitals: 
Patient Vitals for the past 8 hrs: 
 Temp Pulse Resp BP SpO2  
01/19/19 1946  94 (!) 34  94 % 01/19/19 1911  94 (!) 35  91 % 01/19/19 1855  96 (!) 40  91 % 01/19/19 1817  92 (!) 39  96 % 01/19/19 1800  91 (!) 40  96 % 01/19/19 1654  (!) 102 (!) 35  95 % 01/19/19 1643  (!) 101 (!) 39  92 % 01/19/19 1629  (!) 104 29  94 % 01/19/19 1615  99 (!) 40  93 % 01/19/19 1546  (!) 109 (!) 36  90 % 01/19/19 1515 97.8 °F (36.6 °C) (!) 111 (!) 40 158/88 94 % 01/19/19 1447  (!) 112 23    
01/19/19 1408  (!) 112     
01/19/19 1405  (!) 112 (!) 42  98 % 01/19/19 1346  (!) 125 (!) 35  93 % 01/19/19 1332     99 % 01/19/19 1305  (!) 113 (!) 40  97 % 01/19/19 1257  (!) 120 (!) 42  95 % 01/19/19 1256  (!) 118 (!) 36  94 % 01/19/19 1250  (!) 124 20  (!) 88 % I/O: 
 
Intake/Output Summary (Last 24 hours) at 1/19/2019 2039 Last data filed at 1/19/2019 1358 Gross per 24 hour Intake 500 ml Output 1300 ml Net -800 ml Last shift: 
  No intake/output data recorded. Last 3 shifts: 
  01/18 0701 - 01/19 1900 In: 500 [P.O.:480; I.V.:20] Out: 1300 [Urine:1300] Physical Exam:General: No acute distress. Alert. Cooperative. Head: Normocephalic. Atraumatic. Respiratory: No increased work of breathing, no tripoding.  Coarse breath sounds b/l. No wheezes, rales, ronchi. Cardiovascular: Irregularly irregular rhythm. Mechanical click from valve replacement with mild flow murmur. Pulses 2+ throughout. GI: + bowel sounds. Nontender. No rebound tenderness or guarding. Nondistended Extremities: No edema. No tenderness Laboratory Data Recent Results (from the past 12 hour(s)) GLUCOSE, POC Collection Time: 01/19/19 12:02 PM  
Result Value Ref Range Glucose (POC) 92 65 - 100 mg/dL Performed by [employer] GLUCOSE, POC Collection Time: 01/19/19  4:22 PM  
Result Value Ref Range Glucose (POC) 141 (H) 65 - 100 mg/dL Performed by [employer] BLOOD GAS, ARTERIAL Collection Time: 01/19/19  5:09 PM  
Result Value Ref Range pH 7.35 7.35 - 7.45    
 PCO2 63 (H) 35.0 - 45.0 mmHg PO2 76 (L) 80 - 100 mmHg O2 SAT 94 92 - 97 % BICARBONATE 34 (H) 22 - 26 mmol/L  
 BASE EXCESS 6.2 mmol/L  
 O2 METHOD NASAL O2    
 O2 FLOW RATE 3.00 L/min Sample source ARTERIAL    
 SITE RIGHT BRACHIAL TEE'S TEST N/A    
NT-PRO BNP Collection Time: 01/19/19  6:00 PM  
Result Value Ref Range NT pro-BNP 2,918 (H) 0 - 125 PG/ML  
TROPONIN I Collection Time: 01/19/19  6:00 PM  
Result Value Ref Range Troponin-I, Qt. 0.08 (H) <0.05 ng/mL CBC W/O DIFF Collection Time: 01/19/19  6:00 PM  
Result Value Ref Range WBC 38.6 (H) 3.6 - 11.0 K/uL  
 RBC 2.83 (L) 3.80 - 5.20 M/uL HGB 8.6 (L) 11.5 - 16.0 g/dL HCT 27.3 (L) 35.0 - 47.0 % MCV 96.5 80.0 - 99.0 FL  
 MCH 30.4 26.0 - 34.0 PG  
 MCHC 31.5 30.0 - 36.5 g/dL RDW 20.5 (H) 11.5 - 14.5 % PLATELET 411 806 - 534 K/uL MPV ABNORMAL 8.9 - 12.9 FL  
 NRBC 0.3 (H) 0  WBC ABSOLUTE NRBC 0.11 (H) 0.00 - 0.01 K/uL EKG, 12 LEAD, INITIAL Collection Time: 01/19/19  6:08 PM  
Result Value Ref Range Ventricular Rate 90 BPM  
 Atrial Rate 90 BPM  
 QRS Duration 76 ms  
 Q-T Interval 374 ms QTC Calculation (Bezet) 457 ms Calculated R Axis 68 degrees Calculated T Axis 84 degrees Diagnosis Undetermined rhythm Nonspecific ST abnormality Abnormal ECG When compared with ECG of 06-JAN-2019 13:59, 
Current undetermined rhythm precludes rhythm comparison, needs review T wave inversion less evident in Anterior leads LACTIC ACID Collection Time: 01/19/19  6:51 PM  
Result Value Ref Range Lactic acid 1.4 0.4 - 2.0 MMOL/L Imaging - No New Imaging Abdominal US: + stones, no cholecystitis Capsule Study: gastritis, diminutive AVMs Hospital Problems: 
Hospital Problems  Date Reviewed: 11/29/2018 Codes Class Noted POA Thyroid function test abnormal ICD-10-CM: R94.6 ICD-9-CM: 794.5  1/16/2019 Unknown * (Principal) COPD with acute exacerbation ([facility] [address] ICD-10-CM: J44.1 ICD-9-CM: 491.21  1/6/2019 Unknown Anemia ICD-10-CM: D64.9 ICD-9-CM: 285.9  10/28/2018 Yes Hyperlipidemia ICD-10-CM: E78.5 ICD-9-CM: 272.4  10/26/2018 Yes Chronic back pain (Chronic) ICD-10-CM: M54.9, G89.29 ICD-9-CM: 724.5, 338.29  10/26/2018 Yes History of GI bleed ICD-10-CM: Z87.19 ICD-9-CM: V12.79 Present on Admission 11/25/2016 Yes Diastolic CHF, chronic (HCC) ICD-10-CM: I50.32 
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  
  Tissue MVR 1989 following failed balloon valvuloplasty for MS Redo MVR 2004 due to severe MR, AVR due to AR ([employer]) CAD (coronary artery disease), native coronary artery ICD-10-CM: I25.10 ICD-9-CM: 414.01  1/8/2015 Yes S/P MVR (mitral valve replacement) ICD-10-CM: X44.9 ICD-9-CM: V43.3  1/8/2015 Yes

## 2019-01-20 NOTE — PROGRESS NOTES
Patient reclining in bed resting. Did not awaken or respond to verbal stimuli or conversation.  is at bedside. He spoke about his current thoughts, feelings, and concerns. He expresses anticipatory grief and concern that his wife may have given up on her health and in fighting for her recovery. He spoke about their 12 year marriage and what life has been like for the both of them. Afterward, he appeared comforted and expressed gratitude for this visit. He also expressed gratitude for all of the care that the pastoral care and nursing staff have been providing. Visited by Rev. Hira Roland, [facility]  paging service: [phone])

## 2019-01-20 NOTE — PROGRESS NOTES
SHIFT CHANGE: 
7:48 AM Report received from Damaris Vizcarra RN. SBAR, Kardex, Procedure Summary, Intake/Output, MAR, Recent Results, Med Rec Status and Cardiac Rhythm AFIB were discussed. SHIFT SUMMARY: 
8:30 AM  Patient refusing morning medications at this time. Will attempt again later. Blood drawn per order and sent to the lab. 12:00 PM  Spoke with family practice and ok to discontinue IVF. HYPOGLYCEMIC EPISODE DOCUMENTATION Patient with hypoglycemic episode(s) at 1200(time) on 1/20/19(date). BG value(s) pre-treatment 79 Was patient symptomatic? [] yes, [x] no Patient was treated with the following rescue medications/treatments: [] D50 [] Glucose tablets 
              [] Glucagon 
              [] 4oz juice [x] 6oz reg soda 
              [] 8oz low fat milk BG value post-treatment: 91 Once BG treated and value greater than 80mg/dl, pt was provided with the following: 
[] snack [x] meal 
Name of MD notified:Family Practice 2:29 PM  Family practice notified of patient's hgb of 6.9. 
 
6:35 PM  Patient's . Refusing to eat dinner. SSI held. Ival Brew END OF SHIFT REPORT: 
 
7:25 PM Bedside and Verbal shift change report given to Damaris Vizcarra RN (oncoming nurse) by Lady Gosselin, RN (offgoing nurse). Report included the following information SBAR, Kardex, Procedure Summary, Intake/Output, MAR, Recent Results, Med Rec Status and Cardiac Rhythm AFLUTTER.

## 2019-01-21 NOTE — PROGRESS NOTES
Physical Therapy 1427 INR 5.9 Pt received in bed, declining to participate with bed level exercise. PT will continue to follow. [facility] Shiva Khoury

## 2019-01-21 NOTE — DISCHARGE INSTRUCTIONS
[facility] - [facility] / [address]    Camden Yee / [id number] : 1955    Admission date: 2019 Discharge date: 2019       Primary care provider: Ashleigh Lanza DO    Discharging provider:  Toya Navarro MD  - Family Medicine Resident  *** - Attending, Family Medicine   . . . . . . . . . . . . . . . . . . . . . . . . . . . . . . . . . . . . . . . . . . . . . . . . . . . . . . . . . . . . . . . . . . . . . . . [place] [address] COURSE:    Per Admitting provider, Camden Yee is a 66 y.o. female with hx of CAD, s/p MVR/AVR and a fib with RVR on Coumadin, CHF, COPD who presents to the ED via EMS complaining of worsening shortness of breath over the past 2-3 days. Patient states that her home health nurse was worried about her breathing status earlier in the week and called her PCP Dr Adelaida Kyle. PCP suggested evaluation in ED but pt refused so called in azithromycin and prednisone dose pack. Patient started prednisone but pharmacy would not fill the z-pack because of concern for interaction with warfarin. She has been using albuterol 2 puffs 2-3 times a day for 2 days. Denies sick contact at home. Denies cough but endorses sputum production white in color. Pt endorses anxiety, SOB, decreased appetite.   Pt denies fever, chills, sinus congestion, CP, dysuria, constipation, diarrhea, bloody stool or urine, N/V, pain.     In the ED:   - Vital signs: T97.4, , /69, 100% on 6L N/C  - Labs: WBC 32.0, Hgb 6.7, , phos 6.0, proBNP 982, LA 2.8, FOBT neg; blood gases pH 7.31, pCO2 48, pO2 78,    HCO3 24, pO2  - CXR: small L PE, chronic; mild pulm vascular congestion, no evidence of PNA     Patient was treated with 2 NS boluses, Levaquin, Xanax, Ativan    Code purple called on pt in ED --> BCx drawn, fluid boluses initiated\"        Hospital Course by problem  ***      FOLLOW-UP CARE RECOMMENDATIONS:  Follow-up Information     Follow up With Specialties Details Why Contact Info    Ana Feliz MD Cardiology On 2/22/2019 340 pm [address] [facility] [address] [address]  [address], [place], DO [facility]   [address] [facility] [place] [address]  [phone]              It is very important that you keep follow-up appointment(s). Bring discharge papers, medication list (and/or medication bottles) to follow-up appointments for review by outpatient provider(s). FOLLOW-UP TESTS RECOMMENDED:   · Thyroid Function Tests with PCP  · Daily INR     ONGOING TREATMENT PLAN: ***    PENDING TEST RESULTS:  At the time of discharge the following test results are still pending: ***. Please review these results as they become available. Specific symptoms to watch for: chest pain, shortness of breath, fever, chills, nausea, vomiting, diarrhea, change in mentation, falling, weakness, bleeding. DIET:  Cardiac Diet and Diabetic Diet    ACTIVITY:  PT/OT Eval and Treat    WOUND CARE:  None needed    EQUIPMENT needed:  PT/OT at facility to eval and treat     INCIDENTAL FINDINGS:  None    GOALS OF CARE:  X  Eventual return to home/independent/assisted living     Long term SNF      Hospice     No rehospitalization     Patient condition at discharge:   Functional status    Poor    X  Deconditioned      Independent   Cognition  X  Lucid     Forgetful (some sensescence)     Dementia   Catheters/lines (plus indication)    Delgado     PICC      PEG         Code status    Full code    X  DNR    . . . . . . . . . . . . . . . . . . . . . . . . . . . . . . . . . . . . . . . . . . . . . . . . . . . . . . . . . . . . . . . . . . . . . . . Fabien Hernandez      CHRONIC MEDICAL CONDITIONS:  Problem List as of 1/16/2019 Date Reviewed: 11/29/2018          Codes Class Noted - Resolved    Thyroid function test abnormal ICD-10-CM: R94.6  ICD-9-CM: 794.5  1/16/2019 - Present        * (Principal) COPD with acute exacerbation ([facility] [address] ICD-10-CM: J44.1  ICD-9-CM: 491.21  1/6/2019 - Present        Anemia ICD-10-CM: D64.9  ICD-9-CM: 285.9  10/28/2018 - Present        GI bleed ICD-10-CM: K92.2  ICD-9-CM: 578.9  10/28/2018 - Present        COPD exacerbation ([place] [address].) ICD-10-CM: J44.1  ICD-9-CM: 491.21  10/26/2018 - Present        Hypokalemia ICD-10-CM: E87.6  ICD-9-CM: 276.8  10/26/2018 - Present        JAZZMINE (acute kidney injury) ([place] [address].) ICD-10-CM: N17.9  ICD-9-CM: 584.9  10/26/2018 - Present        Hyperglycemia ICD-10-CM: R73.9  ICD-9-CM: 790.29  10/26/2018 - Present        A-fib ([place] [address] ICD-10-CM: I48.91  ICD-9-CM: 427.31  10/26/2018 - Present        Chronic anticoagulation ICD-10-CM: Z79.01  ICD-9-CM: V58.61  10/26/2018 - Present        Hyperlipidemia ICD-10-CM: E78.5  ICD-9-CM: 272.4  10/26/2018 - Present        Chronic back pain (Chronic) ICD-10-CM: M54.9, G89.29  ICD-9-CM: 724.5, 338.29  10/26/2018 - Present        Chest pain ICD-10-CM: R07.9  ICD-9-CM: 786.50  10/24/2018 - Present        SOB (shortness of breath) ICD-10-CM: R06.02  ICD-9-CM: 786.05  7/29/2018 - Present        Hypokalemia ICD-10-CM: E87.6  ICD-9-CM: 276.8  7/26/2018 - Present        Thrush of mouth and esophagus ([place] [address] ICD-10-CM: B37.81, B37.0  ICD-9-CM: 112.84, 112.0  7/26/2018 - Present        H/O total hysterectomy ICD-10-CM: Z90.710  ICD-9-CM: V88.01  9/26/2017 - Present        History of GI bleed ICD-10-CM: Z87.19  ICD-9-CM: V12.79 Present on Admission 11/25/2016 - Present        Diastolic CHF, chronic (HCC) ICD-10-CM: I50.32  ICD-9-CM: 428.32, 428.0  12/17/2015 - Present        Chronic atrial fibrillation ([facility] ICD-10-CM: I48.2  ICD-9-CM: 427.31  12/17/2015 - Present        Tobacco abuse ICD-10-CM: Z72.0  ICD-9-CM: 305.1  11/14/2015 - Present        COPD (chronic obstructive pulmonary disease) (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 ICD-10-CM: I08.0  ICD-9-CM: 396.9  1/8/2015 - Present    Overview Signed 1/8/2015  4:57 PM by Segundo Decker MD     Tissue MVR 1989 following failed balloon valvuloplasty for MS  Redo MVR 2004 due to severe MR, AVR due to [address] ([facility])             CAD (coronary artery disease), native coronary artery ICD-10-CM: I25.10  ICD-9-CM: 414.01  1/8/2015 - Present        S/P MVR (mitral valve replacement) ICD-10-CM: Z95.2  ICD-9-CM: V43.3  1/8/2015 - Present        RESOLVED: Acute exacerbation of chronic obstructive pulmonary disease (COPD) ([facility] [address] ICD-10-CM: J44.1  ICD-9-CM: 491.21  11/26/2017 - 3/16/2018        RESOLVED: COPD exacerbation ([facility] ICD-10-CM: J44.1  ICD-9-CM: 491.21  4/5/2016 - 11/26/2017        RESOLVED: ACP (advance care planning) ICD-10-CM: Z71.89  ICD-9-CM: V65.49  2/18/2016 - 11/26/2017    Overview Signed 2/18/2016  8:21 AM by Geraldean Goltz, DO     discussed with pt             RESOLVED: ADHF (acute decompensated heart failure) ICD-10-CM: I50.9  ICD-9-CM: 428.0  12/17/2015 - 12/18/2015        RESOLVED: HCAP (healthcare-associated pneumonia) ICD-10-CM: J18.9  ICD-9-CM: 486  12/7/2015 - 12/18/2015        RESOLVED: Hypokalemia ICD-10-CM: E87.6  ICD-9-CM: 276.8  12/6/2015 - 12/18/2015        RESOLVED: Supratherapeutic INR ICD-10-CM: R79.1  ICD-9-CM: 790.92  12/5/2015 - 12/18/2015        RESOLVED: Hypotension ICD-10-CM: I95.9  ICD-9-CM: 458.9  12/4/2015 - 12/18/2015        RESOLVED: Diarrhea ICD-10-CM: R19.7  ICD-9-CM: 787.91  12/4/2015 - 12/18/2015        RESOLVED: Acute kidney injury ([facility] ICD-10-CM: N17.9  ICD-9-CM: 584.9  12/4/2015 - 12/18/2015        RESOLVED: GIB (gastrointestinal bleeding) ICD-10-CM: K92.2  ICD-9-CM: 578.9  11/25/2015 - 12/18/2015        RESOLVED: On home oxygen therapy ICD-10-CM: Z99.81  ICD-9-CM: V46.2  11/19/2015 - 12/18/2015    Overview Signed 11/19/2015 10:12 AM by Steph Xiong RN     2LPM continous             RESOLVED: COPD exacerbation ([facility] ICD-10-CM: J44.1  ICD-9-CM: 491.21  11/14/2015 - 12/14/2015 RESOLVED: Leukocytosis ICD-10-CM: I44.890  ICD-9-CM: 288.60  11/14/2015 - 12/18/2015        RESOLVED: Pleural effusion, left ICD-10-CM: J90  ICD-9-CM: 511.9  11/14/2015 - 12/18/2015        RESOLVED: Elevated brain natriuretic peptide (BNP) level ICD-10-CM: R79.89  ICD-9-CM: 790.99  11/14/2015 - 12/18/2015        RESOLVED: History of artificial heart valve ICD-10-CM: Z95.2  ICD-9-CM: V43.3  11/14/2015 - 12/17/2015        RESOLVED: Paroxysmal atrial fibrillation ([facility] ICD-10-CM: I48.0  ICD-9-CM: 427.31  11/14/2015 - 12/18/2015        RESOLVED: Atrial fibrillation with RVR ([facility] [address] ICD-10-CM: I48.91  ICD-9-CM: 427.31  11/14/2015 - 12/18/2015        RESOLVED: H/O mitral valve replacement ICD-10-CM: Z95.2  ICD-9-CM: V43.3  1/8/2015 - 1/8/2015        RESOLVED: A-fib ([facility] [address] ICD-10-CM: I48.91  ICD-9-CM: 427.31  1/8/2015 - 1/8/2015        RESOLVED: Mitral regurgitation ICD-10-CM: I34.0  ICD-9-CM: 424.0  1/8/2015 - 1/8/2015        RESOLVED: Inadequate anticoagulation ICD-10-CM: Z51.81, Z79.01  ICD-9-CM: V58.83, V58.61  1/8/2015 - 12/18/2015        RESOLVED: Atrial fibrillation, new onset ([facility] ICD-10-CM: I48.91  ICD-9-CM: 427.31  1/8/2015 - 11/14/2015              Information obtained by :   I understand that if any problems occur once I am at home I am to contact my physician. I understand and acknowledge receipt of the instructions indicated above.                                                                                                                                              Physician's or R.N.'s Signature                                                                  Date/Time                                                                                                                                              Patient or Representative Signature                                                          Date/Time

## 2019-01-21 NOTE — PROGRESS NOTES
Rcvd call from Rae in the lab, patient's INR is 5.9. I informed patient's primary nurse, Max Barrow RN.

## 2019-01-21 NOTE — DIABETES MGMT
DTC Progress Note Recommendations/ Comments: Chart review for hypoglycemia. FBG today 79 mg/dL Lantus 8 units was held last night Also noted FBG 72 mg/dL on 1/19/2019 and pre-lunch 79 mg/dL yesterday. Lantus dose decreased to 8 units at bedtime. However, it was held yesterday. The Last dose of Lantus was given 1/19/2019 @ 2211. Prednisone tapered to 10. Pt required no correction lispro x 72 hours If appropriate please d/c Lantus Current hospital DM medication: Lantus 8 units daily, held last night 
                                                     lispro insulin correction scale, normal sensitivity Chart reviewed on Shaniqua Messer. Patient is a 66 y.o. female with no history of diabetes. A1c likely inaccurate due to low Hgb and Hct. Last blood transfusion 10/27/2018 A1c:  
Lab Results Component Value Date/Time Hemoglobin A1c <3.5 (L) 01/06/2019 02:39 PM  
 Hemoglobin A1c <3.5 (L) 10/25/2018 07:40 AM  
 
 
Recent Glucose Results:  
Lab Results Component Value Date/Time GLU 54 (L) 01/21/2019 02:57 AM  
 GLUCPOC 110 (H) 01/21/2019 11:29 AM  
 GLUCPOC 122 (H) 01/21/2019 07:49 AM  
 GLUCPOC 108 (H) 01/21/2019 07:16 AM  
  
 
Lab Results Component Value Date/Time Creatinine 1.78 (H) 01/21/2019 02:57 AM  
 
Estimated Creatinine Clearance: 28.3 mL/min (A) (based on SCr of 1.78 mg/dL (H)). Active Orders Diet DIET CARDIAC Regular PO intake:  
Patient Vitals for the past 72 hrs: 
 % Diet Eaten 01/20/19 1821 0 % 01/20/19 0800 0 % Will continue to follow as needed. Thank you Som Magana RN, CDE Time spent: 5 min

## 2019-01-21 NOTE — CONSULTS
Palliative Medicine Due to high consult volume will not be able to see patient today. Will plan to see patient Tuesday 1/22

## 2019-01-21 NOTE — PROGRESS NOTES
[facility]  
Daily Progress Note Date: 1/21/2019 Assessment/Plan:  
Omaira Dang is a 66 y.o. female who is hospitalized for COPD Exacerbation, initially meeting Severe Sepsis Criteria. 24 Events: No Acute Events overnight. JAZZMINE, PreRenal Azotemia with FeNa 7.9%: BL Cr 0.8-. 0.6. Cr slowly trending up since dark urine noted on 1/19. Cr today 1.75, peaked at 1.86 despite . FeNa 7.9%. Uop improved (.76cc/kghr) over past 24 hours. Initial concern for UTI however UA only concerning for large blood. Given nephrotoxicity and low suspicion for 'ed zosyn yesterday. - c/w IVFs - Nephrology consulted, appreciated recs 
- Daily BMP 
- Monitor Uop Acute on Chronic Respiratory Failure POA in the Setting of COPD Exacerbation: Acute Exacerbation resolved, overall respiratory status STABLE. Initially with 6L N/C requirement and intermittently on BiPAP, now on 2LN/C(her home requirement). RVP neg. SpCx + candida, 1/6 BCx NG(Final). Sputum Cx + candida. Completed doxy treatment (1/12). Pulm signed off, appreciate recs. IV steroids -->pred taper(finished 1/17) - Albuterol Nebs q6hr RT 
- home Trelegy - Prednisone 10mg daily (home medication) - Wean O2 for goal of 88-92% sats  
  
Severe Sepsis Rule Out: RESOLVED. Patient's initial SIRS Criteria - elevated WBC, RR< HR explainable by combination of chronic problems and no source of infection present. WBC elevated chronically 2/2 daily steroid use, RR elevated due to COPD exacerbation and HR elevated due to COPD + hx of A fib with RVR. Sepsis workup completed and treatment with fluids initiated. However upon treatment of COPD Excerbation RR, HR improved so unlikley true sepsis as there is no source  1/6 BCx No Growth, Final, SpCx +candida - Continue daily CBC 
- VS per unit routine GI Bleed:  
HgB 7.3  this morning.  Per capsule study patient has AVMs which are likely source of slow bleed. Initial FOBT negative, then 1/20 FOBT +. HgB 6.7 POA. S/p4 U pRBCs. HgB stable. Recs for upper and lower scopes once respiratory status allows - GI will not scope currently as patient is mod-high risk of not being able to come off anesthesia/intubation during procedure. - Protonix 40mg BID 
- Transfuse for HgB <7 
- GI Consulted, appreciate recs 
  
Chronic Diastolic CHF:  
Euvolemic on admission. Trop POA . 10 --> .06 --> .5. Volume status easily upset so caution with diuresis. Trop yesterday 0.108-->0.12--0.14 today. - Trend trops q6hrs 
- Hold today's 0.5mg Bumex (pt received yesterday) - Strict I&O 
- Cardiology consult, appreciate recs 
  
Atrial Fibrillation s/p MVR on Coumadin (INR POA 1.2)  
Pt has been seen OP by Dr Korina Cardenas but has not followed up with him since 2016. Goal INR 2.5 - 3.5. EKG POA: Afib with RVR . Coumadin home dose is 3mg everyday but Friday 2mg. INR this AM 2.4. Coumadin 3mg x3 days then 2.5mg x 2 days. Dig level 0.5 
- Home Warfarin to be held today (for 3rd night), pharmacy to dose, INR goal of 2.5-3.5. INR today 5.9 
- Diltiazem 240 CD 
- Digoxin 0.125mg PO daily, held per cardiology given JAZZMINE 
- Cardiology consulted, appreciate recs  
- Daily Mg & Phos with repletions PRN 
  
Leukocytosis: Chronic and IMPROVED from presentation Chronic with BL ~22-29 likely due to daily prednisone use. 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 However, tests are not reliable in inpt setting in pt with multiple chronic disease in exacerbation. Additionally steroids and heparin and possible underlying DM may cause abnormalities in testing 
- repeat thyroid studies with PCP and rec for endo referral is persistently abnl 
  
Hyperglycemia:  
likely due to chronic steroid dose.  Questionable DM at baseline however Alc <3.5% due to chronic anemia. Will adjust insulin based on trending BGs. Poor PO intake over last 3 days with BG low 100s  
- SSI, regular sensitivity 
- POC glucose ACHS 
- hypoglycemia protocol 
  
Hyperphosphatemia: 
Phos 6.2 POA. Phos 8.7 today - Monitor with daily BMP, Mg, Phos 
  
Chronic Back Pain  
- Home Percocet 5-325 mg resumed, q6h PRN  
  
Hyperlipidemia  
(: TC: 196, T, HDL 88, VLDL 30 & LDL 78) - Holding home Lovastatin 10 mg because of transminitis Transaminitis: IMPROVED 
LFTs down trended once, then fabiola again. Down from yesterday. Likely combination of cholelithiasis and capsule study. 
- Holding home Lovastatin - GI following Anxiety: IMPROVED. Likely baseline anxiety exacerbated by severe COPD. E[isode this morning when discussing her care and insurance denial but was redirectable and able to be reassured - Lexapro 10mg QHS 
- Xanax 0.5mg BID PRN 
  
FEN/GI - Low Na Diabetic Diet. NS @ 100 cc/hr Activity - Ambulate as tolerated DVT prophylaxis - Coumadin, pharm to dose - holding while INR supratherapeutic GI prophylaxis - Protonix Disposition - Admit to Stepdown. Plan to d/c to Pulm Rehab vs SNF, pending PA. PT/OT following Code Status - DNR, discussed with patient / caregivers. Patient will be discussed with Dr. Wily Sarabia, supervising physician. Meg Farias MD 
Family Medicine Resident CC: \"I'm about the same\" Subjective No acute events overnight. Patient appears uncomfortable but not acutely distressed. Speaks very little. Denies chest pain, dysuria, constipation. Endorses breathing is at baseline and that she is fatigued. Inpatient Medications Current Facility-Administered Medications Medication Dose Route Frequency  0.9% sodium chloride infusion  100 mL/hr IntraVENous CONTINUOUS  
 bumetanide (BUMEX) tablet 0.5 mg  0.5 mg Oral EVERY OTHER DAY  Warfarin: Hold tonight. INR = 6   Other Rx Dosing/Monitoring  pantoprazole (PROTONIX) tablet 40 mg  40 mg Oral ACB&D  
 insulin glargine (LANTUS) injection 8 Units  8 Units SubCUTAneous QHS  
 0.9% sodium chloride infusion 250 mL  250 mL IntraVENous PRN  
 levalbuterol (XOPENEX) nebulizer soln 0.63 mg/3 mL  0.63 mg Nebulization Q6H RT  
 piperacillin-tazobactam (ZOSYN) 3.375 g in 0.9% sodium chloride (MBP/ADV) 100 mL  3.375 g IntraVENous Q8H  
 insulin lispro (HUMALOG) injection   SubCUTAneous AC&HS  
 artificial saliva (MOUTH KOTE) 1 Spray  1 Spray Oral PRN  predniSONE (DELTASONE) tablet 10 mg  10 mg Oral DAILY WITH BREAKFAST  0.9% sodium chloride infusion 250 mL  250 mL IntraVENous PRN  
 escitalopram oxalate (LEXAPRO) tablet 10 mg  10 mg Oral QPM  
 docusate sodium (COLACE) capsule 100 mg  100 mg Oral DAILY PRN  polyethylene glycol (MIRALAX) packet 17 g  17 g Oral DAILY PRN  
 digoxin (LANOXIN) tablet 0.125 mg  0.125 mg Oral DAILY  ondansetron (ZOFRAN) injection 4 mg  4 mg IntraVENous Q8H PRN  
 ALPRAZolam (XANAX) tablet 0.5 mg  0.5 mg Oral BID PRN  
 Warfarin- Pharmacist Dosing   Other Rx Dosing/Monitoring  oxyCODONE IR (ROXICODONE) tablet 5 mg  5 mg Oral Q8H PRN  
 0.9% sodium chloride infusion 250 mL  250 mL IntraVENous PRN  
 alteplase (CATHFLO) 1 mg in sterile water (preservative free) 1 mL injection  1 mg InterCATHeter PRN  
 sodium chloride (NS) flush 10-30 mL  10-30 mL InterCATHeter PRN  
 sodium chloride (NS) flush 10 mL  10 mL InterCATHeter Q24H  
 sodium chloride (NS) flush 10 mL  10 mL InterCATHeter PRN  
 sodium chloride (NS) flush 10-40 mL  10-40 mL InterCATHeter Q8H  
 sodium chloride (NS) flush 20 mL  20 mL InterCATHeter Q24H  
 bacitracin 500 unit/gram packet 1 Packet  1 Packet Topical PRN  
 guaiFENesin ER (MUCINEX) tablet 600 mg  600 mg Oral Q12H  
 dilTIAZem CD (CARDIZEM CD) capsule 240 mg  240 mg Oral DAILY  nystatin (MYCOSTATIN) 100,000 unit/mL oral suspension 500,000 Units  500,000 Units Oral QID  sodium chloride (NS) flush 5-10 mL  5-10 mL IntraVENous Q8H  
 sodium chloride (NS) flush 5-10 mL  5-10 mL IntraVENous PRN  
 nicotine (NICODERM CQ) 21 mg/24 hr patch 1 Patch  1 Patch TransDERmal Q24H  
 fluticasone-umeclidin-vilanter 100-62.5-25 mcg dsdv 1 Puff  (Patient Supplied)  1 Puff Inhalation DAILY  roflumilast (DALIRESP) tablet 500 mcg  500 mcg Oral DAILY  0.9% sodium chloride infusion  50 mL/hr IntraVENous PRN  
 glucose chewable tablet 16 g  4 Tab Oral PRN  
 dextrose (D50W) injection syrg 12.5-25 g  12.5-25 g IntraVENous PRN  
 glucagon (GLUCAGEN) injection 1 mg  1 mg IntraMUSCular PRN Allergies Allergies Allergen Reactions  Spiriva Respimat [Tiotropium Bromide] Anaphylaxis and Other (comments) Adverse effects; Per RN - pt states that Spiriva caused throat swelling and could not breathe well.  Spiriva Respimat [Tiotropium Bromide] Shortness of Breath  Tomato Shortness of Breath Only occurs with raw tomatoes, tolerates ketchup without issue  Celebrex [Celecoxib] Rash  Celebrex [Celecoxib] Rash  Rocephin [Ceftriaxone] Shortness of Breath  Tomato Rash Objective Vitals: 
Patient Vitals for the past 8 hrs: 
 Temp Pulse Resp BP SpO2  
01/21/19 0300 97.8 °F (36.6 °C) 81 26 118/54 96 % 01/21/19 0200  82 29 124/53 94 % 01/21/19 0123 97.9 °F (36.6 °C) 79 21 114/55   
01/21/19 0023 98 °F (36.7 °C) 85 30 119/53 96 % 01/21/19 0010 97.6 °F (36.4 °C) 86 30 115/51 95 % I/O: 
 
Intake/Output Summary (Last 24 hours) at 1/21/2019 9348 Last data filed at 1/21/2019 7336 Gross per 24 hour Intake 1381.7 ml Output 450 ml Net 931.7 ml Last shift: 
  No intake/output data recorded. Last 3 shifts: 
  01/19 1901 - 01/21 0700 In: 1481.7 [P.O.:600; I.V.:500] Out: 1440 [QXWBR:1427] Physical Exam:General: No acute distress. Alert. Appears weak. Cooperative. Head: Normocephalic. Atraumatic. Respiratory: No increased work of breathing, no tripoding. Coarse breath sounds b/l. No wheezes, rales, ronchi. Cardiovascular: Irregularly irregular rhythm. Mechanical click from valve replacement with mild flow murmur. Pulses 2+ throughout. GI: + bowel sounds. Mildy tender ove lower quadrants. No rebound tenderness or guarding. Nondistended Extremities: Trace edema of b/l LE. Moderate tenderness of b/l LE Laboratory Data Recent Results (from the past 12 hour(s)) GLUCOSE, POC Collection Time: 01/20/19  8:55 PM  
Result Value Ref Range Glucose (POC) 90 65 - 100 mg/dL Performed by Jovanna Campuzano CBC WITH AUTOMATED DIFF Collection Time: 01/21/19  2:57 AM  
Result Value Ref Range WBC 18.9 (H) 3.6 - 11.0 K/uL  
 RBC 2.65 (L) 3.80 - 5.20 M/uL HGB 8.1 (L) 11.5 - 16.0 g/dL HCT 25.3 (L) 35.0 - 47.0 % MCV 95.5 80.0 - 99.0 FL  
 MCH 30.6 26.0 - 34.0 PG  
 MCHC 32.0 30.0 - 36.5 g/dL RDW 20.6 (H) 11.5 - 14.5 % PLATELET 741 (L) 988 - 400 K/uL NRBC 0.2 (H) 0  WBC ABSOLUTE NRBC 0.04 (H) 0.00 - 0.01 K/uL NEUTROPHILS 92 (H) 32 - 75 % LYMPHOCYTES 2 (L) 12 - 49 % MONOCYTES 5 5 - 13 % EOSINOPHILS 0 0 - 7 % BASOPHILS 0 0 - 1 % IMMATURE GRANULOCYTES 1 (H) 0.0 - 0.5 % ABS. NEUTROPHILS 17.4 (H) 1.8 - 8.0 K/UL  
 ABS. LYMPHOCYTES 0.4 (L) 0.8 - 3.5 K/UL  
 ABS. MONOCYTES 0.9 0.0 - 1.0 K/UL  
 ABS. EOSINOPHILS 0.0 0.0 - 0.4 K/UL  
 ABS. BASOPHILS 0.0 0.0 - 0.1 K/UL  
 ABS. IMM. GRANS. 0.2 (H) 0.00 - 0.04 K/UL  
 DF SMEAR SCANNED    
 RBC COMMENTS SCHISTOCYTES PRESENT 
    
 RBC COMMENTS ANISOCYTOSIS 
2+ METABOLIC PANEL, COMPREHENSIVE Collection Time: 01/21/19  2:57 AM  
Result Value Ref Range Sodium 143 136 - 145 mmol/L Potassium 3.6 3.5 - 5.1 mmol/L Chloride 101 97 - 108 mmol/L  
 CO2 33 (H) 21 - 32 mmol/L Anion gap 9 5 - 15 mmol/L Glucose 54 (L) 65 - 100 mg/dL BUN 38 (H) 6 - 20 MG/DL  Creatinine 1.78 (H) 0.55 - 1.02 MG/DL  
 BUN/Creatinine ratio 21 (H) 12 - 20 GFR est AA 35 (L) >60 ml/min/1.73m2 GFR est non-AA 29 (L) >60 ml/min/1.73m2 Calcium 8.5 8.5 - 10.1 MG/DL Bilirubin, total 2.3 (H) 0.2 - 1.0 MG/DL  
 ALT (SGPT) 93 (H) 12 - 78 U/L  
 AST (SGOT) 206 (H) 15 - 37 U/L Alk. phosphatase 60 45 - 117 U/L Protein, total 5.4 (L) 6.4 - 8.2 g/dL Albumin 2.4 (L) 3.5 - 5.0 g/dL Globulin 3.0 2.0 - 4.0 g/dL A-G Ratio 0.8 (L) 1.1 - 2.2 PROTHROMBIN TIME + INR Collection Time: 01/21/19  2:57 AM  
Result Value Ref Range INR 5.9 (HH) 0.9 - 1.1 Prothrombin time 54.8 (H) 9.0 - 11.1 sec MAGNESIUM Collection Time: 01/21/19  2:57 AM  
Result Value Ref Range Magnesium 2.3 1.6 - 2.4 mg/dL PHOSPHORUS Collection Time: 01/21/19  2:57 AM  
Result Value Ref Range Phosphorus 6.2 (H) 2.6 - 4.7 MG/DL  
GLUCOSE, POC Collection Time: 01/21/19  6:58 AM  
Result Value Ref Range Glucose (POC) 79 65 - 100 mg/dL Performed by [employer] GLUCOSE, POC Collection Time: 01/21/19  7:16 AM  
Result Value Ref Range Glucose (POC) 108 (H) 65 - 100 mg/dL Performed by [employer] [facility] - No New Imaging Abdominal US: + stones, no cholecystitis Capsule Study: gastritis, diminutive AVMs Hospital Problems: 
Hospital Problems  Date Reviewed: 11/29/2018 Codes Class Noted POA Thyroid function test abnormal ICD-10-CM: R94.6 ICD-9-CM: 794.5  1/16/2019 Unknown * (Principal) COPD with acute exacerbation ([facility] [address] ICD-10-CM: J44.1 ICD-9-CM: 491.21  1/6/2019 Unknown Anemia ICD-10-CM: D64.9 ICD-9-CM: 285.9  10/28/2018 Yes Hyperlipidemia ICD-10-CM: E78.5 ICD-9-CM: 272.4  10/26/2018 Yes Chronic back pain (Chronic) ICD-10-CM: M54.9, G89.29 ICD-9-CM: 724.5, 338.29  10/26/2018 Yes History of GI bleed ICD-10-CM: Z87.19 ICD-9-CM: V12.79 Present on Admission 11/25/2016 Yes  Diastolic CHF, chronic (HCC) ICD-10-CM: I50.32 
 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  
  Tissue MVR 1989 following failed balloon valvuloplasty for MS Redo MVR 2004 due to severe MR, AVR due to AR ([employer]) CAD (coronary artery disease), native coronary artery ICD-10-CM: I25.10 ICD-9-CM: 414.01  1/8/2015 Yes S/P MVR (mitral valve replacement) ICD-10-CM: C03.6 ICD-9-CM: V43.3  1/8/2015 Yes

## 2019-01-21 NOTE — PROGRESS NOTES
Cardiology Progress Note [address] [facility], [address] Phone [phone]; Fax [phone] 
 
 
 
2019 9:29 AM  
 
Admit Date:           2019 Admit Diagnosis:  COPD with acute exacerbation ([facility] [address] :          1955 MRN:          [id number] ASSESSMENT/RECOMMENDATION:  
Chronic atrial fibrillation 
 -chronic on coumadin (for mechanical valves) -Her HR is up but this may be related to her anemia and underlying pulmonary issues.  
 -on cardizem and digoxin. Dig level 0.5 in 19- will d/c digoxin now with worsening creatinine. 2) Mechanical AV/MV replacement 
-she has only one option and that is to stay anticoagulated with her mechanical valves. No alternative treatment. 
-coumadin on hold INR supra therapeutic-   
-echo ordered 3)SOB 
-pBNP only 982 and small pleural effusion   
4) Anemia H/h stable   
5) Dyslipidemia 6) CKD: creatinine trending up   
  
 
 
 
 07 - 1900 In: 400 [P.O.:400] Out: 0 Last 3 Recorded Weights in this Encounter 19 2102 19 0450 19 0505 Weight: 120 lb 4.8 oz (54.6 kg) 122 lb 6.4 oz (55.5 kg) 119 lb (54 kg) 1901 -  0700 In: 1481.7 [P.O.:600; I.V.:500] Out: 1440 [[facility] SUBJECTIVE Faith January denies palpitations, irregular heart beat, SOB, chest pain or LE edema. No lightheadedness or dizziness + fatigue Current Facility-Administered Medications Medication Dose Route Frequency  0.9% sodium chloride infusion  100 mL/hr IntraVENous CONTINUOUS  
 bumetanide (BUMEX) tablet 0.5 mg  0.5 mg Oral EVERY OTHER DAY  Warfarin: Hold tonight. INR = 6   Other Rx Dosing/Monitoring  pantoprazole (PROTONIX) tablet 40 mg  40 mg Oral ACB&D  
  insulin glargine (LANTUS) injection 8 Units  8 Units SubCUTAneous QHS  
 0.9% sodium chloride infusion 250 mL  250 mL IntraVENous PRN  
 levalbuterol (XOPENEX) nebulizer soln 0.63 mg/3 mL  0.63 mg Nebulization Q6H RT  
 piperacillin-tazobactam (ZOSYN) 3.375 g in 0.9% sodium chloride (MBP/ADV) 100 mL  3.375 g IntraVENous Q8H  
 insulin lispro (HUMALOG) injection   SubCUTAneous AC&HS  
 artificial saliva (MOUTH KOTE) 1 Spray  1 Spray Oral PRN  predniSONE (DELTASONE) tablet 10 mg  10 mg Oral DAILY WITH BREAKFAST  0.9% sodium chloride infusion 250 mL  250 mL IntraVENous PRN  
 escitalopram oxalate (LEXAPRO) tablet 10 mg  10 mg Oral QPM  
 docusate sodium (COLACE) capsule 100 mg  100 mg Oral DAILY PRN  polyethylene glycol (MIRALAX) packet 17 g  17 g Oral DAILY PRN  
 digoxin (LANOXIN) tablet 0.125 mg  0.125 mg Oral DAILY  ondansetron (ZOFRAN) injection 4 mg  4 mg IntraVENous Q8H PRN  
 ALPRAZolam (XANAX) tablet 0.5 mg  0.5 mg Oral BID PRN  
 Warfarin- Pharmacist Dosing   Other Rx Dosing/Monitoring  oxyCODONE IR (ROXICODONE) tablet 5 mg  5 mg Oral Q8H PRN  
 0.9% sodium chloride infusion 250 mL  250 mL IntraVENous PRN  
 alteplase (CATHFLO) 1 mg in sterile water (preservative free) 1 mL injection  1 mg InterCATHeter PRN  
 sodium chloride (NS) flush 10-30 mL  10-30 mL InterCATHeter PRN  
 sodium chloride (NS) flush 10 mL  10 mL InterCATHeter Q24H  
 sodium chloride (NS) flush 10 mL  10 mL InterCATHeter PRN  
 sodium chloride (NS) flush 10-40 mL  10-40 mL InterCATHeter Q8H  
 sodium chloride (NS) flush 20 mL  20 mL InterCATHeter Q24H  
 bacitracin 500 unit/gram packet 1 Packet  1 Packet Topical PRN  
 guaiFENesin ER (MUCINEX) tablet 600 mg  600 mg Oral Q12H  
 dilTIAZem CD (CARDIZEM CD) capsule 240 mg  240 mg Oral DAILY  nystatin (MYCOSTATIN) 100,000 unit/mL oral suspension 500,000 Units  500,000 Units Oral QID  sodium chloride (NS) flush 5-10 mL  5-10 mL IntraVENous Q8H  
  sodium chloride (NS) flush 5-10 mL  5-10 mL IntraVENous PRN  
 nicotine (NICODERM CQ) 21 mg/24 hr patch 1 Patch  1 Patch TransDERmal Q24H  
 fluticasone-umeclidin-vilanter 100-62.5-25 mcg dsdv 1 Puff  (Patient Supplied)  1 Puff Inhalation DAILY  roflumilast (DALIRESP) tablet 500 mcg  500 mcg Oral DAILY  0.9% sodium chloride infusion  50 mL/hr IntraVENous PRN  
 glucose chewable tablet 16 g  4 Tab Oral PRN  
 dextrose (D50W) injection syrg 12.5-25 g  12.5-25 g IntraVENous PRN  
 glucagon (GLUCAGEN) injection 1 mg  1 mg IntraMUSCular PRN OBJECTIVE Intake/Output Summary (Last 24 hours) at 1/21/2019 9181 Last data filed at 1/21/2019 1458 Gross per 24 hour Intake 1781.7 ml Output 450 ml Net 1331.7 ml Review of Systems - History obtained from the patient AS PER  HPI Telemetry NSR 
 
PHYSICAL EXAM  
  
 
Visit Vitals /61 (BP 1 Location: Left arm, BP Patient Position: At rest) Pulse 89 Temp 98 °F (36.7 °C) Resp 19 Ht 5' 4\" (1.626 m) Wt 119 lb (54 kg) SpO2 97% BMI 20.43 kg/m² Gen: Well-developed, frail/thin, in no acute distress  alert and oriented x 3 HEENT:  Pink conjunctivae, Hearing grossly normal.No scleral icterus or conjunctival, moist mucous membranes Neck: Supple,No JVD Resp: No accessory muscle use, exp wheezing, crackles in bases. Shallow breath sounds Card: Regular Rate,Rythm, 2/6  Murmur/crisp click, rubs or gallop. No thrills. GI:          soft, non-tender MSK: No cyanosis or clubbing, good capillary refill Skin: No rashes or ulcers, + bruising Neuro:  Cranial nerves are grossly intact, moving all four extremities, no focal deficit, follows commands appropriately Psych:  Good insight, oriented to person, place and time, alert, Nml Affect LE: No edema DATA REVIEW Laboratory and Imaging have been reviewed by me and are notable for Recent Labs  
  01/20/19 
1806 01/20/19 
1333 01/20/19 
3009 TROIQ 0.11* 0.13* 0.14* Recent Labs  
  01/21/19 
0257 01/20/19 
1549 01/20/19 
1333  01/20/19 
0156 01/19/19 2033 01/19/19 
1800 01/19/19 
0011   --   --   --  142 139  --  141  
K 3.6  --   --   --  5.1 5.0  --  3.6 CO2 33*  --   --   --  33* 35*  --  37* BUN 38*  --   --   --  36* 32*  --  21* CREA 1.78*  --   --   --  1.44* 1.23*  --  0.80 GLU 54*  --   --   --  110* 100  --  73 PHOS 6.2*  --   --   --  8.7*  --   --  5.6*  
MG 2.3  --   --   --  2.3  --   --  2.0 WBC 18.9*  --   --   --  31.1*  --  38.6* 15.5* HGB 8.1* 6.8* 6.9*   < > 7.2*  --  8.6* 8.0*  
HCT 25.3* 21.8* 22.2*   < > 23.4*  --  27.3* 25.5*  
*  --   --   --  147*  --  209 147*  
 < > = values in this interval not displayed.   
 
 
 
 
 
Mohamud Matamoros, STEVE

## 2019-01-21 NOTE — PROGRESS NOTES
Shift Summary 1930: Bedside and Verbal shift change report given to Aruan Mares RN and Amanda Humphrey, Student Nurse (oncoming nurse) by Nisreen Carranza RN (offgoing nurse). Report included the following information SBAR, Kardex, Procedure Summary, Intake/Output, MAR, Accordion, Recent Results and Cardiac Rhythm Afib, Aflutter. Patient lethargic but easily arousable, A&Ox4 
 
2115: 1u prbcs transfusion started 
 
0023: Transfusion completed. Tolerated well 
 
0300: blood sent down to lab 
 
0420: Critical INR of 5.9, FP resident made aware. HGb now 8.9 
 
0730: Bedside and Verbal shift change report given to Addie Cruz RN (oncoming nurse) by Aruna Mares RN (offgoing nurse). Report included the following information SBAR, Kardex, Procedure Summary, Intake/Output, Accordion, Recent Results and Cardiac Rhythm Afib. HYPOGLYCEMIC EPISODE DOCUMENTATION Patient with hypoglycemic episode(s) at 0658*(time) on 1/21/19(date). BG value(s) pre-treatment 79 Was patient symptomatic? [] yes, [x] no Patient was treated with the following rescue medications/treatments: [] D50 [] Glucose tablets 
              [] Glucagon 
              [x] 4oz juice 
              [] 6oz reg soda 
              [] 8oz low fat milk BG value post-treatment 118 Once BG treated and value greater than 80mg/dl, pt was provided with the following: 
[x] snack 
[] meal 
Name of MD notified: Kemar Andres The following orders were received: none

## 2019-01-21 NOTE — PROGRESS NOTES
Problem: Falls - Risk of 
Goal: *Absence of Falls Document Kisha Snider Fall Risk and appropriate interventions in the flowsheet. Outcome: Progressing Towards Goal 
Fall Risk Interventions: 
Mobility Interventions: Bed/chair exit alarm, Communicate number of staff needed for ambulation/transfer, Patient to call before getting OOB, PT Consult for mobility concerns Mentation Interventions: Adequate sleep, hydration, pain control, Bed/chair exit alarm, Door open when patient unattended, Evaluate medications/consider consulting pharmacy, Eyeglasses and hearing aids, Familiar objects from home, Family/sitter at bedside, Gait belt with transfers/ambulation, HELP ([address]) if available, Increase mobility, More frequent rounding, Reorient patient, Room close to nurse's station, Self-releasing belt, Toileting rounds, Update white board Medication Interventions: Bed/chair exit alarm, Patient to call before getting OOB, Teach patient to arise slowly Elimination Interventions: Bed/chair exit alarm, Call light in reach, Toileting schedule/hourly rounds, Patient to call for help with toileting needs. Melissa Kessler 0700- Bedside and Verbal shift change report given to Ilya Kennedy RN (oncoming nurse) by Aries Will RN (offgoing nurse). Report included the following information SBAR, Kardex and Recent Results. ..  
 
0900- Having stool and urine at same time. . Unable to collect urine @ present time. 1200- Notify MDs  The above situation. . Order received and can do straight cath and will urine to lab. Blood to lab. Melissa Kessler 1330- MD VERA BLACK) is here to see. Melissa Kessler Bladder scan 133. Melissa Kessler 1900- Bedside and Verbal shift change report given to Aries Will RN (oncoming nurse) by Audrey Gorman (offgoing nurse). Report included the following information SBAR, Kardex and Recent Results. Melissa Kessler

## 2019-01-21 NOTE — PROGRESS NOTES
Occupational therapy note:  
Chart reviewed. Patient with INR of 5.9, with goal of 2.5-3.5. Will hold OT services today and follow up with patient tomorrow. Idalia Gibbons MS OTR/L

## 2019-01-21 NOTE — PROGRESS NOTES
Auth from [facility] according to [facility] was obtained over the weekend. I called Pasha Montoya who is in admissions at [facility]. She said that the auth is valid for 72 hours. She will call [facility] tomorrow. I will continue to follow.  JENNIFER Estes

## 2019-01-21 NOTE — PROGRESS NOTES
[facility] Dosing Services: 01/21/19 Consult for Warfarin Dosing by Pharmacy by Sandra Matamoros Consult provided for this 66 y.o female for indication of  afib, MVR, AVR Day of therapy: resumed from home. (PTA: Warfarin 3 mg on Sat/Sun/Mon/Tue/Wed/Thur and 2 mg on Fri) Warfarin: Hold tonight. INR = 5.9 Significant drug interactions: Zosyn (abx) LABS   
PT/INR Lab Results Component Value Date/Time INR 5.9 (HH) 01/21/2019 02:57 AM  
  
Platelets Lab Results Component Value Date/Time PLATELET 159 (L) 44/67/3672 02:57 AM  
  
H/H Lab Results Component Value Date/Time HGB 8.1 (L) 01/21/2019 02:57 AM  
  
 
Warfarin Administrations (last 168 hours) Date/Time Action Medication Dose  
 01/18/19 1716 Given  
 warfarin (COUMADIN) tablet 3 mg 3 mg  
 01/17/19 1833 Given  
 warfarin (COUMADIN) tablet 3 mg 3 mg  
 01/16/19 1802 Given  
 warfarin (COUMADIN) tablet 2.5 mg 2.5 mg  
 01/15/19 1739 Given  
 warfarin (COUMADIN) tablet 2.5 mg 2.5 mg  
 01/14/19 1829 Given  
 warfarin (COUMADIN) tablet 3 mg 3 mg Pharmacy to follow daily and will provide subsequent Warfarin dosing based on clinical status. Thank you for the consult, 
Varun CHAUDHARI, [address]

## 2019-01-21 NOTE — CONSULTS
[address] 
MR#: [id number] : 1955 ACCOUNT #: [de-identified] DATE OF SERVICE: 2019 REASON FOR CONSULTATION:  Elevated serum creatinine. This is a 74-year-old white female with the following medical problems: 1. Recent episode of acute kidney injury. 2.  History of congestive heart failure. 3.  Status post mitral valve repair and aortic valve replacement. 4.  Anemia. 5.  Chronic obstructive pulmonary disease. 6.  Atrial fibrillation. 7.  Ongoing tobacco use. We are asked to see the patient regarding an elevated serum creatinine, today measured at 1.78 mg/dL. In addition, we are asked to see the patient in regards to a serum phosphorus of 6.2 mg/dL (improved). This is a 74-year-old white female who has been seen previously by my partners (10/2018) for elevated serum creatinine values. These largely resolved. She has been readmitted to the hospital, has been here several days. Her serum creatinine over the last several days has shown a progressive rise, and in fact, she is net fluid negative approximately 5 liters over the past week. She looks quite frail. She cannot provide much of a history. It is unclear whether or not she has been seen in the office, it appears that she has been primarily evaluated while hospitalized. CURRENT MEDICATIONS:  Of note include diltiazem 240 mg daily, Lexapro, guaifenesin, insulin, pantoprazole 40 mg daily, prednisone 10 mg daily, warfarin. Normal saline solution at 75 mL an hour (started yesterday). PHYSICAL EXAMINATION: 
GENERAL:  Very frail, chronically ill-appearing female in bed. VITAL SIGNS:  Most recent blood pressure documented is 123/61, pulse 89, temperature is 98 degrees, oxygen saturation 97%, respiratory rate is 19. HEENT:  Normocephalic. NECK:  Without swelling. LUNGS:  Show rhonchi bilaterally. HEART:  Shows no pericardial friction rub. ABDOMEN:  Soft. EXTREMITIES:  Show trace thigh edema. SKIN:  Warm to touch. NEUROLOGIC:  She is awake and alert and answers questions appropriately. LABORATORY DATA:  No white blood cell count 21,000, hematocrit 26.3, platelet count is 546,000. Sodium 143, potassium 3.6, total CO2 is 33, creatinine is 1.78, glucose 54 (earlier). Phosphorus 6.2, magnesium 2.3. Renal ultrasound done January 19th showed right and left kidneys measuring 9.4 and 9.9 cm respectively, no evidence of hydronephrosis empty bladder. No ascites. ASSESSMENT AND PLAN:  The patient's serum creatinine is rising with continued diuresis. She is now getting intravenous fluids. She is being seen by cardiology. Her postvoid bladder scan (done at the time of my evaluation) was 133 mL per minute. I think at this point would fluid resuscitate (judiciously); I do not think much more needs to be done as any evaluation had probably been performed previously by my partners. Dr. Jessica Peña or Dr. Zenaida Barahona returns tomorrow and they will follow up the patient at that time. Thanks for the consult. Chris Avila MD 
  
 
CGA / MN 
D: 01/21/2019 13:52    
T: 01/21/2019 14:05 JOB #: [id number]

## 2019-01-21 NOTE — PROGRESS NOTES
[address] Medicine Residency Resident Note in Brief 
---------------------------------------- 
 
S: 
Patient's  at bedside concerned that patient is not eating or drinking and is weak. Explained that she has become more fatigued over the past several days and that her kidney function is declining. Discussed that we are having nephrology and palliative care to see the patient to determine the cause of her worsening kidney function. O: 
Visit Vitals /74 Pulse 78 Temp 98 °F (36.7 °C) Resp 27 Ht 5' 4\" (1.626 m) Wt 119 lb (54 kg) SpO2 98% BMI 20.43 kg/m² Physical Exam 
Patient appears tired and is not interactive Lungs are coarse anteriorly and posteriorly without wheezes, min crackles appreciated at bases HR ~ 80bpm, irregularly irregular with mechanical click LE with trace edema bilaterally A/P Kidney function declining based on Cr and decreased Uop however patient with minimal PO intake since 1/19/19 and increasingly weak, lethargic. ABG improved from 1/19, LA WNL at 0.5, Cr uptrending to 1.86, WBC stable at 20.7, Hgb stable at 8.3 
- ECHO pending 
- Urine lytes pending - appreciate nephrology and palliative consultation Please see full daily progress note for complete plan.  
Kem Soliz MD 
4:23 PM

## 2019-01-21 NOTE — PROGRESS NOTES
Patient lying in bed resting. Did not respond to verbal stimuli x 3.  Spoke words of comfort and hope. Provided spiritual presence. No family or visitors present at this time. Will continue to follow up as needed and upon request. 
 
Visited by Rev. Milli Morris, [facility]  paging service: [phone])

## 2019-01-22 NOTE — PROGRESS NOTES
[facility]  TIA Name: Faith Vergara MRN: [id number] : 1955 Hospital: [address] Date: 2019 Impression Plan 1. Acute on chronic resp failure 2. CO2 retention 3. Encephalopathy 4. Leukocytosis 5. JAZZMINE 6.  
 
 
 
 
 
 
 · Continous bipap for now if pt tolerates. Spoke at length with pt's  who feels that pt has suffered enough at this point. He states he does not want her to go through any aggressive treatment and would like to persue comfort. · No pressors · NS 100cc/hr · Add IV steroids for now · Hold lasix · Recent CT abdomen with no intra-abdominal process Pt critically ill and at high risk for respiratory decompensation. cctime 36 min outside procedures. Radiology ( personally reviewed) CT chest  with no new infiltrates ABG No results for input(s): PHI, PO2I, PCO2I in the last 72 hours. Subjective Cc: respiratory failure Got called by [facility] residents due to increasing lethargy, respiratory distress. ABG showed acute on chronic respiratory failure and pt finally in distress enough to agree to bipap. Per nursing pt has refused bipap under any circumstance due to claustrophobia. There is no aspiration event/fevers/chills leading up to event, however WBC has been increasing in the last week. Chest and abdominal CT negative for acute etiology. Mildly hypotensive before bipap applied, but responded to small fluid bolus. Review of Systems: 
Review of systems not obtained due to patient factors. Past Medical History:  
Diagnosis Date  A-fib ([facility] [address] [facility]  Anticoagulant long-term use  CAD (coronary artery disease), native coronary artery   
 mild to moderate by cath  CHF (congestive heart failure) ([facility]  Chronic obstructive pulmonary disease ([facility] [address] [facility]  Dyslipidemia  History of vascular access device 10/26/2018 [facility] VAT - 4 FR Midline, 9 cm, R basilic, for difficult iv access  History of vascular access device 01/08/2019 [facility] VAT - 5 FR PICC, right basilic, 36 cm , for limited vessels/heparin  Ill-defined condition   
 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  
Procedure Laterality Date  COLONOSCOPY N/A 11/28/2016 COLONOSCOPY performed by Dai Cam MD at [facility]  HX HEART CATHETERIZATION    
 cabg  HX HYSTERECTOMY    
 BSO  
 HX MITRAL VALVE REPLACEMENT    
 and redo MVR and AVR Prior to Admission medications Medication Sig Start Date End Date Taking? Authorizing Provider  
bumetanide (BUMEX) 0.5 mg tablet Take 1 Tab by mouth every other day. 1/21/19  Yes Daquan Bhat MD  
predniSONE (DELTASONE) 10 mg tablet Take 10 mg by mouth daily (with breakfast). 1/21/19  Yes Daquan Bhat MD  
artificial saliva (MOUTH KOTE) spra Take 1 Spray by mouth as needed for Other. 1/20/19  Yes Daquan Bhat MD  
digoxin (LANOXIN) 0.125 mg tablet Take 1 Tab by mouth daily. 1/21/19  Yes Daquan Bhat MD  
ALPRAZolam [facility]) 0.5 mg tablet Take 1 Tab by mouth two (2) times daily as needed for Anxiety (Please offer attarax initially but if anxiety is nonresponsive). Max Daily Amount: 1 mg. 1/20/19  Yes Daquan Bhat MD  
naloxone [facility]) 4 mg/actuation nasal spray Use 1 spray intranasally, then discard. Repeat with new spray every 2 min as needed for opioid overdose symptoms, alternating nostrils. 1/20/19  Yes Daquan Bhat MD  
guaiFENesin ER [facility]) 600 mg ER tablet Take 1 Tab by mouth every twelve (12) hours. 1/16/19  Yes Daquan Bhat MD  
escitalopram oxalate (LEXAPRO) 10 mg tablet Take 1 Tab by mouth every evening.  1/16/19  Yes Daquan Bhat MD  
 dilTIAZem CD (CARDIZEM CD) 240 mg ER capsule Take 1 Cap by mouth daily. 1/16/19  Yes Sailaja Ochoa MD  
roflumilast [facility]) tab tablet Take 500 mcg by mouth daily. Yes Provider, Historical  
warfarin (COUMADIN) 1 mg tablet Take  by mouth six (6) days a week. Warfarin 3 mg on Sa/Alfonso/Mo/Tu/We/Th and Warfarin 2 mg on Fr   Yes Provider, Historical  
oxyCODONE-acetaminophen (PERCOCET) 5-325 mg per tablet Take 1 Tab by mouth every eight (8) hours as needed for Pain. Max Daily Amount: 3 Tabs. Must last 30 days fill 12/27/18 12/21/18  Yes Dian French,   
lisinopril (PRINIVIL, ZESTRIL) 5 mg tablet Take 1 Tab by mouth daily. 11/8/18  Yes Dian French DO  
lovastatin (MEVACOR) 10 mg tablet Take 10 mg by mouth nightly. Yes Other, MD Andre  
fluticasone-umeclidin-vilanter (TRELEGY ELLIPTA) 100-62.5-25 mcg dsdv Take 1 Puff by inhalation daily. 10/5/18  Yes Dian French DO  
docusate sodium (COLACE) 100 mg capsule Take 100 mg by mouth every evening. Yes Provider, Historical  
potassium chloride SR (KLOR-CON 10) 10 mEq tablet Take 10 mEq by mouth daily. Yes Provider, Historical  
dilTIAZem (CARDIZEM) 60 mg tablet TAKE 1 TABLET BEFORE BREAKFAST, LUNCH, DINNER AND AT BEDTIME 8/22/17  Yes Dian French DO  
albuterol (VENTOLIN HFA) 90 mcg/actuation inhaler Take 2 Puffs by inhalation every four (4) hours as needed for Wheezing or Shortness of Breath. 1/18/19   Serafin Ponce,   
 
Current Facility-Administered Medications Medication Dose Route Frequency  guaiFENesin (ROBITUSSIN) 100 mg/5 mL oral liquid 200 mg  200 mg Oral Q6H  
 albuterol (ACCUNEB) nebulizer solution 1.25 mg  1.25 mg Nebulization Q4H RT  
 Warfarin - HOLD warfarin 1/22/19 for INR 4.9   Other QPM  
 furosemide (LASIX) injection 10 mg  10 mg IntraVENous ONCE  
 sodium chloride 0.9 % bolus infusion 250 mL  250 mL IntraVENous ONCE  
 0.9% sodium chloride infusion  100 mL/hr IntraVENous CONTINUOUS  
  pantoprazole (PROTONIX) tablet 40 mg  40 mg Oral ACB&D  
 insulin lispro (HUMALOG) injection   SubCUTAneous AC&HS  predniSONE (DELTASONE) tablet 10 mg  10 mg Oral DAILY WITH BREAKFAST  escitalopram oxalate (LEXAPRO) tablet 10 mg  10 mg Oral QPM  
 Warfarin- Pharmacist Dosing   Other Rx Dosing/Monitoring  sodium chloride (NS) flush 10 mL  10 mL InterCATHeter Q24H  
 sodium chloride (NS) flush 10-40 mL  10-40 mL InterCATHeter Q8H  
 sodium chloride (NS) flush 20 mL  20 mL InterCATHeter Q24H  
 dilTIAZem CD (CARDIZEM CD) capsule 240 mg  240 mg Oral DAILY  nystatin (MYCOSTATIN) 100,000 unit/mL oral suspension 500,000 Units  500,000 Units Oral QID  sodium chloride (NS) flush 5-10 mL  5-10 mL IntraVENous Q8H  
 nicotine (NICODERM CQ) 21 mg/24 hr patch 1 Patch  1 Patch TransDERmal Q24H  
 fluticasone-umeclidin-vilanter 100-62.5-25 mcg dsdv 1 Puff  (Patient Supplied)  1 Puff Inhalation DAILY  roflumilast (DALIRESP) tablet 500 mcg  500 mcg Oral DAILY Allergies Allergen Reactions  Spiriva Respimat [Tiotropium Bromide] Anaphylaxis and Other (comments) Adverse effects; Per RN - pt states that Spiriva caused throat swelling and could not breathe well.  Spiriva Respimat [Tiotropium Bromide] Shortness of Breath  Tomato Shortness of Breath Only occurs with raw tomatoes, tolerates ketchup without issue  Celebrex [Celecoxib] Rash  Celebrex [Celecoxib] Rash  Rocephin [Ceftriaxone] Shortness of Breath  Tomato Rash Social History Tobacco Use  Smoking status: Current Every Day Smoker Packs/day: 0.50 Types: Cigarettes Last attempt to quit: 11/1/2015 Years since quitting: 3.2  Smokeless tobacco: Never Used  Tobacco comment: 11/1/2014 Substance Use Topics  Alcohol use: No  
  Alcohol/week: 0.0 oz Family History Problem Relation Age of Onset  Cancer Brother Laboratory: I have personally reviewed the critical care flowsheet and labs. Recent Labs  
  01/22/19 
1425 01/22/19 
0232 01/21/19 
1230 WBC 29.7* 18.2* 20.7* HGB 7.8* 7.3* 8.3* HCT 24.9* 23.2* 26.3*  
 125* 137* Recent Labs  
  01/22/19 
0232 01/21/19 
1230 01/21/19 
0257 01/20/19 
0448 01/20/19 
4358  143 143  --  142  
K 3.7 4.3 3.6  --  5.1  102 101  --  98  
CO2 32 31 33*  --  33* GLU 87 92 54*  --  110* BUN 33* 35* 38*  --  36* CREA 1.75* 1.86* 1.78*  --  1.44* CA 8.3* 8.4* 8.5  --  8.4* MG 2.0  --  2.3  --  2.3 PHOS 5.7* 5.6* 6.2*  --  8.7* ALB 2.3* 2.5* 2.4*  --  2.6* SGOT 197* 193* 206*  --  342* ALT 78 90* 93*  --  122* INR 4.9*  --  5.9* 6.0* 6.0* Objective: Mode Rate Tidal Volume Pressure FiO2 PEEP  
         50 % Vital Signs:   
 TMAX(24) Intake/Output:  
Last shift:      Ventilator Volumes Vt Spont (ml): 250 ml Ve Observed (l/min): 7.6 l/min Last 3 shifts: 01/22 0701 - 01/22 1900 In: 48 [P.O.:50] Out: - [id number] Intake/Output Summary (Last 24 hours) at 1/22/2019 1555 Last data filed at 1/22/2019 1241 Gross per 24 hour Intake 1672.08 ml Output 1000 ml Net 672.08 ml EXAM:  
GENERAL: on bipap, lethargic, taking volumes of 200 on IPAP 10, HEENT:  PERRL, EOMI, no alar flaring or epistaxis, oral mucosa moist without cyanosis, NECK:  no jugular vein distention, no retractions, no thyromegaly or masses, LUNGS: decreased breath sounds billaterally with minimal airmovement , HEART:  Regular rate and rhythm with no MGR; no edema is present, ABDOMEN:  soft with no tenderness, bowel sounds present, EXTREMITIES:  warm with no cyanosis, SKIN:  no jaundice or ecchymosis and NEUROLOGIC:  Wakes up with stimuli, but falls right back asleep. Robbi Soto MD 
[facility] [place]

## 2019-01-22 NOTE — PROGRESS NOTES
[address] Medicine Residency Resident Note in Brief 
---------------------------------------- 
 
S: 
Called to patient bedside as patient was lethargic and not engaging with palliative care physician. Per nursing, patient refusing medications as well as BiPaP. Patient with low BPS on monitor (MAPS 40-50s). Patient complaining of abdominal pain and nausea but without emesis Called patient's  to let him know that she wasdoing poorly. Asked what she would want done in this situation. He reiterated that she would not want to be placed on life support and clarified this to mean no chest compressions and no intubation. He stated that use of pressors to support BP was fine. Had similar conversation with patient with  at bedside 1/22/19 where patient agreed that she did not want life support or admission to ICU clarified to mean that she does not want to be intubated nor cardiovascular resuscitation if her heart were to stop. O: 
Visit Vitals /60 (BP 1 Location: Left arm, BP Patient Position: At rest) Pulse 82 Temp 97.4 °F (36.3 °C) Resp 30 Ht 5' 4\" (1.626 m) Wt 120 lb 8 oz (54.7 kg) SpO2 97% BMI 20.68 kg/m² Gen: lethargic, nods head to questions CV: irregular rhythm, no LE edema Pulm: coarse BS throughout with crackles at bilateral bases pH on ABG 7.16 PCO2 84.5 PO2 68 HCO3 29.7 A/P: ABG c/w respiratory acidosis. Patient with ongoing soft BPs despite fluid bolus. - Placed on BiPAP 
- 500cc NS bolus - Intensivist consulted 
-  notified of patient's condition and encouraged to come to hospital to discuss care in greater depth Please see full daily progress note for complete plan. Emily Mcrae MD 
2:44 PM 
 
-------- Addendum Patient's  arrived. Discussed patient's current status with him, intensivist, Dr Tramaine Morales, myself and Dr Edin Teague. Discussed patients current status and declining respiratory status.  Mr Neda Tubbs stated \"just let her go if she wants to\". He expressed understanding that she has severe COPD and that she has to work hard to breath even outside of the hospital. He states that she would not want life prolonging measures. We discussed these measures, namely the role of pressors and  declined use of pressors and further life prolonging measures in his wife's care. He stated he is ok with BiPaP right now but we discussed that if Mrs Merlene Abarca becomes uncomfortable with the mask we can pursue care to make her comfortable. Palliative team is following and will meet with family this afternoon. Ree Griffin MD 
[address] [place] [address] 3:55pm 
 
-------------- Addendum Met with palliative team and Mr Merlene Abarca. He does not want to watch his wife die but knows that it is her time to go. He'd like the BiPAP removed because she doesn't like it. Wishes to make her as comfortable as possible and doesn't want her to go to the ICU. He'd like to be called when she passes. Toni smith numbers below: 
 
[phone] (Home) 
[phone] (cell) 
[phone] (work) Ree Griffin MD 
[id number] [place] 5:15pm

## 2019-01-22 NOTE — PROGRESS NOTES
Palliative Medicine Code Status: DNR Advance Care Planning: 
Advance Care Planning 1/22/2019 Patient's Healthcare Decision Maker is: Legal Next of Kin Primary Decision Maker Name Radha García () Confirm Advance Directive None Patient Would Like to Complete Advance Directive Currently not alert enough to complete Patient / Family Encounter Documentation Participants (names): Pt, Palliative Medicine (Dr. Rafiq Jaffe, [facility]) Narrative:  Supportive visit to pt, no family present. Pt was poorly responsive, kept eyes closed through majority of visit, spoke only once, otherwise nodded yes/no or shrugged in response to questions. Pt denied pain or SOB, complained of nausea. Pt lives with  of 15+ yrs as well as dtr and a grandchild or grandchildren. Pt indicated  would not be coming in today, reports  works during the day. Pt does not have AMD on file.  Radha García is legal NOK and would serve as surrogate decision maker if pt is unable to speak for herself. Psychosocial Issues Identified/ Resilience Factors: Prolonged hospitalization, pt with significant anxiety, had appeared motivated to get better early in hospitalization with goal of being able to cook dinner for her , is now noted to be declining PT/OT or otherwise unable to participate, appeared withdrawn at time of visit.  note over weekend states  has expressed concern that pt is \"giving up. \" Goals of Care / Plan: Palliative team will attempt follow up when pt is more alert, will otherwise plan to contact  to offer support and discuss goals of care. SW will follow for support. Thank you for including Palliative Medicine in the care of Ms. Taylor Keane. Sylwia  ROSA Mccain, [facility]- 
[phone])

## 2019-01-22 NOTE — PROGRESS NOTES
Bedside and Verbal shift change report given to Johnathon (onczara nurse) by Elizabeth Woods (offgoing nurse). Report included the following information SBAR, Kardex, Procedure Summary, Intake/Output, MAR, Recent Results and Med Rec Status. 1623: FP notified patient , at bedside, refuses for patient's BG to be checked. Dr Cyndy Lucas asked if CXR should be portable and she and Dr Tramaine Morales state cancel cxr. 
 
3294:  services called to request a volunteer for [facility].  Bruce Nathaniel states he will mobilize a team and send someone to sit with patient. 1819: Please call  [phone] with any changes. Please call [phone] after 9pm.  
 
1900: Bedside and Verbal shift change report given to [facility] (oncoming nurse) by Johnathon (offgoing nurse). Report included the following information SBAR, Kardex, Procedure Summary, Intake/Output, MAR, Recent Results and Med Rec Status.

## 2019-01-22 NOTE — PROGRESS NOTES
Shift Summary 1930: Bedside and Verbal shift change report given to Aldo Brooks RN (oncoming nurse) by Wally Elam RN (offgoing nurse). Report included the following information SBAR, Kardex, Procedure Summary, Intake/Output, MAR, Accordion, Recent Results and Cardiac Rhythm Aflutter. 2200: Patient has had two incontinent dark stools and one stool in bedside commode. Stool appears to have some blood in it, though it is brown. Patient has increased energy compared to yesterday, but is still weak. Urine dark brown ([employer] colored). Skin tear on arm continues to bleed under central line dressing. Patient refused dinner or any snack offered 0010: Patient complaining of anxiety. Prn xanex order instructs to try atarax first, however no atarax ordered. Administered prn Xanex. 0130: Patient oxygen dropping into 80s as she sleeps. Increased to 3L NC 
 
0420:  
Patient had another incontinent loose stool that appears to contain blood. INR resulted at 4.9, critical but improved from yesterday. hgb dropped to 7.3. Notified Dr. Verónica Du ([address] [facility] resident) of results and sources of bleeding noted overnight. 0730: Bedside and Verbal shift change report given to Arturo Murrieta RN (oncoming nurse) by Aldo Brooks RN (offgoing nurse). Report included the following information SBAR, Kardex, Procedure Summary, Intake/Output, MAR, Accordion, Recent Results and Cardiac Rhythm Aflutter. Care Plan SummaryProblem: Falls - Risk of 
Goal: *Absence of Falls Document Croswell Shows Fall Risk and appropriate interventions in the flowsheet. Outcome: Progressing Towards Goal 
Fall Risk Interventions: 
Mobility Interventions: Bed/chair exit alarm, Patient to call before getting OOB, Utilize walker, cane, or other assistive device, Strengthening exercises (ROM-active/passive), PT Consult for mobility concerns, PT Consult for assist device competence, OT consult for ADLs Mentation Interventions: Bed/chair exit alarm, Door open when patient unattended, Family/sitter at bedside, Increase mobility, More frequent rounding Medication Interventions: Teach patient to arise slowly, Patient to call before getting OOB, Bed/chair exit alarm Elimination Interventions: Bed/chair exit alarm, Call light in reach, Toileting schedule/hourly rounds Problem: Pressure Injury - Risk of 
Goal: *Prevention of pressure injury Document Jose Scale and appropriate interventions in the flowsheet. Outcome: Progressing Towards Goal 
Pressure Injury Interventions: 
Sensory Interventions: Assess changes in LOC, Discuss PT/OT consult with provider, Float heels, Minimize linen layers Moisture Interventions: Absorbent underpads, Limit adult briefs, Internal/External urinary devices, Check for incontinence Q2 hours and as needed Activity Interventions: PT/OT evaluation, Increase time out of bed Mobility Interventions: PT/OT evaluation Nutrition Interventions: Document food/fluid/supplement intake, Offer support with meals,snacks and hydration Friction and Shear Interventions: Apply protective barrier, creams and emollients, Lift team/patient mobility team, Lift sheet, Transferring/repositioning devices Problem: Chronic Obstructive Pulmonary Disease (COPD) Goal: *Optimize nutritional status Outcome: Not Progressing Towards Goal 
Variance: Patient slowly responding Comments: Patient reports no appetite, refusing meals Problem: Anemia Care Plan (Adult and Pediatric) Goal: *Labs within defined limits Outcome: Progressing Towards Goal 
Hgb dropped to 7.3 from 8.3, continues to show signs of bleeding in urine, stool, and from skin tear on arm. INR 4.9 Goal: *Tolerates increased activity Outcome: Progressing Towards Goal 
Appears more perky in bed/more energy, but still very weak, not getting OOB

## 2019-01-22 NOTE — PROGRESS NOTES
[address] Medicine Residency Resident Note in Brief 
---------------------------------------- 
 
S: Assessed pt at bedside. Pt reports that she is doing well right now and wishes to sleep. O: 
Visit Vitals /51 (BP 1 Location: Left arm, BP Patient Position: At rest) Pulse 78 Temp 98 °F (36.7 °C) Resp 19 Ht 5' 4\" (1.626 m) Wt 119 lb (54 kg) SpO2 98% BMI 20.43 kg/m² Gen: Appears tired. No acute distress. CV: NRRR. No murmur. Resp: LCTAB. No wheezing, rales, rhonchi. Ext: no edema. A/P Ms Zack Esparza is a 65 yo F admitted for COPD exacerbation. #Acute on chronic respiratory failure 2/2 COPD exacerbation 
- continue daily prednisone 10 mg  
- continue roflumilast, fluticasone inhaler 
- monitor respiratory status #Anxiety - currently well-controlled 
- continue lexapro 
- continue xanax PRN Please see full daily progress note for complete plan.  
Nikki Rordigues MD 
8:54 PM

## 2019-01-22 NOTE — PROGRESS NOTES
Patient reclining in bed, resting. Opened eyes upon greeting, but did not acknowledge this 's presence or indicate she was aware of my presence. Provided spiritual presence and spoke words of comfort and hope. Patient had requested prayer in the past; a prayer was offered. No family or visitors are present at this time. Will continue to follow up as needed and upon request as able. Visited by Rev. Gaye Daniel, [address] [facility]  paging service: [phone])

## 2019-01-22 NOTE — PROGRESS NOTES
Physical Therapy 1210 chart reviewed, spoke with RN. Pt received in bed,using 2L O2 sat  97%. Pt briefly opened eyes to acknowledge therapist,pt declined OOB to chair or bed level exercise today due to increased nausea. PT will continue to follow Julianna Phillips

## 2019-01-22 NOTE — PROGRESS NOTES
[facility] Dosing Services: 01/22/19 Consult for Warfarin Dosing by Pharmacy by Magdaleno Figueroa Consult provided for this 66 y.o female for indication of  afib, MVR, AVR Day of therapy: resumed from home. (PTA: Warfarin 3 mg on Sat/Sun/Mon/Tue/Wed/Thur and 2 mg on Fri) Warfarin: Hold tonight. INR = 4.9 Significant drug interactions: Zosyn (abx) Previous dose given Held 1/21 PT/INR Lab Results Component Value Date/Time INR 4.9 (HH) 01/22/2019 02:32 AM  
  
Platelets Lab Results Component Value Date/Time PLATELET 411 (L) 33/78/0352 02:32 AM  
  
H/H Lab Results Component Value Date/Time HGB 7.3 (L) 01/22/2019 02:32 AM  
  
 
Pharmacy to follow daily and will provide subsequent Warfarin dosing based on clinical status. Thank you for the consult, 
Varun CHAUDHARI, [address]

## 2019-01-22 NOTE — CONSULTS
Palliative Medicine Family Meeting note Met with spouse, Mainor Pham, also with Family Practice team 
Discussed goals of care. Mr. Espinoza Savers clear that he wants focus now only on comfort. He understands she is dying soon. He doesn't want her to suffer at all. He wants BIPAP removed. Explained transition to comfort - no labs, procedures and will plan to give medications to ease pain/dyspnea/anxiety/agitation. Expect that she will die soon. He has spent time with her now and needs to go home, cannot stay at bedside and watch her die. He wants to be called after she dies. Comfort care order set placed. Other meds discontinued. Additional time on unit 40min

## 2019-01-22 NOTE — PROGRESS NOTES
Palliative Medicine Social Work Note 1410:  Checked back on pt in afternoon along with Palliative physician Dr. Preet Caldwell. No family present. Pt engaged only minimally again, stated \"stomach\" but did not otherwise speak, BP noted to be low on monitor. Dr. Preet Caldwell discussed with RN, Family Practice was notified and came to room to assess pt. Physicians reached out to  by phone; will plan to meet with  upon arrival to discuss goals of care. [phone]:  Family Practice (Dr. Madison Rg) and Palliative Medicine (Dr. Preet Caldwell, Marya Hightower) met with  at bedside. Pt is now on bipap, did not rouse during visit.  stated he already lost one wife, is aware that he is now getting ready to lose another.  spoke of the happy times they've had over their almost 12 yrs of marriage, including 6 cruises and a trip to [place].  shared that pt had a rather difficult life in her younger years.  expressed wish to transition to comfort measures, is aware that pt might not survive the night, stated he can not sit and watch pt die, requested to be contacted when she passes.  took pt's cell phone home, wanted pt's rings left intact for now but stated he will come back to the hospital to gather rings and other belongings after she dies.  does not anticipate other family will be coming, stated pt is not in contact with her two living sisters. Pt does not have children of her own,  has kids from previous marriage, one of whom lives in the home along with a 2 yr old child. SW will continue to follow for support. Consider hospice consult if pt survives the night. Thank you for including Palliative Medicine in the care of Ms. Cassy Franco. [phone], ROSA, [facility]- Palliative Medicine:  [phone])

## 2019-01-22 NOTE — PROGRESS NOTES
Win Luong Georgette [place] [address] YOB: 1955 Assessment & Plan:  
ARF ? Due to diuresis COPD exacerbation Afib Diastolic CHF AVR MVR Chronic anticoagulation GIB Rec: 
Continue IVF Avoid nephrotoxins Watch labs and volume status No acute indication RRT Subjective:  
CC: f/u ARF 
HPI: Renal function stable. ROS: No n/v, SOB is stable Current Facility-Administered Medications Medication Dose Route Frequency  guaiFENesin (ROBITUSSIN) 100 mg/5 mL oral liquid 200 mg  200 mg Oral Q6H  Warfarin - HOLD dose 1/21/19 for INR 5.9   Other QPM  
 prochlorperazine (COMPAZINE) injection 10 mg  10 mg IntraVENous Q6H PRN  
 0.9% sodium chloride infusion  100 mL/hr IntraVENous CONTINUOUS  
 Warfarin: Hold tonight. INR = 6   Other Rx Dosing/Monitoring  pantoprazole (PROTONIX) tablet 40 mg  40 mg Oral ACB&D  
 0.9% sodium chloride infusion 250 mL  250 mL IntraVENous PRN  
 insulin lispro (HUMALOG) injection   SubCUTAneous AC&HS  
 artificial saliva (MOUTH KOTE) 1 Spray  1 Spray Oral PRN  predniSONE (DELTASONE) tablet 10 mg  10 mg Oral DAILY WITH BREAKFAST  0.9% sodium chloride infusion 250 mL  250 mL IntraVENous PRN  
 escitalopram oxalate (LEXAPRO) tablet 10 mg  10 mg Oral QPM  
 docusate sodium (COLACE) capsule 100 mg  100 mg Oral DAILY PRN  polyethylene glycol (MIRALAX) packet 17 g  17 g Oral DAILY PRN  
 ondansetron (ZOFRAN) injection 4 mg  4 mg IntraVENous Q8H PRN  
 ALPRAZolam (XANAX) tablet 0.5 mg  0.5 mg Oral BID PRN  
 Warfarin- Pharmacist Dosing   Other Rx Dosing/Monitoring  oxyCODONE IR (ROXICODONE) tablet 5 mg  5 mg Oral Q8H PRN  
 0.9% sodium chloride infusion 250 mL  250 mL IntraVENous PRN  
 alteplase (CATHFLO) 1 mg in sterile water (preservative free) 1 mL injection  1 mg InterCATHeter PRN  
 sodium chloride (NS) flush 10-30 mL  10-30 mL InterCATHeter PRN  
  sodium chloride (NS) flush 10 mL  10 mL InterCATHeter Q24H  
 sodium chloride (NS) flush 10 mL  10 mL InterCATHeter PRN  
 sodium chloride (NS) flush 10-40 mL  10-40 mL InterCATHeter Q8H  
 sodium chloride (NS) flush 20 mL  20 mL InterCATHeter Q24H  
 bacitracin 500 unit/gram packet 1 Packet  1 Packet Topical PRN  
 dilTIAZem CD (CARDIZEM CD) capsule 240 mg  240 mg Oral DAILY  nystatin (MYCOSTATIN) 100,000 unit/mL oral suspension 500,000 Units  500,000 Units Oral QID  sodium chloride (NS) flush 5-10 mL  5-10 mL IntraVENous Q8H  
 sodium chloride (NS) flush 5-10 mL  5-10 mL IntraVENous PRN  
 nicotine (NICODERM CQ) 21 mg/24 hr patch 1 Patch  1 Patch TransDERmal Q24H  
 fluticasone-umeclidin-vilanter 100-62.5-25 mcg dsdv 1 Puff  (Patient Supplied)  1 Puff Inhalation DAILY  roflumilast (DALIRESP) tablet 500 mcg  500 mcg Oral DAILY  0.9% sodium chloride infusion  50 mL/hr IntraVENous PRN  
 glucose chewable tablet 16 g  4 Tab Oral PRN  
 dextrose (D50W) injection syrg 12.5-25 g  12.5-25 g IntraVENous PRN  
 glucagon (GLUCAGEN) injection 1 mg  1 mg IntraMUSCular PRN Objective:  
 
Vitals: 
Blood pressure 118/61, pulse 91, temperature 97.7 °F (36.5 °C), resp. rate 27, height 5' 4\" (1.626 m), weight 54.7 kg (120 lb 8 oz), SpO2 92 %. Temp (24hrs), Av.9 °F (36.6 °C), Min:97.7 °F (36.5 °C), Max:98 °F (36.7 °C) Intake and Output: 
No intake/output data recorded.  1901 -  0700 In: 2763.8 [P.O.:910; I.V.:1472.1] Out: 1000 [Urine:1000] Physical Exam:               
GENERAL ASSESSMENT: chronically ill, on oxygen CHEST: Rhonchi HEART: S1S2 ABDOMEN: Soft,NT 
EXTREMITY: no EDEMA 
 
 
   
ECG/rhythm: 
 
Data Review No results for input(s): TNIPOC in the last 72 hours. No lab exists for component: ITNL Recent Labs  
  19 
1806 19 
1333 19 
5459 TROIQ 0.11* 0.13* 0.14* Recent Labs  
  19 
0232 19 
1230 19 
0257  19 0156  
 143 143  --  142  
K 3.7 4.3 3.6  --  5.1  102 101  --  98  
CO2 32 31 33*  --  33* BUN 33* 35* 38*  --  36* CREA 1.75* 1.86* 1.78*  --  1.44* GLU 87 92 54*  --  110* PHOS 5.7* 5.6* 6.2*  --  8.7* MG 2.0  --  2.3  --  2.3 CA 8.3* 8.4* 8.5  --  8.4* ALB 2.3* 2.5* 2.4*  --  2.6* WBC 18.2* 20.7* 18.9*  --  31.1* HGB 7.3* 8.3* 8.1*   < > 7.2* HCT 23.2* 26.3* 25.3*   < > 23.4*  
* 137* 126*  --  147*  
 < > = values in this interval not displayed. Recent Labs  
  01/22/19 
0232 01/21/19 
0257 01/20/19 
0448 INR 4.9* 5.9* 6.0*  
PTP 46.4* 54.8* 55.1* Needs: urine analysis, urine sodium, protein and creatinine Lab Results Component Value Date/Time Sodium,urine random 105 01/21/2019 04:01 PM  
 Creatinine, urine 17.38 01/21/2019 04:01 PM  
 
 
 
 
: May Pablo MD 
1/22/2019 [facility]: 
[email or web address] Margot De Souza office: 
[address] [place] [address], [address] [place], [address] [facility] Phone: [phone] Fax :     [phone] [place] office: 
[address] [facility], [address] Phone - [phone] Fax - [phone]

## 2019-01-22 NOTE — PROGRESS NOTES
[facility] FAMILY MEDICINE RESIDENCY PROGRAM  
Daily Progress Note Date: 1/22/2019 Assessment/Plan:  
Sheila Castro is a 66 y.o. female who is hospitalized for COPD Exacerbation, initially meeting Severe Sepsis Criteria. 24 Events: No Acute Events overnight. Per nursing patient with 3 loose stools, no todd blood but c/f blood in stool JAZZMINE: FeNa 7.9%, c/w post-renal/obstructive uropathy. BL Cr 0.8-. 0.6. Cr slowly trending up since dark urine noted on 1/19. Cr today 1.75, peaked at 1.86 despite . UOP improved (.76cc/kghr) over past 24 hours. Initial concern for UTI however UA only concerning for large blood. Given nephrotoxicity and low suspicion for infection, d/c'd zosyn yesterday. - c/w MIVF 
- Nephrology consulted, appreciated recs 
- Daily BMP 
- Monitor Uop Acute on Chronic Respiratory Failure POA in the Setting of COPD Exacerbation: Acute Exacerbation resolved, overall respiratory status STABLE. Initially with 6L N/C requirement and intermittently on BiPAP, now on 2LN/C(her home requirement). RVP neg. SpCx + candida, 1/6 BCx NG(Final). Sputum Cx + candida. Completed doxy treatment (1/12). Pulm signed off, appreciate recs. IV steroids -->pred taper(finished 1/17) - Albuterol Nebs q4hr RT, had been refusing 
- home Trelegy - Prednisone 10mg daily (home medication) - Wean O2 for goal of 88-92% sats  
  
Severe Sepsis Rule Out: RESOLVED. Patient's initial SIRS Criteria - elevated WBC, RR< HR explainable by combination of chronic problems and no source of infection present. WBC elevated chronically 2/2 daily steroid use, RR elevated due to COPD exacerbation and HR elevated due to COPD + hx of A fib with RVR. Sepsis workup completed and treatment with fluids initiated. However upon treatment of COPD Excerbation RR, HR improved so unlikley true sepsis as there is no source  1/6 BCx No Growth, Final, SpCx +candida - Continue daily CBC 
- VS per unit routine GI Bleed: HgB 7.3  this morning. Per capsule study patient has AVMs which are likely source of slow bleed. Initial FOBT negative, then 1/20 FOBT +. HgB 6.7 POA. S/p4 U pRBCs. HgB stable. Recs for upper and lower scopes once respiratory status allows - GI will not scope currently as patient is mod-high risk of not being able to come off anesthesia/intubation during procedure. - Protonix 40mg BID 
- Transfuse for HgB <7 
- GI Consulted, appreciate recs 
  
Chronic Diastolic CHF:  
Euvolemic on admission. Trop POA . 10 --> .06 --> .5. Volume status easily upset so caution with diuresis. Trop yesterday 0.108-->0.12--0.14 today. - Trend trops q6hrs 
- Hold today's 0.5mg Bumex given JAZZMINE 
- Strict I&O 
- Cardiology consult, appreciate recs 
  
Atrial Fibrillation s/p MVR on Coumadin (INR POA 1.2)  
Pt has been seen OP by Dr Lauren Birmingham but has not followed up with him since 2016. Goal INR 2.5 - 3.5. EKG POA: Afib with RVR . INR today 5.9 
- Home Warfarin to be held today, pharmacy to dose, INR goal of 2.5-3.5. - Diltiazem 240 CD 
- Digoxin 0.125mg PO daily, held per cardiology given JAZZMINE 
- Cardiology consulted, appreciate recs  
- Daily Mg & Phos with repletions PRN 
  
Leukocytosis: Chronic and IMPROVED from presentation Chronic with BL ~22-29 likely due to daily prednisone use. 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 However, tests are not reliable in inpt setting in pt with multiple chronic disease in exacerbation. Additionally steroids and heparin and possible underlying DM may cause abnormalities in testing 
- repeat thyroid studies with PCP and rec for endo referral is persistently abnl 
  
Hyperglycemia:  
likely due to chronic steroid dose. Questionable DM at baseline however Alc <3.5% due to chronic anemia. Will adjust insulin based on trending BGs. Poor PO intake over last 3 days with BG low 100s - SSI, regular sensitivity 
- POC glucose ACHS 
- hypoglycemia protocol 
  
Hyperphosphatemia: 
Phos 6.2 POA. Phos 8.7 today - Monitor with daily BMP, Mg, Phos 
  
Chronic Back Pain  
- Home Percocet 5-325 mg resumed, q6h PRN  
  
Hyperlipidemia  
(: TC: 196, T, HDL 88, VLDL 30 & LDL 78) - Holding home Lovastatin 10 mg because of transminitis Transaminitis: IMPROVED 
LFTs down trended once, then fabiola again. Down from yesterday. Likely combination of cholelithiasis and capsule study. 
- Holding home Lovastatin - GI following Anxiety: IMPROVED. Likely baseline anxiety exacerbated by severe COPD. E[isode this morning when discussing her care and insurance denial but was redirectable and able to be reassured - Lexapro 10mg QHS 
- Xanax 0.5mg BID PRN 
  
FEN/GI - Low Na Diabetic Diet. NS @ 100 cc/hr Activity - Ambulate as tolerated DVT prophylaxis - Coumadin, pharm to dose - holding while INR supratherapeutic GI prophylaxis - Protonix Disposition - Admit to Stepdown. Plan to d/c to Pulm Rehab vs SNF, pending PA. PT/OT following Code Status - DNR, discussed with patient / caregivers. Patient will be discussed with Dr. Kraig Goodrich, supervising physician. Varinder Oliveira MD 
Family Medicine Resident CC: \"I'm about the same\" Subjective No acute events overnight. Patient appears uncomfortable but not acutely distressed. Speaks very little. Denies chest pain, dysuria, constipation. Endorses breathing is at baseline and that she is fatigued. Inpatient Medications Current Facility-Administered Medications Medication Dose Route Frequency  guaiFENesin (ROBITUSSIN) 100 mg/5 mL oral liquid 200 mg  200 mg Oral Q6H  
 albuterol (ACCUNEB) nebulizer solution 1.25 mg  1.25 mg Nebulization Q4H RT  
 Warfarin - HOLD warfarin 19 for INR 4.9   Other QPM  
 sodium chloride 0.9 % bolus infusion 500 mL  500 mL IntraVENous ONCE  
  furosemide (LASIX) injection 10 mg  10 mg IntraVENous ONCE  
 sodium chloride 0.9 % bolus infusion 250 mL  250 mL IntraVENous ONCE  prochlorperazine (COMPAZINE) injection 10 mg  10 mg IntraVENous Q6H PRN  
 0.9% sodium chloride infusion  100 mL/hr IntraVENous CONTINUOUS  
 pantoprazole (PROTONIX) tablet 40 mg  40 mg Oral ACB&D  
 0.9% sodium chloride infusion 250 mL  250 mL IntraVENous PRN  
 insulin lispro (HUMALOG) injection   SubCUTAneous AC&HS  
 artificial saliva (MOUTH KOTE) 1 Spray  1 Spray Oral PRN  predniSONE (DELTASONE) tablet 10 mg  10 mg Oral DAILY WITH BREAKFAST  0.9% sodium chloride infusion 250 mL  250 mL IntraVENous PRN  
 escitalopram oxalate (LEXAPRO) tablet 10 mg  10 mg Oral QPM  
 docusate sodium (COLACE) capsule 100 mg  100 mg Oral DAILY PRN  polyethylene glycol (MIRALAX) packet 17 g  17 g Oral DAILY PRN  
 ondansetron (ZOFRAN) injection 4 mg  4 mg IntraVENous Q8H PRN  
 ALPRAZolam (XANAX) tablet 0.5 mg  0.5 mg Oral BID PRN  
 Warfarin- Pharmacist Dosing   Other Rx Dosing/Monitoring  oxyCODONE IR (ROXICODONE) tablet 5 mg  5 mg Oral Q8H PRN  
 0.9% sodium chloride infusion 250 mL  250 mL IntraVENous PRN  
 alteplase (CATHFLO) 1 mg in sterile water (preservative free) 1 mL injection  1 mg InterCATHeter PRN  
 sodium chloride (NS) flush 10-30 mL  10-30 mL InterCATHeter PRN  
 sodium chloride (NS) flush 10 mL  10 mL InterCATHeter Q24H  
 sodium chloride (NS) flush 10 mL  10 mL InterCATHeter PRN  
 sodium chloride (NS) flush 10-40 mL  10-40 mL InterCATHeter Q8H  
 sodium chloride (NS) flush 20 mL  20 mL InterCATHeter Q24H  
 bacitracin 500 unit/gram packet 1 Packet  1 Packet Topical PRN  
 dilTIAZem CD (CARDIZEM CD) capsule 240 mg  240 mg Oral DAILY  nystatin (MYCOSTATIN) 100,000 unit/mL oral suspension 500,000 Units  500,000 Units Oral QID  sodium chloride (NS) flush 5-10 mL  5-10 mL IntraVENous Q8H  
  sodium chloride (NS) flush 5-10 mL  5-10 mL IntraVENous PRN  
 nicotine (NICODERM CQ) 21 mg/24 hr patch 1 Patch  1 Patch TransDERmal Q24H  
 fluticasone-umeclidin-vilanter 100-62.5-25 mcg dsdv 1 Puff  (Patient Supplied)  1 Puff Inhalation DAILY  roflumilast (DALIRESP) tablet 500 mcg  500 mcg Oral DAILY  0.9% sodium chloride infusion  50 mL/hr IntraVENous PRN  
 glucose chewable tablet 16 g  4 Tab Oral PRN  
 dextrose (D50W) injection syrg 12.5-25 g  12.5-25 g IntraVENous PRN  
 glucagon (GLUCAGEN) injection 1 mg  1 mg IntraMUSCular PRN Allergies Allergies Allergen Reactions  Spiriva Respimat [Tiotropium Bromide] Anaphylaxis and Other (comments) Adverse effects; Per RN - pt states that Spiriva caused throat swelling and could not breathe well.  Spiriva Respimat [Tiotropium Bromide] Shortness of Breath  Tomato Shortness of Breath Only occurs with raw tomatoes, tolerates ketchup without issue  Celebrex [Celecoxib] Rash  Celebrex [Celecoxib] Rash  Rocephin [Ceftriaxone] Shortness of Breath  Tomato Rash Objective Vitals: 
Patient Vitals for the past 8 hrs: 
 Temp Pulse Resp BP SpO2  
01/22/19 1515     93 % 01/22/19 1447     97 % 01/22/19 1121 97.4 °F (36.3 °C) 82 30 120/60 92 % 01/22/19 0758 97.7 °F (36.5 °C) 91 27 118/61 92 % I/O: 
 
Intake/Output Summary (Last 24 hours) at 1/22/2019 1526 Last data filed at 1/22/2019 1241 Gross per 24 hour Intake 1672.08 ml Output 1000 ml Net 672.08 ml Last shift: 
  01/22 0701 - 01/22 1900 In: 48 [P.O.:50] Out: - Last 3 shifts: 
  01/20 1901 - 01/22 0700 In: 2763.8 [P.O.:910; I.V.:1472.1] Out: 1000 [Urine:1000] Physical Exam:General: No acute distress. Alert. Appears weak. Cooperative. Head: Normocephalic. Atraumatic. Respiratory: Pursed lip breathing, no tripoding. Coarse breath sounds b/l. Mild crackles atb/l bases. No wheezes, rales, ronchi. Cardiovascular: Irregularly irregular rhythm. Mechanical click from valve replacement with mild flow murmur. Pulses 2+ throughout. GI: + bowel sounds. Mildy tender ove lower quadrants. No rebound tenderness or guarding. Nondistended Extremities: Trace edema of b/l LE. Moderate tenderness of b/l LE Laboratory Data Recent Results (from the past 12 hour(s)) GLUCOSE, POC Collection Time: 01/22/19  8:01 AM  
Result Value Ref Range Glucose (POC) 117 (H) 65 - 100 mg/dL Performed by Nimisha Lewis (PCT) GLUCOSE, POC Collection Time: 01/22/19 11:24 AM  
Result Value Ref Range Glucose (POC) 128 (H) 65 - 100 mg/dL Performed by Nimisha Lewis (PCT) CBC WITH AUTOMATED DIFF Collection Time: 01/22/19  2:25 PM  
Result Value Ref Range WBC 29.7 (H) 3.6 - 11.0 K/uL  
 RBC 2.48 (L) 3.80 - 5.20 M/uL HGB 7.8 (L) 11.5 - 16.0 g/dL HCT 24.9 (L) 35.0 - 47.0 % .4 (H) 80.0 - 99.0 FL  
 MCH 31.5 26.0 - 34.0 PG  
 MCHC 31.3 30.0 - 36.5 g/dL RDW 21.6 (H) 11.5 - 14.5 % PLATELET 158 350 - 445 K/uL NRBC 0.3 (H) 0  WBC ABSOLUTE NRBC 0.10 (H) 0.00 - 0.01 K/uL NEUTROPHILS 92 (H) 32 - 75 % LYMPHOCYTES 1 (L) 12 - 49 % MONOCYTES 6 5 - 13 % EOSINOPHILS 0 0 - 7 % BASOPHILS 0 0 - 1 % MYELOCYTES 1 (H) 0 % IMMATURE GRANULOCYTES 0 %  
 ABS. NEUTROPHILS 27.3 (H) 1.8 - 8.0 K/UL  
 ABS. LYMPHOCYTES 0.3 (L) 0.8 - 3.5 K/UL  
 ABS. MONOCYTES 1.8 (H) 0.0 - 1.0 K/UL  
 ABS. EOSINOPHILS 0.0 0.0 - 0.4 K/UL  
 ABS. BASOPHILS 0.0 0.0 - 0.1 K/UL  
 ABS. IMM. GRANS. 0.0 K/UL  
 DF MANUAL    
 RBC COMMENTS ANISOCYTOSIS 2+ 
    
 RBC COMMENTS SCHISTOCYTES PRESENT 
    
 RBC COMMENTS BASOPHILIC STIPPLING 
PRESENT Imaging - No New Imaging Abdominal US: + stones, no cholecystitis Capsule Study: gastritis, diminutive AVMs Hospital Problems: 
Hospital Problems  Date Reviewed: 11/29/2018 Codes Class Noted POA Thyroid function test abnormal ICD-10-CM: R94.6 ICD-9-CM: 794.5  1/16/2019 Unknown * (Principal) COPD with acute exacerbation ([facility] [address] ICD-10-CM: J44.1 ICD-9-CM: 491.21  1/6/2019 Unknown Anemia ICD-10-CM: D64.9 ICD-9-CM: 285.9  10/28/2018 Yes Hyperlipidemia ICD-10-CM: E78.5 ICD-9-CM: 272.4  10/26/2018 Yes Chronic back pain (Chronic) ICD-10-CM: M54.9, G89.29 ICD-9-CM: 724.5, 338.29  10/26/2018 Yes History of GI bleed ICD-10-CM: Z87.19 ICD-9-CM: V12.79 Present on Admission 11/25/2016 Yes Diastolic CHF, chronic (HCC) ICD-10-CM: I50.32 
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  
  Tissue MVR 1989 following failed balloon valvuloplasty for MS Redo MVR 2004 due to severe MR, AVR due to AR ([employer]) CAD (coronary artery disease), native coronary artery ICD-10-CM: I25.10 ICD-9-CM: 414.01  1/8/2015 Yes S/P MVR (mitral valve replacement) ICD-10-CM: P73.0 ICD-9-CM: V43.3  1/8/2015 Yes [address] [facility] Medicine Residency Attending Addendum: 
  
I was NOT present with the resident during the interview & examination of the patient. I personally interviewed the patient & repeated the critical or key portions of the exam.  I agree with the resident's note with the following additions: 
  
66 y.o. female who has a past medical history of A-fib, AVR and MVR on coumadin, CAD, HLD, HFpEF and COPD admitted for COPD exacerbation. Completed abx course and steroid taper, now on chronic prednisone 10mg daily. This admission, has also undergone work up for GI bleed, pt deemed too fragile to scope. Capsule study revealed AVMs, suspect slow oozing as no overt bleeding noted. - ARF: Nephro on board. FeNA >4% not c/w IVVD, but unknown post renal etiology. Continue IVF. Strict I/Os. CMP daily. - Chronic OAC for AVR, MVR: INR 4.9, no coumadin x 3 days now and will hold again tonight. If overt signs of bleeding, then will give low dose vit K as INR still > 4.5. INR daily. - Acute on chronic hypoxic respiratory failure: Increased O2 requirement and worsening respiratory acidosis this afternoon. On Bipap now. Very guarded progonosis. Pt is DNR/DNI. - Anemia, GI bleed: GI consulted, apprec recs. Cont protonix bid. Transfuse for Hgb < 7. Closely monitor Hgb and for overt bleeding. 
- Transaminitis: LFTs peaked yesterday, down trending today. GI on board. Holding statin. See resident note for detailed assessment and plan. 
  
Jairo Fabian MD  
Pt seen and examined on 1/22/2019

## 2019-01-22 NOTE — PROGRESS NOTES
[address]  NP 
[phone] GI PROGRESS NOTE 
 
 
NAME: Светлана Singleton :  1955 MRN:  [id number] Subjective:  
Moaning. Objective:  
Labored breathing. Wearing bipap mask. VITALS:  
Last 24hrs VS reviewed since prior progress note. Most recent are: 
Visit Vitals /60 (BP 1 Location: Left arm, BP Patient Position: At rest) Pulse 82 Temp 97.4 °F (36.3 °C) Resp 30 Ht 5' 4\" (1.626 m) Wt 54.7 kg (120 lb 8 oz) SpO2 93% BMI 20.68 kg/m² Intake/Output Summary (Last 24 hours) at 2019 1524 Last data filed at 2019 1241 Gross per 24 hour Intake 1672.08 ml Output 1000 ml Net 672.08 ml PHYSICAL EXAM: 
General: Somnolent, difficult to arouse with sternal rub HEENT: Scleral Icterus noted. Lungs:            Coarse breath sounds bilaterally. Heart:  Regular  rhythm, Abdomen: Soft, moderately distended, Non tender.  (+)Bowel sounds Extremities: No c/c/e Neurologic:   No acute neurological distress Psych:   Not anxious nor agitated. Lab Data Reviewed:  
Recent Labs  
  19 
1425 19 
0232 WBC 29.7* 18.2* HGB 7.8* 7.3* HCT 24.9* 23.2*  
 125* Recent Labs  
  19 
0232 19 
1230  143  
K 3.7 4.3  102 CO2 32 31 BUN 33* 35* CREA 1.75* 1.86* GLU 87 92 PHOS 5.7* 5.6*  
CA 8.3* 8.4* Recent Labs  
  19 
0232 19 
1230 SGOT 197* 193* AP 57 64  
TP 5.5* 5.8* ALB 2.3* 2.5*  
GLOB 3.2 3.3  
 
 
________________________________________________________________________ Patient Active Problem List  
Diagnosis Code  Chest pain R07.9  COPD exacerbation ([facility] [address] J44.1  Hypokalemia E87.6  JAZZMINE (acute kidney injury) ([facility] N17.9  Hyperglycemia R73.9  A-fib (HCC) I48.91  
 Chronic anticoagulation Z79.01  
 Hyperlipidemia E78.5  Chronic back pain M54.9, G89.29  
 Anemia D64.9  
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  
 S/P MVR (mitral valve replacement) Z95.2  COPD (chronic obstructive pulmonary disease) (HCC) J44.9  Tobacco abuse Z72.0  Diastolic CHF, chronic (HCC) I50.32  Chronic atrial fibrillation (HCC) I48.2  History of GI bleed Z87.19  
 H/O total hysterectomy Z90.710  
 Hypokalemia E87.6  Thrush of mouth and esophagus (HCC) B37.81, B37.0  
 SOB (shortness of breath) R06.02  
 COPD with acute exacerbation ([facility] [address] J44.1  Thyroid function test abnormal R94.6 Assessment and Plan: 
Elevated Liver Enzymes: We were called to reassess Ms. Eisenberg for elevated liver enzymes. They were noted to be elevated 1/10, they then trended down, and were elevated again on 1/19. Etiology unclear at this time. Hepatis panel is negative. US shows normal liver and ducts. Possibly autoimmune hepatitis. - Ordered CK to check if elevation may be from skeletal muscle. - Trend CMP Anemia: 
- Trend hemoglobin and transfuse to goal of 7. 
- No plans for EGD or colonoscopy at this time.  
 
 
  
Signed By: Rockwell Schaumann, NP   
 1/22/2019  3:24 PM

## 2019-01-22 NOTE — PROGRESS NOTES
Patient is still not medically stable for discharge. Updated [place], they will need updated clinicals to submit to [facility]. I will continue to follow.  JENNIFER Toro

## 2019-01-22 NOTE — PROGRESS NOTES
Nutrition Assessment: 
 
RECOMMENDATIONS/INTERVENTION(S):  
1. Continue with current diet order. 2. Monitor PO intakes, weight, labs, GI. ASSESSMENT:  
1/22:  Pt on Cardiac diet with decreased PO intakes last 3 days. Pt is currently c/o nausea, refusing to eat, refusing some meds, breathing treatments, and Bipap. Would not open her eyes to talk to me today. Spoke with RN, states she has been refusing to speak with everyone today. RN states pt has not actually had emesis, pt clutching emesis bag during visit. Weight down 3kg since last assessment. Labs: BUN 33, Cr 1.75, phos 5.7. Meds: Humalog, prednisone, coumadin. 1/16: Follow up. Pt now on 1.5gm Na+ diet. No complaints about diet at this time. States intakes are still fair.  sometimes brings her food from outside which she eats well. Is requesting to receive diet drinks, will add no concentrated sweets to her diet order. Labs: POC -040-. Meds: Bumex, Lantus, Humalog, MgSU, Prednisone. Capsule study 1/9 showed pt with gastritis and no active bleeding. 1/10:  65 yo female admitted for COPD exacerbation. RD assessment for LOS. PMhx: CAD, CHF, COPD. Weight WNL per BMI per age. Pt states it remains stable at home, no significant changes. 2gm Na+/Consistent CHO diet ordered. Pt feels the diet order is too restrictive and limiting her ability to eat as much as she does at home. States she does not add salt to any of her foods at home. Intakes documented as 50% meals. Pt states she eats more than that/meal at home. Does not follow a diet at home, states she eats what she wants. Labs: K+ 2.9, , POC -328-294, , . Meds: Bumex, Lantus, Humalog, Solumedrol, MgSu, statin, miralax, Kcl.  S/P capsule study 1/9 secondary to c/o odynophagia - results pending - GI following. On 2L NC O2. Diet Order: Cardiac 
% Eaten:   
Patient Vitals for the past 72 hrs: 
 % Diet Eaten 01/22/19 1241 0 % 01/22/19 0758 0 % 01/21/19 2200 0 % 01/20/19 1821 0 % 01/20/19 0800 0 % Pertinent Medications: [x] Reviewed Labs: [x] Reviewed Anthropometrics: Height: 5' 4\" (162.6 cm) Weight: 54.7 kg (120 lb 8 oz) IBW (%IBW):   ( ) UBW (%UBW):   (  %) BMI: Body mass index is 20.68 kg/m². This BMI is indicative of: 
 [] Underweight    [x] Normal    [] Overweight    []  Obesity    []  Extreme Obesity (BMI>40) Estimated Nutrition Needs (Based on): 2120 Kcals/day(BMR 1144 x AF 1.3) , 48 g(48-59gm (0.8-1gm/kg/d)) Protein Carbohydrate: At Least 130 g/day  Fluids: 1487 mL/day (1 ml/kcal) Last BM: 1/22 - loose   [x]Active     []Hyperactive  []Hypoactive       [] Absent   BS Skin:    [] Intact   [] Incision  [] Breakdown   [] DTI   [] Tears/Excoriation/Abrasion  [x]Edema- trace BLE [] Other: Wt Readings from Last 30 Encounters:  
01/22/19 54.7 kg (120 lb 8 oz)  
11/30/18 57.2 kg (126 lb)  
11/29/18 57.2 kg (126 lb)  
11/27/18 61.7 kg (136 lb)  
11/20/18 61.2 kg (135 lb)  
11/16/18 61.2 kg (135 lb)  
11/15/18 61.7 kg (136 lb)  
11/14/18 61.2 kg (135 lb)  
11/12/18 61.2 kg (135 lb)  
11/10/18 61.2 kg (135 lb) 11/08/18 61.7 kg (136 lb) 11/06/18 61.7 kg (136 lb) 10/31/18 62.9 kg (138 lb 10.7 oz) 10/11/18 55.3 kg (122 lb) 10/05/18 56.7 kg (125 lb)  
09/27/18 57.2 kg (126 lb)  
09/20/18 60.8 kg (134 lb)  
09/13/18 57.2 kg (126 lb)  
09/13/18 56.1 kg (123 lb 9.6 oz) 09/06/18 59 kg (130 lb) 08/29/18 56.2 kg (124 lb) 08/24/18 56.2 kg (124 lb) 08/22/18 56.2 kg (124 lb) 08/17/18 56.2 kg (124 lb) 08/15/18 56.2 kg (124 lb) 08/13/18 56.2 kg (124 lb) 08/10/18 56.2 kg (124 lb) 08/09/18 57.8 kg (127 lb 6 oz) 08/08/18 56.2 kg (124 lb) 08/06/18 56.2 kg (124 lb) NUTRITION DIAGNOSES:  
Problem:  Altered nutrition-related lab values Problem 2: Inadequate energy intake Etiology: related to endocrine dysfunction Etiology 2: decreased ability to consume sufficient energy secondary to nausea Signs/Symptoms: as evidenced by POC BG 94-337mg/dL Signs/Symptoms 2: PO intakes < EEN's last 3 days 1/16: Nutrition dx continues 1/22: Nutrition dx 1 resolved at this time. Nutrition dx 2 new dx. NUTRITION INTERVENTIONS: 
 General healthful diet GOAL:  
Consume > 50% all meals with BG < 160mg/dL in next 1-3 days Cultural, Scientologist, or Ethnic Dietary Needs: None EDUCATION & DISCHARGE NEEDS:  
 [x] None Identified 
 [] Identified and Education Provided/Documented 
 [] Identified and Pt declined/was not appropriate [x] Interdisciplinary Care Plan Reviewed/Documented  
 [x] Discharge Needs:    Continue to follow Low Na+/Consistent CHO diet at home 
 [] No Nutrition Related Discharge Needs NUTRITION RISK:  
Pt Is At Nutrition Risk  [x] No Nutrition Risk Identified  [] PT SEEN FOR:  
 []  MD Consult: []Calorie Count []Diabetic Diet Education []Diet Education []Electrolyte Management []General Nutrition Management and Supplements []Management of Tube Feeding []TPN Recommendations []  RN Referral:  []MST score >=2 
   []Enteral/Parenteral Nutrition PTA []Pregnant: Gestational DM or Multigestation  
              [] Pressure Ulcer 
 
[]  Low BMI      []  Length of Stay       [] Dysphagia Diet         [] Ventilator [x]  Follow-up Previous Recommendations: 
 [x] Implemented          [x] Not Implemented          [] Not Applicable Previous Goal: 
 [] Met              [] Progressing Towards Goal              [x] Not Progressing Towards Goal   [] Not Applicable Nagi Evans RD Pager [phone] Phone [phone]

## 2019-01-22 NOTE — CONSULTS
Palliative Medicine Consult Bryn: [phone]) Patient Name: Shahida Miranda YOB: 1955 Date of Initial Consult: 1/22/19 Reason for Consult: overwhelming symptoms Requesting Provider: Dr. John Pérez Primary Care Physician: Pau Barros DO 
 
 SUMMARY:  
Shahida Miranda is a 66 y.o. with a past history of COPD/ongoing tobacco use/on home oxygen/ FEV1 31%, chronic diastolic heart failure/ s/p AV/MV replacement due to rheumatic heart disease 2014, chronic back pain, anxiety/depression, who was admitted on 1/6/2019 from home with a diagnosis of SOB X 2-3 days. Current medical issues leading to Palliative Medicine involvement include: prolonged hospital stay with underlying debility/decondition due to complex medical issues, now with lethargy, inanition, dyspnea with exertion/chronic respiratory failure, nausea and poor PO intake. CT SCAN (chest/abd/pelvis) 1/19 : gallstones/ no duct dilation, no ascites,  + interstitial lung disease ABD 1/21/19  7.45/47/105 (2L) Patient is  to  Daly Fatima ([phone]/ work [phone]), dtr Haily Kern PALLIATIVE DIAGNOSES:  
1. Nausea, sudden onset today 2. Debility, deconditioning 3. Chronic respiratory failure/ COPD 4. Chronic diastolic CHF 5. Acute renal failure due to diuresis, now getting IVF 6. GI Bleed, anemia (needs outpatient workup), Hb 8.3 to 7.3 overnight, no BMs today 7. Lethargy, inanition, ?depression 8. Chronic pain,long term opioid use 9. Chronic anxiety 10. Anorexia, poor PO intake, hypoalbuminemia, weight loss (6 pounds in 3 months) PLAN:  
1. Not sure why she has such terrible nausea today. Consider possible withdrawal from opioid (on long term, none given in 4 days)- I have asked nurse to give a PRN oxycodone now to see if that improves nausea. Zofran did not help. Nothing remarkable GI on recent CT scan, uremia doesn't seem bad enough to cause this level of nausea.   Don't see any new medications today. Respiratory status is chronic and perhaps worse today, but no major change from her baseline. If nausea does not resolved with resumption of opioid, consider asking GI to address possible causes of nausea. 2. I attempted to discuss care goals with patient (she is unable to do that right now due to nausea) , she sure seems to be declining over time and this admission. Will be important to hear from her what her goals are and how to direct our care. Will return this afternoon to see if she can engage more in conversation once nausea is addressed. 3. Initial consult note routed to primary continuity provider 4. Communicated plan of care with: Palliative IDT Addendum: 
Returned to patient to find further decline, ongoing nausea, answers with one word answers saying \"stomach\" but unable to engage in conversation. BP lower than at time of previous visit. FP service called to assess patient. Discussed with FP team, work up pending - stat labs drawn, Primary team calling  to discuss care goals. Will be available to assist if  coming to hospital this afternoon. GOALS OF CARE / TREATMENT PREFERENCES:  
 
GOALS OF CARE: 
Patient/Health Care Proxy Stated Goals: Other (comment)(patient unable to discuss right now due to nausea) TREATMENT PREFERENCES:  
Code Status: DNR Advance Care Planning: 
[] The [facility] Interdisciplinary Team has updated the ACP Navigator with [address] and Patient Capacity Primary Decision [facility] (Health Care Agent):  Katharina Mitchell Relationship to patient:spouse Phone number:[phone] [] Named in a scanned document  
[x] Legal Next of Kin 
[] Guardian Secondary Decision Maker (First Alternate Health Care Agent):  
Relationship to patient: 
Phone number: 
[] Named in a scanned document  
[] Legal Next of Kin 
[] Guardian Medical Interventions: (not yet discussed) Other Instructions: Other: As far as possible, the palliative care team has discussed with patient / health care proxy about goals of care / treatment preferences for patient. HISTORY:  
 
History obtained from: chart CHIEF COMPLAINT: admitted with SOB 
HPI/SUBJECTIVE: The patient is:  
[] Verbal and participatory [x] Non-participatory due to: nausea 66year old female admitted with worsening shortness of breath despite outpatient steroids and abx. 
 
1/22  Patient feels too nauseated to talk. Denies pain, denies breathing issues Says she slept OK last night Shrugs \"I don't know\" to questions \"what worries you the most right now? \" Clinical Pain Assessment (nonverbal scale for severity on nonverbal patients):  
Clinical Pain Assessment Severity: 0 Activity (Movement): Lying quietly, normal position Duration: for how long has pt been experiencing pain (e.g., 2 days, 1 month, years) Frequency: how often pain is an issue (e.g., several times per day, once every few days, constant) FUNCTIONAL ASSESSMENT:  
 
Palliative Performance Scale (PPS): PPS: 20 
 
 
 PSYCHOSOCIAL/SPIRITUAL SCREENING:  
 
Palliative IDT has assessed this patient for cultural preferences / practices and a referral made as appropriate to needs (Cultural Services, Patient Advocacy, Ethics, etc.) Any spiritual / Denominational concerns: 
[] Yes /  [x] No 
 
Caregiver Burnout: 
[] Yes /  [] No /  [x] No Caregiver Present Anticipatory grief assessment:  
[x] Normal  / [] Maladaptive ESAS Anxiety: Anxiety: 3 ESAS Depression: Depression: 5 REVIEW OF SYSTEMS:  
 
Positive and pertinent negative findings in ROS are noted above in HPI. The following systems were [x] reviewed / [] unable to be reviewed as noted in HPI Other findings are noted below.  
Systems: constitutional, ears/nose/mouth/throat, respiratory, gastrointestinal, genitourinary, musculoskeletal, integumentary, neurologic, psychiatric, endocrine. Positive findings noted below. Modified ESAS Completed by: provider Fatigue: 10 Drowsiness: 5 Depression: 5 Pain: 0 Anxiety: 3 Nausea: 10 Anorexia: 10 Dyspnea: 3 Constipation: No  
  Stool Occurrence(s): 1 PHYSICAL EXAM:  
 
From RN flowsheet: 
Wt Readings from Last 3 Encounters:  
01/22/19 54.7 kg (120 lb 8 oz)  
11/30/18 57.2 kg (126 lb)  
11/29/18 57.2 kg (126 lb) Blood pressure 118/61, pulse 91, temperature 97.7 °F (36.5 °C), resp. rate 27, height 5' 4\" (1.626 m), weight 54.7 kg (120 lb 8 oz), SpO2 92 %. Pain Scale 1: Numeric (0 - 10) Pain Intensity 1: 0 Pain Onset 1: chronic Pain Location 1: Back Pain Orientation 1: Posterior Pain Description 1: Aching Pain Intervention(s) 1: Medication (see MAR) Last bowel movement, if known:  
 
Constitutional: ill appearing female holding green nausea basin (empty) Eyes: pupils equal, anicteric, keeping eyes closed Chest rhonchi bilaterally Card irreg s1s2 
abd soft no rebound no guarding +BS 
extrem no edema Lethargic Gives one word answers, shakes/nods to symptom questions Not willing to engage in conversation HISTORY:  
 
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 (1/8/2015) Overview: Tissue MVR 1989 following failed balloon valvuloplasty for MS Redo MVR 2004 due to severe MR, AVR due to AR ([facility]) CAD (coronary artery disease), native coronary artery (1/8/2015) S/P MVR (mitral valve replacement) (1/8/2015) COPD (chronic obstructive pulmonary disease) ([facility] (11/13/2015) Tobacco abuse (11/14/2015) Diastolic CHF, chronic ([facility] (12/17/2015) Chronic atrial fibrillation ([facility] (12/17/2015) History of GI bleed (11/25/2016) Hyperlipidemia (10/26/2018) Chronic back pain (10/26/2018) Anemia (10/28/2018) Thyroid function test abnormal (1/16/2019) Past Medical History:  
Diagnosis Date  [facility]fib ([facility]  Anticoagulant long-term use  CAD (coronary artery disease), native coronary artery   
 mild to moderate by cath  CHF (congestive heart failure) ([facility]  Chronic obstructive pulmonary disease ([facility]  Dyslipidemia  History of vascular access device 10/26/2018 [facility] VAT - 4 FR Midline, 9 cm, R basilic, for difficult iv access  History of vascular access device 01/08/2019 [facility] VAT - 5 FR PICC, right basilic, 36 cm , for limited vessels/heparin  Ill-defined condition   
 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  
Procedure Laterality Date  COLONOSCOPY N/A 11/28/2016 COLONOSCOPY performed by Lydia Velez MD at [facility]  HX HEART CATHETERIZATION    
 cabg  HX HYSTERECTOMY    
 BSO  
 HX MITRAL VALVE REPLACEMENT    
 and redo MVR and AVR Family History Problem Relation Age of Onset  Cancer Brother History reviewed, no pertinent family history. Social History Tobacco Use  Smoking status: Current Every Day Smoker Packs/day: 0.50 Types: Cigarettes Last attempt to quit: 11/1/2015 Years since quitting: 3.2  Smokeless tobacco: Never Used  Tobacco comment: 11/1/2014 Substance Use Topics  Alcohol use: No  
  Alcohol/week: 0.0 oz Allergies Allergen Reactions  Spiriva Respimat [Tiotropium Bromide] Anaphylaxis and Other (comments) Adverse effects; Per RN - pt states that Spiriva caused throat swelling and could not breathe well.  Spiriva Respimat [Tiotropium Bromide] Shortness of Breath  Tomato Shortness of Breath Only occurs with raw tomatoes, tolerates ketchup without issue  Celebrex [Celecoxib] Rash  Celebrex [Celecoxib] Rash  Rocephin [Ceftriaxone] Shortness of Breath  Tomato Rash Current Facility-Administered Medications Medication Dose Route Frequency  guaiFENesin (ROBITUSSIN) 100 mg/5 mL oral liquid 200 mg  200 mg Oral Q6H  
 albuterol (ACCUNEB) nebulizer solution 1.25 mg  1.25 mg Nebulization Q4H RT  
 Warfarin - HOLD dose 1/21/19 for INR 5.9   Other QPM  
 prochlorperazine (COMPAZINE) injection 10 mg  10 mg IntraVENous Q6H PRN  
 0.9% sodium chloride infusion  100 mL/hr IntraVENous CONTINUOUS  
 Warfarin: Hold tonight. INR = 6   Other Rx Dosing/Monitoring  pantoprazole (PROTONIX) tablet 40 mg  40 mg Oral ACB&D  
 0.9% sodium chloride infusion 250 mL  250 mL IntraVENous PRN  
 insulin lispro (HUMALOG) injection   SubCUTAneous AC&HS  
 artificial saliva (MOUTH KOTE) 1 Spray  1 Spray Oral PRN  predniSONE (DELTASONE) tablet 10 mg  10 mg Oral DAILY WITH BREAKFAST  0.9% sodium chloride infusion 250 mL  250 mL IntraVENous PRN  
 escitalopram oxalate (LEXAPRO) tablet 10 mg  10 mg Oral QPM  
 docusate sodium (COLACE) capsule 100 mg  100 mg Oral DAILY PRN  polyethylene glycol (MIRALAX) packet 17 g  17 g Oral DAILY PRN  
 ondansetron (ZOFRAN) injection 4 mg  4 mg IntraVENous Q8H PRN  
 ALPRAZolam (XANAX) tablet 0.5 mg  0.5 mg Oral BID PRN  
 Warfarin- Pharmacist Dosing   Other Rx Dosing/Monitoring  oxyCODONE IR (ROXICODONE) tablet 5 mg  5 mg Oral Q8H PRN  
 0.9% sodium chloride infusion 250 mL  250 mL IntraVENous PRN  
 alteplase (CATHFLO) 1 mg in sterile water (preservative free) 1 mL injection  1 mg InterCATHeter PRN  
 sodium chloride (NS) flush 10-30 mL  10-30 mL InterCATHeter PRN  
 sodium chloride (NS) flush 10 mL  10 mL InterCATHeter Q24H  
 sodium chloride (NS) flush 10 mL  10 mL InterCATHeter PRN  
  sodium chloride (NS) flush 10-40 mL  10-40 mL InterCATHeter Q8H  
 sodium chloride (NS) flush 20 mL  20 mL InterCATHeter Q24H  
 bacitracin 500 unit/gram packet 1 Packet  1 Packet Topical PRN  
 dilTIAZem CD (CARDIZEM CD) capsule 240 mg  240 mg Oral DAILY  nystatin (MYCOSTATIN) 100,000 unit/mL oral suspension 500,000 Units  500,000 Units Oral QID  sodium chloride (NS) flush 5-10 mL  5-10 mL IntraVENous Q8H  
 sodium chloride (NS) flush 5-10 mL  5-10 mL IntraVENous PRN  
 nicotine (NICODERM CQ) 21 mg/24 hr patch 1 Patch  1 Patch TransDERmal Q24H  
 fluticasone-umeclidin-vilanter 100-62.5-25 mcg dsdv 1 Puff  (Patient Supplied)  1 Puff Inhalation DAILY  roflumilast (DALIRESP) tablet 500 mcg  500 mcg Oral DAILY  0.9% sodium chloride infusion  50 mL/hr IntraVENous PRN  
 glucose chewable tablet 16 g  4 Tab Oral PRN  
 dextrose (D50W) injection syrg 12.5-25 g  12.5-25 g IntraVENous PRN  
 glucagon (GLUCAGEN) injection 1 mg  1 mg IntraMUSCular PRN  
 
 
 
 LAB AND IMAGING FINDINGS:  
 
Lab Results Component Value Date/Time WBC 18.2 (H) 01/22/2019 02:32 AM  
 HGB 7.3 (L) 01/22/2019 02:32 AM  
 PLATELET 010 (L) 07/56/9030 02:32 AM  
 
Lab Results Component Value Date/Time Sodium 143 01/22/2019 02:32 AM  
 Potassium 3.7 01/22/2019 02:32 AM  
 Chloride 103 01/22/2019 02:32 AM  
 CO2 32 01/22/2019 02:32 AM  
 BUN 33 (H) 01/22/2019 02:32 AM  
 Creatinine 1.75 (H) 01/22/2019 02:32 AM  
 Calcium 8.3 (L) 01/22/2019 02:32 AM  
 Magnesium 2.0 01/22/2019 02:32 AM  
 Phosphorus 5.7 (H) 01/22/2019 02:32 AM  
  
Lab Results Component Value Date/Time AST (SGOT) 197 (H) 01/22/2019 02:32 AM  
 Alk. phosphatase 57 01/22/2019 02:32 AM  
 Protein, total 5.5 (L) 01/22/2019 02:32 AM  
 Albumin 2.3 (L) 01/22/2019 02:32 AM  
 Globulin 3.2 01/22/2019 02:32 AM  
 
Lab Results Component Value Date/Time  INR 4.9 (HH) 01/22/2019 02:32 AM  
 Prothrombin time 46.4 (H) 01/22/2019 02:32 AM  
 aPTT 36.5 (H) 01/15/2019 03:13 AM  
  
Lab Results Component Value Date/Time Iron 126 11/29/2018 08:18 AM  
 TIBC 352 11/29/2018 08:18 AM  
 Iron % saturation 36 11/29/2018 08:18 AM  
 Ferritin 89 11/29/2018 08:18 AM  
  
Lab Results Component Value Date/Time pH 7.45 01/21/2019 02:05 PM  
 PCO2 47 (H) 01/21/2019 02:05 PM  
 PO2 105 (H) 01/21/2019 02:05 PM  
 
No components found for: [place] Lab Results Component Value Date/Time CK 47 06/07/2018 12:18 PM  
 CK - MB 1.9 06/07/2018 12:18 PM  
  
 
 
   
 
Total time: 70min Counseling / coordination time, spent as noted above: 45 
> 50% counseling / coordination?: yes Prolonged service was provided for  []30 min   []75 min in face to face time in the presence of the patient, spent as noted above. Time Start:  
Time End:  
Note: this can only be billed with 56871 (initial) or 70709 (follow up). If multiple start / stop times, list each separately.

## 2019-01-23 LAB
ARTERIAL PATENCY WRIST A: YES
BACTERIA SPEC CULT: NORMAL
BASE DEFICIT BLDA-SCNC: 1.6 MMOL/L
BDY SITE: ABNORMAL
CC UR VC: NORMAL
GAS FLOW.O2 O2 DELIVERY SYS: 3 L/MIN
HCO3 BLDA-SCNC: 30 MMOL/L (ref 22–26)
PCO2 BLDA: 85 MMHG (ref 35–45)
PH BLDA: 7.16 [PH] (ref 7.35–7.45)
PO2 BLDA: 68 MMHG (ref 80–100)
SAO2 % BLD: 87 % (ref 92–97)
SAO2% DEVICE SAO2% SENSOR NAME: ABNORMAL
SERVICE CMNT-IMP: ABNORMAL
SERVICE CMNT-IMP: NORMAL
SPECIMEN SITE: ABNORMAL

## 2019-01-23 PROCEDURE — 3331090002 HH PPS REVENUE DEBIT

## 2019-01-23 PROCEDURE — 3331090001 HH PPS REVENUE CREDIT

## 2019-01-23 NOTE — PROGRESS NOTES
Shift report received from FAUSTINA Schneider. Patient on comfort measures. Unresponsive, apneic and gasping on oxygen 2 L NC. Volunteer at bedside for company. Will continue comfort measures and monitor. Patient had a big BM, patient was cleaned .  hrs, patient had . Family Practice MD [place] Staff was notified and pronounced death. Will call and inform  about death.  hrs, [employer] was contacted and noted death. Patient can be release.  hrs, Jorge Chinchilla came in and spoken to family.  hrs, family arrived, [facility] [employer].

## 2019-01-23 NOTE — DISCHARGE SUMMARY
[facility]  
Death Summary Patient: Daniel Mcgrath MRN: [id number] YOB: 1955 Age: 66 y.o. Date of admission:  1/6/2019 Date of death:  1/23/2019 Primary care provider:  Hai Pulido DO Admitting provider:  Catarina Palacios MD 
 
Discharging provider:  Eileen Herrera MD  
 
Consultations · IP CONSULT TO GASTROENTEROLOGY · IP CONSULT TO INTENSIVIST · IP CONSULT TO NEPHROLOGY · IP CONSULT TO CARDIOLOGY · IP CONSULT TO PULMONOLOGY Procedures · Capsule study on 1/10/19 showed gastritis and diminutive AVMs Admission diagnoses COPD with acute exacerbation ([facility] Hospital Problems Hospital Problems  Date Reviewed: 11/29/2018 Codes Class Noted POA Thyroid function test abnormal ICD-10-CM: R94.6 ICD-9-CM: 794.5  1/16/2019 Unknown * (Principal) COPD with acute exacerbation ([facility] [address] ICD-10-CM: J44.1 ICD-9-CM: 491.21  1/6/2019 Unknown Anemia ICD-10-CM: D64.9 ICD-9-CM: 285.9  10/28/2018 Yes Hyperlipidemia ICD-10-CM: E78.5 ICD-9-CM: 272.4  10/26/2018 Yes Chronic back pain (Chronic) ICD-10-CM: M54.9, G89.29 ICD-9-CM: 724.5, 338.29  10/26/2018 Yes History of GI bleed ICD-10-CM: Z87.19 ICD-9-CM: V12.79 Present on Admission 11/25/2016 Yes Diastolic CHF, chronic (HCC) ICD-10-CM: I50.32 
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  
 Tissue MVR 1989 following failed balloon valvuloplasty for MS Redo MVR 2004 due to severe MR, AVR due to AR ([employer]) CAD (coronary artery disease), native coronary artery ICD-10-CM: I25.10 ICD-9-CM: 414.01  1/8/2015 Yes S/P MVR (mitral valve replacement) ICD-10-CM: V36.8 ICD-9-CM: V43.3  1/8/2015 Yes Brief Hospital Course and Problems Addressed During Admission [facility] \"Pebbles\" Manuel Nuñez is a 63yo F with hx of AV/MV replacement, chronic anemia and leukocytosis and diastolic heart failure hospitalized for COPD exacerbation. Mrs Manuel Nuñez presented initially to the ED after several days of worsening shortness of breath with increased O2 requirement. In the year prior to this admission, patient with more frequent admissions for COPD exacerbation. Initially on 6L O2 and intermittent BiPAP along with IV steroids and antibiotics then weaning to home O2 requirement within two days. Patient appeared weak and deconditioned this admission as compared to last admission and was amenable to going to rehab however insurance authorization held up discharge for over one week. During this time patient was participating with PT/OT most days and was medically very stable though had new anxiety requiring prn medications. Beginning on 1/19/19, she became more lethargic and was complaining of abdominal pain, nausea and vomiting. There was concern for GI bleed or other GI etiology as patient had Positive FOBT and gastric capsule study earlier in admission demonstrated small AVMs. Patient was too high risk for scopes so obtained KUB and full abdominal US as well as CT chest, abd/pelvis which were unremarkable. Repeat CXR at this time was also unremarkable. HgB during this episode was stable however Cr was elevated. ABG at the time was unremarkable and c/w chronic COPD. IVFs were started however Cr remained elevated approximately 2-3X baseline and there was decreased Uop. Fluid resuscitation was continued, though cautiously given patient's heart failure. Repeat ECHO demonstrated preserved EF and clinically patient did not appear volume overloaded. Nephrology was consulted with recommendations to continue IV hydration. The patient continued to have increased fatigue and intermittently complained of abdominal pain and nausea that was non-responsive to anti-emetics. From 1/19/19 on she had little appetite and PO intake was poor. She stated she did not feel well each morning and throughout the day but could not localize any discomfort. Throughout this time O2 requirement remained at baseline. Palliative care was consulted given patient had declined significantly clinically and because of her comorbidities workup was limited though the workup that was done did not point to a cause of her clinical decline. On 1/21/19, this provider spoke with patient and  regarding her wishes for medical management and it was reiterated that she did not wish to have life prolonging measures should she decline further. On the afternoon of 1/22/19 the palliative team went to meet with the patient who was increasingly lethargic and largely nonresponsive to questions. At this time the primary team was called. The patient's BPs were soft with MAPs in the 50s. STAT labs were collected including an ABG which showed respiratory acidosis. The patient was started on BiPAP and given gentle fluid boluses. Patient fought BiPAP and when she removed the mask desaturated to the low 70s. BiPAP mask was replaced and patient ceased to fight the mask. Her  was called given her the rapid decline in her status and presented to the hospital a few hours later when he met initially with the intensivist and declined pressors for blood pressure support and later with the palliative team where he asked to make his wife as comfortable as possible and to remove the BiPAP mask.  Within an hour comfort care was ordered and the BIPAP was discontinued after the administration of IV dilaudid and ativan. Initially, the patient's vital signs remained stable then developed apneic respirations and ceased breathing. Acute on Chronic Respiratory Failure POA in the Setting of COPD Exacerbation: Initially on 6L N/C and intermittent BiPAP which patient often declined. Scheduled albuterol nebs (patient declined duonebs) and home trelegy initiated, along with 5 days doxycycline and IV steroids  which were transitioned to PO. Steroid course completed(1/17) while awaiting placement. RVP neg. SpCx + candida, 1/6 BCx NG(Final). Sputum Cx + candida. JAZZMINE: FeNa 7.9%, c/w post-renal/obstructive uropathy. BL Cr 0.8-. 0.6. Cr slowly trending up since dark urine noted on 1/19. Cr peaked at 1.86 despite . Initial concern for UTI however UA only concerning for large blood. Nephrology consulted. MIVFs to treat. 
  
Severe Sepsis Rule Out: RESOLVED. Patient's initial SIRS Criteria - elevated WBC, RR< HR explainable by combination of chronic problems (A fib with RVR and COPD in exacerbation) and no source of infection present. WBC elevated chronically 2/2 daily steroid use, RR elevated due to COPD exacerbation and HR elevated due to COPD + hx of A fib with RVR. Sepsis workup completed and treatment with fluids initiated. However upon treatment of COPD Excerbation RR, HR improved so unlikley true sepsis as there is no source  1/6 BCx No Growth, Final, SpCx +candida 
  
Concern for GI Bleed:  
HgB low at baseline ~8.0 and falling below 7 on multiple occasions. Total of 4U pRBCs over admission. Per capsule study patient has AVMs which are likely source of slow bleed. Initial FOBT negative, then 1/20 FOBT +. HgB 6.7 POA. S/p4 U pRBCs. GI unable to scope patient as too high risk to wean from vent given respiratory status. Chronic Diastolic CHF:  
Euvolemic on admission. Trop POA . 10 --> .06 --> .5.  Volume status easily upset so caution taken with diuresis. Changed bumex to .5mg every other day from 1mg daily prior to admission. Repeat ECHO without significant interval changes. Atrial Fibrillation s/p MVR on Coumadin (INR POA 1.2)  
Pt has been seen OP by Dr Higinio Ames but has not followed up with him since 2016. Goal INR 2.5 - 3.5. EKG POA: Afib with RVR . INR initially subtherapeutic then supratherapeutic in final days of admission despite small doses or holding warfarin. Patient on Cardizem 240CD, Digoxin initiated for rate control then discontinued once JAZZMINE. Cardiology consulted and followed patient throughout admission. 
  
Leukocytosis: Chronic. BL 22-29 in months prior to admission on 10mg prednisone daily at home. WBC fluctuated day to day without alterations in medication and without likely sources of infection so unreliable marker of infection and clinical status without additional data. 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 However, tests are not reliable in inpt setting in pt with multiple chronic disease in exacerbation. Additionally steroids and heparin and possible underlying DM may cause abnormalities in testing 
  
Hyperglycemia: likely due to chronic steroid dose. Questionable DM at baseline however Alc <3.5% due to chronic anemia. Patient with severely elevated BGs lagging behind steroid dosing. Maintained on nightly Lantus adjusted based on previous 24-hour BGs as well as SSI.  
  
Hyperphosphatemia: Phos 6.2 POA then resolving and needing repletions at times.  Phos elevated to 8.7 with JAZZMINE and decline in clinical status indicating kidneys were shutting down. 
  
Chronic Back Pain: Home Percocet 5-325 mg resumed, patient typically requesting 2 per day without concern for mis-use. 
  
Hyperlipidemia  
- Holding home Lovastatin 10 mg because of transminitis 
  
Transaminitis: LFTs initially elevated following capsule study without explanation and slowly downtrending then elevated again on 1.19.19 with decline in clinical status. GI consulted however no clear reason for initial elevation. Anxiety: Likely baseline anxiety exacerbated by severe COPD. Initially on prn xanax and atarax then initiated daily lexapro and kept on prn xanax. Last vital signs recorded Visit Vitals BP (!) 46/13 (BP 1 Location: Left arm, BP Patient Position: At rest) Pulse (!) 48 Temp 96.3 °F (35.7 °C) Resp 15 Ht 5' 4\" (1.626 m) Wt 120 lb 8 oz (54.7 kg) SpO2 91% BMI 20.68 kg/m² Labs Recent Labs  
  01/22/19 
1425 01/22/19 
0232 01/21/19 
1230 WBC 29.7* 18.2* 20.7* HGB 7.8* 7.3* 8.3* HCT 24.9* 23.2* 26.3*  
 125* 137* Recent Labs  
  01/22/19 
0232 01/21/19 
1230 01/21/19 
0257  143 143  
K 3.7 4.3 3.6  102 101 CO2 32 31 33* BUN 33* 35* 38* CREA 1.75* 1.86* 1.78* GLU 87 92 54*  
CA 8.3* 8.4* 8.5 MG 2.0  --  2.3 PHOS 5.7* 5.6* 6.2* Recent Labs  
  01/22/19 
0232 01/21/19 
1230 01/21/19 
0257 SGOT 197* 193* 206* AP 57 64 60  
TP 5.5* 5.8* 5.4* ALB 2.3* 2.5* 2.4*  
GLOB 3.2 3.3 3.0 Recent Labs  
  01/22/19 
0232 01/21/19 
0257 INR 4.9* 5.9* PTP 46.4* 54.8* No results for input(s): FE, TIBC, PSAT, FERR in the last 72 hours. Recent Labs  
  01/22/19 
1433 01/21/19 
1405 PH 7.16* 7.45  
PCO2 85* 47* PO2 68* 105* No results for input(s): CPK, CKMB in the last 72 hours. No lab exists for component: TROPONINI No components found for: [place] Pertinent imaging studies 1/6/19 CXR IMPRESSION:  
Small left pleural effusion, chronic. Mild pulmonary vascular congestion. No evidence of pneumonia. 1/9/19 CXR Cardiomediastinal silhouette is stable. Median sternotomy wires are noted Right-sided PICC line extends to the mid SVC.  There is blunting of the left and 
right costophrenic angles, likely representing very small bilateral pleural 
 effusions. Lungs are otherwise clear. There is no pneumothorax. 
  
IMPRESSION: Very small bilateral pleural effusions. 1/14/19 CXR IMPRESSION:  
1. Slightly increased interstitial opacities in the bilateral apices which may 
represent edema. 1/19/19 CXR IMPRESSION: Right arm PICC line tip over the cavoatrial junction. No 
Pneumothorax. 1/19/19 CTA Chest ABD There is aortic calcification without aneurysm or dissection. The distal aorta 
is small and further narrowed by atherosclerosis. Iliofemoral arteries are 
atherosclerotic without aneurysm or advanced stenosis. Radiocephalic artery 
origins show no significant stenosis. Celiac artery, SMA, AUGUSTO and solitary 
bilateral renal arteries are patent without significant stenosis. 
  
Lungs show bilateral upper lobe interstitial disease, upper lobe predominant, 
without dense consolidation. There is pleural-parenchymal scarring at the left 
base. 
  
There is no pleural or pericardial effusion. There is coronary artery 
calcification. 
  
There is cholelithiasis. Liver is unremarkable. Bile ducts are not dilated. Pancreas is unremarkable. No splenomegaly. No adrenal enlargement. Kidneys show 
no mass, stone or hydronephrosis. Bladder is unremarkable. There is no ascites, 
pneumoperitoneum, ascites or significant adenopathy. Appendix is normal. Bowels 
are not dilated. There is no significant stool. There is prior sigmoid colon 
resection and anastomosis without complication. There is a chronic mid thoracic 
compression fracture. 
  
IMPRESSION: 
1. No aortic aneurysm or dissection. 2. Interstitial lung disease. 1/9/19 KUB There is a nonspecific bowel gas pattern. No dilated loop of large or small 
bowel is visualized. No pathologic calcification is visualized. The osseous 
structures are intact. There is no evidence of free intraperitoneal gas on 
supine views.  The osseous structures are diffusely demineralized. 
  
 IMPRESSION: Nonspecific bowel gas pattern. 1/10/19 ABD US IMPRESSION:  
Cholelithiasis without evidence of cholecystitis. 1/19/19 ABD US IMPRESSION: Cholelithiasis. No biliary dilation. 
 
 
--------------------------------- Chronic Diagnoses Problem List as of 1/22/2019 Date Reviewed: 11/29/2018 Codes Class Noted - Resolved Anemia ICD-10-CM: D64.9 ICD-9-CM: 285.9  10/28/2018 - Present GI bleed ICD-10-CM: K92.2 ICD-9-CM: 578.9  10/28/2018 - Present COPD exacerbation ([facility] ICD-10-CM: J44.1 ICD-9-CM: 491.21  10/26/2018 - Present Hypokalemia ICD-10-CM: E87.6 ICD-9-CM: 276.8  10/26/2018 - Present JAZZMINE (acute kidney injury) ([facility] ICD-10-CM: N17.9 ICD-9-CM: 584.9  10/26/2018 - Present Hyperglycemia ICD-10-CM: R73.9 ICD-9-CM: 790.29  10/26/2018 - Present A-fib [facility]) ICD-10-CM: I48.91 
ICD-9-CM: 427.31  10/26/2018 - Present Chronic anticoagulation ICD-10-CM: Z79.01 
ICD-9-CM: V58.61  10/26/2018 - Present Hyperlipidemia ICD-10-CM: E78.5 ICD-9-CM: 272.4  10/26/2018 - Present Chronic back pain (Chronic) ICD-10-CM: M54.9, G89.29 ICD-9-CM: 724.5, 338.29  10/26/2018 - Present Chest pain ICD-10-CM: R07.9 ICD-9-CM: 786.50  10/24/2018 - Present SOB (shortness of breath) ICD-10-CM: R06.02 
ICD-9-CM: 786.05  7/29/2018 - Present Hypokalemia ICD-10-CM: E87.6 ICD-9-CM: 276.8  7/26/2018 - Present Thrush of mouth and esophagus ([facility] [address] ICD-10-CM: B37.81, B37.0 ICD-9-CM: 112.84, 112.0  7/26/2018 - Present H/O total hysterectomy ICD-10-CM: Z90.710 ICD-9-CM: V88.01  9/26/2017 - Present History of GI bleed ICD-10-CM: Z87.19 ICD-9-CM: V12.79 Present on Admission 11/25/2016 - Present Diastolic CHF, chronic (HCC) ICD-10-CM: I50.32 
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  
  Tissue MVR 1989 following failed balloon valvuloplasty for MS Redo MVR 2004 due to severe MR, AVR due to AR ([employer]) CAD (coronary artery disease), native coronary artery ICD-10-CM: I25.10 ICD-9-CM: 414.01  1/8/2015 - Present S/P MVR (mitral valve replacement) ICD-10-CM: J58.3 ICD-9-CM: V43.3  1/8/2015 - Present RESOLVED: Acute exacerbation of chronic obstructive pulmonary disease (COPD) ([facility] ICD-10-CM: J44.1 ICD-9-CM: 491.21  11/26/2017 - 3/16/2018 RESOLVED: COPD exacerbation ([facility] ICD-10-CM: J44.1 ICD-9-CM: 491.21  4/5/2016 - 11/26/2017 RESOLVED: ACP (advance care planning) ICD-10-CM: Z71.89 ICD-9-CM: V65.49  2/18/2016 - 11/26/2017 Overview Signed 2/18/2016  8:21 AM by Geraldean Goltz, DO  
  discussed with pt RESOLVED: ADHF (acute decompensated heart failure) ICD-10-CM: I50.9 ICD-9-CM: 428.0  12/17/2015 - 12/18/2015 RESOLVED: HCAP (healthcare-associated pneumonia) ICD-10-CM: J18.9 ICD-9-CM: 185  12/7/2015 - 12/18/2015 RESOLVED: Hypokalemia ICD-10-CM: E87.6 ICD-9-CM: 276.8  12/6/2015 - 12/18/2015 RESOLVED: Supratherapeutic INR ICD-10-CM: R79.1 ICD-9-CM: 790.92  12/5/2015 - 12/18/2015 RESOLVED: Hypotension ICD-10-CM: I95.9 ICD-9-CM: 458.9  12/4/2015 - 12/18/2015 RESOLVED: Diarrhea ICD-10-CM: R19.7 ICD-9-CM: 787.91  12/4/2015 - 12/18/2015 RESOLVED: Acute kidney injury ([facility] ICD-10-CM: N17.9 ICD-9-CM: 584.9  12/4/2015 - 12/18/2015 RESOLVED: GIB (gastrointestinal bleeding) ICD-10-CM: K92.2 ICD-9-CM: 578.9  11/25/2015 - 12/18/2015 RESOLVED: On home oxygen therapy ICD-10-CM: Z99.81 ICD-9-CM: V46.2  11/19/2015 - 12/18/2015 Overview Signed 11/19/2015 10:12 AM by Charisse Brooks RN  
  2LPM continous RESOLVED: COPD exacerbation ([facility] ICD-10-CM: J44.1 ICD-9-CM: 491.21  11/14/2015 - 12/14/2015 RESOLVED: Leukocytosis ICD-10-CM: V24.993 ICD-9-CM: 288.60  11/14/2015 - 12/18/2015 RESOLVED: Pleural effusion, left ICD-10-CM: J90 ICD-9-CM: 511.9  11/14/2015 - 12/18/2015 RESOLVED: Elevated brain natriuretic peptide (BNP) level ICD-10-CM: R79.89 ICD-9-CM: 790.99  11/14/2015 - 12/18/2015 RESOLVED: History of artificial heart valve ICD-10-CM: Z95.2 ICD-9-CM: V43.3  11/14/2015 - 12/17/2015 RESOLVED: Paroxysmal atrial fibrillation (HCC) ICD-10-CM: I48.0 ICD-9-CM: 427.31  11/14/2015 - 12/18/2015 RESOLVED: Atrial fibrillation with RVR ([facility] ICD-10-CM: I48.91 
ICD-9-CM: 427.31  11/14/2015 - 12/18/2015 RESOLVED: H/O mitral valve replacement ICD-10-CM: Z95.2 ICD-9-CM: V43.3  1/8/2015 - 1/8/2015 RESOLVED: A-fib [facility]) ICD-10-CM: I48.91 
ICD-9-CM: 427.31  1/8/2015 - 1/8/2015 RESOLVED: Mitral regurgitation ICD-10-CM: I34.0 ICD-9-CM: 424.0  1/8/2015 - 1/8/2015 RESOLVED: Inadequate anticoagulation ICD-10-CM: Z51.81, Z79.01 
ICD-9-CM: V58.83, V58.61  1/8/2015 - 12/18/2015 RESOLVED: Atrial fibrillation, new onset ([facility] ICD-10-CM: I48.91 
ICD-9-CM: 427.31  1/8/2015 - 11/14/2015 Thyroid function test abnormal ICD-10-CM: R94.6 ICD-9-CM: 794.5  1/16/2019 - Present * (Principal) COPD with acute exacerbation ([facility] ICD-10-CM: J44.1 ICD-9-CM: 491.21  1/6/2019 - Present Signed:   Rogers Kendall MD 
               Family Medicine Resident                1/23/2019 
               1:40 PM 
 
 
 
Cc: Alhaji Barry DO  
 
 
 [address] Medicine Residency Attending Addendum: 
  
I was NOT present with the resident during the interview & examination of the patient. I personally interviewed the patient & repeated the critical or key portions of the exam.  I agree with the resident's note. 
  
Melissa Soliz was a jesus 66 y.o. female with multiple chronic conditions most notably COPD, anemia, Afib, rheumatic heart disease s/p AV and MV replacement on chronic anticoagulation who has been in the hospital since 1/6/19 with acute on chronic hypoxic, hypercapneic respiratory failure 2/2 COPD exacerbation. Her stay was later complicated by GI bleed, transaminitis and acute renal failure. While her respiratory status, hgb, LFTs and Cr seemed to stabilize somewhat over the last 24-48 hrs, her energy and appetite had been steadily declining. Her  voiced he believed she had \"given up the will to live. \" Today, I suspect she became acutely more acidotic 2/2 air trapping and poor respiratory drive, leading to the need for BIPAP.  After discussion with family, patient was transitioned to comfort care and passed shortly thereafter.  
Lupe Adams MD  
Pt seen and examined on 1/22/2019 during morning rounds as well as in the evening between 5:10 and 5:30pm

## 2019-01-23 NOTE — PROGRESS NOTES
I was paged after Ms. Girtha Severe had . I provided pastoral support to her family as they collected her belongings. They have since left the hospital. 
 
Rev. Philippe Escudero M.Div, [facility] Lead

## 2019-01-23 NOTE — PROGRESS NOTES
Called to examine patient who has . No response to verbal and tactile stimuli. No respiratory effort. Absent heart sounds and pulses. Pupils fixed and dilated. Patient pronounced dead at 8:57 PM hours. Ti Parker MD 
Family Medicine Resident

## 2019-01-24 ENCOUNTER — HOME CARE VISIT (OUTPATIENT)
Dept: HOME HEALTH SERVICES | Facility: HOME HEALTH | Age: 64
End: 2019-01-24
Payer: MEDICARE

## 2019-02-06 NOTE — ED NOTES
Admission Time Out    Check signed & held orders:  [x] Yes or  [] No     Assess Vital Signs over the last 2 hours:  [x] Stable or  [] Unstable     Patient Vitals for the past 4 hrs:   Pulse Resp BP SpO2   07/26/18 1445 97 23 164/68 99 %   07/26/18 1430 95 27 145/66 98 %   07/26/18 1415 90 30 (!) 159/92 99 %   07/26/18 1400 95 20 152/83 100 %   07/26/18 1345 98 27 148/58 99 %   07/26/18 1330 94 22 149/47 97 %   07/26/18 1315 (!) 103 27 153/82 98 %   07/26/18 1300 100 24 131/65 97 %   07/26/18 1245 (!) 106 23 130/69 99 %   07/26/18 1218 (!) 102 21 152/73 99 %   07/26/18 1142 96 20 127/67 98 %   07/26/18 1115 93 26 161/65 99 %       Does this patient meet Code Purple criteria or other Core Measure protocol? [] Yes or  [x] No or  [] Already being treated     Unit patient assigned to: Remote tele                   Does the patient need any special isolation? []  Yes or  [x] No    Is the assigned unit appropriate? [x] Yes or  [] No    Does the patient need to be transported on a monitor and/or has critical drips? [x] Yes or  [] No or  [] Order obtained to travel without Monitor/nurse    Any needs/concerns that need to be addressed prior to admission? (i.e. ED/Admit provider or Nursing Supervisor)      [] Yes or  [x] No    Does patient require IV fluid continued on admission?                               [x]  Yes or  [] No      Stepan Ortega RN
Attempted to call report, unable to receive at this time.
Family practice at bedside.
O2 sats at 91-92% on room air. Pt is not on home oxygen because she \"can't afford it\". NC applied with oxygen at 2 lpm. Pt has a congested cough, utilizing pursed lip breathing at this time.
Per CT, pt refused to lay flat for CT scan and refused CT Scan. Dr. Chase Jade told.
Pt reports some relief after neb tx. Pt has more productive cough now. O2 sats at 97% at this time.
Pt to CT.
Pt to xray.
Pt updated on bed assignment.
Received verbal/telephone order from [facility] to travel to floor without monitor.
TRANSFER - OUT REPORT:    Verbal report given to Iraida(name) on Kathrine Saleh  being transferred to 5th floor(unit) for routine progression of care       Report consisted of patients Situation, Background, Assessment and   Recommendations(SBAR). Information from the following report(s) SBAR, ED Summary, MAR, Recent Results and Cardiac Rhythm afib was reviewed with the receiving nurse. Lines:   Peripheral IV 07/26/18 Left Antecubital (Active)   Site Assessment Clean, dry, & intact 7/26/2018 10:20 AM   Phlebitis Assessment 0 7/26/2018 10:20 AM   Infiltration Assessment 0 7/26/2018 10:20 AM   Dressing Type Tape;Transparent 7/26/2018 10:20 AM   Hub Color/Line Status Pink;Flushed;Patent 7/26/2018 10:20 AM   Action Taken Blood drawn 7/26/2018 10:20 AM        Opportunity for questions and clarification was provided.       Patient transported with:   O2 @ 2 liters  Tech
unknown

## 2019-04-26 NOTE — PROGRESS NOTES
[address] [facility]  
Senior Resident Admission Note CC: chest pain and shortness of breath. HPI: 
Jamie Hollingsworth is a 66 y.o. female with history of atrial fibrillation, COPD (on baseline 3L), MVR, AVR, diastolic CHF who presents to the ER complaining of chest pain. Reports pain started earlier this morning and was left sided and radiated to her left upper extremity. She reports the pain was initially a 7/10 on the pain scale, however had resolved at time of interview. Patient also noted she has chronic SOB which got worse today. She also has had a cough for the past two weeks with no increased in sputum production. Vitals in the ED were notable for -120's. RR 20-30's and sating 94% on 3L. EKG was notable for atrial fibrillation. Labs in the ED were notable for a troponin of <0.05, K 2.0, Mg - 1.4, WBC - 18.6 (previously 16.6), K 2.0, Mg 1.4, Hgb 9.6 (baseline 10-11), INR 2.5, D dimer 0.33. Pro  (baseline 700) ABG - 7.54, pC02 47, bicarbonate 40 (metabolic alkalosis with respiratory alkalosis) In the ED she received Nitroglycerin 0.4mg, Klor- Con 40 mg, IV Kcl 10 mEq (x4) and Rocephin 1g. Complained of feeling warm and felt like her mouth was numb. Received Benadryl, Pepcid and steroids with improvement in her symptoms. XR chest - left costophrenic blunting which may indicate a small left pleural effusion vs chronic pleural effusion. Chart reviewed. Patient seen, examined, and discussed with Dr. Karl Light (PGY-1). See admission note for more details. Visit Vitals BP 99/84 Pulse (!) 104 Temp 98.1 °F (36.7 °C) Resp 25 Ht 5' 4\" (1.626 m) Wt 134 lb (60.8 kg) SpO2 97% BMI 23.00 kg/m² Physical Exam  
Constitutional: She is oriented to person, place, and time. HENT:  
Head: Normocephalic and atraumatic. Cardiovascular:  
Irregularly irregular rhythm Pulmonary/Chest: She is in respiratory distress. She has no rales.  She exhibits no tenderness. Poor air movement. No wheezing or rales appreciated. Abdominal: Soft. Bowel sounds are normal. She exhibits no distension. There is no tenderness. Neurological: She is alert and oriented to person, place, and time. Skin: Skin is warm. She is diaphoretic. A/P: 
 
65 yo female with history of atrial fibrillation, COPD, diastolic heart failure who is admitted for chest pain and shortness of breath 1. Acute hypercapnic respiratory failure likely secondary to COPD:  
      Admit to telemetry, vitals per unit protocol. Received Solumedrol 125mg in ED. Continue Duonebs every 4 hours. Pulmonology consult, appreciate recs. Follow up RVP. Repeat XR chest in AM. 2. Chest pain:  
       Troponin- negative x1. Trend for 3 occurrences. EKG - atrial fibrillation. Cardiology consult, appreciate recs. 3. Hypokalemia. hypomagnesemia - Received 80mEq in ED. Replete with oral 40mEq x3 doses. Recheck in AM. Replete and recheck Mg. Hold home Bumex 4. Atrial fibrillation with RVR: Rate improved after PO Cardizem. Continue Cardizem 60mg qid. Continue Warfarin, Pharmacy to dose. 5. UTI Follow up urine culture. Discontinue Rocephin due to reaction. Levaquin 750 mg daily. Pt discussed with Dr Julianne Simon (on-call attending physician) Marquis Sunitha MD 
Family Medicine Resident Per patient, no steps to enter [facility] kar[facility]

## 2019-12-29 NOTE — PROGRESS NOTES
Shift Summary 2113: Blood administration began  
0025: Transfusion completed Care Plan Summary Problem: Falls - Risk of 
Goal: *Absence of Falls Document Amnaalejo Garcia Fall Risk and appropriate interventions in the flowsheet. Outcome: Progressing Towards Goal 
Fall Risk Interventions: 
Mobility Interventions: Bed/chair exit alarm, Communicate number of staff needed for ambulation/transfer, Utilize walker, cane, or other assistive device, PT Consult for mobility concerns, PT Consult for assist device competence Mentation Interventions: Adequate sleep, hydration, pain control, Bed/chair exit alarm, Door open when patient unattended, Evaluate medications/consider consulting pharmacy, Eyeglasses and hearing aids, Familiar objects from home, Family/sitter at bedside, Gait belt with transfers/ambulation, HELP ([address]) if available, Increase mobility, More frequent rounding, Reorient patient, Room close to nurse's station, Self-releasing belt, Toileting rounds, Update white board Medication Interventions: Bed/chair exit alarm, Patient to call before getting OOB, Teach patient to arise slowly Elimination Interventions: Bed/chair exit alarm, Call light in reach, Patient to call for help with toileting needs Problem: Pressure Injury - Risk of 
Goal: *Prevention of pressure injury Document Jose Scale and appropriate interventions in the flowsheet. Outcome: Progressing Towards Goal 
Pressure Injury Interventions: 
Sensory Interventions: Assess changes in LOC, Assess need for specialty bed, Keep linens dry and wrinkle-free Moisture Interventions: Absorbent underpads, Internal/External urinary devices, Limit adult briefs, Check for incontinence Q2 hours and as needed Activity Interventions: Increase time out of bed, Pressure redistribution bed/mattress(bed type), PT/OT evaluation Mobility Interventions: Pressure redistribution bed/mattress (bed type), PT/OT evaluation Nutrition Interventions: Document food/fluid/supplement intake Friction and Shear Interventions: Lift sheet, Transferring/repositioning devices Problem: Chronic Obstructive Pulmonary Disease (COPD) Goal: *Absence of hypoxia Outcome: Progressing Towards Goal 
Maintaining o2 on home oxygen requirement Problem: Anemia Care Plan (Adult and Pediatric) Goal: *Labs within defined limits Outcome: Progressing Towards Goal 
1 U PRBC transfussed no stated FHx.

## 2020-10-09 NOTE — PROGRESS NOTES
10/9/2020 2:51 PM 
 
Re: Cesar Nieto :  2005 To Whom It May Concern: 
 
Carmen is a patient at [facility]. Please allow her to carry moisturizing cream (Vaseline, Aquaphor or Cerave cream) to use in school as needed. If you have any questions, please have her mother contact our office. Thank you. Sincerely, Miky Morris MD 
 
 Called by RN as patient lost access. Refusing to be stuck without access. Pt needs transfusion of 1 pRBC. Called anesthesia, and they will attempt access. José Hudson MD 
Family Medicine Resident Addendum 11:04 AM------------ Anesthesia assessed pt and recommended a central line, but pt refused central line after discussing risks of not gaining access. Pt was informed that she would be unable to receive the unit of pRBCs recommended. She states that she will not have anything covering her face, and that she is too claustrophobic to tolerate the procedure. She would like to wait until morning to receive a PICC line. She agreed to sign an AMA form.

## 2020-11-09 NOTE — PROGRESS NOTES
Bedside and Verbal shift change report given to Patricia (oncoming nurse) by ketan (offgoing nurse). Report included the following information SBAR, Kardex, ED Summary, Procedure Summary, Intake/Output, MAR, Recent Results and Cardiac Rhythm afib. SHIFT SUMMARY:  Uneventful shift. Pt weaned to 2L O2. History & Physical - Short Stay  Gastroenterology      SUBJECTIVE:     Procedure: Colonoscopy    Chief Complaint/Indication for Procedure: Previous Polyps    PTA Medications   Medication Sig    aspirin (ECOTRIN) 81 MG EC tablet Take 1 tablet (81 mg total) by mouth once daily.    atorvastatin (LIPITOR) 10 MG tablet Take one tab by mouth daily    BIOTIN ORAL Take by mouth.    cetirizine (ZYRTEC) 10 MG tablet Take 1 tablet (10 mg total) by mouth once daily.    DAILY MULTI-VITAMIN ORAL Take by mouth.    enalapril (VASOTEC) 10 MG tablet TAKE 1 TABLET EVERY DAY    esomeprazole (NEXIUM) 40 MG capsule TAKE 1 CAPSULE EVERY DAY    estradioL (ESTRACE) 0.5 MG tablet TAKE 1 TABLET (0.5 MG TOTAL) BY MOUTH ONCE DAILY.    fluticasone propionate (FLONASE) 50 mcg/actuation nasal spray One spray in each nostril once daily    melatonin 5 mg Tab Take by mouth.    triamterene-hydrochlorothiazide 37.5-25 mg (DYAZIDE) 37.5-25 mg per capsule TAKE 1 CAPSULE EVERY MORNING    vit C/E/Zn/coppr/lutein/zeaxan (PRESERVISION AREDS-2 ORAL) Take 1 capsule by mouth 2 (two) times daily.       Review of patient's allergies indicates:   Allergen Reactions    Keflex [cephalexin] Shortness Of Breath    Betadine rtu      Other reaction(s): Rash    Codeine Nausea And Vomiting     Other reaction(s): Vomiting    Iodinated contrast media Swelling     Other reaction(s): Itching  Other reaction(s): Hives  Other reaction(s): Difficulty breathing    Iodine Hives     Other reaction(s): Hives  Other reaction(s): Hives    Oxycodone hcl-oxycodone-asa Nausea And Vomiting    Percodan  [oxycodone-aspirin]      Other reaction(s): Vomiting    Povidone-iodine Swelling    Adhesive Rash     Other reaction(s): skin sloughing  Other reaction(s): skin sloughing        Past Medical History:   Diagnosis Date    Arthritis     CVA (cerebral vascular accident) 11/22/2019    CVA (cerebral vascular accident) 11/22/2019    -Hx of CVA affecting eye in 2016  -Followed by ophthalmology. -On statin and plan to start ASA     Fibromyalgia     Hyperlipidemia     Hypertension     Meniere's disease     Obesity     DEBRA (obstructive sleep apnea) 2019    Primary osteoarthritis involving multiple joints 2010    Raynaud's disease      Past Surgical History:   Procedure Laterality Date    APPENDECTOMY      BACK SURGERY      rods and screws placed     SECTION      x 2    CHOLECYSTECTOMY      gastric sleeve  10/04/2017    HYSTERECTOMY      KNEE SURGERY      OOPHORECTOMY      SHOULDER SURGERY      TONSILLECTOMY       Family History   Problem Relation Age of Onset    Hypertension Mother     Raynaud syndrome Mother     Coronary artery disease Mother      Social History     Tobacco Use    Smoking status: Former Smoker     Packs/day: 1.00     Years: 10.00     Pack years: 10.00    Smokeless tobacco: Never Used    Tobacco comment: quit 20 years ago   Substance Use Topics    Alcohol use: Yes     Comment: rarely    Drug use: No         OBJECTIVE:     Vital Signs (Most Recent)  Temp: 97 °F (36.1 °C) (20)  Pulse: 87 (20)  Resp: 16 (20)  BP: 137/63 (20)  SpO2: 100 % (20)    Physical Exam:                                                       GENERAL:  Comfortable, in no acute distress.                                 HEENT EXAM:  Nonicteric.  No adenopathy.  Oropharynx is clear.               NECK:  Supple.                                                               LUNGS:  Clear.                                                               CARDIAC:  Regular rate and rhythm.  S1, S2.  No murmur.                      ABDOMEN:  Soft, positive bowel sounds, nontender.  No hepatosplenomegaly or masses.  No rebound or guarding.                                             EXTREMITIES:  No edema.     MENTAL STATUS:  Normal, alert and oriented.      ASSESSMENT/PLAN:     Assessment: Previous  Polyps    Plan: Colonoscopy    Anesthesia Plan: General    ASA Grade: ASA 2 - Patient with mild systemic disease with no functional limitations    MALLAMPATI SCORE:  I (soft palate, uvula, fauces, and tonsillar pillars visible)

## 2022-08-29 NOTE — PROGRESS NOTES
You received an injection of a powerful NSAID today (Toradol).  Please do not take another NSAID (ie aspirin, ibuprofen, Aleve, Advil or Motrin) until tomorrow.  If you continue to have pain, you may take Tylenol (acetaminophen) if you are not allergic to this medication.  Take a medicines with food and not empty stomach as these medicines can irritate the stomach.  If you feel any heartburn or dyspepsia, use over-the-counter Pepcid or Prilosec to counter.     Alice Sullivan is a 63 y.o. female   Chief Complaint   Patient presents with    Labs    Medication Refill    pt here for f/u and states that she was given pain meds by her dentist due to having her teeth replaced. Pt reminded that we have a pain contract and can not obtain pain meds from any other provider. Pt advised that she will be terminated if this were to occur again. Rediscussed pain contract and  checked will check utox. Last pain med was last night. Pain meds for pt chronic pain from her compression fx of spine. Pt also with chronic hypokalemia but uses otc K which has seemed to not help in the past yet pt continues to use does not tolerate Rx   And states has elham eating a higher K diet. Pt also with anemia and not currently taking any iron no fatigue, sob with exertion. Later states she is taking a MVi with iron. she is a 63y.o. year old female who presents for evalution. Reviewed PmHx, RxHx, FmHx, SocHx, AllgHx and updated and dated in the chart. Review of Systems - negative except as listed above in the HPI    Objective:     Vitals:    03/29/17 0713   BP: 122/70   Pulse: 83   Resp: 18   Temp: 98.2 °F (36.8 °C)   TempSrc: Oral   SpO2: 97%   Weight: 125 lb (56.7 kg)   Height: 5' 4\" (1.626 m)       Current Outpatient Prescriptions   Medication Sig    oxyCODONE-acetaminophen (PERCOCET) 5-325 mg per tablet Take 1 Tab by mouth every six (6) hours as needed for Pain. Max Daily Amount: 4 Tabs.  warfarin (COUMADIN) 2 mg tablet Take 1 Tab by mouth daily.  lovastatin (MEVACOR) 10 mg tablet TAKE 1 TABLET EVERY NIGHT    ferrous sulfate (SLOW FE) 142 mg (45 mg iron) ER tablet Take 1 Tab by mouth two (2) times daily (with meals).  famotidine (PEPCID) 40 mg tablet Take 1 Tab by mouth two (2) times a day.  docusate sodium (COLACE) 100 mg capsule Take 100 mg by mouth two (2) times daily as needed for Constipation.     budesonide-formoterol (SYMBICORT) 160-4.5 mcg/actuation HFA inhaler Take 2 Puffs by inhalation two (2) times a day.  bumetanide (BUMEX) 1 mg tablet Take 1 Tab by mouth daily.  diltiazem (CARDIZEM) 60 mg tablet Take 1 Tab by mouth Before breakfast, lunch, dinner and at bedtime.  alendronate (FOSAMAX) 70 mg tablet Take 1 Tab by mouth every Wednesday.  albuterol (PROVENTIL VENTOLIN) 2.5 mg /3 mL (0.083 %) nebulizer solution 3 mL by Nebulization route every four (4) hours as needed for Wheezing or Shortness of Breath.  albuterol (VENTOLIN HFA) 90 mcg/actuation inhaler Take 2 Puffs by inhalation every four (4) hours as needed for Wheezing or Shortness of Breath.  enoxaparin (LOVENOX) 60 mg/0.6 mL injection 60 mg by SubCUTAneous route every twelve (12) hours. No current facility-administered medications for this visit. Physical Examination: General appearance - alert, well appearing, and in no distress  Eyes - pupils equal and reactive, extraocular eye movements intact  Chest - clear to auscultation, no wheezes, rales or rhonchi, symmetric air entry  Heart - normal rate, regular rhythm, normal S1, S2, no murmurs, rubs, clicks or gallops  Back exam - limited range of motion, pain with motion noted during exam, tenderness noted midline and b/l paraspinal lumbar. Assessment/ Plan:   Suzy Islas was seen today for labs and medication refill. Diagnoses and all orders for this visit:    Medication monitoring encounter  -     COMPLIANCE DRUG SCREEN/PRESCRIPTION MONITORING  -     PROTHROMBIN TIME + INR    Chronic midline low back pain without sciatica  -     oxyCODONE-acetaminophen (PERCOCET) 5-325 mg per tablet; Take 1 Tab by mouth every six (6) hours as needed for Pain. Max Daily Amount: 4 Tabs. Lumbar compression fracture, sequela  -     oxyCODONE-acetaminophen (PERCOCET) 5-325 mg per tablet; Take 1 Tab by mouth every six (6) hours as needed for Pain. Max Daily Amount: 4 Tabs.     Hypokalemia  -     METABOLIC PANEL, BASIC    Iron deficiency anemia, unspecified iron deficiency anemia type  -     CBC WITH AUTOMATED DIFF    S/P AVR (aortic valve replacement)  -     PROTHROMBIN TIME + INR       Follow-up Disposition:  Return in about 1 month (around 4/29/2017), or if symptoms worsen or fail to improve. I have discussed the diagnosis with the patient and the intended plan as seen in the above orders. The patient has received an after-visit summary and questions were answered concerning future plans. Pt conveyed understanding of plan.     Medication Side Effects and Warnings were discussed with patient      Leisa Mccoy DO

## 2022-09-27 NOTE — PATIENT INSTRUCTIONS

## 2023-05-17 NOTE — PROGRESS NOTES
Patient sitting in chair at bedside. Good eye contact, friendly. Says she feels about the same as she has and is feeling a little discouraged. Spoke briefly about her present thoughts, feelings, and concerns. Her desire is to be able to return home; says she may be attending rehab prior to going home. Requested prayer for perseverance and strength as well as for her health. She appeared comforted and encouraged as a result of this visit and expressed gratitude for this prayer and visit. Visited by Rev. Hui Segal, [facility]  paging service: [phone] What Type Of Note Output Would You Prefer (Optional)?: Standard Output What Is The Reason For Today's Visit?: Full Body Skin Examination

## 2024-03-08 NOTE — PROGRESS NOTES
Continue high dose statin therapy     Jayleen Barajas is a 66 y.o. female Chief Complaint Patient presents with  Labs INR pt here for f/u of her INR. Currently at 3.6 and should be between 2.5-3.5 for artifical heart valve. No issues with bleeding. Taking 3mg everyday britta change to 3mg all days except 2 mg Friday. Pt is unable to afford daliresp and britta ld/c this states was $80. 
 
she is a 66y.o. year old female who presents for evalution. Reviewed PmHx, RxHx, FmHx, SocHx, AllgHx and updated and dated in the chart. Review of Systems - negative except as listed above in the HPI Objective:  
 
Vitals:  
 11/15/18 0947 BP: 124/54 Pulse: 90 Resp: 16 Temp: 98.1 °F (36.7 °C) TempSrc: Oral  
SpO2: 100% Weight: 136 lb (61.7 kg) Height: 5' 4\" (1.626 m) Current Outpatient Medications Medication Sig  warfarin (COUMADIN) 1 mg tablet 3mg everyday except for Friday 2mg  lisinopril (PRINIVIL, ZESTRIL) 5 mg tablet Take 1 Tab by mouth daily.  bumetanide (BUMEX) 1 mg tablet Take 1 Tab by mouth every evening.  OXYGEN-AIR DELIVERY SYSTEMS Take 2 L by inhalation as needed (shortness of breath).  predniSONE (DELTASONE) 10 mg tablet Prednisone 10mg tabs: 11/2: 2 tabs (20mg) 11/3: 2 tabs (20mg) 11/4: 2 tabs (20mg) 11/5: 1 tab (10mg) 11/6: 1 tab (10mg) 11/7: 1 tab (10mg)  nystatin (MYCOSTATIN) 100,000 unit/mL suspension Take 5 mL by mouth four (4) times daily. swish and spit  oxyCODONE-acetaminophen (PERCOCET) 5-325 mg per tablet Take 1 Tab by mouth every six (6) hours as needed for Pain for up to 30 days. Max Daily Amount: 4 Tabs. Must last 30 days  lovastatin (MEVACOR) 10 mg tablet Take 10 mg by mouth nightly.  oxyCODONE-acetaminophen (PERCOCET) 5-325 mg per tablet Take 1 Tab by mouth every four (4) hours as needed for Pain.  fluticasone-umeclidin-vilanter (TRELEGY ELLIPTA) 100-62.5-25 mcg dsdv Take 1 Puff by inhalation nightly.  warfarin (COUMADIN) 1 mg tablet Take 3 mg by mouth five (5) days a week. Take 2 mg on Thursday & Friday Take 3 mg all other days  fluticasone-umeclidin-vilanter (TRELEGY ELLIPTA) 100-62.5-25 mcg dsdv Take 1 Puff by inhalation daily.  warfarin (COUMADIN) 1 mg tablet 3mg every day except Tuesday Thursday take 1mg f/u 1 week  albuterol (VENTOLIN HFA) 90 mcg/actuation inhaler Take 2 Puffs by inhalation every four (4) hours as needed for Wheezing or Shortness of Breath.  nystatin (MYCOSTATIN) 100,000 unit/mL suspension Take 5 ml by mouth and swish and split 4 times daily from 7/31/2018 to 8/4/2018  Indications: oral candidiasis  predniSONE (DELTASONE) 10 mg tablet Resume on 8/13/2018 after completing prednisone taper (Patient taking differently: Resume on 11/9//2018 after completing prednisone taper)  docusate sodium (COLACE) 100 mg capsule Take 100 mg by mouth every evening.  potassium chloride SR (KLOR-CON 10) 10 mEq tablet Take 10 mEq by mouth daily.  lovastatin (MEVACOR) 10 mg tablet TAKE 1 TABLET EVERY NIGHT  dilTIAZem (CARDIZEM) 60 mg tablet TAKE 1 TABLET BEFORE BREAKFAST, LUNCH, DINNER AND AT BEDTIME  budesonide-formoterol (SYMBICORT) 160-4.5 mcg/actuation HFAA INHALE 2 PUFFS TWICE DAILY  guaiFENesin-dextromethorphan SR (MUCINEX DM) 600-30 mg per tablet Take 1 Tab by mouth two (2) times a day. No current facility-administered medications for this visit. Physical Examination: General appearance - alert, well appearing, and in no distress Chest - clear to auscultation, no wheezes, rales or rhonchi, symmetric air entry Heart - regular rhythm with audible click Assessment/ Plan:  
Diagnoses and all orders for this visit: 
 
1. S/P AVR (aortic valve replacement) -     AMB POC PT/INR 2. Centrilobular emphysema ([address] Other orders -     warfarin (COUMADIN) 1 mg tablet; 3mg everyday except for Friday 2mg 
 
 off daliresp due to cost 
Follow-up Disposition: Return in about 2 weeks (around 11/29/2018), or if symptoms worsen or fail to improve. I have discussed the diagnosis with the patient and the intended plan as seen in the above orders. The patient has received an after-visit summary and questions were answered concerning future plans. Pt conveyed understanding of plan. Medication Side Effects and Warnings were discussed with patient Buddy Borges, DO

## 2024-10-23 NOTE — PROGRESS NOTES
Patient scheduled to have a right TKA at [facility] and the surgeon is requesting a clearance from Dr. Cox stating that the patient is cleared from a neurology standpoint.  They are also asking about any medications that need to be discontinued prior to surgery.  Patient is currently prescribed Gabapentin 300 mg TID from Dr. Cox.    Routing to provider to review.   Your liver function tests are normal now. Your WBC are still elevated but has come down slightly. We will recheck this again in a month.

## (undated) DEVICE — DEVICE DEL CAP ADV 2.5MMX180CM --

## (undated) DEVICE — BINDER ABD H12IN FOR 30-45IN WAIST UNIV 4 PNL PREM DSGN E

## (undated) DEVICE — Z DISCONTINUIED USE 2173699 ENDOSCOPE CAPSULE PILLCAM SB2